# Patient Record
Sex: MALE | Race: WHITE | Employment: OTHER | URBAN - METROPOLITAN AREA
[De-identification: names, ages, dates, MRNs, and addresses within clinical notes are randomized per-mention and may not be internally consistent; named-entity substitution may affect disease eponyms.]

---

## 2017-02-10 ENCOUNTER — TELEPHONE (OUTPATIENT)
Dept: PRIMARY CARE CLINIC | Age: 57
End: 2017-02-10

## 2017-04-07 DIAGNOSIS — E11.618 TYPE 2 DIABETES MELLITUS WITH OTHER DIABETIC ARTHROPATHY (HCC): Primary | ICD-10-CM

## 2017-04-07 DIAGNOSIS — E11.69 DM TYPE 2 WITH DIABETIC DYSLIPIDEMIA (HCC): ICD-10-CM

## 2017-04-07 DIAGNOSIS — E11.618 TYPE 2 DIABETES MELLITUS WITH OTHER DIABETIC ARTHROPATHY (HCC): ICD-10-CM

## 2017-04-07 DIAGNOSIS — I10 ESSENTIAL HYPERTENSION: ICD-10-CM

## 2017-04-07 DIAGNOSIS — E78.5 DM TYPE 2 WITH DIABETIC DYSLIPIDEMIA (HCC): ICD-10-CM

## 2017-04-07 LAB
ANION GAP SERPL CALCULATED.3IONS-SCNC: 18 MEQ/L (ref 7–13)
BUN BLDV-MCNC: 14 MG/DL (ref 6–20)
CALCIUM SERPL-MCNC: 9.7 MG/DL (ref 8.6–10.2)
CHLORIDE BLD-SCNC: 90 MEQ/L (ref 98–107)
CO2: 23 MEQ/L (ref 22–29)
CREAT SERPL-MCNC: 0.79 MG/DL (ref 0.7–1.2)
GFR AFRICAN AMERICAN: >60
GFR NON-AFRICAN AMERICAN: >60
GLUCOSE BLD-MCNC: 458 MG/DL (ref 74–109)
HBA1C MFR BLD: 15.3 % (ref 4.8–5.9)
POTASSIUM SERPL-SCNC: 4.2 MEQ/L (ref 3.5–5.1)
SODIUM BLD-SCNC: 131 MEQ/L (ref 132–144)

## 2017-04-07 RX ORDER — LOSARTAN POTASSIUM 100 MG/1
100 TABLET ORAL DAILY
Qty: 90 TABLET | Refills: 1 | Status: SHIPPED | OUTPATIENT
Start: 2017-04-07 | End: 2017-11-22 | Stop reason: SDUPTHER

## 2017-04-09 LAB — C-PEPTIDE: 1.7 NG/ML (ref 0.8–3.5)

## 2017-04-14 DIAGNOSIS — E11.69 DM TYPE 2 WITH DIABETIC DYSLIPIDEMIA (HCC): ICD-10-CM

## 2017-04-14 DIAGNOSIS — E78.5 DM TYPE 2 WITH DIABETIC DYSLIPIDEMIA (HCC): ICD-10-CM

## 2017-04-14 RX ORDER — INSULIN LISPRO 100 [IU]/ML
INJECTION, SUSPENSION SUBCUTANEOUS
Qty: 90 ML | Refills: 3 | Status: SHIPPED | OUTPATIENT
Start: 2017-04-14 | End: 2017-04-14 | Stop reason: SDUPTHER

## 2017-04-14 RX ORDER — INSULIN LISPRO 100 [IU]/ML
INJECTION, SUSPENSION SUBCUTANEOUS
Qty: 90 ML | Refills: 3 | Status: SHIPPED | OUTPATIENT
Start: 2017-04-14 | End: 2019-03-26 | Stop reason: SDUPTHER

## 2017-11-16 DIAGNOSIS — I10 ESSENTIAL HYPERTENSION: ICD-10-CM

## 2017-11-16 RX ORDER — LOSARTAN POTASSIUM 100 MG/1
100 TABLET ORAL DAILY
Qty: 90 TABLET | OUTPATIENT
Start: 2017-11-16

## 2017-11-22 DIAGNOSIS — I10 ESSENTIAL HYPERTENSION: ICD-10-CM

## 2017-11-22 RX ORDER — LOSARTAN POTASSIUM 100 MG/1
100 TABLET ORAL DAILY
Qty: 30 TABLET | Refills: 0 | Status: SHIPPED | OUTPATIENT
Start: 2017-11-22 | End: 2018-06-19

## 2018-03-14 ENCOUNTER — TELEPHONE (OUTPATIENT)
Dept: ENDOCRINOLOGY | Age: 58
End: 2018-03-14

## 2018-06-19 ENCOUNTER — TELEPHONE (OUTPATIENT)
Dept: PRIMARY CARE CLINIC | Age: 58
End: 2018-06-19

## 2018-06-19 ENCOUNTER — OFFICE VISIT (OUTPATIENT)
Dept: PRIMARY CARE CLINIC | Age: 58
End: 2018-06-19
Payer: COMMERCIAL

## 2018-06-19 VITALS
HEART RATE: 100 BPM | BODY MASS INDEX: 30.31 KG/M2 | WEIGHT: 193.1 LBS | HEIGHT: 67 IN | TEMPERATURE: 98 F | DIASTOLIC BLOOD PRESSURE: 80 MMHG | OXYGEN SATURATION: 97 % | SYSTOLIC BLOOD PRESSURE: 130 MMHG | RESPIRATION RATE: 14 BRPM

## 2018-06-19 DIAGNOSIS — M79.661 PAIN OF RIGHT CALF: ICD-10-CM

## 2018-06-19 DIAGNOSIS — J45.902 PERSISTENT ASTHMA WITH STATUS ASTHMATICUS, UNSPECIFIED ASTHMA SEVERITY: Primary | ICD-10-CM

## 2018-06-19 DIAGNOSIS — R07.89 OTHER CHEST PAIN: ICD-10-CM

## 2018-06-19 PROCEDURE — 99214 OFFICE O/P EST MOD 30 MIN: CPT | Performed by: INTERNAL MEDICINE

## 2018-06-19 RX ORDER — ALBUTEROL SULFATE 90 UG/1
2 AEROSOL, METERED RESPIRATORY (INHALATION) EVERY 6 HOURS PRN
COMMUNITY
End: 2018-12-28 | Stop reason: SDUPTHER

## 2018-06-19 RX ORDER — PREDNISONE 10 MG/1
10 TABLET ORAL DAILY
Qty: 10 TABLET | Refills: 1 | Status: SHIPPED | OUTPATIENT
Start: 2018-06-19 | End: 2018-06-29

## 2018-06-19 RX ORDER — FLUTICASONE FUROATE AND VILANTEROL 100; 25 UG/1; UG/1
1 POWDER RESPIRATORY (INHALATION) DAILY
Qty: 1 EACH | Refills: 5 | Status: SHIPPED | OUTPATIENT
Start: 2018-06-19 | End: 2018-12-26 | Stop reason: SDUPTHER

## 2018-06-19 RX ORDER — PREDNISONE 10 MG/1
TABLET ORAL
Refills: 0 | COMMUNITY
Start: 2018-06-17 | End: 2018-08-14 | Stop reason: ALTCHOICE

## 2018-06-19 ASSESSMENT — PATIENT HEALTH QUESTIONNAIRE - PHQ9
SUM OF ALL RESPONSES TO PHQ QUESTIONS 1-9: 0
2. FEELING DOWN, DEPRESSED OR HOPELESS: 0
1. LITTLE INTEREST OR PLEASURE IN DOING THINGS: 0
SUM OF ALL RESPONSES TO PHQ9 QUESTIONS 1 & 2: 0

## 2018-06-20 ENCOUNTER — HOSPITAL ENCOUNTER (EMERGENCY)
Age: 58
Discharge: HOME | End: 2018-06-20
Attending: EMERGENCY MEDICINE
Payer: COMMERCIAL

## 2018-06-20 VITALS
RESPIRATION RATE: 18 BRPM | OXYGEN SATURATION: 100 % | DIASTOLIC BLOOD PRESSURE: 83 MMHG | WEIGHT: 193 LBS | HEIGHT: 67 IN | HEART RATE: 112 BPM | TEMPERATURE: 98.3 F | BODY MASS INDEX: 30.29 KG/M2 | SYSTOLIC BLOOD PRESSURE: 158 MMHG

## 2018-06-20 DIAGNOSIS — S86.111A GASTROCNEMIUS MUSCLE RUPTURE, RIGHT, INITIAL ENCOUNTER: Primary | ICD-10-CM

## 2018-06-20 PROCEDURE — 99282 EMERGENCY DEPT VISIT SF MDM: CPT

## 2018-06-20 PROCEDURE — 6370000000 HC RX 637 (ALT 250 FOR IP): Performed by: EMERGENCY MEDICINE

## 2018-06-20 PROCEDURE — 29505 APPLICATION LONG LEG SPLINT: CPT

## 2018-06-20 PROCEDURE — 6360000002 HC RX W HCPCS: Performed by: EMERGENCY MEDICINE

## 2018-06-20 RX ORDER — HYDROCODONE BITARTRATE AND ACETAMINOPHEN 5; 325 MG/1; MG/1
1 TABLET ORAL EVERY 6 HOURS PRN
Qty: 24 TABLET | Refills: 0 | Status: SHIPPED | OUTPATIENT
Start: 2018-06-20 | End: 2018-06-26

## 2018-06-20 RX ORDER — ONDANSETRON 4 MG/1
4 TABLET, ORALLY DISINTEGRATING ORAL ONCE
Status: COMPLETED | OUTPATIENT
Start: 2018-06-20 | End: 2018-06-20

## 2018-06-20 RX ORDER — OXYCODONE HYDROCHLORIDE AND ACETAMINOPHEN 5; 325 MG/1; MG/1
2 TABLET ORAL ONCE
Status: COMPLETED | OUTPATIENT
Start: 2018-06-20 | End: 2018-06-20

## 2018-06-20 RX ADMIN — ONDANSETRON 4 MG: 4 TABLET, ORALLY DISINTEGRATING ORAL at 13:01

## 2018-06-20 RX ADMIN — OXYCODONE HYDROCHLORIDE AND ACETAMINOPHEN 2 TABLET: 5; 325 TABLET ORAL at 13:00

## 2018-06-20 ASSESSMENT — ENCOUNTER SYMPTOMS
VOMITING: 0
WHEEZING: 0
ABDOMINAL PAIN: 0
EYES NEGATIVE: 1
NAUSEA: 0
SHORTNESS OF BREATH: 0
ALLERGIC/IMMUNOLOGIC NEGATIVE: 1
COLOR CHANGE: 0

## 2018-06-20 ASSESSMENT — PAIN DESCRIPTION - LOCATION: LOCATION: LEG

## 2018-06-20 ASSESSMENT — PAIN SCALES - GENERAL
PAINLEVEL_OUTOF10: 10
PAINLEVEL_OUTOF10: 10

## 2018-06-20 ASSESSMENT — PAIN DESCRIPTION - PAIN TYPE: TYPE: ACUTE PAIN

## 2018-06-20 ASSESSMENT — PAIN DESCRIPTION - ORIENTATION: ORIENTATION: RIGHT;LOWER

## 2018-06-20 ASSESSMENT — PAIN DESCRIPTION - DESCRIPTORS: DESCRIPTORS: ACHING

## 2018-06-22 ENCOUNTER — APPOINTMENT (OUTPATIENT)
Dept: ULTRASOUND IMAGING | Age: 58
End: 2018-06-22
Payer: COMMERCIAL

## 2018-06-22 ENCOUNTER — HOSPITAL ENCOUNTER (EMERGENCY)
Age: 58
Discharge: HOME | End: 2018-06-22
Attending: STUDENT IN AN ORGANIZED HEALTH CARE EDUCATION/TRAINING PROGRAM
Payer: COMMERCIAL

## 2018-06-22 ENCOUNTER — TELEPHONE (OUTPATIENT)
Dept: PRIMARY CARE CLINIC | Age: 58
End: 2018-06-22

## 2018-06-22 VITALS
HEART RATE: 105 BPM | SYSTOLIC BLOOD PRESSURE: 130 MMHG | OXYGEN SATURATION: 97 % | TEMPERATURE: 98.2 F | HEIGHT: 67 IN | WEIGHT: 190 LBS | RESPIRATION RATE: 16 BRPM | DIASTOLIC BLOOD PRESSURE: 103 MMHG | BODY MASS INDEX: 29.82 KG/M2

## 2018-06-22 DIAGNOSIS — S86.111D: Primary | ICD-10-CM

## 2018-06-22 PROCEDURE — 99283 EMERGENCY DEPT VISIT LOW MDM: CPT

## 2018-06-22 PROCEDURE — 93971 EXTREMITY STUDY: CPT

## 2018-06-22 RX ORDER — OXYCODONE HYDROCHLORIDE AND ACETAMINOPHEN 5; 325 MG/1; MG/1
1 TABLET ORAL EVERY 4 HOURS PRN
Qty: 10 TABLET | Refills: 0 | Status: SHIPPED | OUTPATIENT
Start: 2018-06-22 | End: 2018-06-29

## 2018-06-22 ASSESSMENT — PAIN DESCRIPTION - LOCATION: LOCATION: LEG

## 2018-06-22 ASSESSMENT — PAIN DESCRIPTION - PAIN TYPE: TYPE: ACUTE PAIN

## 2018-06-22 ASSESSMENT — PAIN DESCRIPTION - DESCRIPTORS: DESCRIPTORS: THROBBING;BURNING

## 2018-06-22 ASSESSMENT — PAIN DESCRIPTION - FREQUENCY: FREQUENCY: CONTINUOUS

## 2018-06-22 ASSESSMENT — ENCOUNTER SYMPTOMS
ABDOMINAL PAIN: 0
SHORTNESS OF BREATH: 0
BACK PAIN: 0
COUGH: 0

## 2018-06-22 ASSESSMENT — PAIN DESCRIPTION - PROGRESSION: CLINICAL_PROGRESSION: GRADUALLY WORSENING

## 2018-06-22 ASSESSMENT — PAIN SCALES - GENERAL: PAINLEVEL_OUTOF10: 10

## 2018-06-22 ASSESSMENT — PAIN DESCRIPTION - ONSET: ONSET: ON-GOING

## 2018-06-22 ASSESSMENT — PAIN DESCRIPTION - ORIENTATION: ORIENTATION: RIGHT

## 2018-06-23 ASSESSMENT — ENCOUNTER SYMPTOMS
NAUSEA: 0
SHORTNESS OF BREATH: 0
VOICE CHANGE: 0
WHEEZING: 1
PHOTOPHOBIA: 0
VOMITING: 0
CHOKING: 0
TROUBLE SWALLOWING: 0
CHEST TIGHTNESS: 1

## 2018-06-25 ENCOUNTER — HOSPITAL ENCOUNTER (OUTPATIENT)
Dept: MRI IMAGING | Age: 58
Discharge: HOME | End: 2018-06-27
Payer: COMMERCIAL

## 2018-06-25 DIAGNOSIS — M79.661 PAIN OF RIGHT CALF: ICD-10-CM

## 2018-06-25 PROCEDURE — 73718 MRI LOWER EXTREMITY W/O DYE: CPT

## 2018-06-26 LAB
ANION GAP SERPL CALCULATED.3IONS-SCNC: 21 MMOL/L (ref 10–20)
ANION GAP SERPL CALCULATED.3IONS-SCNC: 28 MMOL/L (ref 10–20)
BICARBONATE: 13 MMOL/L (ref 21–32)
BICARBONATE: 9 MMOL/L (ref 21–32)
BUN / CREAT RATIO: 23 (ref 5–25)
BUN / CREAT RATIO: 23 (ref 5–25)
CALCIUM SERPL-MCNC: 8.9 MG/DL (ref 8.6–10.3)
CALCIUM SERPL-MCNC: 9 MG/DL (ref 8.6–10.3)
CHLORIDE BLD-SCNC: 103 MMOL/L (ref 98–107)
CHLORIDE BLD-SCNC: 98 MMOL/L (ref 98–107)
CREAT SERPL-MCNC: 1.13 MG/DL (ref 0.5–1.3)
CREAT SERPL-MCNC: 1.2 MG/DL (ref 0.5–1.3)
GFR CALCULATED: >60
GFR CALCULATED: >60
GLUCOSE BLD-MCNC: 226 MG/DL (ref 70–100)
GLUCOSE BLD-MCNC: 280 MG/DL (ref 70–100)
GLUCOSE BLD-MCNC: 328 MG/DL (ref 70–100)
GLUCOSE BLD-MCNC: 329 MG/DL (ref 70–100)
GLUCOSE BLD-MCNC: 338 MG/DL (ref 70–100)
GLUCOSE BLD-MCNC: 379 MG/DL (ref 70–100)
GLUCOSE BLD-MCNC: 393 MG/DL (ref 70–100)
GLUCOSE BLD-MCNC: 411 MG/DL (ref 70–100)
GLUCOSE: 266 MG/DL (ref 70–100)
GLUCOSE: 384 MG/DL (ref 70–100)
POTASSIUM SERPL-SCNC: 4.8 MMOL/L (ref 3.5–5.1)
POTASSIUM SERPL-SCNC: 4.8 MMOL/L (ref 3.5–5.1)
SODIUM BLD-SCNC: 130 MMOL/L (ref 136–145)
SODIUM BLD-SCNC: 132 MMOL/L (ref 136–145)
UREA NITROGEN: 26 MG/DL (ref 6–23)
UREA NITROGEN: 28 MG/DL (ref 6–23)

## 2018-06-27 LAB
ANION GAP SERPL CALCULATED.3IONS-SCNC: 11 MMOL/L (ref 10–20)
ANION GAP SERPL CALCULATED.3IONS-SCNC: 12 MMOL/L (ref 10–20)
ANION GAP SERPL CALCULATED.3IONS-SCNC: 14 MMOL/L (ref 10–20)
ANION GAP SERPL CALCULATED.3IONS-SCNC: 15 MMOL/L (ref 10–20)
ANION GAP SERPL CALCULATED.3IONS-SCNC: 15 MMOL/L (ref 10–20)
ANION GAP SERPL CALCULATED.3IONS-SCNC: 16 MMOL/L (ref 10–20)
BETA-HYDROXYBUTYRATE: 0.7 MMOL (ref 0.02–0.27)
BICARBONATE: 16 MMOL/L (ref 21–32)
BICARBONATE: 17 MMOL/L (ref 21–32)
BICARBONATE: 17 MMOL/L (ref 21–32)
BICARBONATE: 18 MMOL/L (ref 21–32)
BICARBONATE: 20 MMOL/L (ref 21–32)
BICARBONATE: 20 MMOL/L (ref 21–32)
BUN / CREAT RATIO: 22 (ref 5–25)
BUN / CREAT RATIO: 22 (ref 5–25)
BUN / CREAT RATIO: 23 (ref 5–25)
BUN / CREAT RATIO: 23 (ref 5–25)
BUN / CREAT RATIO: 24 (ref 5–25)
BUN / CREAT RATIO: 24 (ref 5–25)
CALCIUM SERPL-MCNC: 8.3 MG/DL (ref 8.6–10.3)
CALCIUM SERPL-MCNC: 8.4 MG/DL (ref 8.6–10.3)
CALCIUM SERPL-MCNC: 8.5 MG/DL (ref 8.6–10.3)
CALCIUM SERPL-MCNC: 8.6 MG/DL (ref 8.6–10.3)
CALCIUM SERPL-MCNC: 8.7 MG/DL (ref 8.6–10.3)
CALCIUM SERPL-MCNC: 8.8 MG/DL (ref 8.6–10.3)
CHLORIDE BLD-SCNC: 106 MMOL/L (ref 98–107)
CHLORIDE BLD-SCNC: 107 MMOL/L (ref 98–107)
CHLORIDE BLD-SCNC: 108 MMOL/L (ref 98–107)
CHLORIDE BLD-SCNC: 108 MMOL/L (ref 98–107)
CREAT SERPL-MCNC: 0.83 MG/DL (ref 0.5–1.3)
CREAT SERPL-MCNC: 0.83 MG/DL (ref 0.5–1.3)
CREAT SERPL-MCNC: 0.89 MG/DL (ref 0.5–1.3)
CREAT SERPL-MCNC: 0.92 MG/DL (ref 0.5–1.3)
CREAT SERPL-MCNC: 0.97 MG/DL (ref 0.5–1.3)
CREAT SERPL-MCNC: 0.98 MG/DL (ref 0.5–1.3)
ERYTHROCYTE [DISTWIDTH] IN BLOOD BY AUTOMATED COUNT: 13.6 % (ref 12–15.4)
ERYTHROCYTE [DISTWIDTH] IN BLOOD BY AUTOMATED COUNT: 41.4 FL (ref 39.3–48.6)
GFR CALCULATED: >60
GLUCOSE BLD-MCNC: 127 MG/DL (ref 70–100)
GLUCOSE BLD-MCNC: 129 MG/DL (ref 70–100)
GLUCOSE BLD-MCNC: 132 MG/DL (ref 70–100)
GLUCOSE BLD-MCNC: 144 MG/DL (ref 70–100)
GLUCOSE BLD-MCNC: 148 MG/DL (ref 70–100)
GLUCOSE BLD-MCNC: 151 MG/DL (ref 70–100)
GLUCOSE BLD-MCNC: 153 MG/DL (ref 70–100)
GLUCOSE BLD-MCNC: 157 MG/DL (ref 70–100)
GLUCOSE BLD-MCNC: 159 MG/DL (ref 70–100)
GLUCOSE BLD-MCNC: 161 MG/DL (ref 70–100)
GLUCOSE BLD-MCNC: 167 MG/DL (ref 70–100)
GLUCOSE BLD-MCNC: 170 MG/DL (ref 70–100)
GLUCOSE BLD-MCNC: 177 MG/DL (ref 70–100)
GLUCOSE BLD-MCNC: 190 MG/DL (ref 70–100)
GLUCOSE BLD-MCNC: 196 MG/DL (ref 70–100)
GLUCOSE BLD-MCNC: 198 MG/DL (ref 70–100)
GLUCOSE BLD-MCNC: 209 MG/DL (ref 70–100)
GLUCOSE BLD-MCNC: 210 MG/DL (ref 70–100)
GLUCOSE BLD-MCNC: 221 MG/DL (ref 70–100)
GLUCOSE BLD-MCNC: 232 MG/DL (ref 70–100)
GLUCOSE BLD-MCNC: 236 MG/DL (ref 70–100)
GLUCOSE: 137 MG/DL (ref 70–100)
GLUCOSE: 167 MG/DL (ref 70–100)
GLUCOSE: 185 MG/DL (ref 70–100)
GLUCOSE: 201 MG/DL (ref 70–100)
GLUCOSE: 228 MG/DL (ref 70–100)
GLUCOSE: 236 MG/DL (ref 70–100)
HCT VFR BLD CALC: 38.2 % (ref 38.4–54.9)
HEMOGLOBIN: 12.5 G/DL (ref 12.8–17.7)
MCH RBC QN AUTO: 27.5 PG (ref 27.5–32.9)
MCHC RBC AUTO-ENTMCNC: 32.7 G/DL (ref 30.5–35.4)
MCV RBC AUTO: 84 FL (ref 83.3–98.2)
NUCLEATED RBCS: 0 /100{WBCS}
PLATELET # BLD: 209 10*3/UL (ref 155–404)
PMV BLD AUTO: 8.7 FL (ref 9.9–12.1)
POTASSIUM SERPL-SCNC: 3.6 MMOL/L (ref 3.5–5.1)
POTASSIUM SERPL-SCNC: 3.8 MMOL/L (ref 3.5–5.1)
POTASSIUM SERPL-SCNC: 4 MMOL/L (ref 3.5–5.1)
POTASSIUM SERPL-SCNC: 4 MMOL/L (ref 3.5–5.1)
POTASSIUM SERPL-SCNC: 4.1 MMOL/L (ref 3.5–5.1)
POTASSIUM SERPL-SCNC: 4.2 MMOL/L (ref 3.5–5.1)
RBC: 4.55 10*6/UL (ref 4.08–6.37)
RBCS COUNTED: 0 10*3/UL
SODIUM BLD-SCNC: 133 MMOL/L (ref 136–145)
SODIUM BLD-SCNC: 134 MMOL/L (ref 136–145)
SODIUM BLD-SCNC: 135 MMOL/L (ref 136–145)
SODIUM BLD-SCNC: 136 MMOL/L (ref 136–145)
UREA NITROGEN: 18 MG/DL (ref 6–23)
UREA NITROGEN: 19 MG/DL (ref 6–23)
UREA NITROGEN: 20 MG/DL (ref 6–23)
UREA NITROGEN: 21 MG/DL (ref 6–23)
UREA NITROGEN: 22 MG/DL (ref 6–23)
UREA NITROGEN: 24 MG/DL (ref 6–23)
WBC: 25.2 10*3/UL (ref 4.2–11)

## 2018-06-28 LAB
ANION GAP SERPL CALCULATED.3IONS-SCNC: 14 MMOL/L (ref 10–20)
BICARBONATE: 18 MMOL/L (ref 21–32)
BUN / CREAT RATIO: 25 (ref 5–25)
CALCIUM SERPL-MCNC: 8.2 MG/DL (ref 8.6–10.3)
CHLORIDE BLD-SCNC: 106 MMOL/L (ref 98–107)
CREAT SERPL-MCNC: 0.76 MG/DL (ref 0.5–1.3)
ERYTHROCYTE [DISTWIDTH] IN BLOOD BY AUTOMATED COUNT: 13.7 % (ref 12–15.4)
ERYTHROCYTE [DISTWIDTH] IN BLOOD BY AUTOMATED COUNT: 42 FL (ref 39.3–48.6)
GFR CALCULATED: >60
GLUCOSE BLD-MCNC: 231 MG/DL (ref 70–100)
GLUCOSE BLD-MCNC: 242 MG/DL (ref 70–100)
GLUCOSE BLD-MCNC: 277 MG/DL (ref 70–100)
GLUCOSE BLD-MCNC: 297 MG/DL (ref 70–100)
GLUCOSE BLD-MCNC: 324 MG/DL (ref 70–100)
GLUCOSE: 264 MG/DL (ref 70–100)
HCT VFR BLD CALC: 35.5 % (ref 38.4–54.9)
HEMOGLOBIN: 11.7 G/DL (ref 12.8–17.7)
MCH RBC QN AUTO: 27.5 PG (ref 27.5–32.9)
MCHC RBC AUTO-ENTMCNC: 33 G/DL (ref 30.5–35.4)
MCV RBC AUTO: 83.5 FL (ref 83.3–98.2)
NUCLEATED RBCS: 0 /100{WBCS}
PLATELET # BLD: 192 10*3/UL (ref 155–404)
PMV BLD AUTO: 8.9 FL (ref 9.9–12.1)
POTASSIUM SERPL-SCNC: 4 MMOL/L (ref 3.5–5.1)
RBC: 4.25 10*6/UL (ref 4.08–6.37)
RBCS COUNTED: 0 10*3/UL
SODIUM BLD-SCNC: 134 MMOL/L (ref 136–145)
UREA NITROGEN: 19 MG/DL (ref 6–23)
WBC: 18.5 10*3/UL (ref 4.2–11)

## 2018-06-29 LAB
ERYTHROCYTE [DISTWIDTH] IN BLOOD BY AUTOMATED COUNT: 13.9 % (ref 12–15.4)
ERYTHROCYTE [DISTWIDTH] IN BLOOD BY AUTOMATED COUNT: 41.7 FL (ref 39.3–48.6)
GLUCOSE BLD-MCNC: 276 MG/DL (ref 70–100)
GLUCOSE BLD-MCNC: 300 MG/DL (ref 70–100)
GLUCOSE BLD-MCNC: 321 MG/DL (ref 70–100)
GLUCOSE BLD-MCNC: 325 MG/DL (ref 70–100)
HCT VFR BLD CALC: 33.7 % (ref 38.4–54.9)
HEMOGLOBIN: 11.3 G/DL (ref 12.8–17.7)
MCH RBC QN AUTO: 27.6 PG (ref 27.5–32.9)
MCHC RBC AUTO-ENTMCNC: 33.5 G/DL (ref 30.5–35.4)
MCV RBC AUTO: 82.4 FL (ref 83.3–98.2)
NUCLEATED RBCS: 0 /100{WBCS}
PLATELET # BLD: 155 10*3/UL (ref 155–404)
PMV BLD AUTO: 9.2 FL (ref 9.9–12.1)
RBC: 4.09 10*6/UL (ref 4.08–6.37)
RBCS COUNTED: 0 10*3/UL
WBC: 15.1 10*3/UL (ref 4.2–11)

## 2018-06-30 LAB
ERYTHROCYTE [DISTWIDTH] IN BLOOD BY AUTOMATED COUNT: 13.6 % (ref 12–15.4)
ERYTHROCYTE [DISTWIDTH] IN BLOOD BY AUTOMATED COUNT: 41.3 FL (ref 39.3–48.6)
GLUCOSE BLD-MCNC: 177 MG/DL (ref 70–100)
GLUCOSE BLD-MCNC: 230 MG/DL (ref 70–100)
GLUCOSE BLD-MCNC: 268 MG/DL (ref 70–100)
GLUCOSE BLD-MCNC: 283 MG/DL (ref 70–100)
HCT VFR BLD CALC: 33.3 % (ref 38.4–54.9)
HEMOGLOBIN: 11.2 G/DL (ref 12.8–17.7)
MCH RBC QN AUTO: 27.7 PG (ref 27.5–32.9)
MCHC RBC AUTO-ENTMCNC: 33.6 G/DL (ref 30.5–35.4)
MCV RBC AUTO: 82.2 FL (ref 83.3–98.2)
NUCLEATED RBCS: 0 /100{WBCS}
PLATELET # BLD: 134 10*3/UL (ref 155–404)
PMV BLD AUTO: 9.5 FL (ref 9.9–12.1)
RBC: 4.05 10*6/UL (ref 4.08–6.37)
RBCS COUNTED: 0 10*3/UL
WBC: 15.8 10*3/UL (ref 4.2–11)

## 2018-07-01 LAB
GLUCOSE BLD-MCNC: 169 MG/DL (ref 70–100)
GLUCOSE BLD-MCNC: 201 MG/DL (ref 70–100)
GLUCOSE BLD-MCNC: 227 MG/DL (ref 70–100)
GLUCOSE BLD-MCNC: 316 MG/DL (ref 70–100)
GLUCOSE BLD-MCNC: 365 MG/DL (ref 70–100)

## 2018-07-02 LAB
GLUCOSE BLD-MCNC: 152 MG/DL (ref 70–100)
GLUCOSE BLD-MCNC: 167 MG/DL (ref 70–100)
GLUCOSE BLD-MCNC: 175 MG/DL (ref 70–100)
GLUCOSE BLD-MCNC: 189 MG/DL (ref 70–100)
GLUCOSE BLD-MCNC: 253 MG/DL (ref 70–100)

## 2018-07-03 LAB
GLUCOSE BLD-MCNC: 122 MG/DL (ref 70–100)
GLUCOSE BLD-MCNC: 157 MG/DL (ref 70–100)
GLUCOSE BLD-MCNC: 167 MG/DL (ref 70–100)
GLUCOSE BLD-MCNC: 184 MG/DL (ref 70–100)

## 2018-07-04 LAB
GLUCOSE BLD-MCNC: 143 MG/DL (ref 70–100)
GLUCOSE BLD-MCNC: 145 MG/DL (ref 70–100)
GLUCOSE BLD-MCNC: 200 MG/DL (ref 70–100)
GLUCOSE BLD-MCNC: 235 MG/DL (ref 70–100)

## 2018-07-05 LAB
GLUCOSE BLD-MCNC: 144 MG/DL (ref 70–100)
GLUCOSE BLD-MCNC: 210 MG/DL (ref 70–100)
GLUCOSE BLD-MCNC: 211 MG/DL (ref 70–100)
GLUCOSE BLD-MCNC: 248 MG/DL (ref 70–100)

## 2018-07-06 LAB
GLUCOSE BLD-MCNC: 161 MG/DL (ref 70–100)
GLUCOSE BLD-MCNC: 181 MG/DL (ref 70–100)
GLUCOSE BLD-MCNC: 183 MG/DL (ref 70–100)
GLUCOSE BLD-MCNC: 184 MG/DL (ref 70–100)

## 2018-07-07 LAB
GLUCOSE BLD-MCNC: 142 MG/DL (ref 70–100)
GLUCOSE BLD-MCNC: 152 MG/DL (ref 70–100)
GLUCOSE BLD-MCNC: 177 MG/DL (ref 70–100)
GLUCOSE BLD-MCNC: 207 MG/DL (ref 70–100)

## 2018-07-08 LAB
GLUCOSE BLD-MCNC: 123 MG/DL (ref 70–100)
GLUCOSE BLD-MCNC: 131 MG/DL (ref 70–100)
GLUCOSE BLD-MCNC: 152 MG/DL (ref 70–100)
GLUCOSE BLD-MCNC: 152 MG/DL (ref 70–100)
GLUCOSE BLD-MCNC: 187 MG/DL (ref 70–100)

## 2018-07-09 ENCOUNTER — TELEPHONE (OUTPATIENT)
Dept: PRIMARY CARE CLINIC | Age: 58
End: 2018-07-09

## 2018-07-09 LAB
GLUCOSE BLD-MCNC: 137 MG/DL (ref 70–100)
GLUCOSE BLD-MCNC: 143 MG/DL (ref 70–100)
GLUCOSE BLD-MCNC: 168 MG/DL (ref 70–100)
GLUCOSE BLD-MCNC: 247 MG/DL (ref 70–100)

## 2018-07-09 NOTE — TELEPHONE ENCOUNTER
He is in [place] [address] and is asking if you can give him a call? He is being discharged today to rehab. His # is [phone].

## 2018-07-10 LAB
GLUCOSE BLD-MCNC: 122 MG/DL (ref 70–100)
GLUCOSE BLD-MCNC: 126 MG/DL (ref 70–100)

## 2018-07-17 LAB
BASOPHILS ABSOLUTE: 0.1 /ΜL
BASOPHILS RELATIVE PERCENT: 0.8 %
BUN BLDV-MCNC: 10 MG/DL
CALCIUM SERPL-MCNC: 8.5 MG/DL
CHLORIDE BLD-SCNC: 99 MMOL/L
CO2: 27 MMOL/L
CREAT SERPL-MCNC: NORMAL MG/DL
EOSINOPHILS ABSOLUTE: 0.5 /ΜL
EOSINOPHILS RELATIVE PERCENT: 6.3 %
GFR CALCULATED: NORMAL
GLUCOSE BLD-MCNC: 163 MG/DL
HCT VFR BLD CALC: 32 % (ref 41–53)
HEMOGLOBIN: 10.8 G/DL (ref 13.5–17.5)
LYMPHOCYTES ABSOLUTE: 0.8 /ΜL
LYMPHOCYTES RELATIVE PERCENT: 9.2 %
MCH RBC QN AUTO: 26.4 PG
MCHC RBC AUTO-ENTMCNC: 33.6 G/DL
MCV RBC AUTO: 78.7 FL
MONOCYTES ABSOLUTE: 0.7 /ΜL
MONOCYTES RELATIVE PERCENT: 7.7 %
NEUTROPHILS ABSOLUTE: 6.6 /ΜL
NEUTROPHILS RELATIVE PERCENT: 76 %
PLATELET # BLD: 222 K/ΜL
PMV BLD AUTO: 6.9 FL
POTASSIUM SERPL-SCNC: 4.6 MMOL/L
RBC # BLD: 4.07 10^6/ΜL
SODIUM BLD-SCNC: 137 MMOL/L
TSH SERPL DL<=0.05 MIU/L-ACNC: 1.28 UIU/ML
VANCOMYCIN TROUGH: 7.5
WBC # BLD: 8.7 10^3/ML

## 2018-07-17 RX ORDER — MONTELUKAST SODIUM 10 MG/1
10 TABLET ORAL NIGHTLY
COMMUNITY
End: 2018-08-20 | Stop reason: SDUPTHER

## 2018-07-17 RX ORDER — ATORVASTATIN CALCIUM 40 MG/1
40 TABLET, FILM COATED ORAL DAILY
COMMUNITY
End: 2018-08-20 | Stop reason: SDUPTHER

## 2018-07-17 RX ORDER — VANCOMYCIN HCL-SODIUM CHLORIDE IV SOLN 1.5 GM/250ML-0.9% 1.5-0.9/25 GM/ML-%
SOLUTION INTRAVENOUS
COMMUNITY
End: 2018-08-14 | Stop reason: ALTCHOICE

## 2018-07-17 RX ORDER — PANTOPRAZOLE SODIUM 40 MG/1
40 TABLET, DELAYED RELEASE ORAL DAILY
COMMUNITY
End: 2018-08-20 | Stop reason: SDUPTHER

## 2018-07-17 RX ORDER — OXYCODONE HYDROCHLORIDE 5 MG/1
5 TABLET ORAL EVERY 4 HOURS PRN
COMMUNITY
End: 2018-08-20 | Stop reason: SDUPTHER

## 2018-07-17 RX ORDER — LISINOPRIL 10 MG/1
10 TABLET ORAL DAILY
COMMUNITY
End: 2018-08-20 | Stop reason: SDUPTHER

## 2018-07-18 ENCOUNTER — OFFICE VISIT (OUTPATIENT)
Dept: GERIATRIC MEDICINE | Age: 58
End: 2018-07-18
Payer: COMMERCIAL

## 2018-07-18 ENCOUNTER — TELEPHONE (OUTPATIENT)
Dept: INFECTIOUS DISEASES | Age: 58
End: 2018-07-18

## 2018-07-18 DIAGNOSIS — L03.90 CELLULITIS, UNSPECIFIED CELLULITIS SITE: Primary | ICD-10-CM

## 2018-07-18 DIAGNOSIS — R53.1 WEAKNESS: ICD-10-CM

## 2018-07-18 DIAGNOSIS — E11.9 TYPE 2 DIABETES MELLITUS WITHOUT COMPLICATION, UNSPECIFIED LONG TERM INSULIN USE STATUS: ICD-10-CM

## 2018-07-18 DIAGNOSIS — I10 ESSENTIAL HYPERTENSION: ICD-10-CM

## 2018-07-18 PROCEDURE — 99305 1ST NF CARE MODERATE MDM 35: CPT | Performed by: INTERNAL MEDICINE

## 2018-07-23 ENCOUNTER — OFFICE VISIT (OUTPATIENT)
Dept: INFECTIOUS DISEASES | Age: 58
End: 2018-07-23
Payer: COMMERCIAL

## 2018-07-23 VITALS
RESPIRATION RATE: 10 BRPM | HEIGHT: 67 IN | SYSTOLIC BLOOD PRESSURE: 107 MMHG | HEART RATE: 118 BPM | DIASTOLIC BLOOD PRESSURE: 69 MMHG

## 2018-07-23 DIAGNOSIS — S70.11XD HEMATOMA OF RIGHT THIGH, SUBSEQUENT ENCOUNTER: Primary | ICD-10-CM

## 2018-07-23 DIAGNOSIS — B95.61 STAPHYLOCOCCUS AUREUS BACTEREMIA: ICD-10-CM

## 2018-07-23 DIAGNOSIS — R78.81 STAPHYLOCOCCUS AUREUS BACTEREMIA: ICD-10-CM

## 2018-07-23 PROBLEM — S70.11XA HEMATOMA OF RIGHT THIGH: Status: ACTIVE | Noted: 2018-07-23

## 2018-07-23 PROCEDURE — 99213 OFFICE O/P EST LOW 20 MIN: CPT | Performed by: INTERNAL MEDICINE

## 2018-07-24 ENCOUNTER — TELEPHONE (OUTPATIENT)
Dept: INFECTIOUS DISEASES | Age: 58
End: 2018-07-24

## 2018-07-24 DIAGNOSIS — E11.69 DM TYPE 2 WITH DIABETIC DYSLIPIDEMIA (HCC): ICD-10-CM

## 2018-07-24 DIAGNOSIS — E78.5 DM TYPE 2 WITH DIABETIC DYSLIPIDEMIA (HCC): ICD-10-CM

## 2018-07-24 RX ORDER — CEPHALEXIN 500 MG/1
500 CAPSULE ORAL 3 TIMES DAILY
Qty: 90 CAPSULE | Refills: 0 | Status: SHIPPED | OUTPATIENT
Start: 2018-07-24 | End: 2018-08-14 | Stop reason: ALTCHOICE

## 2018-07-26 VITALS — DIASTOLIC BLOOD PRESSURE: 68 MMHG | SYSTOLIC BLOOD PRESSURE: 124 MMHG | HEART RATE: 86 BPM | TEMPERATURE: 98.9 F

## 2018-08-03 ENCOUNTER — OFFICE VISIT (OUTPATIENT)
Dept: PRIMARY CARE CLINIC | Age: 58
End: 2018-08-03
Payer: COMMERCIAL

## 2018-08-03 VITALS
RESPIRATION RATE: 16 BRPM | HEIGHT: 67 IN | DIASTOLIC BLOOD PRESSURE: 64 MMHG | SYSTOLIC BLOOD PRESSURE: 106 MMHG | HEART RATE: 100 BPM | WEIGHT: 181 LBS | BODY MASS INDEX: 28.41 KG/M2

## 2018-08-03 DIAGNOSIS — L03.115 CELLULITIS OF LEG, RIGHT: ICD-10-CM

## 2018-08-03 DIAGNOSIS — M79.604 LEG PAIN, RIGHT: Primary | ICD-10-CM

## 2018-08-03 DIAGNOSIS — E11.9 CONTROLLED TYPE 2 DIABETES MELLITUS WITHOUT COMPLICATION, UNSPECIFIED WHETHER LONG TERM INSULIN USE (HCC): ICD-10-CM

## 2018-08-03 PROCEDURE — 99214 OFFICE O/P EST MOD 30 MIN: CPT | Performed by: INTERNAL MEDICINE

## 2018-08-03 RX ORDER — OXYCODONE HYDROCHLORIDE AND ACETAMINOPHEN 5; 325 MG/1; MG/1
1 TABLET ORAL EVERY 6 HOURS PRN
Qty: 28 TABLET | Refills: 0 | Status: SHIPPED | OUTPATIENT
Start: 2018-08-03 | End: 2018-08-10

## 2018-08-03 NOTE — PROGRESS NOTES
Site: Left Arm   Position: Sitting   Cuff Size: Medium Adult   Pulse: 100   Resp: 16   Weight: 181 lb (82.1 kg)   Height: 5' 7\" (1.702 m)       Physical Exam

## 2018-08-07 ENCOUNTER — TELEPHONE (OUTPATIENT)
Dept: PRIMARY CARE CLINIC | Age: 58
End: 2018-08-07

## 2018-08-07 ASSESSMENT — ENCOUNTER SYMPTOMS
NAUSEA: 1
SHORTNESS OF BREATH: 0
TROUBLE SWALLOWING: 0
VOICE CHANGE: 0
ABDOMINAL PAIN: 1
PHOTOPHOBIA: 0
CHOKING: 0
BLOATING: 1
ABDOMINAL DISTENTION: 1
JAUNDICE: 0
VOMITING: 0

## 2018-08-09 LAB — DIABETIC RETINOPATHY: POSITIVE

## 2018-08-11 LAB — DIABETIC RETINOPATHY: POSITIVE

## 2018-08-14 ENCOUNTER — OFFICE VISIT (OUTPATIENT)
Dept: ENDOCRINOLOGY | Age: 58
End: 2018-08-14
Payer: COMMERCIAL

## 2018-08-14 VITALS
SYSTOLIC BLOOD PRESSURE: 127 MMHG | DIASTOLIC BLOOD PRESSURE: 83 MMHG | BODY MASS INDEX: 27.62 KG/M2 | WEIGHT: 176 LBS | HEART RATE: 124 BPM | HEIGHT: 67 IN

## 2018-08-14 DIAGNOSIS — Z79.4 TYPE 2 DIABETES MELLITUS WITH OTHER DIABETIC ARTHROPATHY, WITH LONG-TERM CURRENT USE OF INSULIN (HCC): Primary | ICD-10-CM

## 2018-08-14 DIAGNOSIS — E11.618 TYPE 2 DIABETES MELLITUS WITH OTHER DIABETIC ARTHROPATHY, WITH LONG-TERM CURRENT USE OF INSULIN (HCC): Primary | ICD-10-CM

## 2018-08-14 LAB
GLUCOSE BLD-MCNC: 150 MG/DL
HBA1C MFR BLD: 7.1 %

## 2018-08-14 PROCEDURE — 82962 GLUCOSE BLOOD TEST: CPT | Performed by: INTERNAL MEDICINE

## 2018-08-14 PROCEDURE — 83036 HEMOGLOBIN GLYCOSYLATED A1C: CPT | Performed by: INTERNAL MEDICINE

## 2018-08-14 PROCEDURE — 99213 OFFICE O/P EST LOW 20 MIN: CPT | Performed by: INTERNAL MEDICINE

## 2018-08-14 ASSESSMENT — ENCOUNTER SYMPTOMS: VISUAL CHANGE: 1

## 2018-08-20 ENCOUNTER — OFFICE VISIT (OUTPATIENT)
Dept: PRIMARY CARE CLINIC | Age: 58
End: 2018-08-20
Payer: COMMERCIAL

## 2018-08-20 VITALS
WEIGHT: 178 LBS | OXYGEN SATURATION: 99 % | DIASTOLIC BLOOD PRESSURE: 88 MMHG | RESPIRATION RATE: 16 BRPM | TEMPERATURE: 97.8 F | HEIGHT: 67 IN | SYSTOLIC BLOOD PRESSURE: 122 MMHG | HEART RATE: 98 BPM | BODY MASS INDEX: 27.94 KG/M2

## 2018-08-20 DIAGNOSIS — I10 ESSENTIAL HYPERTENSION, BENIGN: Primary | ICD-10-CM

## 2018-08-20 DIAGNOSIS — S80.12XD HEMATOMA OF LEFT LOWER EXTREMITY, SUBSEQUENT ENCOUNTER: ICD-10-CM

## 2018-08-20 DIAGNOSIS — J20.9 ACUTE BRONCHITIS, UNSPECIFIED ORGANISM: ICD-10-CM

## 2018-08-20 DIAGNOSIS — J30.1 ALLERGIC RHINITIS DUE TO POLLEN, UNSPECIFIED SEASONALITY: ICD-10-CM

## 2018-08-20 DIAGNOSIS — E78.5 HYPERLIPIDEMIA, UNSPECIFIED HYPERLIPIDEMIA TYPE: ICD-10-CM

## 2018-08-20 DIAGNOSIS — E78.5 DM TYPE 2 WITH DIABETIC DYSLIPIDEMIA (HCC): ICD-10-CM

## 2018-08-20 DIAGNOSIS — E11.69 DM TYPE 2 WITH DIABETIC DYSLIPIDEMIA (HCC): ICD-10-CM

## 2018-08-20 PROBLEM — S80.12XA HEMATOMA OF LEFT LOWER EXTREMITY: Status: ACTIVE | Noted: 2018-08-20

## 2018-08-20 PROCEDURE — 99214 OFFICE O/P EST MOD 30 MIN: CPT | Performed by: FAMILY MEDICINE

## 2018-08-20 RX ORDER — TADALAFIL 20 MG/1
20 TABLET ORAL PRN
Qty: 10 TABLET | Refills: 5 | Status: SHIPPED | OUTPATIENT
Start: 2018-08-20 | End: 2019-09-11

## 2018-08-20 RX ORDER — MONTELUKAST SODIUM 10 MG/1
10 TABLET ORAL NIGHTLY
Qty: 30 TABLET | Refills: 5 | Status: SHIPPED | OUTPATIENT
Start: 2018-08-20 | End: 2019-03-26 | Stop reason: SDUPTHER

## 2018-08-20 RX ORDER — PREDNISONE 10 MG/1
TABLET ORAL
Qty: 30 TABLET | Refills: 0 | Status: SHIPPED | OUTPATIENT
Start: 2018-08-20 | End: 2018-09-03

## 2018-08-20 RX ORDER — CEFDINIR 300 MG/1
300 CAPSULE ORAL 2 TIMES DAILY
Qty: 20 CAPSULE | Refills: 0 | Status: SHIPPED | OUTPATIENT
Start: 2018-08-20 | End: 2018-08-30

## 2018-08-20 RX ORDER — OXYCODONE HYDROCHLORIDE 5 MG/1
5 TABLET ORAL EVERY 4 HOURS PRN
Qty: 30 TABLET | Refills: 0 | Status: SHIPPED | OUTPATIENT
Start: 2018-08-20 | End: 2018-09-19

## 2018-08-20 RX ORDER — PANTOPRAZOLE SODIUM 40 MG/1
40 TABLET, DELAYED RELEASE ORAL DAILY
Qty: 30 TABLET | Refills: 5 | Status: SHIPPED | OUTPATIENT
Start: 2018-08-20 | End: 2019-03-26 | Stop reason: SDUPTHER

## 2018-08-20 RX ORDER — ATORVASTATIN CALCIUM 40 MG/1
40 TABLET, FILM COATED ORAL DAILY
Qty: 30 TABLET | Refills: 5 | Status: SHIPPED | OUTPATIENT
Start: 2018-08-20 | End: 2019-03-26 | Stop reason: SDUPTHER

## 2018-08-20 RX ORDER — LISINOPRIL 10 MG/1
10 TABLET ORAL DAILY
Qty: 30 TABLET | Refills: 5 | Status: SHIPPED | OUTPATIENT
Start: 2018-08-20 | End: 2019-03-26 | Stop reason: SDUPTHER

## 2018-08-20 NOTE — PROGRESS NOTES
Wilian Neumann 63 y.o. male presents today with   Chief Complaint   Patient presents with    Sinusitis     Pt. is here for sinusitis X 2 days. Pt. c/o cough, PND, headache and congestion. Pt. denies any sore throat, chills or fever. Pt. has taken OTC cough suppressant with mild relief. Cough   This is a new problem. The current episode started in the past 7 days. The problem has been unchanged. The problem occurs every few minutes. The cough is productive of sputum. Associated symptoms include ear congestion. Pertinent negatives include no chest pain, chills, ear pain, fever, headaches, heartburn, hemoptysis, myalgias, shortness of breath or weight loss.        Past Medical History:   Diagnosis Date    Back pain     Diabetes mellitus ([address]     Essential hypertension, benign     Hyperlipemia      Patient Active Problem List    Diagnosis Date Noted    Hematoma of left lower extremity 08/20/2018    Staphylococcus aureus bacteremia 07/23/2018    Hematoma of right thigh 07/23/2018    Allergic rhinitis due to pollen 06/07/2016    Diabetes mellitus ([address]     Essential hypertension, benign     Irregular heart rhythm 11/21/2012    Obesity, diabetes, and hypertension syndrome ([address] 03/16/2005    Hyperlipidemia 03/16/2005     Past Surgical History:   Procedure Laterality Date    CARPAL TUNNEL RELEASE Bilateral     FOOT SURGERY Left     bone spur    LAMINOTOMY       Family History   Problem Relation Age of Onset    Other Father         accident    Heart Failure Mother     Heart Disease Brother     Cirrhosis Brother      Social History     Social History    Marital status:      Spouse name: N/A    Number of children: N/A    Years of education: N/A     Social History Main Topics    Smoking status: Never Smoker    Smokeless tobacco: Never Used    Alcohol use 0.0 oz/week      Comment: occasional    Drug use: No    Sexual activity: Not Asked     Other Topics Concern    None     Social History

## 2018-08-27 ASSESSMENT — ENCOUNTER SYMPTOMS
HEARTBURN: 0
COUGH: 1
HEMOPTYSIS: 0
SHORTNESS OF BREATH: 0

## 2018-09-17 ENCOUNTER — OFFICE VISIT (OUTPATIENT)
Dept: PRIMARY CARE CLINIC | Age: 58
End: 2018-09-17
Payer: COMMERCIAL

## 2018-09-17 VITALS
BODY MASS INDEX: 28.56 KG/M2 | HEIGHT: 67 IN | HEART RATE: 78 BPM | DIASTOLIC BLOOD PRESSURE: 86 MMHG | WEIGHT: 182 LBS | RESPIRATION RATE: 16 BRPM | SYSTOLIC BLOOD PRESSURE: 132 MMHG | OXYGEN SATURATION: 99 % | TEMPERATURE: 96.7 F

## 2018-09-17 DIAGNOSIS — M54.41 CHRONIC BILATERAL LOW BACK PAIN WITH BILATERAL SCIATICA: Primary | ICD-10-CM

## 2018-09-17 DIAGNOSIS — I15.2 OBESITY, DIABETES, AND HYPERTENSION SYNDROME (HCC): ICD-10-CM

## 2018-09-17 DIAGNOSIS — E11.69 OBESITY, DIABETES, AND HYPERTENSION SYNDROME (HCC): ICD-10-CM

## 2018-09-17 DIAGNOSIS — M51.26 HNP (HERNIATED NUCLEUS PULPOSUS), LUMBAR: ICD-10-CM

## 2018-09-17 DIAGNOSIS — G89.29 CHRONIC BILATERAL LOW BACK PAIN WITH BILATERAL SCIATICA: Primary | ICD-10-CM

## 2018-09-17 DIAGNOSIS — M54.42 CHRONIC BILATERAL LOW BACK PAIN WITH BILATERAL SCIATICA: Primary | ICD-10-CM

## 2018-09-17 DIAGNOSIS — E66.9 OBESITY, DIABETES, AND HYPERTENSION SYNDROME (HCC): ICD-10-CM

## 2018-09-17 DIAGNOSIS — E11.59 OBESITY, DIABETES, AND HYPERTENSION SYNDROME (HCC): ICD-10-CM

## 2018-09-17 PROCEDURE — 99214 OFFICE O/P EST MOD 30 MIN: CPT | Performed by: FAMILY MEDICINE

## 2018-09-17 RX ORDER — PREDNISONE 10 MG/1
TABLET ORAL
Qty: 20 TABLET | Refills: 0 | Status: SHIPPED | OUTPATIENT
Start: 2018-09-17 | End: 2018-09-27

## 2018-09-17 RX ORDER — METHOCARBAMOL 500 MG/1
500 TABLET, FILM COATED ORAL EVERY EVENING
Qty: 10 TABLET | Refills: 0 | Status: SHIPPED | OUTPATIENT
Start: 2018-09-17 | End: 2018-09-27

## 2018-09-17 ASSESSMENT — ENCOUNTER SYMPTOMS
CHOKING: 0
BOWEL INCONTINENCE: 0
ABDOMINAL PAIN: 0
BACK PAIN: 1
COLOR CHANGE: 0
FACIAL SWELLING: 0
STRIDOR: 0
PHOTOPHOBIA: 0
CHEST TIGHTNESS: 0
WHEEZING: 0
DIARRHEA: 0
APNEA: 0
EYE DISCHARGE: 0
NAUSEA: 0
EYE REDNESS: 0
CONSTIPATION: 0
EYE PAIN: 0

## 2018-09-17 NOTE — PROGRESS NOTES
Social History    Marital status:      Spouse name: N/A    Number of children: N/A    Years of education: N/A     Occupational History    Not on file. Social History Main Topics    Smoking status: Never Smoker    Smokeless tobacco: Never Used    Alcohol use 0.0 oz/week      Comment: occasional    Drug use: No    Sexual activity: Not on file     Other Topics Concern    Not on file     Social History Narrative    No narrative on file     Allergies:  Patient has no known allergies. Review of Systems   Constitutional: Negative for activity change, appetite change, chills, diaphoresis, fever and weight loss. HENT: Negative for congestion, ear discharge, ear pain, facial swelling, hearing loss and mouth sores. Eyes: Negative for photophobia, pain, discharge and redness. Respiratory: Negative for apnea, choking, chest tightness, wheezing and stridor. Cardiovascular: Negative for chest pain, palpitations and leg swelling. Gastrointestinal: Negative for abdominal pain, bowel incontinence, constipation, diarrhea and nausea. Endocrine: Negative for cold intolerance, heat intolerance, polydipsia and polyphagia. Genitourinary: Negative for bladder incontinence, dysuria, frequency and pelvic pain. Musculoskeletal: Positive for back pain and gait problem. Negative for joint swelling, neck pain and neck stiffness. Skin: Negative for color change, pallor, rash and wound. Allergic/Immunologic: Negative for immunocompromised state. Neurological: Positive for weakness (bilateral thighs). Negative for tingling, tremors, syncope, facial asymmetry, speech difficulty, numbness, headaches and paresthesias. Psychiatric/Behavioral: Negative for confusion and hallucinations. The patient is not nervous/anxious and is not hyperactive.         Objective:   /86 (Site: Left Upper Arm, Position: Sitting, Cuff Size: Medium Adult)   Pulse 78   Temp 96.7 °F (35.9 °C) (Tympanic)   Resp 16

## 2018-09-22 LAB — DIABETIC RETINOPATHY: POSITIVE

## 2018-11-15 ENCOUNTER — OFFICE VISIT (OUTPATIENT)
Dept: PRIMARY CARE CLINIC | Age: 58
End: 2018-11-15
Payer: COMMERCIAL

## 2018-11-15 VITALS
DIASTOLIC BLOOD PRESSURE: 62 MMHG | SYSTOLIC BLOOD PRESSURE: 130 MMHG | RESPIRATION RATE: 20 BRPM | HEIGHT: 67 IN | OXYGEN SATURATION: 98 % | WEIGHT: 187.9 LBS | TEMPERATURE: 97.8 F | BODY MASS INDEX: 29.49 KG/M2 | HEART RATE: 124 BPM

## 2018-11-15 DIAGNOSIS — M25.561 ACUTE PAIN OF RIGHT KNEE: Primary | ICD-10-CM

## 2018-11-15 PROCEDURE — 99213 OFFICE O/P EST LOW 20 MIN: CPT | Performed by: INTERNAL MEDICINE

## 2018-11-15 ASSESSMENT — ENCOUNTER SYMPTOMS
VOMITING: 0
NAUSEA: 0
PHOTOPHOBIA: 0
CHOKING: 0
TROUBLE SWALLOWING: 0
SHORTNESS OF BREATH: 0
BACK PAIN: 1
VOICE CHANGE: 0

## 2018-12-07 PROBLEM — G43.009 MIGRAINE WITHOUT AURA: Status: ACTIVE | Noted: 2018-12-07

## 2018-12-07 PROBLEM — G60.9 HEREDITARY PERIPHERAL NEUROPATHY: Status: ACTIVE | Noted: 2018-12-07

## 2018-12-26 ENCOUNTER — OFFICE VISIT (OUTPATIENT)
Dept: PRIMARY CARE CLINIC | Age: 58
End: 2018-12-26
Payer: COMMERCIAL

## 2018-12-26 VITALS
BODY MASS INDEX: 28.72 KG/M2 | OXYGEN SATURATION: 97 % | HEART RATE: 97 BPM | WEIGHT: 183 LBS | HEIGHT: 67 IN | TEMPERATURE: 98.2 F | DIASTOLIC BLOOD PRESSURE: 88 MMHG | SYSTOLIC BLOOD PRESSURE: 132 MMHG | RESPIRATION RATE: 16 BRPM

## 2018-12-26 DIAGNOSIS — M54.50 LOW BACK PAIN RADIATING TO BOTH LEGS: Primary | ICD-10-CM

## 2018-12-26 DIAGNOSIS — J45.902 PERSISTENT ASTHMA WITH STATUS ASTHMATICUS, UNSPECIFIED ASTHMA SEVERITY: ICD-10-CM

## 2018-12-26 DIAGNOSIS — M79.605 LOW BACK PAIN RADIATING TO BOTH LEGS: Primary | ICD-10-CM

## 2018-12-26 DIAGNOSIS — M79.604 LOW BACK PAIN RADIATING TO BOTH LEGS: Primary | ICD-10-CM

## 2018-12-26 PROCEDURE — 99214 OFFICE O/P EST MOD 30 MIN: CPT | Performed by: INTERNAL MEDICINE

## 2018-12-26 PROCEDURE — G8484 FLU IMMUNIZE NO ADMIN: HCPCS | Performed by: INTERNAL MEDICINE

## 2018-12-26 PROCEDURE — 3017F COLORECTAL CA SCREEN DOC REV: CPT | Performed by: INTERNAL MEDICINE

## 2018-12-26 PROCEDURE — 1036F TOBACCO NON-USER: CPT | Performed by: INTERNAL MEDICINE

## 2018-12-26 PROCEDURE — G8427 DOCREV CUR MEDS BY ELIG CLIN: HCPCS | Performed by: INTERNAL MEDICINE

## 2018-12-26 PROCEDURE — G8417 CALC BMI ABV UP PARAM F/U: HCPCS | Performed by: INTERNAL MEDICINE

## 2018-12-26 RX ORDER — FLUTICASONE FUROATE AND VILANTEROL 100; 25 UG/1; UG/1
1 POWDER RESPIRATORY (INHALATION) DAILY
Qty: 1 EACH | Refills: 11 | Status: SHIPPED | OUTPATIENT
Start: 2018-12-26 | End: 2019-03-26 | Stop reason: SDUPTHER

## 2018-12-26 RX ORDER — IBUPROFEN 800 MG/1
800 TABLET ORAL 2 TIMES DAILY PRN
Qty: 60 TABLET | Refills: 5 | Status: SHIPPED | OUTPATIENT
Start: 2018-12-26 | End: 2019-11-29

## 2018-12-26 ASSESSMENT — ENCOUNTER SYMPTOMS
NAUSEA: 0
CHOKING: 0
BACK PAIN: 1
VOMITING: 0
TROUBLE SWALLOWING: 0
SHORTNESS OF BREATH: 0
VOICE CHANGE: 0
PHOTOPHOBIA: 0
ABDOMINAL PAIN: 0

## 2018-12-26 NOTE — PROGRESS NOTES
07/23/2018    Hematoma of right thigh 07/23/2018    Allergic rhinitis due to pollen 06/07/2016    [facility]     Essential hypertension, benign     Irregular heart rhythm 11/21/2012    Obesity, diabetes, and hypertension syndrome ([facility] 03/16/2005    Hyperlipidemia 03/16/2005     Past Surgical History:   Procedure Laterality Date    CARPAL TUNNEL RELEASE Bilateral     FOOT SURGERY Left     bone spur    LAMINOTOMY  2012     Family History   Problem Relation Age of Onset    Other Father         accident    Heart Failure Mother     Heart Disease Brother     Cirrhosis Brother      Social History     Social History    Marital status:      Spouse name: N/A    Number of children: N/A    Years of education: N/A     Social History Main Topics    Smoking status: Never Smoker    Smokeless tobacco: Never Used    Alcohol use 0.0 oz/week      Comment: occasional    Drug use: No    Sexual activity: Not Asked     Other Topics Concern    None     Social History Narrative    None     No Known Allergies    Review of Systems   Constitutional: Negative for appetite change and fever. HENT: Negative for trouble swallowing and voice change. Eyes: Negative for photophobia and visual disturbance. Respiratory: Positive for wheezing. Negative for choking and shortness of breath. Cardiovascular: Negative for chest pain and palpitations. Gastrointestinal: Negative for abdominal pain, nausea and vomiting. Genitourinary: Negative for decreased urine volume, testicular pain and urgency. Musculoskeletal: Positive for back pain. Negative for myalgias. Skin: Negative for rash. Neurological: Positive for numbness. Negative for tremors and syncope. Hematological: Does not bruise/bleed easily. Psychiatric/Behavioral: Negative for suicidal ideas.            Vitals:    12/26/18 1403   BP: 132/88   Site: Left Upper Arm   Position: Sitting   Cuff Size: Medium Adult   Pulse: 97   Resp: 16 Temp: 98.2 °F (36.8 °C)   TempSrc: Tympanic   SpO2: 97%   Weight: 183 lb (83 kg)   Height: 5' 7\" (1.702 m)       Physical Exam   Constitutional: He appears well-developed and well-nourished. HENT:   Head: Normocephalic and atraumatic. Eyes: Pupils are equal, round, and reactive to light. Conjunctivae and EOM are normal.   Neck: Normal range of motion. No thyromegaly present. Cardiovascular: Normal rate and regular rhythm. Pulmonary/Chest: Effort normal. No respiratory distress. He has wheezes. He has no rales. He exhibits no tenderness. Abdominal: Soft. Bowel sounds are normal. He exhibits no distension. There is no tenderness. Musculoskeletal:        Right shoulder: He exhibits decreased range of motion and spasm. Lumbar back: He exhibits decreased range of motion and spasm. Legs:  Neurological: He is alert. No cranial nerve deficit. He exhibits abnormal muscle tone. Coordination normal.   Skin: Skin is dry. Psychiatric: He has a normal mood and affect. Assessment/Plan  Fariba Morales was seen today for asthma, back pain and health maintenance. Diagnoses and all orders for this visit:    Low back pain radiating to both legs  -     ibuprofen (ADVIL;MOTRIN) 800 MG tablet; Take 1 tablet by mouth 2 times daily as needed for Pain    Persistent asthma with status asthmaticus, unspecified asthma severity  -     fluticasone-vilanterol (BREO ELLIPTA) 100-25 MCG/INH AEPB inhaler; Inhale 1 puff into the lungs daily        Return in about 3 months (around 3/26/2019), or if symptoms worsen or fail to improve.     Tracey Gonzalez MD

## 2018-12-28 RX ORDER — ALBUTEROL SULFATE 90 UG/1
2 AEROSOL, METERED RESPIRATORY (INHALATION) EVERY 6 HOURS PRN
Qty: 1 INHALER | Refills: 5 | Status: SHIPPED | OUTPATIENT
Start: 2018-12-28 | End: 2019-03-26 | Stop reason: SDUPTHER

## 2018-12-30 ASSESSMENT — ENCOUNTER SYMPTOMS
WHEEZING: 1
CHEST TIGHTNESS: 1

## 2019-03-26 ENCOUNTER — OFFICE VISIT (OUTPATIENT)
Dept: PRIMARY CARE CLINIC | Age: 59
End: 2019-03-26
Payer: COMMERCIAL

## 2019-03-26 VITALS
RESPIRATION RATE: 14 BRPM | HEART RATE: 103 BPM | DIASTOLIC BLOOD PRESSURE: 88 MMHG | SYSTOLIC BLOOD PRESSURE: 132 MMHG | BODY MASS INDEX: 29.98 KG/M2 | OXYGEN SATURATION: 98 % | HEIGHT: 67 IN | WEIGHT: 191 LBS | TEMPERATURE: 98.2 F

## 2019-03-26 DIAGNOSIS — E11.69 DM TYPE 2 WITH DIABETIC DYSLIPIDEMIA (HCC): Primary | ICD-10-CM

## 2019-03-26 DIAGNOSIS — J45.902 PERSISTENT ASTHMA WITH STATUS ASTHMATICUS, UNSPECIFIED ASTHMA SEVERITY: ICD-10-CM

## 2019-03-26 DIAGNOSIS — K21.9 GASTROESOPHAGEAL REFLUX DISEASE WITHOUT ESOPHAGITIS: ICD-10-CM

## 2019-03-26 DIAGNOSIS — I10 ESSENTIAL HYPERTENSION, BENIGN: ICD-10-CM

## 2019-03-26 DIAGNOSIS — J30.1 ALLERGIC RHINITIS DUE TO POLLEN, UNSPECIFIED SEASONALITY: ICD-10-CM

## 2019-03-26 DIAGNOSIS — E78.5 DM TYPE 2 WITH DIABETIC DYSLIPIDEMIA (HCC): Primary | ICD-10-CM

## 2019-03-26 DIAGNOSIS — E78.5 HYPERLIPIDEMIA, UNSPECIFIED HYPERLIPIDEMIA TYPE: ICD-10-CM

## 2019-03-26 PROCEDURE — 3017F COLORECTAL CA SCREEN DOC REV: CPT | Performed by: INTERNAL MEDICINE

## 2019-03-26 PROCEDURE — 3046F HEMOGLOBIN A1C LEVEL >9.0%: CPT | Performed by: INTERNAL MEDICINE

## 2019-03-26 PROCEDURE — G8427 DOCREV CUR MEDS BY ELIG CLIN: HCPCS | Performed by: INTERNAL MEDICINE

## 2019-03-26 PROCEDURE — 1036F TOBACCO NON-USER: CPT | Performed by: INTERNAL MEDICINE

## 2019-03-26 PROCEDURE — G8417 CALC BMI ABV UP PARAM F/U: HCPCS | Performed by: INTERNAL MEDICINE

## 2019-03-26 PROCEDURE — 2022F DILAT RTA XM EVC RTNOPTHY: CPT | Performed by: INTERNAL MEDICINE

## 2019-03-26 PROCEDURE — G8484 FLU IMMUNIZE NO ADMIN: HCPCS | Performed by: INTERNAL MEDICINE

## 2019-03-26 PROCEDURE — 99214 OFFICE O/P EST MOD 30 MIN: CPT | Performed by: INTERNAL MEDICINE

## 2019-03-26 RX ORDER — FLUTICASONE FUROATE AND VILANTEROL 100; 25 UG/1; UG/1
1 POWDER RESPIRATORY (INHALATION) DAILY
Qty: 3 EACH | Refills: 3 | Status: SHIPPED | OUTPATIENT
Start: 2019-03-26 | End: 2022-10-26 | Stop reason: ALTCHOICE

## 2019-03-26 RX ORDER — INSULIN LISPRO 100 [IU]/ML
INJECTION, SUSPENSION SUBCUTANEOUS
Qty: 90 ML | Refills: 3 | Status: SHIPPED | OUTPATIENT
Start: 2019-03-26 | End: 2019-11-26 | Stop reason: SDUPTHER

## 2019-03-26 RX ORDER — PANTOPRAZOLE SODIUM 40 MG/1
40 TABLET, DELAYED RELEASE ORAL DAILY
Qty: 90 TABLET | Refills: 3 | Status: SHIPPED | OUTPATIENT
Start: 2019-03-26 | End: 2019-09-11

## 2019-03-26 RX ORDER — LISINOPRIL 10 MG/1
10 TABLET ORAL DAILY
Qty: 90 TABLET | Refills: 3 | Status: ON HOLD | OUTPATIENT
Start: 2019-03-26 | End: 2019-12-11 | Stop reason: HOSPADM

## 2019-03-26 RX ORDER — MONTELUKAST SODIUM 10 MG/1
10 TABLET ORAL NIGHTLY
Qty: 90 TABLET | Refills: 3 | Status: SHIPPED | OUTPATIENT
Start: 2019-03-26 | End: 2019-11-06 | Stop reason: SDUPTHER

## 2019-03-26 RX ORDER — ALBUTEROL SULFATE 90 UG/1
2 AEROSOL, METERED RESPIRATORY (INHALATION) EVERY 6 HOURS PRN
Qty: 3 INHALER | Refills: 3 | Status: SHIPPED | OUTPATIENT
Start: 2019-03-26 | End: 2020-02-05

## 2019-03-26 RX ORDER — ATORVASTATIN CALCIUM 40 MG/1
40 TABLET, FILM COATED ORAL DAILY
Qty: 90 TABLET | Refills: 3 | Status: SHIPPED | OUTPATIENT
Start: 2019-03-26 | End: 2019-11-06 | Stop reason: SDUPTHER

## 2019-03-26 ASSESSMENT — PATIENT HEALTH QUESTIONNAIRE - PHQ9
2. FEELING DOWN, DEPRESSED OR HOPELESS: 0
SUM OF ALL RESPONSES TO PHQ QUESTIONS 1-9: 0
SUM OF ALL RESPONSES TO PHQ QUESTIONS 1-9: 0
1. LITTLE INTEREST OR PLEASURE IN DOING THINGS: 0
SUM OF ALL RESPONSES TO PHQ9 QUESTIONS 1 & 2: 0

## 2019-03-27 DIAGNOSIS — E78.5 HYPERLIPIDEMIA, UNSPECIFIED HYPERLIPIDEMIA TYPE: ICD-10-CM

## 2019-03-27 DIAGNOSIS — E78.5 DM TYPE 2 WITH DIABETIC DYSLIPIDEMIA (HCC): ICD-10-CM

## 2019-03-27 DIAGNOSIS — E11.69 DM TYPE 2 WITH DIABETIC DYSLIPIDEMIA (HCC): ICD-10-CM

## 2019-03-27 DIAGNOSIS — I10 ESSENTIAL HYPERTENSION, BENIGN: ICD-10-CM

## 2019-03-27 LAB
ALBUMIN SERPL-MCNC: 4.2 G/DL (ref 3.5–4.6)
ALP BLD-CCNC: 76 U/L (ref 35–104)
ALT SERPL-CCNC: 12 U/L (ref 0–41)
ANION GAP SERPL CALCULATED.3IONS-SCNC: 15 MEQ/L (ref 9–15)
AST SERPL-CCNC: 15 U/L (ref 0–40)
BASOPHILS ABSOLUTE: 0 K/UL (ref 0–0.2)
BASOPHILS RELATIVE PERCENT: 0.5 %
BILIRUB SERPL-MCNC: 0.5 MG/DL (ref 0.2–0.7)
BUN BLDV-MCNC: 17 MG/DL (ref 6–20)
CALCIUM SERPL-MCNC: 9.2 MG/DL (ref 8.5–9.9)
CHLORIDE BLD-SCNC: 97 MEQ/L (ref 95–107)
CHOLESTEROL, TOTAL: 240 MG/DL (ref 0–199)
CO2: 24 MEQ/L (ref 20–31)
CREAT SERPL-MCNC: 0.92 MG/DL (ref 0.7–1.2)
EOSINOPHILS ABSOLUTE: 0.4 K/UL (ref 0–0.7)
EOSINOPHILS RELATIVE PERCENT: 5.4 %
GFR AFRICAN AMERICAN: >60
GFR NON-AFRICAN AMERICAN: >60
GLOBULIN: 2.5 G/DL (ref 2.3–3.5)
GLUCOSE BLD-MCNC: 328 MG/DL (ref 70–99)
HBA1C MFR BLD: 12.5 % (ref 4.8–5.9)
HCT VFR BLD CALC: 45.4 % (ref 42–52)
HDLC SERPL-MCNC: 49 MG/DL (ref 40–59)
HEMOGLOBIN: 15.6 G/DL (ref 14–18)
LDL CHOLESTEROL CALCULATED: 173 MG/DL (ref 0–129)
LYMPHOCYTES ABSOLUTE: 0.8 K/UL (ref 1–4.8)
LYMPHOCYTES RELATIVE PERCENT: 11.4 %
MCH RBC QN AUTO: 28.5 PG (ref 27–31.3)
MCHC RBC AUTO-ENTMCNC: 34.3 % (ref 33–37)
MCV RBC AUTO: 83.2 FL (ref 80–100)
MONOCYTES ABSOLUTE: 0.4 K/UL (ref 0.2–0.8)
MONOCYTES RELATIVE PERCENT: 5.2 %
NEUTROPHILS ABSOLUTE: 5.6 K/UL (ref 1.4–6.5)
NEUTROPHILS RELATIVE PERCENT: 77.5 %
PDW BLD-RTO: 12.8 % (ref 11.5–14.5)
PLATELET # BLD: 126 K/UL (ref 130–400)
POTASSIUM SERPL-SCNC: 4.4 MEQ/L (ref 3.4–4.9)
RBC # BLD: 5.46 M/UL (ref 4.7–6.1)
SODIUM BLD-SCNC: 136 MEQ/L (ref 135–144)
TOTAL PROTEIN: 6.7 G/DL (ref 6.3–8)
TRIGL SERPL-MCNC: 92 MG/DL (ref 0–150)
WBC # BLD: 7.3 K/UL (ref 4.8–10.8)

## 2019-03-30 ASSESSMENT — ENCOUNTER SYMPTOMS
WHEEZING: 1
NAUSEA: 0
CHOKING: 0
HEARTBURN: 1
TROUBLE SWALLOWING: 0
RHINORRHEA: 1
SHORTNESS OF BREATH: 0
PHOTOPHOBIA: 0
VOMITING: 0
CHEST TIGHTNESS: 1
VOICE CHANGE: 0

## 2019-09-11 ENCOUNTER — OFFICE VISIT (OUTPATIENT)
Dept: FAMILY MEDICINE CLINIC | Age: 59
End: 2019-09-11

## 2019-09-11 VITALS
HEIGHT: 67 IN | BODY MASS INDEX: 27.75 KG/M2 | RESPIRATION RATE: 14 BRPM | OXYGEN SATURATION: 96 % | SYSTOLIC BLOOD PRESSURE: 130 MMHG | HEART RATE: 109 BPM | DIASTOLIC BLOOD PRESSURE: 88 MMHG | TEMPERATURE: 97.8 F | WEIGHT: 176.8 LBS

## 2019-09-11 DIAGNOSIS — N52.9 ERECTILE DYSFUNCTION, UNSPECIFIED ERECTILE DYSFUNCTION TYPE: ICD-10-CM

## 2019-09-11 DIAGNOSIS — Z00.00 HEALTH CARE MAINTENANCE: ICD-10-CM

## 2019-09-11 DIAGNOSIS — R30.0 DYSURIA: Primary | ICD-10-CM

## 2019-09-11 DIAGNOSIS — R30.0 DYSURIA: ICD-10-CM

## 2019-09-11 PROCEDURE — 99213 OFFICE O/P EST LOW 20 MIN: CPT | Performed by: INTERNAL MEDICINE

## 2019-09-11 RX ORDER — CIPROFLOXACIN 500 MG/1
500 TABLET, FILM COATED ORAL 2 TIMES DAILY
Qty: 28 TABLET | Refills: 0 | Status: SHIPPED | OUTPATIENT
Start: 2019-09-11 | End: 2019-09-25

## 2019-09-11 RX ORDER — DOXYCYCLINE HYCLATE 100 MG
100 TABLET ORAL 2 TIMES DAILY
Qty: 28 TABLET | Refills: 0 | Status: SHIPPED | OUTPATIENT
Start: 2019-09-11 | End: 2019-09-25

## 2019-09-11 RX ORDER — SILDENAFIL 100 MG/1
100 TABLET, FILM COATED ORAL PRN
Qty: 30 TABLET | Refills: 5 | Status: ON HOLD | OUTPATIENT
Start: 2019-09-11 | End: 2019-12-11 | Stop reason: HOSPADM

## 2019-09-11 NOTE — PROGRESS NOTES
Antonio Us 63 y.o. male presents today with   Chief Complaint   Patient presents with   [facility] Check-Up     Patient here for check up today and discuss private issues. HPI wife with positive Chamydia from the [address]. Past Medical History:   Diagnosis Date    Back pain     Diabetes mellitus ([address]     Essential hypertension, benign     Hyperlipemia     Neuropathic pain, leg, bilateral      Patient Active Problem List    Diagnosis Date Noted    Hereditary peripheral neuropathy 12/07/2018    Migraine without aura 12/07/2018    Hematoma of left lower extremity 08/20/2018    Staphylococcus aureus bacteremia 07/23/2018    Hematoma of right thigh 07/23/2018    Allergic rhinitis due to pollen 06/07/2016    Diabetes mellitus ([address]     Essential hypertension, benign     Irregular heart rhythm 11/21/2012    Obesity, diabetes, and hypertension syndrome ([address] 03/16/2005    Hyperlipidemia 03/16/2005     Past Surgical History:   Procedure Laterality Date    CARPAL TUNNEL RELEASE Bilateral     FOOT SURGERY Left     bone spur    LAMINOTOMY  2012     Family History   Problem Relation Age of Onset    Other Father         accident    Heart Failure Mother     Heart Disease Brother     Cirrhosis Brother      Social History     Socioeconomic History    Marital status:      Spouse name: None    Number of children: None    Years of education: None    Highest education level: None   Occupational History    None   Social Needs    Financial resource strain: None    Food insecurity:     Worry: None     Inability: None    Transportation needs:     Medical: None     Non-medical: None   Tobacco Use    Smoking status: Never Smoker    Smokeless tobacco: Never Used   Substance and Sexual Activity    Alcohol use:  Yes     Alcohol/week: 0.0 standard drinks     Comment: occasional    Drug use: No    Sexual activity: None   Lifestyle    Physical activity:     Days per week: None     Minutes per session: None  Stress: None   Relationships    Social connections:     Talks on phone: None     Gets together: None     Attends Taoism service: None     Active member of club or organization: None     Attends meetings of clubs or organizations: None     Relationship status: None    Intimate partner violence:     Fear of current or ex partner: None     Emotionally abused: None     Physically abused: None     Forced sexual activity: None   Other Topics Concern    None   Social History Narrative    None     No Known Allergies    Review of Systems   Constitutional: Negative for fatigue and fever. HENT: Negative for trouble swallowing and voice change. Eyes: Negative for photophobia and visual disturbance. Respiratory: Negative for choking and shortness of breath. Cardiovascular: Negative for chest pain and palpitations. Gastrointestinal: Negative for nausea and vomiting. Genitourinary: Negative for decreased urine volume, testicular pain and urgency. Skin: Negative for rash. Neurological: Negative for tremors and syncope. Hematological: Does not bruise/bleed easily. Psychiatric/Behavioral: Negative for suicidal ideas. Vitals:    09/11/19 0831   BP: 130/88   Pulse: 109   Resp: 14   Temp: 97.8 °F (36.6 °C)   SpO2: 96%   Weight: 176 lb 12.8 oz (80.2 kg)   Height: 5' 7\" (1.702 m)       Physical Exam   Constitutional: He appears well-developed and well-nourished. HENT:   Head: Normocephalic and atraumatic. Eyes: Pupils are equal, round, and reactive to light. Conjunctivae and EOM are normal.   Neck: Normal range of motion. Cardiovascular: Normal rate and regular rhythm. Pulmonary/Chest: Effort normal. No stridor. No respiratory distress. Abdominal: Soft. Bowel sounds are normal.   Musculoskeletal: Normal range of motion. Neurological: He is alert. Skin: Skin is dry. Assessment/Plan  Tila Gan was seen today for check-up.     Diagnoses and all orders for this visit:    Dysuria  -

## 2019-09-16 LAB
C. TRACHOMATIS DNA ,URINE: NEGATIVE
N. GONORRHOEAE DNA, URINE: NEGATIVE

## 2019-09-17 ASSESSMENT — ENCOUNTER SYMPTOMS
PHOTOPHOBIA: 0
VOMITING: 0
CHOKING: 0
SHORTNESS OF BREATH: 0
VOICE CHANGE: 0
TROUBLE SWALLOWING: 0
NAUSEA: 0

## 2019-10-15 ENCOUNTER — TELEPHONE (OUTPATIENT)
Dept: ENDOCRINOLOGY | Age: 59
End: 2019-10-15

## 2019-10-24 DIAGNOSIS — Z12.11 COLON CANCER SCREENING: Primary | ICD-10-CM

## 2019-11-04 DIAGNOSIS — J30.1 ALLERGIC RHINITIS DUE TO POLLEN, UNSPECIFIED SEASONALITY: ICD-10-CM

## 2019-11-04 DIAGNOSIS — E11.69 DM TYPE 2 WITH DIABETIC DYSLIPIDEMIA (HCC): ICD-10-CM

## 2019-11-04 DIAGNOSIS — E78.5 DM TYPE 2 WITH DIABETIC DYSLIPIDEMIA (HCC): ICD-10-CM

## 2019-11-04 DIAGNOSIS — E78.5 HYPERLIPIDEMIA, UNSPECIFIED HYPERLIPIDEMIA TYPE: ICD-10-CM

## 2019-11-06 RX ORDER — MONTELUKAST SODIUM 10 MG/1
10 TABLET ORAL NIGHTLY
Qty: 30 TABLET | Refills: 3 | Status: SHIPPED | OUTPATIENT
Start: 2019-11-06 | End: 2020-01-22

## 2019-11-06 RX ORDER — ATORVASTATIN CALCIUM 40 MG/1
40 TABLET, FILM COATED ORAL DAILY
Qty: 30 TABLET | Refills: 3 | Status: SHIPPED | OUTPATIENT
Start: 2019-11-06 | End: 2020-01-22

## 2019-11-14 ENCOUNTER — TELEPHONE (OUTPATIENT)
Dept: PRIMARY CARE CLINIC | Age: 59
End: 2019-11-14

## 2019-11-14 RX ORDER — BUDESONIDE AND FORMOTEROL FUMARATE DIHYDRATE 80; 4.5 UG/1; UG/1
2 AEROSOL RESPIRATORY (INHALATION) 2 TIMES DAILY
Qty: 1 INHALER | Refills: 5 | Status: SHIPPED | OUTPATIENT
Start: 2019-11-14 | End: 2020-07-20

## 2019-11-15 RX ORDER — CEFUROXIME AXETIL 500 MG/1
500 TABLET ORAL 2 TIMES DAILY
Qty: 20 TABLET | Refills: 0 | Status: SHIPPED | OUTPATIENT
Start: 2019-11-15 | End: 2019-11-25

## 2019-11-26 ENCOUNTER — HOSPITAL ENCOUNTER (EMERGENCY)
Age: 59
Discharge: HOME | DRG: 198 | End: 2019-11-27
Attending: STUDENT IN AN ORGANIZED HEALTH CARE EDUCATION/TRAINING PROGRAM | Admitting: INTERNAL MEDICINE
Payer: COMMERCIAL

## 2019-11-26 ENCOUNTER — APPOINTMENT (OUTPATIENT)
Dept: CT IMAGING | Age: 59
DRG: 198 | End: 2019-11-26
Payer: COMMERCIAL

## 2019-11-26 ENCOUNTER — OFFICE VISIT (OUTPATIENT)
Dept: ENDOCRINOLOGY | Age: 59
End: 2019-11-26
Payer: COMMERCIAL

## 2019-11-26 ENCOUNTER — APPOINTMENT (OUTPATIENT)
Dept: GENERAL RADIOLOGY | Age: 59
DRG: 198 | End: 2019-11-26
Payer: COMMERCIAL

## 2019-11-26 ENCOUNTER — TELEPHONE (OUTPATIENT)
Dept: PRIMARY CARE CLINIC | Age: 59
End: 2019-11-26

## 2019-11-26 VITALS
WEIGHT: 202 LBS | SYSTOLIC BLOOD PRESSURE: 169 MMHG | BODY MASS INDEX: 31.71 KG/M2 | DIASTOLIC BLOOD PRESSURE: 123 MMHG | HEIGHT: 67 IN | HEART RATE: 95 BPM

## 2019-11-26 VITALS
RESPIRATION RATE: 17 BRPM | BODY MASS INDEX: 29.82 KG/M2 | TEMPERATURE: 97.6 F | SYSTOLIC BLOOD PRESSURE: 176 MMHG | HEART RATE: 77 BPM | HEIGHT: 67 IN | OXYGEN SATURATION: 97 % | DIASTOLIC BLOOD PRESSURE: 106 MMHG | WEIGHT: 190 LBS

## 2019-11-26 DIAGNOSIS — I20.8 ANGINA AT REST (HCC): ICD-10-CM

## 2019-11-26 DIAGNOSIS — I10 UNCONTROLLED HYPERTENSION: Primary | ICD-10-CM

## 2019-11-26 DIAGNOSIS — Z79.4 DIABETES MELLITUS DUE TO UNDERLYING CONDITION WITH HYPEROSMOLARITY WITHOUT COMA, WITH LONG-TERM CURRENT USE OF INSULIN (HCC): Primary | ICD-10-CM

## 2019-11-26 DIAGNOSIS — E08.00 DIABETES MELLITUS DUE TO UNDERLYING CONDITION WITH HYPEROSMOLARITY WITHOUT COMA, WITH LONG-TERM CURRENT USE OF INSULIN (HCC): ICD-10-CM

## 2019-11-26 DIAGNOSIS — E78.5 DM TYPE 2 WITH DIABETIC DYSLIPIDEMIA (HCC): ICD-10-CM

## 2019-11-26 DIAGNOSIS — R51.9 ACUTE INTRACTABLE HEADACHE, UNSPECIFIED HEADACHE TYPE: ICD-10-CM

## 2019-11-26 DIAGNOSIS — E08.00 DIABETES MELLITUS DUE TO UNDERLYING CONDITION WITH HYPEROSMOLARITY WITHOUT COMA, WITH LONG-TERM CURRENT USE OF INSULIN (HCC): Primary | ICD-10-CM

## 2019-11-26 DIAGNOSIS — Z91.199 MEDICALLY NONCOMPLIANT: ICD-10-CM

## 2019-11-26 DIAGNOSIS — E11.69 DM TYPE 2 WITH DIABETIC DYSLIPIDEMIA (HCC): ICD-10-CM

## 2019-11-26 DIAGNOSIS — Z79.4 DIABETES MELLITUS DUE TO UNDERLYING CONDITION WITH HYPEROSMOLARITY WITHOUT COMA, WITH LONG-TERM CURRENT USE OF INSULIN (HCC): ICD-10-CM

## 2019-11-26 PROBLEM — R07.9 CHEST PAIN: Status: ACTIVE | Noted: 2019-11-26

## 2019-11-26 LAB
ALBUMIN SERPL-MCNC: 4.3 G/DL (ref 3.5–4.6)
ALP BLD-CCNC: 74 U/L (ref 35–104)
ALT SERPL-CCNC: 16 U/L (ref 0–41)
ANION GAP SERPL CALCULATED.3IONS-SCNC: 11 MEQ/L (ref 9–15)
ANION GAP SERPL CALCULATED.3IONS-SCNC: 12 MEQ/L (ref 9–15)
AST SERPL-CCNC: 21 U/L (ref 0–40)
BASOPHILS ABSOLUTE: 0 K/UL (ref 0–0.2)
BASOPHILS RELATIVE PERCENT: 0.7 %
BILIRUB SERPL-MCNC: 0.3 MG/DL (ref 0.2–0.7)
BUN BLDV-MCNC: 12 MG/DL (ref 6–20)
BUN BLDV-MCNC: 13 MG/DL (ref 6–20)
C-REACTIVE PROTEIN, HIGH SENSITIVITY: 2.4 MG/L (ref 0–5)
CALCIUM SERPL-MCNC: 8.8 MG/DL (ref 8.5–9.9)
CALCIUM SERPL-MCNC: 9.5 MG/DL (ref 8.5–9.9)
CHLORIDE BLD-SCNC: 98 MEQ/L (ref 95–107)
CHLORIDE BLD-SCNC: 99 MEQ/L (ref 95–107)
CHOLESTEROL, TOTAL: 127 MG/DL (ref 0–199)
CHP ED QC CHECK: NORMAL
CK MB: 6.1 NG/ML (ref 0–6.7)
CO2: 27 MEQ/L (ref 20–31)
CO2: 27 MEQ/L (ref 20–31)
CREAT SERPL-MCNC: 0.87 MG/DL (ref 0.7–1.2)
CREAT SERPL-MCNC: 0.97 MG/DL (ref 0.7–1.2)
CREATINE KINASE-MB INDEX: 2.3 % (ref 0–3.5)
EKG ATRIAL RATE: 91 BPM
EKG P AXIS: 49 DEGREES
EKG P-R INTERVAL: 134 MS
EKG Q-T INTERVAL: 380 MS
EKG QRS DURATION: 92 MS
EKG QTC CALCULATION (BAZETT): 467 MS
EKG R AXIS: -35 DEGREES
EKG T AXIS: 7 DEGREES
EKG VENTRICULAR RATE: 91 BPM
EOSINOPHILS ABSOLUTE: 0.3 K/UL (ref 0–0.7)
EOSINOPHILS RELATIVE PERCENT: 7.4 %
GFR AFRICAN AMERICAN: >60
GFR AFRICAN AMERICAN: >60
GFR NON-AFRICAN AMERICAN: >60
GFR NON-AFRICAN AMERICAN: >60
GLOBULIN: 2.1 G/DL (ref 2.3–3.5)
GLUCOSE BLD-MCNC: 172 MG/DL (ref 70–99)
GLUCOSE BLD-MCNC: 219 MG/DL
GLUCOSE BLD-MCNC: 268 MG/DL (ref 70–99)
HBA1C MFR BLD: 11.3 %
HBA1C MFR BLD: 12 % (ref 4.8–5.9)
HCT VFR BLD CALC: 41.1 % (ref 42–52)
HDLC SERPL-MCNC: 42 MG/DL (ref 40–59)
HEMOGLOBIN: 13.8 G/DL (ref 14–18)
INR BLD: 0.9
LDL CHOLESTEROL CALCULATED: 61 MG/DL (ref 0–129)
LYMPHOCYTES ABSOLUTE: 1 K/UL (ref 1–4.8)
LYMPHOCYTES RELATIVE PERCENT: 21.4 %
MAGNESIUM: 1.5 MG/DL (ref 1.7–2.4)
MCH RBC QN AUTO: 28.1 PG (ref 27–31.3)
MCHC RBC AUTO-ENTMCNC: 33.7 % (ref 33–37)
MCV RBC AUTO: 83.6 FL (ref 80–100)
MONOCYTES ABSOLUTE: 0.5 K/UL (ref 0.2–0.8)
MONOCYTES RELATIVE PERCENT: 10.3 %
NEUTROPHILS ABSOLUTE: 2.7 K/UL (ref 1.4–6.5)
NEUTROPHILS RELATIVE PERCENT: 60.2 %
PDW BLD-RTO: 13.8 % (ref 11.5–14.5)
PLATELET # BLD: 158 K/UL (ref 130–400)
POTASSIUM SERPL-SCNC: 3.8 MEQ/L (ref 3.4–4.9)
POTASSIUM SERPL-SCNC: 4.2 MEQ/L (ref 3.4–4.9)
PRO-BNP: 741 PG/ML
PROTHROMBIN TIME: 12.6 SEC (ref 12.3–14.9)
RBC # BLD: 4.92 M/UL (ref 4.7–6.1)
SODIUM BLD-SCNC: 137 MEQ/L (ref 135–144)
SODIUM BLD-SCNC: 137 MEQ/L (ref 135–144)
TOTAL CK: 265 U/L (ref 0–190)
TOTAL PROTEIN: 6.4 G/DL (ref 6.3–8)
TRIGL SERPL-MCNC: 120 MG/DL (ref 0–150)
TROPONIN: <0.01 NG/ML (ref 0–0.01)
TSH SERPL DL<=0.05 MIU/L-ACNC: 1.2 UIU/ML (ref 0.44–3.86)
WBC # BLD: 4.5 K/UL (ref 4.8–10.8)

## 2019-11-26 PROCEDURE — 2022F DILAT RTA XM EVC RTNOPTHY: CPT | Performed by: INTERNAL MEDICINE

## 2019-11-26 PROCEDURE — 71046 X-RAY EXAM CHEST 2 VIEWS: CPT

## 2019-11-26 PROCEDURE — 3046F HEMOGLOBIN A1C LEVEL >9.0%: CPT | Performed by: INTERNAL MEDICINE

## 2019-11-26 PROCEDURE — 36415 COLL VENOUS BLD VENIPUNCTURE: CPT

## 2019-11-26 PROCEDURE — 96375 TX/PRO/DX INJ NEW DRUG ADDON: CPT

## 2019-11-26 PROCEDURE — 86141 C-REACTIVE PROTEIN HS: CPT

## 2019-11-26 PROCEDURE — 3017F COLORECTAL CA SCREEN DOC REV: CPT | Performed by: INTERNAL MEDICINE

## 2019-11-26 PROCEDURE — 80061 LIPID PANEL: CPT

## 2019-11-26 PROCEDURE — 84443 ASSAY THYROID STIM HORMONE: CPT

## 2019-11-26 PROCEDURE — 6370000000 HC RX 637 (ALT 250 FOR IP): Performed by: STUDENT IN AN ORGANIZED HEALTH CARE EDUCATION/TRAINING PROGRAM

## 2019-11-26 PROCEDURE — 2500000003 HC RX 250 WO HCPCS: Performed by: STUDENT IN AN ORGANIZED HEALTH CARE EDUCATION/TRAINING PROGRAM

## 2019-11-26 PROCEDURE — 85025 COMPLETE CBC W/AUTO DIFF WBC: CPT

## 2019-11-26 PROCEDURE — G8484 FLU IMMUNIZE NO ADMIN: HCPCS | Performed by: INTERNAL MEDICINE

## 2019-11-26 PROCEDURE — 99285 EMERGENCY DEPT VISIT HI MDM: CPT

## 2019-11-26 PROCEDURE — 96374 THER/PROPH/DIAG INJ IV PUSH: CPT

## 2019-11-26 PROCEDURE — G8417 CALC BMI ABV UP PARAM F/U: HCPCS | Performed by: INTERNAL MEDICINE

## 2019-11-26 PROCEDURE — G8428 CUR MEDS NOT DOCUMENT: HCPCS | Performed by: INTERNAL MEDICINE

## 2019-11-26 PROCEDURE — 99213 OFFICE O/P EST LOW 20 MIN: CPT | Performed by: INTERNAL MEDICINE

## 2019-11-26 PROCEDURE — 82553 CREATINE MB FRACTION: CPT

## 2019-11-26 PROCEDURE — 82550 ASSAY OF CK (CPK): CPT

## 2019-11-26 PROCEDURE — 85610 PROTHROMBIN TIME: CPT

## 2019-11-26 PROCEDURE — 83735 ASSAY OF MAGNESIUM: CPT

## 2019-11-26 PROCEDURE — 93005 ELECTROCARDIOGRAM TRACING: CPT | Performed by: STUDENT IN AN ORGANIZED HEALTH CARE EDUCATION/TRAINING PROGRAM

## 2019-11-26 PROCEDURE — 1036F TOBACCO NON-USER: CPT | Performed by: INTERNAL MEDICINE

## 2019-11-26 PROCEDURE — 95250 CONT GLUC MNTR PHYS/QHP EQP: CPT | Performed by: INTERNAL MEDICINE

## 2019-11-26 PROCEDURE — 83880 ASSAY OF NATRIURETIC PEPTIDE: CPT

## 2019-11-26 PROCEDURE — 80053 COMPREHEN METABOLIC PANEL: CPT

## 2019-11-26 PROCEDURE — 70450 CT HEAD/BRAIN W/O DYE: CPT

## 2019-11-26 PROCEDURE — 83036 HEMOGLOBIN GLYCOSYLATED A1C: CPT | Performed by: INTERNAL MEDICINE

## 2019-11-26 PROCEDURE — 84484 ASSAY OF TROPONIN QUANT: CPT

## 2019-11-26 RX ORDER — LANCETS 28 GAUGE
1 EACH MISCELLANEOUS DAILY
Qty: 300 EACH | Refills: 3 | Status: SHIPPED | OUTPATIENT
Start: 2019-11-26 | End: 2021-11-19 | Stop reason: SDUPTHER

## 2019-11-26 RX ORDER — LISINOPRIL 10 MG/1
10 TABLET ORAL DAILY
Status: CANCELLED | OUTPATIENT
Start: 2019-11-27

## 2019-11-26 RX ORDER — NICOTINE POLACRILEX 4 MG
15 LOZENGE BUCCAL PRN
Status: CANCELLED | OUTPATIENT
Start: 2019-11-26

## 2019-11-26 RX ORDER — INSULIN GLARGINE 100 [IU]/ML
25 INJECTION, SOLUTION SUBCUTANEOUS NIGHTLY
Status: CANCELLED | OUTPATIENT
Start: 2019-11-26

## 2019-11-26 RX ORDER — ATORVASTATIN CALCIUM 40 MG/1
40 TABLET, FILM COATED ORAL NIGHTLY
Status: CANCELLED | OUTPATIENT
Start: 2019-11-26

## 2019-11-26 RX ORDER — INSULIN LISPRO 100 [IU]/ML
INJECTION, SUSPENSION SUBCUTANEOUS
Qty: 15 PEN | Refills: 3 | Status: SHIPPED | OUTPATIENT
Start: 2019-11-26 | End: 2020-01-20 | Stop reason: SDUPTHER

## 2019-11-26 RX ORDER — SODIUM CHLORIDE 0.9 % (FLUSH) 0.9 %
10 SYRINGE (ML) INJECTION EVERY 12 HOURS SCHEDULED
Status: CANCELLED | OUTPATIENT
Start: 2019-11-26

## 2019-11-26 RX ORDER — BLOOD-GLUCOSE METER
1 KIT MISCELLANEOUS DAILY PRN
Qty: 1 DEVICE | Refills: 0 | Status: SHIPPED | OUTPATIENT
Start: 2019-11-26 | End: 2021-11-19 | Stop reason: SDUPTHER

## 2019-11-26 RX ORDER — MAGNESIUM SULFATE IN WATER 40 MG/ML
2 INJECTION, SOLUTION INTRAVENOUS ONCE
Status: CANCELLED | OUTPATIENT
Start: 2019-11-26 | End: 2019-11-26

## 2019-11-26 RX ORDER — ACETAMINOPHEN 325 MG/1
650 TABLET ORAL EVERY 4 HOURS PRN
Status: CANCELLED | OUTPATIENT
Start: 2019-11-26

## 2019-11-26 RX ORDER — NITROGLYCERIN 0.4 MG/1
0.4 TABLET SUBLINGUAL EVERY 5 MIN PRN
Status: CANCELLED | OUTPATIENT
Start: 2019-11-26

## 2019-11-26 RX ORDER — SODIUM CHLORIDE 9 MG/ML
INJECTION, SOLUTION INTRAVENOUS CONTINUOUS
Status: CANCELLED | OUTPATIENT
Start: 2019-11-26 | End: 2019-11-27

## 2019-11-26 RX ORDER — ASPIRIN 81 MG/1
324 TABLET, CHEWABLE ORAL ONCE
Status: COMPLETED | OUTPATIENT
Start: 2019-11-26 | End: 2019-11-26

## 2019-11-26 RX ORDER — DEXTROSE MONOHYDRATE 25 G/50ML
12.5 INJECTION, SOLUTION INTRAVENOUS PRN
Status: CANCELLED | OUTPATIENT
Start: 2019-11-26

## 2019-11-26 RX ORDER — ASPIRIN 81 MG/1
81 TABLET, CHEWABLE ORAL DAILY
Status: CANCELLED | OUTPATIENT
Start: 2019-11-27

## 2019-11-26 RX ORDER — ENALAPRILAT 2.5 MG/2ML
1.25 INJECTION INTRAVENOUS ONCE
Status: COMPLETED | OUTPATIENT
Start: 2019-11-26 | End: 2019-11-26

## 2019-11-26 RX ORDER — ALBUTEROL SULFATE 90 UG/1
2 AEROSOL, METERED RESPIRATORY (INHALATION) EVERY 6 HOURS PRN
Status: CANCELLED | OUTPATIENT
Start: 2019-11-26

## 2019-11-26 RX ORDER — DEXTROSE MONOHYDRATE 50 MG/ML
100 INJECTION, SOLUTION INTRAVENOUS PRN
Status: CANCELLED | OUTPATIENT
Start: 2019-11-26

## 2019-11-26 RX ORDER — SODIUM CHLORIDE 0.9 % (FLUSH) 0.9 %
10 SYRINGE (ML) INJECTION PRN
Status: CANCELLED | OUTPATIENT
Start: 2019-11-26

## 2019-11-26 RX ORDER — ACETAMINOPHEN 500 MG
1000 TABLET ORAL ONCE
Status: COMPLETED | OUTPATIENT
Start: 2019-11-26 | End: 2019-11-26

## 2019-11-26 RX ORDER — ONDANSETRON 2 MG/ML
4 INJECTION INTRAMUSCULAR; INTRAVENOUS EVERY 6 HOURS PRN
Status: CANCELLED | OUTPATIENT
Start: 2019-11-26

## 2019-11-26 RX ORDER — METOPROLOL TARTRATE 5 MG/5ML
5 INJECTION INTRAVENOUS ONCE
Status: COMPLETED | OUTPATIENT
Start: 2019-11-26 | End: 2019-11-26

## 2019-11-26 RX ADMIN — ACETAMINOPHEN 1000 MG: 500 TABLET ORAL at 20:23

## 2019-11-26 RX ADMIN — METOPROLOL TARTRATE 5 MG: 1 INJECTION, SOLUTION INTRAVENOUS at 19:44

## 2019-11-26 RX ADMIN — ENALAPRILAT 1.25 MG: 1.25 INJECTION INTRAVENOUS at 22:04

## 2019-11-26 RX ADMIN — ASPIRIN 81 MG 324 MG: 81 TABLET ORAL at 19:44

## 2019-11-26 ASSESSMENT — ENCOUNTER SYMPTOMS
ABDOMINAL PAIN: 0
CHEST TIGHTNESS: 0
COUGH: 0
SHORTNESS OF BREATH: 0
BACK PAIN: 0
VOMITING: 0
TROUBLE SWALLOWING: 0
VISUAL CHANGE: 1
DIARRHEA: 0
SINUS PRESSURE: 0

## 2019-11-26 ASSESSMENT — PAIN DESCRIPTION - ONSET: ONSET: PROGRESSIVE

## 2019-11-26 ASSESSMENT — PAIN - FUNCTIONAL ASSESSMENT: PAIN_FUNCTIONAL_ASSESSMENT: PREVENTS OR INTERFERES SOME ACTIVE ACTIVITIES AND ADLS

## 2019-11-26 ASSESSMENT — PAIN DESCRIPTION - FREQUENCY: FREQUENCY: INTERMITTENT

## 2019-11-26 ASSESSMENT — PAIN SCALES - GENERAL
PAINLEVEL_OUTOF10: 1
PAINLEVEL_OUTOF10: 10

## 2019-11-26 ASSESSMENT — PAIN DESCRIPTION - PROGRESSION: CLINICAL_PROGRESSION: GRADUALLY WORSENING

## 2019-11-26 ASSESSMENT — PAIN DESCRIPTION - ORIENTATION: ORIENTATION: MID

## 2019-11-26 ASSESSMENT — PAIN DESCRIPTION - PAIN TYPE: TYPE: ACUTE PAIN

## 2019-11-26 ASSESSMENT — PAIN DESCRIPTION - DESCRIPTORS: DESCRIPTORS: ACHING

## 2019-11-26 ASSESSMENT — PAIN DESCRIPTION - LOCATION: LOCATION: CHEST

## 2019-11-27 PROCEDURE — 93010 ELECTROCARDIOGRAM REPORT: CPT | Performed by: INTERNAL MEDICINE

## 2019-11-29 ENCOUNTER — APPOINTMENT (OUTPATIENT)
Dept: CT IMAGING | Age: 59
End: 2019-11-29
Payer: COMMERCIAL

## 2019-11-29 ENCOUNTER — HOSPITAL ENCOUNTER (EMERGENCY)
Age: 59
Discharge: HOME | End: 2019-11-29
Attending: EMERGENCY MEDICINE
Payer: COMMERCIAL

## 2019-11-29 VITALS
DIASTOLIC BLOOD PRESSURE: 98 MMHG | HEART RATE: 90 BPM | WEIGHT: 195 LBS | BODY MASS INDEX: 30.61 KG/M2 | SYSTOLIC BLOOD PRESSURE: 163 MMHG | HEIGHT: 67 IN | OXYGEN SATURATION: 96 % | RESPIRATION RATE: 19 BRPM | TEMPERATURE: 97.6 F

## 2019-11-29 DIAGNOSIS — H53.2 DIPLOPIA: ICD-10-CM

## 2019-11-29 DIAGNOSIS — I10 ESSENTIAL HYPERTENSION: ICD-10-CM

## 2019-11-29 DIAGNOSIS — R51.9 ACUTE NONINTRACTABLE HEADACHE, UNSPECIFIED HEADACHE TYPE: Primary | ICD-10-CM

## 2019-11-29 LAB
ALBUMIN SERPL-MCNC: 3.8 G/DL (ref 3.5–4.6)
ALP BLD-CCNC: 65 U/L (ref 35–104)
ALT SERPL-CCNC: 17 U/L (ref 0–41)
ANION GAP SERPL CALCULATED.3IONS-SCNC: 9 MEQ/L (ref 9–15)
AST SERPL-CCNC: 18 U/L (ref 0–40)
BASOPHILS ABSOLUTE: 0 K/UL (ref 0–0.2)
BASOPHILS RELATIVE PERCENT: 0.6 %
BILIRUB SERPL-MCNC: 0.4 MG/DL (ref 0.2–0.7)
BUN BLDV-MCNC: 11 MG/DL (ref 6–20)
CALCIUM SERPL-MCNC: 8.9 MG/DL (ref 8.5–9.9)
CHLORIDE BLD-SCNC: 100 MEQ/L (ref 95–107)
CO2: 29 MEQ/L (ref 20–31)
CREAT SERPL-MCNC: 0.9 MG/DL (ref 0.7–1.2)
EKG ATRIAL RATE: 91 BPM
EKG P AXIS: 45 DEGREES
EKG P-R INTERVAL: 134 MS
EKG Q-T INTERVAL: 396 MS
EKG QRS DURATION: 90 MS
EKG QTC CALCULATION (BAZETT): 487 MS
EKG R AXIS: -26 DEGREES
EKG T AXIS: 9 DEGREES
EKG VENTRICULAR RATE: 91 BPM
EOSINOPHILS ABSOLUTE: 0.3 K/UL (ref 0–0.7)
EOSINOPHILS RELATIVE PERCENT: 5.9 %
GFR AFRICAN AMERICAN: >60
GFR NON-AFRICAN AMERICAN: >60
GLOBULIN: 2.2 G/DL (ref 2.3–3.5)
GLUCOSE BLD-MCNC: 209 MG/DL (ref 70–99)
HCT VFR BLD CALC: 40.9 % (ref 42–52)
HEMOGLOBIN: 13.8 G/DL (ref 14–18)
LYMPHOCYTES ABSOLUTE: 0.7 K/UL (ref 1–4.8)
LYMPHOCYTES RELATIVE PERCENT: 17.1 %
MAGNESIUM: 1.4 MG/DL (ref 1.7–2.4)
MCH RBC QN AUTO: 28.2 PG (ref 27–31.3)
MCHC RBC AUTO-ENTMCNC: 33.8 % (ref 33–37)
MCV RBC AUTO: 83.4 FL (ref 80–100)
MONOCYTES ABSOLUTE: 0.4 K/UL (ref 0.2–0.8)
MONOCYTES RELATIVE PERCENT: 10 %
NEUTROPHILS ABSOLUTE: 2.9 K/UL (ref 1.4–6.5)
NEUTROPHILS RELATIVE PERCENT: 66.4 %
PDW BLD-RTO: 13.9 % (ref 11.5–14.5)
PLATELET # BLD: 155 K/UL (ref 130–400)
POTASSIUM SERPL-SCNC: 4 MEQ/L (ref 3.4–4.9)
RBC # BLD: 4.91 M/UL (ref 4.7–6.1)
SODIUM BLD-SCNC: 138 MEQ/L (ref 135–144)
TOTAL PROTEIN: 6 G/DL (ref 6.3–8)
TROPONIN: <0.01 NG/ML (ref 0–0.01)
WBC # BLD: 4.3 K/UL (ref 4.8–10.8)

## 2019-11-29 PROCEDURE — 93005 ELECTROCARDIOGRAM TRACING: CPT | Performed by: EMERGENCY MEDICINE

## 2019-11-29 PROCEDURE — 36415 COLL VENOUS BLD VENIPUNCTURE: CPT

## 2019-11-29 PROCEDURE — 99284 EMERGENCY DEPT VISIT MOD MDM: CPT

## 2019-11-29 PROCEDURE — 2580000003 HC RX 258: Performed by: EMERGENCY MEDICINE

## 2019-11-29 PROCEDURE — 70450 CT HEAD/BRAIN W/O DYE: CPT

## 2019-11-29 PROCEDURE — 84484 ASSAY OF TROPONIN QUANT: CPT

## 2019-11-29 PROCEDURE — 96374 THER/PROPH/DIAG INJ IV PUSH: CPT

## 2019-11-29 PROCEDURE — 6370000000 HC RX 637 (ALT 250 FOR IP): Performed by: EMERGENCY MEDICINE

## 2019-11-29 PROCEDURE — 6360000002 HC RX W HCPCS: Performed by: EMERGENCY MEDICINE

## 2019-11-29 PROCEDURE — 2500000003 HC RX 250 WO HCPCS: Performed by: EMERGENCY MEDICINE

## 2019-11-29 PROCEDURE — 96375 TX/PRO/DX INJ NEW DRUG ADDON: CPT

## 2019-11-29 PROCEDURE — 85025 COMPLETE CBC W/AUTO DIFF WBC: CPT

## 2019-11-29 PROCEDURE — 83735 ASSAY OF MAGNESIUM: CPT

## 2019-11-29 PROCEDURE — 80053 COMPREHEN METABOLIC PANEL: CPT

## 2019-11-29 PROCEDURE — 93010 ELECTROCARDIOGRAM REPORT: CPT | Performed by: INTERNAL MEDICINE

## 2019-11-29 RX ORDER — LABETALOL 20 MG/4 ML (5 MG/ML) INTRAVENOUS SYRINGE
20 ONCE
Status: COMPLETED | OUTPATIENT
Start: 2019-11-29 | End: 2019-11-29

## 2019-11-29 RX ORDER — IBUPROFEN 800 MG/1
800 TABLET ORAL 2 TIMES DAILY PRN
Qty: 180 TABLET | Refills: 1 | Status: SHIPPED | OUTPATIENT
Start: 2019-11-29 | End: 2020-06-17

## 2019-11-29 RX ORDER — METOCLOPRAMIDE 10 MG/1
10 TABLET ORAL 2 TIMES DAILY PRN
Qty: 60 TABLET | Refills: 0 | Status: ON HOLD | OUTPATIENT
Start: 2019-11-29 | End: 2019-12-13

## 2019-11-29 RX ORDER — METOCLOPRAMIDE HYDROCHLORIDE 5 MG/ML
10 INJECTION INTRAMUSCULAR; INTRAVENOUS ONCE
Status: COMPLETED | OUTPATIENT
Start: 2019-11-29 | End: 2019-11-29

## 2019-11-29 RX ORDER — ACETAMINOPHEN 500 MG
1000 TABLET ORAL ONCE
Status: COMPLETED | OUTPATIENT
Start: 2019-11-29 | End: 2019-11-29

## 2019-11-29 RX ORDER — KETOROLAC TROMETHAMINE 30 MG/ML
30 INJECTION, SOLUTION INTRAMUSCULAR; INTRAVENOUS ONCE
Status: COMPLETED | OUTPATIENT
Start: 2019-11-29 | End: 2019-11-29

## 2019-11-29 RX ORDER — 0.9 % SODIUM CHLORIDE 0.9 %
1000 INTRAVENOUS SOLUTION INTRAVENOUS ONCE
Status: COMPLETED | OUTPATIENT
Start: 2019-11-29 | End: 2019-11-29

## 2019-11-29 RX ORDER — DIPHENHYDRAMINE HYDROCHLORIDE 50 MG/ML
25 INJECTION INTRAMUSCULAR; INTRAVENOUS ONCE
Status: COMPLETED | OUTPATIENT
Start: 2019-11-29 | End: 2019-11-29

## 2019-11-29 RX ADMIN — DIPHENHYDRAMINE HYDROCHLORIDE 25 MG: 50 INJECTION, SOLUTION INTRAMUSCULAR; INTRAVENOUS at 10:35

## 2019-11-29 RX ADMIN — KETOROLAC TROMETHAMINE 30 MG: 30 INJECTION, SOLUTION INTRAMUSCULAR at 10:35

## 2019-11-29 RX ADMIN — SODIUM CHLORIDE 1000 ML: 9 INJECTION, SOLUTION INTRAVENOUS at 10:36

## 2019-11-29 RX ADMIN — LABETALOL 20 MG/4 ML (5 MG/ML) INTRAVENOUS SYRINGE 20 MG: at 10:35

## 2019-11-29 RX ADMIN — ACETAMINOPHEN 1000 MG: 500 TABLET ORAL at 10:35

## 2019-11-29 RX ADMIN — METOCLOPRAMIDE 10 MG: 5 INJECTION, SOLUTION INTRAMUSCULAR; INTRAVENOUS at 10:35

## 2019-11-29 ASSESSMENT — PAIN SCALES - GENERAL
PAINLEVEL_OUTOF10: 10
PAINLEVEL_OUTOF10: 10

## 2019-11-29 ASSESSMENT — ENCOUNTER SYMPTOMS
SHORTNESS OF BREATH: 0
BACK PAIN: 0
NAUSEA: 0
VOMITING: 0
DIARRHEA: 0
SORE THROAT: 0
COUGH: 0
ABDOMINAL PAIN: 0

## 2019-11-29 ASSESSMENT — PAIN DESCRIPTION - PAIN TYPE: TYPE: ACUTE PAIN

## 2019-11-29 ASSESSMENT — PAIN DESCRIPTION - ORIENTATION: ORIENTATION: LEFT

## 2019-11-29 ASSESSMENT — PAIN DESCRIPTION - DESCRIPTORS: DESCRIPTORS: HEADACHE

## 2019-11-29 ASSESSMENT — PAIN DESCRIPTION - LOCATION: LOCATION: HEAD

## 2019-12-02 ENCOUNTER — OFFICE VISIT (OUTPATIENT)
Dept: PRIMARY CARE CLINIC | Age: 59
End: 2019-12-02
Payer: COMMERCIAL

## 2019-12-02 VITALS
RESPIRATION RATE: 14 BRPM | OXYGEN SATURATION: 98 % | HEIGHT: 67 IN | SYSTOLIC BLOOD PRESSURE: 158 MMHG | WEIGHT: 195.8 LBS | BODY MASS INDEX: 30.73 KG/M2 | HEART RATE: 105 BPM | DIASTOLIC BLOOD PRESSURE: 98 MMHG | TEMPERATURE: 97.8 F

## 2019-12-02 DIAGNOSIS — J06.9 UPPER RESPIRATORY TRACT INFECTION, UNSPECIFIED TYPE: ICD-10-CM

## 2019-12-02 DIAGNOSIS — I10 ESSENTIAL HYPERTENSION: Primary | ICD-10-CM

## 2019-12-02 DIAGNOSIS — Z12.11 COLON CANCER SCREENING: ICD-10-CM

## 2019-12-02 DIAGNOSIS — H49.22 SIXTH NERVE PALSY OF LEFT EYE: ICD-10-CM

## 2019-12-02 PROCEDURE — G8427 DOCREV CUR MEDS BY ELIG CLIN: HCPCS | Performed by: INTERNAL MEDICINE

## 2019-12-02 PROCEDURE — 1036F TOBACCO NON-USER: CPT | Performed by: INTERNAL MEDICINE

## 2019-12-02 PROCEDURE — 99214 OFFICE O/P EST MOD 30 MIN: CPT | Performed by: INTERNAL MEDICINE

## 2019-12-02 PROCEDURE — G8484 FLU IMMUNIZE NO ADMIN: HCPCS | Performed by: INTERNAL MEDICINE

## 2019-12-02 PROCEDURE — 3017F COLORECTAL CA SCREEN DOC REV: CPT | Performed by: INTERNAL MEDICINE

## 2019-12-02 PROCEDURE — G8598 ASA/ANTIPLAT THER USED: HCPCS | Performed by: INTERNAL MEDICINE

## 2019-12-02 PROCEDURE — G8417 CALC BMI ABV UP PARAM F/U: HCPCS | Performed by: INTERNAL MEDICINE

## 2019-12-02 RX ORDER — ATENOLOL 50 MG/1
50 TABLET ORAL DAILY
Qty: 30 TABLET | Refills: 5 | Status: ON HOLD | OUTPATIENT
Start: 2019-12-02 | End: 2019-12-11 | Stop reason: HOSPADM

## 2019-12-02 RX ORDER — AZITHROMYCIN 250 MG/1
TABLET, FILM COATED ORAL
Qty: 1 PACKET | Refills: 1 | Status: ON HOLD | OUTPATIENT
Start: 2019-12-02 | End: 2019-12-11 | Stop reason: HOSPADM

## 2019-12-06 ENCOUNTER — TELEPHONE (OUTPATIENT)
Dept: ENDOCRINOLOGY | Age: 59
End: 2019-12-06

## 2019-12-08 ENCOUNTER — HOSPITAL ENCOUNTER (INPATIENT)
Age: 59
LOS: 6 days | Discharge: HOME | DRG: 194 | End: 2019-12-11
Attending: EMERGENCY MEDICINE | Admitting: INTERNAL MEDICINE
Payer: COMMERCIAL

## 2019-12-08 ENCOUNTER — APPOINTMENT (OUTPATIENT)
Dept: GENERAL RADIOLOGY | Age: 59
DRG: 194 | End: 2019-12-08
Payer: COMMERCIAL

## 2019-12-08 DIAGNOSIS — E16.2 HYPOGLYCEMIA: ICD-10-CM

## 2019-12-08 DIAGNOSIS — J45.21 MILD INTERMITTENT ASTHMA WITH EXACERBATION: Primary | ICD-10-CM

## 2019-12-08 DIAGNOSIS — R06.00 DYSPNEA AND RESPIRATORY ABNORMALITIES: ICD-10-CM

## 2019-12-08 DIAGNOSIS — I50.811 ACUTE RIGHT-SIDED CONGESTIVE HEART FAILURE (HCC): ICD-10-CM

## 2019-12-08 DIAGNOSIS — R06.89 DYSPNEA AND RESPIRATORY ABNORMALITIES: ICD-10-CM

## 2019-12-08 PROBLEM — J18.9 COMMUNITY ACQUIRED PNEUMONIA OF RIGHT LOWER LOBE OF LUNG: Status: ACTIVE | Noted: 2019-12-08

## 2019-12-08 LAB
ALBUMIN SERPL-MCNC: 4.4 G/DL (ref 3.5–4.6)
ALP BLD-CCNC: 74 U/L (ref 35–104)
ALT SERPL-CCNC: 33 U/L (ref 0–41)
ANION GAP SERPL CALCULATED.3IONS-SCNC: 11 MEQ/L (ref 9–15)
AST SERPL-CCNC: 24 U/L (ref 0–40)
BASOPHILS ABSOLUTE: 0 K/UL (ref 0–0.2)
BASOPHILS RELATIVE PERCENT: 0.8 %
BILIRUB SERPL-MCNC: 0.4 MG/DL (ref 0.2–0.7)
BILIRUBIN DIRECT: <0.2 MG/DL (ref 0–0.4)
BILIRUBIN, INDIRECT: NORMAL MG/DL (ref 0–0.6)
BUN BLDV-MCNC: 15 MG/DL (ref 6–20)
CALCIUM SERPL-MCNC: 9.2 MG/DL (ref 8.5–9.9)
CHLORIDE BLD-SCNC: 107 MEQ/L (ref 95–107)
CHP ED QC CHECK: YES
CHP ED QC CHECK: YES
CO2: 24 MEQ/L (ref 20–31)
CREAT SERPL-MCNC: 1.02 MG/DL (ref 0.7–1.2)
EKG ATRIAL RATE: 82 BPM
EKG P AXIS: 54 DEGREES
EKG P-R INTERVAL: 138 MS
EKG Q-T INTERVAL: 408 MS
EKG QRS DURATION: 90 MS
EKG QTC CALCULATION (BAZETT): 476 MS
EKG R AXIS: -27 DEGREES
EKG T AXIS: -17 DEGREES
EKG VENTRICULAR RATE: 82 BPM
EOSINOPHILS ABSOLUTE: 0.3 K/UL (ref 0–0.7)
EOSINOPHILS RELATIVE PERCENT: 4.5 %
GFR AFRICAN AMERICAN: >60
GFR NON-AFRICAN AMERICAN: >60
GLUCOSE BLD-MCNC: 44 MG/DL (ref 70–99)
GLUCOSE BLD-MCNC: 56 MG/DL (ref 60–115)
GLUCOSE BLD-MCNC: 59 MG/DL
GLUCOSE BLD-MCNC: 59 MG/DL (ref 60–115)
GLUCOSE BLD-MCNC: 62 MG/DL (ref 60–115)
GLUCOSE BLD-MCNC: 81 MG/DL
GLUCOSE BLD-MCNC: 81 MG/DL (ref 60–115)
GLUCOSE BLD-MCNC: 90 MG/DL (ref 60–115)
HCT VFR BLD CALC: 41.6 % (ref 42–52)
HEMOGLOBIN: 14 G/DL (ref 14–18)
LACTIC ACID: 2.6 MMOL/L (ref 0.5–2.2)
LIPASE: 25 U/L (ref 12–95)
LYMPHOCYTES ABSOLUTE: 1.1 K/UL (ref 1–4.8)
LYMPHOCYTES RELATIVE PERCENT: 17.7 %
MCH RBC QN AUTO: 28.6 PG (ref 27–31.3)
MCHC RBC AUTO-ENTMCNC: 33.6 % (ref 33–37)
MCV RBC AUTO: 85.1 FL (ref 80–100)
MONOCYTES ABSOLUTE: 0.6 K/UL (ref 0.2–0.8)
MONOCYTES RELATIVE PERCENT: 9.1 %
NEUTROPHILS ABSOLUTE: 4.4 K/UL (ref 1.4–6.5)
NEUTROPHILS RELATIVE PERCENT: 67.9 %
PDW BLD-RTO: 13.6 % (ref 11.5–14.5)
PERFORMED ON: ABNORMAL
PERFORMED ON: ABNORMAL
PERFORMED ON: NORMAL
PLATELET # BLD: 168 K/UL (ref 130–400)
POC CREATININE WHOLE BLOOD: 59
POTASSIUM SERPL-SCNC: 4 MEQ/L (ref 3.4–4.9)
PRO-BNP: 2449 PG/ML
PROCALCITONIN: 0.05 NG/ML (ref 0–0.15)
RBC # BLD: 4.9 M/UL (ref 4.7–6.1)
SODIUM BLD-SCNC: 142 MEQ/L (ref 135–144)
TOTAL PROTEIN: 6.9 G/DL (ref 6.3–8)
TROPONIN: <0.01 NG/ML (ref 0–0.01)
WBC # BLD: 6.5 K/UL (ref 4.8–10.8)

## 2019-12-08 PROCEDURE — 94640 AIRWAY INHALATION TREATMENT: CPT

## 2019-12-08 PROCEDURE — 87040 BLOOD CULTURE FOR BACTERIA: CPT

## 2019-12-08 PROCEDURE — 80076 HEPATIC FUNCTION PANEL: CPT

## 2019-12-08 PROCEDURE — 99285 EMERGENCY DEPT VISIT HI MDM: CPT

## 2019-12-08 PROCEDURE — 85025 COMPLETE CBC W/AUTO DIFF WBC: CPT

## 2019-12-08 PROCEDURE — 6370000000 HC RX 637 (ALT 250 FOR IP): Performed by: INTERNAL MEDICINE

## 2019-12-08 PROCEDURE — 83605 ASSAY OF LACTIC ACID: CPT

## 2019-12-08 PROCEDURE — 80048 BASIC METABOLIC PNL TOTAL CA: CPT

## 2019-12-08 PROCEDURE — 93005 ELECTROCARDIOGRAM TRACING: CPT | Performed by: EMERGENCY MEDICINE

## 2019-12-08 PROCEDURE — 36415 COLL VENOUS BLD VENIPUNCTURE: CPT

## 2019-12-08 PROCEDURE — 94761 N-INVAS EAR/PLS OXIMETRY MLT: CPT

## 2019-12-08 PROCEDURE — 83690 ASSAY OF LIPASE: CPT

## 2019-12-08 PROCEDURE — 84484 ASSAY OF TROPONIN QUANT: CPT

## 2019-12-08 PROCEDURE — 6370000000 HC RX 637 (ALT 250 FOR IP): Performed by: EMERGENCY MEDICINE

## 2019-12-08 PROCEDURE — 6360000002 HC RX W HCPCS: Performed by: EMERGENCY MEDICINE

## 2019-12-08 PROCEDURE — 2580000003 HC RX 258: Performed by: EMERGENCY MEDICINE

## 2019-12-08 PROCEDURE — 1210000000 HC MED SURG R&B

## 2019-12-08 PROCEDURE — 83880 ASSAY OF NATRIURETIC PEPTIDE: CPT

## 2019-12-08 PROCEDURE — 94664 DEMO&/EVAL PT USE INHALER: CPT

## 2019-12-08 PROCEDURE — 71045 X-RAY EXAM CHEST 1 VIEW: CPT

## 2019-12-08 PROCEDURE — 2700000000 HC OXYGEN THERAPY PER DAY

## 2019-12-08 PROCEDURE — 84145 PROCALCITONIN (PCT): CPT

## 2019-12-08 RX ORDER — DOCUSATE SODIUM 100 MG/1
100 CAPSULE, LIQUID FILLED ORAL 2 TIMES DAILY
Status: DISCONTINUED | OUTPATIENT
Start: 2019-12-08 | End: 2019-12-11 | Stop reason: HOSPADM

## 2019-12-08 RX ORDER — AZITHROMYCIN 500 MG/1
500 TABLET, FILM COATED ORAL ONCE
Status: COMPLETED | OUTPATIENT
Start: 2019-12-08 | End: 2019-12-08

## 2019-12-08 RX ORDER — LISINOPRIL 20 MG/1
40 TABLET ORAL DAILY
Status: DISCONTINUED | OUTPATIENT
Start: 2019-12-09 | End: 2019-12-11 | Stop reason: HOSPADM

## 2019-12-08 RX ORDER — SODIUM CHLORIDE 0.9 % (FLUSH) 0.9 %
3 SYRINGE (ML) INJECTION EVERY 8 HOURS
Status: DISCONTINUED | OUTPATIENT
Start: 2019-12-08 | End: 2019-12-08

## 2019-12-08 RX ORDER — PANTOPRAZOLE SODIUM 40 MG/1
40 TABLET, DELAYED RELEASE ORAL
Status: DISCONTINUED | OUTPATIENT
Start: 2019-12-09 | End: 2019-12-11 | Stop reason: HOSPADM

## 2019-12-08 RX ORDER — IPRATROPIUM BROMIDE AND ALBUTEROL SULFATE 2.5; .5 MG/3ML; MG/3ML
1 SOLUTION RESPIRATORY (INHALATION) 3 TIMES DAILY
Status: DISCONTINUED | OUTPATIENT
Start: 2019-12-09 | End: 2019-12-11

## 2019-12-08 RX ORDER — NICOTINE POLACRILEX 4 MG
15 LOZENGE BUCCAL PRN
Status: DISCONTINUED | OUTPATIENT
Start: 2019-12-08 | End: 2019-12-11 | Stop reason: HOSPADM

## 2019-12-08 RX ORDER — SODIUM CHLORIDE 0.9 % (FLUSH) 0.9 %
10 SYRINGE (ML) INJECTION PRN
Status: DISCONTINUED | OUTPATIENT
Start: 2019-12-08 | End: 2019-12-11 | Stop reason: HOSPADM

## 2019-12-08 RX ORDER — HYDRALAZINE HYDROCHLORIDE 20 MG/ML
10 INJECTION INTRAMUSCULAR; INTRAVENOUS EVERY 6 HOURS PRN
Status: DISCONTINUED | OUTPATIENT
Start: 2019-12-08 | End: 2019-12-11 | Stop reason: HOSPADM

## 2019-12-08 RX ORDER — ATENOLOL 50 MG/1
50 TABLET ORAL DAILY
Status: DISCONTINUED | OUTPATIENT
Start: 2019-12-09 | End: 2019-12-09

## 2019-12-08 RX ORDER — MONTELUKAST SODIUM 10 MG/1
10 TABLET ORAL NIGHTLY
Status: DISCONTINUED | OUTPATIENT
Start: 2019-12-08 | End: 2019-12-11 | Stop reason: HOSPADM

## 2019-12-08 RX ORDER — ONDANSETRON 2 MG/ML
4 INJECTION INTRAMUSCULAR; INTRAVENOUS EVERY 6 HOURS PRN
Status: DISCONTINUED | OUTPATIENT
Start: 2019-12-08 | End: 2019-12-11 | Stop reason: HOSPADM

## 2019-12-08 RX ORDER — POLYETHYLENE GLYCOL 3350 17 G/17G
17 POWDER, FOR SOLUTION ORAL DAILY PRN
Status: DISCONTINUED | OUTPATIENT
Start: 2019-12-08 | End: 2019-12-11 | Stop reason: HOSPADM

## 2019-12-08 RX ORDER — FUROSEMIDE 10 MG/ML
20 INJECTION INTRAMUSCULAR; INTRAVENOUS ONCE
Status: COMPLETED | OUTPATIENT
Start: 2019-12-08 | End: 2019-12-09

## 2019-12-08 RX ORDER — ATORVASTATIN CALCIUM 40 MG/1
40 TABLET, FILM COATED ORAL NIGHTLY
Status: DISCONTINUED | OUTPATIENT
Start: 2019-12-08 | End: 2019-12-11 | Stop reason: HOSPADM

## 2019-12-08 RX ORDER — DEXTROSE MONOHYDRATE 25 G/50ML
12.5 INJECTION, SOLUTION INTRAVENOUS PRN
Status: DISCONTINUED | OUTPATIENT
Start: 2019-12-08 | End: 2019-12-11 | Stop reason: HOSPADM

## 2019-12-08 RX ORDER — PANTOPRAZOLE SODIUM 40 MG/1
40 TABLET, DELAYED RELEASE ORAL DAILY
Status: ON HOLD | COMMUNITY
End: 2022-08-16

## 2019-12-08 RX ORDER — DEXTROSE MONOHYDRATE 50 MG/ML
100 INJECTION, SOLUTION INTRAVENOUS PRN
Status: DISCONTINUED | OUTPATIENT
Start: 2019-12-08 | End: 2019-12-11 | Stop reason: HOSPADM

## 2019-12-08 RX ORDER — ACETAMINOPHEN 325 MG/1
650 TABLET ORAL EVERY 4 HOURS PRN
Status: DISCONTINUED | OUTPATIENT
Start: 2019-12-08 | End: 2019-12-11 | Stop reason: HOSPADM

## 2019-12-08 RX ORDER — SODIUM CHLORIDE 0.9 % (FLUSH) 0.9 %
10 SYRINGE (ML) INJECTION EVERY 12 HOURS SCHEDULED
Status: DISCONTINUED | OUTPATIENT
Start: 2019-12-08 | End: 2019-12-11 | Stop reason: HOSPADM

## 2019-12-08 RX ORDER — IPRATROPIUM BROMIDE AND ALBUTEROL SULFATE 2.5; .5 MG/3ML; MG/3ML
1 SOLUTION RESPIRATORY (INHALATION)
Status: DISCONTINUED | OUTPATIENT
Start: 2019-12-09 | End: 2019-12-08

## 2019-12-08 RX ORDER — ALBUTEROL SULFATE 2.5 MG/3ML
2.5 SOLUTION RESPIRATORY (INHALATION)
Status: DISCONTINUED | OUTPATIENT
Start: 2019-12-08 | End: 2019-12-11 | Stop reason: HOSPADM

## 2019-12-08 RX ADMIN — ALBUTEROL SULFATE 5 MG: 2.5 SOLUTION RESPIRATORY (INHALATION) at 20:01

## 2019-12-08 RX ADMIN — DEXTROSE 15 G: 15 GEL ORAL at 23:11

## 2019-12-08 RX ADMIN — IPRATROPIUM BROMIDE 0.5 MG: 0.5 SOLUTION RESPIRATORY (INHALATION) at 20:01

## 2019-12-08 RX ADMIN — Medication 3 ML: at 19:38

## 2019-12-08 RX ADMIN — CEFTRIAXONE SODIUM 2 G: 2 INJECTION, POWDER, FOR SOLUTION INTRAMUSCULAR; INTRAVENOUS at 20:59

## 2019-12-08 RX ADMIN — AZITHROMYCIN MONOHYDRATE 500 MG: 500 TABLET ORAL at 20:59

## 2019-12-08 ASSESSMENT — ENCOUNTER SYMPTOMS
VOICE CHANGE: 0
EYES NEGATIVE: 1
COUGH: 0
SHORTNESS OF BREATH: 1
VOMITING: 0
DIARRHEA: 0
CHEST TIGHTNESS: 1
TROUBLE SWALLOWING: 0
WHEEZING: 1
EYE DISCHARGE: 0
SINUS PAIN: 0
NAUSEA: 1
SORE THROAT: 0
EYE PAIN: 0
VOMITING: 0
CHOKING: 0
BLOOD IN STOOL: 0
SORE THROAT: 1
ABDOMINAL PAIN: 1
ABDOMINAL DISTENTION: 0
SINUS PAIN: 1
BACK PAIN: 0
SHORTNESS OF BREATH: 0
NAUSEA: 0
RHINORRHEA: 1
PHOTOPHOBIA: 0

## 2019-12-08 ASSESSMENT — PAIN DESCRIPTION - PAIN TYPE: TYPE: ACUTE PAIN

## 2019-12-08 ASSESSMENT — PAIN DESCRIPTION - FREQUENCY: FREQUENCY: CONTINUOUS

## 2019-12-08 ASSESSMENT — PAIN DESCRIPTION - LOCATION: LOCATION: HEAD

## 2019-12-08 ASSESSMENT — PAIN SCALES - GENERAL: PAINLEVEL_OUTOF10: 8

## 2019-12-08 ASSESSMENT — PAIN DESCRIPTION - DESCRIPTORS: DESCRIPTORS: HEADACHE

## 2019-12-09 ENCOUNTER — APPOINTMENT (OUTPATIENT)
Dept: ULTRASOUND IMAGING | Age: 59
DRG: 194 | End: 2019-12-09
Payer: COMMERCIAL

## 2019-12-09 PROBLEM — I50.33 ACUTE ON CHRONIC DIASTOLIC (CONGESTIVE) HEART FAILURE (HCC): Status: ACTIVE | Noted: 2019-12-09

## 2019-12-09 LAB
ANION GAP SERPL CALCULATED.3IONS-SCNC: 15 MEQ/L (ref 9–15)
BUN BLDV-MCNC: 15 MG/DL (ref 6–20)
CALCIUM SERPL-MCNC: 9.1 MG/DL (ref 8.5–9.9)
CHLORIDE BLD-SCNC: 103 MEQ/L (ref 95–107)
CO2: 24 MEQ/L (ref 20–31)
CREAT SERPL-MCNC: 0.81 MG/DL (ref 0.7–1.2)
GFR AFRICAN AMERICAN: >60
GFR NON-AFRICAN AMERICAN: >60
GLUCOSE BLD-MCNC: 101 MG/DL (ref 60–115)
GLUCOSE BLD-MCNC: 169 MG/DL (ref 60–115)
GLUCOSE BLD-MCNC: 198 MG/DL (ref 60–115)
GLUCOSE BLD-MCNC: 249 MG/DL (ref 60–115)
GLUCOSE BLD-MCNC: 85 MG/DL (ref 60–115)
GLUCOSE BLD-MCNC: 92 MG/DL (ref 70–99)
HCT VFR BLD CALC: 38.5 % (ref 42–52)
HEMOGLOBIN: 12.9 G/DL (ref 14–18)
LV EF: 60 %
LVEF MODALITY: NORMAL
MCH RBC QN AUTO: 28.6 PG (ref 27–31.3)
MCHC RBC AUTO-ENTMCNC: 33.5 % (ref 33–37)
MCV RBC AUTO: 85.3 FL (ref 80–100)
PDW BLD-RTO: 13.7 % (ref 11.5–14.5)
PERFORMED ON: ABNORMAL
PERFORMED ON: NORMAL
PERFORMED ON: NORMAL
PLATELET # BLD: 161 K/UL (ref 130–400)
POTASSIUM REFLEX MAGNESIUM: 4.1 MEQ/L (ref 3.4–4.9)
RBC # BLD: 4.52 M/UL (ref 4.7–6.1)
SODIUM BLD-SCNC: 142 MEQ/L (ref 135–144)
WBC # BLD: 6.4 K/UL (ref 4.8–10.8)

## 2019-12-09 PROCEDURE — 93306 TTE W/DOPPLER COMPLETE: CPT

## 2019-12-09 PROCEDURE — 6370000000 HC RX 637 (ALT 250 FOR IP): Performed by: INTERNAL MEDICINE

## 2019-12-09 PROCEDURE — 6360000002 HC RX W HCPCS: Performed by: INTERNAL MEDICINE

## 2019-12-09 PROCEDURE — 87449 NOS EACH ORGANISM AG IA: CPT

## 2019-12-09 PROCEDURE — 1210000000 HC MED SURG R&B

## 2019-12-09 PROCEDURE — 2700000000 HC OXYGEN THERAPY PER DAY

## 2019-12-09 PROCEDURE — 2580000003 HC RX 258: Performed by: INTERNAL MEDICINE

## 2019-12-09 PROCEDURE — 99254 IP/OBS CNSLTJ NEW/EST MOD 60: CPT | Performed by: INTERNAL MEDICINE

## 2019-12-09 PROCEDURE — 80048 BASIC METABOLIC PNL TOTAL CA: CPT

## 2019-12-09 PROCEDURE — 87899 AGENT NOS ASSAY W/OPTIC: CPT

## 2019-12-09 PROCEDURE — 94640 AIRWAY INHALATION TREATMENT: CPT

## 2019-12-09 PROCEDURE — 36415 COLL VENOUS BLD VENIPUNCTURE: CPT

## 2019-12-09 PROCEDURE — 93880 EXTRACRANIAL BILAT STUDY: CPT

## 2019-12-09 PROCEDURE — 93010 ELECTROCARDIOGRAM REPORT: CPT | Performed by: INTERNAL MEDICINE

## 2019-12-09 PROCEDURE — 94761 N-INVAS EAR/PLS OXIMETRY MLT: CPT

## 2019-12-09 PROCEDURE — 85027 COMPLETE CBC AUTOMATED: CPT

## 2019-12-09 RX ORDER — FUROSEMIDE 10 MG/ML
40 INJECTION INTRAMUSCULAR; INTRAVENOUS 2 TIMES DAILY
Status: DISCONTINUED | OUTPATIENT
Start: 2019-12-09 | End: 2019-12-10

## 2019-12-09 RX ORDER — KETOROLAC TROMETHAMINE 30 MG/ML
30 INJECTION, SOLUTION INTRAMUSCULAR; INTRAVENOUS EVERY 6 HOURS PRN
Status: DISCONTINUED | OUTPATIENT
Start: 2019-12-09 | End: 2019-12-11 | Stop reason: HOSPADM

## 2019-12-09 RX ORDER — ASPIRIN 81 MG/1
81 TABLET ORAL DAILY
Status: DISCONTINUED | OUTPATIENT
Start: 2019-12-09 | End: 2019-12-11 | Stop reason: HOSPADM

## 2019-12-09 RX ORDER — ISOSORBIDE MONONITRATE 60 MG/1
60 TABLET, EXTENDED RELEASE ORAL DAILY
Status: DISCONTINUED | OUTPATIENT
Start: 2019-12-09 | End: 2019-12-11 | Stop reason: HOSPADM

## 2019-12-09 RX ORDER — CARVEDILOL 12.5 MG/1
12.5 TABLET ORAL 2 TIMES DAILY WITH MEALS
Status: DISCONTINUED | OUTPATIENT
Start: 2019-12-09 | End: 2019-12-11 | Stop reason: HOSPADM

## 2019-12-09 RX ADMIN — Medication 10 ML: at 08:51

## 2019-12-09 RX ADMIN — CARVEDILOL 12.5 MG: 12.5 TABLET, FILM COATED ORAL at 17:07

## 2019-12-09 RX ADMIN — IPRATROPIUM BROMIDE AND ALBUTEROL SULFATE 1 AMPULE: .5; 3 SOLUTION RESPIRATORY (INHALATION) at 19:22

## 2019-12-09 RX ADMIN — MONTELUKAST 10 MG: 10 TABLET, FILM COATED ORAL at 01:31

## 2019-12-09 RX ADMIN — IPRATROPIUM BROMIDE AND ALBUTEROL SULFATE 1 AMPULE: .5; 3 SOLUTION RESPIRATORY (INHALATION) at 07:32

## 2019-12-09 RX ADMIN — ONDANSETRON 4 MG: 2 INJECTION INTRAMUSCULAR; INTRAVENOUS at 06:40

## 2019-12-09 RX ADMIN — ACETAMINOPHEN 650 MG: 325 TABLET ORAL at 01:30

## 2019-12-09 RX ADMIN — IPRATROPIUM BROMIDE AND ALBUTEROL SULFATE 1 AMPULE: .5; 3 SOLUTION RESPIRATORY (INHALATION) at 15:21

## 2019-12-09 RX ADMIN — DOCUSATE SODIUM 100 MG: 100 CAPSULE, LIQUID FILLED ORAL at 20:26

## 2019-12-09 RX ADMIN — KETOROLAC TROMETHAMINE 30 MG: 30 INJECTION, SOLUTION INTRAMUSCULAR at 12:21

## 2019-12-09 RX ADMIN — Medication 10 ML: at 01:33

## 2019-12-09 RX ADMIN — DOCUSATE SODIUM 100 MG: 100 CAPSULE, LIQUID FILLED ORAL at 01:31

## 2019-12-09 RX ADMIN — LISINOPRIL 40 MG: 20 TABLET ORAL at 08:51

## 2019-12-09 RX ADMIN — Medication 10 ML: at 20:27

## 2019-12-09 RX ADMIN — FUROSEMIDE 40 MG: 10 INJECTION, SOLUTION INTRAMUSCULAR; INTRAVENOUS at 17:08

## 2019-12-09 RX ADMIN — FUROSEMIDE 20 MG: 10 INJECTION, SOLUTION INTRAMUSCULAR; INTRAVENOUS at 01:32

## 2019-12-09 RX ADMIN — ATENOLOL 50 MG: 50 TABLET ORAL at 08:50

## 2019-12-09 RX ADMIN — ASPIRIN 81 MG: 81 TABLET, COATED ORAL at 18:36

## 2019-12-09 RX ADMIN — KETOROLAC TROMETHAMINE 30 MG: 30 INJECTION, SOLUTION INTRAMUSCULAR at 06:40

## 2019-12-09 RX ADMIN — MONTELUKAST 10 MG: 10 TABLET, FILM COATED ORAL at 20:26

## 2019-12-09 RX ADMIN — ISOSORBIDE MONONITRATE 60 MG: 60 TABLET, EXTENDED RELEASE ORAL at 18:36

## 2019-12-09 RX ADMIN — ATORVASTATIN CALCIUM 40 MG: 40 TABLET, FILM COATED ORAL at 20:26

## 2019-12-09 RX ADMIN — PANTOPRAZOLE SODIUM 40 MG: 40 TABLET, DELAYED RELEASE ORAL at 06:27

## 2019-12-09 ASSESSMENT — ENCOUNTER SYMPTOMS
COUGH: 0
WHEEZING: 0
BLOOD IN STOOL: 0
EYES NEGATIVE: 1
CHEST TIGHTNESS: 0
NAUSEA: 0
SHORTNESS OF BREATH: 1
STRIDOR: 0
GASTROINTESTINAL NEGATIVE: 1

## 2019-12-09 ASSESSMENT — PAIN SCALES - GENERAL
PAINLEVEL_OUTOF10: 3
PAINLEVEL_OUTOF10: 8
PAINLEVEL_OUTOF10: 10

## 2019-12-10 ENCOUNTER — APPOINTMENT (OUTPATIENT)
Dept: MRI IMAGING | Age: 59
DRG: 194 | End: 2019-12-10
Payer: COMMERCIAL

## 2019-12-10 LAB
ANION GAP SERPL CALCULATED.3IONS-SCNC: 13 MEQ/L (ref 9–15)
BUN BLDV-MCNC: 22 MG/DL (ref 6–20)
C-REACTIVE PROTEIN, HIGH SENSITIVITY: 4.4 MG/L (ref 0–5)
CALCIUM SERPL-MCNC: 8.5 MG/DL (ref 8.5–9.9)
CHLORIDE BLD-SCNC: 100 MEQ/L (ref 95–107)
CO2: 27 MEQ/L (ref 20–31)
CREAT SERPL-MCNC: 1.19 MG/DL (ref 0.7–1.2)
GFR AFRICAN AMERICAN: >60
GFR NON-AFRICAN AMERICAN: >60
GLUCOSE BLD-MCNC: 208 MG/DL (ref 60–115)
GLUCOSE BLD-MCNC: 211 MG/DL (ref 60–115)
GLUCOSE BLD-MCNC: 227 MG/DL (ref 70–99)
GLUCOSE BLD-MCNC: 248 MG/DL (ref 60–115)
GLUCOSE BLD-MCNC: 260 MG/DL (ref 60–115)
PERFORMED ON: ABNORMAL
POTASSIUM SERPL-SCNC: 4.3 MEQ/L (ref 3.4–4.9)
PROCALCITONIN: 0.05 NG/ML (ref 0–0.15)
SEDIMENTATION RATE, ERYTHROCYTE: 10 MM (ref 0–20)
SODIUM BLD-SCNC: 140 MEQ/L (ref 135–144)
STREP PNEUMO AG, UR: NEGATIVE

## 2019-12-10 PROCEDURE — 1210000000 HC MED SURG R&B

## 2019-12-10 PROCEDURE — 99223 1ST HOSP IP/OBS HIGH 75: CPT | Performed by: INTERNAL MEDICINE

## 2019-12-10 PROCEDURE — 99255 IP/OBS CONSLTJ NEW/EST HI 80: CPT | Performed by: PSYCHIATRY & NEUROLOGY

## 2019-12-10 PROCEDURE — 6360000002 HC RX W HCPCS: Performed by: NURSE PRACTITIONER

## 2019-12-10 PROCEDURE — 83519 RIA NONANTIBODY: CPT

## 2019-12-10 PROCEDURE — 6360000004 HC RX CONTRAST MEDICATION: Performed by: NURSE PRACTITIONER

## 2019-12-10 PROCEDURE — 85652 RBC SED RATE AUTOMATED: CPT

## 2019-12-10 PROCEDURE — 2580000003 HC RX 258: Performed by: INTERNAL MEDICINE

## 2019-12-10 PROCEDURE — 84145 PROCALCITONIN (PCT): CPT

## 2019-12-10 PROCEDURE — 94640 AIRWAY INHALATION TREATMENT: CPT

## 2019-12-10 PROCEDURE — 94761 N-INVAS EAR/PLS OXIMETRY MLT: CPT

## 2019-12-10 PROCEDURE — A9579 GAD-BASE MR CONTRAST NOS,1ML: HCPCS | Performed by: NURSE PRACTITIONER

## 2019-12-10 PROCEDURE — 70553 MRI BRAIN STEM W/O & W/DYE: CPT

## 2019-12-10 PROCEDURE — 36415 COLL VENOUS BLD VENIPUNCTURE: CPT

## 2019-12-10 PROCEDURE — 6370000000 HC RX 637 (ALT 250 FOR IP): Performed by: INTERNAL MEDICINE

## 2019-12-10 PROCEDURE — 6360000002 HC RX W HCPCS: Performed by: INTERNAL MEDICINE

## 2019-12-10 PROCEDURE — 86141 C-REACTIVE PROTEIN HS: CPT

## 2019-12-10 PROCEDURE — 83516 IMMUNOASSAY NONANTIBODY: CPT

## 2019-12-10 PROCEDURE — 99233 SBSQ HOSP IP/OBS HIGH 50: CPT | Performed by: INTERNAL MEDICINE

## 2019-12-10 PROCEDURE — 80048 BASIC METABOLIC PNL TOTAL CA: CPT

## 2019-12-10 RX ORDER — SODIUM CHLORIDE 0.9 % (FLUSH) 0.9 %
10 SYRINGE (ML) INJECTION 2 TIMES DAILY
Status: DISCONTINUED | OUTPATIENT
Start: 2019-12-10 | End: 2019-12-11 | Stop reason: HOSPADM

## 2019-12-10 RX ORDER — FUROSEMIDE 10 MG/ML
40 INJECTION INTRAMUSCULAR; INTRAVENOUS ONCE
Status: COMPLETED | OUTPATIENT
Start: 2019-12-10 | End: 2019-12-10

## 2019-12-10 RX ORDER — LORAZEPAM 2 MG/ML
1 INJECTION INTRAMUSCULAR ONCE
Status: COMPLETED | OUTPATIENT
Start: 2019-12-10 | End: 2019-12-10

## 2019-12-10 RX ADMIN — GADOTERIDOL 20 ML: 279.3 INJECTION, SOLUTION INTRAVENOUS at 19:42

## 2019-12-10 RX ADMIN — IPRATROPIUM BROMIDE AND ALBUTEROL SULFATE 1 AMPULE: .5; 3 SOLUTION RESPIRATORY (INHALATION) at 20:37

## 2019-12-10 RX ADMIN — ACETAMINOPHEN 650 MG: 325 TABLET ORAL at 23:06

## 2019-12-10 RX ADMIN — LISINOPRIL 40 MG: 20 TABLET ORAL at 10:11

## 2019-12-10 RX ADMIN — ISOSORBIDE MONONITRATE 60 MG: 60 TABLET, EXTENDED RELEASE ORAL at 10:29

## 2019-12-10 RX ADMIN — ATORVASTATIN CALCIUM 40 MG: 40 TABLET, FILM COATED ORAL at 21:12

## 2019-12-10 RX ADMIN — CARVEDILOL 12.5 MG: 12.5 TABLET, FILM COATED ORAL at 10:12

## 2019-12-10 RX ADMIN — FUROSEMIDE 40 MG: 10 INJECTION, SOLUTION INTRAMUSCULAR; INTRAVENOUS at 10:11

## 2019-12-10 RX ADMIN — LORAZEPAM 1 MG: 2 INJECTION INTRAMUSCULAR; INTRAVENOUS at 18:34

## 2019-12-10 RX ADMIN — IPRATROPIUM BROMIDE AND ALBUTEROL SULFATE 1 AMPULE: .5; 3 SOLUTION RESPIRATORY (INHALATION) at 07:17

## 2019-12-10 RX ADMIN — ASPIRIN 81 MG: 81 TABLET, COATED ORAL at 10:11

## 2019-12-10 RX ADMIN — CARVEDILOL 12.5 MG: 12.5 TABLET, FILM COATED ORAL at 17:45

## 2019-12-10 RX ADMIN — MONTELUKAST 10 MG: 10 TABLET, FILM COATED ORAL at 21:12

## 2019-12-10 RX ADMIN — PANTOPRAZOLE SODIUM 40 MG: 40 TABLET, DELAYED RELEASE ORAL at 05:18

## 2019-12-10 RX ADMIN — ACETAMINOPHEN 650 MG: 325 TABLET ORAL at 10:20

## 2019-12-10 RX ADMIN — IPRATROPIUM BROMIDE AND ALBUTEROL SULFATE 1 AMPULE: .5; 3 SOLUTION RESPIRATORY (INHALATION) at 12:47

## 2019-12-10 RX ADMIN — Medication 10 ML: at 21:12

## 2019-12-10 ASSESSMENT — ENCOUNTER SYMPTOMS
EYE DISCHARGE: 0
EYE PAIN: 0
TROUBLE SWALLOWING: 0
WHEEZING: 0
SHORTNESS OF BREATH: 0
EYE ITCHING: 0
PHOTOPHOBIA: 0
GASTROINTESTINAL NEGATIVE: 1
NAUSEA: 0
COUGH: 0
VOMITING: 0
RESPIRATORY NEGATIVE: 1
COLOR CHANGE: 0
CHEST TIGHTNESS: 0
EYE REDNESS: 0
EYES NEGATIVE: 1
STRIDOR: 0
BLOOD IN STOOL: 0

## 2019-12-10 ASSESSMENT — PAIN SCALES - GENERAL
PAINLEVEL_OUTOF10: 4
PAINLEVEL_OUTOF10: 5

## 2019-12-10 ASSESSMENT — PAIN DESCRIPTION - PROGRESSION
CLINICAL_PROGRESSION: GRADUALLY WORSENING

## 2019-12-11 ENCOUNTER — TELEPHONE (OUTPATIENT)
Dept: ENDOCRINOLOGY | Age: 59
End: 2019-12-11

## 2019-12-11 VITALS
BODY MASS INDEX: 29.82 KG/M2 | WEIGHT: 190 LBS | HEIGHT: 67 IN | OXYGEN SATURATION: 95 % | DIASTOLIC BLOOD PRESSURE: 90 MMHG | HEART RATE: 87 BPM | TEMPERATURE: 97.9 F | SYSTOLIC BLOOD PRESSURE: 159 MMHG | RESPIRATION RATE: 16 BRPM

## 2019-12-11 LAB
GLUCOSE BLD-MCNC: 188 MG/DL (ref 60–115)
GLUCOSE BLD-MCNC: 258 MG/DL (ref 60–115)
GLUCOSE BLD-MCNC: 267 MG/DL (ref 60–115)
L. PNEUMOPHILA SEROGP 1 UR AG: NEGATIVE
PERFORMED ON: ABNORMAL

## 2019-12-11 PROCEDURE — 6370000000 HC RX 637 (ALT 250 FOR IP): Performed by: INTERNAL MEDICINE

## 2019-12-11 PROCEDURE — 94664 DEMO&/EVAL PT USE INHALER: CPT

## 2019-12-11 PROCEDURE — 87070 CULTURE OTHR SPECIMN AEROBIC: CPT

## 2019-12-11 PROCEDURE — 99232 SBSQ HOSP IP/OBS MODERATE 35: CPT | Performed by: INTERNAL MEDICINE

## 2019-12-11 PROCEDURE — 2700000000 HC OXYGEN THERAPY PER DAY

## 2019-12-11 PROCEDURE — 87205 SMEAR GRAM STAIN: CPT

## 2019-12-11 PROCEDURE — 99233 SBSQ HOSP IP/OBS HIGH 50: CPT | Performed by: PSYCHIATRY & NEUROLOGY

## 2019-12-11 RX ORDER — IPRATROPIUM BROMIDE AND ALBUTEROL SULFATE 2.5; .5 MG/3ML; MG/3ML
1 SOLUTION RESPIRATORY (INHALATION) EVERY 4 HOURS PRN
Status: DISCONTINUED | OUTPATIENT
Start: 2019-12-11 | End: 2019-12-11 | Stop reason: HOSPADM

## 2019-12-11 RX ORDER — LISINOPRIL 40 MG/1
40 TABLET ORAL DAILY
Qty: 30 TABLET | Refills: 3 | Status: ON HOLD | OUTPATIENT
Start: 2019-12-12 | End: 2019-12-14 | Stop reason: HOSPADM

## 2019-12-11 RX ORDER — FUROSEMIDE 40 MG/1
40 TABLET ORAL DAILY
Status: DISCONTINUED | OUTPATIENT
Start: 2019-12-11 | End: 2019-12-11 | Stop reason: HOSPADM

## 2019-12-11 RX ORDER — ISOSORBIDE MONONITRATE 60 MG/1
60 TABLET, EXTENDED RELEASE ORAL DAILY
Qty: 30 TABLET | Refills: 3 | Status: ON HOLD | OUTPATIENT
Start: 2019-12-12 | End: 2019-12-14 | Stop reason: HOSPADM

## 2019-12-11 RX ORDER — ASPIRIN 81 MG/1
81 TABLET ORAL DAILY
Qty: 30 TABLET | Refills: 3 | Status: ON HOLD | OUTPATIENT
Start: 2019-12-12 | End: 2022-08-16

## 2019-12-11 RX ORDER — CARVEDILOL 12.5 MG/1
12.5 TABLET ORAL 2 TIMES DAILY WITH MEALS
Qty: 60 TABLET | Refills: 3 | Status: ON HOLD | OUTPATIENT
Start: 2019-12-11 | End: 2019-12-14 | Stop reason: HOSPADM

## 2019-12-11 RX ORDER — PSEUDOEPHEDRINE HCL 30 MG
100 TABLET ORAL 2 TIMES DAILY
Qty: 60 CAPSULE | Refills: 0 | Status: ON HOLD | OUTPATIENT
Start: 2019-12-11 | End: 2019-12-13

## 2019-12-11 RX ORDER — FUROSEMIDE 40 MG/1
40 TABLET ORAL DAILY
Qty: 60 TABLET | Refills: 3 | Status: ON HOLD | OUTPATIENT
Start: 2019-12-11 | End: 2019-12-14 | Stop reason: HOSPADM

## 2019-12-11 RX ADMIN — ASPIRIN 81 MG: 81 TABLET, COATED ORAL at 09:27

## 2019-12-11 RX ADMIN — PANTOPRAZOLE SODIUM 40 MG: 40 TABLET, DELAYED RELEASE ORAL at 06:40

## 2019-12-11 RX ADMIN — CARVEDILOL 12.5 MG: 12.5 TABLET, FILM COATED ORAL at 09:27

## 2019-12-11 RX ADMIN — LISINOPRIL 40 MG: 20 TABLET ORAL at 09:27

## 2019-12-11 RX ADMIN — DOCUSATE SODIUM 100 MG: 100 CAPSULE, LIQUID FILLED ORAL at 09:27

## 2019-12-11 RX ADMIN — ISOSORBIDE MONONITRATE 60 MG: 60 TABLET, EXTENDED RELEASE ORAL at 09:27

## 2019-12-11 ASSESSMENT — ENCOUNTER SYMPTOMS
VOMITING: 0
WHEEZING: 0
TROUBLE SWALLOWING: 0
GASTROINTESTINAL NEGATIVE: 1
STRIDOR: 0
EYES NEGATIVE: 1
SHORTNESS OF BREATH: 0
COLOR CHANGE: 0
COUGH: 0
CHEST TIGHTNESS: 0
NAUSEA: 0
BLOOD IN STOOL: 0
RESPIRATORY NEGATIVE: 1

## 2019-12-12 LAB — ACETYLCHOLINE BINDING ANTIBODY: 0 NMOL/L (ref 0–0.4)

## 2019-12-13 ENCOUNTER — HOSPITAL ENCOUNTER (INPATIENT)
Age: 59
LOS: 1 days | Discharge: HOME | DRG: 204 | End: 2019-12-14
Attending: STUDENT IN AN ORGANIZED HEALTH CARE EDUCATION/TRAINING PROGRAM | Admitting: INTERNAL MEDICINE
Payer: COMMERCIAL

## 2019-12-13 ENCOUNTER — TELEPHONE (OUTPATIENT)
Dept: PRIMARY CARE CLINIC | Age: 59
End: 2019-12-13

## 2019-12-13 ENCOUNTER — TELEPHONE (OUTPATIENT)
Dept: OTHER | Facility: CLINIC | Age: 59
End: 2019-12-13

## 2019-12-13 ENCOUNTER — APPOINTMENT (OUTPATIENT)
Dept: GENERAL RADIOLOGY | Age: 59
DRG: 204 | End: 2019-12-13
Payer: COMMERCIAL

## 2019-12-13 DIAGNOSIS — R77.8 ELEVATED TROPONIN: ICD-10-CM

## 2019-12-13 DIAGNOSIS — R55 NEAR SYNCOPE: ICD-10-CM

## 2019-12-13 DIAGNOSIS — I95.1 ORTHOSTATIC HYPOTENSION: Primary | ICD-10-CM

## 2019-12-13 PROBLEM — I21.4 NSTEMI (NON-ST ELEVATED MYOCARDIAL INFARCTION) (HCC): Status: ACTIVE | Noted: 2019-12-13

## 2019-12-13 LAB
ACETYLCHOL MODUL AB: 0 %
ACETYLCHOLINE BLOCKING AB: 0 % (ref 0–26)
ALBUMIN SERPL-MCNC: 4.1 G/DL (ref 3.5–4.6)
ALP BLD-CCNC: 78 U/L (ref 35–104)
ALT SERPL-CCNC: 17 U/L (ref 0–41)
ANION GAP SERPL CALCULATED.3IONS-SCNC: 12 MEQ/L (ref 9–15)
APTT: 29.1 SEC (ref 24.4–36.8)
AST SERPL-CCNC: 28 U/L (ref 0–40)
BASOPHILS ABSOLUTE: 0.1 K/UL (ref 0–0.2)
BASOPHILS RELATIVE PERCENT: 0.9 %
BILIRUB SERPL-MCNC: 0.5 MG/DL (ref 0.2–0.7)
BILIRUBIN URINE: NEGATIVE
BLOOD CULTURE, ROUTINE: NORMAL
BLOOD, URINE: NEGATIVE
BUN BLDV-MCNC: 28 MG/DL (ref 6–20)
C-REACTIVE PROTEIN, HIGH SENSITIVITY: 11.4 MG/L (ref 0–5)
CALCIUM SERPL-MCNC: 9.5 MG/DL (ref 8.5–9.9)
CHLORIDE BLD-SCNC: 96 MEQ/L (ref 95–107)
CHP ED QC CHECK: NORMAL
CLARITY: CLEAR
CO2: 30 MEQ/L (ref 20–31)
COLOR: YELLOW
CREAT SERPL-MCNC: 1.34 MG/DL (ref 0.7–1.2)
CULTURE, BLOOD 2: NORMAL
CULTURE, RESPIRATORY: NORMAL
EKG ATRIAL RATE: 88 BPM
EKG P AXIS: 45 DEGREES
EKG P-R INTERVAL: 124 MS
EKG Q-T INTERVAL: 404 MS
EKG QRS DURATION: 90 MS
EKG QTC CALCULATION (BAZETT): 488 MS
EKG R AXIS: -24 DEGREES
EKG T AXIS: -31 DEGREES
EKG VENTRICULAR RATE: 88 BPM
EOSINOPHILS ABSOLUTE: 0.2 K/UL (ref 0–0.7)
EOSINOPHILS RELATIVE PERCENT: 3.8 %
GFR AFRICAN AMERICAN: >60
GFR NON-AFRICAN AMERICAN: 54.6
GLOBULIN: 3.1 G/DL (ref 2.3–3.5)
GLUCOSE BLD-MCNC: 103 MG/DL (ref 70–99)
GLUCOSE BLD-MCNC: 105 MG/DL (ref 60–115)
GLUCOSE BLD-MCNC: 267 MG/DL (ref 60–115)
GLUCOSE BLD-MCNC: 96 MG/DL
GLUCOSE BLD-MCNC: 96 MG/DL (ref 60–115)
GLUCOSE URINE: NEGATIVE MG/DL
GRAM STAIN RESULT: NORMAL
HCT VFR BLD CALC: 43.2 % (ref 42–52)
HEMOGLOBIN: 14.3 G/DL (ref 14–18)
INR BLD: 1
KETONES, URINE: ABNORMAL MG/DL
LEUKOCYTE ESTERASE, URINE: NEGATIVE
LYMPHOCYTES ABSOLUTE: 0.8 K/UL (ref 1–4.8)
LYMPHOCYTES RELATIVE PERCENT: 11.9 %
MAGNESIUM: 1.9 MG/DL (ref 1.7–2.4)
MCH RBC QN AUTO: 27.9 PG (ref 27–31.3)
MCHC RBC AUTO-ENTMCNC: 33.2 % (ref 33–37)
MCV RBC AUTO: 84.1 FL (ref 80–100)
MONOCYTES ABSOLUTE: 0.5 K/UL (ref 0.2–0.8)
MONOCYTES RELATIVE PERCENT: 7.6 %
NEUTROPHILS ABSOLUTE: 5 K/UL (ref 1.4–6.5)
NEUTROPHILS RELATIVE PERCENT: 75.8 %
NITRITE, URINE: NEGATIVE
PDW BLD-RTO: 13.3 % (ref 11.5–14.5)
PERFORMED ON: ABNORMAL
PERFORMED ON: NORMAL
PERFORMED ON: NORMAL
PH UA: 5 (ref 5–9)
PLATELET # BLD: 182 K/UL (ref 130–400)
POTASSIUM SERPL-SCNC: 3.9 MEQ/L (ref 3.4–4.9)
POTASSIUM SERPL-SCNC: 5.3 MEQ/L (ref 3.4–4.9)
PRO-BNP: 770 PG/ML
PROTEIN UA: ABNORMAL MG/DL
PROTHROMBIN TIME: 13.5 SEC (ref 12.3–14.9)
RBC # BLD: 5.14 M/UL (ref 4.7–6.1)
SODIUM BLD-SCNC: 138 MEQ/L (ref 135–144)
SPECIFIC GRAVITY UA: 1.02 (ref 1–1.03)
TOTAL CK: 118 U/L (ref 0–190)
TOTAL PROTEIN: 7.2 G/DL (ref 6.3–8)
TROPONIN: 0.04 NG/ML (ref 0–0.01)
TROPONIN: 0.05 NG/ML (ref 0–0.01)
TROPONIN: 0.06 NG/ML (ref 0–0.01)
URINE REFLEX TO CULTURE: ABNORMAL
UROBILINOGEN, URINE: 0.2 E.U./DL
WBC # BLD: 6.6 K/UL (ref 4.8–10.8)

## 2019-12-13 PROCEDURE — G0378 HOSPITAL OBSERVATION PER HR: HCPCS

## 2019-12-13 PROCEDURE — 84484 ASSAY OF TROPONIN QUANT: CPT

## 2019-12-13 PROCEDURE — 36415 COLL VENOUS BLD VENIPUNCTURE: CPT

## 2019-12-13 PROCEDURE — 80053 COMPREHEN METABOLIC PANEL: CPT

## 2019-12-13 PROCEDURE — 82550 ASSAY OF CK (CPK): CPT

## 2019-12-13 PROCEDURE — 84132 ASSAY OF SERUM POTASSIUM: CPT

## 2019-12-13 PROCEDURE — 83735 ASSAY OF MAGNESIUM: CPT

## 2019-12-13 PROCEDURE — 6360000002 HC RX W HCPCS: Performed by: INTERNAL MEDICINE

## 2019-12-13 PROCEDURE — 6370000000 HC RX 637 (ALT 250 FOR IP): Performed by: STUDENT IN AN ORGANIZED HEALTH CARE EDUCATION/TRAINING PROGRAM

## 2019-12-13 PROCEDURE — 2580000003 HC RX 258: Performed by: STUDENT IN AN ORGANIZED HEALTH CARE EDUCATION/TRAINING PROGRAM

## 2019-12-13 PROCEDURE — 2060000000 HC ICU INTERMEDIATE R&B

## 2019-12-13 PROCEDURE — 86141 C-REACTIVE PROTEIN HS: CPT

## 2019-12-13 PROCEDURE — 99218 PR INITIAL OBSERVATION CARE/DAY 30 MINUTES: CPT | Performed by: INTERNAL MEDICINE

## 2019-12-13 PROCEDURE — 83880 ASSAY OF NATRIURETIC PEPTIDE: CPT

## 2019-12-13 PROCEDURE — 99285 EMERGENCY DEPT VISIT HI MDM: CPT

## 2019-12-13 PROCEDURE — 96372 THER/PROPH/DIAG INJ SC/IM: CPT

## 2019-12-13 PROCEDURE — 85730 THROMBOPLASTIN TIME PARTIAL: CPT

## 2019-12-13 PROCEDURE — 93005 ELECTROCARDIOGRAM TRACING: CPT | Performed by: STUDENT IN AN ORGANIZED HEALTH CARE EDUCATION/TRAINING PROGRAM

## 2019-12-13 PROCEDURE — 71046 X-RAY EXAM CHEST 2 VIEWS: CPT

## 2019-12-13 PROCEDURE — 85610 PROTHROMBIN TIME: CPT

## 2019-12-13 PROCEDURE — 85025 COMPLETE CBC W/AUTO DIFF WBC: CPT

## 2019-12-13 PROCEDURE — 81003 URINALYSIS AUTO W/O SCOPE: CPT

## 2019-12-13 PROCEDURE — 2580000003 HC RX 258: Performed by: INTERNAL MEDICINE

## 2019-12-13 RX ORDER — SODIUM CHLORIDE 0.9 % (FLUSH) 0.9 %
10 SYRINGE (ML) INJECTION EVERY 12 HOURS SCHEDULED
Status: DISCONTINUED | OUTPATIENT
Start: 2019-12-13 | End: 2019-12-14 | Stop reason: HOSPADM

## 2019-12-13 RX ORDER — SODIUM CHLORIDE 9 MG/ML
INJECTION, SOLUTION INTRAVENOUS ONCE
Status: COMPLETED | OUTPATIENT
Start: 2019-12-13 | End: 2019-12-13

## 2019-12-13 RX ORDER — 0.9 % SODIUM CHLORIDE 0.9 %
500 INTRAVENOUS SOLUTION INTRAVENOUS ONCE
Status: COMPLETED | OUTPATIENT
Start: 2019-12-13 | End: 2019-12-13

## 2019-12-13 RX ORDER — ASPIRIN 81 MG/1
81 TABLET, CHEWABLE ORAL DAILY
Status: DISCONTINUED | OUTPATIENT
Start: 2019-12-14 | End: 2019-12-14 | Stop reason: HOSPADM

## 2019-12-13 RX ORDER — ASPIRIN 81 MG/1
162 TABLET, CHEWABLE ORAL ONCE
Status: COMPLETED | OUTPATIENT
Start: 2019-12-13 | End: 2019-12-13

## 2019-12-13 RX ORDER — ACETAMINOPHEN 325 MG/1
650 TABLET ORAL EVERY 4 HOURS PRN
Status: DISCONTINUED | OUTPATIENT
Start: 2019-12-13 | End: 2019-12-14 | Stop reason: HOSPADM

## 2019-12-13 RX ORDER — ONDANSETRON 2 MG/ML
4 INJECTION INTRAMUSCULAR; INTRAVENOUS EVERY 6 HOURS PRN
Status: DISCONTINUED | OUTPATIENT
Start: 2019-12-13 | End: 2019-12-14 | Stop reason: HOSPADM

## 2019-12-13 RX ORDER — NICOTINE POLACRILEX 4 MG
15 LOZENGE BUCCAL PRN
Status: DISCONTINUED | OUTPATIENT
Start: 2019-12-13 | End: 2019-12-14 | Stop reason: HOSPADM

## 2019-12-13 RX ORDER — DEXTROSE MONOHYDRATE 50 MG/ML
100 INJECTION, SOLUTION INTRAVENOUS PRN
Status: DISCONTINUED | OUTPATIENT
Start: 2019-12-13 | End: 2019-12-14 | Stop reason: HOSPADM

## 2019-12-13 RX ORDER — DEXTROSE MONOHYDRATE 25 G/50ML
12.5 INJECTION, SOLUTION INTRAVENOUS PRN
Status: DISCONTINUED | OUTPATIENT
Start: 2019-12-13 | End: 2019-12-14 | Stop reason: HOSPADM

## 2019-12-13 RX ORDER — SODIUM CHLORIDE 0.9 % (FLUSH) 0.9 %
10 SYRINGE (ML) INJECTION PRN
Status: DISCONTINUED | OUTPATIENT
Start: 2019-12-13 | End: 2019-12-14 | Stop reason: HOSPADM

## 2019-12-13 RX ADMIN — SODIUM CHLORIDE 500 ML: 9 INJECTION, SOLUTION INTRAVENOUS at 14:58

## 2019-12-13 RX ADMIN — ASPIRIN 81 MG 162 MG: 81 TABLET ORAL at 14:58

## 2019-12-13 RX ADMIN — SODIUM CHLORIDE: 9 INJECTION, SOLUTION INTRAVENOUS at 20:17

## 2019-12-13 RX ADMIN — ENOXAPARIN SODIUM 40 MG: 40 INJECTION SUBCUTANEOUS at 16:39

## 2019-12-13 RX ADMIN — Medication 10 ML: at 22:59

## 2019-12-13 ASSESSMENT — ENCOUNTER SYMPTOMS
CHEST TIGHTNESS: 0
SHORTNESS OF BREATH: 0
TROUBLE SWALLOWING: 0
VOMITING: 0
COUGH: 0
BACK PAIN: 0
SINUS PRESSURE: 0
WHEEZING: 0
RESPIRATORY NEGATIVE: 1
GASTROINTESTINAL NEGATIVE: 1
ABDOMINAL PAIN: 0
EYES NEGATIVE: 1
BLOOD IN STOOL: 0
NAUSEA: 0
DIARRHEA: 0
ANAL BLEEDING: 0
STRIDOR: 0

## 2019-12-13 ASSESSMENT — PAIN SCALES - WONG BAKER
WONGBAKER_NUMERICALRESPONSE: 0

## 2019-12-13 ASSESSMENT — PAIN SCALES - GENERAL
PAINLEVEL_OUTOF10: 0

## 2019-12-14 VITALS
SYSTOLIC BLOOD PRESSURE: 160 MMHG | RESPIRATION RATE: 16 BRPM | OXYGEN SATURATION: 95 % | DIASTOLIC BLOOD PRESSURE: 87 MMHG | TEMPERATURE: 97.5 F | HEART RATE: 98 BPM | WEIGHT: 187.3 LBS | BODY MASS INDEX: 29.4 KG/M2 | HEIGHT: 67 IN

## 2019-12-14 LAB
ALBUMIN SERPL-MCNC: 3.9 G/DL (ref 3.5–4.6)
ANION GAP SERPL CALCULATED.3IONS-SCNC: 13 MEQ/L (ref 9–15)
BUN BLDV-MCNC: 26 MG/DL (ref 6–20)
CALCIUM SERPL-MCNC: 9 MG/DL (ref 8.5–9.9)
CHLORIDE BLD-SCNC: 99 MEQ/L (ref 95–107)
CO2: 28 MEQ/L (ref 20–31)
CREAT SERPL-MCNC: 1.12 MG/DL (ref 0.7–1.2)
GFR AFRICAN AMERICAN: >60
GFR NON-AFRICAN AMERICAN: >60
GLUCOSE BLD-MCNC: 216 MG/DL (ref 60–115)
GLUCOSE BLD-MCNC: 226 MG/DL (ref 70–99)
HBA1C MFR BLD: 10.3 % (ref 4.8–5.9)
HCT VFR BLD CALC: 41.7 % (ref 42–52)
HEMOGLOBIN: 14.1 G/DL (ref 14–18)
MAGNESIUM: 1.8 MG/DL (ref 1.7–2.4)
MCH RBC QN AUTO: 28.7 PG (ref 27–31.3)
MCHC RBC AUTO-ENTMCNC: 33.7 % (ref 33–37)
MCV RBC AUTO: 84.9 FL (ref 80–100)
PDW BLD-RTO: 13.5 % (ref 11.5–14.5)
PERFORMED ON: ABNORMAL
PHOSPHORUS: 3.7 MG/DL (ref 2.3–4.8)
PLATELET # BLD: 159 K/UL (ref 130–400)
POTASSIUM SERPL-SCNC: 4.1 MEQ/L (ref 3.4–4.9)
RBC # BLD: 4.91 M/UL (ref 4.7–6.1)
SODIUM BLD-SCNC: 140 MEQ/L (ref 135–144)
WBC # BLD: 6.6 K/UL (ref 4.8–10.8)

## 2019-12-14 PROCEDURE — 6360000002 HC RX W HCPCS: Performed by: INTERNAL MEDICINE

## 2019-12-14 PROCEDURE — 83735 ASSAY OF MAGNESIUM: CPT

## 2019-12-14 PROCEDURE — 99224 PR SBSQ OBSERVATION CARE/DAY 15 MINUTES: CPT | Performed by: INTERNAL MEDICINE

## 2019-12-14 PROCEDURE — 85027 COMPLETE CBC AUTOMATED: CPT

## 2019-12-14 PROCEDURE — 80069 RENAL FUNCTION PANEL: CPT

## 2019-12-14 PROCEDURE — 96372 THER/PROPH/DIAG INJ SC/IM: CPT

## 2019-12-14 PROCEDURE — 6370000000 HC RX 637 (ALT 250 FOR IP): Performed by: INTERNAL MEDICINE

## 2019-12-14 PROCEDURE — 36415 COLL VENOUS BLD VENIPUNCTURE: CPT

## 2019-12-14 PROCEDURE — 83036 HEMOGLOBIN GLYCOSYLATED A1C: CPT

## 2019-12-14 PROCEDURE — G0378 HOSPITAL OBSERVATION PER HR: HCPCS

## 2019-12-14 PROCEDURE — 2580000003 HC RX 258: Performed by: INTERNAL MEDICINE

## 2019-12-14 RX ORDER — CARVEDILOL 6.25 MG/1
6.25 TABLET ORAL 2 TIMES DAILY WITH MEALS
Qty: 60 TABLET | Refills: 3 | Status: SHIPPED | OUTPATIENT
Start: 2019-12-14 | End: 2020-04-23

## 2019-12-14 RX ORDER — LISINOPRIL 10 MG/1
10 TABLET ORAL 2 TIMES DAILY
Qty: 30 TABLET | Refills: 3 | Status: SHIPPED | OUTPATIENT
Start: 2019-12-14 | End: 2020-01-22

## 2019-12-14 RX ORDER — LISINOPRIL 10 MG/1
10 TABLET ORAL 2 TIMES DAILY
Status: DISCONTINUED | OUTPATIENT
Start: 2019-12-14 | End: 2019-12-14 | Stop reason: HOSPADM

## 2019-12-14 RX ORDER — CARVEDILOL 6.25 MG/1
6.25 TABLET ORAL 2 TIMES DAILY WITH MEALS
Status: DISCONTINUED | OUTPATIENT
Start: 2019-12-14 | End: 2019-12-14 | Stop reason: HOSPADM

## 2019-12-14 RX ADMIN — INSULIN LISPRO 2 UNITS: 100 INJECTION, SOLUTION INTRAVENOUS; SUBCUTANEOUS at 08:45

## 2019-12-14 RX ADMIN — ENOXAPARIN SODIUM 40 MG: 40 INJECTION SUBCUTANEOUS at 08:44

## 2019-12-14 RX ADMIN — LISINOPRIL 10 MG: 10 TABLET ORAL at 11:45

## 2019-12-14 RX ADMIN — ACETAMINOPHEN 650 MG: 325 TABLET ORAL at 05:33

## 2019-12-14 RX ADMIN — ASPIRIN 81 MG 81 MG: 81 TABLET ORAL at 08:44

## 2019-12-14 RX ADMIN — CARVEDILOL 6.25 MG: 6.25 TABLET, FILM COATED ORAL at 11:45

## 2019-12-14 RX ADMIN — Medication 10 ML: at 08:51

## 2019-12-14 ASSESSMENT — ENCOUNTER SYMPTOMS
GASTROINTESTINAL NEGATIVE: 1
SHORTNESS OF BREATH: 0
EYES NEGATIVE: 1
COUGH: 0
BLOOD IN STOOL: 0
STRIDOR: 0
WHEEZING: 0
CHEST TIGHTNESS: 0
NAUSEA: 0
RESPIRATORY NEGATIVE: 1

## 2019-12-14 ASSESSMENT — PAIN SCALES - WONG BAKER
WONGBAKER_NUMERICALRESPONSE: 0

## 2019-12-14 ASSESSMENT — PAIN SCALES - GENERAL
PAINLEVEL_OUTOF10: 0
PAINLEVEL_OUTOF10: 5

## 2019-12-16 ENCOUNTER — OFFICE VISIT (OUTPATIENT)
Dept: PRIMARY CARE CLINIC | Age: 59
End: 2019-12-16
Payer: COMMERCIAL

## 2019-12-16 VITALS
HEIGHT: 67 IN | TEMPERATURE: 96.8 F | SYSTOLIC BLOOD PRESSURE: 120 MMHG | RESPIRATION RATE: 16 BRPM | HEART RATE: 95 BPM | OXYGEN SATURATION: 95 % | WEIGHT: 192.6 LBS | BODY MASS INDEX: 30.23 KG/M2 | DIASTOLIC BLOOD PRESSURE: 80 MMHG

## 2019-12-16 PROCEDURE — 3017F COLORECTAL CA SCREEN DOC REV: CPT | Performed by: NURSE PRACTITIONER

## 2019-12-16 PROCEDURE — G8417 CALC BMI ABV UP PARAM F/U: HCPCS | Performed by: NURSE PRACTITIONER

## 2019-12-16 PROCEDURE — 1111F DSCHRG MED/CURRENT MED MERGE: CPT | Performed by: NURSE PRACTITIONER

## 2019-12-16 PROCEDURE — G8598 ASA/ANTIPLAT THER USED: HCPCS | Performed by: NURSE PRACTITIONER

## 2019-12-16 PROCEDURE — G8427 DOCREV CUR MEDS BY ELIG CLIN: HCPCS | Performed by: NURSE PRACTITIONER

## 2019-12-16 PROCEDURE — 99214 OFFICE O/P EST MOD 30 MIN: CPT | Performed by: NURSE PRACTITIONER

## 2019-12-16 PROCEDURE — 1036F TOBACCO NON-USER: CPT | Performed by: NURSE PRACTITIONER

## 2019-12-16 PROCEDURE — G8484 FLU IMMUNIZE NO ADMIN: HCPCS | Performed by: NURSE PRACTITIONER

## 2019-12-16 PROCEDURE — 93010 ELECTROCARDIOGRAM REPORT: CPT | Performed by: INTERNAL MEDICINE

## 2019-12-16 SDOH — ECONOMIC STABILITY: TRANSPORTATION INSECURITY
IN THE PAST 12 MONTHS, HAS THE LACK OF TRANSPORTATION KEPT YOU FROM MEDICAL APPOINTMENTS OR FROM GETTING MEDICATIONS?: NO

## 2019-12-16 SDOH — ECONOMIC STABILITY: TRANSPORTATION INSECURITY
IN THE PAST 12 MONTHS, HAS LACK OF TRANSPORTATION KEPT YOU FROM MEETINGS, WORK, OR FROM GETTING THINGS NEEDED FOR DAILY LIVING?: NO

## 2019-12-16 SDOH — ECONOMIC STABILITY: FOOD INSECURITY: WITHIN THE PAST 12 MONTHS, THE FOOD YOU BOUGHT JUST DIDN'T LAST AND YOU DIDN'T HAVE MONEY TO GET MORE.: SOMETIMES TRUE

## 2019-12-16 SDOH — ECONOMIC STABILITY: FOOD INSECURITY: WITHIN THE PAST 12 MONTHS, YOU WORRIED THAT YOUR FOOD WOULD RUN OUT BEFORE YOU GOT MONEY TO BUY MORE.: SOMETIMES TRUE

## 2019-12-16 NOTE — LETTER
[facility]  [address] [address]  Phone: [phone]  Fax: [phone] D. SHERIN Nunez - CNP        December 16, 2019     Patient: Sher Villarreal   YOB: 1960   Date of Visit: 12/16/2019       To Whom It May Concern: It is my medical opinion that Sher Villarreal should remain out of work until cleared by cardiology. If you have any questions or concerns, please don't hesitate to call.     Sincerely,        Cory Lemus, SHERIN - CNP

## 2019-12-16 NOTE — PROGRESS NOTES
Hospital Follow Up    Juan Armas   YOB: 1960    Date of Office Visit:  12/16/2019  Date of Hospital Admission: 12/23/19  Date of Hospital Discharge: 12/24/19  Readmission Risk Score(high >=14%.  Medium >=10%):Readmission Risk Score: 14    Care management risk score Rising risk (score 2-5) and Complex Care (Scores >=6): 7     Non face to face  following discharge, date last encounter closed (first attempt may have been earlier): *No documented post hospital discharge outreach found in the last 14 days *No documented post hospital discharge outreach found in the last 14 days    Call initiated 2 business days of discharge: *No response recorded in the last 14 days     Patient Active Problem List   Diagnosis    [facility]    Essential hypertension, benign    Obesity, diabetes, and hypertension syndrome ([address]    Irregular heart rhythm    Hyperlipidemia    Allergic rhinitis due to pollen    Staphylococcus aureus bacteremia    Hematoma of right thigh    Hematoma of left lower extremity    Hereditary peripheral neuropathy    Migraine without aura    Chest pain    Community acquired pneumonia of right lower lobe of lung ([address]    Acute on chronic diastolic (congestive) heart failure ([address]    Mild intermittent asthma with exacerbation    NSTEMI (non-ST elevated myocardial infarction) ([address]    Anginal chest pain at rest [facility])     No Known Allergies    Medications listed as ordered at the time of discharge from hospital   Charito Turk Medication Instructions DEEPTHI:    Printed on:01/11/20 3199   Medication Information                      albuterol sulfate HFA (VENTOLIN HFA) 108 (90 Base) MCG/ACT inhaler  Inhale 2 puffs into the lungs every 6 hours as needed for Wheezing             aspirin 81 MG EC tablet  Take 1 tablet by mouth daily             atorvastatin (LIPITOR) 40 MG tablet  Take 1 tablet by mouth daily             blood glucose monitor kit and supplies  Please give 1 meter Dx E11.65 (Please dispense covered brand)             blood glucose monitor strips  Pt test 4x daily Dx E11.65 (dispense covered brand)             Blood Glucose Monitoring Suppl (FREESTYLE LITE) MARIUM  1 Device by Does not apply route daily as needed (as directed)             Blood Glucose Monitoring Suppl (ONETOUCH VERIO IQ SYSTEM) W/DEVICE KIT  Use as directed             blood glucose test strips (FREESTYLE LITE) strip  As needed.              blood glucose test strips (ONETOUCH VERIO) strip  Test tid             budesonide-formoterol (SYMBICORT) 80-4.5 MCG/ACT AERO  Inhale 2 puffs into the lungs 2 times daily             carvedilol (COREG) 6.25 MG tablet  Take 1 tablet by mouth 2 times daily (with meals)             fluticasone-vilanterol (BREO ELLIPTA) 100-25 MCG/INH AEPB inhaler  Inhale 1 puff into the lungs daily             FREESTYLE LANCETS MISC  1 each by Does not apply route daily             ibuprofen (ADVIL;MOTRIN) 800 MG tablet  Take 1 tablet by mouth 2 times daily as needed for Pain             insulin lispro protamine & lispro (HUMALOG MIX 75/25 KWIKPEN) (75-25) 100 UNIT per ML SUPN injection pen  INJECT 50 units twice a day             Insulin Pen Needle (NOVOFINE) 32G X 6 MM MISC  Bid             Lancets MISC  Pt test 4x daily Dx E11.65 (dispense covered brand)             lisinopril (PRINIVIL;ZESTRIL) 10 MG tablet  Take 1 tablet by mouth 2 times daily             metFORMIN (GLUCOPHAGE) 1000 MG tablet  TAKE 1 TABLET BY MOUTH TWICE DAILY WITH MEALS             montelukast (SINGULAIR) 10 MG tablet  Take 1 tablet by mouth nightly             ONE TOUCH LANCETS MISC  1 each by Does not apply route 3 times daily             pantoprazole (PROTONIX) 40 MG tablet  Take 40 mg by mouth daily               Medications marked \"taking\" at this time  Outpatient Medications Marked as Taking for the 12/16/19 encounter (Office Visit) with SHERIN Caraballo CNP   Medication Sig Dispense Refill   Peter.Ivy lisinopril (PRINIVIL;ZESTRIL) 10 MG tablet Take 1 tablet by mouth 2 times daily 30 tablet 3    carvedilol (COREG) 6.25 MG tablet Take 1 tablet by mouth 2 times daily (with meals) 60 tablet 3    aspirin 81 MG EC tablet Take 1 tablet by mouth daily 30 tablet 3    pantoprazole (PROTONIX) 40 MG tablet Take 40 mg by mouth daily      ibuprofen (ADVIL;MOTRIN) 800 MG tablet Take 1 tablet by mouth 2 times daily as needed for Pain 180 tablet 1    insulin lispro protamine & lispro (HUMALOG MIX 75/25 KWIKPEN) (75-25) 100 UNIT per ML SUPN injection pen INJECT 50 units twice a day 15 pen 3    Insulin Pen Needle (NOVOFINE) 32G X 6 MM MISC Bid 300 each 3    FREESTYLE LANCETS MISC 1 each by Does not apply route daily 300 each 3    blood glucose test strips (FREESTYLE LITE) strip As needed.  300 strip 3    Blood Glucose Monitoring Suppl (FREESTYLE LITE) MARIUM 1 Device by Does not apply route daily as needed (as directed) 1 Device 0    budesonide-formoterol (SYMBICORT) 80-4.5 MCG/ACT AERO Inhale 2 puffs into the lungs 2 times daily 1 Inhaler 5    metFORMIN (GLUCOPHAGE) 1000 MG tablet TAKE 1 TABLET BY MOUTH TWICE DAILY WITH MEALS 60 tablet 3    montelukast (SINGULAIR) 10 MG tablet Take 1 tablet by mouth nightly 30 tablet 3    atorvastatin (LIPITOR) 40 MG tablet Take 1 tablet by mouth daily (Patient taking differently: Take 40 mg by mouth nightly ) 30 tablet 3    blood glucose test strips (ONETOUCH VERIO) strip Test tid 300 each 3    ONE TOUCH LANCETS MISC 1 each by Does not apply route 3 times daily 300 each 3    albuterol sulfate HFA (VENTOLIN HFA) 108 (90 Base) MCG/ACT inhaler Inhale 2 puffs into the lungs every 6 hours as needed for Wheezing 3 Inhaler 3    fluticasone-vilanterol (BREO ELLIPTA) 100-25 MCG/INH AEPB inhaler Inhale 1 puff into the lungs daily 3 each 3    Blood Glucose Monitoring Suppl (ONETOUCH VERIO IQ SYSTEM) W/DEVICE KIT Use as directed 1 kit 02     Medications patient taking as of now reconciled Weight: 192 lb 9.6 oz (87.4 kg)   Height: 5' 7\" (1.702 m)     Body mass index is 30.17 kg/m². Wt Readings from Last 3 Encounters:   12/24/19 197 lb 5 oz (89.5 kg)   12/16/19 192 lb 9.6 oz (87.4 kg)   12/14/19 187 lb 4.8 oz (85 kg)     BP Readings from Last 3 Encounters:   12/24/19 130/72   12/16/19 120/80   12/14/19 (!) 160/87     Physical Exam  Constitutional:       Appearance: He is well-developed. HENT:      Head: Normocephalic and atraumatic. Eyes:      Conjunctiva/sclera: Conjunctivae normal.      Pupils: Pupils are equal, round, and reactive to light. Neck:      Musculoskeletal: Normal range of motion. Cardiovascular:      Rate and Rhythm: Normal rate and regular rhythm. Pulmonary:      Effort: Pulmonary effort is normal. No respiratory distress. Breath sounds: No wheezing or rales. Abdominal:      General: Bowel sounds are normal. There is no distension. Palpations: Abdomen is soft. Musculoskeletal: Normal range of motion. Skin:     General: Skin is dry. Coloration: Skin is not jaundiced. Neurological:      Mental Status: He is alert and oriented to person, place, and time. Cranial Nerves: Cranial nerves are intact. No cranial nerve deficit. Psychiatric:         Mood and Affect: Mood normal.       Assessment/Plan:   Diagnosis Orders   1. Hospital discharge follow-up  [facility] DISCHARGE MEDS RECONCILED W/ CURRENT OUTPATIENT MED LIST     [facility]5 today. Dr. Daniel Agent follow up next 2 weeks. Dr. Annie Flood follow up     Go back to work once Cardiology clears him. Reviewed with the patient: currentclinical status, medications, activities and diet. Side effects, adverse effects of the medicationprescribed today, as well as treatment plan/ rationale and result expectations havebeen discussed with the patient who expresses understanding and desires to proceed. Pt instructions reviewed and given to patient.     Close follow up to evaluate treatment resultsand

## 2019-12-17 RX ORDER — GLUCOSAMINE HCL/CHONDROITIN SU 500-400 MG
CAPSULE ORAL
Qty: 150 STRIP | Refills: 3 | Status: SHIPPED | OUTPATIENT
Start: 2019-12-17 | End: 2020-01-20 | Stop reason: SDUPTHER

## 2019-12-17 RX ORDER — LANCETS 30 GAUGE
EACH MISCELLANEOUS
Qty: 150 EACH | Refills: 3 | Status: SHIPPED | OUTPATIENT
Start: 2019-12-17

## 2019-12-23 ENCOUNTER — APPOINTMENT (OUTPATIENT)
Dept: CT IMAGING | Age: 59
End: 2019-12-23
Payer: COMMERCIAL

## 2019-12-23 ENCOUNTER — HOSPITAL ENCOUNTER (OUTPATIENT)
Age: 59
Setting detail: OBSERVATION
Discharge: ANOTHER HOSPITAL | End: 2019-12-24
Attending: STUDENT IN AN ORGANIZED HEALTH CARE EDUCATION/TRAINING PROGRAM | Admitting: INTERNAL MEDICINE
Payer: COMMERCIAL

## 2019-12-23 ENCOUNTER — TELEPHONE (OUTPATIENT)
Dept: PRIMARY CARE CLINIC | Age: 59
End: 2019-12-23

## 2019-12-23 ENCOUNTER — TELEPHONE (OUTPATIENT)
Dept: OTHER | Facility: CLINIC | Age: 59
End: 2019-12-23

## 2019-12-23 ENCOUNTER — APPOINTMENT (OUTPATIENT)
Dept: GENERAL RADIOLOGY | Age: 59
End: 2019-12-23
Payer: COMMERCIAL

## 2019-12-23 DIAGNOSIS — R77.8 ELEVATED TROPONIN: ICD-10-CM

## 2019-12-23 DIAGNOSIS — Z79.4 TYPE 2 DIABETES MELLITUS WITH HYPERGLYCEMIA, WITH LONG-TERM CURRENT USE OF INSULIN (HCC): ICD-10-CM

## 2019-12-23 DIAGNOSIS — E83.42 HYPOMAGNESEMIA: ICD-10-CM

## 2019-12-23 DIAGNOSIS — I20.8 ANGINAL CHEST PAIN AT REST (HCC): Primary | ICD-10-CM

## 2019-12-23 DIAGNOSIS — I50.33 ACUTE ON CHRONIC DIASTOLIC CONGESTIVE HEART FAILURE (HCC): ICD-10-CM

## 2019-12-23 DIAGNOSIS — E11.65 TYPE 2 DIABETES MELLITUS WITH HYPERGLYCEMIA, WITH LONG-TERM CURRENT USE OF INSULIN (HCC): ICD-10-CM

## 2019-12-23 PROBLEM — I20.89 ANGINAL CHEST PAIN AT REST: Status: ACTIVE | Noted: 2019-12-23

## 2019-12-23 LAB
ALBUMIN SERPL-MCNC: 4.2 G/DL (ref 3.5–4.6)
ALP BLD-CCNC: 64 U/L (ref 35–104)
ALT SERPL-CCNC: 16 U/L (ref 0–41)
ANION GAP SERPL CALCULATED.3IONS-SCNC: 11 MEQ/L (ref 9–15)
APTT: 28.2 SEC (ref 24.4–36.8)
AST SERPL-CCNC: 16 U/L (ref 0–40)
BASOPHILS ABSOLUTE: 0.1 K/UL (ref 0–0.2)
BASOPHILS RELATIVE PERCENT: 0.9 %
BILIRUB SERPL-MCNC: 0.4 MG/DL (ref 0.2–0.7)
BUN BLDV-MCNC: 20 MG/DL (ref 6–20)
C-REACTIVE PROTEIN, HIGH SENSITIVITY: 1.1 MG/L (ref 0–5)
CALCIUM SERPL-MCNC: 9 MG/DL (ref 8.5–9.9)
CHLORIDE BLD-SCNC: 100 MEQ/L (ref 95–107)
CHP ED QC CHECK: YES
CO2: 27 MEQ/L (ref 20–31)
CREAT SERPL-MCNC: 0.9 MG/DL (ref 0.7–1.2)
EOSINOPHILS ABSOLUTE: 0.4 K/UL (ref 0–0.7)
EOSINOPHILS RELATIVE PERCENT: 6.3 %
GFR AFRICAN AMERICAN: >60
GFR NON-AFRICAN AMERICAN: >60
GLOBULIN: 2.6 G/DL (ref 2.3–3.5)
GLUCOSE BLD-MCNC: 151 MG/DL (ref 60–115)
GLUCOSE BLD-MCNC: 160 MG/DL (ref 60–115)
GLUCOSE BLD-MCNC: 166 MG/DL (ref 70–99)
GLUCOSE BLD-MCNC: 175 MG/DL
GLUCOSE BLD-MCNC: 175 MG/DL (ref 60–115)
HCT VFR BLD CALC: 42.1 % (ref 42–52)
HEMOGLOBIN: 14.2 G/DL (ref 14–18)
INR BLD: 1
LYMPHOCYTES ABSOLUTE: 0.9 K/UL (ref 1–4.8)
LYMPHOCYTES RELATIVE PERCENT: 12.9 %
MAGNESIUM: 1.4 MG/DL (ref 1.7–2.4)
MCH RBC QN AUTO: 28.3 PG (ref 27–31.3)
MCHC RBC AUTO-ENTMCNC: 33.7 % (ref 33–37)
MCV RBC AUTO: 84 FL (ref 80–100)
MONOCYTES ABSOLUTE: 0.4 K/UL (ref 0.2–0.8)
MONOCYTES RELATIVE PERCENT: 6 %
NEUTROPHILS ABSOLUTE: 5.1 K/UL (ref 1.4–6.5)
NEUTROPHILS RELATIVE PERCENT: 73.9 %
PDW BLD-RTO: 13.2 % (ref 11.5–14.5)
PERFORMED ON: ABNORMAL
PLATELET # BLD: 150 K/UL (ref 130–400)
POTASSIUM SERPL-SCNC: 4.4 MEQ/L (ref 3.4–4.9)
PRO-BNP: 431 PG/ML
PRO-BNP: 474 PG/ML
PROTHROMBIN TIME: 13.8 SEC (ref 12.3–14.9)
RBC # BLD: 5.01 M/UL (ref 4.7–6.1)
SODIUM BLD-SCNC: 138 MEQ/L (ref 135–144)
TOTAL CK: 118 U/L (ref 0–190)
TOTAL PROTEIN: 6.8 G/DL (ref 6.3–8)
TROPONIN: 0.01 NG/ML (ref 0–0.01)
TROPONIN: 0.01 NG/ML (ref 0–0.01)
TROPONIN: <0.01 NG/ML (ref 0–0.01)
TSH SERPL DL<=0.05 MIU/L-ACNC: 1.8 UIU/ML (ref 0.44–3.86)
WBC # BLD: 7 K/UL (ref 4.8–10.8)

## 2019-12-23 PROCEDURE — 71275 CT ANGIOGRAPHY CHEST: CPT

## 2019-12-23 PROCEDURE — 99220 PR INITIAL OBSERVATION CARE/DAY 70 MINUTES: CPT | Performed by: INTERNAL MEDICINE

## 2019-12-23 PROCEDURE — 96372 THER/PROPH/DIAG INJ SC/IM: CPT

## 2019-12-23 PROCEDURE — 94664 DEMO&/EVAL PT USE INHALER: CPT

## 2019-12-23 PROCEDURE — G0378 HOSPITAL OBSERVATION PER HR: HCPCS

## 2019-12-23 PROCEDURE — 6370000000 HC RX 637 (ALT 250 FOR IP): Performed by: INTERNAL MEDICINE

## 2019-12-23 PROCEDURE — 6360000004 HC RX CONTRAST MEDICATION: Performed by: INTERNAL MEDICINE

## 2019-12-23 PROCEDURE — 80053 COMPREHEN METABOLIC PANEL: CPT

## 2019-12-23 PROCEDURE — 83735 ASSAY OF MAGNESIUM: CPT

## 2019-12-23 PROCEDURE — 94640 AIRWAY INHALATION TREATMENT: CPT

## 2019-12-23 PROCEDURE — 85610 PROTHROMBIN TIME: CPT

## 2019-12-23 PROCEDURE — 6360000002 HC RX W HCPCS: Performed by: INTERNAL MEDICINE

## 2019-12-23 PROCEDURE — 83880 ASSAY OF NATRIURETIC PEPTIDE: CPT

## 2019-12-23 PROCEDURE — 2580000003 HC RX 258: Performed by: INTERNAL MEDICINE

## 2019-12-23 PROCEDURE — 6360000002 HC RX W HCPCS: Performed by: STUDENT IN AN ORGANIZED HEALTH CARE EDUCATION/TRAINING PROGRAM

## 2019-12-23 PROCEDURE — 93005 ELECTROCARDIOGRAM TRACING: CPT | Performed by: STUDENT IN AN ORGANIZED HEALTH CARE EDUCATION/TRAINING PROGRAM

## 2019-12-23 PROCEDURE — 82550 ASSAY OF CK (CPK): CPT

## 2019-12-23 PROCEDURE — 99285 EMERGENCY DEPT VISIT HI MDM: CPT

## 2019-12-23 PROCEDURE — 85025 COMPLETE CBC W/AUTO DIFF WBC: CPT

## 2019-12-23 PROCEDURE — 85730 THROMBOPLASTIN TIME PARTIAL: CPT

## 2019-12-23 PROCEDURE — 36415 COLL VENOUS BLD VENIPUNCTURE: CPT

## 2019-12-23 PROCEDURE — 84443 ASSAY THYROID STIM HORMONE: CPT

## 2019-12-23 PROCEDURE — 84484 ASSAY OF TROPONIN QUANT: CPT

## 2019-12-23 PROCEDURE — 71046 X-RAY EXAM CHEST 2 VIEWS: CPT

## 2019-12-23 PROCEDURE — 86141 C-REACTIVE PROTEIN HS: CPT

## 2019-12-23 PROCEDURE — 94761 N-INVAS EAR/PLS OXIMETRY MLT: CPT

## 2019-12-23 PROCEDURE — 6370000000 HC RX 637 (ALT 250 FOR IP): Performed by: STUDENT IN AN ORGANIZED HEALTH CARE EDUCATION/TRAINING PROGRAM

## 2019-12-23 PROCEDURE — 96374 THER/PROPH/DIAG INJ IV PUSH: CPT

## 2019-12-23 RX ORDER — ASPIRIN 81 MG/1
81 TABLET ORAL DAILY
Status: DISCONTINUED | OUTPATIENT
Start: 2019-12-23 | End: 2019-12-25 | Stop reason: HOSPADM

## 2019-12-23 RX ORDER — SODIUM CHLORIDE 0.9 % (FLUSH) 0.9 %
10 SYRINGE (ML) INJECTION EVERY 12 HOURS SCHEDULED
Status: DISCONTINUED | OUTPATIENT
Start: 2019-12-23 | End: 2019-12-24

## 2019-12-23 RX ORDER — LANCETS 28 GAUGE
1 EACH MISCELLANEOUS DAILY
Status: DISCONTINUED | OUTPATIENT
Start: 2019-12-23 | End: 2019-12-23 | Stop reason: RX

## 2019-12-23 RX ORDER — MONTELUKAST SODIUM 10 MG/1
10 TABLET ORAL NIGHTLY
Status: DISCONTINUED | OUTPATIENT
Start: 2019-12-23 | End: 2019-12-25 | Stop reason: HOSPADM

## 2019-12-23 RX ORDER — FUROSEMIDE 10 MG/ML
20 INJECTION INTRAMUSCULAR; INTRAVENOUS ONCE
Status: COMPLETED | OUTPATIENT
Start: 2019-12-23 | End: 2019-12-23

## 2019-12-23 RX ORDER — LISINOPRIL 10 MG/1
10 TABLET ORAL 2 TIMES DAILY
Status: DISCONTINUED | OUTPATIENT
Start: 2019-12-23 | End: 2019-12-25 | Stop reason: HOSPADM

## 2019-12-23 RX ORDER — PANTOPRAZOLE SODIUM 40 MG/1
40 TABLET, DELAYED RELEASE ORAL DAILY
Status: DISCONTINUED | OUTPATIENT
Start: 2019-12-23 | End: 2019-12-25 | Stop reason: HOSPADM

## 2019-12-23 RX ORDER — NITROGLYCERIN 0.4 MG/1
0.4 TABLET SUBLINGUAL EVERY 5 MIN PRN
Status: DISCONTINUED | OUTPATIENT
Start: 2019-12-23 | End: 2019-12-25 | Stop reason: HOSPADM

## 2019-12-23 RX ORDER — ASPIRIN 81 MG/1
81 TABLET, CHEWABLE ORAL DAILY
Status: DISCONTINUED | OUTPATIENT
Start: 2019-12-24 | End: 2019-12-23 | Stop reason: SDUPTHER

## 2019-12-23 RX ORDER — ONDANSETRON 2 MG/ML
4 INJECTION INTRAMUSCULAR; INTRAVENOUS EVERY 6 HOURS PRN
Status: DISCONTINUED | OUTPATIENT
Start: 2019-12-23 | End: 2019-12-25 | Stop reason: HOSPADM

## 2019-12-23 RX ORDER — BLOOD-GLUCOSE METER
1 KIT MISCELLANEOUS DAILY PRN
Status: DISCONTINUED | OUTPATIENT
Start: 2019-12-23 | End: 2019-12-23 | Stop reason: RX

## 2019-12-23 RX ORDER — HYDRALAZINE HYDROCHLORIDE 50 MG/1
50 TABLET, FILM COATED ORAL EVERY 12 HOURS SCHEDULED
Status: DISCONTINUED | OUTPATIENT
Start: 2019-12-23 | End: 2019-12-25 | Stop reason: HOSPADM

## 2019-12-23 RX ORDER — SODIUM CHLORIDE 0.9 % (FLUSH) 0.9 %
10 SYRINGE (ML) INJECTION PRN
Status: DISCONTINUED | OUTPATIENT
Start: 2019-12-23 | End: 2019-12-24

## 2019-12-23 RX ORDER — ISOSORBIDE MONONITRATE 30 MG/1
30 TABLET, EXTENDED RELEASE ORAL DAILY
Status: DISCONTINUED | OUTPATIENT
Start: 2019-12-23 | End: 2019-12-25 | Stop reason: HOSPADM

## 2019-12-23 RX ORDER — ATORVASTATIN CALCIUM 20 MG/1
20 TABLET, FILM COATED ORAL NIGHTLY
Status: DISCONTINUED | OUTPATIENT
Start: 2019-12-23 | End: 2019-12-23 | Stop reason: SDUPTHER

## 2019-12-23 RX ORDER — ALBUTEROL SULFATE 90 UG/1
2 AEROSOL, METERED RESPIRATORY (INHALATION) EVERY 6 HOURS PRN
Status: DISCONTINUED | OUTPATIENT
Start: 2019-12-23 | End: 2019-12-25 | Stop reason: HOSPADM

## 2019-12-23 RX ORDER — ASPIRIN 81 MG/1
324 TABLET, CHEWABLE ORAL ONCE
Status: COMPLETED | OUTPATIENT
Start: 2019-12-23 | End: 2019-12-23

## 2019-12-23 RX ORDER — CARVEDILOL 6.25 MG/1
6.25 TABLET ORAL 2 TIMES DAILY WITH MEALS
Status: DISCONTINUED | OUTPATIENT
Start: 2019-12-23 | End: 2019-12-25 | Stop reason: HOSPADM

## 2019-12-23 RX ORDER — ATORVASTATIN CALCIUM 40 MG/1
40 TABLET, FILM COATED ORAL DAILY
Status: DISCONTINUED | OUTPATIENT
Start: 2019-12-23 | End: 2019-12-25 | Stop reason: HOSPADM

## 2019-12-23 RX ADMIN — ASPIRIN 81 MG 324 MG: 81 TABLET ORAL at 08:37

## 2019-12-23 RX ADMIN — FUROSEMIDE 20 MG: 10 INJECTION, SOLUTION INTRAVENOUS at 10:30

## 2019-12-23 RX ADMIN — LISINOPRIL 10 MG: 10 TABLET ORAL at 12:36

## 2019-12-23 RX ADMIN — ENOXAPARIN SODIUM 40 MG: 40 INJECTION SUBCUTANEOUS at 12:36

## 2019-12-23 RX ADMIN — Medication 10 ML: at 20:56

## 2019-12-23 RX ADMIN — CARVEDILOL 6.25 MG: 6.25 TABLET, FILM COATED ORAL at 20:54

## 2019-12-23 RX ADMIN — IOPAMIDOL 100 ML: 755 INJECTION, SOLUTION INTRAVENOUS at 11:56

## 2019-12-23 RX ADMIN — ATORVASTATIN CALCIUM 40 MG: 40 TABLET, FILM COATED ORAL at 20:55

## 2019-12-23 RX ADMIN — LISINOPRIL 10 MG: 10 TABLET ORAL at 20:54

## 2019-12-23 RX ADMIN — PANTOPRAZOLE SODIUM 40 MG: 40 TABLET, DELAYED RELEASE ORAL at 12:36

## 2019-12-23 RX ADMIN — MOMETASONE FUROATE AND FORMOTEROL FUMARATE DIHYDRATE 2 PUFF: 200; 5 AEROSOL RESPIRATORY (INHALATION) at 19:35

## 2019-12-23 RX ADMIN — INSULIN LISPRO 50 UNITS: 100 INJECTION, SUSPENSION SUBCUTANEOUS at 17:07

## 2019-12-23 RX ADMIN — MONTELUKAST 10 MG: 10 TABLET, FILM COATED ORAL at 20:54

## 2019-12-23 RX ADMIN — ISOSORBIDE MONONITRATE 30 MG: 30 TABLET, EXTENDED RELEASE ORAL at 12:36

## 2019-12-23 RX ADMIN — CARVEDILOL 6.25 MG: 6.25 TABLET, FILM COATED ORAL at 12:36

## 2019-12-23 RX ADMIN — Medication 10 ML: at 12:36

## 2019-12-23 ASSESSMENT — PAIN DESCRIPTION - DESCRIPTORS
DESCRIPTORS: ACHING
DESCRIPTORS: PRESSURE

## 2019-12-23 ASSESSMENT — PAIN DESCRIPTION - FREQUENCY: FREQUENCY: CONTINUOUS

## 2019-12-23 ASSESSMENT — ENCOUNTER SYMPTOMS
TROUBLE SWALLOWING: 0
CHEST TIGHTNESS: 0
EYE PAIN: 0
VOMITING: 0
ABDOMINAL PAIN: 0
SINUS PRESSURE: 0
STRIDOR: 0
COLOR CHANGE: 0
ABDOMINAL DISTENTION: 0
SHORTNESS OF BREATH: 0
NAUSEA: 0
BACK PAIN: 0
DIARRHEA: 0
CHOKING: 0
WHEEZING: 0
EYE DISCHARGE: 0
APNEA: 0
COUGH: 0

## 2019-12-23 ASSESSMENT — PAIN SCALES - GENERAL
PAINLEVEL_OUTOF10: 0
PAINLEVEL_OUTOF10: 5

## 2019-12-23 ASSESSMENT — PAIN DESCRIPTION - PAIN TYPE
TYPE: ACUTE PAIN
TYPE: ACUTE PAIN

## 2019-12-23 ASSESSMENT — PAIN DESCRIPTION - PROGRESSION: CLINICAL_PROGRESSION: GRADUALLY WORSENING

## 2019-12-23 ASSESSMENT — PAIN DESCRIPTION - LOCATION
LOCATION: HEAD
LOCATION: CHEST

## 2019-12-23 ASSESSMENT — PAIN DESCRIPTION - ONSET: ONSET: ON-GOING

## 2019-12-23 ASSESSMENT — HEART SCORE: ECG: 1

## 2019-12-24 ENCOUNTER — APPOINTMENT (OUTPATIENT)
Dept: CARDIAC CATH/INVASIVE PROCEDURES | Age: 59
End: 2019-12-24
Payer: COMMERCIAL

## 2019-12-24 VITALS
TEMPERATURE: 97.9 F | SYSTOLIC BLOOD PRESSURE: 130 MMHG | OXYGEN SATURATION: 96 % | HEIGHT: 67 IN | RESPIRATION RATE: 18 BRPM | WEIGHT: 197.31 LBS | BODY MASS INDEX: 30.97 KG/M2 | HEART RATE: 96 BPM | DIASTOLIC BLOOD PRESSURE: 72 MMHG

## 2019-12-24 LAB
CHOLESTEROL, TOTAL: 132 MG/DL (ref 0–199)
EKG ATRIAL RATE: 90 BPM
EKG ATRIAL RATE: 91 BPM
EKG P AXIS: 47 DEGREES
EKG P AXIS: 49 DEGREES
EKG P-R INTERVAL: 136 MS
EKG P-R INTERVAL: 136 MS
EKG Q-T INTERVAL: 364 MS
EKG Q-T INTERVAL: 380 MS
EKG QRS DURATION: 86 MS
EKG QRS DURATION: 88 MS
EKG QTC CALCULATION (BAZETT): 447 MS
EKG QTC CALCULATION (BAZETT): 464 MS
EKG R AXIS: -27 DEGREES
EKG R AXIS: -28 DEGREES
EKG T AXIS: 12 DEGREES
EKG T AXIS: 3 DEGREES
EKG VENTRICULAR RATE: 90 BPM
EKG VENTRICULAR RATE: 91 BPM
GLUCOSE BLD-MCNC: 187 MG/DL (ref 60–115)
GLUCOSE BLD-MCNC: 190 MG/DL (ref 60–115)
HCT VFR BLD CALC: 42.2 % (ref 42–52)
HDLC SERPL-MCNC: 39 MG/DL (ref 40–59)
HEMOGLOBIN: 14.4 G/DL (ref 14–18)
LDL CHOLESTEROL CALCULATED: 71 MG/DL (ref 0–129)
MAGNESIUM: 1.6 MG/DL (ref 1.7–2.4)
MCH RBC QN AUTO: 28.6 PG (ref 27–31.3)
MCHC RBC AUTO-ENTMCNC: 34.1 % (ref 33–37)
MCV RBC AUTO: 83.9 FL (ref 80–100)
PDW BLD-RTO: 13.2 % (ref 11.5–14.5)
PERFORMED ON: ABNORMAL
PERFORMED ON: ABNORMAL
PLATELET # BLD: 151 K/UL (ref 130–400)
RBC # BLD: 5.03 M/UL (ref 4.7–6.1)
TRIGL SERPL-MCNC: 112 MG/DL (ref 0–150)
WBC # BLD: 6.7 K/UL (ref 4.8–10.8)

## 2019-12-24 PROCEDURE — 6370000000 HC RX 637 (ALT 250 FOR IP): Performed by: INTERNAL MEDICINE

## 2019-12-24 PROCEDURE — 6360000004 HC RX CONTRAST MEDICATION: Performed by: INTERNAL MEDICINE

## 2019-12-24 PROCEDURE — 93460 R&L HRT ART/VENTRICLE ANGIO: CPT | Performed by: INTERNAL MEDICINE

## 2019-12-24 PROCEDURE — 83735 ASSAY OF MAGNESIUM: CPT

## 2019-12-24 PROCEDURE — 2580000003 HC RX 258

## 2019-12-24 PROCEDURE — 2709999900 HC NON-CHARGEABLE SUPPLY

## 2019-12-24 PROCEDURE — 94761 N-INVAS EAR/PLS OXIMETRY MLT: CPT

## 2019-12-24 PROCEDURE — 6360000002 HC RX W HCPCS

## 2019-12-24 PROCEDURE — 2500000003 HC RX 250 WO HCPCS

## 2019-12-24 PROCEDURE — 93010 ELECTROCARDIOGRAM REPORT: CPT | Performed by: INTERNAL MEDICINE

## 2019-12-24 PROCEDURE — 93005 ELECTROCARDIOGRAM TRACING: CPT | Performed by: STUDENT IN AN ORGANIZED HEALTH CARE EDUCATION/TRAINING PROGRAM

## 2019-12-24 PROCEDURE — 85027 COMPLETE CBC AUTOMATED: CPT

## 2019-12-24 PROCEDURE — 94640 AIRWAY INHALATION TREATMENT: CPT

## 2019-12-24 PROCEDURE — 2580000003 HC RX 258: Performed by: INTERNAL MEDICINE

## 2019-12-24 PROCEDURE — C1894 INTRO/SHEATH, NON-LASER: HCPCS

## 2019-12-24 PROCEDURE — C1751 CATH, INF, PER/CENT/MIDLINE: HCPCS

## 2019-12-24 PROCEDURE — C1769 GUIDE WIRE: HCPCS

## 2019-12-24 PROCEDURE — G0378 HOSPITAL OBSERVATION PER HR: HCPCS

## 2019-12-24 PROCEDURE — 36415 COLL VENOUS BLD VENIPUNCTURE: CPT

## 2019-12-24 PROCEDURE — 80061 LIPID PANEL: CPT

## 2019-12-24 RX ORDER — SODIUM CHLORIDE 9 MG/ML
INJECTION, SOLUTION INTRAVENOUS CONTINUOUS
Status: ACTIVE | OUTPATIENT
Start: 2019-12-24 | End: 2019-12-24

## 2019-12-24 RX ORDER — SODIUM CHLORIDE 9 MG/ML
INJECTION, SOLUTION INTRAVENOUS
Status: DISCONTINUED
Start: 2019-12-24 | End: 2019-12-24

## 2019-12-24 RX ADMIN — ASPIRIN 81 MG: 81 TABLET, COATED ORAL at 08:09

## 2019-12-24 RX ADMIN — Medication 10 ML: at 08:10

## 2019-12-24 RX ADMIN — CARVEDILOL 6.25 MG: 6.25 TABLET, FILM COATED ORAL at 16:33

## 2019-12-24 RX ADMIN — PANTOPRAZOLE SODIUM 40 MG: 40 TABLET, DELAYED RELEASE ORAL at 08:09

## 2019-12-24 RX ADMIN — IOPAMIDOL 115 ML: 612 INJECTION, SOLUTION INTRAVENOUS at 10:23

## 2019-12-24 RX ADMIN — LISINOPRIL 10 MG: 10 TABLET ORAL at 08:09

## 2019-12-24 RX ADMIN — CARVEDILOL 6.25 MG: 6.25 TABLET, FILM COATED ORAL at 08:09

## 2019-12-24 RX ADMIN — ISOSORBIDE MONONITRATE 30 MG: 30 TABLET, EXTENDED RELEASE ORAL at 08:09

## 2019-12-24 RX ADMIN — MOMETASONE FUROATE AND FORMOTEROL FUMARATE DIHYDRATE 2 PUFF: 200; 5 AEROSOL RESPIRATORY (INHALATION) at 19:57

## 2019-12-24 RX ADMIN — INSULIN LISPRO 50 UNITS: 100 INJECTION, SUSPENSION SUBCUTANEOUS at 16:30

## 2019-12-24 RX ADMIN — HYDRALAZINE HYDROCHLORIDE 50 MG: 50 TABLET, FILM COATED ORAL at 12:43

## 2019-12-24 ASSESSMENT — PAIN SCALES - GENERAL
PAINLEVEL_OUTOF10: 0

## 2020-01-11 ASSESSMENT — ENCOUNTER SYMPTOMS
DIARRHEA: 0
SHORTNESS OF BREATH: 1
ANAL BLEEDING: 0
WHEEZING: 0
NAUSEA: 0
CHOKING: 0
CHEST TIGHTNESS: 0
RHINORRHEA: 0
COUGH: 0
TROUBLE SWALLOWING: 0
BLOOD IN STOOL: 0
ABDOMINAL PAIN: 0
VOICE CHANGE: 0
ABDOMINAL DISTENTION: 0
PHOTOPHOBIA: 0
CONSTIPATION: 0
VOMITING: 0

## 2020-01-20 ENCOUNTER — OFFICE VISIT (OUTPATIENT)
Dept: ENDOCRINOLOGY | Age: 60
End: 2020-01-20
Payer: COMMERCIAL

## 2020-01-20 VITALS
TEMPERATURE: 97.9 F | BODY MASS INDEX: 30.45 KG/M2 | SYSTOLIC BLOOD PRESSURE: 148 MMHG | HEART RATE: 99 BPM | HEIGHT: 67 IN | OXYGEN SATURATION: 95 % | DIASTOLIC BLOOD PRESSURE: 89 MMHG | WEIGHT: 194 LBS

## 2020-01-20 LAB
CHP ED QC CHECK: ABNORMAL
GLUCOSE BLD-MCNC: 172 MG/DL

## 2020-01-20 PROCEDURE — 3017F COLORECTAL CA SCREEN DOC REV: CPT | Performed by: INTERNAL MEDICINE

## 2020-01-20 PROCEDURE — 1036F TOBACCO NON-USER: CPT | Performed by: INTERNAL MEDICINE

## 2020-01-20 PROCEDURE — G8417 CALC BMI ABV UP PARAM F/U: HCPCS | Performed by: INTERNAL MEDICINE

## 2020-01-20 PROCEDURE — 82962 GLUCOSE BLOOD TEST: CPT | Performed by: INTERNAL MEDICINE

## 2020-01-20 PROCEDURE — 99213 OFFICE O/P EST LOW 20 MIN: CPT | Performed by: INTERNAL MEDICINE

## 2020-01-20 PROCEDURE — G8427 DOCREV CUR MEDS BY ELIG CLIN: HCPCS | Performed by: INTERNAL MEDICINE

## 2020-01-20 PROCEDURE — 3046F HEMOGLOBIN A1C LEVEL >9.0%: CPT | Performed by: INTERNAL MEDICINE

## 2020-01-20 PROCEDURE — 2022F DILAT RTA XM EVC RTNOPTHY: CPT | Performed by: INTERNAL MEDICINE

## 2020-01-20 PROCEDURE — G8484 FLU IMMUNIZE NO ADMIN: HCPCS | Performed by: INTERNAL MEDICINE

## 2020-01-20 RX ORDER — INSULIN LISPRO 100 [IU]/ML
INJECTION, SUSPENSION SUBCUTANEOUS
Qty: 15 PEN | Refills: 3
Start: 2020-01-20 | End: 2020-05-21 | Stop reason: SDUPTHER

## 2020-01-20 RX ORDER — GLUCOSAMINE HCL/CHONDROITIN SU 500-400 MG
CAPSULE ORAL
Qty: 150 STRIP | Refills: 3 | Status: SHIPPED | OUTPATIENT
Start: 2020-01-20 | End: 2020-10-07

## 2020-01-20 ASSESSMENT — ENCOUNTER SYMPTOMS: RESPIRATORY NEGATIVE: 1

## 2020-01-20 NOTE — PROGRESS NOTES
Monitoring Suppl ([employer] IQ SYSTEM) W/DEVICE KIT, Use as directed, Disp: 1 kit, Rfl: 02      Review of Systems   Respiratory: Negative. Cardiovascular: Negative. All other systems reviewed and are negative. Vitals:    01/20/20 0955   BP: (!) 148/89   Pulse: 99   Temp: 97.9 °F (36.6 °C)   TempSrc: Oral   SpO2: 95%   Weight: 194 lb (88 kg)   Height: 5' 7\" (1.702 m)       Objective:   Physical Exam  Constitutional:       Appearance: Normal appearance. HENT:      Head: Normocephalic and atraumatic. Neck:      Musculoskeletal: Normal range of motion and neck supple. Cardiovascular:      Rate and Rhythm: Normal rate. Chest:       Neurological:      Mental Status: He is alert. Assessment:       Diagnosis Orders   1. DM type 2 with diabetic dyslipidemia (HCC)  POCT Glucose    insulin lispro protamine & lispro (HUMALOG MIX 75/25 KWIKPEN) (75-25) 100 UNIT per ML SUPN injection pen    Basic Metabolic Panel    Hemoglobin A1C           Plan:      Orders Placed This Encounter   Procedures    Basic Metabolic Panel     Standing Status:   Future     Standing Expiration Date:   1/20/2021    Hemoglobin A1C     Standing Status:   Future     Standing Expiration Date:   1/20/2021    POCT Glucose     Orders Placed This Encounter   Medications    blood glucose monitor strips     Sig: Pt test 4x daily Dx E11.65 (dispense covered brand)     Dispense:  150 strip     Refill:  3     Brand per patient preference. May round up to next available package size.     insulin lispro protamine & lispro (HUMALOG MIX 75/25 KWIKPEN) (75-25) 100 UNIT per ML SUPN injection pen     Sig: INJECT 30-40  units twice a day     Dispense:  15 pen     Refill:  3     Continue metformin thousand milligrams twice daily  This patient's insulin dose 75/25 Humalog 30 to 40 units twice daily repeat labs in 3 months time        Dora Tan MD

## 2020-01-22 ENCOUNTER — OFFICE VISIT (OUTPATIENT)
Dept: FAMILY MEDICINE CLINIC | Age: 60
End: 2020-01-22
Payer: COMMERCIAL

## 2020-01-22 VITALS
HEIGHT: 67 IN | WEIGHT: 194 LBS | RESPIRATION RATE: 14 BRPM | SYSTOLIC BLOOD PRESSURE: 124 MMHG | BODY MASS INDEX: 30.45 KG/M2 | TEMPERATURE: 97.9 F | DIASTOLIC BLOOD PRESSURE: 81 MMHG

## 2020-01-22 PROCEDURE — G8484 FLU IMMUNIZE NO ADMIN: HCPCS | Performed by: INTERNAL MEDICINE

## 2020-01-22 PROCEDURE — 99213 OFFICE O/P EST LOW 20 MIN: CPT | Performed by: INTERNAL MEDICINE

## 2020-01-22 PROCEDURE — G8427 DOCREV CUR MEDS BY ELIG CLIN: HCPCS | Performed by: INTERNAL MEDICINE

## 2020-01-22 PROCEDURE — 3017F COLORECTAL CA SCREEN DOC REV: CPT | Performed by: INTERNAL MEDICINE

## 2020-01-22 PROCEDURE — 1036F TOBACCO NON-USER: CPT | Performed by: INTERNAL MEDICINE

## 2020-01-22 PROCEDURE — G8417 CALC BMI ABV UP PARAM F/U: HCPCS | Performed by: INTERNAL MEDICINE

## 2020-01-22 RX ORDER — METOPROLOL TARTRATE 100 MG/1
100 TABLET ORAL
COMMUNITY
Start: 2020-01-03 | End: 2020-04-23 | Stop reason: SDUPTHER

## 2020-01-22 RX ORDER — AMIODARONE HYDROCHLORIDE 200 MG/1
200 TABLET ORAL
COMMUNITY
Start: 2020-01-04 | End: 2020-02-03

## 2020-01-22 ASSESSMENT — PATIENT HEALTH QUESTIONNAIRE - PHQ9
2. FEELING DOWN, DEPRESSED OR HOPELESS: 0
SUM OF ALL RESPONSES TO PHQ QUESTIONS 1-9: 0
SUM OF ALL RESPONSES TO PHQ QUESTIONS 1-9: 0
SUM OF ALL RESPONSES TO PHQ9 QUESTIONS 1 & 2: 0
1. LITTLE INTEREST OR PLEASURE IN DOING THINGS: 0

## 2020-01-22 NOTE — PROGRESS NOTES
Number of children: Not on file    Years of education: Not on file    Highest education level: Not on file   Occupational History    Not on file   Social Needs    Financial resource strain: Not on file    Food insecurity:     Worry: Sometimes true     Inability: Sometimes true    Transportation needs:     Medical: No     Non-medical: No   Tobacco Use    Smoking status: Never Smoker    Smokeless tobacco: Never Used   Substance and Sexual Activity    Alcohol use: Yes     Alcohol/week: 0.0 standard drinks     Comment: occasional    Drug use: No    Sexual activity: Not on file   Lifestyle    Physical activity:     Days per week: Not on file     Minutes per session: Not on file    Stress: Not on file   Relationships    Social connections:     Talks on phone: Not on file     Gets together: Not on file     Attends [employer] service: Not on file     Active member of club or organization: Not on file     Attends meetings of clubs or organizations: Not on file     Relationship status: Not on file    Intimate partner violence:     Fear of current or ex partner: Not on file     Emotionally abused: Not on file     Physically abused: Not on file     Forced sexual activity: Not on file   Other Topics Concern    Not on file   Social History Narrative    Not on file     No Known Allergies    Review of Systems   Constitutional: Negative for fatigue and fever. HENT: Negative for trouble swallowing and voice change. Eyes: Negative for photophobia and visual disturbance. Respiratory: Negative for choking, chest tightness and shortness of breath. Cardiovascular: Negative for chest pain and palpitations. Gastrointestinal: Negative for nausea and vomiting. Genitourinary: Negative for decreased urine volume, testicular pain and urgency. Skin: Negative for rash. Neurological: Negative for tremors and syncope. Hematological: Does not bruise/bleed easily.    Psychiatric/Behavioral: Negative for suicidal ideas.           Vitals:    01/22/20 1127   BP: 124/81   Resp: 14   Temp: 97.9 °F (36.6 °C)   Weight: 194 lb (88 kg)   Height: 5' 7\" (1.702 m)       Physical Exam  Constitutional:       Appearance: He is well-developed. HENT:      Head: Normocephalic and atraumatic. Eyes:      Conjunctiva/sclera: Conjunctivae normal.      Pupils: Pupils are equal, round, and reactive to light. Neck:      Musculoskeletal: Normal range of motion. Cardiovascular:      Rate and Rhythm: Normal rate and regular rhythm. Pulmonary:      Effort: Pulmonary effort is normal.   Abdominal:      General: Bowel sounds are normal.      Palpations: Abdomen is soft. Musculoskeletal: Normal range of motion. Skin:     General: Skin is dry. Coloration: Skin is not jaundiced. Neurological:      Mental Status: He is alert. Assessment/Plan  Aline Damon was seen today for follow up after procedure. Diagnoses and all orders for this visit:    Coronary artery disease involving coronary bypass graft of native heart without angina pectoris  -     Cologuard; Future        No follow-ups on file.     Art Soriano MD

## 2020-01-28 ASSESSMENT — ENCOUNTER SYMPTOMS
CHEST TIGHTNESS: 0
NAUSEA: 0
TROUBLE SWALLOWING: 0
VOMITING: 0
VOICE CHANGE: 0
SHORTNESS OF BREATH: 0
PHOTOPHOBIA: 0
CHOKING: 0

## 2020-01-30 ENCOUNTER — TELEPHONE (OUTPATIENT)
Dept: PRIMARY CARE CLINIC | Age: 60
End: 2020-01-30

## 2020-02-05 RX ORDER — ALBUTEROL SULFATE 90 UG/1
2 AEROSOL, METERED RESPIRATORY (INHALATION) EVERY 6 HOURS PRN
Qty: 18 G | Refills: 5 | Status: SHIPPED | OUTPATIENT
Start: 2020-02-05 | End: 2020-10-08

## 2020-02-06 DIAGNOSIS — E78.5 DM TYPE 2 WITH DIABETIC DYSLIPIDEMIA (HCC): ICD-10-CM

## 2020-02-06 DIAGNOSIS — E11.69 DM TYPE 2 WITH DIABETIC DYSLIPIDEMIA (HCC): ICD-10-CM

## 2020-02-06 LAB
ANION GAP SERPL CALCULATED.3IONS-SCNC: 12 MEQ/L (ref 9–15)
BUN BLDV-MCNC: 15 MG/DL (ref 6–20)
CALCIUM SERPL-MCNC: 9.4 MG/DL (ref 8.5–9.9)
CHLORIDE BLD-SCNC: 99 MEQ/L (ref 95–107)
CO2: 28 MEQ/L (ref 20–31)
CREAT SERPL-MCNC: 1.02 MG/DL (ref 0.7–1.2)
GFR AFRICAN AMERICAN: >60
GFR NON-AFRICAN AMERICAN: >60
GLUCOSE BLD-MCNC: 192 MG/DL (ref 70–99)
HBA1C MFR BLD: 7.1 % (ref 4.8–5.9)
POTASSIUM SERPL-SCNC: 4.9 MEQ/L (ref 3.4–4.9)
SODIUM BLD-SCNC: 139 MEQ/L (ref 135–144)

## 2020-02-21 ENCOUNTER — OFFICE VISIT (OUTPATIENT)
Dept: ENDOCRINOLOGY | Age: 60
End: 2020-02-21
Payer: COMMERCIAL

## 2020-02-21 VITALS
DIASTOLIC BLOOD PRESSURE: 88 MMHG | SYSTOLIC BLOOD PRESSURE: 175 MMHG | WEIGHT: 209 LBS | HEIGHT: 67 IN | BODY MASS INDEX: 32.8 KG/M2 | HEART RATE: 105 BPM

## 2020-02-21 LAB
CHP ED QC CHECK: NORMAL
GLUCOSE BLD-MCNC: 211 MG/DL

## 2020-02-21 PROCEDURE — 3017F COLORECTAL CA SCREEN DOC REV: CPT | Performed by: INTERNAL MEDICINE

## 2020-02-21 PROCEDURE — 2022F DILAT RTA XM EVC RTNOPTHY: CPT | Performed by: INTERNAL MEDICINE

## 2020-02-21 PROCEDURE — 3051F HG A1C>EQUAL 7.0%<8.0%: CPT | Performed by: INTERNAL MEDICINE

## 2020-02-21 PROCEDURE — 82962 GLUCOSE BLOOD TEST: CPT | Performed by: INTERNAL MEDICINE

## 2020-02-21 PROCEDURE — 99213 OFFICE O/P EST LOW 20 MIN: CPT | Performed by: INTERNAL MEDICINE

## 2020-02-21 PROCEDURE — G8417 CALC BMI ABV UP PARAM F/U: HCPCS | Performed by: INTERNAL MEDICINE

## 2020-02-21 PROCEDURE — G8484 FLU IMMUNIZE NO ADMIN: HCPCS | Performed by: INTERNAL MEDICINE

## 2020-02-21 PROCEDURE — 1036F TOBACCO NON-USER: CPT | Performed by: INTERNAL MEDICINE

## 2020-02-21 PROCEDURE — G8428 CUR MEDS NOT DOCUMENT: HCPCS | Performed by: INTERNAL MEDICINE

## 2020-03-19 ENCOUNTER — OFFICE VISIT (OUTPATIENT)
Dept: PRIMARY CARE CLINIC | Age: 60
End: 2020-03-19
Payer: COMMERCIAL

## 2020-03-19 VITALS
BODY MASS INDEX: 33.27 KG/M2 | HEIGHT: 67 IN | WEIGHT: 212 LBS | HEART RATE: 80 BPM | TEMPERATURE: 97.8 F | DIASTOLIC BLOOD PRESSURE: 68 MMHG | SYSTOLIC BLOOD PRESSURE: 140 MMHG | OXYGEN SATURATION: 99 %

## 2020-03-19 PROCEDURE — G8484 FLU IMMUNIZE NO ADMIN: HCPCS | Performed by: NURSE PRACTITIONER

## 2020-03-19 PROCEDURE — G8427 DOCREV CUR MEDS BY ELIG CLIN: HCPCS | Performed by: NURSE PRACTITIONER

## 2020-03-19 PROCEDURE — 1036F TOBACCO NON-USER: CPT | Performed by: NURSE PRACTITIONER

## 2020-03-19 PROCEDURE — G8417 CALC BMI ABV UP PARAM F/U: HCPCS | Performed by: NURSE PRACTITIONER

## 2020-03-19 PROCEDURE — 87804 INFLUENZA ASSAY W/OPTIC: CPT | Performed by: NURSE PRACTITIONER

## 2020-03-19 PROCEDURE — 99213 OFFICE O/P EST LOW 20 MIN: CPT | Performed by: NURSE PRACTITIONER

## 2020-03-19 PROCEDURE — 3017F COLORECTAL CA SCREEN DOC REV: CPT | Performed by: NURSE PRACTITIONER

## 2020-03-19 RX ORDER — AZITHROMYCIN 250 MG/1
250 TABLET, FILM COATED ORAL SEE ADMIN INSTRUCTIONS
Qty: 6 TABLET | Refills: 0 | Status: SHIPPED | OUTPATIENT
Start: 2020-03-19 | End: 2020-03-24

## 2020-03-19 ASSESSMENT — ENCOUNTER SYMPTOMS
TROUBLE SWALLOWING: 0
VOMITING: 0
COUGH: 1
DIARRHEA: 0
NAUSEA: 1
SORE THROAT: 0
SHORTNESS OF BREATH: 1
VOICE CHANGE: 0
COLOR CHANGE: 0
ABDOMINAL PAIN: 0
CONSTIPATION: 0
BACK PAIN: 0

## 2020-03-19 NOTE — LETTER
[address]  Phone: [phone]  Fax: [phone]    Jacqueline Newman, SHERIN - CNP        March 19, 2020     Patient: Ilana Umaña   YOB: 1960   Date of Visit: 3/19/2020       To Whom it May Concern:    Ilana Umaña was seen in my clinic on 3/19/2020. He may return to work on 3/20/2020. If you have any questions or concerns, please don't hesitate to call.     Sincerely,                   Jacqueline Newman, APRN - CNP

## 2020-03-19 NOTE — PROGRESS NOTES
Sandra Duarte  1960    Chief Complaint   Patient presents with    Cough     x a few days    Generalized Body Aches     x a few days    Shortness of Breath     x a few days    Nausea       Respiratory Symptoms:  Patient complains of 3 day(s) history of non-productive cough. Symptoms have been unchanged with time. He denies ear symptoms. Relevant PMH: Asthma. Smoking history:  He  reports that he has never smoked. He has never used smokeless tobacco.     He has had ill contacts with influenza     Treatment to date: none. Travel screen completed:  Yes    BPA for COVID triggered: no    Sandra Duarte is a 63 y.o. male [facility] with chief complaint of intermittent productive cough producing light yellow phlegm, generalized body aches, shortness of breath and nausea for the past couple of days. He was exposed to his daughter who has influenza. He denies any chest pain, notes shortness of breath is when coughing, notes history of asthma and has inhaler but hasn't used it. Denies any fever, vomiting, diarrhea, abdominal pain. HPI    Nursing Notes were reviewed. REVIEW OF SYSTEMS       Review of Systems   Constitutional: Negative for appetite change and fever. HENT: Negative for drooling, ear pain, sore throat, trouble swallowing and voice change. Respiratory: Positive for cough and shortness of breath. Cardiovascular: Negative for chest pain. Gastrointestinal: Positive for nausea. Negative for abdominal pain, constipation, diarrhea and vomiting. Genitourinary: Negative for decreased urine volume and dysuria. Musculoskeletal: Positive for myalgias. Negative for arthralgias and back pain. Skin: Negative for color change. Neurological: Negative for dizziness, weakness, light-headedness and headaches. Psychiatric/Behavioral: Negative for agitation and behavioral problems. All other systems reviewed and are negative.             Vitals:    03/19/20 1215   BP: (!) by 250mg on days 2 - 5  Dispense: 6 tablet; Refill: 0           SHERIN Restrepo CNP  3/19/20     This visit was provided as a focused evaluation during the COVID -19 pandemic/national emergency. A comprehensive review of all previous patient history and testing was not conducted. Pertinent findings were elicited during the visit. Provided patient with anticipatory guidance and instructed on follow up and to be seen in the ER if symptoms become worse.

## 2020-04-23 ENCOUNTER — VIRTUAL VISIT (OUTPATIENT)
Dept: PRIMARY CARE CLINIC | Age: 60
End: 2020-04-23
Payer: COMMERCIAL

## 2020-04-23 PROCEDURE — 99442 PR PHYS/QHP TELEPHONE EVALUATION 11-20 MIN: CPT | Performed by: INTERNAL MEDICINE

## 2020-04-23 RX ORDER — ROSUVASTATIN CALCIUM 20 MG/1
20 TABLET, COATED ORAL NIGHTLY
Qty: 30 TABLET | Refills: 5 | Status: SHIPPED | OUTPATIENT
Start: 2020-04-23 | End: 2020-10-30

## 2020-04-23 RX ORDER — ASPIRIN 81 MG/1
162 TABLET ORAL DAILY
Qty: 30 TABLET | Refills: 5 | Status: SHIPPED | OUTPATIENT
Start: 2020-04-23 | End: 2020-10-30

## 2020-04-23 RX ORDER — SILDENAFIL 100 MG/1
100 TABLET, FILM COATED ORAL PRN
Qty: 9 TABLET | Refills: 11 | Status: SHIPPED | OUTPATIENT
Start: 2020-04-23 | End: 2020-06-17

## 2020-04-23 RX ORDER — METOPROLOL TARTRATE 100 MG/1
100 TABLET ORAL 2 TIMES DAILY
Qty: 60 TABLET | Refills: 5 | Status: SHIPPED | OUTPATIENT
Start: 2020-04-23 | End: 2020-06-04 | Stop reason: SDUPTHER

## 2020-04-23 ASSESSMENT — ENCOUNTER SYMPTOMS
CHEST TIGHTNESS: 0
PHOTOPHOBIA: 0
COUGH: 0
NAUSEA: 0
SHORTNESS OF BREATH: 0

## 2020-05-21 RX ORDER — INSULIN LISPRO 100 [IU]/ML
INJECTION, SUSPENSION SUBCUTANEOUS
Qty: 15 PEN | Refills: 3 | Status: SHIPPED | OUTPATIENT
Start: 2020-05-21 | End: 2020-08-17

## 2020-05-21 NOTE — TELEPHONE ENCOUNTER
Requested Prescriptions     Pending Prescriptions Disp Refills    insulin lispro protamine & lispro (HUMALOG MIX 75/25 KWIKPEN) (75-25) 100 UNIT per ML SUPN injection pen 15 pen 3     Sig: INJECT 30-40  units twice a day

## 2020-06-04 ENCOUNTER — TELEPHONE (OUTPATIENT)
Dept: ENDOCRINOLOGY | Age: 60
End: 2020-06-04

## 2020-06-04 RX ORDER — METOPROLOL TARTRATE 100 MG/1
100 TABLET ORAL 2 TIMES DAILY
Qty: 60 TABLET | Refills: 5 | Status: SHIPPED | OUTPATIENT
Start: 2020-06-04 | End: 2021-02-22

## 2020-06-09 DIAGNOSIS — I10 ESSENTIAL HYPERTENSION: ICD-10-CM

## 2020-06-09 DIAGNOSIS — I25.810 CORONARY ARTERY DISEASE INVOLVING CORONARY BYPASS GRAFT OF NATIVE HEART WITHOUT ANGINA PECTORIS: ICD-10-CM

## 2020-06-09 DIAGNOSIS — E03.9 HYPOTHYROIDISM, UNSPECIFIED TYPE: ICD-10-CM

## 2020-06-09 DIAGNOSIS — E78.5 DM TYPE 2 WITH DIABETIC DYSLIPIDEMIA (HCC): ICD-10-CM

## 2020-06-09 DIAGNOSIS — Z12.5 PROSTATE CANCER SCREENING: ICD-10-CM

## 2020-06-09 DIAGNOSIS — E11.69 DM TYPE 2 WITH DIABETIC DYSLIPIDEMIA (HCC): ICD-10-CM

## 2020-06-09 LAB
ALBUMIN SERPL-MCNC: 4.4 G/DL (ref 3.5–4.6)
ALP BLD-CCNC: 72 U/L (ref 35–104)
ALT SERPL-CCNC: 12 U/L (ref 0–41)
ANION GAP SERPL CALCULATED.3IONS-SCNC: 15 MEQ/L (ref 9–15)
AST SERPL-CCNC: 13 U/L (ref 0–40)
BASOPHILS ABSOLUTE: 0.1 K/UL (ref 0–0.2)
BASOPHILS RELATIVE PERCENT: 0.9 %
BILIRUB SERPL-MCNC: 0.6 MG/DL (ref 0.2–0.7)
BUN BLDV-MCNC: 23 MG/DL (ref 6–20)
CALCIUM SERPL-MCNC: 9.7 MG/DL (ref 8.5–9.9)
CHLORIDE BLD-SCNC: 94 MEQ/L (ref 95–107)
CHOLESTEROL, TOTAL: 214 MG/DL (ref 0–199)
CO2: 27 MEQ/L (ref 20–31)
CREAT SERPL-MCNC: 1.14 MG/DL (ref 0.7–1.2)
EOSINOPHILS ABSOLUTE: 0.3 K/UL (ref 0–0.7)
EOSINOPHILS RELATIVE PERCENT: 5.4 %
GFR AFRICAN AMERICAN: >60
GFR NON-AFRICAN AMERICAN: >60
GLOBULIN: 2.9 G/DL (ref 2.3–3.5)
GLUCOSE BLD-MCNC: 399 MG/DL (ref 70–99)
HBA1C MFR BLD: 11.8 % (ref 4.8–5.9)
HCT VFR BLD CALC: 47.7 % (ref 42–52)
HDLC SERPL-MCNC: 50 MG/DL (ref 40–59)
HEMOGLOBIN: 15.8 G/DL (ref 14–18)
LDL CHOLESTEROL CALCULATED: 125 MG/DL (ref 0–129)
LYMPHOCYTES ABSOLUTE: 0.8 K/UL (ref 1–4.8)
LYMPHOCYTES RELATIVE PERCENT: 13.5 %
MCH RBC QN AUTO: 27.4 PG (ref 27–31.3)
MCHC RBC AUTO-ENTMCNC: 33.2 % (ref 33–37)
MCV RBC AUTO: 82.6 FL (ref 80–100)
MONOCYTES ABSOLUTE: 0.4 K/UL (ref 0.2–0.8)
MONOCYTES RELATIVE PERCENT: 7.4 %
NEUTROPHILS ABSOLUTE: 4.4 K/UL (ref 1.4–6.5)
NEUTROPHILS RELATIVE PERCENT: 72.8 %
PDW BLD-RTO: 15.1 % (ref 11.5–14.5)
PLATELET # BLD: 145 K/UL (ref 130–400)
POTASSIUM SERPL-SCNC: 4.8 MEQ/L (ref 3.4–4.9)
PROSTATE SPECIFIC ANTIGEN: 0.56 NG/ML (ref 0–5.4)
RBC # BLD: 5.78 M/UL (ref 4.7–6.1)
SODIUM BLD-SCNC: 136 MEQ/L (ref 135–144)
TOTAL PROTEIN: 7.3 G/DL (ref 6.3–8)
TRIGL SERPL-MCNC: 193 MG/DL (ref 0–150)
TSH SERPL DL<=0.05 MIU/L-ACNC: 1.27 UIU/ML (ref 0.44–3.86)
WBC # BLD: 6 K/UL (ref 4.8–10.8)

## 2020-06-14 ENCOUNTER — HOSPITAL ENCOUNTER (EMERGENCY)
Age: 60
Discharge: HOME | End: 2020-06-14
Attending: FAMILY MEDICINE
Payer: COMMERCIAL

## 2020-06-14 VITALS
BODY MASS INDEX: 31.39 KG/M2 | SYSTOLIC BLOOD PRESSURE: 150 MMHG | HEART RATE: 76 BPM | DIASTOLIC BLOOD PRESSURE: 92 MMHG | TEMPERATURE: 97.5 F | HEIGHT: 67 IN | OXYGEN SATURATION: 99 % | RESPIRATION RATE: 18 BRPM | WEIGHT: 200 LBS

## 2020-06-14 PROCEDURE — 99283 EMERGENCY DEPT VISIT LOW MDM: CPT

## 2020-06-14 PROCEDURE — 96372 THER/PROPH/DIAG INJ SC/IM: CPT

## 2020-06-14 PROCEDURE — 6360000002 HC RX W HCPCS: Performed by: FAMILY MEDICINE

## 2020-06-14 RX ORDER — DOXYCYCLINE 100 MG/1
100 TABLET ORAL 2 TIMES DAILY
Qty: 20 TABLET | Refills: 0 | Status: SHIPPED | OUTPATIENT
Start: 2020-06-14 | End: 2020-06-24

## 2020-06-14 RX ORDER — KETOROLAC TROMETHAMINE 30 MG/ML
60 INJECTION, SOLUTION INTRAMUSCULAR; INTRAVENOUS ONCE
Status: COMPLETED | OUTPATIENT
Start: 2020-06-14 | End: 2020-06-14

## 2020-06-14 RX ADMIN — KETOROLAC TROMETHAMINE 60 MG: 30 INJECTION, SOLUTION INTRAMUSCULAR at 12:31

## 2020-06-14 ASSESSMENT — ENCOUNTER SYMPTOMS
ALLERGIC/IMMUNOLOGIC NEGATIVE: 1
BACK PAIN: 0
VISUAL CHANGE: 0
TINGLING: 0
VOMITING: 0
DIARRHEA: 0
EYE PAIN: 0
EYES NEGATIVE: 1
SINUS PRESSURE: 0
PHOTOPHOBIA: 0
SORE THROAT: 0
RESPIRATORY NEGATIVE: 1
NAUSEA: 0
GASTROINTESTINAL NEGATIVE: 1
COUGH: 0
BLURRED VISION: 0
ABDOMINAL PAIN: 0
SWOLLEN GLANDS: 0

## 2020-06-14 ASSESSMENT — PAIN DESCRIPTION - LOCATION: LOCATION: HEAD

## 2020-06-14 ASSESSMENT — PAIN DESCRIPTION - ORIENTATION: ORIENTATION: RIGHT

## 2020-06-14 ASSESSMENT — PAIN SCALES - GENERAL
PAINLEVEL_OUTOF10: 7
PAINLEVEL_OUTOF10: 7

## 2020-06-14 ASSESSMENT — PAIN DESCRIPTION - DESCRIPTORS: DESCRIPTORS: HEADACHE

## 2020-06-14 ASSESSMENT — PAIN DESCRIPTION - PAIN TYPE: TYPE: ACUTE PAIN

## 2020-06-14 NOTE — ED PROVIDER NOTES
NursingNotes were reviewed. REVIEW OF SYSTEMS    (2-9 systems for level 4, 10 or more for level 5)     Review of Systems   Constitutional: Negative. Negative for fatigue and fever. HENT: Negative. Negative for congestion, ear pain, hearing loss, postnasal drip, sinus pressure and sore throat. Eyes: Negative. Negative for blurred vision, photophobia and pain. Respiratory: Negative. Negative for cough. Cardiovascular: Negative. Negative for syncope and near-syncope. Gastrointestinal: Negative. Negative for abdominal pain, diarrhea, nausea and vomiting. Endocrine: Negative. Genitourinary: Negative. Musculoskeletal: Negative. Negative for back pain, myalgias, neck pain and neck stiffness. Skin: Negative. Allergic/Immunologic: Negative. Neurological: Positive for headaches. Negative for dizziness, focal weakness, seizures, weakness, numbness, paresthesias and loss of balance. Hematological: Negative. Psychiatric/Behavioral: Negative. Except as noted above the remainder of the review of systems was reviewed and negative.        PAST MEDICAL HISTORY     Past Medical History:   Diagnosis Date    Back pain     CAD (coronary artery disease) 01/2020    3 vessel     Diabetes mellitus ([facility] [facility]     Essential hypertension, benign     Hyperlipemia     Neuropathic pain, leg, bilateral          SURGICALHISTORY       Past Surgical History:   Procedure Laterality Date    CARPAL TUNNEL RELEASE Bilateral     FOOT SURGERY Left     bone spur    LAMINOTOMY  2012         CURRENT MEDICATIONS       Discharge Medication List as of 6/14/2020  1:27 PM      CONTINUE these medications which have NOT CHANGED    Details   metoprolol (LOPRESSOR) 100 MG tablet Take 1 tablet by mouth 2 times daily, Disp-60 tablet, R-5Normal      insulin lispro protamine & lispro (HUMALOG MIX 75/25 KWIKPEN) (75-25) 100 UNIT per ML SUPN injection pen INJECT 30-40  units twice a day, Disp-15 pen, R-3Normal each, R-3, NormalTest tid       !! ONE TOUCH LANCETS MISC 3 TIMES DAILY Starting Tue 3/26/2019, Disp-300 each, R-3, Normal      fluticasone-vilanterol (BREO ELLIPTA) 100-25 MCG/INH AEPB inhaler Inhale 1 puff into the lungs daily, Disp-3 each, R-3Normal      Blood Glucose Monitoring Suppl ([employer] SYSTEM) W/DEVICE KIT Disp-1 kit, R-02, NormalUse as directed        !! - Potential duplicate medications found. Please discuss with provider. ALLERGIES     Patient has no known allergies. FAMILY HISTORY       Family History   Problem Relation Age of Onset    Other Father         accident    Heart Failure Mother     Heart Disease Brother     Cirrhosis Brother           SOCIAL HISTORY       Social History     Socioeconomic History    Marital status:      Spouse name: None    Number of children: None    Years of education: None    Highest education level: None   Occupational History    None   Social Needs    Financial resource strain: None    Food insecurity     Worry: Sometimes true     Inability: Sometimes true    Transportation needs     Medical: No     Non-medical: No   Tobacco Use    Smoking status: Never Smoker    Smokeless tobacco: Never Used   Substance and Sexual Activity    Alcohol use:  Yes     Alcohol/week: 0.0 standard drinks     Comment: occasional    Drug use: No    Sexual activity: None   Lifestyle    Physical activity     Days per week: None     Minutes per session: None    Stress: None   Relationships    Social connections     Talks on phone: None     Gets together: None     Attends Mu-ism service: None     Active member of club or organization: None     Attends meetings of clubs or organizations: None     Relationship status: None    Intimate partner violence     Fear of current or ex partner: None     Emotionally abused: None     Physically abused: None     Forced sexual activity: None   Other Topics Concern    None   Social History Narrative    None

## 2020-06-17 ENCOUNTER — OFFICE VISIT (OUTPATIENT)
Dept: FAMILY MEDICINE CLINIC | Age: 60
End: 2020-06-17
Payer: COMMERCIAL

## 2020-06-17 VITALS
OXYGEN SATURATION: 98 % | SYSTOLIC BLOOD PRESSURE: 181 MMHG | BODY MASS INDEX: 31.71 KG/M2 | DIASTOLIC BLOOD PRESSURE: 106 MMHG | WEIGHT: 202 LBS | HEART RATE: 91 BPM | RESPIRATION RATE: 14 BRPM | TEMPERATURE: 97.6 F | HEIGHT: 67 IN

## 2020-06-17 PROCEDURE — G8427 DOCREV CUR MEDS BY ELIG CLIN: HCPCS | Performed by: INTERNAL MEDICINE

## 2020-06-17 PROCEDURE — 1036F TOBACCO NON-USER: CPT | Performed by: INTERNAL MEDICINE

## 2020-06-17 PROCEDURE — 3017F COLORECTAL CA SCREEN DOC REV: CPT | Performed by: INTERNAL MEDICINE

## 2020-06-17 PROCEDURE — G8417 CALC BMI ABV UP PARAM F/U: HCPCS | Performed by: INTERNAL MEDICINE

## 2020-06-17 PROCEDURE — 99214 OFFICE O/P EST MOD 30 MIN: CPT | Performed by: INTERNAL MEDICINE

## 2020-06-17 RX ORDER — LISINOPRIL 10 MG/1
10 TABLET ORAL DAILY
Qty: 30 TABLET | Refills: 5 | Status: SHIPPED | OUTPATIENT
Start: 2020-06-17 | End: 2021-01-20

## 2020-06-17 NOTE — PROGRESS NOTES
Malou Key 62 y.o. male presents today with   Chief Complaint   Patient presents with    Check-Up     Blood work, discuss medications and hypertension. Hypertension   This is a chronic problem. The current episode started more than 1 year ago. The problem is unchanged. The problem is controlled. Associated symptoms include anxiety. Pertinent negatives include no chest pain, headaches, palpitations or shortness of breath. Neurologic Problem   The patient's primary symptoms include an altered mental status (once a few seconds) and memory loss (forgeful more). The patient's pertinent negatives include no syncope. The current episode started 1 to 4 weeks ago. The neurological problem developed suddenly. The problem has been resolved since onset. Pertinent negatives include no abdominal pain, chest pain, fatigue, fever, headaches, light-headedness, nausea, palpitations, shortness of breath or vomiting.    tooth extractions. Had dry socket.     Past Medical History:   Diagnosis Date    Back pain     CAD (coronary artery disease) 01/2020    3 vessel     Diabetes mellitus ([address]     Essential hypertension, benign     Hyperlipemia     Neuropathic pain, leg, bilateral      Patient Active Problem List    Diagnosis Date Noted    Anginal chest pain at rest [facility]) 12/23/2019    NSTEMI (non-ST elevated myocardial infarction) ([address] 12/13/2019    Mild intermittent asthma with exacerbation     Acute on chronic diastolic (congestive) heart failure ([address] 12/09/2019    Community acquired pneumonia of right lower lobe of lung ([address] 12/08/2019    Chest pain 11/26/2019    Hereditary peripheral neuropathy 12/07/2018    Migraine without aura 12/07/2018    Hematoma of left lower extremity 08/20/2018    Staphylococcus aureus bacteremia 07/23/2018    Hematoma of right thigh 07/23/2018    Allergic rhinitis due to pollen 06/07/2016    Diabetes mellitus ([address]     Essential hypertension, benign     Irregular heart

## 2020-06-22 ASSESSMENT — ENCOUNTER SYMPTOMS
CHOKING: 0
TROUBLE SWALLOWING: 0
SHORTNESS OF BREATH: 0
PHOTOPHOBIA: 0
VOMITING: 0
NAUSEA: 0
ABDOMINAL PAIN: 0
VOICE CHANGE: 0

## 2020-06-23 ENCOUNTER — TELEPHONE (OUTPATIENT)
Dept: PRIMARY CARE CLINIC | Age: 60
End: 2020-06-23

## 2020-06-26 PROBLEM — E11.42 TYPE 2 DIABETES MELLITUS WITH DIABETIC POLYNEUROPATHY (HCC): Status: ACTIVE | Noted: 2018-06-13

## 2020-06-26 PROBLEM — J32.8 OTHER CHRONIC SINUSITIS: Status: ACTIVE | Noted: 2018-06-13

## 2020-06-26 PROBLEM — L03.115 CELLULITIS OF RIGHT LOWER LIMB: Status: ACTIVE | Noted: 2018-06-26

## 2020-06-26 PROBLEM — M43.17 SPONDYLOLISTHESIS, LUMBOSACRAL REGION: Status: ACTIVE | Noted: 2018-10-16

## 2020-06-26 PROBLEM — M79.604 PAIN OF RIGHT LOWER EXTREMITY: Status: ACTIVE | Noted: 2018-06-16

## 2020-06-26 PROBLEM — Z82.49 FAMILY HISTORY OF ISCHEMIC HEART DISEASE AND OTHER DISEASES OF THE CIRCULATORY SYSTEM: Status: ACTIVE | Noted: 2018-06-13

## 2020-06-26 PROBLEM — J20.8 ACUTE BRONCHITIS DUE TO OTHER SPECIFIED ORGANISMS: Status: ACTIVE | Noted: 2019-01-28

## 2020-06-26 PROBLEM — Z79.4 LONG TERM (CURRENT) USE OF INSULIN (HCC): Status: ACTIVE | Noted: 2018-06-26

## 2020-06-26 PROBLEM — M41.27 OTHER IDIOPATHIC SCOLIOSIS, LUMBOSACRAL REGION: Status: ACTIVE | Noted: 2018-09-28

## 2020-06-26 PROBLEM — M48.062 SPINAL STENOSIS, LUMBAR REGION WITH NEUROGENIC CLAUDICATION: Status: ACTIVE | Noted: 2018-10-16

## 2020-06-26 PROBLEM — E03.8 OTHER SPECIFIED HYPOTHYROIDISM: Status: ACTIVE | Noted: 2018-06-26

## 2020-06-26 PROBLEM — Z79.84 LONG TERM (CURRENT) USE OF ORAL HYPOGLYCEMIC DRUGS: Status: ACTIVE | Noted: 2018-06-26

## 2020-06-26 PROBLEM — M79.662 PAIN IN LEFT LOWER LEG: Status: ACTIVE | Noted: 2018-06-16

## 2020-06-26 PROBLEM — N49.2 INFLAMMATORY DISORDERS OF SCROTUM: Status: ACTIVE | Noted: 2018-06-26

## 2020-06-26 PROBLEM — R06.02 SHORTNESS OF BREATH: Status: ACTIVE | Noted: 2018-06-16

## 2020-06-26 PROBLEM — R06.00 DYSPNEA, UNSPECIFIED: Status: ACTIVE | Noted: 2018-06-16

## 2020-06-26 PROBLEM — Z91.199 GENERAL PATIENT NONCOMPLIANCE: Status: ACTIVE | Noted: 2020-06-26

## 2020-06-26 PROBLEM — S61.206A: Status: ACTIVE | Noted: 2018-06-13

## 2020-06-26 PROBLEM — G47.30 SLEEP APNEA: Status: ACTIVE | Noted: 2018-06-13

## 2020-06-26 PROBLEM — M51.86 OTHER INTERVERTEBRAL DISC DISORDERS, LUMBAR REGION: Status: ACTIVE | Noted: 2018-10-16

## 2020-06-26 PROBLEM — I25.118 ATHSCL HEART DISEASE OF NATIVE COR ART W OTH ANG PCTRS (HCC): Status: ACTIVE | Noted: 2018-06-13

## 2020-06-26 PROBLEM — Z02.1 ENCOUNTER FOR PRE-EMPLOYMENT EXAMINATION: Status: ACTIVE | Noted: 2018-04-13

## 2020-07-04 ENCOUNTER — NURSE TRIAGE (OUTPATIENT)
Dept: OTHER | Facility: CLINIC | Age: 60
End: 2020-07-04

## 2020-07-16 ENCOUNTER — NURSE ONLY (OUTPATIENT)
Dept: PRIMARY CARE CLINIC | Age: 60
End: 2020-07-16

## 2020-07-16 ENCOUNTER — VIRTUAL VISIT (OUTPATIENT)
Dept: PRIMARY CARE CLINIC | Age: 60
End: 2020-07-16
Payer: COMMERCIAL

## 2020-07-16 PROCEDURE — 99442 PR PHYS/QHP TELEPHONE EVALUATION 11-20 MIN: CPT | Performed by: INTERNAL MEDICINE

## 2020-07-16 RX ORDER — GUAIFENESIN 600 MG/1
600 TABLET, EXTENDED RELEASE ORAL 2 TIMES DAILY
Qty: 60 TABLET | Refills: 1 | Status: SHIPPED | OUTPATIENT
Start: 2020-07-16 | End: 2020-08-16

## 2020-07-16 NOTE — PROGRESS NOTES
Justin Munoz is a 62 y.o. male evaluated via telephone on 7/16/2020. Consent:  He and/or health care decision maker is aware that that he may receive a bill for this telephone service, depending on his insurance coverage, and has provided verbal consent to proceed: Yes    Documentation:  I communicated with the patient and/or health care decision maker. Details of this discussion including any medical advice provided:    Fever, cough x 1 week  Pt presents with 1 week of cough productive of yellowish sputum. No assoc chest pain but attests to chest congestion, mild exertional SOB,   Attests to feverish feeling, sweats and chills. Assoc generalized body aches and headache. Assoc wheezing, sore throat, no nausea or vomiting. Attests to contact with grandchildren exposed to positive COVID pt. Attests to lack of taste and smell. Poorly-controlled DM with A1c 11.8 in 6/2020. I affirm this is a Patient Initiated Episode with a Patient who has not had a related appointment within my department in the past 7 days or scheduled within the next 24 hours.     Patient identification was verified at the start of the visit: Yes    Total Time: minutes: 5-10 minutes    Note: not billable if this call serves to triage the patient into an appointment for the relevant concern    [facility]

## 2020-07-20 RX ORDER — DILTIAZEM HYDROCHLORIDE 60 MG/1
TABLET, FILM COATED ORAL
Qty: 10.2 G | Refills: 5 | Status: SHIPPED | OUTPATIENT
Start: 2020-07-20 | End: 2021-02-22

## 2020-07-22 LAB
SARS-COV-2: NOT DETECTED
SOURCE: NORMAL

## 2020-08-16 RX ORDER — GUAIFENESIN 600 MG/1
TABLET, EXTENDED RELEASE ORAL
Qty: 60 TABLET | Refills: 1 | Status: SHIPPED | OUTPATIENT
Start: 2020-08-16 | End: 2021-11-12

## 2020-08-17 RX ORDER — INSULIN LISPRO 100 [IU]/ML
INJECTION, SUSPENSION SUBCUTANEOUS
Qty: 15 ML | Refills: 3 | Status: SHIPPED | OUTPATIENT
Start: 2020-08-17 | End: 2021-01-27 | Stop reason: SDUPTHER

## 2020-08-22 ENCOUNTER — TELEPHONE (OUTPATIENT)
Dept: FAMILY MEDICINE CLINIC | Age: 60
End: 2020-08-22

## 2020-09-23 ENCOUNTER — TELEPHONE (OUTPATIENT)
Dept: PRIMARY CARE CLINIC | Age: 60
End: 2020-09-23

## 2020-09-23 NOTE — TELEPHONE ENCOUNTER
Jose Brooks was contacted to set up a video visit with Dennis Bermudez MD.     Spoke with: Patient    Patient encouraged to sign up for MyChart in order to complete Virtual Visits, if MyChart status was not already active. Patient not agreeableto sign up for a Virtual Visit with PCP within the next week. Reason for declining VV with PCP in the next week:  Other - not at this time       Holly Cobos

## 2020-09-30 ENCOUNTER — VIRTUAL VISIT (OUTPATIENT)
Dept: PRIMARY CARE CLINIC | Age: 60
End: 2020-09-30
Payer: COMMERCIAL

## 2020-09-30 PROCEDURE — 99441 PR PHYS/QHP TELEPHONE EVALUATION 5-10 MIN: CPT | Performed by: FAMILY MEDICINE

## 2020-09-30 RX ORDER — AMOXICILLIN 875 MG/1
875 TABLET, COATED ORAL 2 TIMES DAILY
Qty: 20 TABLET | Refills: 0 | Status: SHIPPED | OUTPATIENT
Start: 2020-09-30 | End: 2020-10-10

## 2020-09-30 ASSESSMENT — ENCOUNTER SYMPTOMS
SHORTNESS OF BREATH: 0
VOMITING: 0
SINUS PAIN: 1
DIARRHEA: 0
SINUS PRESSURE: 1
SORE THROAT: 0
CHEST TIGHTNESS: 0
ABDOMINAL PAIN: 0
COUGH: 0
RHINORRHEA: 0
TROUBLE SWALLOWING: 0
NAUSEA: 0
CONSTIPATION: 0

## 2020-09-30 NOTE — PROGRESS NOTES
TABLET BY MOUTH TWICE DAILY WITH MEALS  Edmundo Xavier MD   MUCUS RELIEF 600 MG extended release tablet Take 1 tablet by mouth 2 times daily for 15 days  Lorane Patch, MD   SYMBICORT 80-4.5 MCG/ACT AERO Inhale 2 puffs into the lungs 2 times daily  Lorane Patch, MD   lisinopril (PRINIVIL;ZESTRIL) 10 MG tablet Take 1 tablet by mouth daily  Lorane Patch, MD   metoprolol (LOPRESSOR) 100 MG tablet Take 1 tablet by mouth 2 times daily  Lorane Patch, MD   rosuvastatin (CRESTOR) 20 MG tablet Take 1 tablet by mouth nightly  Lorane Patch, MD   aspirin EC 81 MG EC tablet Take 2 tablets by mouth daily  Meron Mclean, MD   blood glucose monitor kit and supplies Test 3 times a day & as needed for symptoms of irregular blood glucose. Sunitha Pabon MD   albuterol sulfate  (90 Base) MCG/ACT inhaler Inhale 2 puffs into the lungs every 6 hours as needed for [address]   blood glucose monitor strips Pt test 4x daily Dx E11.65 (dispense covered brand)  Sunitha Pabon MD   blood glucose monitor kit and supplies Please give 1 meter Dx E11.65 (Please dispense covered brand)  Sunitha Pabon MD   Lancets MISC Pt test 4x daily Dx E11.65 (dispense covered brand)  Sunitha Pabon MD   aspirin 81 MG EC tablet Take 1 tablet by mouth daily  Shima Alba MD   pantoprazole (PROTONIX) 40 MG tablet Take 40 mg by mouth daily  Historical Provider, MD   Insulin Pen Needle (NOVOFINE) 32G X 6 MM MISC Bid  Sunitha Pabon MD   FREESTYLE LANCETS MISC 1 each by Does not apply route daily  Sunitha Pabon MD   blood glucose test strips (FREESTYLE LITE) strip As needed.   Sunitha Pabon MD   Blood Glucose Monitoring Suppl (FREESTYLE LITE) MARIUM 1 Device by Does not apply route daily as needed (as directed)  Sunitha Pabon MD   blood glucose test strips (ONETOUCH VERIO) strip Test tid  Meron Mclean MD   ONE TOUCH LANCETS MISC 1 each by Does not apply route 3 times daily  Meron Mclean MD   fluticasone-vilanterol (BREO ELLIPTA) 100-25 MCG/INH AEPB inhaler Inhale 1 puff into the lungs daily  Gee Flores MD   Blood Glucose Monitoring Suppl ([employer] SYSTEM) W/DEVICE KIT Use as directed  Sugey Saldaña MD       Social History     Tobacco Use    Smoking status: Never Smoker    Smokeless tobacco: Never Used   Substance Use Topics    Alcohol use: Yes     Alcohol/week: 0.0 standard drinks     Comment: occasional    Drug use: No        PHYSICAL EXAMINATION:    Due to this being a TeleHealth encounter, evaluation of the organ systems is limited. ASSESSMENT/PLAN:  1. Acute non-recurrent sinusitis, unspecified location    - amoxicillin (AMOXIL) 875 MG tablet; Take 1 tablet by mouth 2 times daily for 10 days  Dispense: 20 tablet; Refill: 0      Return if symptoms worsen or fail to improve. Time spent with patient: 5 minutes. An  electronic signature was used to authenticate this note. --Nathalie Marhs DO on 9/30/2020 at 9:09 AM        Pursuant to the emergency declaration under the [facility] waiver authority and the [employer] and [employer] Act, this Virtual  Visit was conducted, with patient's consent, to reduce the patient's risk of exposure to COVID-19 and provide continuity of care for an established patient. Services were provided through a telephone synchronous discussion virtually to substitute for in-person clinic visit.

## 2020-10-07 RX ORDER — BLOOD-GLUCOSE METER
KIT MISCELLANEOUS
Qty: 150 STRIP | Refills: 3 | Status: SHIPPED | OUTPATIENT
Start: 2020-10-07 | End: 2021-11-17 | Stop reason: ALTCHOICE

## 2020-10-08 RX ORDER — ALBUTEROL SULFATE 90 UG/1
2 AEROSOL, METERED RESPIRATORY (INHALATION) EVERY 6 HOURS PRN
Qty: 18 G | Refills: 5 | Status: SHIPPED | OUTPATIENT
Start: 2020-10-08 | End: 2022-04-01 | Stop reason: SDUPTHER

## 2020-10-08 NOTE — TELEPHONE ENCOUNTER
Pharmacy requesting medication refill. Please approve or deny this request.    Rx requested:  Requested Prescriptions     Pending Prescriptions Disp Refills    albuterol sulfate  (90 Base) MCG/ACT inhaler [Pharmacy Med Name: albuterol sulfate HFA 90 mcg/actuation aerosol inhaler] 18 g 5     Sig: Inhale 2 puffs into the lungs every 6 hours as needed for Wheezing         Last Office Visit:   6/17/2020      Next Visit Date:  No future appointments.

## 2021-01-20 DIAGNOSIS — E78.5 DM TYPE 2 WITH DIABETIC DYSLIPIDEMIA (HCC): ICD-10-CM

## 2021-01-20 DIAGNOSIS — E11.69 DM TYPE 2 WITH DIABETIC DYSLIPIDEMIA (HCC): ICD-10-CM

## 2021-01-20 DIAGNOSIS — I10 ESSENTIAL HYPERTENSION: ICD-10-CM

## 2021-01-20 RX ORDER — LISINOPRIL 10 MG/1
10 TABLET ORAL DAILY
Qty: 30 TABLET | Refills: 5 | Status: SHIPPED | OUTPATIENT
Start: 2021-01-20 | End: 2021-08-09

## 2021-01-27 ENCOUNTER — OFFICE VISIT (OUTPATIENT)
Dept: FAMILY MEDICINE CLINIC | Age: 61
End: 2021-01-27
Payer: MEDICARE

## 2021-01-27 VITALS
SYSTOLIC BLOOD PRESSURE: 150 MMHG | BODY MASS INDEX: 29.91 KG/M2 | OXYGEN SATURATION: 97 % | DIASTOLIC BLOOD PRESSURE: 82 MMHG | RESPIRATION RATE: 16 BRPM | WEIGHT: 190.6 LBS | HEART RATE: 109 BPM | HEIGHT: 67 IN

## 2021-01-27 DIAGNOSIS — E11.69 DM TYPE 2 WITH DIABETIC DYSLIPIDEMIA (HCC): ICD-10-CM

## 2021-01-27 DIAGNOSIS — E78.5 DM TYPE 2 WITH DIABETIC DYSLIPIDEMIA (HCC): ICD-10-CM

## 2021-01-27 DIAGNOSIS — N39.43 POST-VOID DRIBBLING: ICD-10-CM

## 2021-01-27 DIAGNOSIS — J45.909 MODERATE ASTHMA, UNSPECIFIED WHETHER COMPLICATED, UNSPECIFIED WHETHER PERSISTENT: Primary | ICD-10-CM

## 2021-01-27 PROCEDURE — 99214 OFFICE O/P EST MOD 30 MIN: CPT | Performed by: INTERNAL MEDICINE

## 2021-01-27 RX ORDER — FLUTICASONE FUROATE AND VILANTEROL TRIFENATATE 100; 25 UG/1; UG/1
1 POWDER RESPIRATORY (INHALATION) DAILY
Qty: 1 EACH | Refills: 5 | Status: SHIPPED | OUTPATIENT
Start: 2021-01-27 | End: 2021-08-09

## 2021-01-27 RX ORDER — INSULIN LISPRO 100 [IU]/ML
INJECTION, SUSPENSION SUBCUTANEOUS
Qty: 5 PEN | Refills: 5 | Status: ON HOLD | OUTPATIENT
Start: 2021-01-27 | End: 2021-11-15 | Stop reason: HOSPADM

## 2021-01-27 RX ORDER — ALBUTEROL SULFATE 90 UG/1
2 AEROSOL, METERED RESPIRATORY (INHALATION) EVERY 6 HOURS PRN
Qty: 1 INHALER | Refills: 11 | Status: SHIPPED | OUTPATIENT
Start: 2021-01-27 | End: 2021-11-12

## 2021-01-27 ASSESSMENT — ENCOUNTER SYMPTOMS
PHOTOPHOBIA: 0
VOICE CHANGE: 0
BLURRED VISION: 0
TROUBLE SWALLOWING: 0
WHEEZING: 1
CHOKING: 0
SHORTNESS OF BREATH: 0
NAUSEA: 0
CHEST TIGHTNESS: 1

## 2021-01-27 ASSESSMENT — PATIENT HEALTH QUESTIONNAIRE - PHQ9
SUM OF ALL RESPONSES TO PHQ9 QUESTIONS 1 & 2: 0
2. FEELING DOWN, DEPRESSED OR HOPELESS: 0
SUM OF ALL RESPONSES TO PHQ QUESTIONS 1-9: 0
1. LITTLE INTEREST OR PLEASURE IN DOING THINGS: 0

## 2021-01-27 NOTE — PROGRESS NOTES
Nancy Qiu 62 y.o. male presents today with   Chief Complaint   Patient presents with    Diabetes     A1C due     Other     Pt states would like to discuss personal issues        Diabetes  He presents for his follow-up diabetic visit. He has type 2 diabetes mellitus. His disease course has been stable. Pertinent negatives for hypoglycemia include no dizziness or tremors. Pertinent negatives for diabetes include no blurred vision, no chest pain and no fatigue. Pertinent negatives for hypoglycemia complications include no blackouts. Symptoms are stable. Asthma  He complains of chest tightness and wheezing. There is no shortness of breath. This is a recurrent problem. The current episode started more than 1 year ago. The problem occurs every several days. The problem has been waxing and waning. Pertinent negatives include no chest pain, fever or trouble swallowing. His past medical history is significant for asthma. Urinary Frequency   This is a recurrent problem. The current episode started more than 1 month ago. The problem has been waxing and waning. Associated symptoms include frequency and urgency. Pertinent negatives include no nausea.     not using insulin x 1 week with cost.      Past Medical History:   Diagnosis Date    Back pain     CAD (coronary artery disease) 01/2020    3 vessel     Diabetes mellitus ([facility] [address]     Essential hypertension, benign     Hyperlipemia     Neuropathic pain, leg, bilateral      Patient Active Problem List    Diagnosis Date Noted    General patient noncompliance 06/26/2020    Anginal chest pain at rest [facility]) 12/23/2019    NSTEMI (non-ST elevated myocardial infarction) ([address] 12/13/2019    Mild intermittent asthma with exacerbation     Acute on chronic diastolic (congestive) heart failure ([address] 12/09/2019    Community acquired pneumonia of right lower lobe of lung 12/08/2019    Chest pain 11/26/2019    Acute bronchitis due to other specified organisms 01/28/2019  Hereditary peripheral neuropathy 12/07/2018    Migraine without aura 12/07/2018    Other intervertebral disc disorders, lumbar region 10/16/2018    Spinal stenosis, lumbar region with neurogenic claudication 10/16/2018    Spondylolisthesis, lumbosacral region 10/16/2018    Other idiopathic scoliosis, lumbosacral region 09/28/2018    Hematoma of left lower extremity 08/20/2018    Staphylococcus aureus bacteremia 07/23/2018    Hematoma of right thigh 07/23/2018    Body mass index (bmi) 29.0-29.9, adult 06/26/2018    Cellulitis of right lower limb 06/26/2018    Other specified hypothyroidism 06/26/2018    Inflammatory disorders of scrotum 06/26/2018    Long term (current) use of insulin ([address] 06/26/2018    Long term (current) use of oral hypoglycemic drugs 06/26/2018    Dyspnea, unspecified 06/16/2018    Pain in left lower leg 06/16/2018    Pain of right lower extremity 06/16/2018    Shortness of breath 06/16/2018    Athscl heart disease of native cor art w oth ang pctrs ([address] 06/13/2018    Family history of ischemic heart disease and other diseases of the circulatory system 06/13/2018    Other chronic sinusitis 06/13/2018    Sleep apnea 06/13/2018    Type 2 diabetes mellitus with diabetic polyneuropathy ([address] 06/13/2018    Unsp open wound of r little finger w/o damage to nail, init 06/13/2018    Encounter for pre-employment examination 04/13/2018    Allergic rhinitis due to pollen 06/07/2016    Diabetes mellitus ([address]     Essential hypertension, benign     Irregular heart rhythm 11/21/2012    Obesity, diabetes, and hypertension syndrome ([address] 03/16/2005    Hyperlipidemia 03/16/2005     Past Surgical History:   Procedure Laterality Date    CARPAL TUNNEL RELEASE Bilateral     FOOT SURGERY Left     bone spur    LAMINOTOMY  2012     Family History   Problem Relation Age of Onset    Other Father         accident    Heart Failure Mother     Heart Disease Brother  Cirrhosis Brother      Social History     Socioeconomic History    Marital status:      Spouse name: None    Number of children: None    Years of education: None    Highest education level: None   Occupational History    None   Social Needs    Financial resource strain: None    Food insecurity     Worry: Sometimes true     Inability: Sometimes true    Transportation needs     Medical: No     Non-medical: No   Tobacco Use    Smoking status: Never Smoker    Smokeless tobacco: Never Used   Substance and Sexual Activity    Alcohol use: Yes     Alcohol/week: 0.0 standard drinks     Comment: occasional    Drug use: No    Sexual activity: None   Lifestyle    Physical activity     Days per week: None     Minutes per session: None    Stress: None   Relationships    Social connections     Talks on phone: None     Gets together: None     Attends [employer] service: None     Active member of club or organization: None     Attends meetings of clubs or organizations: None     Relationship status: None    Intimate partner violence     Fear of current or ex partner: None     Emotionally abused: None     Physically abused: None     Forced sexual activity: None   Other Topics Concern    None   Social History Narrative    None     No Known Allergies    Review of Systems   Constitutional: Negative for fatigue and fever. HENT: Negative for trouble swallowing and voice change. Eyes: Negative for blurred vision, photophobia and visual disturbance. Respiratory: Positive for wheezing. Negative for choking and shortness of breath. Cardiovascular: Negative for chest pain and palpitations. Gastrointestinal: Negative for nausea. Genitourinary: Positive for frequency and urgency. Negative for decreased urine volume. Skin: Negative for rash. Neurological: Negative for dizziness, tremors and syncope. Hematological: Does not bruise/bleed easily. Psychiatric/Behavioral: Negative for suicidal ideas. Vitals:    01/27/21 1145   BP: (!) 150/82   Site: Left Upper Arm   Position: Sitting   Cuff Size: Large Adult   Pulse: 109   Resp: 16   SpO2: 97%   Weight: 190 lb 9.6 oz (86.5 kg)   Height: 5' 7\" (1.702 m)       Physical Exam  Constitutional:       Appearance: He is well-developed. HENT:      Head: Normocephalic and atraumatic. Eyes:      Conjunctiva/sclera: Conjunctivae normal.      Pupils: Pupils are equal, round, and reactive to light. Neck:      Musculoskeletal: Normal range of motion. Cardiovascular:      Rate and Rhythm: Normal rate and regular rhythm. Pulmonary:      Effort: Pulmonary effort is normal. No respiratory distress. Breath sounds: No wheezing. Abdominal:      General: There is no distension. Musculoskeletal: Normal range of motion. Skin:     General: Skin is dry. Coloration: Skin is not jaundiced. Neurological:      Mental Status: He is alert. Cranial Nerves: No cranial nerve deficit. Psychiatric:         Mood and Affect: Mood normal.          Assessment/Plan  Donel Chol was seen today for diabetes and other. Diagnoses and all orders for this visit:    Moderate asthma, unspecified whether complicated, unspecified whether persistent  -     albuterol sulfate HFA (VENTOLIN HFA) 108 (90 Base) MCG/ACT inhaler; Inhale 2 puffs into the lungs every 6 hours as needed for Wheezing  -     fluticasone-vilanterol (BREO ELLIPTA) 100-25 MCG/INH AEPB inhaler; Inhale 1 puff into the lungs daily    DM type 2 with diabetic dyslipidemia (HCC)  -     insulin lispro protamine & lispro (HUMALOG MIX) (75-25) 100 UNIT per ML SUPN injection pen; 40 u bid SC    Post-void dribbling  -     US POST VOID RESIDUAL; Future  -     Jodi Huertas MD, Urology, [place]  -     PSA Screening; Future  -     US TRANSRECTAL; Future        Return in about 3 months (around 4/27/2021), or if symptoms worsen or fail to improve.     Jeff Diallo MD

## 2021-02-04 ENCOUNTER — HOSPITAL ENCOUNTER (OUTPATIENT)
Dept: ULTRASOUND IMAGING | Age: 61
Discharge: HOME | End: 2021-02-06
Payer: MEDICARE

## 2021-02-04 DIAGNOSIS — N39.43 POST-VOID DRIBBLING: ICD-10-CM

## 2021-02-04 PROCEDURE — 76872 US TRANSRECTAL: CPT

## 2021-02-04 PROCEDURE — 51798 US URINE CAPACITY MEASURE: CPT

## 2021-02-19 ENCOUNTER — OFFICE VISIT (OUTPATIENT)
Dept: UROLOGY | Age: 61
End: 2021-02-19
Payer: MEDICARE

## 2021-02-19 VITALS
SYSTOLIC BLOOD PRESSURE: 130 MMHG | BODY MASS INDEX: 29.82 KG/M2 | HEART RATE: 64 BPM | OXYGEN SATURATION: 98 % | TEMPERATURE: 96.9 F | DIASTOLIC BLOOD PRESSURE: 88 MMHG | HEIGHT: 67 IN | WEIGHT: 190 LBS

## 2021-02-19 DIAGNOSIS — N39.43 POST-VOID DRIBBLING: Primary | ICD-10-CM

## 2021-02-19 DIAGNOSIS — N13.8 HYPERTROPHY OF PROSTATE WITH URINARY OBSTRUCTION: ICD-10-CM

## 2021-02-19 DIAGNOSIS — N39.43 POST-VOID DRIBBLING: ICD-10-CM

## 2021-02-19 DIAGNOSIS — N40.1 HYPERTROPHY OF PROSTATE WITH URINARY OBSTRUCTION: ICD-10-CM

## 2021-02-19 DIAGNOSIS — N52.9 ERECTILE DYSFUNCTION, UNSPECIFIED ERECTILE DYSFUNCTION TYPE: ICD-10-CM

## 2021-02-19 LAB
BACTERIA: NEGATIVE /HPF
BILIRUBIN URINE: NEGATIVE
BILIRUBIN, POC: ABNORMAL
BLOOD URINE, POC: ABNORMAL
BLOOD, URINE: ABNORMAL
CLARITY, POC: CLEAR
CLARITY: CLEAR
COLOR, POC: YELLOW
COLOR: YELLOW
EPITHELIAL CELLS, UA: ABNORMAL /HPF (ref 0–5)
GLUCOSE URINE, POC: ABNORMAL
GLUCOSE URINE: >=1000 MG/DL
HYALINE CASTS: ABNORMAL /HPF (ref 0–5)
KETONES, POC: ABNORMAL
KETONES, URINE: NEGATIVE MG/DL
LEUKOCYTE EST, POC: ABNORMAL
LEUKOCYTE ESTERASE, URINE: NEGATIVE
NITRITE, POC: ABNORMAL
NITRITE, URINE: NEGATIVE
PH UA: 5.5 (ref 5–9)
PH, POC: 5.5
PROTEIN UA: >=300 MG/DL
PROTEIN, POC: ABNORMAL
RBC UA: ABNORMAL /HPF (ref 0–5)
SPECIFIC GRAVITY UA: 1.04 (ref 1–1.03)
SPECIFIC GRAVITY, POC: 1.02
UROBILINOGEN, POC: 1
UROBILINOGEN, URINE: 1 E.U./DL
WBC UA: ABNORMAL /HPF (ref 0–5)

## 2021-02-19 PROCEDURE — 99204 OFFICE O/P NEW MOD 45 MIN: CPT | Performed by: UROLOGY

## 2021-02-19 PROCEDURE — G8417 CALC BMI ABV UP PARAM F/U: HCPCS | Performed by: UROLOGY

## 2021-02-19 PROCEDURE — 81003 URINALYSIS AUTO W/O SCOPE: CPT | Performed by: UROLOGY

## 2021-02-19 PROCEDURE — G8427 DOCREV CUR MEDS BY ELIG CLIN: HCPCS | Performed by: UROLOGY

## 2021-02-19 PROCEDURE — G8484 FLU IMMUNIZE NO ADMIN: HCPCS | Performed by: UROLOGY

## 2021-02-19 PROCEDURE — 3017F COLORECTAL CA SCREEN DOC REV: CPT | Performed by: UROLOGY

## 2021-02-19 PROCEDURE — 1036F TOBACCO NON-USER: CPT | Performed by: UROLOGY

## 2021-02-19 ASSESSMENT — ENCOUNTER SYMPTOMS
ABDOMINAL PAIN: 0
GASTROINTESTINAL NEGATIVE: 1
ABDOMINAL DISTENTION: 0
VOMITING: 0
DIARRHEA: 0
SHORTNESS OF BREATH: 0
NAUSEA: 0
BACK PAIN: 1
RESPIRATORY NEGATIVE: 1

## 2021-02-19 NOTE — PROGRESS NOTES
Subjective:      Patient ID: Lia Antonio is a 62 y.o. male. HPI This is a 63 yo male with DM with peripheral neuropathy, HTN, COPD, GERD, CAD/CABG x 3, DDD,  and sent for evaluation of voiding problems and ED. He reports having PVD, NF 3, urgency and DF > 8 for about 6 mo and is not progressive. He has no hematuria or dysuria or pain. He has no UI and no hesitancy and mostly feels the bladder empties. He has not tried any treatments. He has no prior stones or UTI's. He has no intermittency. These symptoms are bothersome. He also has 2 yr h/o ED with 50% tumescence and rapid detumescence. He has no trauma or curve. He has tried Cialis and Viagra 100 mg and these helped at first but no longer help much. He had no SE. He has normal libido. He has no inciting events. He does notice some muscle loss. He gets back pain when having intercourse. He takes no NTG and has no current CP. He has no family h/o  malignancies. He does not smoke ciggs. He has no prior  surgical history. He has no other complaints. Past Medical History:   Diagnosis Date    Back pain     CAD (coronary artery disease) 01/2020    3 vessel     Diabetes mellitus ([facility] [address]     Essential hypertension, benign     Hyperlipemia     Neuropathic pain, leg, bilateral      Past Surgical History:   Procedure Laterality Date    CARPAL TUNNEL RELEASE Bilateral     FOOT SURGERY Left     bone spur    LAMINOTOMY  2012     Social History     Socioeconomic History    Marital status:      Spouse name: None    Number of children: None    Years of education: None    Highest education level: None   Occupational History    None   Social Needs    Financial resource strain: None    Food insecurity     Worry: Sometimes true     Inability: Sometimes true    Transportation needs     Medical: No     Non-medical: No   Tobacco Use    Smoking status: Never Smoker    Smokeless tobacco: Never Used   Substance and Sexual Activity    Alcohol use:  Yes Alcohol/week: 0.0 standard drinks     Comment: occasional    Drug use: No    Sexual activity: None   Lifestyle    Physical activity     Days per week: None     Minutes per session: None    Stress: None   Relationships    Social connections     Talks on phone: None     Gets together: None     Attends [employer] service: None     Active member of club or organization: None     Attends meetings of clubs or organizations: None     Relationship status: None    Intimate partner violence     Fear of current or ex partner: None     Emotionally abused: None     Physically abused: None     Forced sexual activity: None   Other Topics Concern    None   Social History Narrative    None     Family History   Problem Relation Age of Onset    Other Father         accident    Heart Failure Mother     Heart Disease Brother     Cirrhosis Brother      Current Outpatient Medications   Medication Sig Dispense Refill    albuterol sulfate HFA (VENTOLIN HFA) 108 (90 Base) MCG/ACT inhaler Inhale 2 puffs into the lungs every 6 hours as needed for Wheezing 1 Inhaler 11    fluticasone-vilanterol (BREO ELLIPTA) 100-25 MCG/INH AEPB inhaler Inhale 1 puff into the lungs daily 1 each 5    insulin lispro protamine & lispro (HUMALOG MIX) (75-25) 100 UNIT per ML SUPN injection pen 40 u bid SC 5 pen 5    metFORMIN (GLUCOPHAGE) 1000 MG tablet TAKE 1 TABLET BY MOUTH TWICE DAILY WITH MEALS 60 tablet 3    lisinopril (PRINIVIL;ZESTRIL) 10 MG tablet TAKE 1 TABLET BY MOUTH DAILY 30 tablet 5    ASPIRIN ADULT LOW STRENGTH 81 MG EC tablet Take 2 tablets by mouth daily 30 tablet 5    rosuvastatin (CRESTOR) 20 MG tablet Take 1 tablet by mouth nightly 30 tablet 5    albuterol sulfate  (90 Base) MCG/ACT inhaler Inhale 2 puffs into the lungs every 6 hours as needed for Wheezing 18 g 5    blood glucose test strips (FREESTYLE LITE) strip Test FOUR times daily as directed 150 strip 3  MUCUS RELIEF 600 MG extended release tablet Take 1 tablet by mouth 2 times daily for 15 days 60 tablet 1    SYMBICORT 80-4.5 MCG/ACT AERO Inhale 2 puffs into the lungs 2 times daily 10.2 g 5    blood glucose monitor kit and supplies Test 3 times a day & as needed for symptoms of irregular blood glucose. 1 kit 0    blood glucose monitor kit and supplies Please give 1 meter Dx E11.65 (Please dispense covered brand) 1 kit 0    Lancets MISC Pt test 4x daily Dx E11.65 (dispense covered brand) 150 each 3    aspirin 81 MG EC tablet Take 1 tablet by mouth daily 30 tablet 3    pantoprazole (PROTONIX) 40 MG tablet Take 40 mg by mouth daily      Insulin Pen Needle (NOVOFINE) 32G X 6 MM MISC Bid 300 each 3    FREESTYLE LANCETS MISC 1 each by Does not apply route daily 300 each 3    blood glucose test strips (FREESTYLE LITE) strip As needed. 300 strip 3    Blood Glucose Monitoring Suppl (FREESTYLE LITE) MARIUM 1 Device by Does not apply route daily as needed (as directed) 1 Device 0    blood glucose test strips (ONETOUCH VERIO) strip Test tid 300 each 3    ONE TOUCH LANCETS MISC 1 each by Does not apply route 3 times daily 300 each 3    fluticasone-vilanterol (BREO ELLIPTA) 100-25 MCG/INH AEPB inhaler Inhale 1 puff into the lungs daily 3 each 3    Blood Glucose Monitoring Suppl (ONETOUCH VERIO IQ SYSTEM) W/DEVICE KIT Use as directed 1 kit 02    metoprolol (LOPRESSOR) 100 MG tablet Take 1 tablet by mouth 2 times daily 60 tablet 5     No current facility-administered medications for this visit. Patient has no known allergies. reviewed      Review of Systems   Constitutional: Negative. HENT: Negative. Eyes: Positive for visual disturbance. Respiratory: Negative. Negative for shortness of breath. Cardiovascular: Negative. Negative for chest pain. Gastrointestinal: Negative. Negative for abdominal distention, abdominal pain, diarrhea, nausea and vomiting. Endocrine: Negative. Genitourinary: Positive for frequency and urgency. Negative for decreased urine volume, discharge, dysuria, enuresis, flank pain, genital sores, hematuria, penile pain, penile swelling, scrotal swelling and testicular pain. Musculoskeletal: Positive for back pain. Skin: Negative. Neurological: Negative. Hematological: Negative. Does not bruise/bleed easily. Psychiatric/Behavioral: Negative. Objective:   Physical Exam  Constitutional:       Appearance: Normal appearance. HENT:      Head: Normocephalic and atraumatic. Eyes:      Conjunctiva/sclera: Conjunctivae normal.   Neck:      Musculoskeletal: Neck supple. No muscular tenderness. Cardiovascular:      Rate and Rhythm: Normal rate and regular rhythm. Heart sounds: Normal heart sounds. No murmur. Pulmonary:      Effort: Pulmonary effort is normal. No respiratory distress. Breath sounds: Normal breath sounds. No stridor. No wheezing, rhonchi or rales. Abdominal:      General: Abdomen is flat. Bowel sounds are normal. There is no distension. Palpations: Abdomen is soft. There is no mass. Tenderness: There is no abdominal tenderness. There is no right CVA tenderness, left CVA tenderness, guarding or rebound. Hernia: No hernia is present. There is no hernia in the left inguinal area or right inguinal area. Genitourinary:     Penis: Normal. No phimosis, paraphimosis, hypospadias, erythema, tenderness, discharge, swelling or lesions. Testes:         Right: Mass, tenderness, swelling, testicular hydrocele or varicocele not present. Right testis is descended. Left: Mass, tenderness, swelling, testicular hydrocele or varicocele not present. Left testis is descended. Epididymis:      Right: Not inflamed or enlarged. No mass or tenderness. Left: Not inflamed or enlarged. No mass or tenderness. Prostate: Enlarged. Not tender and no nodules present. Rectum: No external hemorrhoid. Comments: Prostate is 1-2 +  Neurological:      Mental Status: He is alert. Psychiatric:         Mood and Affect: Mood normal.         Behavior: Behavior normal.           PSA   Date Value Ref Range Status   06/09/2020 0.56 0.00 - 5.40 ng/mL Final     Comment:     When the Total PSA is between 3.00 and 10.00 ng/mL, consider  requesting a Free PSA to aid in diagnosis. 06/07/2016 0.79 0.00 - 3.89 ng/mL Final     Comment:     When the Total PSA is between 3.00 and 10.00 ng/mL, consider  requesting a Free PSA to aid in diagnosis.        2/19/2021  9:51 AM - Marvin Metz CMA ([facility]AMA)    Component Collected Lab   Color, UA 02/19/2021  9:50 AM Unknown   yellow    Clarity, UA 02/19/2021  9:50 AM Unknown   clear    Glucose, UA POC 02/19/2021  9:50 AM Unknown   500 mg    Bilirubin, UA 02/19/2021  9:50 AM Unknown   neg    Ketones, UA 02/19/2021  9:50 AM Unknown   neg    Spec Grav, UA 02/19/2021  9:50 AM Unknown   1.025    Blood, UA POC 02/19/2021  9:50 AM Unknown   small    pH, UA 02/19/2021  9:50 AM Unknown   5.5    Protein, UA POC 02/19/2021  9:50 AM Unknown   >=300mg    Urobilinogen, UA 02/19/2021  9:50 AM Unknown   1.0    Leukocytes, UA 02/19/2021  9:50 AM Unknown   neg    Nitrite, UA 02/19/2021  9:50 AM Unknown   neg    Lab and Collection    POCT Urinalysis No Micro (Auto) - 2/19/2021    US TRANSRECTAL  Status: Final result   Order Providers    Authorizing Encounter Billing   Frances Ramsey MD [place] [facility] 2 Jorge Lockett MD          Signed by    Signed Date/Time Phone Pager   Yin Schmitt 2/04/2021  2:07 [phone]    Reading Providers    Read Date Phone Pager   Bentley Silva Feb 4, 2021  2:07 [phone]    All Reviewers List    Frances Ramsey MD on 2/4/2021 17:49   Routing History    Priority Sent On From To Message Type    2/4/2021  2:10 PM [place], [place] Incoming Radiant Results From [address] [place], MD Results   Radiation Dose Estimates No radiation information found for this patient   Narrative   EXAMINATION:  US POST VOID RESIDUAL, US TRANSRECTAL       CLINICAL HISTORY: N39.43 Post-void dribbling ICD10. .       COMPARISONS:  NONE AVAILABLE       TECHNIQUE:  Grayscale as well as duplex color ultrasound and a transrectal study of the prostate was performed       FINDINGS:     The prostate measures 4.7 x 2 0.5, 4.2 in the AP transverse and long dimension for a total prostatic volume of 28.3 mL. PSA 0.56. Density 0.19. Predicted PSA is 3.4. This is a grade 1 prostate. Scattered hyperechoic foci with shadowing in the midline likely prostatic calcification noted.           Impression   SCATTERED MIDLINE PROSTATIC CALCIFICATION AND OTHER FINDINGS AS DESCRIBED ABOVE               EXAMINATION:  US POST VOID RESIDUAL, US TRANSRECTAL       CLINICAL HISTORY: N39.43 Post-void dribbling ICD10. .       COMPARISONS:  NONE AVAILABLE       TECHNIQUE:  Transabdominal ultrasound the bladder       FINDINGS:     Exam is limited due to incomplete distention. The bladder wall is borderline thickened but this is likely artifactual due to incomplete distention. The bladder wall shows no irregularity. No significant trabeculations. No masses. Bilateral ureteral jets are identified.  No masses   Prevoid volume of 130 mL centimeters   Postvoid residual 17.9 cubic centimeters       IMPRESSION:  17.9 ML       Assessment:

## 2021-02-20 DIAGNOSIS — J45.902 PERSISTENT ASTHMA WITH STATUS ASTHMATICUS, UNSPECIFIED ASTHMA SEVERITY: ICD-10-CM

## 2021-02-22 RX ORDER — DILTIAZEM HYDROCHLORIDE 60 MG/1
TABLET, FILM COATED ORAL
Qty: 10.2 G | Refills: 5 | Status: SHIPPED | OUTPATIENT
Start: 2021-02-22

## 2021-02-22 RX ORDER — METOPROLOL TARTRATE 100 MG/1
100 TABLET ORAL 2 TIMES DAILY
Qty: 60 TABLET | Refills: 5 | Status: SHIPPED | OUTPATIENT
Start: 2021-02-22 | End: 2021-11-12

## 2021-02-22 NOTE — TELEPHONE ENCOUNTER
Pharmacy requesting medication refill.  Please approve or deny this request.    Rx requested:  Requested Prescriptions     Pending Prescriptions Disp Refills    SYMBICORT 80-4.5 MCG/ACT AERO [Pharmacy Med Name: Symbicort 80 mcg-4.5 mcg/actuation HFA aerosol inhaler] 10.2 g 5     Sig: Inhale 2 puffs into the lungs 2 times daily    metoprolol (LOPRESSOR) 100 MG tablet [Pharmacy Med Name: metoprolol tartrate 100 mg tablet] 60 tablet 5     Sig: Take 1 tablet by mouth 2 times daily         Last Office Visit:   1/27/2021      Next Visit Date:  Future Appointments   Date Time Provider Rosaline Silveira   3/5/2021  8:45 AM Tayler Dockery MD [facility]   4/28/2021 11:30 AM Lucero Monroe MD [address] [facility]

## 2021-02-25 DIAGNOSIS — N52.9 ERECTILE DYSFUNCTION, UNSPECIFIED ERECTILE DYSFUNCTION TYPE: ICD-10-CM

## 2021-02-27 LAB
SEX HORMONE BINDING GLOBULIN: 28 NMOL/L (ref 11–80)
TESTOSTERONE FREE PERCENT: 2 % (ref 1.6–2.9)
TESTOSTERONE FREE, CALC: 72 PG/ML (ref 47–244)
TESTOSTERONE TOTAL-MALE: 361 NG/DL (ref 300–890)

## 2021-03-04 ENCOUNTER — VIRTUAL VISIT (OUTPATIENT)
Dept: PRIMARY CARE CLINIC | Age: 61
End: 2021-03-04
Payer: MEDICARE

## 2021-03-04 DIAGNOSIS — Z00.00 ROUTINE GENERAL MEDICAL EXAMINATION AT A HEALTH CARE FACILITY: Primary | ICD-10-CM

## 2021-03-04 PROCEDURE — G0402 INITIAL PREVENTIVE EXAM: HCPCS | Performed by: NURSE PRACTITIONER

## 2021-03-04 PROCEDURE — G8484 FLU IMMUNIZE NO ADMIN: HCPCS | Performed by: NURSE PRACTITIONER

## 2021-03-04 PROCEDURE — 3017F COLORECTAL CA SCREEN DOC REV: CPT | Performed by: NURSE PRACTITIONER

## 2021-03-04 ASSESSMENT — LIFESTYLE VARIABLES: HOW OFTEN DO YOU HAVE A DRINK CONTAINING ALCOHOL: 0

## 2021-03-04 ASSESSMENT — PATIENT HEALTH QUESTIONNAIRE - PHQ9
2. FEELING DOWN, DEPRESSED OR HOPELESS: 0
SUM OF ALL RESPONSES TO PHQ QUESTIONS 1-9: 0
1. LITTLE INTEREST OR PLEASURE IN DOING THINGS: 0

## 2021-03-04 NOTE — PROGRESS NOTES
Medicare Annual Wellness Visit  Are Name: Naveen Mcbride Date: 3/4/2021   MRN: [id number] Sex: Male   Age: 62 y.o. Ethnicity: Non-/Non    : 1960 Race: Zoe Orozco is here for Medicare AWV    Screenings for behavioral, psychosocial and functional/safety risks, and cognitive dysfunction are all negative except as indicated below. These results, as well as other patient data from the [address] form, are documented in Flowsheets linked to this Encounter. No Known Allergies      Prior to Visit Medications    Medication Sig Taking?  Authorizing Provider   SYMBICORT 80-4.5 MCG/ACT AERO Inhale 2 puffs into the lungs 2 times daily Yes Cesilia Jeong MD   metoprolol (LOPRESSOR) 100 MG tablet Take 1 tablet by mouth 2 times daily Yes Cesilia Jeong MD   albuterol sulfate HFA (VENTOLIN HFA) 108 (90 Base) MCG/ACT inhaler Inhale 2 puffs into the lungs every 6 hours as needed for Wheezing Yes Cesilia Jeong MD   fluticasone-vilanterol (BREO ELLIPTA) 100-25 MCG/INH AEPB inhaler Inhale 1 puff into the lungs daily Yes Cesilia Jeong MD   insulin lispro protamine & lispro (HUMALOG MIX) (75-25) 100 UNIT per ML SUPN injection pen 40 u bid SC Yes Cesilia Jeong MD   metFORMIN (GLUCOPHAGE) 1000 MG tablet TAKE 1 TABLET BY MOUTH TWICE DAILY WITH MEALS Yes Deb Aceves MD   lisinopril (PRINIVIL;ZESTRIL) 10 MG tablet TAKE 1 TABLET BY MOUTH DAILY Yes Cesilia Jeong MD   ASPIRIN ADULT LOW STRENGTH 81 MG EC tablet Take 2 tablets by mouth daily Yes Cesilia Jeong MD   rosuvastatin (CRESTOR) 20 MG tablet Take 1 tablet by mouth nightly Yes Cesilia Jeong MD   albuterol sulfate  (90 Base) MCG/ACT inhaler Inhale 2 puffs into the lungs every 6 hours as needed for Wheezing Yes Cesilia Jeong MD   blood glucose test strips (FREESTYLE LITE) strip Test FOUR times daily as directed Yes Deb Aceves MD blood glucose monitor kit and supplies Test 3 times a day & as needed for symptoms of irregular blood glucose. Yes Juan M Haji MD   blood glucose monitor kit and supplies Please give 1 meter Dx E11.65 (Please dispense covered brand) Yes Juan M Haji MD   Lancets MISC Pt test 4x daily Dx E11.65 (dispense covered brand) Yes Juan M Haji MD   aspirin 81 MG EC tablet Take 1 tablet by mouth daily Yes Violette Colunga MD   pantoprazole (PROTONIX) 40 MG tablet Take 40 mg by mouth daily Yes Historical MD Tiffanie   Insulin Pen Needle (NOVOFINE) 32G X 6 MM MISC Bid Yes Juan M Haji MD   FREESTYLE LANCETS MISC 1 each by Does not apply route daily Yes Juan M Haji MD   blood glucose test strips (FREESTYLE LITE) strip As needed.  Yes Juan M Haji MD   Blood Glucose Monitoring Suppl (FREESTYLE LITE) MARIUM 1 Device by Does not apply route daily as needed (as directed) Yes Juan M Haji MD   ONE TOUCH LANCETS MISC 1 each by Does not apply route 3 times daily Yes Darrel Giron MD   fluticasone-vilanterol (BREO ELLIPTA) 100-25 MCG/INH AEPB inhaler Inhale 1 puff into the lungs daily Yes Darrel Giron MD   Blood Glucose Monitoring Suppl ([employer] IQ SYSTEM) W/DEVICE KIT Use as directed Yes Juan M Haji MD   MUCUS RELIEF 600 MG extended release tablet Take 1 tablet by mouth 2 times daily for 15 days  Patient not taking: Reported on 3/4/2021  Darrel Giron MD   blood glucose test strips (ONETOUCH VERIO) strip Test tid  Darrel Giron MD         Past Medical History:   Diagnosis Date    Back pain     CAD (coronary artery disease) 01/2020    3 vessel     Diabetes mellitus ([facility] [address]     Essential hypertension, benign     Hyperlipemia     Neuropathic pain, leg, bilateral        Past Surgical History:   Procedure Laterality Date    CARPAL TUNNEL RELEASE Bilateral     FOOT SURGERY Left     bone spur    LAMINOTOMY  2012         Family History   Problem Relation Age of Onset    Other Father         accident    Heart Failure Mother  Heart Disease Brother     Cirrhosis Brother        CareTeam (Including outside providers/suppliers regularly involved in providing care):   Patient Care Team:  Tabitha Arnold MD as PCP - General (Internal Medicine)  Tabitha Arnold MD [place] [facility]aneled [place]  Abdulaziz Bo MD (Infectious Diseases)    Wt Readings from Last 3 Encounters:   02/19/21 190 lb (86.2 kg)   01/27/21 190 lb 9.6 oz (86.5 kg)   06/17/20 202 lb (91.6 kg)      Patient-Reported Vitals 3/4/2021   Patient-Reported Weight 190lbs   Patient-Reported Height 5ft7      There is no height or weight on file to calculate BMI. Based upon direct observation of the patient, evaluation of cognition reveals recent and remote memory intact. Patient's complete Health Risk Assessment and screening values have been reviewed and are found in Flowsheets. The following problems were reviewed today and where indicated follow up appointments were made and/or referrals ordered. Positive Risk Factor Screenings with Interventions:            General Health and ACP:  General  In general, how would you say your health is?: (!) Poor  In the past 7 days, have you experienced any of the following?  New or Increased Pain, New or Increased Fatigue, Loneliness, Social Isolation, Stress or Anger?: (!) New or Increased Fatigue(pt reports its not new but he is tired)  Do you get the social and emotional support that you need?: Yes  Do you have a Living Will?: (!) No  Advance Directives     Power of  Living Will ACP-Advance Directive ACP-Power of     Not on File Not on File Not on File Not on File      General Health Risk Interventions: · Poor self-assessment of health status: patient advised to follow-up in this office for further evaluation and treatment of pt reports having chronic issues and needing to decide about back surgery but many chronic issues within 1 month(s), patient declines any further evaluation/treatment for this issue, pt reports having A appt sched 4/28 with PCP  · Pain issues: patient declines any further evaluation/treatment for this issue, pt declines assitance with ACP  · No Living Will: Advance Care Planning addressed with patient today and Patient declines ACP discussion/assistance    Health Habits/Nutrition:  Health Habits/Nutrition  Do you exercise for at least 20 minutes 2-3 times per week?: (!) No  Have you lost any weight without trying in the past 3 months?: No  Do you eat only one meal per day?: No  Have you seen the dentist within the past year?: Yes     Health Habits/Nutrition Interventions:  · Inadequate physical activity:  patient is not ready to increase his/her physical activity level at this time    Hearing/Vision:  No exam data present  Hearing/Vision  Do you or your family notice any trouble with your hearing that hasn't been managed with hearing aids?: (!) Yes(pt denies any assistance)  Do you have difficulty driving, watching TV, or doing any of your daily activities because of your eyesight?: (!) Yes  Have you had an eye exam within the past year?: Yes(pt has retinopathy, was gettig eye injections but needed to change insurance)  Hearing/Vision Interventions:  · Hearing concerns:  patient declines any further evaluation/treatment for hearing issues, pt encourage to make an appt with hearing specialist  · Vision concerns:  patient encouraged to make appointment with his/her eye specialist, patient declines any further evaluation/treatment for this issue    Safety:  Safety  Do you have working smoke detectors?: Yes  Have all throw rugs been removed or fastened?: Yes Do you have non-slip mats or surfaces in all bathtubs/showers?: (!) No  Do all of your stairways have a railing or banister?: Yes  Are your doorways, halls and stairs free of clutter?: Yes  Do you always fasten your seatbelt when you are in a car?: (!) No(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)  Safety Interventions:  · Home safety tips provided  · Patient declines any further evaluation/treatment for this issue     Personalized Preventive Plan   Current Health Maintenance Status  Immunization History   Administered Date(s) Administered    DTaP 06/08/2015    Influenza, Natan Almarazer, 6 mo and older, IM, PF (Flulaval, Fluarix) 03/26/2019    Tdap (Boostrix, Adacel) 06/08/2015        Health Maintenance   Topic Date Due    Hepatitis C screen  Never done    Pneumococcal 0-64 years Vaccine (1 of 1 - PPSV23) Never done    HIV screen  Never done    Hepatitis B vaccine (1 of 3 - Risk 3-dose series) Never done    Shingles Vaccine (1 of 2) Never done    Colon cancer screen colonoscopy  Never done    Diabetic microalbuminuria test  12/20/2018    Diabetic foot exam  08/14/2019    Flu vaccine (1) 09/01/2020    A1C test (Diabetic or Prediabetic)  09/09/2020    Diabetic retinal exam  12/16/2020    Annual Wellness Visit (AWV)  Never done    Lipid screen  06/09/2021    TSH testing  06/09/2021    Potassium monitoring  06/09/2021    Creatinine monitoring  06/09/2021    DTaP/Tdap/Td vaccine (2 - Tdap) 06/08/2025    Hepatitis A vaccine  Aged Out    Hib vaccine  Aged Out    Meningococcal (ACWY) vaccine  Aged Out     Recommendations for [employer] Due: see orders and patient instructions/AVS.  . Recommended screening schedule for the next 5-10 years is provided to the patient in written form: see Patient Tierra Ortiz was seen today for medicare awv.     Diagnoses and all orders for this visit:    Routine general medical examination at a health care facility Luz Marina Fregoso is a 62 y.o. male being evaluated by a Virtual Visit (phone) encounter to address concerns as mentioned above. A caregiver was present when appropriate. Due to this being a TeleHealth encounter (During [facility] public health emergency), evaluation of the following organ systems was limited: Vitals/Constitutional/EENT/Resp/CV/GI//MS/Neuro/Skin/Heme-Lymph-Imm. Pursuant to the emergency declaration under the [address] [place] [address] and the [employer] Act, this Virtual Visit was conducted with patient's (and/or legal guardian's) consent, to reduce the patient's risk of exposure to COVID-19 and provide necessary medical care. The patient (and/or legal guardian) has also been advised to contact this office for worsening conditions or problems, and seek emergency medical treatment and/or call 911 if deemed necessary. Patient identification was verified at the start of the visit: Yes    Services were provided through phone to substitute for in-person clinic visit. Patient and provider were located at their individual homes. --SHERIN Jenkins - CNP on 3/4/2021 at 3:56 PM    An electronic signature was used to authenticate this note.

## 2021-03-04 NOTE — PATIENT INSTRUCTIONS
Personalized Preventive Plan for Author Tompkins - 3/4/2021  Medicare offers a range of preventive health benefits. Some of the tests and screenings are paid in full while other may be subject to a deductible, co-insurance, and/or copay. Some of these benefits include a comprehensive review of your medical history including lifestyle, illnesses that may run in your family, and various assessments and screenings as appropriate. After reviewing your medical record and screening and assessments performed today your provider may have ordered immunizations, labs, imaging, and/or referrals for you. A list of these orders (if applicable) as well as your Preventive Care list are included within your After Visit Summary for your review. Other Preventive Recommendations:    · A preventive eye exam performed by an eye specialist is recommended every 1-2 years to screen for glaucoma; cataracts, macular degeneration, and other eye disorders. · A preventive dental visit is recommended every 6 months. · Try to get at least 150 minutes of exercise per week or 10,000 steps per day on a pedometer . · Order or download the FREE \"Exercise & Physical Activity: Your Everyday Guide\" from [employer]. Call 2-720.219.7231 or search [employer] online. · You need 1683-9582 mg of calcium and 0513-8511 IU of vitamin D per day. It is possible to meet your calcium requirement with diet alone, but a vitamin D supplement is usually necessary to meet this goal.  · When exposed to the sun, use a sunscreen that protects against both UVA and UVB radiation with an SPF of 30 or greater. Reapply every 2 to 3 hours or after sweating, drying off with a towel, or swimming. · Always wear a seat belt when traveling in a car. Always wear a helmet when riding a bicycle or motorcycle.   Patient Education        Well Visit, Men 48 to 72: Care Instructions  Your Care Instructions Physical exams can help you stay healthy. Your doctor has checked your overall health and may have suggested ways to take good care of yourself. He or she also may have recommended tests. At home, you can help prevent illness with healthy eating, regular exercise, and other steps. Follow-up care is a key part of your treatment and safety. Be sure to make and go to all appointments, and call your doctor if you are having problems. It's also a good idea to know your test results and keep a list of the medicines you take. How can you care for yourself at home? Reach and stay at a healthy weight. This will lower your risk for many problems, such as obesity, diabetes, heart disease, and high blood pressure. Get at least 30 minutes of exercise on most days of the week. Walking is a good choice. You also may want to do other activities, such as running, swimming, cycling, or playing tennis or team sports. Do not smoke. Smoking can make health problems worse. If you need help quitting, talk to your doctor about stop-smoking programs and medicines. These can increase your chances of quitting for good. Protect your skin from too much sun. When you're outdoors from 10 a.m. to 4 p.m., stay in the shade or cover up with clothing and a hat with a wide brim. Wear sunglasses that block UV rays. Even when it's cloudy, put broad-spectrum sunscreen (SPF 30 or higher) on any exposed skin. See a dentist one or two times a year for checkups and to have your teeth cleaned. Wear a seat belt in the car. Follow your doctor's advice about when to have certain tests. These tests can spot problems early. Cholesterol. Your doctor will tell you how often to have this done based on your overall health and other things that can increase your risk for heart attack and stroke. Blood pressure. Have your blood pressure checked during a routine doctor visit. Your doctor will tell you how often to check your blood pressure based on your age, your blood pressure results, and other factors. Prostate exam. Talk to your doctor about whether you should have a blood test (called a PSA test) for prostate cancer. Experts recommend that you discuss the benefits and risks of the test with your doctor before you decide whether to have this test.  Diabetes. Ask your doctor whether you should have tests for diabetes. Vision. Some experts recommend that you have yearly exams for glaucoma and other age-related eye problems starting at age 48. Hearing. Tell your doctor if you notice any change in your hearing. You can have tests to find out how well you hear. Colorectal cancer. Your risk for colorectal cancer gets higher as you get older. Some experts say that adults should start regular screening at age 48 and stop at age 76. Others say to start before age 48 or continue after age 76. Talk with your doctor about your risk and when to start and stop screening. Heart attack and stroke risk. At least every 4 to 6 years, you should have your risk for heart attack and stroke assessed. Your doctor uses factors such as your age, blood pressure, cholesterol, and whether you smoke or have diabetes to show what your risk for a heart attack or stroke is over the next 10 years. Abdominal aortic aneurysm. Ask your doctor whether you should have a test to check for an aneurysm. You may need a test if you ever smoked or if your parent, brother, sister, or child has had an aneurysm. When should you call for help? Watch closely for changes in your health, and be sure to contact your doctor if you have any problems or symptoms that concern you. Where can you learn more? Exercise regularly to improve your strength, muscle tone, and balance. Walk if you can. Swimming may be a good choice if you cannot walk easily. Have your vision and hearing checked each year or any time you notice a change. If you have trouble seeing and hearing, you might not be able to avoid objects and could lose your balance. Know the side effects of the medicines you take. Ask your doctor or pharmacist whether the medicines you take can affect your balance. Sleeping pills or sedatives can affect your balance. Limit the amount of alcohol you drink. Alcohol can impair your balance and other senses. Ask your doctor whether calluses or corns on your feet need to be removed. If you wear loose-fitting shoes because of calluses or corns, you can lose your balance and fall. Talk to your doctor if you have numbness in your feet. Preventing falls at home  Remove raised doorway thresholds, throw rugs, and clutter. Repair loose carpet or raised areas in the floor. Move furniture and electrical cords to keep them out of walking paths. Use nonskid floor wax, and wipe up spills right away, especially on ceramic tile floors. If you use a walker or cane, put rubber tips on it. If you use crutches, clean the bottoms of them regularly with an abrasive pad, such as steel wool. Keep your house well lit, especially stairways, porches, and outside walkways. Use night-lights in areas such as hallways and bathrooms. Add extra light switches or use remote switches (such as switches that go on or off when you clap your hands) to make it easier to turn lights on if you have to get up during the night. Install sturdy handrails on stairways. Move items in your cabinets so that the things you use a lot are on the lower shelves (about waist level). Keep a cordless phone and a flashlight with new batteries by your bed. If possible, put a phone in each of the main rooms of your house, or carry a cell phone in case you fall and cannot reach a phone. Or, you can wear a device around your neck or wrist. You push a button that sends a signal for help. Wear low-heeled shoes that fit well and give your feet good support. Use footwear with nonskid soles. Check the heels and soles of your shoes for wear. Repair or replace worn heels or soles. Do not wear socks without shoes on wood floors. Walk on the grass when the sidewalks are slippery. If you live in an area that gets snow and ice in the winter, sprinkle salt on slippery steps and sidewalks. Preventing falls in the bath  Install grab bars and nonskid mats inside and outside your shower or tub and near the toilet and sinks. Use shower chairs and bath benches. Use a hand-held shower head that will allow you to sit while showering. Get into a tub or shower by putting the weaker leg in first. Get out of a tub or shower with your strong side first.  Repair loose toilet seats and consider installing a raised toilet seat to make getting on and off the toilet easier. Keep your bathroom door unlocked while you are in the shower. Where can you learn more? Go to https://QuantifeedpecamilaMediaLink.Rethink Books. org and sign in to your Data Impact account. Enter [id number] [id number] [phone] in the [facility] box to learn more about \"Preventing Falls: Care Instructions. \"     If you do not have an account, please click on the \"Sign Up Now\" link. Current as of: April 15, 2020               Content Version: 12.6  © 3108-8134 [employer] Inc[employer]. Care instructions adapted under license by [facility] ([facility]). If you have questions about a medical condition or this instruction, always ask your healthcare professional. [facility] [address] any warranty or liability for your use of this information.          Patient Education Diabetes and Preventing Falls: Care Instructions  Your Care Instructions     If you are an older adult who has diabetes, you may have a higher risk of falling. Complications of diabetessuch as nerve damage, foot problems, and reduced visionmay increase your risk of a fall. Some of your medicines also may add to your risk. By making your home safer, you can lower your risk of falling. Doing things to prevent diabetes complications may also help to lower your risk. You can make your home safer with a few simple measures. Follow-up care is a key part of your treatment and safety. Be sure to make and go to all appointments, and call your doctor if you are having problems. It's also a good idea to know your test results and keep a list of the medicines you take. How can you care for yourself at home? Taking care of yourself  Keep your blood sugar at a target level (which you set with your doctor). Exercise regularly to improve your strength, muscle tone, and balance. Walk if you can. Swimming may be a good choice if you cannot walk easily. Have your vision checked as often as your doctor recommends. It is usually once a year or more often if you have eye problems. Know the side effects of the medicines you take. Ask your doctor or pharmacist whether the medicines you take can affect your balance. Sleeping pills or sedatives can affect your balance. Limit the amount of alcohol you drink. Alcohol can impair your balance and other senses. Have your doctor check your feet during each visit. If you have a foot problem, see your doctor. Preventing falls at home  Remove raised doorway thresholds, throw rugs, and clutter. Repair loose carpet or raised areas in the floor. Move furniture and electrical cords to keep them out of walking paths. Use nonskid floor wax, and wipe up spills right away, especially on ceramic tile floors. If you use a walker or cane, put rubber tips on it. If you use crutches, clean the bottoms of them regularly with an abrasive pad, such as steel wool. Keep your house well lit, especially stairways, porches, and outside walkways. Use night-lights in areas such as hallways and bathrooms. Add extra light switches or use remote switches (such as switches that go on or off when you clap your hands) to make it easier to turn lights on if you have to get up during the night. Install sturdy handrails on stairways. Put grab bars near your shower, bathtub, and toilet. Store household items on low shelves so that you do not have to climb or reach high. Or use a reaching device that you can get at a medical supply store. If you have to climb for something, use a step stool with handrails, or ask someone to get it for you. Keep a cordless phone and a flashlight with new batteries by your bed. If possible, put a phone in each of the main rooms of your house, or carry a cell phone in case you fall and cannot reach a phone. Or you can wear a device around your neck or wrist. You push a button that sends a signal for help. Wear low-heeled shoes that fit well and give your feet good support. Use footwear with nonskid soles. Check the heels and soles of your shoes for wear. Repair or replace worn heels or soles. Do not wear socks without shoes on wood floors. Walk on the grass when the sidewalks are slippery. If you live in an area that gets snow and ice in the winter, sprinkle salt on slippery steps and sidewalks. Where can you learn more? Go to https://Rovio Entertainmentluther.Acunote. org and sign in to your Posiq account. Enter S922 in the [facility] box to learn more about \"Diabetes and Preventing Falls: Care Instructions. \"     If you do not have an account, please click on the \"Sign Up Now\" link. Current as of: April 15, 2020               Content Version: 12.6  © 0910-4570 [employer] Incorporated. Put a carbon monoxide alarm in the hallway outside of the bedrooms in each sleeping area of the house. The alarm should be placed high on the wall. Make sure that the alarm can't be covered up by furniture or drapes. Test alarms regularly by pressing the test button. Replace non-lithium batteries in alarms twice a year. Have a plan for getting out of the home if there is a fire. Practice by having a fire drill. Keep a fire extinguisher in the kitchen. What can you do to prevent falls? You can help prevent falls by keeping rooms uncluttered, with clear walkways around furniture. Keep electrical cords off the floor, and remove throw rugs to prevent tripping. If there are steps in the home, make sure they all have handrails, and always use the handrails. Don't leave items on the steps, and be sure to fix any that are loose, broken, or uneven. How can you increase safety for people with dementia? If you are caring for someone who has dementia, you may need to make some extra changes to create a safe home. People with dementia have a loss of mental skills, such as memory, problem solving, and learning. So things that might not have been a danger to them before can cause safety problems now. Here are some things to consider:  Don't move furniture around. The person may become confused. Use locks on doors and cupboards. Lock up knives, scissors, medicines, cleaning supplies, and other dangerous items. Use hidden switches or controls for the stove, thermostat, water heater, and other appliances. If your loved one is still cooking, think about whether that is safe. It may be okay with some help, depending on your loved one's condition. But for people who have memory or thinking problems, it's best to avoid any activities that might not be safe. If the person tends to wander or to try to leave the home, install motion-sensor lights on all doors and windows. Have emergency numbers in a central area near a phone. Include 911 and numbers for the doctor and family members. Get medical alert jewelry for the person so you can be contacted if he or she wanders away. If possible, provide a safe place for wandering, such as an enclosed yard or garden. Where can you learn more? Go to https://Telxpepiceweb.Pocket Concierge. org and sign in to your LiveQoS account. Enter P674 in the [employer] box to learn more about \"Learning About How to Make a Home Safe. \"     If you do not have an account, please click on the \"Sign Up Now\" link. Current as of: December 9, 2019               Content Version: 12.6  © 9037-6115 [employer]. Care instructions adapted under license by [facility] ([facility]). If you have questions about a medical condition or this instruction, always ask your healthcare professional. [address] any warranty or liability for your use of this information.

## 2021-03-05 ENCOUNTER — OFFICE VISIT (OUTPATIENT)
Dept: UROLOGY | Age: 61
End: 2021-03-05
Payer: MEDICARE

## 2021-03-05 VITALS
HEIGHT: 67 IN | HEART RATE: 104 BPM | DIASTOLIC BLOOD PRESSURE: 86 MMHG | BODY MASS INDEX: 29.82 KG/M2 | WEIGHT: 190 LBS | SYSTOLIC BLOOD PRESSURE: 132 MMHG

## 2021-03-05 DIAGNOSIS — N40.1 BPH WITH OBSTRUCTION/LOWER URINARY TRACT SYMPTOMS: ICD-10-CM

## 2021-03-05 DIAGNOSIS — N13.8 BPH WITH OBSTRUCTION/LOWER URINARY TRACT SYMPTOMS: ICD-10-CM

## 2021-03-05 DIAGNOSIS — R31.29 MICROHEMATURIA: Primary | ICD-10-CM

## 2021-03-05 PROCEDURE — 51798 US URINE CAPACITY MEASURE: CPT | Performed by: UROLOGY

## 2021-03-05 PROCEDURE — G8417 CALC BMI ABV UP PARAM F/U: HCPCS | Performed by: UROLOGY

## 2021-03-05 PROCEDURE — 1036F TOBACCO NON-USER: CPT | Performed by: UROLOGY

## 2021-03-05 PROCEDURE — 3017F COLORECTAL CA SCREEN DOC REV: CPT | Performed by: UROLOGY

## 2021-03-05 PROCEDURE — 99214 OFFICE O/P EST MOD 30 MIN: CPT | Performed by: UROLOGY

## 2021-03-05 PROCEDURE — G8484 FLU IMMUNIZE NO ADMIN: HCPCS | Performed by: UROLOGY

## 2021-03-05 PROCEDURE — G8427 DOCREV CUR MEDS BY ELIG CLIN: HCPCS | Performed by: UROLOGY

## 2021-03-05 PROCEDURE — 51741 ELECTRO-UROFLOWMETRY FIRST: CPT | Performed by: UROLOGY

## 2021-03-05 RX ORDER — TAMSULOSIN HYDROCHLORIDE 0.4 MG/1
0.4 CAPSULE ORAL DAILY
Qty: 90 CAPSULE | Refills: 3 | Status: ON HOLD | OUTPATIENT
Start: 2021-03-05 | End: 2022-08-16

## 2021-03-05 ASSESSMENT — ENCOUNTER SYMPTOMS: ABDOMINAL PAIN: 0

## 2021-03-05 NOTE — PROGRESS NOTES
Subjective:      Patient ID: Tommy Fleming is a 62 y.o. male. HPI  This is a 63 yo male with DM with peripheral neuropathy, HTN, COPD, GERD, CAD/CABG x 3, DDD, and with BPH by exam and LUTs that are bothersome back in follow-up. Since last seen on 2/19/21, he has no new complaints. I reviewed the interval testosterone level and U/A results in detail. He has failed Viagra and Cialis full strength in past.      Past Medical History:   Diagnosis Date    Back pain     CAD (coronary artery disease) 01/2020    3 vessel     Diabetes mellitus ([facility] [address]     Essential hypertension, benign     Hyperlipemia     Neuropathic pain, leg, bilateral      Past Surgical History:   Procedure Laterality Date    CARPAL TUNNEL RELEASE Bilateral     FOOT SURGERY Left     bone spur    LAMINOTOMY  2012     Social History     Socioeconomic History    Marital status:      Spouse name: None    Number of children: None    Years of education: None    Highest education level: None   Occupational History    None   Social Needs    Financial resource strain: None    Food insecurity     Worry: Sometimes true     Inability: Sometimes true    Transportation needs     Medical: No     Non-medical: No   Tobacco Use    Smoking status: Never Smoker    Smokeless tobacco: Never Used   Substance and Sexual Activity    Alcohol use:  Yes     Alcohol/week: 0.0 standard drinks     Comment: occasional    Drug use: No    Sexual activity: None   Lifestyle    Physical activity     Days per week: None     Minutes per session: None    Stress: None   Relationships    Social connections     Talks on phone: None     Gets together: None     Attends [employer] service: None     Active member of club or organization: None     Attends meetings of clubs or organizations: None     Relationship status: None    Intimate partner violence     Fear of current or ex partner: None     Emotionally abused: None     Physically abused: None     Forced sexual activity: None   Other Topics Concern    None   Social History Narrative    None     Family History   Problem Relation Age of Onset    Other Father         accident    Heart Failure Mother     Heart Disease Brother     Cirrhosis Brother      Current Outpatient Medications   Medication Sig Dispense Refill    tamsulosin (FLOMAX) 0.4 MG capsule Take 1 capsule by mouth daily 90 capsule 3    SYMBICORT 80-4.5 MCG/ACT AERO Inhale 2 puffs into the lungs 2 times daily 10.2 g 5    metoprolol (LOPRESSOR) 100 MG tablet Take 1 tablet by mouth 2 times daily 60 tablet 5    albuterol sulfate HFA (VENTOLIN HFA) 108 (90 Base) MCG/ACT inhaler Inhale 2 puffs into the lungs every 6 hours as needed for Wheezing 1 Inhaler 11    fluticasone-vilanterol (BREO ELLIPTA) 100-25 MCG/INH AEPB inhaler Inhale 1 puff into the lungs daily 1 each 5    insulin lispro protamine & lispro (HUMALOG MIX) (75-25) 100 UNIT per ML SUPN injection pen 40 u bid SC 5 pen 5    metFORMIN (GLUCOPHAGE) 1000 MG tablet TAKE 1 TABLET BY MOUTH TWICE DAILY WITH MEALS 60 tablet 3    lisinopril (PRINIVIL;ZESTRIL) 10 MG tablet TAKE 1 TABLET BY MOUTH DAILY 30 tablet 5    ASPIRIN ADULT LOW STRENGTH 81 MG EC tablet Take 2 tablets by mouth daily 30 tablet 5    rosuvastatin (CRESTOR) 20 MG tablet Take 1 tablet by mouth nightly 30 tablet 5    albuterol sulfate  (90 Base) MCG/ACT inhaler Inhale 2 puffs into the lungs every 6 hours as needed for Wheezing 18 g 5    blood glucose test strips (FREESTYLE LITE) strip Test FOUR times daily as directed 150 strip 3    MUCUS RELIEF 600 MG extended release tablet Take 1 tablet by mouth 2 times daily for 15 days 60 tablet 1    blood glucose monitor kit and supplies Test 3 times a day & as needed for symptoms of irregular blood glucose.  1 kit 0    blood glucose monitor kit and supplies Please give 1 meter Dx E11.65 (Please dispense covered brand) 1 kit 0    Lancets MISC Pt test 4x daily Dx E11.65 (dispense covered brand) 150 each 3    aspirin 81 MG EC tablet Take 1 tablet by mouth daily 30 tablet 3    pantoprazole (PROTONIX) 40 MG tablet Take 40 mg by mouth daily      Insulin Pen Needle (NOVOFINE) 32G X 6 MM MISC Bid 300 each 3    FREESTYLE LANCETS MISC 1 each by Does not apply route daily 300 each 3    blood glucose test strips (FREESTYLE LITE) strip As needed. 300 strip 3    Blood Glucose Monitoring Suppl (FREESTYLE LITE) MARIUM 1 Device by Does not apply route daily as needed (as directed) 1 Device 0    blood glucose test strips (ONETOUCH VERIO) strip Test tid 300 each 3    ONE TOUCH LANCETS MISC 1 each by Does not apply route 3 times daily 300 each 3    fluticasone-vilanterol (BREO ELLIPTA) 100-25 MCG/INH AEPB inhaler Inhale 1 puff into the lungs daily 3 each 3    Blood Glucose Monitoring Suppl (ONETOUCH VERIO IQ SYSTEM) W/DEVICE KIT Use as directed 1 kit 02     No current facility-administered medications for this visit. Patient has no known allergies. reviewed      Review of Systems   Constitutional: Negative for unexpected weight change. Gastrointestinal: Negative for abdominal pain. Genitourinary: Negative for dysuria and hematuria. Objective:   Physical Exam  Constitutional:       Appearance: Normal appearance. Abdominal:      General: There is no distension. Neurological:      Mental Status: He is alert. Psychiatric:         Mood and Affect: Mood normal.         Behavior: Behavior normal.         2/27/2021  5:40 AM - [place] [facility] Incoming Lab Results From [facility]    Component Value Ref Range & Units Status Collected Lab   Testosterone Free, Calc 72  47 - 244 pg/mL Final 02/25/2021  8:57 AM [facility]   INTERPRETIVE INFORMATION:  Testosterone, Free   Deacon Stage IV    35 - 169 pg/mL   Deacon Stage V     41 - 239 pg/mL   The concentration of Free Testosterone is derived from a mathematical   expression based on the constant for the binding of testosterone to Sex   Hormone Binding Globulin (SHBG). Access complete set of age- and/or gender-specific reference intervals   for   this test in the [facility] Test Directory ([employer]). Testosterone % Free 2.0  1.6 - 2.9 % Final 02/25/2021  8:57 AM [facility]   Performed By: Juan Hoover   [address] [place] [address], [address] [place] [address]   : Marlon Dumont. Maria Teresa Fan MD    Total Testosterone 361  300 - 890 ng/dL Final 02/25/2021  8:57 AM [facility]   REFERENCE INTERVAL: Testosterone, Adult Male   Access complete set of age- and/or gender-specific reference intervals   for   this test in the [facility] Test Directory ([email or web address]). Sex Hormone Binding 28  11 - 80 nmol/L Final 02/25/2021  8:57 AM [facility]   REFERENCE INTERVAL: Sex Hormone Binding Globulin   Access complete set of age- and/or gender-specific reference intervals   for   this test in the [facility] Test Directory ([employer]). Testing Performed By    [facility] Name Director Address Valid Date Range   [address]  Yvette Aguilera MD [email or web address] [address] 12/16/20 1125-Present   Lab and Collection    Testosterone Free And Total Male - 2/25/2021  Result History    Testosterone Free And Total Male on 2/27/2021 2/19/2021  3:57 PM - [place], Ch[place] Incoming Lab Results From [facility]    Component Value Ref Range & Units Status Collected Lab   Bacteria, UA Negative  Negative /HPF Final 02/19/2021  3:41 PM [address] [facility]   Hyaline Casts, UA 0-1  0 - 5 /HPF Final 02/19/2021  3:41 PM [address] [facility]   WBC, UA 0-2  0 - 5 /HPF Final 02/19/2021  3:41 PM [address] [facility]   RBC, UA 3-5Abnormal   0 - 5 /HPF Final 02/19/2021  3:41 PM [address] [facility]   Epithelial Cells, UA 0-2  0 - 5 /HPF Final 02/19/2021  3:41 PM [address] [facility]   Testing Performed By    [facility] Name Director Address Valid Date Range   [address] [address] [facility] Aleena Evangelista MD [address]    [place] [address] [id number] 07/12/18

## 2021-03-11 ENCOUNTER — TELEPHONE (OUTPATIENT)
Dept: PRIMARY CARE CLINIC | Age: 61
End: 2021-03-11

## 2021-03-11 DIAGNOSIS — Z79.4 DIABETES MELLITUS DUE TO UNDERLYING CONDITION WITH HYPEROSMOLARITY WITHOUT COMA, WITH LONG-TERM CURRENT USE OF INSULIN (HCC): Primary | ICD-10-CM

## 2021-03-11 DIAGNOSIS — E08.00 DIABETES MELLITUS DUE TO UNDERLYING CONDITION WITH HYPEROSMOLARITY WITHOUT COMA, WITH LONG-TERM CURRENT USE OF INSULIN (HCC): Primary | ICD-10-CM

## 2021-03-22 DIAGNOSIS — I25.810 CORONARY ARTERY DISEASE INVOLVING CORONARY BYPASS GRAFT OF NATIVE HEART WITHOUT ANGINA PECTORIS: ICD-10-CM

## 2021-03-23 RX ORDER — ASPIRIN 81 MG/1
TABLET, DELAYED RELEASE ORAL
Qty: 30 TABLET | Refills: 5 | Status: SHIPPED | OUTPATIENT
Start: 2021-03-23 | End: 2021-08-09

## 2021-03-23 NOTE — TELEPHONE ENCOUNTER
Pharmacy requesting medication refill.  Please approve or deny this request.    Rx requested:  Requested Prescriptions     Pending Prescriptions Disp Refills    SM ASPIRIN ADULT LOW STRENGTH 81 MG EC tablet [Pharmacy Med Name: aspirin 81 mg tablet,delayed release] 30 tablet 5     Sig: Take 2 tablets by mouth daily         Last Office Visit:   1/27/2021      Next Visit Date:  Future Appointments   Date Time Provider Rosaline Silveira   3/26/2021  9:00 AM Verner Koller, MD [facility]   4/28/2021 11:30 AM Michelle Albarran MD [facility]

## 2021-04-28 ENCOUNTER — NURSE TRIAGE (OUTPATIENT)
Dept: OTHER | Facility: CLINIC | Age: 61
End: 2021-04-28

## 2021-04-28 ENCOUNTER — APPOINTMENT (OUTPATIENT)
Dept: GENERAL RADIOLOGY | Age: 61
End: 2021-04-28
Payer: MEDICARE

## 2021-04-28 ENCOUNTER — HOSPITAL ENCOUNTER (EMERGENCY)
Age: 61
Discharge: HOME | End: 2021-04-28
Attending: EMERGENCY MEDICINE
Payer: MEDICARE

## 2021-04-28 VITALS
WEIGHT: 190 LBS | OXYGEN SATURATION: 95 % | DIASTOLIC BLOOD PRESSURE: 88 MMHG | TEMPERATURE: 97.3 F | BODY MASS INDEX: 29.82 KG/M2 | HEIGHT: 67 IN | HEART RATE: 106 BPM | SYSTOLIC BLOOD PRESSURE: 127 MMHG | RESPIRATION RATE: 14 BRPM

## 2021-04-28 DIAGNOSIS — R53.83 FATIGUE, UNSPECIFIED TYPE: Primary | ICD-10-CM

## 2021-04-28 DIAGNOSIS — I16.0 HYPERTENSIVE URGENCY: ICD-10-CM

## 2021-04-28 DIAGNOSIS — E11.65 UNCONTROLLED TYPE 2 DIABETES MELLITUS WITH HYPERGLYCEMIA (HCC): ICD-10-CM

## 2021-04-28 DIAGNOSIS — Z91.199 NONCOMPLIANCE: ICD-10-CM

## 2021-04-28 LAB
ALBUMIN SERPL-MCNC: 4 G/DL (ref 3.5–4.6)
ALP BLD-CCNC: 72 U/L (ref 35–104)
ALT SERPL-CCNC: 9 U/L (ref 0–41)
ANION GAP SERPL CALCULATED.3IONS-SCNC: 10 MEQ/L (ref 9–15)
AST SERPL-CCNC: 11 U/L (ref 0–40)
BACTERIA: NEGATIVE /HPF
BASOPHILS ABSOLUTE: 0 K/UL (ref 0–0.2)
BASOPHILS RELATIVE PERCENT: 0.6 %
BILIRUB SERPL-MCNC: 0.9 MG/DL (ref 0.2–0.7)
BILIRUBIN URINE: NEGATIVE
BLOOD, URINE: ABNORMAL
BUN BLDV-MCNC: 15 MG/DL (ref 6–20)
CALCIUM SERPL-MCNC: 8.5 MG/DL (ref 8.5–9.9)
CHLORIDE BLD-SCNC: 92 MEQ/L (ref 95–107)
CHP ED QC CHECK: NORMAL
CHP ED QC CHECK: NORMAL
CLARITY: CLEAR
CO2: 25 MEQ/L (ref 20–31)
COLOR: YELLOW
CREAT SERPL-MCNC: 1.01 MG/DL (ref 0.7–1.2)
D DIMER: <0.27 MG/L FEU (ref 0–0.5)
EOSINOPHILS ABSOLUTE: 0.1 K/UL (ref 0–0.7)
EOSINOPHILS RELATIVE PERCENT: 2.3 %
EPITHELIAL CELLS, UA: ABNORMAL /HPF (ref 0–5)
GFR AFRICAN AMERICAN: >60
GFR NON-AFRICAN AMERICAN: >60
GLOBULIN: 2.2 G/DL (ref 2.3–3.5)
GLUCOSE BLD-MCNC: 288 MG/DL
GLUCOSE BLD-MCNC: 288 MG/DL (ref 60–115)
GLUCOSE BLD-MCNC: 358 MG/DL
GLUCOSE BLD-MCNC: 358 MG/DL (ref 60–115)
GLUCOSE BLD-MCNC: 369 MG/DL (ref 70–99)
GLUCOSE URINE: >=1000 MG/DL
HBA1C MFR BLD: 13.3 % (ref 4.8–5.9)
HCT VFR BLD CALC: 49.3 % (ref 42–52)
HEMOGLOBIN: 16.5 G/DL (ref 14–18)
HYALINE CASTS: ABNORMAL /HPF (ref 0–5)
INR BLD: 1
KETONES, URINE: 15 MG/DL
LACTIC ACID: 1.1 MMOL/L (ref 0.5–2.2)
LEUKOCYTE ESTERASE, URINE: NEGATIVE
LYMPHOCYTES ABSOLUTE: 0.5 K/UL (ref 1–4.8)
LYMPHOCYTES RELATIVE PERCENT: 10.6 %
MAGNESIUM: 1.9 MG/DL (ref 1.7–2.4)
MCH RBC QN AUTO: 27.7 PG (ref 27–31.3)
MCHC RBC AUTO-ENTMCNC: 33.5 % (ref 33–37)
MCV RBC AUTO: 82.7 FL (ref 80–100)
MONOCYTES ABSOLUTE: 0.4 K/UL (ref 0.2–0.8)
MONOCYTES RELATIVE PERCENT: 8.3 %
NEUTROPHILS ABSOLUTE: 4 K/UL (ref 1.4–6.5)
NEUTROPHILS RELATIVE PERCENT: 78.2 %
NITRITE, URINE: NEGATIVE
PDW BLD-RTO: 12.8 % (ref 11.5–14.5)
PERFORMED ON: ABNORMAL
PERFORMED ON: ABNORMAL
PH UA: 5.5 (ref 5–9)
PLATELET # BLD: 137 K/UL (ref 130–400)
POTASSIUM SERPL-SCNC: 4.3 MEQ/L (ref 3.4–4.9)
PROTEIN UA: 100 MG/DL
PROTHROMBIN TIME: 13 SEC (ref 12.3–14.9)
RBC # BLD: 5.96 M/UL (ref 4.7–6.1)
RBC UA: ABNORMAL /HPF (ref 0–5)
SARS-COV-2, NAAT: NOT DETECTED
SODIUM BLD-SCNC: 127 MEQ/L (ref 135–144)
SPECIFIC GRAVITY UA: 1.03 (ref 1–1.03)
TOTAL PROTEIN: 6.2 G/DL (ref 6.3–8)
TROPONIN: <0.01 NG/ML (ref 0–0.01)
URINE REFLEX TO CULTURE: ABNORMAL
UROBILINOGEN, URINE: 1 E.U./DL
WBC # BLD: 5.2 K/UL (ref 4.8–10.8)
WBC UA: ABNORMAL /HPF (ref 0–5)

## 2021-04-28 PROCEDURE — 6370000000 HC RX 637 (ALT 250 FOR IP): Performed by: EMERGENCY MEDICINE

## 2021-04-28 PROCEDURE — 84484 ASSAY OF TROPONIN QUANT: CPT

## 2021-04-28 PROCEDURE — 85610 PROTHROMBIN TIME: CPT

## 2021-04-28 PROCEDURE — 71045 X-RAY EXAM CHEST 1 VIEW: CPT

## 2021-04-28 PROCEDURE — 87635 SARS-COV-2 COVID-19 AMP PRB: CPT

## 2021-04-28 PROCEDURE — 99283 EMERGENCY DEPT VISIT LOW MDM: CPT

## 2021-04-28 PROCEDURE — 83605 ASSAY OF LACTIC ACID: CPT

## 2021-04-28 PROCEDURE — 36415 COLL VENOUS BLD VENIPUNCTURE: CPT

## 2021-04-28 PROCEDURE — 85025 COMPLETE CBC W/AUTO DIFF WBC: CPT

## 2021-04-28 PROCEDURE — 83036 HEMOGLOBIN GLYCOSYLATED A1C: CPT

## 2021-04-28 PROCEDURE — 83735 ASSAY OF MAGNESIUM: CPT

## 2021-04-28 PROCEDURE — 93005 ELECTROCARDIOGRAM TRACING: CPT | Performed by: EMERGENCY MEDICINE

## 2021-04-28 PROCEDURE — 2580000003 HC RX 258: Performed by: EMERGENCY MEDICINE

## 2021-04-28 PROCEDURE — 96374 THER/PROPH/DIAG INJ IV PUSH: CPT

## 2021-04-28 PROCEDURE — 80053 COMPREHEN METABOLIC PANEL: CPT

## 2021-04-28 PROCEDURE — 81001 URINALYSIS AUTO W/SCOPE: CPT

## 2021-04-28 PROCEDURE — 85379 FIBRIN DEGRADATION QUANT: CPT

## 2021-04-28 RX ORDER — NICOTINE POLACRILEX 4 MG
15 LOZENGE BUCCAL PRN
Status: DISCONTINUED | OUTPATIENT
Start: 2021-04-28 | End: 2021-04-28 | Stop reason: HOSPADM

## 2021-04-28 RX ORDER — 0.9 % SODIUM CHLORIDE 0.9 %
1000 INTRAVENOUS SOLUTION INTRAVENOUS ONCE
Status: COMPLETED | OUTPATIENT
Start: 2021-04-28 | End: 2021-04-28

## 2021-04-28 RX ORDER — DEXTROSE MONOHYDRATE 25 G/50ML
12.5 INJECTION, SOLUTION INTRAVENOUS PRN
Status: DISCONTINUED | OUTPATIENT
Start: 2021-04-28 | End: 2021-04-28 | Stop reason: HOSPADM

## 2021-04-28 RX ORDER — METOPROLOL TARTRATE 50 MG/1
100 TABLET, FILM COATED ORAL ONCE
Status: COMPLETED | OUTPATIENT
Start: 2021-04-28 | End: 2021-04-28

## 2021-04-28 RX ORDER — DEXTROSE MONOHYDRATE 50 MG/ML
100 INJECTION, SOLUTION INTRAVENOUS PRN
Status: DISCONTINUED | OUTPATIENT
Start: 2021-04-28 | End: 2021-04-28 | Stop reason: HOSPADM

## 2021-04-28 RX ADMIN — INSULIN HUMAN 8 UNITS: 100 INJECTION, SOLUTION PARENTERAL at 13:53

## 2021-04-28 RX ADMIN — SODIUM CHLORIDE 1000 ML: 9 INJECTION, SOLUTION INTRAVENOUS at 13:59

## 2021-04-28 RX ADMIN — SODIUM CHLORIDE 1000 ML: 9 INJECTION, SOLUTION INTRAVENOUS at 15:14

## 2021-04-28 RX ADMIN — METOPROLOL TARTRATE 100 MG: 50 TABLET, FILM COATED ORAL at 14:35

## 2021-04-28 ASSESSMENT — ENCOUNTER SYMPTOMS
SHORTNESS OF BREATH: 1
CHEST TIGHTNESS: 0
NAUSEA: 1
SINUS PRESSURE: 1
VOMITING: 1
ABDOMINAL PAIN: 0
EYE PAIN: 0
SORE THROAT: 0

## 2021-04-28 NOTE — TELEPHONE ENCOUNTER
and goes    6. SEVERITY: \"How bad is the pain? \"  (e.g., Scale 1-10; mild, moderate, or severe)     - MILD (1-3): doesn't interfere with normal activities, abdomen soft and not tender to touch      - MODERATE (4-7): interferes with normal activities or awakens from sleep, tender to touch      - SEVERE (8-10): excruciating pain, doubled over, unable to do any normal activities        0/10 abdominal pain. Generalized body pain 7/10    7. RECURRENT SYMPTOM: \"Have you ever had this type of abdominal pain before? \" If so, ask: \"When was the last time? \" and \"What happened that time? \"       Yes. It was an infection in his stomach    8. CAUSE: \"What do you think is causing the abdominal pain? \"      Unsure    9. RELIEVING/AGGRAVATING FACTORS: \"What makes it better or worse? \" (e.g., movement, antacids, bowel movement)      Denies    10. OTHER SYMPTOMS: \"Has there been any vomiting, diarrhea, constipation, or urine problems? \"        See above. Last vomited coffee grounds a week ago.   Bowel movements unchanged    Protocols used: ABDOMINAL PAIN - MALE-ADULT-OH

## 2021-04-28 NOTE — ED PROVIDER NOTES
[address] [facility] ED  EMERGENCY DEPARTMENT ENCOUNTER      Pt Name: Jeimy Elkins  MRN: [id number]  Armsgracegfkavita 1960  Date of evaluation: 2021  Provider: Charlene Cheney DO    CHIEF COMPLAINT       Chief Complaint   Patient presents with    Fatigue     sinus pressure and drainage, nausea, dark emesis once 4 days ago    Shortness of Breath         HISTORY OF PRESENT ILLNESS   (Location/Symptom, Timing/Onset, Context/Setting, Quality, Duration, Modifying Factors, Severity)  Note limiting factors. Jeimy Elkins is a 62 y.o. male who presents to the emergency department . Patient comes in with increasing fatigue some shortness of breath. Has some sinus pressure and drainage some nausea. Has not been eating much and therefore has not been taking his insulin. Some vomiting. Patient has history of triple bypass, ischemic cardiomyopathy, hypertension, hyperlipidemia and diabetes and stopped taking all his medication except the insulin that he takes only when he feels that he needs it. He decided that he did not need all the medicine because his mother took a lot of medicine and still  when she was 67. He feels that people are overmedicated. If there was not a pandemic going on, patient would not even have come here today because he really wants to know if he has Covid. Has not seen any of his doctors for some time at least over a year. Has cardiologist at [facility] where he had his bypass surgery. Was seeing Dr. Jose Singh for his diabetes but has not seen him for a long time. HPI    Nursing Notes were reviewed. REVIEW OF SYSTEMS    (2-9 systems for level 4, 10 or more for level 5)     Review of Systems   Constitutional: Positive for fatigue. Negative for activity change, appetite change and fever. HENT: Positive for sinus pressure. Negative for congestion and sore throat. Eyes: Negative for pain and visual disturbance. Respiratory: Positive for shortness of breath.  Negative for chest tightness. Cardiovascular: Negative for chest pain. Gastrointestinal: Positive for nausea and vomiting. Negative for abdominal pain. Endocrine: Positive for polydipsia. Genitourinary: Negative for flank pain and urgency. Musculoskeletal: Negative for gait problem and neck stiffness. Skin: Negative for rash. Neurological: Negative for weakness, light-headedness and headaches. Psychiatric/Behavioral: Negative for confusion and sleep disturbance. Except as noted above the remainder of the review of systems was reviewed and negative. PAST MEDICAL HISTORY     Past Medical History:   Diagnosis Date    Back pain     CAD (coronary artery disease) 01/2020    3 vessel     Diabetes mellitus ([facility] [address]     Essential hypertension, benign     Hyperlipemia     Neuropathic pain, leg, bilateral          SURGICAL HISTORY       Past Surgical History:   Procedure Laterality Date    CARPAL TUNNEL RELEASE Bilateral     FOOT SURGERY Left     bone spur    LAMINOTOMY  2012         CURRENT MEDICATIONS       Previous Medications    ALBUTEROL SULFATE HFA (VENTOLIN HFA) 108 (90 BASE) MCG/ACT INHALER    Inhale 2 puffs into the lungs every 6 hours as needed for Wheezing    ALBUTEROL SULFATE  (90 BASE) MCG/ACT INHALER    Inhale 2 puffs into the lungs every 6 hours as needed for Wheezing    ASPIRIN 81 MG EC TABLET    Take 1 tablet by mouth daily    BLOOD GLUCOSE MONITOR KIT AND SUPPLIES    Please give 1 meter Dx E11.65 (Please dispense covered brand)    BLOOD GLUCOSE MONITOR KIT AND SUPPLIES    Test 3 times a day & as needed for symptoms of irregular blood glucose. BLOOD GLUCOSE MONITORING SUPPL (FREESTYLE LITE) MARIUM    1 Device by Does not apply route daily as needed (as directed)    BLOOD GLUCOSE MONITORING SUPPL ([employer] SYSTEM) W/DEVICE KIT    Use as directed    BLOOD GLUCOSE TEST STRIPS (FREESTYLE LITE) STRIP    As needed.     BLOOD GLUCOSE TEST STRIPS (FREESTYLE LITE) STRIP    Test FOUR times daily as directed    BLOOD GLUCOSE TEST STRIPS (ONETOUCH VERIO) STRIP    Test tid    FLUTICASONE-VILANTEROL (BREO ELLIPTA) 100-25 MCG/INH AEPB INHALER    Inhale 1 puff into the lungs daily    FLUTICASONE-VILANTEROL (BREO ELLIPTA) 100-25 MCG/INH AEPB INHALER    Inhale 1 puff into the lungs daily    FREESTYLE LANCETS MISC    1 each by Does not apply route daily    INSULIN LISPRO PROTAMINE & LISPRO (HUMALOG MIX) (75-25) 100 UNIT PER ML SUPN INJECTION PEN    40 u bid SC    INSULIN PEN NEEDLE (NOVOFINE) 32G X 6 MM MISC    Bid    LANCETS MISC    Pt test 4x daily Dx E11.65 (dispense covered brand)    LISINOPRIL (PRINIVIL;ZESTRIL) 10 MG TABLET    TAKE 1 TABLET BY MOUTH DAILY    METFORMIN (GLUCOPHAGE) 1000 MG TABLET    TAKE 1 TABLET BY MOUTH TWICE DAILY WITH MEALS    METOPROLOL (LOPRESSOR) 100 MG TABLET    Take 1 tablet by mouth 2 times daily    MUCUS RELIEF 600 MG EXTENDED RELEASE TABLET    Take 1 tablet by mouth 2 times daily for 15 days    ONE TOUCH LANCETS MISC    1 each by Does not apply route 3 times daily    PANTOPRAZOLE (PROTONIX) 40 MG TABLET    Take 40 mg by mouth daily    ROSUVASTATIN (CRESTOR) 20 MG TABLET    Take 1 tablet by mouth nightly    SM ASPIRIN ADULT LOW STRENGTH 81 MG EC TABLET    Take 2 tablets by mouth daily    SYMBICORT 80-4.5 MCG/ACT AERO    Inhale 2 puffs into the lungs 2 times daily    TAMSULOSIN (FLOMAX) 0.4 MG CAPSULE    Take 1 capsule by mouth daily       ALLERGIES     Patient has no known allergies.     FAMILY HISTORY       Family History   Problem Relation Age of Onset    Other Father         accident    Heart Failure Mother     Heart Disease Brother     Cirrhosis Brother           SOCIAL HISTORY       Social History     Socioeconomic History    Marital status:      Spouse name: Not on file    Number of children: Not on file    Years of education: Not on file    Highest education level: Not on file   Occupational History    Not on file   Social Needs    Financial range or not returned as of this dictation. EMERGENCY DEPARTMENT COURSE and DIFFERENTIAL DIAGNOSIS/MDM:   Vitals:    Vitals:    04/28/21 1227   BP: (!) 160/94   Pulse: 112   Resp: 22   Temp: 97.3 °F (36.3 °C)   SpO2: 98%   Weight: 190 lb (86.2 kg)   Height: 5' 7\" (1.702 m)       Patient here with fatigue some shortness of breath. Extremely noncompliant with all of his medications. Hemoglobin A1c 13.3. Blood pressure uncontrolled. Patient was given IV fluids and insulin for his sugar of 380. Patient needs to get back on his medications and I will refer him back to his cardiologist.  I would also like him to make an appointment with endocrinology because clearly his diabetes is not controlled. Negative covid test.    MDM      REASSESSMENT          CRITICAL CARE TIME   Total Critical Care time was 0 minutes, excluding separately reportable procedures. There was a high probability of clinically significant/life threatening deterioration in the patient's condition which required my urgent intervention. CONSULTS:  None    PROCEDURES:  Unless otherwise noted below, none     Procedures        FINAL IMPRESSION      1. Fatigue, unspecified type    2. Uncontrolled type 2 diabetes mellitus with hyperglycemia ([facility] [address]    3. Noncompliance    4. Hypertensive urgency          DISPOSITION/PLAN   DISPOSITION        PATIENT REFERRED TO:  Elliott Ignacio[address]  [phone]    Schedule an appointment as soon as possible for a visit       Monika Hancock MD  [address] [place] [address]    Schedule an appointment as soon as possible for a visit         DISCHARGE MEDICATIONS:  New Prescriptions    No medications on file     Controlled Substances Monitoring:     RX Monitoring 8/20/2018   Attestation The Prescription Monitoring Report for this patient was reviewed today.    Periodic Controlled Substance Monitoring Possible medication side effects, risk of tolerance/dependence & alternative treatments discussed. ;No signs of potential drug abuse or diversion identified.;Obtaining appropriate analgesic effect of treatment.        (Please note that portions of this note were completed with a voice recognition program.  Efforts were made to edit the dictations but occasionally words are mis-transcribed.)    Leigh Dance, DO (electronically signed)  Attending Emergency Physician           Leigh Dance, DO  04/28/21 0235

## 2021-04-28 NOTE — ED TRIAGE NOTES
ED with c/o generalized weakness, sinus pressure with drainage that triggers his gag reflex and made him vomit once last week. Pt said emesis was very dark in color and had some green in it. Pt reports being SOB but able to speak full sentences.

## 2021-04-29 LAB
EKG ATRIAL RATE: 103 BPM
EKG P AXIS: 48 DEGREES
EKG P-R INTERVAL: 124 MS
EKG Q-T INTERVAL: 362 MS
EKG QRS DURATION: 90 MS
EKG QTC CALCULATION (BAZETT): 474 MS
EKG R AXIS: -45 DEGREES
EKG T AXIS: 78 DEGREES
EKG VENTRICULAR RATE: 103 BPM

## 2021-04-29 PROCEDURE — 93010 ELECTROCARDIOGRAM REPORT: CPT | Performed by: INTERNAL MEDICINE

## 2021-05-05 ENCOUNTER — TELEPHONE (OUTPATIENT)
Dept: FAMILY MEDICINE CLINIC | Age: 61
End: 2021-05-05

## 2021-05-05 DIAGNOSIS — E78.5 DM TYPE 2 WITH DIABETIC DYSLIPIDEMIA (HCC): ICD-10-CM

## 2021-05-05 DIAGNOSIS — I25.810 CORONARY ARTERY DISEASE INVOLVING CORONARY BYPASS GRAFT OF NATIVE HEART WITHOUT ANGINA PECTORIS: ICD-10-CM

## 2021-05-05 DIAGNOSIS — E11.69 DM TYPE 2 WITH DIABETIC DYSLIPIDEMIA (HCC): ICD-10-CM

## 2021-05-05 DIAGNOSIS — E78.5 DM TYPE 2 WITH DIABETIC DYSLIPIDEMIA (HCC): Primary | ICD-10-CM

## 2021-05-05 DIAGNOSIS — E11.69 DM TYPE 2 WITH DIABETIC DYSLIPIDEMIA (HCC): Primary | ICD-10-CM

## 2021-05-05 RX ORDER — INSULIN ASPART 100 [IU]/ML
40 INJECTION, SUSPENSION SUBCUTANEOUS 2 TIMES DAILY WITH MEALS
Qty: 5 PEN | Refills: 5 | Status: SHIPPED | OUTPATIENT
Start: 2021-05-05 | End: 2021-05-05 | Stop reason: SDUPTHER

## 2021-05-05 RX ORDER — INSULIN ASPART 100 [IU]/ML
40 INJECTION, SUSPENSION SUBCUTANEOUS 2 TIMES DAILY WITH MEALS
Qty: 5 PEN | Refills: 5 | Status: SHIPPED | OUTPATIENT
Start: 2021-05-05 | End: 2022-02-25 | Stop reason: SDUPTHER

## 2021-05-05 NOTE — TELEPHONE ENCOUNTER
Elton/Pharmacist from [facility] calling to inform. .. Cindy Garrison Pt's insurance is stating that Pt's insulin needs changed from Lispro 75/25 to Shasha brand.     Please send new script to [address] in [place]

## 2021-05-06 RX ORDER — ROSUVASTATIN CALCIUM 20 MG/1
20 TABLET, COATED ORAL NIGHTLY
Qty: 30 TABLET | Refills: 5 | Status: ON HOLD | OUTPATIENT
Start: 2021-05-06 | End: 2021-11-15 | Stop reason: SDUPTHER

## 2021-05-06 NOTE — TELEPHONE ENCOUNTER
Pharmacy requesting medication refill. Please approve or deny this request.    Rx requested:  Requested Prescriptions     Pending Prescriptions Disp Refills    rosuvastatin (CRESTOR) 20 MG tablet [Pharmacy Med Name: rosuvastatin 20 mg tablet] 30 tablet 5     Sig: Take 1 tablet by mouth nightly         Last Office Visit:   1/27/2021      Next Visit Date:  No future appointments.

## 2021-08-06 ENCOUNTER — TELEPHONE (OUTPATIENT)
Dept: PRIMARY CARE CLINIC | Age: 61
End: 2021-08-06

## 2021-08-08 DIAGNOSIS — I25.810 CORONARY ARTERY DISEASE INVOLVING CORONARY BYPASS GRAFT OF NATIVE HEART WITHOUT ANGINA PECTORIS: ICD-10-CM

## 2021-08-08 DIAGNOSIS — I10 ESSENTIAL HYPERTENSION: ICD-10-CM

## 2021-08-08 DIAGNOSIS — J45.909 MODERATE ASTHMA, UNSPECIFIED WHETHER COMPLICATED, UNSPECIFIED WHETHER PERSISTENT: ICD-10-CM

## 2021-08-09 RX ORDER — ASPIRIN 81 MG/1
TABLET ORAL
Qty: 30 TABLET | Refills: 5 | Status: SHIPPED | OUTPATIENT
Start: 2021-08-09 | End: 2021-11-12

## 2021-08-09 RX ORDER — FLUTICASONE FUROATE AND VILANTEROL TRIFENATATE 100; 25 UG/1; UG/1
1 POWDER RESPIRATORY (INHALATION) DAILY
Qty: 1 EACH | Refills: 5 | Status: SHIPPED | OUTPATIENT
Start: 2021-08-09 | End: 2021-11-12

## 2021-08-09 RX ORDER — LISINOPRIL 10 MG/1
10 TABLET ORAL DAILY
Qty: 30 TABLET | Refills: 5 | Status: ON HOLD | OUTPATIENT
Start: 2021-08-09 | End: 2021-11-15 | Stop reason: SDUPTHER

## 2021-08-27 ENCOUNTER — TELEPHONE (OUTPATIENT)
Dept: PRIMARY CARE CLINIC | Age: 61
End: 2021-08-27

## 2021-11-12 ENCOUNTER — APPOINTMENT (OUTPATIENT)
Dept: CT IMAGING | Age: 61
DRG: 073 | End: 2021-11-12
Payer: MEDICARE

## 2021-11-12 ENCOUNTER — HOSPITAL ENCOUNTER (INPATIENT)
Age: 61
LOS: 2 days | Discharge: HOME HEALTH | DRG: 073 | End: 2021-11-15
Attending: EMERGENCY MEDICINE
Payer: MEDICARE

## 2021-11-12 ENCOUNTER — APPOINTMENT (OUTPATIENT)
Dept: MRI IMAGING | Age: 61
DRG: 073 | End: 2021-11-12
Payer: MEDICARE

## 2021-11-12 ENCOUNTER — APPOINTMENT (OUTPATIENT)
Dept: GENERAL RADIOLOGY | Age: 61
DRG: 073 | End: 2021-11-12
Payer: MEDICARE

## 2021-11-12 DIAGNOSIS — G25.5 CHOREA: ICD-10-CM

## 2021-11-12 DIAGNOSIS — R73.9 HYPERGLYCEMIA: ICD-10-CM

## 2021-11-12 DIAGNOSIS — I16.0 HYPERTENSIVE URGENCY: Primary | ICD-10-CM

## 2021-11-12 DIAGNOSIS — R25.9 INVOLUNTARY MOVEMENTS: ICD-10-CM

## 2021-11-12 DIAGNOSIS — I25.810 CORONARY ARTERY DISEASE INVOLVING CORONARY BYPASS GRAFT OF NATIVE HEART WITHOUT ANGINA PECTORIS: ICD-10-CM

## 2021-11-12 DIAGNOSIS — I10 ESSENTIAL HYPERTENSION: ICD-10-CM

## 2021-11-12 PROBLEM — N17.9 AKI (ACUTE KIDNEY INJURY) (HCC): Status: ACTIVE | Noted: 2021-11-12

## 2021-11-12 PROBLEM — R79.89 ELEVATED TROPONIN: Status: ACTIVE | Noted: 2021-11-12

## 2021-11-12 PROBLEM — R77.8 ELEVATED TROPONIN: Status: ACTIVE | Noted: 2021-11-12

## 2021-11-12 PROBLEM — E11.65 TYPE 2 DIABETES MELLITUS WITH HYPERGLYCEMIA (HCC): Status: ACTIVE | Noted: 2021-11-12

## 2021-11-12 PROBLEM — I25.10 CAD (CORONARY ARTERY DISEASE): Status: ACTIVE | Noted: 2018-06-13

## 2021-11-12 PROBLEM — E87.1 HYPONATREMIA: Status: ACTIVE | Noted: 2021-11-12

## 2021-11-12 LAB
ALBUMIN SERPL-MCNC: 3.8 G/DL (ref 3.5–4.6)
ALP BLD-CCNC: 94 U/L (ref 35–104)
ALT SERPL-CCNC: 9 U/L (ref 0–41)
AMPHETAMINE SCREEN, URINE: NORMAL
ANION GAP SERPL CALCULATED.3IONS-SCNC: 12 MEQ/L (ref 9–15)
APTT: 22.4 SEC (ref 24.4–36.8)
AST SERPL-CCNC: 14 U/L (ref 0–40)
BACTERIA: NEGATIVE /HPF
BARBITURATE SCREEN URINE: NORMAL
BASE EXCESS VENOUS: 0 (ref -3–3)
BASOPHILS ABSOLUTE: 0 K/UL (ref 0–0.2)
BASOPHILS RELATIVE PERCENT: 0.4 %
BENZODIAZEPINE SCREEN, URINE: NORMAL
BILIRUB SERPL-MCNC: 0.3 MG/DL (ref 0.2–0.7)
BILIRUBIN URINE: NEGATIVE
BLOOD, URINE: ABNORMAL
BUN BLDV-MCNC: 27 MG/DL (ref 8–23)
CALCIUM IONIZED: 1.1 MMOL/L (ref 1.12–1.32)
CALCIUM SERPL-MCNC: 9 MG/DL (ref 8.5–9.9)
CANNABINOID SCREEN URINE: NORMAL
CHLORIDE BLD-SCNC: 92 MEQ/L (ref 95–107)
CHP ED QC CHECK: NORMAL
CK MB: 6.2 NG/ML (ref 0–6.7)
CLARITY: CLEAR
CO2: 25 MEQ/L (ref 20–31)
COCAINE METABOLITE SCREEN URINE: NORMAL
COLOR: YELLOW
CREAT SERPL-MCNC: 1.3 MG/DL (ref 0.7–1.2)
CREATINE KINASE-MB INDEX: 2.8 % (ref 0–3.5)
EOSINOPHILS ABSOLUTE: 0.2 K/UL (ref 0–0.7)
EOSINOPHILS RELATIVE PERCENT: 3.6 %
EPITHELIAL CELLS, UA: ABNORMAL /HPF (ref 0–5)
ETHANOL PERCENT: NORMAL G/DL
ETHANOL: <10 MG/DL (ref 0–0.08)
GFR AFRICAN AMERICAN: >60
GFR AFRICAN AMERICAN: >60
GFR NON-AFRICAN AMERICAN: 56.2
GFR NON-AFRICAN AMERICAN: >60
GLOBULIN: 2.1 G/DL (ref 2.3–3.5)
GLUCOSE BLD-MCNC: 306 MG/DL (ref 60–115)
GLUCOSE BLD-MCNC: 500 MG/DL
GLUCOSE BLD-MCNC: 681 MG/DL (ref 70–99)
GLUCOSE BLD-MCNC: >600 MG/DL (ref 60–115)
GLUCOSE BLD-MCNC: >700 MG/DL (ref 60–115)
GLUCOSE URINE: >=1000 MG/DL
HCO3 VENOUS: 25.7 MMOL/L (ref 23–29)
HCT VFR BLD CALC: 43.8 % (ref 42–52)
HEMOGLOBIN: 14.7 G/DL (ref 14–18)
HEMOGLOBIN: 16 GM/DL (ref 13.5–17.5)
HYALINE CASTS: ABNORMAL /HPF (ref 0–5)
INR BLD: 0.9
KETONES, URINE: NEGATIVE MG/DL
LACTATE: 1.91 MMOL/L (ref 0.4–2)
LACTIC ACID: 0.9 MMOL/L (ref 0.5–2.2)
LEUKOCYTE ESTERASE, URINE: NEGATIVE
LYMPHOCYTES ABSOLUTE: 0.5 K/UL (ref 1–4.8)
LYMPHOCYTES RELATIVE PERCENT: 10.5 %
Lab: NORMAL
MCH RBC QN AUTO: 28.1 PG (ref 27–31.3)
MCHC RBC AUTO-ENTMCNC: 33.5 % (ref 33–37)
MCV RBC AUTO: 83.9 FL (ref 80–100)
METHADONE SCREEN, URINE: NORMAL
MONOCYTES ABSOLUTE: 0.4 K/UL (ref 0.2–0.8)
MONOCYTES RELATIVE PERCENT: 7.7 %
NEUTROPHILS ABSOLUTE: 3.9 K/UL (ref 1.4–6.5)
NEUTROPHILS RELATIVE PERCENT: 77.8 %
NITRITE, URINE: NEGATIVE
O2 SAT, VEN: 70 %
OPIATE SCREEN URINE: NORMAL
OXYCODONE URINE: NORMAL
PCO2, VEN: 45 MM HG (ref 40–50)
PDW BLD-RTO: 12.5 % (ref 11.5–14.5)
PERFORMED ON: ABNORMAL
PH UA: 5.5 (ref 5–9)
PH VENOUS: 7.37 (ref 7.35–7.45)
PHENCYCLIDINE SCREEN URINE: NORMAL
PLATELET # BLD: 139 K/UL (ref 130–400)
PO2, VEN: 38 MM HG
POC CHLORIDE: 96 MEQ/L (ref 99–110)
POC CREATININE: 1.1 MG/DL (ref 0.8–1.3)
POC FIO2: 21
POC HEMATOCRIT: 47 % (ref 41–53)
POC POTASSIUM: 3.8 MEQ/L (ref 3.5–5.1)
POC SAMPLE TYPE: ABNORMAL
POC SODIUM: 129 MEQ/L (ref 136–145)
POTASSIUM SERPL-SCNC: 4.1 MEQ/L (ref 3.4–4.9)
PROPOXYPHENE SCREEN: NORMAL
PROTEIN UA: 100 MG/DL
PROTHROMBIN TIME: 12.1 SEC (ref 12.3–14.9)
RBC # BLD: 5.21 M/UL (ref 4.7–6.1)
RBC UA: ABNORMAL /HPF (ref 0–5)
SODIUM BLD-SCNC: 129 MEQ/L (ref 135–144)
SPECIFIC GRAVITY UA: 1.03 (ref 1–1.03)
TCO2 CALC VENOUS: 27 MMOL/L
TOTAL CK: 223 U/L (ref 0–190)
TOTAL PROTEIN: 5.9 G/DL (ref 6.3–8)
TROPONIN: 0.02 NG/ML (ref 0–0.01)
URINE REFLEX TO CULTURE: ABNORMAL
UROBILINOGEN, URINE: 1 E.U./DL
WBC # BLD: 5 K/UL (ref 4.8–10.8)
WBC UA: ABNORMAL /HPF (ref 0–5)

## 2021-11-12 PROCEDURE — 82948 REAGENT STRIP/BLOOD GLUCOSE: CPT

## 2021-11-12 PROCEDURE — 6360000004 HC RX CONTRAST MEDICATION: Performed by: EMERGENCY MEDICINE

## 2021-11-12 PROCEDURE — A9579 GAD-BASE MR CONTRAST NOS,1ML: HCPCS | Performed by: EMERGENCY MEDICINE

## 2021-11-12 PROCEDURE — 6360000002 HC RX W HCPCS: Performed by: EMERGENCY MEDICINE

## 2021-11-12 PROCEDURE — 82550 ASSAY OF CK (CPK): CPT

## 2021-11-12 PROCEDURE — 82565 ASSAY OF CREATININE: CPT

## 2021-11-12 PROCEDURE — 81001 URINALYSIS AUTO W/SCOPE: CPT

## 2021-11-12 PROCEDURE — 2500000003 HC RX 250 WO HCPCS: Performed by: EMERGENCY MEDICINE

## 2021-11-12 PROCEDURE — 82435 ASSAY OF BLOOD CHLORIDE: CPT

## 2021-11-12 PROCEDURE — 96375 TX/PRO/DX INJ NEW DRUG ADDON: CPT

## 2021-11-12 PROCEDURE — 85025 COMPLETE CBC W/AUTO DIFF WBC: CPT

## 2021-11-12 PROCEDURE — G0378 HOSPITAL OBSERVATION PER HR: HCPCS

## 2021-11-12 PROCEDURE — 93005 ELECTROCARDIOGRAM TRACING: CPT | Performed by: EMERGENCY MEDICINE

## 2021-11-12 PROCEDURE — 99284 EMERGENCY DEPT VISIT MOD MDM: CPT

## 2021-11-12 PROCEDURE — 84484 ASSAY OF TROPONIN QUANT: CPT

## 2021-11-12 PROCEDURE — 85730 THROMBOPLASTIN TIME PARTIAL: CPT

## 2021-11-12 PROCEDURE — 70450 CT HEAD/BRAIN W/O DYE: CPT

## 2021-11-12 PROCEDURE — 83605 ASSAY OF LACTIC ACID: CPT

## 2021-11-12 PROCEDURE — 36600 WITHDRAWAL OF ARTERIAL BLOOD: CPT

## 2021-11-12 PROCEDURE — 2580000003 HC RX 258: Performed by: EMERGENCY MEDICINE

## 2021-11-12 PROCEDURE — 70553 MRI BRAIN STEM W/O & W/DYE: CPT

## 2021-11-12 PROCEDURE — 80307 DRUG TEST PRSMV CHEM ANLYZR: CPT

## 2021-11-12 PROCEDURE — 82330 ASSAY OF CALCIUM: CPT

## 2021-11-12 PROCEDURE — 84132 ASSAY OF SERUM POTASSIUM: CPT

## 2021-11-12 PROCEDURE — 6370000000 HC RX 637 (ALT 250 FOR IP): Performed by: EMERGENCY MEDICINE

## 2021-11-12 PROCEDURE — 82803 BLOOD GASES ANY COMBINATION: CPT

## 2021-11-12 PROCEDURE — 80053 COMPREHEN METABOLIC PANEL: CPT

## 2021-11-12 PROCEDURE — 71045 X-RAY EXAM CHEST 1 VIEW: CPT

## 2021-11-12 PROCEDURE — 96374 THER/PROPH/DIAG INJ IV PUSH: CPT

## 2021-11-12 PROCEDURE — 82553 CREATINE MB FRACTION: CPT

## 2021-11-12 PROCEDURE — 85610 PROTHROMBIN TIME: CPT

## 2021-11-12 PROCEDURE — 36415 COLL VENOUS BLD VENIPUNCTURE: CPT

## 2021-11-12 PROCEDURE — 84295 ASSAY OF SERUM SODIUM: CPT

## 2021-11-12 PROCEDURE — 96376 TX/PRO/DX INJ SAME DRUG ADON: CPT

## 2021-11-12 PROCEDURE — 82077 ASSAY SPEC XCP UR&BREATH IA: CPT

## 2021-11-12 PROCEDURE — 85014 HEMATOCRIT: CPT

## 2021-11-12 RX ORDER — 0.9 % SODIUM CHLORIDE 0.9 %
10 VIAL (ML) INJECTION ONCE
Status: DISCONTINUED | OUTPATIENT
Start: 2021-11-12 | End: 2021-11-15 | Stop reason: HOSPADM

## 2021-11-12 RX ORDER — LABETALOL HYDROCHLORIDE 5 MG/ML
20 INJECTION, SOLUTION INTRAVENOUS ONCE
Status: COMPLETED | OUTPATIENT
Start: 2021-11-12 | End: 2021-11-12

## 2021-11-12 RX ORDER — LORAZEPAM 2 MG/ML
2 INJECTION INTRAMUSCULAR ONCE
Status: COMPLETED | OUTPATIENT
Start: 2021-11-12 | End: 2021-11-12

## 2021-11-12 RX ORDER — LABETALOL HYDROCHLORIDE 5 MG/ML
40 INJECTION, SOLUTION INTRAVENOUS ONCE
Status: COMPLETED | OUTPATIENT
Start: 2021-11-12 | End: 2021-11-12

## 2021-11-12 RX ORDER — DIPHENHYDRAMINE HYDROCHLORIDE 50 MG/ML
25 INJECTION INTRAMUSCULAR; INTRAVENOUS ONCE
Status: COMPLETED | OUTPATIENT
Start: 2021-11-12 | End: 2021-11-12

## 2021-11-12 RX ORDER — 0.9 % SODIUM CHLORIDE 0.9 %
2000 INTRAVENOUS SOLUTION INTRAVENOUS ONCE
Status: COMPLETED | OUTPATIENT
Start: 2021-11-12 | End: 2021-11-12

## 2021-11-12 RX ADMIN — INSULIN HUMAN 10 UNITS: 100 INJECTION, SOLUTION PARENTERAL at 18:40

## 2021-11-12 RX ADMIN — LABETALOL HYDROCHLORIDE 20 MG: 5 INJECTION, SOLUTION INTRAVENOUS at 19:46

## 2021-11-12 RX ADMIN — SODIUM CHLORIDE 2000 ML: 9 INJECTION, SOLUTION INTRAVENOUS at 18:32

## 2021-11-12 RX ADMIN — LABETALOL HYDROCHLORIDE 40 MG: 5 INJECTION, SOLUTION INTRAVENOUS at 22:39

## 2021-11-12 RX ADMIN — LORAZEPAM 2 MG: 2 INJECTION INTRAMUSCULAR; INTRAVENOUS at 20:39

## 2021-11-12 RX ADMIN — DIPHENHYDRAMINE HYDROCHLORIDE 25 MG: 50 INJECTION, SOLUTION INTRAMUSCULAR; INTRAVENOUS at 18:33

## 2021-11-12 RX ADMIN — GADOTERIDOL 15 ML: 279.3 INJECTION, SOLUTION INTRAVENOUS at 21:28

## 2021-11-12 ASSESSMENT — ENCOUNTER SYMPTOMS
VOMITING: 0
WHEEZING: 0
ALLERGIC/IMMUNOLOGIC NEGATIVE: 1
NAUSEA: 0
RHINORRHEA: 0
TROUBLE SWALLOWING: 0
EYES NEGATIVE: 1
SHORTNESS OF BREATH: 0
ABDOMINAL PAIN: 0
STRIDOR: 0

## 2021-11-12 NOTE — ED PROVIDER NOTES
[address] [facility] ED  eMERGENCY dEPARTMENT eNCOUnter      Pt Name: Arina Zhang  MRN: [id number]  [place] 1960  Date of evaluation: 11/12/2021  Provider: Fletcher Holman MD    CHIEF COMPLAINT     No chief complaint on file. HISTORY OF PRESENT ILLNESS   (Location/Symptom, Timing/Onset,Context/Setting, Quality, Duration, Modifying Factors, Severity)  Note limiting factors. Arina Zhang is a 62 y.o. male who presents to the emergency department complaint of having atypical movements of right upper extremity for the last several days. Patient admits that it seems as though he has some degree of left-sided facial droop. Patient admits that he has been having some speech difficulties. Patient admits the predominant reason for his presentation however, has been the unstoppable movements of his right arm and right leg. Patient denies jesica cephalgia. Patient admits to routine insulin dosing, but does not check his blood sugars and does not have an active meter at home. Patient denies jesica chest pain. Patient denies nausea vomiting. Patient denies jesica cephalgia. HPI    NursingNotes were reviewed. REVIEW OF SYSTEMS    (2-9 systems for level 4, 10 or more for level 5)     Review of Systems   Constitutional: Positive for activity change. Negative for chills and fever. HENT: Negative for congestion, ear pain, rhinorrhea and trouble swallowing. Eyes: Negative. Respiratory: Negative for shortness of breath, wheezing and stridor. Cardiovascular: Negative for chest pain and leg swelling. Gastrointestinal: Negative for abdominal pain, nausea and vomiting. Endocrine: Negative. Genitourinary: Negative for dysuria, frequency and hematuria. Musculoskeletal: Positive for gait problem. Negative for neck pain. Skin: Negative. Allergic/Immunologic: Negative. Neurological: Positive for facial asymmetry, speech difficulty and weakness.  Negative for seizures, syncope and light-headedness. Complaint of significant spastic movement of right arm and right lower extremity. Hematological: Negative. Psychiatric/Behavioral: Positive for confusion. Negative for hallucinations, self-injury and suicidal ideas. Except as noted above the remainder of the review of systems was reviewed and negative. PAST MEDICAL HISTORY     Past Medical History:   Diagnosis Date    Asthma     Back pain     CAD (coronary artery disease) 01/2020    3 vessel     Diabetes mellitus ([facility] [address]     Essential hypertension, benign     Hyperlipemia     Neuropathic pain, leg, bilateral          SURGICALHISTORY       Past Surgical History:   Procedure Laterality Date    CARPAL TUNNEL RELEASE Bilateral     FOOT SURGERY Left     bone spur    LAMINOTOMY  2012         CURRENT MEDICATIONS       Previous Medications    ALBUTEROL SULFATE  (90 BASE) MCG/ACT INHALER    Inhale 2 puffs into the lungs every 6 hours as needed for Wheezing    ASPIRIN 81 MG EC TABLET    Take 1 tablet by mouth daily    BLOOD GLUCOSE MONITOR KIT AND SUPPLIES    Please give 1 meter Dx E11.65 (Please dispense covered brand)    BLOOD GLUCOSE MONITOR KIT AND SUPPLIES    Test 3 times a day & as needed for symptoms of irregular blood glucose. BLOOD GLUCOSE MONITORING SUPPL (FREESTYLE LITE) MARIUM    1 Device by Does not apply route daily as needed (as directed)    BLOOD GLUCOSE MONITORING SUPPL (Traxer SYSTEM) W/DEVICE KIT    Use as directed    BLOOD GLUCOSE TEST STRIPS (FREESTYLE LITE) STRIP    As needed.     BLOOD GLUCOSE TEST STRIPS (FREESTYLE LITE) STRIP    Test FOUR times daily as directed    BLOOD GLUCOSE TEST STRIPS (ONETOUCH VERIO) STRIP    Test tid    FLUTICASONE-VILANTEROL (BREO ELLIPTA) 100-25 MCG/INH AEPB INHALER    Inhale 1 puff into the lungs daily    FREESTYLE LANCETS MISC    1 each by Does not apply route daily    GLIMEPIRIDE (AMARYL) 4 MG TABLET        INSULIN ASPART PROTAMINE-INSULIN ASPART (NOVOLOG MIX 70/30 FLEXPEN) (70-30) 100 UNIT/ML INJECTION    Inject 40 Units into the skin 2 times daily (with meals)    INSULIN LISPRO PROTAMINE & LISPRO (HUMALOG MIX) (75-25) 100 UNIT PER ML SUPN INJECTION PEN    40 u bid SC    INSULIN PEN NEEDLE (NOVOFINE) 32G X 6 MM MISC    Bid    LANCETS MISC    Pt test 4x daily Dx E11.65 (dispense covered brand)    LISINOPRIL (PRINIVIL;ZESTRIL) 10 MG TABLET    TAKE 1 TABLET BY MOUTH DAILY    METFORMIN (GLUCOPHAGE) 1000 MG TABLET    TAKE 1 TABLET BY MOUTH TWICE DAILY WITH MEALS    ONE TOUCH LANCETS MISC    1 each by Does not apply route 3 times daily    PANTOPRAZOLE (PROTONIX) 40 MG TABLET    Take 40 mg by mouth daily    ROSUVASTATIN (CRESTOR) 20 MG TABLET    Take 1 tablet by mouth nightly    SYMBICORT 80-4.5 MCG/ACT AERO    Inhale 2 puffs into the lungs 2 times daily    TAMSULOSIN (FLOMAX) 0.4 MG CAPSULE    Take 1 capsule by mouth daily       ALLERGIES     Patient has no known allergies. FAMILY HISTORY       Family History   Problem Relation Age of Onset    Other Father         accident    Heart Failure Mother     Heart Disease Brother     Cirrhosis Brother           SOCIAL HISTORY       Social History     Socioeconomic History    Marital status:      Spouse name: Not on file    Number of children: Not on file    Years of education: Not on file    Highest education level: Not on file   Occupational History    Not on file   Tobacco Use    Smoking status: Never Smoker    Smokeless tobacco: Never Used   Vaping Use    Vaping Use: Never used   Substance and Sexual Activity    Alcohol use:  Yes     Alcohol/week: 0.0 standard drinks     Comment: occasional    Drug use: No    Sexual activity: Not on file   Other Topics Concern    Not on file   Social History Narrative    Not on file     Social Determinants of Health     Financial Resource Strain:     Difficulty of Paying Living Expenses: Not on file   Food Insecurity:     Worried About [address] in the Last Year: Not on file    Ran Out of Food in the Last Year: Not on file   Transportation Needs:     Lack of Transportation (Medical): Not on file    Lack of Transportation (Non-Medical): Not on file   Physical Activity:     Days of Exercise per Week: Not on file    Minutes of Exercise per Session: Not on file   Stress:     Feeling of Stress : Not on file   Social Connections:     Frequency of Communication with Friends and Family: Not on file    Frequency of Social Gatherings with Friends and Family: Not on file    Attends [employer] Services: Not on file    Active Member of [address] [place] [address] or Organizations: Not on file    Attends Club or Organization Meetings: Not on file    Marital Status: Not on file   Intimate Partner Violence:     Fear of Current or Ex-Partner: Not on file    Emotionally Abused: Not on file    Physically Abused: Not on file    Sexually Abused: Not on file   Housing Stability:     Unable to Pay for Housing in the Last Year: Not on file    Number of [place] in the Last Year: Not on file    Unstable Housing in the Last Year: Not on file       SCREENINGS             PHYSICAL EXAM    (up to 7 for level 4, 8 or more for level 5)     ED Triage Vitals [11/12/21 1709]   BP Temp Temp Source Pulse Resp SpO2 Height Weight   (!) 184/120 98.6 °F (37 °C) Oral 113 18 98 % 5' 7\" (1.702 m) 170 lb (77.1 kg)       Physical Exam  Vitals and nursing note reviewed. Constitutional:       Appearance: Normal appearance. He is well-developed. Comments: Patient presently demonstrates tachycardia, without evidence of acute cardio or pulmonary decompensation. Patient demonstrates ongoing having ballistic movement of right arm with modest movement of right leg. There is no ballistic movement of the left side. HENT:      Head: Normocephalic and atraumatic. Comments: Mild left-sided facial droop is appreciated. Strictly around the mouth.   There is sparing of the forehead periocular structures and the like. Right Ear: External ear normal.      Left Ear: External ear normal.      Nose: Nose normal.   Eyes:      General:         Right eye: No discharge. Left eye: No discharge. Conjunctiva/sclera: Conjunctivae normal.      Pupils: Pupils are equal, round, and reactive to light. Neck:      Trachea: Trachea normal.   Cardiovascular:      Rate and Rhythm: Normal rate and regular rhythm. Pulses: Normal pulses. Heart sounds: Normal heart sounds. No friction rub. No gallop. Pulmonary:      Effort: Pulmonary effort is normal. No respiratory distress. Breath sounds: Normal breath sounds. No stridor. No wheezing or rhonchi. Abdominal:      General: Bowel sounds are normal. There is no distension. Palpations: Abdomen is soft. Tenderness: There is no abdominal tenderness. There is no guarding. Musculoskeletal:         General: Normal range of motion. Cervical back: Full passive range of motion without pain, normal range of motion and neck supple. No rigidity or tenderness. Skin:     General: Skin is warm and dry. Neurological:      Mental Status: He is alert and oriented to person, place, and time. Cranial Nerves: Cranial nerve deficit present. Sensory: No sensory deficit. Coordination: Coordination abnormal.      Gait: Gait abnormal.      Comments: Neurologic examination is demonstrated in above radiology. Psychiatric:         Mood and Affect: Mood normal.         Speech: Speech normal.         Behavior: Behavior normal.         Thought Content: Thought content normal.         Judgment: Judgment normal.         DIAGNOSTIC RESULTS     EKG: All EKG's are interpreted by the Emergency Department Physician who either signs or Co-signsthis chart in the absence of a cardiologist.    EKG demonstrates sinus tachycardia. Rate is 111. There is bundle branch block. Nonspecific ST segments. There is no ectopy.   This is an abnormal overall EKG.    RADIOLOGY:   Non-plain filmimages such as CT, Ultrasound and MRI are read by the radiologist. Plain radiographic images are visualized and preliminarily interpreted by the emergency physician with the below findings:      Interpretation per the Radiologist below, if available at the time ofthis note:    CT Head WO Contrast   Final Result      There is an 8 mm hyperdensity in the left periventricular white matter which may be acute or subacute and may represent a small focus of hemorrhage. Findings were called to ER at 1913 hours and briefly discussed with Dr. Anna Harris. XR CHEST PORTABLE   Final Result   No radiographic evidence of acute intrathoracic process.             MRI BRAIN W WO CONTRAST    (Results Pending)         ED BEDSIDE ULTRASOUND:   Performed by ED Physician - none    LABS:  Labs Reviewed   COMPREHENSIVE METABOLIC PANEL - Abnormal; Notable for the following components:       Result Value    Sodium 129 (*)     Chloride 92 (*)     Glucose 681 (*)     BUN 27 (*)     CREATININE 1.30 (*)     GFR Non- 56.2 (*)     Total Protein 5.9 (*)     Globulin 2.1 (*)     All other components within normal limits    Narrative:     CALL  [facility]  [facility] tel. [phone],  Glucose results called to and read back by [facility], 11/12/2021 19:22, by  KIRAN   CBC WITH AUTO DIFFERENTIAL - Abnormal; Notable for the following components:    Lymphocytes Absolute 0.5 (*)     All other components within normal limits   URINE RT REFLEX TO CULTURE - Abnormal; Notable for the following components:    Glucose, Ur >=1000 (*)     Blood, Urine TRACE (*)     Protein,  (*)     All other components within normal limits   APTT - Abnormal; Notable for the following components:    aPTT 22.4 (*)     All other components within normal limits   PROTIME-INR - Abnormal; Notable for the following components:    Protime 12.1 (*)     All other components within normal limits   CK - Abnormal; Notable for the following components: Total  (*)     All other components within normal limits    Narrative:     CALL  [facility]  [facility] tel. [phone],  Glucose results called to and read back by [facility], 11/12/2021 19:22, by  KIRAN   TROPONIN - Abnormal; Notable for the following components:    Troponin 0.018 (*)     All other components within normal limits    Narrative:     CALL  [facility]  [facility] tel. [phone],  Troponin results called to and read back by [facility], 11/12/2021 19:22,  by Dale Clayton  Glucose results called to and read back by [facility], 11/12/2021 19:22, by  [address] - Abnormal; Notable for the following components:    RBC, UA 3-5 (*)     All other components within normal limits   POCT GLUCOSE - Abnormal; Notable for the following components:    POC Glucose >600 (*)     All other components within normal limits   POCT VENOUS - Abnormal; Notable for the following components:    POC Sodium 129 (*)     POC Chloride 96 (*)     POC Glucose >700 (*)     Calcium, Ion 1.10 (*)     All other components within normal limits   POCT GLUCOSE - Abnormal; Notable for the following components:    POC Glucose 306 (*)     All other components within normal limits   POCT GLUCOSE - Normal   URINE DRUG SCREEN   ETHANOL   LACTIC ACID, PLASMA   CKMB & RELATIVE PERCENT    Narrative:     CALL  [facility] tel. [phone],  Troponin results called to and read back by [facility], 11/12/2021 19:22,  by KIRAN  Glucose results called to and read back by [facility], 11/12/2021 19:22, by  Dale Clayton       All other labs were within normal range or not returned as of this dictation.     EMERGENCY DEPARTMENT COURSE and DIFFERENTIAL DIAGNOSIS/MDM:   Vitals:    Vitals:    11/12/21 1709 11/12/21 1909   BP: (!) 184/120 (!) 169/99   Pulse: 113 110   Resp: 18 18   Temp: 98.6 °F (37 °C)    TempSrc: Oral    SpO2: 98% 99%   Weight: 170 lb (77.1 kg)    Height: 5' 7\" (1.702 m)        MDM patient's initial CT was concerning for an 8 mm density left parietal area that may represent a bleed. Given this being a change from 2 years ago urgent MRI was recommended. MRI was interpreted as normal even with comparison to 2019. There does not appear to be any acute process within the brain. Patient had hyperglycemia and hypertension that needs monitoring and treatment in hospital.  In addition, he will need neuro consultation regarding his choreic-like movements of right arm. CONSULTS:  None    PROCEDURES:  Unless otherwise noted below, none     Procedures    FINAL IMPRESSION      1. Hypertensive urgency    2. Hyperglycemia        DISPOSITION/PLAN   DISPOSITION Decision To Admit 11/12/2021 10:46:50 PM      PATIENT REFERRED TO:  No follow-up provider specified.     DISCHARGE MEDICATIONS:  New Prescriptions    No medications on file          (Please note that portions of this note were completed with a voice recognitionprogram.  Efforts were made to edit the dictations but occasionally words are mis-transcribed.)    Karen Garcia MD (electronically signed)  Attending Emergency Physician          Karen Garcia MD  11/12/21 4499

## 2021-11-12 NOTE — ED TRIAGE NOTES
Pt arrive due to PCP referring him to ER  Per pt Wife stated that he had a facial drop for the last few days. Pt states that he has had involuntary movements with right arm and leg increasing in the last 3-4 days ago  Pt states that he has had shuffle gait \"due to back pain and surgery  Pt states that he is waiting for another back surgery  Pt denies chest pain  Pt denies abd pain  Pt denies SOB  Pt denies N/V/D  Pt is alert and oriented times 4  Pt stats that he has had falls off and on greater than 3 days ago  Pt states that he is anxious due to \"this going on\".

## 2021-11-13 ENCOUNTER — APPOINTMENT (OUTPATIENT)
Dept: MRI IMAGING | Age: 61
DRG: 073 | End: 2021-11-13
Payer: MEDICARE

## 2021-11-13 ENCOUNTER — APPOINTMENT (OUTPATIENT)
Dept: ULTRASOUND IMAGING | Age: 61
DRG: 073 | End: 2021-11-13
Payer: MEDICARE

## 2021-11-13 PROBLEM — I61.9 BRAIN BLEED (HCC): Status: ACTIVE | Noted: 2021-11-13

## 2021-11-13 LAB
ALBUMIN SERPL-MCNC: 3.4 G/DL (ref 3.5–4.6)
ALP BLD-CCNC: 71 U/L (ref 35–104)
ALT SERPL-CCNC: 9 U/L (ref 0–41)
ANION GAP SERPL CALCULATED.3IONS-SCNC: 14 MEQ/L (ref 9–15)
AST SERPL-CCNC: 13 U/L (ref 0–40)
BILIRUB SERPL-MCNC: 0.5 MG/DL (ref 0.2–0.7)
BUN BLDV-MCNC: 18 MG/DL (ref 8–23)
CALCIUM SERPL-MCNC: 8.6 MG/DL (ref 8.5–9.9)
CHLORIDE BLD-SCNC: 101 MEQ/L (ref 95–107)
CO2: 21 MEQ/L (ref 20–31)
CREAT SERPL-MCNC: 0.93 MG/DL (ref 0.7–1.2)
GFR AFRICAN AMERICAN: >60
GFR NON-AFRICAN AMERICAN: >60
GLOBULIN: 2.3 G/DL (ref 2.3–3.5)
GLUCOSE BLD-MCNC: 242 MG/DL (ref 60–115)
GLUCOSE BLD-MCNC: 293 MG/DL (ref 60–115)
GLUCOSE BLD-MCNC: 327 MG/DL (ref 60–115)
GLUCOSE BLD-MCNC: 371 MG/DL (ref 70–99)
GLUCOSE BLD-MCNC: 430 MG/DL (ref 60–115)
GLUCOSE BLD-MCNC: 508 MG/DL (ref 60–115)
HCT VFR BLD CALC: 41.2 % (ref 42–52)
HEMOGLOBIN: 13.9 G/DL (ref 14–18)
MCH RBC QN AUTO: 27.9 PG (ref 27–31.3)
MCHC RBC AUTO-ENTMCNC: 33.7 % (ref 33–37)
MCV RBC AUTO: 82.8 FL (ref 80–100)
PDW BLD-RTO: 12.7 % (ref 11.5–14.5)
PERFORMED ON: ABNORMAL
PLATELET # BLD: 126 K/UL (ref 130–400)
POTASSIUM REFLEX MAGNESIUM: 3.8 MEQ/L (ref 3.4–4.9)
RBC # BLD: 4.97 M/UL (ref 4.7–6.1)
SODIUM BLD-SCNC: 136 MEQ/L (ref 135–144)
TOTAL PROTEIN: 5.7 G/DL (ref 6.3–8)
TROPONIN: 0.01 NG/ML (ref 0–0.01)
WBC # BLD: 5.3 K/UL (ref 4.8–10.8)

## 2021-11-13 PROCEDURE — 70544 MR ANGIOGRAPHY HEAD W/O DYE: CPT

## 2021-11-13 PROCEDURE — 36415 COLL VENOUS BLD VENIPUNCTURE: CPT

## 2021-11-13 PROCEDURE — 99222 1ST HOSP IP/OBS MODERATE 55: CPT | Performed by: PSYCHIATRY & NEUROLOGY

## 2021-11-13 PROCEDURE — 96375 TX/PRO/DX INJ NEW DRUG ADDON: CPT

## 2021-11-13 PROCEDURE — 1210000000 HC MED SURG R&B

## 2021-11-13 PROCEDURE — 6370000000 HC RX 637 (ALT 250 FOR IP): Performed by: PSYCHIATRY & NEUROLOGY

## 2021-11-13 PROCEDURE — 92610 EVALUATE SWALLOWING FUNCTION: CPT

## 2021-11-13 PROCEDURE — 93880 EXTRACRANIAL BILAT STUDY: CPT

## 2021-11-13 PROCEDURE — 6370000000 HC RX 637 (ALT 250 FOR IP): Performed by: INTERNAL MEDICINE

## 2021-11-13 PROCEDURE — 85027 COMPLETE CBC AUTOMATED: CPT

## 2021-11-13 PROCEDURE — 94640 AIRWAY INHALATION TREATMENT: CPT

## 2021-11-13 PROCEDURE — 96376 TX/PRO/DX INJ SAME DRUG ADON: CPT

## 2021-11-13 PROCEDURE — 6370000000 HC RX 637 (ALT 250 FOR IP): Performed by: NURSE PRACTITIONER

## 2021-11-13 PROCEDURE — 93306 TTE W/DOPPLER COMPLETE: CPT

## 2021-11-13 PROCEDURE — 92523 SPEECH SOUND LANG COMPREHEN: CPT

## 2021-11-13 PROCEDURE — 80053 COMPREHEN METABOLIC PANEL: CPT

## 2021-11-13 PROCEDURE — 94761 N-INVAS EAR/PLS OXIMETRY MLT: CPT

## 2021-11-13 PROCEDURE — 97166 OT EVAL MOD COMPLEX 45 MIN: CPT

## 2021-11-13 PROCEDURE — 97162 PT EVAL MOD COMPLEX 30 MIN: CPT

## 2021-11-13 PROCEDURE — 6360000002 HC RX W HCPCS: Performed by: INTERNAL MEDICINE

## 2021-11-13 PROCEDURE — 84484 ASSAY OF TROPONIN QUANT: CPT

## 2021-11-13 PROCEDURE — 2500000003 HC RX 250 WO HCPCS: Performed by: INTERNAL MEDICINE

## 2021-11-13 PROCEDURE — 2500000003 HC RX 250 WO HCPCS

## 2021-11-13 RX ORDER — ASPIRIN 300 MG/1
300 SUPPOSITORY RECTAL DAILY
Status: DISCONTINUED | OUTPATIENT
Start: 2021-11-13 | End: 2021-11-13

## 2021-11-13 RX ORDER — PANTOPRAZOLE SODIUM 40 MG/1
40 TABLET, DELAYED RELEASE ORAL
Status: DISCONTINUED | OUTPATIENT
Start: 2021-11-13 | End: 2021-11-15 | Stop reason: HOSPADM

## 2021-11-13 RX ORDER — HYDRALAZINE HYDROCHLORIDE 20 MG/ML
10 INJECTION INTRAMUSCULAR; INTRAVENOUS EVERY 4 HOURS PRN
Status: DISCONTINUED | OUTPATIENT
Start: 2021-11-13 | End: 2021-11-13

## 2021-11-13 RX ORDER — DEXTROSE MONOHYDRATE 25 G/50ML
12.5 INJECTION, SOLUTION INTRAVENOUS PRN
Status: DISCONTINUED | OUTPATIENT
Start: 2021-11-13 | End: 2021-11-15 | Stop reason: HOSPADM

## 2021-11-13 RX ORDER — WATER FOR INJ.,BACTERIOSTATIC
VIAL (ML) INJECTION
Status: COMPLETED
Start: 2021-11-13 | End: 2021-11-13

## 2021-11-13 RX ORDER — ASPIRIN 81 MG/1
81 TABLET ORAL DAILY
Status: DISCONTINUED | OUTPATIENT
Start: 2021-11-13 | End: 2021-11-15 | Stop reason: HOSPADM

## 2021-11-13 RX ORDER — INSULIN ASPART 100 [IU]/ML
35 INJECTION, SUSPENSION SUBCUTANEOUS 2 TIMES DAILY WITH MEALS
Status: DISCONTINUED | OUTPATIENT
Start: 2021-11-13 | End: 2021-11-13 | Stop reason: CLARIF

## 2021-11-13 RX ORDER — ASPIRIN 81 MG/1
81 TABLET ORAL DAILY
Status: DISCONTINUED | OUTPATIENT
Start: 2021-11-13 | End: 2021-11-13

## 2021-11-13 RX ORDER — DEXTROSE MONOHYDRATE 50 MG/ML
100 INJECTION, SOLUTION INTRAVENOUS PRN
Status: DISCONTINUED | OUTPATIENT
Start: 2021-11-13 | End: 2021-11-15 | Stop reason: HOSPADM

## 2021-11-13 RX ORDER — LORAZEPAM 2 MG/ML
1 INJECTION INTRAMUSCULAR
Status: DISPENSED | OUTPATIENT
Start: 2021-11-13 | End: 2021-11-13

## 2021-11-13 RX ORDER — INSULIN GLARGINE 100 [IU]/ML
42 INJECTION, SOLUTION SUBCUTANEOUS NIGHTLY
Status: DISCONTINUED | OUTPATIENT
Start: 2021-11-13 | End: 2021-11-15 | Stop reason: HOSPADM

## 2021-11-13 RX ORDER — NICOTINE POLACRILEX 4 MG
15 LOZENGE BUCCAL PRN
Status: DISCONTINUED | OUTPATIENT
Start: 2021-11-13 | End: 2021-11-15 | Stop reason: HOSPADM

## 2021-11-13 RX ORDER — BUDESONIDE AND FORMOTEROL FUMARATE DIHYDRATE 80; 4.5 UG/1; UG/1
2 AEROSOL RESPIRATORY (INHALATION) 2 TIMES DAILY
Status: DISCONTINUED | OUTPATIENT
Start: 2021-11-13 | End: 2021-11-15 | Stop reason: HOSPADM

## 2021-11-13 RX ORDER — TAMSULOSIN HYDROCHLORIDE 0.4 MG/1
0.4 CAPSULE ORAL DAILY
Status: DISCONTINUED | OUTPATIENT
Start: 2021-11-13 | End: 2021-11-15 | Stop reason: HOSPADM

## 2021-11-13 RX ORDER — POLYETHYLENE GLYCOL 3350 17 G/17G
17 POWDER, FOR SOLUTION ORAL DAILY PRN
Status: DISCONTINUED | OUTPATIENT
Start: 2021-11-13 | End: 2021-11-15 | Stop reason: HOSPADM

## 2021-11-13 RX ORDER — ONDANSETRON 2 MG/ML
4 INJECTION INTRAMUSCULAR; INTRAVENOUS EVERY 6 HOURS PRN
Status: DISCONTINUED | OUTPATIENT
Start: 2021-11-13 | End: 2021-11-15 | Stop reason: HOSPADM

## 2021-11-13 RX ORDER — ONDANSETRON 4 MG/1
4 TABLET, ORALLY DISINTEGRATING ORAL EVERY 8 HOURS PRN
Status: DISCONTINUED | OUTPATIENT
Start: 2021-11-13 | End: 2021-11-15 | Stop reason: HOSPADM

## 2021-11-13 RX ORDER — ATORVASTATIN CALCIUM 80 MG/1
80 TABLET, FILM COATED ORAL NIGHTLY
Status: DISCONTINUED | OUTPATIENT
Start: 2021-11-13 | End: 2021-11-15 | Stop reason: HOSPADM

## 2021-11-13 RX ORDER — LISINOPRIL 10 MG/1
10 TABLET ORAL DAILY
Status: DISCONTINUED | OUTPATIENT
Start: 2021-11-13 | End: 2021-11-15 | Stop reason: HOSPADM

## 2021-11-13 RX ORDER — LABETALOL HYDROCHLORIDE 5 MG/ML
20 INJECTION, SOLUTION INTRAVENOUS EVERY 4 HOURS PRN
Status: DISCONTINUED | OUTPATIENT
Start: 2021-11-13 | End: 2021-11-15 | Stop reason: HOSPADM

## 2021-11-13 RX ORDER — HALOPERIDOL 2 MG/1
2 TABLET ORAL 3 TIMES DAILY
Status: DISCONTINUED | OUTPATIENT
Start: 2021-11-13 | End: 2021-11-15 | Stop reason: HOSPADM

## 2021-11-13 RX ADMIN — BUDESONIDE AND FORMOTEROL FUMARATE DIHYDRATE 2 PUFF: 80; 4.5 AEROSOL RESPIRATORY (INHALATION) at 09:04

## 2021-11-13 RX ADMIN — HYDRALAZINE HYDROCHLORIDE 10 MG: 20 INJECTION INTRAMUSCULAR; INTRAVENOUS at 05:05

## 2021-11-13 RX ADMIN — INSULIN LISPRO 8 UNITS: 100 INJECTION, SOLUTION INTRAVENOUS; SUBCUTANEOUS at 16:18

## 2021-11-13 RX ADMIN — INSULIN GLARGINE 42 UNITS: 100 INJECTION, SOLUTION SUBCUTANEOUS at 22:02

## 2021-11-13 RX ADMIN — PANTOPRAZOLE SODIUM 40 MG: 40 TABLET, DELAYED RELEASE ORAL at 09:16

## 2021-11-13 RX ADMIN — BACTERIOSTATIC WATER: 1 INJECTION, SOLUTION INTRAMUSCULAR; INTRAVENOUS; SUBCUTANEOUS at 10:15

## 2021-11-13 RX ADMIN — LABETALOL HYDROCHLORIDE 20 MG: 5 INJECTION, SOLUTION INTRAVENOUS at 09:26

## 2021-11-13 RX ADMIN — HALOPERIDOL 2 MG: 2 TABLET ORAL at 21:34

## 2021-11-13 RX ADMIN — LISINOPRIL 10 MG: 10 TABLET ORAL at 09:16

## 2021-11-13 RX ADMIN — ATORVASTATIN CALCIUM 80 MG: 80 TABLET, FILM COATED ORAL at 21:34

## 2021-11-13 RX ADMIN — BUDESONIDE AND FORMOTEROL FUMARATE DIHYDRATE 2 PUFF: 80; 4.5 AEROSOL RESPIRATORY (INHALATION) at 19:10

## 2021-11-13 RX ADMIN — INSULIN LISPRO 10 UNITS: 100 INJECTION, SOLUTION INTRAVENOUS; SUBCUTANEOUS at 09:18

## 2021-11-13 RX ADMIN — INSULIN LISPRO 10 UNITS: 100 INJECTION, SOLUTION INTRAVENOUS; SUBCUTANEOUS at 16:17

## 2021-11-13 RX ADMIN — INSULIN LISPRO 12 UNITS: 100 INJECTION, SOLUTION INTRAVENOUS; SUBCUTANEOUS at 11:51

## 2021-11-13 RX ADMIN — HALOPERIDOL 2 MG: 2 TABLET ORAL at 14:36

## 2021-11-13 RX ADMIN — TAMSULOSIN HYDROCHLORIDE 0.4 MG: 0.4 CAPSULE ORAL at 09:16

## 2021-11-13 ASSESSMENT — PAIN SCALES - GENERAL
PAINLEVEL_OUTOF10: 0

## 2021-11-13 ASSESSMENT — ENCOUNTER SYMPTOMS
COLOR CHANGE: 0
TROUBLE SWALLOWING: 0
NAUSEA: 0
BACK PAIN: 0
VOMITING: 0
PHOTOPHOBIA: 0
SHORTNESS OF BREATH: 0
CHOKING: 0

## 2021-11-13 NOTE — FLOWSHEET NOTE
Family member of patient that identified herself as patients \"daughter\" called floor asking for medical information about patient. She did not have patients HIPPA code so this RN told her that I am unable to give out his information and that she would need to personally talk to the patient. The family member then hung up the phone. Patient then called PCA into room asking for the HIPPA code for his \"wife\". Patient also asked PCA to ask RN for medication to \"calm him down\" or he is \"going to start flipping out again\". Patient appears agitated, stating he wants to go home. Attempted to update RN supervisor Vanita Mack, unable to reach her at this time. 20

## 2021-11-13 NOTE — FLOWSHEET NOTE
Pt assessment completed. VS completed. Pt alert to self, able to give birthdate. Pt aware in hospital but thought he was at [place]. Pt wants to use urinal by self but  unable to urinate directly into urinal. Spastic movements noted to right arm. Dr Eduard José aware. N.O. received for increased BP. Pt denies pain at this time.  Electronically signed by Alejandra Charles RN on 11/13/21 at 11:17 AM EST

## 2021-11-13 NOTE — PROGRESS NOTES
[facility]   Facility/Department: [facility]  Speech Language Pathology  Clinical Bedside Swallow Evaluation    NAME:Joseph Reyes Savannah  : 1960 (17 y.o.)   MRN: [id number]  ROOM: [place] [address]  ADMISSION DATE: 2021  PATIENT DIAGNOSIS(ES): Hyperglycemia [R73.9]  Hypertensive urgency [I16.0]  Involuntary movements [R25.9]  No chief complaint on file.     Patient Active Problem List    Diagnosis Date Noted    Involuntary movements 2021    ANTON (acute kidney injury) ([facility] 2021    Hyponatremia 2021    Type 2 diabetes mellitus with hyperglycemia ([facility] 2021    Elevated troponin 2021    General patient noncompliance 2020    Anginal chest pain at rest [facility]) 2019    NSTEMI (non-ST elevated myocardial infarction) ([facility] 2019    Mild intermittent asthma with exacerbation     Acute on chronic diastolic (congestive) heart failure ([address] 2019    Community acquired pneumonia of right lower lobe of lung 2019    Chest pain 2019    Acute bronchitis due to other specified organisms 2019    Hereditary peripheral neuropathy 2018    Migraine without aura 2018    Other intervertebral disc disorders, lumbar region 10/16/2018    Spinal stenosis, lumbar region with neurogenic claudication 10/16/2018    Spondylolisthesis, lumbosacral region 10/16/2018    Other idiopathic scoliosis, lumbosacral region 2018    Hematoma of left lower extremity 2018    Staphylococcus aureus bacteremia 2018    Hematoma of right thigh 2018    Body mass index (bmi) 29.0-29.9, adult 2018    Cellulitis of right lower limb 2018    Other specified hypothyroidism 2018    Inflammatory disorders of scrotum 2018    Long term (current) use of insulin ([facility] 2018    Long term (current) use of oral hypoglycemic drugs 2018    Dyspnea, unspecified 2018    Pain in left lower leg 2018    Pain of right lower extremity 06/16/2018    Shortness of breath 06/16/2018    CAD (coronary artery disease) 06/13/2018    Family history of ischemic heart disease and other diseases of the circulatory system 06/13/2018    Other chronic sinusitis 06/13/2018    Sleep apnea 06/13/2018    Type 2 diabetes mellitus with diabetic polyneuropathy ([address] 06/13/2018    Unsp open wound of r little finger w/o damage to nail, init 06/13/2018    Encounter for pre-employment examination 04/13/2018    Allergic rhinitis due to pollen 06/07/2016    Essential hypertension, benign     Irregular heart rhythm 11/21/2012    Obesity, diabetes, and hypertension syndrome ([address] 03/16/2005    Hyperlipidemia 03/16/2005     Past Medical History:   Diagnosis Date    Asthma     Back pain     CAD (coronary artery disease) 01/2020    3 vessel     Diabetes mellitus ([address]     Essential hypertension, benign     Hyperlipemia     Neuropathic pain, leg, bilateral      Past Surgical History:   Procedure Laterality Date    CARPAL TUNNEL RELEASE Bilateral     FOOT SURGERY Left     bone spur    LAMINOTOMY  2012     No Known Allergies    DATE ONSET: 11/12/2021    Date of Evaluation: 11/13/2021   Evaluating Therapist: GEE Crisostomo    Recommended Diet and Intervention  Diet Solids Recommendation: Regular  Liquid Consistency Recommendation: Thin  Recommended Form of Meds: PO  Recommendations: Self feed; Dysphagia treatment  Therapeutic Interventions: Diet tolerance monitoring, Oral motor exercises, Pharyngeal exercises    Compensatory Swallowing Strategies  Compensatory Swallowing Strategies: Eat/Feed slowly;  No straws; Upright as possible for all oral intake; Small bites/sips    Reason for Referral  Sarah Arauz was referred for a bedside swallow evaluation to assess the efficiency of his swallow function, identify signs and symptoms of aspiration and make recommendations regarding safe dietary consistencies, effective compensatory strategies, and safe eating environment. General  Chart Reviewed: Yes  Subjective  Subjective: Pt was alert and cooperative for BSE  Behavior/Cognition: Alert; Cooperative  Respiratory Status: Room air  O2 Device: None (Room air)  Communication Observation: Functional  Follows Directions: Simple  Dentition: Adequate; Some missing teeth  Patient Positioning: Upright in bed  Baseline Vocal Quality: Normal  Prior Dysphagia History: None  Consistencies Administered: Reg solid; Dysphagia Pureed (Dysphagia I); Thin - cup    Current Diet level:  Current Diet : NPO  Current Liquid Diet : NPO    Oral Motor Deficits  Oral/Motor  Oral Motor: Exceptions to [facility]  Labial Strength: Reduced  Lingual Strength: Reduced    Oral Phase Dysfunction  Oral Phase  Oral Phase: Exceptions  Oral Phase Dysfunction  Decreased Anterior to Posterior Transit: Reg solid  Lingual/Palatal Residue: Reg solid  Oral Phase  Oral Phase - Comment: Pt presents with mild oral phase dysphagia characterized by decreased AP transit and mild lingual residuals post swallow following trials of regular solid. Pt was able to clear residuals with use of liquid wash. Indicators of Pharyngeal Phase Dysfunction   Pharyngeal Phase  Pharyngeal Phase: Exceptions  Indicators of Pharyngeal Phase Dysfunction  Cough - Immediate: Thin - cup  Pharyngeal Phase   Pharyngeal: Suspected pharyngeal phase dysphagia characterized by immediate cough following large consecutive sips of thin liquids via cup. Following direct instruction in safe swallowing strategies pt was able to implement small sips given standby cues and did not exhibit overt s/s of aspiration. 1-2x f/u needed in order to assess continued tolerance of recommended diet. Impression  Dysphagia Diagnosis: Mild oral stage dysphagia; Suspected needs further assessment  Dysphagia Impression : Mild oral phase dysphagia;  Suspected pharyngeal phase dysphagia  Dysphagia Outcome Severity Scale: Level 5: Mild dysphagia- Distant supervision. May need one diet consistency restricted     Treatment Plan  Requires SLP Intervention: Yes  Duration/Frequency of Treatment: 1-2x/wk for LOS or until all goals met  D/C Recommendations: To be determined       Treatment/Goals  Short-term Goals  Timeframe for Short-term Goals: 1 week  Goal 1: Pt will tolerate recommended diet level with no overt s/s of aspiration and adequate oral clearance of bolus in all opportunities. Goal 2: Pt will complete oral motor exercises with 80% acc given cues as needed in order to increase lingual/labial strength and decrease risk of oral residuals. Goal 3: Pt will complete pharyngeal exercises (nesha, effortful) with 80% acc given cues as needed in order to increase pharyngeal musculature and decrease risk of aspiration. Long-term Goals  Timeframe for Long-term Goals: 2 weeks  Goal 1: Pt will tolerate least restrictive diet consistency with no adverse outcomes. Prognosis  Prognosis  Prognosis for safe diet advancement: fair  Individuals consulted  Consulted and agree with results and recommendations: Patient; RN (RN - Anna Harris)    Education  Patient Education: Pt was educated on results of BSE  Patient Education Response: Verbalizes understanding  Safety Devices in place: Yes  Type of devices: Call light within reach; Bed alarm in place; Nurse notified    Pain Assessment:  Pre-Treatment  Pain assessment: 0-10  Pain level: 0  Intervention:  Patient denies pain. Post-Treatment  Pain assessment: 0-10  Pain level: 0  Intervention:  Patient denies pain.          Therapy Time  SLP Individual Minutes  Time In: 0830  Time Out: 1797  Minutes: 9          Signature: Electronically signed by GEE Crisostomo on 11/13/2021 at 10:31 AM

## 2021-11-13 NOTE — FLOWSHEET NOTE
Patient refusing blood draw at this time. Educated on the need for his labs to be redrawn. Rescheduled to try and draw him at a later time.  Electronically signed by Bre Kraft RN on 11/13/2021 at 3:27 AM

## 2021-11-13 NOTE — CONSULTS
Subjective:      Patient ID: Kathrin Dacosta is a 62 y.o. male who presents today for right-sided weakness and chorea. HPI 62 yrs  right-handed gentleman now with a right-sided weakness and chorea. Patient reports symptoms going on for a few days and is uncontrolled sling arm on the right and some in his leg as well. Patient appears to have multiple risk factors for vascular disease very noncompliant exam told. Patient has no previous history of seizures. He denies any loss of awareness or loss of consciousness with the same. Lives at home and is ambulatory. He has no other symptoms today    Review of Systems   Constitutional: Negative for fever. HENT: Negative for ear pain, tinnitus and trouble swallowing. Eyes: Negative for photophobia and visual disturbance. Respiratory: Negative for choking and shortness of breath. Cardiovascular: Negative for chest pain and palpitations. Gastrointestinal: Negative for nausea and vomiting. Musculoskeletal: Negative for back pain, gait problem, joint swelling, myalgias, neck pain and neck stiffness. Skin: Negative for color change. Allergic/Immunologic: Negative for food allergies. Neurological: Positive for tremors and weakness. Negative for dizziness, seizures, syncope, facial asymmetry, speech difficulty, light-headedness, numbness and headaches. Psychiatric/Behavioral: Negative for behavioral problems, confusion, hallucinations and sleep disturbance.        Past Medical History:   Diagnosis Date    Asthma     Back pain     CAD (coronary artery disease) 01/2020    3 vessel     Diabetes mellitus ([facility]     Essential hypertension, benign     Hyperlipemia     Neuropathic pain, leg, bilateral      Past Surgical History:   Procedure Laterality Date    CARPAL TUNNEL RELEASE Bilateral     FOOT SURGERY Left     bone spur    LAMINOTOMY  2012     Social History     Socioeconomic History    Marital status:      Spouse name: Not on file    Number of children: Not on file    Years of education: Not on file    Highest education level: Not on file   Occupational History    Not on file   Tobacco Use    Smoking status: Never Smoker    Smokeless tobacco: Never Used   Vaping Use    Vaping Use: Never used   Substance and Sexual Activity    Alcohol use: Yes     Alcohol/week: 0.0 standard drinks     Comment: occasional    Drug use: No    Sexual activity: Not on file   Other Topics Concern    Not on file   Social History Narrative    Not on file     Social Determinants of Health     Financial Resource Strain:     Difficulty of Paying Living Expenses: Not on file   Food Insecurity:     Worried About Running Out of Food in the Last Year: Not on file    Gricel of Food in the Last Year: Not on file   Transportation Needs:     Lack of Transportation (Medical): Not on file    Lack of Transportation (Non-Medical):  Not on file   Physical Activity:     Days of Exercise per Week: Not on file    Minutes of Exercise per Session: Not on file   Stress:     Feeling of Stress : Not on file   Social Connections:     Frequency of Communication with Friends and Family: Not on file    Frequency of Social Gatherings with Friends and Family: Not on file    Attends [employer] Services: Not on file    Active Member of [address] [place] [address] [employer] or Organizations: Not on file    Attends Club or Organization Meetings: Not on file    Marital Status: Not on file   Intimate Partner Violence:     Fear of Current or Ex-Partner: Not on file    Emotionally Abused: Not on file    Physically Abused: Not on file    Sexually Abused: Not on file   Housing Stability:     Unable to Pay for Housing in the Last Year: Not on file    Number of [place] in the Last Year: Not on file    Unstable Housing in the Last Year: Not on file     Family History   Problem Relation Age of Onset    Other Father         accident    Heart Failure Mother     Heart Disease Brother     Cirrhosis Brother No Known Allergies  Current Facility-Administered Medications   Medication Dose Route Frequency Provider Last Rate Last Admin    ondansetron (ZOFRAN-ODT) disintegrating tablet 4 mg  4 mg Oral Q8H PRN Deloras Michael, APRN - CNP        Or    ondansetron [facility]) injection 4 mg  4 mg IntraVENous Q6H PRN Deloras Michael, APRN - CNP        polyethylene glycol (GLYCOLAX) packet 17 g  17 g Oral Daily PRN Deloras Michael, APRN - CNP        [Held by provider] enoxaparin (LOVENOX) injection 40 mg  40 mg SubCUTAneous Daily Deloras Michael, APRN - CNP        atorvastatin (LIPITOR) tablet 80 mg  80 mg Oral Nightly Deloras Michael, APRN - CNP        insulin lispro (HUMALOG) injection vial 0-12 Units  0-12 Units SubCUTAneous TID WC Deloras Michael, APRN - CNP   10 Units at 11/13/21 7406    insulin lispro (HUMALOG) injection vial 0-6 Units  0-6 Units SubCUTAneous Nightly Lisa Head, APRN - CNP        glucose (GLUTOSE) 40 % oral gel 15 g  15 g Oral PRN Deloras Michael, APRN - CNP        dextrose 50 % IV solution  12.5 g IntraVENous PRN Deloras Michael, APRN - CNP        glucagon (rDNA) injection 1 mg  1 mg IntraMUSCular PRN Deloras Michael, APRN - CNP        dextrose 5 % solution  100 mL/hr IntraVENous PRN Deloras Michael, APRN - CNP        insulin lispro (HUMALOG) injection vial 0-12 Units  0-12 Units SubCUTAneous TID WC Deloras Michael, APRN - CNP        insulin lispro (HUMALOG) injection vial 0-6 Units  0-6 Units SubCUTAneous Nightly Deloras Michael, APRN - CNP        glucose (GLUTOSE) 40 % oral gel 15 g  15 g Oral PRN Deloras Michael, APRN - CNP        dextrose 50 % IV solution  12.5 g IntraVENous PRN Deloras Michael, APRN - CNP        glucagon (rDNA) injection 1 mg  1 mg IntraMUSCular PRN Deloras Michael, APRN - CNP        dextrose 5 % solution  100 mL/hr IntraVENous PRN Marisa Mikes Luz Teague, APRN - CNP        influenza quadrivalent split vaccine (FLUZONE;FLUARIX;FLULAVAL;AFLURIA) injection 0.5 mL  0.5 mL IntraMUSCular Prior to discharge Clarence Méndez MD       [facility] by [facility]] aspirin EC tablet 81 mg  81 mg Oral Daily Clarence Méndez MD        lisinopril (PRINIVIL;ZESTRIL) tablet 10 mg  10 mg Oral Daily Clarence Méndez MD   10 mg at 11/13/21 0916    insulin lispro (HUMALOG) injection vial 6 Units  6 Units SubCUTAneous TID WC Clarence Méndez MD   6 Units at 11/13/21 0917    insulin glargine (LANTUS) injection vial 42 Units  42 Units SubCUTAneous Nightly Clarence Méndez MD        labetalol (NORMODYNE;TRANDATE) injection 20 mg  20 mg IntraVENous Q4H PRN Kalina Gregg MD   20 mg at 11/13/21 0926    pantoprazole (PROTONIX) tablet 40 mg  40 mg Oral QAM AC Kalina Gregg MD   40 mg at 11/13/21 0916    budesonide-formoterol (SYMBICORT) 80-4.5 MCG/ACT inhaler 2 puff  2 puff Inhalation BID Kalina Gregg MD   2 puff at 11/13/21 0904    tamsulosin (FLOMAX) capsule 0.4 mg  0.4 mg Oral Daily Kalina Gregg MD   0.4 mg at 11/13/21 9880    water, bacteriostatic injection             haloperidol (HALDOL) tablet 2 mg  2 mg Oral TID Jose Antonio Steele MD        sodium chloride (PF) 0.9 % injection 10 mL  10 mL IntraVENous Once Shivani Abernathy MD            Objective:   BP (!) 177/109   Pulse 97   Temp 97.7 °F (36.5 °C) (Oral)   Resp 18   Ht 5' 7\" (1.702 m)   Wt 170 lb (77.1 kg)   SpO2 95%   BMI 26.63 kg/m²     Physical Exam  Vitals reviewed. Eyes:      Pupils: Pupils are equal, round, and reactive to light. Cardiovascular:      Rate and Rhythm: Normal rate and regular rhythm. Heart sounds: No murmur heard. Pulmonary:      Effort: Pulmonary effort is normal.      Breath sounds: Normal breath sounds. Abdominal:      General: Bowel sounds are normal.   Musculoskeletal:         General: Normal range of motion. Cervical back: Normal range of motion.    Skin: General: Skin is warm. Neurological:      Mental Status: He is alert and oriented to person, place, and time. Cranial Nerves: No cranial nerve deficit. Sensory: No sensory deficit. Motor: No abnormal muscle tone. Coordination: Coordination normal.      Deep Tendon Reflexes: Reflexes are normal and symmetric. Babinski sign absent on the right side. Babinski sign absent on the left side. Comments: Examination notable for right-sided arm chorea or hemiballismus which is uncontrolled. He is areflexic in the lower extremities with a known history of neuropathy. We did not attempt to walk him. Psychiatric:         Mood and Affect: Mood normal.         CT Head WO Contrast    Result Date: 11/12/2021  EXAMINATION: CT HEAD WO CONTRAST, 11/12/2021 6:50 PM CLINICAL HISTORY:  hemibalisstic presentation with intermittent confusion COMPARISON: Compared to prior from November 29, 2019 and November 26, 2019 TECHNIQUE:  Multiple contiguous axial images of the head were obtained from the skull base through the skull vertex without intravenous contrast. Sagittal and coronal reformats have been produced. All CT scans at this facility use dose modulation, iterative reconstruction, and/or weight based dosing when appropriate to reduce radiation dose to as low as reasonably achievable. BRAIN CT FINDINGS: There is an 8 mm hyperdensity in the left periventricular white matter which is new since the previous examinations. Gray-white matter differentiation is maintained. There is prominence of sulci and ventricles indicating mild global cerebral atrophy and chronic involutional changes. There are periventricular white matter hypodensities which are normally associated with chronic microangiopathy. The basal ganglia are within normal limits. There are no acute changes or space-occupying lesions in the posterior fossa. The visualized portions of the orbits are within normal limits. The globes are intact.  The imaged portions of the paranasal sinuses are unremarkable. The calvarium is intact. There is an 8 mm hyperdensity in the left periventricular white matter which may be acute or subacute and may represent a small focus of hemorrhage. Findings were called to ER at 1913 hours and briefly discussed with Dr. Topher Pond. XR CHEST PORTABLE    Result Date: 11/12/2021  Exam: XR CHEST PORTABLE History:  hyperglycemia, ams Technique: AP portable view of the chest obtained. Comparison: Chest x-ray from April 20, 2021 Chest x-ray portable Findings: Status post median sternotomy. The cardiomediastinal silhouette is within normal limits. There are no infiltrates, consolidations or effusions. Bones of the thorax appear intact. There are chronic, healed posttraumatic deformities of the left ribs. No radiographic evidence of acute intrathoracic process. MRI BRAIN W WO CONTRAST    Result Date: 11/13/2021  EXAMINATION:  MRI BRAIN W WO CONTRAST HISTORY:   possible stroke   fall. Restlessness within the right arm and leg. Atypical movements of the right upper extremity for several days. Forgetfulness. Confusion. Headaches. Slurred speech. Urinary urgency. Possible Stroke Symptoms Within Last Couple Days hemiballistic presentation with intermittent confusion. TECHNIQUE:  Routine brain MRI protocol without and with contrast including diffusion images. CONTRAST:  15 mL gadoteridol (PROHANCE) injection COMPARISON: CT head 11/12/2021. MRI brain 12/10/2019 RESULT: BRAIN: Acute Change: There is no evidence of restricted diffusion to suggest an acute infarct. Hemorrhage:    No evidence of prior parenchymal hemorrhage. Mass Lesion/ Mass Effect:    No evidence of an intracranial mass or extra-axial fluid collection. No abnormal parenchymal or leptomeningeal enhancement following contrast administration. No significant mass effect.  Chronic Change/parenchyma:  Bilateral increased T1 signal within the basal ganglia, especially involving the bilateral putamen and caudate. This finding correlates with the hyperdensity seen on the CT from 11/12/2021 and appears new from prior MRI 12/10/2019. Mild to moderate generalized volume loss for age. Brain parenchyma otherwise unchanged from prior MRI. Ventricles:     Ventriculomegaly corresponds to the degree of parenchymal volume loss. Skull Base:    Hypothalamic and pituitary region are grossly normal.   Craniocervical junction is normal. No significant marrow replacement process. Vasculature:    Major intracranial arterial structures, and dural venous sinuses show typical flow void, suggesting patency. Other:  Minimal thickening in the paranasal sinuses. Nonspecific right mastoid effusion, similar to prior MRI. The orbits are unremarkable. Small lipoma within the left posterior scalp measuring around 2.0 cm, unchanged. Bilateral increased T1 signal within the basal ganglia, correlating with findings on the recent CT and new from the prior MRI from 2019. 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 Differential also includes a number of other toxic/metabolic etiologies. No evidence for acute infarct.        Lab Results   Component Value Date    WBC 5.3 11/13/2021    RBC 4.97 11/13/2021    RBC 3.86 07/08/2018    HGB 13.9 11/13/2021    HCT 41.2 11/13/2021    MCV 82.8 11/13/2021    MCH 27.9 11/13/2021    MCHC 33.7 11/13/2021    RDW 12.7 11/13/2021     11/13/2021    MPV 6.9 07/17/2018     Lab Results   Component Value Date     11/13/2021    K 3.8 11/13/2021     11/13/2021    CO2 21 11/13/2021    BUN 18 11/13/2021    CREATININE 0.93 11/13/2021    GFRAA >60.0 11/13/2021    AGRATIO 0.6 07/08/2018    LABGLOM >60.0 11/13/2021    GLUCOSE 371 11/13/2021    GLUCOSE 142 07/08/2018    PROT 5.7 11/13/2021    LABALBU 3.4 11/13/2021    LABALBU 2.4 07/08/2018    CALCIUM 8.6 11/13/2021    BILITOT 0.5 11/13/2021    ALKPHOS 71 11/13/2021    AST 13 11/13/2021    ALT 9 11/13/2021     Lab Results   Component Value Date    PROTIME 12.1 11/12/2021    INR 0.9 11/12/2021     Lab Results   Component Value Date    TSH 1.270 06/09/2020     Lab Results   Component Value Date    TRIG 193 06/09/2020    HDL 50 06/09/2020    LDLCALC 125 06/09/2020     Lab Results   Component Value Date    LABAMPH Neg 11/12/2021    BARBSCNU Neg 11/12/2021    LABBENZ Neg 11/12/2021    LABMETH Neg 11/12/2021    OPIATESCREENURINE Neg 11/12/2021    PHENCYCLIDINESCREENURINE Neg 11/12/2021    ETOH <10 11/12/2021     No results found for: LITHIUM, DILFRTOT, VALPROATE    Assessment:   Intracerebral hemorrhage with a 8 mm hemorrhage in the left periventricular area. Patient also has extensive white matter changes notable in his basal ganglia. 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 Findings not quite sensitive for seizure though this cannot be ruled out we will follow this up with an EEG. In the interim we will treat him with very low-dose of haloperidol  Patient has multiple risk factors for some vascular disease and extensive white matter changes already occurred. Other etiologies of white matter disease would be considered. For now 1 would hold all anticoagulation and antiplatelet agents and patient require rehabilitation. he also has a peripheral neuropathy at least clinically. Consultation includes my consultation with emergency room      Joshua Paez MD, [place], American Board of Psychiatry & Neurology  Board Certified in Vascular Neurology  Board Certified in Neuromuscular Medicine  Certified in [facility] [address]:

## 2021-11-13 NOTE — FLOWSHEET NOTE
Called pt pharmacy updated home med list with current prescriptions.  Call made pt wife not sure of current medication list.Electronically signed by Mary Soler RN on 11/13/2021 at 2:30 PM

## 2021-11-13 NOTE — FLOWSHEET NOTE
Went in to fix patient's tele leads and patient smacking hand away and pulling up blanket refusing to be put back on the monitor.  Educated on the need to have monitor on to see his heart rhythm and pulled blanket over head and refused for me to put the leads back on Electronically signed by Kaci Ross RN on 11/13/2021 at 4:13 AM

## 2021-11-13 NOTE — PROGRESS NOTES
See OT evaluation for all goals and OT POC.  Electronically signed by LAMBERT Murry/L on 11/13/2021 at 11:36 AM

## 2021-11-13 NOTE — PROGRESS NOTES
[facility] Occupational Therapy      Date: 2021  Patient Name: Larry Patient        MRN: [id number]  Account: [de-identified]   : 1960  (62 y.o.)  Room: [place] [address]    Chart reviewed, attempted OT at 9:55 a.m  for ford; Lawernce Roughen Patient not seen 2° to:    Pt. having bedside procedure. Echo    Spoke to Adilene ''R'' , RN aware. Will attempt again when able.     Electronically signed by BAO Monroe on 2021 at 9:55 AM

## 2021-11-13 NOTE — H&P
[place] [address] MEDICINE    HISTORY AND PHYSICAL EXAM    PATIENT NAME:  Mayi Khanna    MRN:  [id number]  SERVICE DATE:  11/12/2021   SERVICE TIME:  11:39 PM    Primary Care Physician: Haley Rhodes MD     SUBJECTIVE  CHIEF COMPLAINT:  Uncontrolled movement of extremities on right    HPI:  Mayi Khanna is a 62 y.o., , male who  has a past medical history of Asthma, Back pain, CAD (coronary artery disease), Diabetes mellitus ([address] Essential hypertension, benign, Hyperlipemia, and Neuropathic pain, leg, bilateral. that is hospitalized for stroke like symptoms, involuntary movement of right extremities. HPI  Presents w 4-5 day history uncontrolled movement of extremities on right, especially right arm. His speech is noticeable slow and he believes he has right facial droop. He denies HA, blurred vision and dizziness. No falls. No fever, chills. Right arm movement appears to start at shoulder - rolls forward and arm then follows. He has less, but still present, involuntary movement of right LE - draw leg upward. He denies pain. He was found to have Glu > 600. No N/V or diaphoresis. Insulin at home.        CT head showed  8 mm density left parietal area that may represent a bleed  MRI head preliminarily negative     PAST MEDICAL HISTORY:    Past Medical History:   Diagnosis Date    Asthma     Back pain     CAD (coronary artery disease) 01/2020    3 vessel     Diabetes mellitus (HCC)     Essential hypertension, benign     Hyperlipemia     Neuropathic pain, leg, bilateral      PAST SURGICAL HISTORY:    Past Surgical History:   Procedure Laterality Date    CARPAL TUNNEL RELEASE Bilateral     FOOT SURGERY Left     bone spur    LAMINOTOMY  2012     FAMILY HISTORY:    Family History   Problem Relation Age of Onset    Other Father         accident    Heart Failure Mother     Heart Disease Brother     Cirrhosis Brother      SOCIAL HISTORY:    Social History     Socioeconomic History  Marital status:      Spouse name: Not on file    Number of children: Not on file    Years of education: Not on file    Highest education level: Not on file   Occupational History    Not on file   Tobacco Use    Smoking status: Never Smoker    Smokeless tobacco: Never Used   Vaping Use    Vaping Use: Never used   Substance and Sexual Activity    Alcohol use: Yes     Alcohol/week: 0.0 standard drinks     Comment: occasional    Drug use: No    Sexual activity: Not on file   Other Topics Concern    Not on file   Social History Narrative    Not on file     Social Determinants of Health     Financial Resource Strain:     Difficulty of Paying Living Expenses: Not on file   Food Insecurity:     Worried About Running Out of Food in the Last Year: Not on file    Gricel of Food in the Last Year: Not on file   Transportation Needs:     Lack of Transportation (Medical): Not on file    Lack of Transportation (Non-Medical): Not on file   Physical Activity:     Days of Exercise per Week: Not on file    Minutes of Exercise per Session: Not on file   Stress:     Feeling of Stress : Not on file   Social Connections:     Frequency of Communication with Friends and Family: Not on file    Frequency of Social Gatherings with Friends and Family: Not on file    Attends Caodaism Services: Not on file    Active Member of [employer] or Organizations: Not on file    Attends Club or Organization Meetings: Not on file    Marital Status: Not on file   Intimate Partner Violence:     Fear of Current or Ex-Partner: Not on file    Emotionally Abused: Not on file    Physically Abused: Not on file    Sexually Abused: Not on file   Housing Stability:     Unable to Pay for Housing in the Last Year: Not on file    Number of [place] in the Last Year: Not on file    Unstable Housing in the Last Year: Not on file     MEDICATIONS:   Prior to Admission medications    Medication Sig Start Date End Date Taking? Authorizing Provider   insulin aspart protamine-insulin aspart (NOVOLOG MIX 70/30 FLEXPEN) (70-30) 100 UNIT/ML injection Inject 40 Units into the skin 2 times daily (with meals) 5/5/21  Yes Floyd Feliciano MD   SYMBICORT 80-4.5 MCG/ACT AERO Inhale 2 puffs into the lungs 2 times daily 2/22/21  Yes Floyd Feliciano MD   metFORMIN (GLUCOPHAGE) 1000 MG tablet TAKE 1 TABLET BY MOUTH TWICE DAILY WITH MEALS 1/20/21  Yes Tima Hunter MD   fluticasone-vilanterol (BREO ELLIPTA) 100-25 MCG/INH AEPB inhaler Inhale 1 puff into the lungs daily 3/26/19  Yes Floyd Feliciano MD   glimepiride (AMARYL) 4 MG tablet     Historical Provider, MD   lisinopril (PRINIVIL;ZESTRIL) 10 MG tablet TAKE 1 TABLET BY MOUTH DAILY 8/9/21   Floyd Feliciano MD   rosuvastatin (CRESTOR) 20 MG tablet Take 1 tablet by mouth nightly 5/6/21   Floyd Feliciano MD   tamsulosin [facility]) 0.4 MG capsule Take 1 capsule by mouth daily 3/5/21   Joi Means MD   insulin lispro protamine & lispro (HUMALOG MIX) (75-25) 100 UNIT per ML SUPN injection pen 40 u bid SC 1/27/21   Floyd Feliciano MD   albuterol sulfate  (90 Base) MCG/ACT inhaler Inhale 2 puffs into the lungs every 6 hours as needed for Wheezing 10/8/20   Floyd Feliciano MD   blood glucose test strips (FREESTYLE LITE) strip Test FOUR times daily as directed 10/7/20   Tima Hunter MD   blood glucose monitor kit and supplies Test 3 times a day & as needed for symptoms of irregular blood glucose. 2/21/20   Tima Hunter MD   blood glucose monitor kit and supplies Please give 1 meter Dx E11.65 (Please dispense covered brand) 12/17/19   Tima Hunter MD   Lancets MISC Pt test 4x daily Dx E11.65 (dispense covered brand) 12/17/19   Tima Hunter MD   aspirin 81 MG EC tablet Take 1 tablet by mouth daily 12/12/19   Victoria Hodge MD   pantoprazole (PROTONIX) 40 MG tablet Take 40 mg by mouth daily    Historical Provider, MD   Insulin Pen Needle (NOVOFINE) 32G X 6 MM MISC Bid 11/26/19   MD DAMIÁN Alvarez LANCETS MISC 1 each by Does not apply route daily 11/26/19   Eryn Xiao MD   blood glucose test strips (FREESTYLE LITE) strip As needed. 11/26/19   Eryn Xiao MD   Blood Glucose Monitoring Suppl (FREESTYLE LITE) MARIUM 1 Device by Does not apply route daily as needed (as directed) 11/26/19   Eryn Xiao MD   blood glucose test strips (ONETOUCH VERIO) strip Test tid 3/26/19   Ginny Sosa MD   ONE TOUCH LANCETS MISC 1 each by Does not apply route 3 times daily 3/26/19   Ginny Sosa MD   Blood Glucose Monitoring Suppl ([place] [employer] IQ SYSTEM) W/DEVICE KIT Use as directed 7/5/16   Eryn Xiao MD       ALLERGIES: Patient has no known allergies. REVIEW OF SYSTEM:   Review of Systems   Unable to perform ROS: Other (He is sedated)        OBJECTIVE  PHYSICAL EXAM:   Physical Exam  Constitutional:       General: He is not in acute distress. Appearance: He is normal weight. Comments: Sedated  Asleep    Will awaken only briefly to answer questions. Believes he is at [facility] [place]. Does not know why. HENT:      Head: Normocephalic. Nose: Nose normal.      Mouth/Throat:      Mouth: Mucous membranes are dry. Eyes:      Conjunctiva/sclera: Conjunctivae normal.   Neck:      Vascular: No carotid bruit. Cardiovascular:      Rate and Rhythm: Normal rate and regular rhythm. Pulses: Normal pulses. Heart sounds: No murmur heard. Pulmonary:      Effort: Pulmonary effort is normal.      Breath sounds: No wheezing or rhonchi. Abdominal:      General: There is no distension. Palpations: Abdomen is soft. Tenderness: There is no abdominal tenderness. Musculoskeletal:      Cervical back: No tenderness. Right lower leg: No edema. Left lower leg: No edema. Skin:     General: Skin is warm and dry. Capillary Refill: Capillary refill takes less than 2 seconds. Neurological:      Comments: Sleepy, sedated.   Limited response  CEDEÑO x 4   Psychiatric:         Mood and Affect: Mood normal.         Behavior: Behavior normal.          BP (!) 169/99   Pulse 110   Temp 98.6 °F (37 °C) (Oral)   Resp 18   Ht 5' 7\" (1.702 m)   Wt 170 lb (77.1 kg)   SpO2 99%   BMI 26.63 kg/m²     DATA:     Diagnostic tests reviewed for today's visit:    Most recent labs and imaging results reviewed.      LABS:    Recent Results (from the past 24 hour(s))   POCT Glucose    Collection Time: 11/12/21  5:24 PM   Result Value Ref Range    POC Glucose >600 (HH) 60 - 115 mg/dl    Performed on ACCU-CHEK    POCT Glucose    Collection Time: 11/12/21  5:26 PM   Result Value Ref Range    Glucose 500 mg/dL    QC OK? ok    Urine Drug Screen    Collection Time: 11/12/21  5:30 PM   Result Value Ref Range    Amphetamine Screen, Urine Neg Negative <1000 ng/mL    Barbiturate Screen, Ur Neg Negative < 200 ng/mL    Benzodiazepine Screen, Urine Neg Negative < 200 ng/mL    Cannabinoid Scrn, Ur Neg Negative < 50 ng/mL    Cocaine Metabolite Screen, Urine Neg Negative < 300 ng/mL    Opiate Scrn, Ur Neg Negative < 300 ng/mL    PCP Screen, Urine Neg Negative < 25 ng/mL    Methadone Screen, Urine Neg Negative <300 ng/mL    Propoxyphene Scrn, Ur Neg Negative <300 ng/mL    Oxycodone Urine Neg Negative <100 ng/mL    Drug Screen Comment: see below    Urine Reflex to Culture    Collection Time: 11/12/21  5:30 PM    Specimen: Urine, clean catch   Result Value Ref Range    Color, UA Yellow Straw/Yellow    Clarity, UA Clear Clear    Glucose, Ur >=1000 (A) Negative mg/dL    Bilirubin Urine Negative Negative    Ketones, Urine Negative Negative mg/dL    Specific Gravity, UA 1.029 1.005 - 1.030    Blood, Urine TRACE (A) Negative    pH, UA 5.5 5.0 - 9.0    Protein,  (A) Negative mg/dL    Urobilinogen, Urine 1.0 <2.0 E.U./dL    Nitrite, Urine Negative Negative    Leukocyte Esterase, Urine Negative Negative    Urine Reflex to Culture Not Indicated    Microscopic Urinalysis    Collection Time: 11/12/21  5:30 PM   Result Value Ref Range    Bacteria, UA Negative Negative /HPF    Hyaline Casts, UA 0-1 0 - 5 /HPF    WBC, UA 0-2 0 - 5 /HPF    RBC, UA 3-5 (A) 0 - 5 /HPF    Epithelial Cells, UA 0-2 0 - 5 /HPF   Comprehensive Metabolic Panel    Collection Time: 11/12/21  5:45 PM   Result Value Ref Range    Sodium 129 (L) 135 - 144 mEq/L    Potassium 4.1 3.4 - 4.9 mEq/L    Chloride 92 (L) 95 - 107 mEq/L    CO2 25 20 - 31 mEq/L    Anion Gap 12 9 - 15 mEq/L    Glucose 681 (HH) 70 - 99 mg/dL    BUN 27 (H) 8 - 23 mg/dL    CREATININE 1.30 (H) 0.70 - 1.20 mg/dL    GFR Non-African American 56.2 (L) >60    GFR  >60.0 >60    Calcium 9.0 8.5 - 9.9 mg/dL    Total Protein 5.9 (L) 6.3 - 8.0 g/dL    Albumin 3.8 3.5 - 4.6 g/dL    Total Bilirubin 0.3 0.2 - 0.7 mg/dL    Alkaline Phosphatase 94 35 - 104 U/L    ALT 9 0 - 41 U/L    AST 14 0 - 40 U/L    Globulin 2.1 (L) 2.3 - 3.5 g/dL   CBC Auto Differential    Collection Time: 11/12/21  5:45 PM   Result Value Ref Range    WBC 5.0 4.8 - 10.8 K/uL    RBC 5.21 4.70 - 6.10 M/uL    Hemoglobin 14.7 14.0 - 18.0 g/dL    Hematocrit 43.8 42.0 - 52.0 %    MCV 83.9 80.0 - 100.0 fL    MCH 28.1 27.0 - 31.3 pg    MCHC 33.5 33.0 - 37.0 %    RDW 12.5 11.5 - 14.5 %    Platelets 183 760 - 193 K/uL    Neutrophils % 77.8 %    Lymphocytes % 10.5 %    Monocytes % 7.7 %    Eosinophils % 3.6 %    Basophils % 0.4 %    Neutrophils Absolute 3.9 1.4 - 6.5 K/uL    Lymphocytes Absolute 0.5 (L) 1.0 - 4.8 K/uL    Monocytes Absolute 0.4 0.2 - 0.8 K/uL    Eosinophils Absolute 0.2 0.0 - 0.7 K/uL    Basophils Absolute 0.0 0.0 - 0.2 K/uL   Ethanol    Collection Time: 11/12/21  5:45 PM   Result Value Ref Range    Ethanol Lvl <10 mg/dL    Ethanol percent Not indicated G/dL   Lactic Acid, Plasma    Collection Time: 11/12/21  5:45 PM   Result Value Ref Range    Lactic Acid 0.9 0.5 - 2.2 mmol/L   APTT    Collection Time: 11/12/21  5:45 PM   Result Value Ref Range    aPTT 22.4 (L) 24.4 - 36.8 sec   Protime-INR    Collection Time: 11/12/21  5:45 PM   Result Value Ref Range    Protime 12.1 (L) 12.3 - 14.9 sec    INR 0.9    CK    Collection Time: 11/12/21  5:45 PM   Result Value Ref Range    Total  (H) 0 - 190 U/L   Troponin    Collection Time: 11/12/21  5:45 PM   Result Value Ref Range    Troponin 0.018 (HH) 0.000 - 0.010 ng/mL   CKMB & RELATIVE PERCENT    Collection Time: 11/12/21  5:45 PM   Result Value Ref Range    CK-MB 6.2 0.0 - 6.7 ng/mL    CK-MB Index 2.8 0.0 - 3.5 %   EKG 12 Lead - Chest Pain    Collection Time: 11/12/21  6:19 PM   Result Value Ref Range    Ventricular Rate 111 BPM    Atrial Rate 111 BPM    P-R Interval 130 ms    QRS Duration 94 ms    Q-T Interval 354 ms    QTc Calculation (Bazett) 481 ms    P Axis 42 degrees    R Axis -46 degrees    T Axis 63 degrees   POCT Venous    Collection Time: 11/12/21  6:31 PM   Result Value Ref Range    POC Sodium 129 (L) 136 - 145 mEq/L    POC Potassium 3.8 3.5 - 5.1 mEq/L    POC Chloride 96 (L) 99 - 110 mEq/L    POC Glucose >700 (HH) 60 - 115 mg/dl    POC Creatinine 1.1 0.8 - 1.3 mg/dL    GFR Non-African American >60 >60    GFR African American >60 >60    Calcium, Ion 1.10 (L) 1.12 - 1.32 mmol/L    pH, Umair 7.365 7.350 - 7.450    pCO2, Umair 45.0 40.0 - 50.0 mm Hg    pO2, Umair 38 Not Established mm Hg    HCO3, Venous 25.7 23.0 - 29.0 mmol/L    Base Excess, Umair 0 -3 - 3    O2 Sat, Umair 70 Not Established %    TC02 (Calc), Umair 27 Not Established mmol/L    Lactate 1.91 0.40 - 2.00 mmol/L    Hemoglobin 16.0 13.5 - 17.5 gm/dL    POC Hematocrit 47 41 - 53 %    FIO2 21.000     Sample Type UMAIR     Performed on SEE BELOW    POCT Glucose    Collection Time: 11/12/21  9:45 PM   Result Value Ref Range    POC Glucose 306 (H) 60 - 115 mg/dl    Performed on [facility]        IMAGING:  CT Head WO Contrast    Result Date: 11/12/2021  EXAMINATION: CT HEAD WO CONTRAST, 11/12/2021 6:50 PM CLINICAL HISTORY:  hemibalisstic presentation with intermittent confusion COMPARISON: Compared to prior from November 29, 2019 and November 26, 2019 TECHNIQUE: Multiple contiguous axial images of the head were obtained from the skull base through the skull vertex without intravenous contrast. Sagittal and coronal reformats have been produced. All CT scans at this facility use dose modulation, iterative reconstruction, and/or weight based dosing when appropriate to reduce radiation dose to as low as reasonably achievable. BRAIN CT FINDINGS: There is an 8 mm hyperdensity in the left periventricular white matter which is new since the previous examinations. Gray-white matter differentiation is maintained. There is prominence of sulci and ventricles indicating mild global cerebral atrophy and chronic involutional changes. There are periventricular white matter hypodensities which are normally associated with chronic microangiopathy. The basal ganglia are within normal limits. There are no acute changes or space-occupying lesions in the posterior fossa. The visualized portions of the orbits are within normal limits. The globes are intact. The imaged portions of the paranasal sinuses are unremarkable. The calvarium is intact. There is an 8 mm hyperdensity in the left periventricular white matter which may be acute or subacute and may represent a small focus of hemorrhage. Findings were called to ER at 1913 hours and briefly discussed with Dr. Jennie Drew. XR CHEST PORTABLE    Result Date: 11/12/2021  Exam: XR CHEST PORTABLE History:  hyperglycemia, ams Technique: AP portable view of the chest obtained. Comparison: Chest x-ray from April 20, 2021 Chest x-ray portable Findings: Status post median sternotomy. The cardiomediastinal silhouette is within normal limits. There are no infiltrates, consolidations or effusions. Bones of the thorax appear intact. There are chronic, healed posttraumatic deformities of the left ribs. No radiographic evidence of acute intrathoracic process.        VTE Prophylaxis: low molecular weight heparin -  start    ASSESSMENT AND PLAN  Principal Problem:    Involuntary movements   For past several days  Mostly RUE. Had noticed speech changes and facial droop, confirmed by his wife by ER staff. He is sedated but arousable. Brief answer to some questions. CEDEÑO x 4. Frequent movement of RUE. Also draws up RLE. CT w 8 mm hyperdensity in the left periventricular white matter which may be acute or subacute and may represent a small focus of hemorrhage. MRI unremarkable. Plan: admit   Neuro consult    Carotids,  Echo. BP control. Active Problems:    Hyponatremia  Na 129  When corrected for glucose, it is normal        Plan: monitor labs,  Control hyperglycemia. CAD s/p cabbage. States no chest pain. EKG w ST, LAE, IRBBB  Troponin 0.018     Plan:  Serial troponin. ANTON    B/ Scr  27/1.30     Baseline Scr 1.0   Has UO. No hematuria. Plan:monitor labs  Maintain hydration. Type 2 diabetes mellitus with hyperglycemia  Presented BS >600   Insulin at home   Last A1c 13.3 in April   Plan:   SSI for now. Resume home meds at discharge. Elevated troponin  Trop 0.018  No chest pain.   EKG OK  Plan:  Serial troponin          Plan of care discussed with: patient    SIGNATURE: SHERIN Schwarz - CNP  DATE: November 12, 2021  TIME: 11:39 PM   Paolo Alfaro MD  - supervising physician

## 2021-11-13 NOTE — FLOWSHEET NOTE
Patients bed alarm went off. Nurse and pca went into patients room. Patient was up with walker heading to bathroom. When nursing tried to enter bathroom to steady patient, patient swung his arm back and told nursing to [facility] off\". Patient very agitated all of the sudden. He is yelling at nursing staff and insisting that we remove his IV so that he can leave AMA. Nursing supervisor called. Secure message sent to Dr Alecia Maddox. 1714: Secure message sent to Dr Marcell Shirley him that patient is wanting to leave ama. Patient is using walker and changing out of his gown and into his clothes. Andrew Short RN supervisor in room. Dr Alecia Maddox aware of patient wanting to leave AMA. 1735: Dr Shorty Johnson aware of patient wanting to leave AMA. 1752: After discussing the situation with the patient in depth, he is willing to stay at the hospital at this time. Patient made aware of the need for the bed alarm, since he was insisting we turned it off. Patient is high risk for falls, and has very unsteady gait due to the spastic nature of his right side at this time. Patient agreed to leave bed alarm on at this time and call for assistance. Patient given 2 urinals for use. Patients wife at bedside. She also expressed a desire for him to stay at the hospital. Patient calm and cooperative at this time.

## 2021-11-13 NOTE — FLOWSHEET NOTE
Reported BP to Dr Tracy Penaloza awaiting further orders Electronically signed by Frankie Sesay RN on 11/13/2021 at 4:27 AM

## 2021-11-13 NOTE — PROGRESS NOTES
Josi Ramsay   Facility/Department: [facility]  Speech Language Pathology  Initial Speech/Language/Cognitive Assessment    NAME:Gary Ovalles  : 1960 (11 y.o.)   MRN: [id number]  ROOM: [place] [address]  ADMISSION DATE: 2021  PATIENT DIAGNOSIS(ES): Hyperglycemia [R73.9]  Hypertensive urgency [I16.0]  Involuntary movements [R25.9]  No chief complaint on file.     Patient Active Problem List    Diagnosis Date Noted    Involuntary movements 2021    [facility]TON [facility]acute kidney [facility]) ([facility] 2021    Hyponatremia 2021    Type 2 diabetes mellitus with hyperglycemia ([facility] [facility] 2021    Elevated troponin 2021    General patient noncompliance 2020    Anginal chest pain at rest [facility]) 2019    NSTEMI (non-ST elevated myocardial infarction) ([facility] [facility] 2019    Mild intermittent asthma with exacerbation     Acute on chronic diastolic (congestive) heart failure ([facility] [facility] 2019    Community acquired pneumonia of right lower lobe of lung 2019    Chest pain 2019    Acute bronchitis due to other specified organisms 2019    Hereditary peripheral neuropathy 2018    Migraine without aura 2018    Other intervertebral disc disorders, lumbar region 10/16/2018    Spinal stenosis, lumbar region with neurogenic claudication 10/16/2018    Spondylolisthesis, lumbosacral region 10/16/2018    Other idiopathic scoliosis, lumbosacral region 2018    Hematoma of left lower extremity 2018    Staphylococcus aureus bacteremia 2018    Hematoma of right thigh 2018    Body mass index (bmi) 29.0-29.9, adult 2018    Cellulitis of right lower limb 2018    Other specified hypothyroidism 2018    Inflammatory disorders of scrotum 2018    Long term (current) use of insulin ([facility] 2018    Long term (current) use of oral hypoglycemic drugs 2018    Dyspnea, unspecified 2018    Pain in left lower leg 2018  Pain of right lower extremity 06/16/2018    Shortness of breath 06/16/2018    CAD (coronary artery disease) 06/13/2018    Family history of ischemic heart disease and other diseases of the circulatory system 06/13/2018    Other chronic sinusitis 06/13/2018    Sleep apnea 06/13/2018    Type 2 diabetes mellitus with diabetic polyneuropathy ([address] 06/13/2018    Unsp open wound of r little finger w/o damage to nail, init 06/13/2018    Encounter for pre-employment examination 04/13/2018    Allergic rhinitis due to pollen 06/07/2016    Essential hypertension, benign     Irregular heart rhythm 11/21/2012    Obesity, diabetes, and hypertension syndrome ([address] 03/16/2005    Hyperlipidemia 03/16/2005     Past Medical History:   Diagnosis Date    Asthma     Back pain     CAD (coronary artery disease) 01/2020    3 vessel     Diabetes mellitus ([address]     Essential hypertension, benign     Hyperlipemia     Neuropathic pain, leg, bilateral      Past Surgical History:   Procedure Laterality Date    CARPAL TUNNEL RELEASE Bilateral     FOOT SURGERY Left     bone spur    LAMINOTOMY  2012         DATE ONSET: 11/12/2021    Date of Evaluation: 11/13/2021   Evaluating Therapist: Peace Pete SLP      Assessment:  Cognitive Diagnosis: Pt presents with mild cognitive-linguistic impairment characterized by decrease STM, thought organization, and tangential, off topic responses negatively impacting his ability to effectively communicate wants and needs and safely perform ADLs upon discharge. Speech Diagnosis: Pt presents with mild dysarthria characterized by decreased oral motor strength, imprecise articulation, and mildly decreased speech intelligibility negatively impacting his ability to effective communicate wants and needs with caregivers upon discharge.    Diagnosis: Mild cognitive-linguistic impairment; Mild dysarthria    Recommendations:  Requires SLP Intervention: Yes  Duration/Frequency of Treatment: 1-2x/wk for LOS or until all goals met  D/C Recommendations: To be determined        Goals:  Short-term Goals  Timeframe for Short-term Goals: 1 week  Goal 1: Pt will be educated on 3 memory strategies and will implement during structured tasks with 80% acc given cues as needed in order to promote safety with the completion of ADLs. Goal 2: Pt will verbally sequence ADLs with 80% acc given cues as needed in order to promote effective communication of wants and needs. Goal 3: Pt will be educated on speech intelligibility strategies and will implement during structured tasks with 80% acc given cues as needed in order to promote effective communication of wants and needs with caregivers. Goal 4: Within 1-5 days of the implementation of ST POC, pt will participate in further assessment of problem solving skills and new goals to be added to POC as deemed necessary. Long-term Goals  Timeframe for Long-term Goals: 2 week  Goal 1: Pt will increase his cognitive-linguistic skills in order to promote effective communication of wants and needs and safety with the completions of ADLs. Goal 2: Pt will increase his speech intelligibility in order to promtoe effective communication of wants and needs with caregivers upon discharge.    Patient's goals: Pt was involved with the creation of POC    Subjective:   Previous level of function and limitations: See below  General  Chart Reviewed: Yes  Patient assessed for rehabilitation services?: Yes  Family / Caregiver Present: No  Subjective  Subjective: Pt was alert and cooperative for SLE  Social/Functional History  Lives With: Spouse  Active : Yes  Vision  Vision: Impaired  Vision Exceptions: Wears glasses for reading  Hearing  Hearing: Exceptions to [facility]  Hearing Exceptions: Hard of hearing/hearing concerns        Objective:     Oral/Motor  Oral Motor: Exceptions to [facility]  Labial Strength: Reduced  Lingual Strength: Reduced    Auditory Comprehension  Comprehension: Within Functional Limits  Yes/No Questions:  [facility]  Basic Questions:  [facility]  One Step Basic Commands:  (WFL)  Two Step Basic Commands:  (WFL)  Multistep Basic Commands:  (WFL)  Complex/Abstract Commands:  (WFL)  Conversation:  (WFL)    Reading Comprehension  Reading Status: Unable to assess    Expression  Primary Mode of Expression: Verbal    Verbal Expression  Verbal Expression: Within functional limits  Automatic Speech:  (WFL)  Confrontation:  (WFL)  Convergent:  (WFL)  Divergent:  (WFL)  Responsive:  (WFL)  Conversation: Moderate  Interfering Components: Impaired thought organization; Tangential speech    Written Expression  Dominant Hand: Right  Written Expression: Unable to assess    Motor Speech  Motor Speech: Exceptions to [facility] [place]  Intelligibility: Mild  Dysarthria : Mild    Pragmatics/Social Functioning  Pragmatics: Exceptions to [facility]  Affect: Mild  Topic Maintenance: Moderate    Cognition:      Orientation  Overall Orientation Status: Within Normal Limits  Attention  Attention: Within Functional Limits  Memory  Memory: Exceptions to [facility]  Short-term Memory: Moderate (Pt recalled 1/3 words after 5-min delay)  Problem Solving  Problem Solving: Unable to assess  Abstract Reasoning  Abstract Reasoning: Within Functional Limits  Safety/Judgement  Safety/Judgement: Unable to assess    Additional Assessments:   N/A        Prognosis:  Speech Therapy Prognosis  Prognosis: Fair  Individuals consulted  Consulted and agree with results and recommendations: Patient; RN (Jorge A)    Education:  Patient Education: Pt was educated on results of SLE  Patient Education Response: Verbalizes understanding  Safety Devices in place: Yes  Type of devices: Call light within reach; Bed alarm in place; Nurse notified    Pain Assessment:  Pre-Treatment  Pain assessment: 0-10  Pain level: 0  Intervention:  Patient denies pain. Post-Treatment  Pain assessment: 0-10  Pain level: 0  Intervention:  Patient denies pain.       Therapy Time  SLP Individual Minutes  Time In: 3218  Time Out: 9557  Minutes: 13        Signature: Electronically signed by GEE Gonzalez on 11/13/2021 at 10:45 AM

## 2021-11-13 NOTE — FLOWSHEET NOTE
Arrived to floor via cart. Vitals obtained but patient was not very compliant moving arm for vitals to be obtained. Barely answered any questions directed to him. Did answer some orientation questions but when asked other admission questions would not answer at this time Slight left facial droop noted when patient asked to smile. Fixed tele but patient would not roll for it to be fixed more securely and as left room tele was removed by patient again. Bed alarm on given a urinal and call light and instructed to call for assistance. Assessment was complete.  Electronically signed by Derrell Echevarria RN on 11/13/2021 at 1:30 AM

## 2021-11-13 NOTE — PROGRESS NOTES
Hospitalist Progress Note      PCP: Vincenzo Han MD    Date of Admission: 11/12/2021    Chief Complaint:  No acute events, afebrile, hypertensive    Medications:  Reviewed    Infusion Medications    dextrose      dextrose       Scheduled Medications    enoxaparin  40 mg SubCUTAneous Daily    atorvastatin  80 mg Oral Nightly    insulin lispro  0-12 Units SubCUTAneous TID WC    insulin lispro  0-6 Units SubCUTAneous Nightly    insulin lispro  0-12 Units SubCUTAneous TID WC    insulin lispro  0-6 Units SubCUTAneous Nightly    influenza virus vaccine  0.5 mL IntraMUSCular Prior to discharge    [Held by provider] aspirin  81 mg Oral Daily    lisinopril  10 mg Oral Daily    insulin lispro  6 Units SubCUTAneous TID WC    insulin glargine  42 Units SubCUTAneous Nightly    pantoprazole  40 mg Oral QAM AC    budesonide-formoterol  2 puff Inhalation BID    tamsulosin  0.4 mg Oral Daily    sodium chloride (PF)  10 mL IntraVENous Once     PRN Meds: ondansetron **OR** ondansetron, polyethylene glycol, glucose, dextrose, glucagon (rDNA), dextrose, glucose, dextrose, glucagon (rDNA), dextrose, labetalol    No intake or output data in the 24 hours ending 11/13/21 0821    Exam:    BP (!) 188/109   Pulse 101   Temp 97.9 °F (36.6 °C) (Oral)   Resp 18   Ht 5' 7\" (1.702 m)   Wt 170 lb (77.1 kg)   SpO2 100%   BMI 26.63 kg/m²     General appearance: appears stated age and cooperative. Respiratory: clear to auscultation, bilaterally   Cardiovascular: Regular rate and rhythm, S1/S2   Abdomen: Soft, active bowel sounds. Musculoskeletal: No edema bilaterally.        Labs:   Recent Labs     11/12/21 1745 11/12/21 1831 11/13/21  0601   WBC 5.0  --  5.3   HGB 14.7 16.0 13.9*   HCT 43.8  --  41.2*     --  126*     Recent Labs     11/12/21 1745 11/12/21 1831   *  --    K 4.1  --    CL 92*  --    CO2 25  --    BUN 27*  --    CREATININE 1.30* 1.1   CALCIUM 9.0  --      Recent Labs     11/12/21 1745   AST 14 ALT 9   BILITOT 0.3   ALKPHOS 94     Recent Labs     11/12/21  1745   INR 0.9     Recent Labs     11/12/21  1745   CKTOTAL 223*   TROPONINI 0.018*       Urinalysis:      Lab Results   Component Value Date    NITRU Negative 11/12/2021    WBCUA 0-2 11/12/2021    BACTERIA Negative 11/12/2021    RBCUA 3-5 11/12/2021    BLOODU TRACE 11/12/2021    SPECGRAV 1.029 11/12/2021    GLUCOSEU >=1000 11/12/2021       Radiology:  CT Head WO Contrast   Final Result      There is an 8 mm hyperdensity in the left periventricular white matter which may be acute or subacute and may represent a small focus of hemorrhage. Findings were called to ER at 1913 hours and briefly discussed with Dr. Kenyatta Thompson. XR CHEST PORTABLE   Final Result   No radiographic evidence of acute intrathoracic process. MRI BRAIN W WO CONTRAST    (Results Pending)   US CAROTID ARTERY BILATERAL    (Results Pending)           Assessment/Plan:    62 y. o. 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,RLE  - CT head showed ICH sity in the left periventricular white matter   - MRI showed extensive white matter changes in his basal ganglia  - hold ASA,Lovenox  - followed by neurology     Hypertensive urgency  - in the setting of possible brain bleed  - IV labetalol as needed to keep SBP<160  - monitor BP closely     Elevated troponin  - ECG showed sinus tachycardia  - holding ASA due to possible brain bleed  - serial troponins  - cardiology consult    IDDM with severe hyperglycemia  - with Glc>600 on arrival  - improving  - on Lantus, premeal coverage,ISS    ANTON  - improved    Diet: Diet NPO    Code Status: Full Code               Electronically signed by Chace Art MD on 11/13/2021 at 8:21 AM

## 2021-11-13 NOTE — PROGRESS NOTES
Physical Therapy Med Surg Initial Assessment  [facility]Department[facility]  Room: N221/N221-       NAME: Tyler Jarvis  : 1960 (62 y.o.)  MRN: [id number]  CODE STATUS: Full Code    Date of Service: 2021    Patient Diagnosis(es): Hyperglycemia [R73.9]  Hypertensive urgency [I16.0]  Involuntary movements [R25.9]   No chief complaint on file.     Patient Active Problem List    Diagnosis Date Noted    Involuntary movements 2021    ANTON (acute kidney injury) ([facility] 2021    Hyponatremia 2021    Type 2 diabetes mellitus with hyperglycemia ([facility] 2021    Elevated troponin 2021    General patient noncompliance 2020    Anginal chest pain at rest [facility]) 2019    NSTEMI (non-ST elevated myocardial infarction) ([facility] 2019    Mild intermittent asthma with exacerbation     Acute on chronic diastolic (congestive) heart failure ([facility] 2019    Community acquired pneumonia of right lower lobe of lung 2019    Chest pain 2019    Acute bronchitis due to other specified organisms 2019    Hereditary peripheral neuropathy 2018    Migraine without aura 2018    Other intervertebral disc disorders, lumbar region 10/16/2018    Spinal stenosis, lumbar region with neurogenic claudication 10/16/2018    Spondylolisthesis, lumbosacral region 10/16/2018    Other idiopathic scoliosis, lumbosacral region 2018    Hematoma of left lower extremity 2018    Staphylococcus aureus bacteremia 2018    Hematoma of right thigh 2018    Body mass index (bmi) 29.0-29.9, adult 2018    Cellulitis of right lower limb 2018    Other specified hypothyroidism 2018    Inflammatory disorders of scrotum 2018    Long term (current) use of insulin ([facility] 2018    Long term (current) use of oral hypoglycemic drugs 2018    Dyspnea, unspecified 2018    Pain in left lower leg 2018    Pain of right lower extremity 06/16/2018    Shortness of breath 06/16/2018    CAD (coronary artery disease) 06/13/2018    Family history of ischemic heart disease and other diseases of the circulatory system 06/13/2018    Other chronic sinusitis 06/13/2018    Sleep apnea 06/13/2018    Type 2 diabetes mellitus with diabetic polyneuropathy ([facility] 06/13/2018    Unsp open wound of r little finger w/o damage to nail, init 06/13/2018    Encounter for pre-employment examination 04/13/2018    Allergic rhinitis due to pollen 06/07/2016    Essential hypertension, benign     Irregular heart rhythm 11/21/2012    Obesity, diabetes, and hypertension syndrome ([facility] 03/16/2005    Hyperlipidemia 03/16/2005        Past Medical History:   Diagnosis Date    Asthma     Back pain     CAD (coronary artery disease) 01/2020    3 vessel     Diabetes mellitus ([address]     Essential hypertension, benign     Hyperlipemia     Neuropathic pain, leg, bilateral      Past Surgical History:   Procedure Laterality Date    CARPAL TUNNEL RELEASE Bilateral     FOOT SURGERY Left     bone spur    LAMINOTOMY  2012     Chart Reviewed: Yes  Patient assessed for rehabilitation services?: Yes  Family / Caregiver Present: No    Restrictions:  Restrictions/Precautions: Fall Risk (high fall risk per Travis score;impulsive)     SUBJECTIVE: Subjective: \"I feel that it gets worse when I am anxious. \"    Pain  Pre Treatment Pain Screening  Pain at present: 0    Post Treatment Pain Screening:   Pain Screening  Patient Currently in Pain: No  Pain Assessment  Pain Assessment: 0-10  Pain Level: 0    Prior Level of Function:  Social/Functional History  Lives With: Spouse  Type of Home:  ([facility])  Home Layout: Two level, Bed/Bath upstairs, 1/2 bath on main level (10-12 steps with handrail on L when ascending)  Home Access: Level entry, Stairs to enter without rails  Entrance Stairs - Number of Steps: 1  Bathroom Shower/Tub: Tub/Shower unit  Bathroom Toilet: Standard  Bathroom Equipment: Hand-held shower  Home Equipment:  (no AD)  ADL Assistance: Independent  Homemaking Assistance: Independent  Homemaking Responsibilities: Yes (shared)  Ambulation Assistance: Independent  Transfer Assistance: Independent  Active : Yes  Occupation: Retired  Type of occupation: [employer]    OBJECTIVE:   Vision Exceptions: Wears glasses for reading (Rx bifocals ar broken per pt reports)  Hearing: Exceptions to [facility]  Hearing Exceptions: Hard of hearing/hearing concerns    Cognition:  Overall Orientation Status: Within Functional Limits  Follows Commands: Within Functional Limits    Observation/Palpation  Posture: Good  Observation: pleasant, cooperative, uses inappriate language at times, R UE and R LE choriaform movements noted-increased when aggitated during education and instruction    ROM:  RLE AROM: WFL  LLE AROM : WFL    Strength:  Strength RLE  Strength RLE: WFL  Strength LLE  Strength LLE: WFL    Neuro:  Balance  Posture: Good  Sitting - Static: Good  Sitting - Dynamic: +; Fair (5 R sdied LOB in sitting when attempting to geovanny socks-agitated when attempting to guard for pt safety)  Standing - Static: Fair; +  Standing - Dynamic: Fair; +  Comments: reaching for object on ground with R sided LOB-impulsive and aggitated with attempts to guard pt for safety     Motor Control  Gross Motor?: Exceptions  Comments: R UE and R LE ataxia-limited assessement d/t poor ability to redirection during assessment  Sensation  Overall Sensation Status: Impaired  Additional Comments: b/l hand/feet diabetic neuropathy    Bed mobility  Supine to Sit: Stand by assistance  Sit to Supine: Supervision    Transfers  Sit to Stand: Stand by assistance  Stand to sit: Stand by assistance  Bed to Chair: Stand by assistance  Stand Pivot Transfers: Stand by assistance  Comment: with no AD    Ambulation  Ambulation?: Yes  Ambulation 1  Surface: level tile  Device: Rolling Walker  Assistance: Stand by assistance (pt did not allow for CGA)  Gait Deviations: Increased MIRANDA; Decreased step length; Decreased step height  Distance: 50ft  Comments: ambulating in room, negotiating door/doorways, bathroom environement, impulsive, gait ataxia with frequent R lateral exersions, impulsive with poor safety awareness. Pt aggitated with guarding techiques. Difficult to redirect to education regaring safety concerns    Activity Tolerance  Activity Tolerance: Patient Tolerated treatment well      PT Education  PT Education: PT Role; Goals; Plan of Care; General Safety  Patient Education: fall risk, benefits of PT to address balance and gait deficits    ASSESSMENT:   Body structures, Functions, Activity limitations: Decreased functional mobility ; Decreased safe awareness; Decreased balance; Decreased coordination  Decision Making: Medium Complexity  History: med  Exam: med  Clinical Presentation: med    Prognosis: Good  Patient Education: fall risk, benefits of PT to address balance and gait deficits  Barriers to Learning: readiness for education, problem solving/higher level reasoning deficits, decreased awarenes of impact of deficits on his safety    DISCHARGE RECOMMENDATIONS:  Discharge Recommendations: Continue to assess pending progress  Assessment: Pt demonstrates the above deficits and decline in functional mobility status placing them at increased risk for falls. Pt admits to frequent falls at home and demonstrates LOB during mobility assessment this date. Pt would benefit from physical therapy to address above deficits and allow for safe return home at highest level of function, decrease risk for falls, and improve QOL.   REQUIRES PT FOLLOW UP: Yes      PLAN OF CARE:  Plan  Times per week: 3-6  Current Treatment Recommendations: Strengthening, Transfer Training, Endurance Training, Neuromuscular Re-education, Equipment Evaluation, Education, & procurement, Modalities, Patient/Caregiver Education & Training, Home Exercise Program, Gait Training, Balance Training, Functional Mobility Training, Stair training, Safety Education & Training  Safety Devices  Type of devices: Left in bed, Call light within reach, Bed alarm in place, Nurse notified    Goals:  Patient goals : to go home  Long term goals  Long term goal 1: Bed mobility with indep  Long term goal 2: Functional transfers with indep  Long term goal 3: Amb 150ft with LRAD and indep  Long term goal 4: SMITH >43/56 to demonstrate low fall risk    AMPA (6 CLICK) [id number] Mobility Raw Score : 19     Therapy Time:   Individual   Time In 1026   Time Out 1047   Minutes [address], [address] [facility], 11/13/21 at 2:21 PM       Definitions for assistance levels  Independent = pt does not require any physical supervision or assistance from another person for activity completion. Device may be needed.   Stand by assistance = pt requires verbal cues or instructions from another person, close to but not touching, to perform the activity  Minimal assistance= pt performs 75% or more of the activity; assistance is required to complete the activity  Moderate assistance= pt performs 50% of the activity; assistance is required to complete the activity  Maximal assistance = pt performs 25% of the activity; assistance is required to complete the activity  Dependent = pt requires total physical assistance to accomplish the task

## 2021-11-13 NOTE — PROGRESS NOTES
[facility] OCCUPATIONAL THERAPY EVALUATION - ACUTE     NAME: Mavis Hill  : 1960 (62 y.o.)  MRN: [id number]  CODE STATUS: Full Code  Room: T548/C790-14    Date of Service: 2021    Patient Diagnosis(es): Hyperglycemia [R73.9]  Hypertensive urgency [I16.0]  Involuntary movements [R25.9]   No chief complaint on file.     Patient Active Problem List    Diagnosis Date Noted    Involuntary movements 2021    ANTON (acute kidney injury) ([facility] 2021    Hyponatremia 2021    Type 2 diabetes mellitus with hyperglycemia ([facility] [facility] 2021    Elevated troponin 2021    General patient noncompliance 2020    Anginal chest pain at rest [facility]) 2019    NSTEMI (non-ST elevated myocardial infarction) ([facility] [address] [facility] 2019    Mild intermittent asthma with exacerbation     Acute on chronic diastolic (congestive) heart failure ([address] 2019    Community acquired pneumonia of right lower lobe of lung 2019    Chest pain 2019    Acute bronchitis due to other specified organisms 2019    Hereditary peripheral neuropathy 2018    Migraine without aura 2018    Other intervertebral disc disorders, lumbar region 10/16/2018    Spinal stenosis, lumbar region with neurogenic claudication 10/16/2018    Spondylolisthesis, lumbosacral region 10/16/2018    Other idiopathic scoliosis, lumbosacral region 2018    Hematoma of left lower extremity 2018    Staphylococcus aureus bacteremia 2018    Hematoma of right thigh 2018    Body mass index (bmi) 29.0-29.9, adult 2018    Cellulitis of right lower limb 2018    Other specified hypothyroidism 2018    Inflammatory disorders of scrotum 2018    Long term (current) use of insulin ([address] 2018    Long term (current) use of oral hypoglycemic drugs 2018    Dyspnea, unspecified 2018    Pain in left lower leg 2018    Pain of right lower extremity 06/16/2018    Shortness of breath 06/16/2018    CAD (coronary artery disease) 06/13/2018    Family history of ischemic heart disease and other diseases of the circulatory system 06/13/2018    Other chronic sinusitis 06/13/2018    Sleep apnea 06/13/2018    Type 2 diabetes mellitus with diabetic polyneuropathy ([facility] [facility] 06/13/2018    Unsp open wound of r little finger w/o damage to nail, init 06/13/2018    Encounter for pre-employment examination 04/13/2018    Allergic rhinitis due to pollen 06/07/2016    Essential hypertension, benign     Irregular heart rhythm 11/21/2012    Obesity, diabetes, and hypertension syndrome ([facility] [address] 03/16/2005    Hyperlipidemia 03/16/2005        Past Medical History:   Diagnosis Date    Asthma     Back pain     CAD (coronary artery disease) 01/2020    3 vessel     Diabetes mellitus ([facility] [address]     Essential hypertension, benign     Hyperlipemia     Neuropathic pain, leg, bilateral      Past Surgical History:   Procedure Laterality Date    CARPAL TUNNEL RELEASE Bilateral     FOOT SURGERY Left     bone spur    LAMINOTOMY  2012        Restrictions  Restrictions/Precautions: Fall Risk     Safety Devices: Safety Devices  Safety Devices in place: Yes  Type of devices:  All fall risk precautions in place        Subjective       Pain Reassessment:   Pain Assessment  Patient Currently in Pain: Denies       Prior Level of Function:  Social/Functional History  Lives With: Spouse  Type of Home:  ([facility])  Home Layout: Two level, Bed/Bath upstairs, 1/2 bath on main level (10-12 steps with handrail on L when ascending)  Home Access: Level entry, Stairs to enter without rails  Entrance Stairs - Number of Steps: 1  Bathroom Shower/Tub: Tub/Shower unit  Bathroom Toilet: Standard  Bathroom Equipment: Hand-held shower  Home Equipment:  (no AD)  ADL Assistance: Independent  Homemaking Assistance: Independent  Homemaking Responsibilities: Yes (shared)  Ambulation Assistance: Independent  Transfer Assistance: Independent  Active : Yes  Occupation: Retired  Type of occupation: [employer]    OBJECTIVE:     Orientation Status:  Orientation  Overall Orientation Status: Within Functional Limits    Observation:  Observation/Palpation  Observation: Pt with restless movements with RUE and RLE, pt reported notices increased restless movement with increased \"anxiety\", recommended to discuss with physician    Cognition Status:  Cognition  Cognition Comment: some impulsivity, follows simple commands, mild agitation (but apologizes) for therpaist close proximity for safety    Perception Status:  Perception  Overall Perceptual Status: WFL    Sensation Status:  Sensation  Overall Sensation Status: Impaired  Additional Comments: b/l hand/feet diabetic neuropathy    Vision and Hearing Status:  Vision  Vision: Impaired  Vision Exceptions: Wears glasses for reading (Rx bifocals ar broken per pt reports)  Hearing  Hearing: Exceptions to [facility] Exceptions: Hard of hearing/hearing concerns     ROM:   LUE AROM (degrees)  LUE AROM : WFL  Left Hand AROM (degrees)  Left Hand AROM: WFL  RUE AROM (degrees)  RUE AROM : WFL  Right Hand AROM (degrees)  Right Hand AROM: WFL    Strength:  LUE Strength  L Hand General: 3+/5  LUE Strength Comment: 3+/5  RUE Strength  R Hand General: 3+/5  RUE Strength Comment: 3+/5    Coordination, Tone, Quality of Movement:    Tone RUE  RUE Tone: Normotonic  Tone LUE  LUE Tone: Normotonic  Coordination  Movements Are Fluid And Coordinated: No  Coordination and Movement description: Fine motor impairments, Gross motor impairments  Quality of Movement Other  Comment: Hemiballismus like movments in RUE and RLE    Hand Dominance:  Hand Dominance  Hand Dominance: Right    ADL Status:  ADL  Feeding: Setup  Grooming: Stand by assistance  UE Bathing: Stand by assistance  LE Bathing: Contact guard assistance  UE Dressing: Stand by assistance  LE Dressing: Contact guard assistance (pt with lateral lean to R with LOB while seated EOB to don socks, pt almost hit head on foot of bed)  Toileting: Unable to assess(comment) (anticipated SBA/CGA)  Toilet Transfers  Toilet - Technique: Ambulating  Toilet Transfer: Stand by assistance  Toilet Transfers Comments: did not use grab bars, pushed off legs       Therapy key for assistance levels -   Independent = Pt. is able to perform task with no assistance but may require a device   Stand by assistance = Pt. does not perform task at an independent level but does not need physical assistance, requires verbal cues  Minimal, Moderate, Maximal Assistance = Pt. requires physical assistance (25%, 50%, 75% assist from helper) for task but is able to actively participate in task   Dependent = Pt. requires total assistance with task and is not able to actively participate with task completion     Functional Mobility:  Functional Mobility  Functional - Mobility Device: No device  Activity: To/from bathroom  Assist Level: Contact guard assistance  Transfers  Sit to stand: Contact guard assistance, Stand by assistance  Stand to sit: Contact guard assistance, Stand by assistance    Bed Mobility  Bed mobility  Supine to Sit: Stand by assistance  Sit to Supine: Supervision    Seated and Standing Balance:  Balance  Sitting Balance: Contact guard assistance (CGA for dynamic sitting balance when donning socks, SBA static sitting)  Standing Balance: Contact guard assistance    Functional Endurance:  Activity Tolerance  Activity Tolerance: fair+    D/C Recommendations:  OT D/C RECOMMENDATIONS  REQUIRES OT FOLLOW UP: Yes    Equipment Recommendations:  OT Equipment Recommendations  Other: recommended shower chair for safety    OT Education:   OT Education  OT Education: OT Role, Plan of Care, Equipment  Patient Education: educated on shower chair for saftey at home, pt verbalized understanding  Barriers to Learning: none    OT Follow Up:  OT D/C RECOMMENDATIONS  REQUIRES OT FOLLOW UP: Yes Assessment/Discharge Disposition:  Assessment: Pt is a 62 y.o. male with above mentioned deficits impairing ability to safely complete ADL's at reported baseline. Pt reported increased hx of falls and hitting head over stove (without fall). Pt may benefit from OT services to address deficits and maximize safety and function during ADL's. Performance deficits / Impairments: Decreased functional mobility , Decreased ADL status, Decreased coordination, Decreased high-level IADLs, Decreased balance, Decreased safe awareness  Prognosis: Fair  Discharge Recommendations: Continue to assess pending progress  Decision Making: Medium Complexity  History: Multi comorb  Exam: 6 perf imp  Assistance / Modification: Min A    Six Click Score   How much help for putting on and taking off regular lower body clothing?: A Little  How much help for Bathing?: A Little  How much help for Toileting?: A Little  How much help for putting on and taking off regular upper body clothing?: A Little  How much help for taking care of personal grooming?: A Little  How much help for eating meals?: A Little  AM-[facility] Inpatient Daily Activity Raw Score: 18  AM-PAC Inpatient ADL T-Scale Score : 38.66  ADL Inpatient CMS 0-100% Score: 46.65    Plan:  Plan  Times per week: 1-4  Plan weeks: LOS  Current Treatment Recommendations: Strengthening, Functional Mobility Training, Balance Training, Home Management Training, Safety Education & Training, Equipment Evaluation, Education, & procurement, Patient/Caregiver Education & Training, Self-Care / ADL    Goals:   Patient will:    - Be Supervised in LB ADLs  - Be supervised in ADL transfers without LOB  - Improve B UE strength and endurance to increase by 1/2 MMT grade in order to participate in self-care activities as projected. - Other :  pt will verbalize 2-3 technique for safety during ADL's (i.e. sitting for shower, ECWS techniques, etc).     Patient Goal:    Get better and go home   Discussed and agreed upon: Yes Comments:     Therapy Time:   OT Individual Minutes  Time In: 1022  Time Out: 1043  Minutes: 21    Eval: 21 minutes     Electronically signed by:     Rylee Bravo OTKENZIE/L, OTR/L  11/13/2021, 11:36 AM

## 2021-11-14 LAB
ALBUMIN SERPL-MCNC: 3.1 G/DL (ref 3.5–4.6)
ALP BLD-CCNC: 70 U/L (ref 35–104)
ALT SERPL-CCNC: 8 U/L (ref 0–41)
ANION GAP SERPL CALCULATED.3IONS-SCNC: 12 MEQ/L (ref 9–15)
AST SERPL-CCNC: 13 U/L (ref 0–40)
BILIRUB SERPL-MCNC: 0.4 MG/DL (ref 0.2–0.7)
BUN BLDV-MCNC: 18 MG/DL (ref 8–23)
CALCIUM SERPL-MCNC: 8.7 MG/DL (ref 8.5–9.9)
CHLORIDE BLD-SCNC: 103 MEQ/L (ref 95–107)
CHOLESTEROL, TOTAL: 235 MG/DL (ref 0–199)
CO2: 22 MEQ/L (ref 20–31)
CREAT SERPL-MCNC: 0.93 MG/DL (ref 0.7–1.2)
GFR AFRICAN AMERICAN: >60
GFR NON-AFRICAN AMERICAN: >60
GLOBULIN: 2.3 G/DL (ref 2.3–3.5)
GLUCOSE BLD-MCNC: 189 MG/DL (ref 60–115)
GLUCOSE BLD-MCNC: 214 MG/DL (ref 60–115)
GLUCOSE BLD-MCNC: 240 MG/DL (ref 60–115)
GLUCOSE BLD-MCNC: 241 MG/DL (ref 60–115)
GLUCOSE BLD-MCNC: 259 MG/DL (ref 70–99)
GLUCOSE BLD-MCNC: 376 MG/DL (ref 60–115)
HBA1C MFR BLD: 12.7 % (ref 4.8–5.9)
HBA1C MFR BLD: 13.1 % (ref 4.8–5.9)
HCT VFR BLD CALC: 40.2 % (ref 42–52)
HDLC SERPL-MCNC: 41 MG/DL (ref 40–59)
HEMOGLOBIN: 13.7 G/DL (ref 14–18)
LDL CHOLESTEROL CALCULATED: 164 MG/DL (ref 0–129)
MAGNESIUM: 1.9 MG/DL (ref 1.7–2.4)
MCH RBC QN AUTO: 28.1 PG (ref 27–31.3)
MCHC RBC AUTO-ENTMCNC: 34.1 % (ref 33–37)
MCV RBC AUTO: 82.4 FL (ref 80–100)
PDW BLD-RTO: 12.9 % (ref 11.5–14.5)
PERFORMED ON: ABNORMAL
PLATELET # BLD: 155 K/UL (ref 130–400)
POTASSIUM REFLEX MAGNESIUM: 3.7 MEQ/L (ref 3.4–4.9)
RBC # BLD: 4.88 M/UL (ref 4.7–6.1)
SODIUM BLD-SCNC: 137 MEQ/L (ref 135–144)
TOTAL PROTEIN: 5.4 G/DL (ref 6.3–8)
TRIGL SERPL-MCNC: 150 MG/DL (ref 0–150)
WBC # BLD: 6 K/UL (ref 4.8–10.8)

## 2021-11-14 PROCEDURE — 86778 TOXOPLASMA ANTIBODY IGM: CPT

## 2021-11-14 PROCEDURE — 85027 COMPLETE CBC AUTOMATED: CPT

## 2021-11-14 PROCEDURE — 6370000000 HC RX 637 (ALT 250 FOR IP): Performed by: INTERNAL MEDICINE

## 2021-11-14 PROCEDURE — 6370000000 HC RX 637 (ALT 250 FOR IP): Performed by: PSYCHIATRY & NEUROLOGY

## 2021-11-14 PROCEDURE — 99233 SBSQ HOSP IP/OBS HIGH 50: CPT | Performed by: PSYCHIATRY & NEUROLOGY

## 2021-11-14 PROCEDURE — 94761 N-INVAS EAR/PLS OXIMETRY MLT: CPT

## 2021-11-14 PROCEDURE — 6370000000 HC RX 637 (ALT 250 FOR IP): Performed by: NURSE PRACTITIONER

## 2021-11-14 PROCEDURE — 80053 COMPREHEN METABOLIC PANEL: CPT

## 2021-11-14 PROCEDURE — 94640 AIRWAY INHALATION TREATMENT: CPT

## 2021-11-14 PROCEDURE — 36415 COLL VENOUS BLD VENIPUNCTURE: CPT

## 2021-11-14 PROCEDURE — 83036 HEMOGLOBIN GLYCOSYLATED A1C: CPT

## 2021-11-14 PROCEDURE — 86225 DNA ANTIBODY NATIVE: CPT

## 2021-11-14 PROCEDURE — 80061 LIPID PANEL: CPT

## 2021-11-14 PROCEDURE — 83735 ASSAY OF MAGNESIUM: CPT

## 2021-11-14 PROCEDURE — 86235 NUCLEAR ANTIGEN ANTIBODY: CPT

## 2021-11-14 PROCEDURE — 86317 IMMUNOASSAY INFECTIOUS AGENT: CPT

## 2021-11-14 PROCEDURE — 82164 ANGIOTENSIN I ENZYME TEST: CPT

## 2021-11-14 PROCEDURE — 87389 HIV-1 AG W/HIV-1&-2 AB AG IA: CPT

## 2021-11-14 PROCEDURE — 1210000000 HC MED SURG R&B

## 2021-11-14 PROCEDURE — 86147 CARDIOLIPIN ANTIBODY EA IG: CPT

## 2021-11-14 PROCEDURE — 86592 SYPHILIS TEST NON-TREP QUAL: CPT

## 2021-11-14 PROCEDURE — 99223 1ST HOSP IP/OBS HIGH 75: CPT | Performed by: INTERNAL MEDICINE

## 2021-11-14 RX ORDER — ACETAMINOPHEN 325 MG/1
650 TABLET ORAL EVERY 4 HOURS PRN
Status: DISCONTINUED | OUTPATIENT
Start: 2021-11-14 | End: 2021-11-15 | Stop reason: HOSPADM

## 2021-11-14 RX ORDER — CLONAZEPAM 0.5 MG/1
0.5 TABLET ORAL 2 TIMES DAILY
Status: DISCONTINUED | OUTPATIENT
Start: 2021-11-14 | End: 2021-11-15 | Stop reason: HOSPADM

## 2021-11-14 RX ADMIN — METOPROLOL TARTRATE 25 MG: 25 TABLET, FILM COATED ORAL at 20:36

## 2021-11-14 RX ADMIN — HALOPERIDOL 2 MG: 2 TABLET ORAL at 10:40

## 2021-11-14 RX ADMIN — INSULIN LISPRO 2 UNITS: 100 INJECTION, SOLUTION INTRAVENOUS; SUBCUTANEOUS at 11:19

## 2021-11-14 RX ADMIN — METOPROLOL TARTRATE 25 MG: 25 TABLET, FILM COATED ORAL at 13:15

## 2021-11-14 RX ADMIN — TAMSULOSIN HYDROCHLORIDE 0.4 MG: 0.4 CAPSULE ORAL at 08:59

## 2021-11-14 RX ADMIN — BUDESONIDE AND FORMOTEROL FUMARATE DIHYDRATE 2 PUFF: 80; 4.5 AEROSOL RESPIRATORY (INHALATION) at 05:25

## 2021-11-14 RX ADMIN — INSULIN LISPRO 10 UNITS: 100 INJECTION, SOLUTION INTRAVENOUS; SUBCUTANEOUS at 09:07

## 2021-11-14 RX ADMIN — INSULIN LISPRO 4 UNITS: 100 INJECTION, SOLUTION INTRAVENOUS; SUBCUTANEOUS at 16:16

## 2021-11-14 RX ADMIN — BUDESONIDE AND FORMOTEROL FUMARATE DIHYDRATE 2 PUFF: 80; 4.5 AEROSOL RESPIRATORY (INHALATION) at 18:56

## 2021-11-14 RX ADMIN — ACETAMINOPHEN 650 MG: 325 TABLET ORAL at 13:40

## 2021-11-14 RX ADMIN — LISINOPRIL 10 MG: 10 TABLET ORAL at 08:59

## 2021-11-14 RX ADMIN — INSULIN LISPRO 4 UNITS: 100 INJECTION, SOLUTION INTRAVENOUS; SUBCUTANEOUS at 09:06

## 2021-11-14 RX ADMIN — INSULIN GLARGINE 42 UNITS: 100 INJECTION, SOLUTION SUBCUTANEOUS at 20:40

## 2021-11-14 RX ADMIN — CLONAZEPAM 0.5 MG: 0.5 TABLET ORAL at 20:37

## 2021-11-14 RX ADMIN — ATORVASTATIN CALCIUM 80 MG: 80 TABLET, FILM COATED ORAL at 20:36

## 2021-11-14 RX ADMIN — HALOPERIDOL 2 MG: 2 TABLET ORAL at 16:24

## 2021-11-14 RX ADMIN — HALOPERIDOL 2 MG: 2 TABLET ORAL at 20:35

## 2021-11-14 RX ADMIN — PANTOPRAZOLE SODIUM 40 MG: 40 TABLET, DELAYED RELEASE ORAL at 06:21

## 2021-11-14 RX ADMIN — CLONAZEPAM 0.5 MG: 0.5 TABLET ORAL at 13:16

## 2021-11-14 RX ADMIN — INSULIN LISPRO 10 UNITS: 100 INJECTION, SOLUTION INTRAVENOUS; SUBCUTANEOUS at 16:16

## 2021-11-14 RX ADMIN — INSULIN LISPRO 10 UNITS: 100 INJECTION, SOLUTION INTRAVENOUS; SUBCUTANEOUS at 11:21

## 2021-11-14 ASSESSMENT — PAIN SCALES - GENERAL
PAINLEVEL_OUTOF10: 0
PAINLEVEL_OUTOF10: 3
PAINLEVEL_OUTOF10: 0
PAINLEVEL_OUTOF10: 0

## 2021-11-14 ASSESSMENT — ENCOUNTER SYMPTOMS
EYE DISCHARGE: 0
ALLERGIC/IMMUNOLOGIC NEGATIVE: 1
CHOKING: 0
STRIDOR: 0
COLOR CHANGE: 0
EYE REDNESS: 0
PHOTOPHOBIA: 0
RESPIRATORY NEGATIVE: 1
APNEA: 0
NAUSEA: 0
FACIAL SWELLING: 0
VOMITING: 0
TROUBLE SWALLOWING: 0
WHEEZING: 0
COUGH: 0
CHEST TIGHTNESS: 0
BACK PAIN: 0
GASTROINTESTINAL NEGATIVE: 1
SHORTNESS OF BREATH: 0

## 2021-11-14 ASSESSMENT — PAIN DESCRIPTION - ONSET: ONSET: GRADUAL

## 2021-11-14 ASSESSMENT — PAIN DESCRIPTION - FREQUENCY: FREQUENCY: CONTINUOUS

## 2021-11-14 ASSESSMENT — PAIN DESCRIPTION - DESCRIPTORS: DESCRIPTORS: HEADACHE

## 2021-11-14 ASSESSMENT — PAIN DESCRIPTION - LOCATION: LOCATION: HEAD

## 2021-11-14 ASSESSMENT — PAIN DESCRIPTION - PAIN TYPE: TYPE: ACUTE PAIN

## 2021-11-14 ASSESSMENT — PAIN DESCRIPTION - PROGRESSION: CLINICAL_PROGRESSION: GRADUALLY WORSENING

## 2021-11-14 NOTE — PROGRESS NOTES
Neurology Follow up    SUBJECTIVE: Patient became very agitated yesterday and wanted to leave the hospital he then stuck around. He still has some involuntary movement which is not as significant as we had seen yesterday though still has it.   No further bleed was identified on the MRI and there is some basal ganglia findings of concern bilaterally    Current Facility-Administered Medications   Medication Dose Route Frequency Provider Last Rate Last Admin    clonazePAM (KLONOPIN) tablet 0.5 mg  0.5 mg Oral BID Janice Garner MD        ondansetron (ZOFRAN-ODT) disintegrating tablet 4 mg  4 mg Oral Q8H PRN Manuelalee Marino, APRN - CNP        Or    ondansetron [facility]) injection 4 mg  4 mg IntraVENous Q6H PRN Manuelalee Marino, APRN - CNP        polyethylene glycol (GLYCOLAX) packet 17 g  17 g Oral Daily PRN Muna Pichardo, APRN - CNP        [Held by provider] enoxaparin (LOVENOX) injection 40 mg  40 mg SubCUTAneous Daily Manuelalegemma Pichardo, APRN - CNP        atorvastatin (LIPITOR) tablet 80 mg  80 mg Oral Nightly Manuelalee Marino, APRN - CNP   80 mg at 11/13/21 2134    insulin lispro (HUMALOG) injection vial 0-12 Units  0-12 Units SubCUTAneous TID  Muna Pichardo, APRN - CNP   8 Units at 11/13/21 1618    insulin lispro (HUMALOG) injection vial 0-6 Units  0-6 Units SubCUTAneous Nightly SHERIN Figueroa - CNP        glucose (GLUTOSE) 40 % oral gel 15 g  15 g Oral PRN Muna Pichardo, APRN - CNP        dextrose 50 % IV solution  12.5 g IntraVENous PRN Manuelalee Marino, APRN - CNP        glucagon (rDNA) injection 1 mg  1 mg IntraMUSCular PRN Miahe Marino, APRN - CNP        dextrose 5 % solution  100 mL/hr IntraVENous PRN Manuelalee Marino, APRN - CNP        insulin lispro (HUMALOG) injection vial 0-12 Units  0-12 Units SubCUTAneous TID  Muna Pichardo, APRN - CNP   2 Units at 11/14/21 1119    insulin lispro (HUMALOG) injection vial 0-6 Units  0-6 Units SubCUTAneous Nightly Negrito Stearnset, APRN - CNP   2 Units at 11/13/21 2134    glucose (GLUTOSE) 40 % oral gel 15 g  15 g Oral PRN Carmenn Darynet, APRN - CNP        dextrose 50 % IV solution  12.5 g IntraVENous PRN Negrito Stearnset, APRN - CNP        glucagon (rDNA) injection 1 mg  1 mg IntraMUSCular PRN Negrito Stearnset, APRN - CNP        dextrose 5 % solution  100 mL/hr IntraVENous PRN Livier Henriquez APRN - CNP        influenza quadrivalent split vaccine (FLUZONE;FLUARIX;FLULAVAL;AFLURIA) injection 0.5 mL  0.5 mL IntraMUSCular Prior to discharge Hayden Navarro MD       [facility] by provider] aspirin EC tablet 81 mg  81 mg Oral Daily Hayden Navarro MD        lisinopril (PRINIVIL;ZESTRIL) tablet 10 mg  10 mg Oral Daily Hayden Navarro MD   10 mg at 11/14/21 0859    insulin glargine (LANTUS) injection vial 42 Units  42 Units SubCUTAneous Nightly Hayden Navarro MD   42 Units at 11/13/21 2202    labetalol (NORMODYNE;TRANDATE) injection 20 mg  20 mg IntraVENous Q4H PRN Yi Miranda MD   20 mg at 11/13/21 0926    pantoprazole (PROTONIX) tablet 40 mg  40 mg Oral QAM AC Yi Miranda MD   40 mg at 11/14/21 0621    budesonide-formoterol (SYMBICORT) 80-4.5 MCG/ACT inhaler 2 puff  2 puff Inhalation BID Yi Miranda MD   2 puff at 11/14/21 0525    tamsulosin (FLOMAX) capsule 0.4 mg  0.4 mg Oral Daily Yi Miranda MD   0.4 mg at 11/14/21 0859    haloperidol (HALDOL) tablet 2 mg  2 mg Oral TID Santos Maddox MD   2 mg at 11/14/21 1040    insulin lispro (HUMALOG) injection vial 10 Units  10 Units SubCUTAneous TID WC Yi Miranda MD   10 Units at 11/14/21 1121    sodium chloride (PF) 0.9 % injection 10 mL  10 mL IntraVENous Once Guanako Challenger, MD           PHYSICAL EXAM:    /80   Pulse 86   Temp 97.9 °F (36.6 °C) (Oral)   Resp 16   Ht 5' 7\" (1.702 m)   Wt 170 lb (77.1 kg)   SpO2 100%   BMI 26.63 kg/m² General Appearance:      Skin:  normal  CVS - Normal sounds, No murmurs , No carotid Bruits  RS -CTA  Abdomen Soft, BS present  Review of Systems   Constitutional: Negative for fever. HENT: Negative for ear pain, tinnitus and trouble swallowing. Eyes: Negative for photophobia and visual disturbance. Respiratory: Negative for choking and shortness of breath. Cardiovascular: Negative for chest pain and palpitations. Gastrointestinal: Negative for nausea and vomiting. Musculoskeletal: Negative for back pain, gait problem, joint swelling, myalgias, neck pain and neck stiffness. Skin: Negative for color change. Allergic/Immunologic: Negative for food allergies. Neurological: Negative for dizziness, tremors, seizures, syncope, facial asymmetry, speech difficulty, weakness, light-headedness, numbness and headaches. Psychiatric/Behavioral: Negative for behavioral problems, confusion, hallucinations and sleep disturbance.    Patient has some uncontrolled movements of the left upper and lower extremity  Mental Status Exam:             Level of Alertness:   awake            Orientation:   person, place, time                      Attention/Concentration:  normal            Language:  normal      Funduscopic Exam:     Cranial Nerves          Cranial nerve III           Pupils:  equal, round, reactive to light      Cranial nerves III, IV, VI           Extraocular Movements: intact      Cranial nerve V           Facial sensation:  intact      Cranial nerve VII           Facial strength: intact      Cranial nerve VIII           Hearing:  intact      Cranial nerve IX           Palate:  intact      Cranial nerve XI         Shoulder shrug:  intact      Cranial nerve XII          Tongue movement:  normal    Motor: Motor examination notable for mild Choreform and Hemisbalic  movements on the right upper extremity  Drift:  absent  Motor exam is symmetrical 5 out of 5 all extremities bilaterally  Tone: The basal ganglia are within normal limits. There are no acute changes or space-occupying lesions in the posterior fossa. The visualized portions of the orbits are within normal limits. The globes are intact. The imaged portions of the paranasal sinuses are unremarkable. The calvarium is intact. There is an 8 mm hyperdensity in the left periventricular white matter which may be acute or subacute and may represent a small focus of hemorrhage. Findings were called to ER at 1913 hours and briefly discussed with Dr. Jose Maria Sharp. MRA HEAD WO CONTRAST    Result Date: 11/13/2021  EXAM: MR angiography of the head without contrast. History: Stroke Technique: Multiplanar multisequence MRI of the brain was performed. 3-D time-of-flight axial images were performed through the [facility] [place]. 3-D volume rendered images of the [place] [facility] [place] performed. Comparison: MRI of the brain from November 12, 2021 Findings: The bilateral distal cervical internal carotid arteries through the skull base are patent. The bilateral middle cerebral arteries through the trifurcation and opercular branches are patent. The vertebrobasilar system including the superior cerebellar and posterior cerebral arteries are patent. Neither posterior communicating artery is definitively identified. The bilateral anterior cerebral arteries and anterior communicating artery are patent. No aneurysm or high-grade stenosis of the visualized cerebral vasculature. No aneurysm or high-grade stenosis of the visualized cerebral vasculature. XR CHEST PORTABLE    Result Date: 11/12/2021  Exam: XR CHEST PORTABLE History:  hyperglycemia, ams Technique: AP portable view of the chest obtained. Comparison: Chest x-ray from April 20, 2021 Chest x-ray portable Findings: Status post median sternotomy. The cardiomediastinal silhouette is within normal limits. There are no infiltrates, consolidations or effusions. Bones of the thorax appear intact.  There are chronic, healed posttraumatic deformities of the left ribs. No radiographic evidence of acute intrathoracic process. MRI BRAIN W WO CONTRAST    Result Date: 11/13/2021  EXAMINATION:  MRI BRAIN W WO CONTRAST HISTORY:   possible stroke   fall. Restlessness within the right arm and leg. Atypical movements of the right upper extremity for several days. Forgetfulness. Confusion. Headaches. Slurred speech. Urinary urgency. Possible Stroke Symptoms Within Last Couple Days hemiballistic presentation with intermittent confusion. TECHNIQUE:  Routine brain MRI protocol without and with contrast including diffusion images. CONTRAST:  15 mL gadoteridol (PROHANCE) injection COMPARISON: CT head 11/12/2021. MRI brain 12/10/2019 RESULT: BRAIN: Acute Change: There is no evidence of restricted diffusion to suggest an acute infarct. Hemorrhage:    No evidence of prior parenchymal hemorrhage. Mass Lesion/ Mass Effect:    No evidence of an intracranial mass or extra-axial fluid collection. No abnormal parenchymal or leptomeningeal enhancement following contrast administration. No significant mass effect. Chronic Change/parenchyma:  Bilateral increased T1 signal within the basal ganglia, especially involving the bilateral putamen and caudate. This finding correlates with the hyperdensity seen on the CT from 11/12/2021 and appears new from prior MRI 12/10/2019. Mild to moderate generalized volume loss for age. Brain parenchyma otherwise unchanged from prior MRI. Ventricles:     Ventriculomegaly corresponds to the degree of parenchymal volume loss. Skull Base:    Hypothalamic and pituitary region are grossly normal.   Craniocervical junction is normal. No significant marrow replacement process. Vasculature:    Major intracranial arterial structures, and dural venous sinuses show typical flow void, suggesting patency. Other:  Minimal thickening in the paranasal sinuses.  Nonspecific right mastoid effusion, similar to prior MRI. The orbits are unremarkable. Small lipoma within the left posterior scalp measuring around 2.0 cm, unchanged. Bilateral increased T1 signal within the basal ganglia, correlating with findings on the recent CT and new from the prior MRI from 2019. 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 Differential also includes a number of other toxic/metabolic etiologies. No evidence for acute infarct. US CAROTID ARTERY BILATERAL    Result Date: 11/13/2021  BILATERAL DUPLEX CAROTID ULTRASOUND CLINICAL INFORMATION:  Stroke like symptoms COMPARISON:  None available FINDINGS:  The bilateral carotid duplex ultrasound study demonstrates the following:                                                                  RIGHT            LEFT Proximal common carotid artery           114 cm/s            102 cm/s  Mid common carotid artery                   118 cm/s            112 cm/s  Distal common carotid artery                98 cm/s           116 cm/s Proximal internal carotid artery             85 cm/s            87 cm/s Mid internal carotid artery                      81 cm/s           65 cm/s Distal internal carotid artery                  69 cm/s             60 cm/s External carotid artery                            111 cm/s           179 cm/s    ICA/CCA ratio                                         0.72                0.78   Vertebral arteries antegrade flow         Yes                     Yes      Less than 50% stenosis predicted of the bilateral internal carotid arteries. Antegrade flow of the bilateral vertebral arteries. Validated velocity measurements with angiographic measurements, velocity criteria are extrapolated from diameter data as defined by the Society of Radiologist in [address] [place] [address] Drive Radiology 2003; 601;489-110. Recent Labs     11/12/21 1745 11/12/21 1745 11/12/21 1831 11/13/21  0601 11/14/21  0554   WBC 5.0  --   --  5.3 6.0   HGB 14.7   < > 16.0 13.9* 13.7*     --   --  126* 155    < > = values in this interval not displayed. 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 The etiology of this needs to be addressed. Initial CT was reported to show abnormality though this was further followed with an MRI and there is no session of any bleed or acute infarct. A complete metabolic work-up and autoimmune work-up and infective work-up will be obtained for chorea. There is no family history of cardiac to suggest Orquidea's. Early Dimas-Creutzfeldt's disease cannot be ruled out we will follow this up with an EEG. Patient at times becomes very agitated and wants to leave and is competent if he leaves. There is significant noncompliance in his medication and he initially presented with a glucose of 681 which it self can cause such Chorea, we have seen recently a few cases of PRES-like syndromes occurring with hyperglycemia and hyponatremia causing basal ganglia symptoms and abnormal MRI findings. This therefore truly appears to be a metabolic dysfunction. Will arrange for evaluation of Lyme titers toxoplasma HIV and autoimmune evaluation such as lupus.   Recommended symptomatic and he is on low-dose Haldol and will give him some Klonopin just to make sure he does not get agitated with the same and leave before investigations are complete. Hemoglobin A1c still pending    Joshua José MD, Michael Hunter, American Board of Psychiatry & Neurology  Board Certified in Vascular Neurology  Board Certified in Neuromuscular Medicine  Certified in . Randy 38 ;

## 2021-11-14 NOTE — PROGRESS NOTES
Hospitalist Progress Note      PCP: Vincenzo Han MD    Date of Admission: 11/12/2021    Chief Complaint:  Patient tried to leave AMA last night than decided to stay, afebrile, stable HD    Medications:  Reviewed    Infusion Medications    dextrose      dextrose       Scheduled Medications    [Held by provider] enoxaparin  40 mg SubCUTAneous Daily    atorvastatin  80 mg Oral Nightly    insulin lispro  0-12 Units SubCUTAneous TID WC    insulin lispro  0-6 Units SubCUTAneous Nightly    insulin lispro  0-12 Units SubCUTAneous TID WC    insulin lispro  0-6 Units SubCUTAneous Nightly    influenza virus vaccine  0.5 mL IntraMUSCular Prior to discharge    [Held by provider] aspirin  81 mg Oral Daily    lisinopril  10 mg Oral Daily    insulin glargine  42 Units SubCUTAneous Nightly    pantoprazole  40 mg Oral QAM AC    budesonide-formoterol  2 puff Inhalation BID    tamsulosin  0.4 mg Oral Daily    haloperidol  2 mg Oral TID    insulin lispro  10 Units SubCUTAneous TID WC    sodium chloride (PF)  10 mL IntraVENous Once     PRN Meds: ondansetron **OR** ondansetron, polyethylene glycol, glucose, dextrose, glucagon (rDNA), dextrose, glucose, dextrose, glucagon (rDNA), dextrose, labetalol      Intake/Output Summary (Last 24 hours) at 11/14/2021 1002  Last data filed at 11/14/2021 0400  Gross per 24 hour   Intake 220 ml   Output 60 ml   Net 160 ml       Exam:    /80   Pulse 86   Temp 97.9 °F (36.6 °C) (Oral)   Resp 16   Ht 5' 7\" (1.702 m)   Wt 170 lb (77.1 kg)   SpO2 100%   BMI 26.63 kg/m²     General appearance: appears stated age and cooperative. Respiratory: clear to auscultation, bilaterally   Cardiovascular: Regular rate and rhythm, S1/S2   Abdomen: Soft, active bowel sounds. Musculoskeletal: No edema bilaterally.        Labs:   Recent Labs     11/12/21  1745 11/12/21  1745 11/12/21  1831 11/13/21  0601 11/14/21  0554   WBC 5.0  --   --  5.3 6.0   HGB 14.7   < > 16.0 13.9* 13.7*   HCT 43.8 --   --  41.2* 40.2*     --   --  126* 155    < > = values in this interval not displayed. Recent Labs     11/12/21  1745 11/12/21  1831 11/13/21  0601 11/14/21  0554   *  --  136 137   K 4.1  --  3.8 3.7   CL 92*  --  101 103   CO2 25  --  21 22   BUN 27*  --  18 18   CREATININE 1.30* 1.1 0.93 0.93   CALCIUM 9.0  --  8.6 8.7     Recent Labs     11/12/21  1745 11/13/21  0601 11/14/21  0554   AST 14 13 13   ALT 9 9 8   BILITOT 0.3 0.5 0.4   ALKPHOS 94 71 70     Recent Labs     11/12/21 1745   INR 0.9     Recent Labs     11/12/21  1745 11/13/21  0601   CKTOTAL 223*  --    TROPONINI 0.018* 0.013*       Urinalysis:      Lab Results   Component Value Date    NITRU Negative 11/12/2021    WBCUA 0-2 11/12/2021    BACTERIA Negative 11/12/2021    RBCUA 3-5 11/12/2021    BLOODU TRACE 11/12/2021    SPECGRAV 1.029 11/12/2021    GLUCOSEU >=1000 11/12/2021       Radiology:  US CAROTID ARTERY BILATERAL   Final Result      Less than 50% stenosis predicted of the bilateral internal carotid arteries. Antegrade flow of the bilateral vertebral arteries. Validated velocity measurements with angiographic measurements, velocity criteria are extrapolated from diameter data as defined by the Society of Radiologist in [address] [place] [address] Radiology 2003; 305;914-582. MRA HEAD WO CONTRAST   Final Result      No aneurysm or high-grade stenosis of the visualized cerebral vasculature. MRI BRAIN W WO CONTRAST   Final Result      Bilateral increased T1 signal within the basal ganglia, correlating with findings on the recent CT and new from the prior MRI from 2019. Overall this finding is nonspecific, but given clinical history and history of diabetes, findings may relate to    nonketotic hyperglycemia induced hemiballism/hemichorea or hyperglycemia associated choreaballism. Differential also includes a number of other toxic/metabolic etiologies. No evidence for acute infarct.             CT Head WO Contrast   Final Result      There is an 8 mm hyperdensity in the left periventricular white matter which may be acute or subacute and may represent a small focus of hemorrhage. Findings were called to ER at 1913 hours and briefly discussed with Dr. Tone Mack. XR CHEST PORTABLE   Final Result   No radiographic evidence of acute intrathoracic process. Assessment/Plan:    62 y. o. 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  - in the setting of hyperglycemia  - started on low dose Haldol and Klonopin  - followed by neurology     Hypertensive urgency  - in the setting of possible brain bleed  - IV labetalol as needed to keep SBP<160  - resumed home dose of Lisinopril  - monitor BP closely     Elevated troponin  - ECG showed sinus tachycardia  - holding ASA due to brain bleed  - serial troponins  - cardiology consulted    IDDM with severe hyperglycemia  - with Glc>600 on arrival  - on Lantus, premeal coverage,ISS    ANTON  - improved    Diet: ADULT DIET; Regular; 4 carb choices (60 gm/meal);  No Drinking Straws    Code Status: Full Code       Disposition - needs acute rehab, patient declined, is ok with [facility] per         Electronically signed by Kristyn Yun MD on 11/14/2021 at 10:02 AM

## 2021-11-14 NOTE — CONSULTS
CC: CAD, tremor, weakness. Patient is a 62 y.o. male who presents with a chief complaint of right sided weakness and tremor. Patient is followed on a regular basis by Dr. Moon Moreno MD.  Patient with past medical history of coronary disease status post CABG, diabetes mellitus, hypertension, hyperlipidemia who presents with above complaints. Neurological work-up is in progress. Patient noted to have a 8 mm hemorrhage in the left periventricular area. Patient also noted to have mildly elevated cardiac enzyme with mild renal insufficiency on presentation. Denies any anginal or CHF type symptoms. Patient with history of CABG in 2019. Blood sugar is 681 on presentation.     Past Medical History:   Diagnosis Date    Asthma     Back pain     CAD (coronary artery disease) 01/2020    3 vessel     Diabetes mellitus ([facility] [address]     Essential hypertension, benign     Hyperlipemia     Neuropathic pain, leg, bilateral       Patient Active Problem List   Diagnosis    Essential hypertension, benign    Obesity, diabetes, and hypertension syndrome (HCC)    Irregular heart rhythm    Hyperlipidemia    Allergic rhinitis due to pollen    Staphylococcus aureus bacteremia    Hematoma of right thigh    Hematoma of left lower extremity    Hereditary peripheral neuropathy    Migraine without aura    Chest pain    Community acquired pneumonia of right lower lobe of lung    Acute on chronic diastolic (congestive) heart failure (HCC)    Mild intermittent asthma with exacerbation    NSTEMI (non-ST elevated myocardial infarction) (HCC)    Anginal chest pain at rest [facility])    Acute bronchitis due to other specified organisms    CAD (coronary artery disease)    Body mass index (bmi) 29.0-29.9, adult    Cellulitis of right lower limb    Dyspnea, unspecified    Encounter for pre-employment examination    Family history of ischemic heart disease and other diseases of the circulatory system    Other specified hypothyroidism    Inflammatory disorders of scrotum    Long term (current) use of insulin (HCC)    Long term (current) use of oral hypoglycemic drugs    Other chronic sinusitis    Other idiopathic scoliosis, lumbosacral region    Other intervertebral disc disorders, lumbar region    Pain in left lower leg    Pain of right lower extremity    General patient noncompliance    Shortness of breath    Sleep apnea    Spinal stenosis, lumbar region with neurogenic claudication    Spondylolisthesis, lumbosacral region    Type 2 diabetes mellitus with diabetic polyneuropathy ([facility])    Unsp open wound of r little finger w/o damage to nail, init    Involuntary movements    ANTON (acute kidney injury) ([facility] [address]    Hyponatremia    Type 2 diabetes mellitus with hyperglycemia ([facility])    Elevated troponin    Brain bleed ([facility])       Past Surgical History:   Procedure Laterality Date    CARPAL TUNNEL RELEASE Bilateral     FOOT SURGERY Left     bone spur    LAMINOTOMY  2012       Social History     Socioeconomic History    Marital status:      Spouse name: None    Number of children: None    Years of education: None    Highest education level: None   Occupational History    None   Tobacco Use    Smoking status: Never Smoker    Smokeless tobacco: Never Used   Vaping Use    Vaping Use: Never used   Substance and Sexual Activity    Alcohol use: Yes     Alcohol/week: 0.0 standard drinks     Comment: occasional    Drug use: No    Sexual activity: None   Other Topics Concern    None   Social History Narrative    None     Social Determinants of Health     Financial Resource Strain:     Difficulty of Paying Living Expenses: Not on file   Food Insecurity:     Worried About Running Out of Food in the Last Year: Not on file    Gricel of Food in the Last Year: Not on file   Transportation Needs:     Lack of Transportation (Medical): Not on file    Lack of Transportation (Non-Medical):  Not on file   Physical Activity:     Days of Exercise per Week: Not on file    Minutes of Exercise per Session: Not on file   Stress:     Feeling of Stress : Not on file   Social Connections:     Frequency of Communication with Friends and Family: Not on file    Frequency of Social Gatherings with Friends and Family: Not on file    Attends Buddhism Services: Not on file    Active Member of [address] [place] [address] [employer] or Organizations: Not on file    Attends Club or Organization Meetings: Not on file    Marital Status: Not on file   Intimate Partner Violence:     Fear of Current or Ex-Partner: Not on file    Emotionally Abused: Not on file    Physically Abused: Not on file    Sexually Abused: Not on file   Housing Stability:     Unable to Pay for Housing in the Last Year: Not on file    Number of [place] in the Last Year: Not on file    Unstable Housing in the Last Year: Not on file       Family History   Problem Relation Age of Onset    Other Father         accident    Heart Failure Mother     Heart Disease Brother     Cirrhosis Brother        Current Facility-Administered Medications   Medication Dose Route Frequency Provider Last Rate Last Admin    clonazePAM (KLONOPIN) tablet 0.5 mg  0.5 mg Oral BID Caesar Joshi MD        ondansetron (ZOFRAN-ODT) disintegrating tablet 4 mg  4 mg Oral Q8H PRN SHERIN Schaefer CNP        Or    ondansetron (ZOFRAN) injection 4 mg  4 mg IntraVENous Q6H PRN SHERIN Benjamin CNP        polyethylene glycol (GLYCOLAX) packet 17 g  17 g Oral Daily PRN SHERIN Benjamin CNP        [Held by provider] enoxaparin (LOVENOX) injection 40 mg  40 mg SubCUTAneous Daily SHERIN Benjamin CNP        atorvastatin (LIPITOR) tablet 80 mg  80 mg Oral Nightly SHERIN Schaefer CNP   80 mg at 11/13/21 3704    insulin lispro (HUMALOG) injection vial 0-12 Units  0-12 Units SubCUTAneous TID  SHERIN Benjamin CNP   8 Units at 11/13/21 1618    insulin lispro (HUMALOG) injection vial 0-6 Units  0-6 Units SubCUTAneous Nightly Darshan Head APRN - CNP        glucose (GLUTOSE) 40 % oral gel 15 g  15 g Oral PRN Caralee Marino, APRN - CNP        dextrose 50 % IV solution  12.5 g IntraVENous PRN Caralee Marino, APRN - CNP        glucagon (rDNA) injection 1 mg  1 mg IntraMUSCular PRN Caralee Marino, APRN - CNP        dextrose 5 % solution  100 mL/hr IntraVENous PRN Caralee Marino, APRN - CNP        insulin lispro (HUMALOG) injection vial 0-12 Units  0-12 Units SubCUTAneous TID WC Caralee Marino, APRN - CNP   2 Units at 11/14/21 1119    insulin lispro (HUMALOG) injection vial 0-6 Units  0-6 Units SubCUTAneous Nightly Caralee Marino, APRN - CNP   2 Units at 11/13/21 2134    glucose (GLUTOSE) 40 % oral gel 15 g  15 g Oral PRN Caralee Marino, APRN - CNP        dextrose 50 % IV solution  12.5 g IntraVENous PRN Caralee Marino, APRN - CNP        glucagon (rDNA) injection 1 mg  1 mg IntraMUSCular PRN Darshan Head, APRN - CNP        dextrose 5 % solution  100 mL/hr IntraVENous PRN Caralee Marino, APRN - CNP        influenza quadrivalent split vaccine (FLUZONE;FLUARIX;FLULAVAL;AFLURIA) injection 0.5 mL  0.5 mL IntraMUSCular Prior to discharge April Vivas MD       [facility] [place] by provider] aspirin EC tablet 81 mg  81 mg Oral Daily April Vivas MD        lisinopril (PRINIVIL;ZESTRIL) tablet 10 mg  10 mg Oral Daily April Vivas MD   10 mg at 11/14/21 0859    insulin glargine (LANTUS) injection vial 42 Units  42 Units SubCUTAneous Nightly April Vivas MD   42 Units at 11/13/21 2202    labetalol (NORMODYNE;TRANDATE) injection 20 mg  20 mg IntraVENous Q4H PRN Dandre Briggs MD   20 mg at 11/13/21 0926    pantoprazole (PROTONIX) tablet 40 mg  40 mg Oral QAM AC Dandre Briggs MD   40 mg at 11/14/21 0621    budesonide-formoterol (SYMBICORT) ACCU-CHEK    POCT Glucose    Collection Time: 11/13/21  4:14 PM   Result Value Ref Range    POC Glucose 327 (H) 60 - 115 mg/dl    Performed on ACCU-CHEK    POCT Glucose    Collection Time: 11/13/21  8:27 PM   Result Value Ref Range    POC Glucose 242 (H) 60 - 115 mg/dl    Performed on ACCU-CHEK    CBC    Collection Time: 11/14/21  5:54 AM   Result Value Ref Range    WBC 6.0 4.8 - 10.8 K/uL    RBC 4.88 4.70 - 6.10 M/uL    Hemoglobin 13.7 (L) 14.0 - 18.0 g/dL    Hematocrit 40.2 (L) 42.0 - 52.0 %    MCV 82.4 80.0 - 100.0 fL    MCH 28.1 27.0 - 31.3 pg    MCHC 34.1 33.0 - 37.0 %    RDW 12.9 11.5 - 14.5 %    Platelets 538 523 - 649 K/uL   Lipid panel - fasting    Collection Time: 11/14/21  5:54 AM   Result Value Ref Range    Cholesterol, Total 235 (H) 0 - 199 mg/dL    Triglycerides 150 0 - 150 mg/dL    HDL 41 40 - 59 mg/dL    LDL Calculated 164 (H) 0 - 129 mg/dL   Comprehensive Metabolic Panel w/ Reflex to MG    Collection Time: 11/14/21  5:54 AM   Result Value Ref Range    Sodium 137 135 - 144 mEq/L    Potassium reflex Magnesium 3.7 3.4 - 4.9 mEq/L    Chloride 103 95 - 107 mEq/L    CO2 22 20 - 31 mEq/L    Anion Gap 12 9 - 15 mEq/L    Glucose 259 (H) 70 - 99 mg/dL    BUN 18 8 - 23 mg/dL    CREATININE 0.93 0.70 - 1.20 mg/dL    GFR Non-African American >60.0 >60    GFR  >60.0 >60    Calcium 8.7 8.5 - 9.9 mg/dL    Total Protein 5.4 (L) 6.3 - 8.0 g/dL    Albumin 3.1 (L) 3.5 - 4.6 g/dL    Total Bilirubin 0.4 0.2 - 0.7 mg/dL    Alkaline Phosphatase 70 35 - 104 U/L    ALT 8 0 - 41 U/L    AST 13 0 - 40 U/L    Globulin 2.3 2.3 - 3.5 g/dL   POCT Glucose    Collection Time: 11/14/21  6:24 AM   Result Value Ref Range    POC Glucose 241 (H) 60 - 115 mg/dl    Performed on ACCU-CHEK    POCT Glucose    Collection Time: 11/14/21  9:05 AM   Result Value Ref Range    POC Glucose 376 (H) 60 - 115 mg/dl    Performed on ACCU-CHEK    POCT Glucose    Collection Time: 11/14/21 11:17 AM   Result Value Ref Range    POC Glucose 189 (H) 60 - 115 mg/dl    Performed on ACCU-CHEK      Troponin:   Lab Results   Component Value Date    TROPONINI 0.013 11/13/2021       EKG: sinus tachycardia      ASSESSMENT:    Active Hospital Problems    Diagnosis Date Noted    CAD (coronary artery disease) [I25.10] 06/13/2018     Priority: High    Brain bleed ([facility] [address] [I61.9] 11/13/2021     Priority: Low    Involuntary movements [R25.9] 11/12/2021     Priority: Low    ANTON (acute kidney injury) ([facility] [address] [N17.9] 11/12/2021     Priority: Low    Hyponatremia [E87.1] 11/12/2021     Priority: Low    Type 2 diabetes mellitus with hyperglycemia ([facility] [address] [E11.65] 11/12/2021     Priority: Low    Elevated troponin [R77.8] 11/12/2021     Priority: Low     Elevated cardiac enzyme likely demand ischemia. No angina    History of CAD status post CABG in 2019    Neurological changes rule out CVA versus other. Neurology following    Diabetes mellitus type 2 uncontrolled    Essential hypertension    Hyperlipidemia    Sinus tachycardia      PLAN:   1. As always, aggressive risk factor modification is strongly recommended. We should adhere to the JNC VIII guidelines for HTN management and the NCEPATP III guidelines for LDL-C management. 2. Check 2D echocardiogram  3. Monitor on telemetry  4. Neurology recommendations. 5. GI/VTE prophylaxis   6. maximize cardiac medications   7. No antiplatelets or anticoagulation given intracranial hemorrhage. 8. Further recommendations to follow. Thank you for allowing me to participate in the care of your patient, please don't hesitate to contact me if you have any further questions.     Electronically signed by Abelardo Martinez DO on 11/14/2021 at 12:46 PM

## 2021-11-14 NOTE — CARE COORDINATION
[facility] EMERGENCY MEDICAL [place] AT [place] Case Management Initial Discharge Assessment    Met with Patient to discuss discharge plan. PCP: Cristina Lyn MD                                Date of Last Visit: few months ago    If no PCP, list provided? N/A    Discharge Planning    Living Arrangements: independently at home    Who do you live with? wife    Who helps you with your care:  self or spouse--spouse can help him all the time he states    If lives at home:     Do you have any barriers navigating in your home? yes - multiple stairs up to bedroom, steps into home    Patient can perform ADL? Yes--with assistance    Current Services (outpatient and in home) :  None    Dialysis: No    Is transportation available to get to your appointments? Yes    DME Equipment:  no    Respiratory equipment: None    Respiratory provider:  no     [facility] [address]    Consult with Medication Assistance Program?  No      Patient agreeable to [facility]? Yes, [facility] [place]    Patient agreeable to SNF/Rehab? No and Declined    Other discharge needs identified? Other patient may need a walker, he is currently declining the need for any equipment. Does Patient Have a High-Risk for Readmission Diagnosis (CHF, PN, MI, COPD)? No      Initial Discharge Plan? (Note: please see concurrent daily documentation for any updates after initial note). Patient refusing to go anywhere for therapy. He is however agreeable to [facility]. [facility] and he selected [facility]. Patient also is declining any DME for assistance. He states he will crawl around home, crawl upstairs if he has to, he does not want to go anywhere but home.      Readmission Risk              Risk of Unplanned Readmission:  19         Electronically signed by Sanaz Brooke RN on 11/14/2021 at 8:01 AM

## 2021-11-14 NOTE — FLOWSHEET NOTE
Pt assessment completed. VS completed. Denies pain. Calm et cooperative at this time. Electronically signed by Makenzie Zelaya RN on 11/14/21 at 9:28 AM EST

## 2021-11-15 VITALS
HEIGHT: 67 IN | BODY MASS INDEX: 26.68 KG/M2 | WEIGHT: 170 LBS | RESPIRATION RATE: 18 BRPM | DIASTOLIC BLOOD PRESSURE: 79 MMHG | HEART RATE: 85 BPM | SYSTOLIC BLOOD PRESSURE: 148 MMHG | TEMPERATURE: 97.9 F | OXYGEN SATURATION: 95 %

## 2021-11-15 LAB
ALBUMIN SERPL-MCNC: 3.3 G/DL (ref 3.5–4.6)
ALP BLD-CCNC: 69 U/L (ref 35–104)
ALT SERPL-CCNC: 8 U/L (ref 0–41)
ANION GAP SERPL CALCULATED.3IONS-SCNC: 9 MEQ/L (ref 9–15)
AST SERPL-CCNC: 13 U/L (ref 0–40)
BILIRUB SERPL-MCNC: 0.3 MG/DL (ref 0.2–0.7)
BUN BLDV-MCNC: 17 MG/DL (ref 8–23)
CALCIUM SERPL-MCNC: 8.7 MG/DL (ref 8.5–9.9)
CHLORIDE BLD-SCNC: 104 MEQ/L (ref 95–107)
CO2: 25 MEQ/L (ref 20–31)
CREAT SERPL-MCNC: 1.02 MG/DL (ref 0.7–1.2)
EKG ATRIAL RATE: 111 BPM
EKG P AXIS: 42 DEGREES
EKG P-R INTERVAL: 130 MS
EKG Q-T INTERVAL: 354 MS
EKG QRS DURATION: 94 MS
EKG QTC CALCULATION (BAZETT): 481 MS
EKG R AXIS: -46 DEGREES
EKG T AXIS: 63 DEGREES
EKG VENTRICULAR RATE: 111 BPM
GFR AFRICAN AMERICAN: >60
GFR NON-AFRICAN AMERICAN: >60
GLOBULIN: 2.1 G/DL (ref 2.3–3.5)
GLUCOSE BLD-MCNC: 243 MG/DL (ref 70–99)
GLUCOSE BLD-MCNC: 255 MG/DL (ref 60–115)
GLUCOSE BLD-MCNC: 256 MG/DL (ref 60–115)
HCT VFR BLD CALC: 41.2 % (ref 42–52)
HEMOGLOBIN: 13.7 G/DL (ref 14–18)
MCH RBC QN AUTO: 27.8 PG (ref 27–31.3)
MCHC RBC AUTO-ENTMCNC: 33.2 % (ref 33–37)
MCV RBC AUTO: 83.7 FL (ref 80–100)
PDW BLD-RTO: 12.8 % (ref 11.5–14.5)
PERFORMED ON: ABNORMAL
PERFORMED ON: ABNORMAL
PLATELET # BLD: 134 K/UL (ref 130–400)
POTASSIUM REFLEX MAGNESIUM: 3.9 MEQ/L (ref 3.4–4.9)
RBC # BLD: 4.92 M/UL (ref 4.7–6.1)
SODIUM BLD-SCNC: 138 MEQ/L (ref 135–144)
TOTAL PROTEIN: 5.4 G/DL (ref 6.3–8)
WBC # BLD: 6.3 K/UL (ref 4.8–10.8)

## 2021-11-15 PROCEDURE — 93010 ELECTROCARDIOGRAM REPORT: CPT | Performed by: INTERNAL MEDICINE

## 2021-11-15 PROCEDURE — 99233 SBSQ HOSP IP/OBS HIGH 50: CPT | Performed by: PSYCHIATRY & NEUROLOGY

## 2021-11-15 PROCEDURE — 6370000000 HC RX 637 (ALT 250 FOR IP): Performed by: INTERNAL MEDICINE

## 2021-11-15 PROCEDURE — 36415 COLL VENOUS BLD VENIPUNCTURE: CPT

## 2021-11-15 PROCEDURE — APPSS30 APP SPLIT SHARED TIME 16-30 MINUTES: Performed by: NURSE PRACTITIONER

## 2021-11-15 PROCEDURE — 6370000000 HC RX 637 (ALT 250 FOR IP): Performed by: PSYCHIATRY & NEUROLOGY

## 2021-11-15 PROCEDURE — 80053 COMPREHEN METABOLIC PANEL: CPT

## 2021-11-15 PROCEDURE — 6360000002 HC RX W HCPCS: Performed by: NURSE PRACTITIONER

## 2021-11-15 PROCEDURE — 97535 SELF CARE MNGMENT TRAINING: CPT

## 2021-11-15 PROCEDURE — 97116 GAIT TRAINING THERAPY: CPT

## 2021-11-15 PROCEDURE — 97129 THER IVNTJ 1ST 15 MIN: CPT

## 2021-11-15 PROCEDURE — 92526 ORAL FUNCTION THERAPY: CPT

## 2021-11-15 PROCEDURE — 94640 AIRWAY INHALATION TREATMENT: CPT

## 2021-11-15 PROCEDURE — 95816 EEG AWAKE AND DROWSY: CPT

## 2021-11-15 PROCEDURE — 85027 COMPLETE CBC AUTOMATED: CPT

## 2021-11-15 PROCEDURE — 99232 SBSQ HOSP IP/OBS MODERATE 35: CPT | Performed by: INTERNAL MEDICINE

## 2021-11-15 RX ORDER — ROSUVASTATIN CALCIUM 20 MG/1
20 TABLET, COATED ORAL NIGHTLY
Qty: 30 TABLET | Refills: 5 | Status: SHIPPED | OUTPATIENT
Start: 2021-11-15 | End: 2022-02-22

## 2021-11-15 RX ORDER — CLONAZEPAM 0.5 MG/1
0.5 TABLET ORAL 2 TIMES DAILY
Qty: 60 TABLET | Refills: 0 | Status: SHIPPED | OUTPATIENT
Start: 2021-11-15 | End: 2022-03-21 | Stop reason: ALTCHOICE

## 2021-11-15 RX ORDER — LISINOPRIL 10 MG/1
10 TABLET ORAL DAILY
Qty: 30 TABLET | Refills: 5 | Status: SHIPPED | OUTPATIENT
Start: 2021-11-15 | End: 2022-06-01 | Stop reason: SDUPTHER

## 2021-11-15 RX ADMIN — INSULIN LISPRO 10 UNITS: 100 INJECTION, SOLUTION INTRAVENOUS; SUBCUTANEOUS at 08:33

## 2021-11-15 RX ADMIN — LISINOPRIL 10 MG: 10 TABLET ORAL at 08:24

## 2021-11-15 RX ADMIN — METOPROLOL TARTRATE 25 MG: 25 TABLET, FILM COATED ORAL at 08:24

## 2021-11-15 RX ADMIN — HALOPERIDOL 2 MG: 2 TABLET ORAL at 08:24

## 2021-11-15 RX ADMIN — ENOXAPARIN SODIUM 40 MG: 100 INJECTION SUBCUTANEOUS at 09:33

## 2021-11-15 RX ADMIN — PANTOPRAZOLE SODIUM 40 MG: 40 TABLET, DELAYED RELEASE ORAL at 05:56

## 2021-11-15 RX ADMIN — INSULIN LISPRO 6 UNITS: 100 INJECTION, SOLUTION INTRAVENOUS; SUBCUTANEOUS at 11:38

## 2021-11-15 RX ADMIN — HALOPERIDOL 2 MG: 2 TABLET ORAL at 13:46

## 2021-11-15 RX ADMIN — CLONAZEPAM 0.5 MG: 0.5 TABLET ORAL at 08:25

## 2021-11-15 RX ADMIN — INSULIN LISPRO 10 UNITS: 100 INJECTION, SOLUTION INTRAVENOUS; SUBCUTANEOUS at 11:37

## 2021-11-15 RX ADMIN — TAMSULOSIN HYDROCHLORIDE 0.4 MG: 0.4 CAPSULE ORAL at 08:28

## 2021-11-15 RX ADMIN — ASPIRIN 81 MG: 81 TABLET, COATED ORAL at 09:33

## 2021-11-15 RX ADMIN — BUDESONIDE AND FORMOTEROL FUMARATE DIHYDRATE 2 PUFF: 80; 4.5 AEROSOL RESPIRATORY (INHALATION) at 08:25

## 2021-11-15 RX ADMIN — INSULIN LISPRO 6 UNITS: 100 INJECTION, SOLUTION INTRAVENOUS; SUBCUTANEOUS at 08:32

## 2021-11-15 ASSESSMENT — ENCOUNTER SYMPTOMS
NAUSEA: 0
COLOR CHANGE: 0
SHORTNESS OF BREATH: 0
BACK PAIN: 0
TROUBLE SWALLOWING: 0
CHOKING: 0
VOMITING: 0
PHOTOPHOBIA: 0

## 2021-11-15 ASSESSMENT — PAIN SCALES - GENERAL: PAINLEVEL_OUTOF10: 0

## 2021-11-15 NOTE — FLOWSHEET NOTE
Patient not cleared to drive from Neurology. Patient states we can \"advise\" him \"but [place] and I can drive myself if I want to. \" Supervisor and Charolet Hamman NP notified. Charolet Hamman NP went to speak to patient. States patient is trying to call for ride.  Electronically signed by López Williamson RN on 11/15/2021 at 1:56 PM

## 2021-11-15 NOTE — PROGRESS NOTES
CC: CAD, tremor, weakness. Patient is a 62 y.o. male who presents with a chief complaint of right sided weakness and tremor. Patient is followed on a regular basis by Dr. Cristina Lyn MD. Patient with past medical history of coronary disease status post CABG, diabetes mellitus, hypertension, hyperlipidemia who presents with above complaints. Neurological work-up is in progress. Patient noted to have a 8 mm hemorrhage in the left periventricular area. Patient also noted to have mildly elevated cardiac enzyme with mild renal insufficiency on presentation. Denies any anginal or CHF type symptoms. Patient with history of CABG in 2019. Blood sugar is 681 on presentation. 11-15-21: doing ok. Eating breakfast. No Cp or SOB. Wants to go home today. Echo is pending. HD stable. NSR on tele.  NSR at 91bpm.       Past Medical History        Past Medical History:   Diagnosis Date    Asthma     Back pain     CAD (coronary artery disease) 01/2020    3 vessel     Diabetes mellitus ([address]     Essential hypertension, benign     Hyperlipemia     Neuropathic pain, leg, bilateral          Patient Active Problem List   Diagnosis    Essential hypertension, benign    Obesity, diabetes, and hypertension syndrome (HCC)    Irregular heart rhythm    Hyperlipidemia    Allergic rhinitis due to pollen    Staphylococcus aureus bacteremia    Hematoma of right thigh    Hematoma of left lower extremity    Hereditary peripheral neuropathy    Migraine without aura    Chest pain    Community acquired pneumonia of right lower lobe of lung    Acute on chronic diastolic (congestive) heart failure (HCC)    Mild intermittent asthma with exacerbation    NSTEMI (non-ST elevated myocardial infarction) (HCC)    Anginal chest pain at rest [facility])    Acute bronchitis due to other specified organisms    CAD (coronary artery disease)    Body mass index (bmi) 29.0-29.9, adult    Cellulitis of right lower limb    Dyspnea, unspecified  Encounter for pre-employment examination    Family history of ischemic heart disease and other diseases of the circulatory system    Other specified hypothyroidism    Inflammatory disorders of scrotum    Long term (current) use of insulin ([address]    Long term (current) use of oral hypoglycemic drugs    Other chronic sinusitis    Other idiopathic scoliosis, lumbosacral region    Other intervertebral disc disorders, lumbar region    Pain in left lower leg    Pain of right lower extremity    General patient noncompliance    Shortness of breath    Sleep apnea    Spinal stenosis, lumbar region with neurogenic claudication    Spondylolisthesis, lumbosacral region    Type 2 diabetes mellitus with diabetic polyneuropathy ([address]    Unsp open wound of r little finger w/o damage to nail, init    Involuntary movements    ANTON (acute kidney injury) ([address]    Hyponatremia    Type 2 diabetes mellitus with hyperglycemia (HCC)    Elevated troponin    Brain bleed (HCC)     Past Surgical History         Past Surgical History:   Procedure Laterality Date    CARPAL TUNNEL RELEASE Bilateral     FOOT SURGERY Left     bone spur    LAMINOTOMY  2012     Social History   Social History         Socioeconomic History    Marital status:      Spouse name: None    Number of children: None    Years of education: None    Highest education level: None   Occupational History    None   Tobacco Use    Smoking status: Never Smoker    Smokeless tobacco: Never Used   Vaping Use    Vaping Use: Never used   Substance and Sexual Activity    Alcohol use:  Yes     Alcohol/week: 0.0 standard drinks     Comment: occasional    Drug use: No    Sexual activity: None   Other Topics Concern    None   Social History Narrative    None   Social Determinants of Health         Financial Resource Strain:      Difficulty of Paying Living Expenses: Not on file    Food Insecurity:      Worried About Running Out of Food in the Last Year: Not on file     Ran Out of Food in the Last Year: Not on file    Transportation Needs:      Lack of Transportation (Medical): Not on file     Lack of Transportation (Non-Medical):  Not on file    Physical Activity:      Days of Exercise per Week: Not on file     Minutes of Exercise per Session: Not on file    Stress:      Feeling of Stress : Not on file    Social Connections:      Frequency of Communication with Friends and Family: Not on file     Frequency of Social Gatherings with Friends and Family: Not on file     Attends Taoist Services: Not on file     Active Member of [address] [place] [address] [employer] or Organizations: Not on file     Attends Club or Organization Meetings: Not on file     Marital Status: Not on file    Intimate Partner Violence:      Fear of Current or Ex-Partner: Not on file     Emotionally Abused: Not on file     Physically Abused: Not on file     Sexually Abused: Not on file    Housing Stability:      Unable to Pay for Housing in the Last Year: Not on file     Number of [place] in the Last Year: Not on file     Unstable Housing in the Last Year: Not on file      Family History         Family History   Problem Relation Age of Onset    Other Father     accident    Heart Failure Mother     Heart Disease Brother     Cirrhosis Brother      Current Facility-Administered Medications             Current Facility-Administered Medications   Medication Dose Route Frequency Provider Last Rate Last Admin    clonazePAM (KLONOPIN) tablet 0.5 mg 0.5 mg Oral BID Holly Pyle MD      ondansetron (ZOFRAN-ODT) disintegrating tablet 4 mg 4 mg Oral Q8H PRN SHERIN Landaverde CNP      Or    ondansetron (ZOFRAN) injection 4 mg 4 mg IntraVENous Q6H PRN SHERIN Santos CNP      polyethylene glycol (GLYCOLAX) packet 17 g 17 g Oral Daily PRN SHERIN Santos CNP      [Held by provider] enoxaparin (LOVENOX) injection 40 mg 40 mg SubCUTAneous Daily Brant Atkins Sonido, APRN - CNP      atorvastatin (LIPITOR) tablet 80 mg 80 mg Oral Nightly Thera Frame, APRN - CNP  80 mg at 11/13/21 2134    insulin lispro (HUMALOG) injection vial 0-12 Units 0-12 Units SubCUTAneous TID WC Thera Frame, APRN - CNP  8 Units at 11/13/21 1618    insulin lispro (HUMALOG) injection vial 0-6 Units 0-6 Units SubCUTAneous Nightly Thera Frame, APRN - CNP      glucose (GLUTOSE) 40 % oral gel 15 g 15 g Oral PRN Thera Frame, APRN - CNP      dextrose 50 % IV solution 12.5 g IntraVENous PRN Thera Frame, APRN - CNP      glucagon (rDNA) injection 1 mg 1 mg IntraMUSCular PRN Thera Frame, APRN - CNP      dextrose 5 % solution 100 mL/hr IntraVENous PRN Thera Frame, APRN - CNP      insulin lispro (HUMALOG) injection vial 0-12 Units 0-12 Units SubCUTAneous TID WC Thera Frame, APRN - CNP  2 Units at 11/14/21 1119    insulin lispro (HUMALOG) injection vial 0-6 Units 0-6 Units SubCUTAneous Nightly Thera Frame, APRN - CNP  2 Units at 11/13/21 2134    glucose (GLUTOSE) 40 % oral gel 15 g 15 g Oral PRN Thera Frame, APRN - CNP      dextrose 50 % IV solution 12.5 g IntraVENous PRN Thera Frame, APRN - CNP      glucagon (rDNA) injection 1 mg 1 mg IntraMUSCular PRN Mert Head, APRN - CNP      dextrose 5 % solution 100 mL/hr IntraVENous PRN Thera Frame, APRN - CNP      influenza quadrivalent split vaccine (FLUZONE;FLUARIX;FLULAVAL;AFLURIA) injection 0.5 mL 0.5 mL IntraMUSCular Prior to discharge Mariza Pepe MD     [facility] by provider] aspirin EC tablet 81 mg 81 mg Oral Daily Mariza Pepe MD      lisinopril (PRINIVIL;ZESTRIL) tablet 10 mg 10 mg Oral Daily Mariza Pepe MD  10 mg at 11/14/21 0859    insulin glargine (LANTUS) injection vial 42 Units 42 Units SubCUTAneous Nightly Mariza Pepe MD  42 Units at 11/13/21 2202    labetalol (NORMODYNE;TRANDATE) injection 20 mg 20 mg IntraVENous Q4H PRN Anushka Cruz MD  20 mg at 11/13/21 0926    pantoprazole (PROTONIX) tablet 40 mg 40 mg Oral QAM AC Anushka Cruz MD  40 mg at 11/14/21 0621    budesonide-formoterol (SYMBICORT) 80-4.5 MCG/ACT inhaler 2 puff 2 puff Inhalation BID Anushka Cruz MD  2 puff at 11/14/21 0525    tamsulosin (FLOMAX) capsule 0.4 mg 0.4 mg Oral Daily Anushka Cruz MD  0.4 mg at 11/14/21 0859    haloperidol (HALDOL) tablet 2 mg 2 mg Oral TID Leonor August MD  2 mg at 11/14/21 1040    insulin lispro (HUMALOG) injection vial 10 Units 10 Units SubCUTAneous TID WC Anushka Cruz MD  10 Units at 11/14/21 1121    sodium chloride (PF) 0.9 % injection 10 mL 10 mL IntraVENous Once Iwona Vale MD     ALLERGIES: Patient has no known allergies. Review of Systems   Constitutional: Negative for activity change, appetite change, chills, diaphoresis, fatigue and fever. HENT: Negative for facial swelling, nosebleeds and trouble swallowing. Eyes: Negative for discharge, redness and visual disturbance. Respiratory: Negative. Negative for apnea, cough, choking, chest tightness, shortness of breath, wheezing and stridor. Cardiovascular: Negative. Gastrointestinal: Negative. Endocrine: Negative. Genitourinary: Negative for difficulty urinating, flank pain, frequency and hematuria. Musculoskeletal: Negative. Negative for neck pain. Skin: Negative. Allergic/Immunologic: Negative. Neurological: Positive for tremors. Negative for dizziness, seizures, syncope, speech difficulty, weakness and light-headedness. Hematological: Negative. Psychiatric/Behavioral: Negative for behavioral problems, confusion and sleep disturbance. The patient is not nervous/anxious. VITALS:   Blood pressure 105/80, pulse 86, temperature 97.9 °F (36.6 °C), temperature source Oral, resp. rate 16, height 5' 7\" (1.702 m), weight 170 lb (77.1 kg), SpO2 100 %.    Body mass index is 26.63 Collection Time: 11/14/21 9:05 AM   Result Value Ref Range    POC Glucose 376 (H) 60 - 115 mg/dl    Performed on ACCU-CHEK    POCT Glucose    Collection Time: 11/14/21 11:17 AM   Result Value Ref Range    POC Glucose 189 (H) 60 - 115 mg/dl    Performed on ACCU-CHEK    Troponin:         Lab Results   Component Value Date    TROPONINI 0.013 11/13/2021     EKG: sinus tachycardia   ASSESSMENT:         Active Hospital Problems    Diagnosis Date Noted    CAD (coronary artery disease) [I25.10] 06/13/2018     Priority: High    Brain bleed ([facility] [I61.9] 11/13/2021     Priority: Low    Involuntary movements [R25.9] 11/12/2021     Priority: Low    ANTON (acute kidney injury) ([facility] [N17.9] 11/12/2021     Priority: Low    Hyponatremia [E87.1] 11/12/2021     Priority: Low    Type 2 diabetes mellitus with hyperglycemia ([facility] [E11.65] 11/12/2021     Priority: Low    Elevated troponin [R77.8] 11/12/2021     Priority: Low       Elevated cardiac enzyme likely demand ischemia. No angina   History of CAD status post CABG in 2019   Neurological changes rule out CVA versus other. Neurology following   Diabetes mellitus type 2 uncontrolled   Essential hypertension   Hyperlipidemia   Sinus tachycardia - resolved. Medical non compliance. PLAN:   1. As always, aggressive risk factor modification is strongly recommended. We should adhere to the JNC VIII guidelines for HTN management and the NCEPATP III guidelines for LDL-C management. 2. Await 2D echocardiogram  3. Monitor on telemetry  4. Neurology recommendations. 5. GI/VTE prophylaxis - Lovenox 40mg daily. 6. maximize cardiac medications   7. On ASA 81mg daily  8. Stressed importance of taking meds. 9. Outpatient stress test when feasible. 10. Stable for discharge from cardio standpoint. Thank you for allowing me to participate in the care of your patient, please don't hesitate to contact me if you have any further questions.     Electronically signed by Roopa Baker Holiday, DO on 11/15/2021 at 8:05 AM

## 2021-11-15 NOTE — FLOWSHEET NOTE
Ok to DC from neuro. Patient still threatening AMA if he doesn't get to leave when ride arrives at   3:30pm today. Dr. Malina Sears aware. Awaiting response at this time.  Electronically signed by Charlene Blancas RN on 11/15/2021 at 1:22 PM

## 2021-11-15 NOTE — CARE COORDINATION
INTERDISCIPLINARY ROUNDING    November 15, 2021 at 9:39 AM EST    Anticipated Discharge Date:  Expected Discharge Date: 11/16/21    Anticipated Discharge Disposition:HOME WITH SPOUSE. Patient Mobility or PT/OT ordered: YES    Readmission Risk              Risk of Unplanned Readmission:  21           Discussed patient goal for the day, patient clinical progression, and barriers to discharge. The following Goal(s) of the Day/Commitment(s) have been identified:  EEG [address]- POSSIBLE ADDITION OF DIABETIC ED. PLAN RETURN HOME WITH SPOUSE. AGREEABLE TO [address]. WILL NEED ORDER. PT REFUSES SNF/REHAB.       Beto Vitale RN  November 15, 2021

## 2021-11-15 NOTE — DISCHARGE INSTR - COC
Continuity of Care Form    Patient Name: Guido Cordon   :  1960  MRN:  [id number]    Admit date:  2021  Discharge date:  11/15/2021    Code Status Order: Full Code   Advance Directives:      Admitting Physician:  No admitting provider for patient encounter. PCP: Christi Ochoa MD    Discharging Nurse: YAIR LARA Unit/Room#: P132/R189-65  Discharging Unit Phone Number: [phone]    Emergency Contact:   Extended Emergency Contact Information  Primary Emergency Contact: JaceAnnemarie   [address] Phone: [phone]  Mobile Phone: [phone]  Relation: Brother/Sister  Secondary Emergency Contact: Shaniqua Rooney [address] [address] Phone: [phone]  Relation: Spouse   needed?  Yes    Past Surgical History:  Past Surgical History:   Procedure Laterality Date    CARPAL TUNNEL RELEASE Bilateral     FOOT SURGERY Left     bone spur    LAMINOTOMY         Immunization History:   Immunization History   Administered Date(s) Administered    COVID-19, Pfizer, PF, 30mcg/0.3mL 2021, 2021    DTaP 2015    Influenza, Jessa Casperen, 6 mo and older, IM, PF (Flulaval, Fluarix) 2019    Tdap (Boostrix, Adacel) 2015       Active Problems:  Patient Active Problem List   Diagnosis Code    Essential hypertension, benign I10    Obesity, diabetes, and hypertension syndrome (HCC) E11.69, E66.9, E11.59, I15.2    Irregular heart rhythm I49.9    Hyperlipidemia E78.5    Allergic rhinitis due to pollen J30.1    Staphylococcus aureus bacteremia R78.81, B95.61    Hematoma of right thigh S70.11XA    Hematoma of left lower extremity S80.12XA    Hereditary peripheral neuropathy G60.9    Migraine without aura G43.009    Chest pain R07.9    Community acquired pneumonia of right lower lobe of lung J18.9    Acute on chronic diastolic (congestive) heart failure (HCC) I50.33    Mild intermittent asthma with exacerbation J45.21    NSTEMI (non-ST elevated myocardial infarction) ([facility] I21.4    Anginal chest pain at rest [facility]) I20.8    Acute bronchitis due to other specified organisms J20.8    CAD (coronary artery disease) I25.10    Body mass index (bmi) 29.0-29.9, adult Z68.29    Cellulitis of right lower limb L03.115    Dyspnea, unspecified R06.00    Encounter for pre-employment examination Z02.1    Family history of ischemic heart disease and other diseases of the circulatory system Z82.49    Other specified hypothyroidism E03.8    Inflammatory disorders of scrotum N49.2    Long term (current) use of insulin ([facility] Z79.4    Long term (current) use of oral hypoglycemic drugs Z79.84    Other chronic sinusitis J32.8    Other idiopathic scoliosis, lumbosacral region M41.27    Other intervertebral disc disorders, lumbar region M51.86    Pain in left lower leg M79.662    Pain of right lower extremity M79.604    General patient noncompliance Z91.19    Shortness of breath R06.02    Sleep apnea G47.30    Spinal stenosis, lumbar region with neurogenic claudication M48.062    Spondylolisthesis, lumbosacral region M43.17    Type 2 diabetes mellitus with diabetic polyneuropathy ([facility]) E11.42    Unsp open wound of r little finger w/o damage to nail, init S61.206A    Involuntary movements R25.9    ANTON (acute kidney injury) ([facility] N17.9    Hyponatremia E87.1    Type 2 diabetes mellitus with hyperglycemia ([facility]) E11.65    Elevated troponin R77.8    Brain bleed ([facility]) I61.9       Isolation/Infection:   Isolation            No Isolation          Patient Infection Status       Infection Onset Added Last Indicated Last Indicated By Review Planned Expiration Resolved Resolved By    None active    Resolved    COVID-19 Rule Out 21 COVID-19, Rapid (Ordered)   21     COVID-19 Rule Out 21 COVID-19, Rapid (Ordered)   21 Rule-Out Test Resulted            Nurse Assessment:  Last Vital Signs: BP (!) 148/79   Pulse 85   Temp 97.9 °F (36.6 °C) (Oral)   Resp 18  5' 7\" (1.702 m)   Wt 170 lb (77.1 kg)   SpO2 95%   BMI 26.63 kg/m²     Last documented pain score (0-10 scale): Pain Level: 0  Last Weight:   Wt Readings from Last 1 Encounters:   11/12/21 170 lb (77.1 kg)     Mental Status:  oriented, alert, coherent, and able to concentrate and follow conversation    IV Access:  - None    Nursing Mobility/ADLs:  Walking   Independent  Transfer  Independent  Bathing  Independent  Dressing  Independent  [address]  Independent  Med Delivery   whole    Wound Care Documentation and Therapy:        Elimination:  Continence: Bowel: Yes  Bladder: Yes  Urinary Catheter: None   Colostomy/Ileostomy/Ileal Conduit: No       Date of Last BM: 11/14/2021    Intake/Output Summary (Last 24 hours) at 11/15/2021 1440  Last data filed at 11/15/2021 0510  Gross per 24 hour   Intake --   Output 120 ml   Net -120 ml     I/O last 3 completed shifts:  In: -   Out: 120 [Urine:120]    Safety Concerns: At Risk for Falls    Impairments/Disabilities:      Impaired mobility    Nutrition Therapy:  Current Nutrition Therapy:   - Oral Diet:  Carb Control 4 carbs/meal (1800kcals/day)    Routes of Feeding: Oral  Liquids: Thin Liquids  Daily Fluid Restriction: no  Last Modified Barium Swallow with Video (Video Swallowing Test): not done    Treatments at the Time of Hospital Discharge:   Respiratory Treatments: inhalers  Oxygen Therapy:  is not on home oxygen therapy. Ventilator:    - No ventilator support    Rehab Therapies: Physical Therapy and Occupational Therapy  Weight Bearing Status/Restrictions: No weight bearing restirctions  Other Medical Equipment (for information only, NOT a DME order):  {EQUIPMENT:061358316}  Other Treatments: no straws, accuchecks    Patient's personal belongings (please select all that are sent with patient):   All personal belongings sent with patient     RN SIGNATURE:  Electronically signed by Pauly Rooney on 11/15/21 at 2:42 PM EST    CASE MANAGEMENT/SOCIAL WORK SECTION    Inpatient Status Date: ***    Readmission Risk Assessment Score:  Readmission Risk              Risk of Unplanned Readmission:  20           Discharging to Facility/ Agency   Name:   Address:  Phone:  Fax:    Dialysis Facility (if applicable)   Name:  Address:  Dialysis Schedule:  Phone:  Fax:    / signature: {Esignature:[id number]}    PHYSICIAN SECTION    Prognosis: {Prognosis:8373056741}    Condition at Discharge: [address] [place] [address] Patient Condition:[id number]}    Rehab Potential (if transferring to Rehab): {Prognosis:1110570829}    Recommended Labs or Other Treatments After Discharge: ***    Physician Certification: I certify the above information and transfer of Roosevelt Singh  is necessary for the continuing treatment of the diagnosis listed and that he requires {Admit to Appropriate Level of Care:00262} for {GREATER/LESS:059372454} 30 days.      Update Admission H&P: {CHP DME Changes in QIFEI:814484352}    PHYSICIAN SIGNATURE:  {Esignature:[id number]}

## 2021-11-15 NOTE — CARE COORDINATION
[facility] NOTIFIED OF REFERRAL. SPOKE [address] [place] [address] MESSAGED TO REQUEST C ORDERS.

## 2021-11-15 NOTE — FLOWSHEET NOTE
Patient threatening to leave AMA if the EEG is not complete 2pm. Page sent to EEG tech.  Electronically signed by Sunday Child RN on 11/15/2021 at 11:56 AM

## 2021-11-15 NOTE — DISCHARGE SUMMARY
Discharge Summary    Date: 11/15/2021  Patient Name: Kaci Da Silva YOB: 1960 Age: 62 y.o. Admit Date: 11/12/2021  Discharge Date: 11/15/2021  Discharge Condition: [address]    Admission Diagnosis  Hyperglycemia (R73.9); Hypertensive urgency (I16.0); Involuntary movements (R25.9); Brain bleed ([facility] [address] (I61.9)     Discharge Diagnosis  Principal Problem: Involuntary movementsActive Problems: CAD (coronary artery disease) ANTON (acute kidney injury) ([facility] [address] Hyponatremia Type 2 diabetes mellitus with hyperglycemia (HCC) Elevated troponin Brain bleed (HCC)Resolved Problems: * No resolved hospital problems. 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 Underwent work-up involving internal medicine team, cardiology, and neurology. Recommended to acute rehab on discharge, but refused. Was begrudgingly accepting of outpatient home health care. Deemed appropriate for discharge on 11/15/2021 after completion of EEG and Echocardiogram.  No driving until cleared by neurology clinic. Will need outpatient follow-up with neurology clinic, cardiology clinic, PCP, and endocrinology clinic. Consultants:  IP CONSULT TO NEUROLOGYIP CONSULT TO CARDIOLOGYIP CONSULT TO DIABETES EDUCATORIP CONSULT TO HOME CARE NEEDS    Surgeries/procedures Performed:       Treatments:            Discharge Plan/Disposition:  Home    Hospital/Incidental Findings Requiring Follow Up:    Patient Instructions:    Diet:    Activity:  For number of days (if applicable): Other Instructions:    Provider Follow-Up:   No follow-ups on file. Significant Diagnostic Studies:    Recent Labs:  Admission on 11/12/2021No results displayed because visit has over 200 results. ------------    Radiology last 7 days:  CT Head WO ContrastResult Date: 11/12/2021There is an 8 mm hyperdensity in the left periventricular white matter which may be acute or subacute and may represent a small focus of hemorrhage. Findings were called to ER at 1913 hours and briefly discussed with Dr. Emmanuel May. MRA HEAD WO CONTRASTResult Date: 11/13/2021No aneurysm or high-grade stenosis of the visualized cerebral vasculature. XR CHEST PORTABLEResult Date: 11/12/2021No radiographic evidence of acute intrathoracic process. MRI BRAIN W WO CONTRASTResult Date: 11/13/2021Bilateral increased T1 signal within the basal ganglia, correlating with findings on the recent CT and new from the prior MRI from 2019. 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 Differential also includes a number of other toxic/metabolic etiologies. No evidence for acute infarct. US CAROTID ARTERY BILATERALResult Date: 11/13/2021Less than 50% stenosis predicted of the bilateral internal carotid arteries. Antegrade flow of the bilateral vertebral arteries. Validated velocity measurements with angiographic measurements, velocity criteria are extrapolated from diameter data as defined by the Society of Radiologist in [address] [place] [address] Radiology 2003; 819;321-564. Pending Labs   Order Current Status  ANGIOTENSIN CONVERTING ENZYME In process  ANTI-DNA ANTIBODY, DOUBLE-STRANDED In process  CARDIOLIPIN ANTIBODIES IGG & IGM In process  MIKAELA 2 - Anti SSA & Anti SSB In process  HIV Screen In process  RPR In process  TOXOPLASMA ABS, IGG/IGM In process      Discharge Medications    Current Discharge Medication ListSTART taking these medicationsclonazePAM (KLONOPIN) 0.5 MG tabletTake 1 tablet by mouth 2 times daily for 30 days. Qty: 60 tablet Refills: 0Associated Diagnoses:Involuntary movements;  Chorea    Current Discharge Medication ListCONTINUE these medications which have CHANGEDmetoprolol tartrate (LOPRESSOR) 25 MG tabletTake 1 tablet by mouth 2 times dailyQty: 60 tablet Refills: 3lisinopril (PRINIVIL;ZESTRIL) 10 MG tabletTake 1 tablet by mouth dailyQty: 30 tablet Refills: 5Associated Diagnoses:Essential hypertensionrosuvastatin (CRESTOR) 20 MG tabletTake 1 tablet by mouth nightlyQty: 30 tablet Refills: 5Comments: we are requesting 90 day prescription per patient's insurance. thank you. Associated Diagnoses:Coronary artery disease involving coronary bypass graft of native heart without angina pectoris    Current Discharge Medication ListCONTINUE these medications which have NOT CHANGEDinsulin aspart protamine-insulin aspart (NOVOLOG MIX 70/30 FLEXPEN) (70-30) 100 UNIT/ML injectionInject 40 Units into the skin 2 times daily (with meals)Qty: 5 pen Refills: 5Comments: Pen needles #100, rf x 5Associated Diagnoses:DM type 2 with diabetic dyslipidemia (HCC)tamsulosin (FLOMAX) 0.4 MG capsuleTake 1 capsule by mouth dailyQty: 90 capsule Refills: 3fluticasone-vilanterol (BREO ELLIPTA) 100-25 MCG/INH AEPB inhalerInhale 1 puff into the lungs dailyQty: 3 each Refills: 3Associated Diagnoses:Persistent asthma with status asthmaticus, unspecified asthma severityglimepiride (AMARYL) 4 MG tabletSYMBICORT 80-4.5 MCG/ACT AEROInhale 2 puffs into the lungs 2 times dailyQty: 10.2 g Refills: 5Associated Diagnoses:Persistent asthma with status asthmaticus, unspecified asthma severitymetFORMIN (GLUCOPHAGE) 1000 MG tabletTAKE 1 TABLET BY MOUTH TWICE DAILY WITH MEALSQty: 60 tablet Refills: 3Associated Diagnoses:DM type 2 with diabetic dyslipidemia (HCC)albuterol sulfate  (90 Base) MCG/ACT inhalerInhale 2 puffs into the lungs every 6 hours as needed for [facility]: 18 g Refills: 5Associated Diagnoses:Persistent asthma with status asthmaticus, unspecified asthma severity!! blood glucose test strips (FREESTYLE LITE) stripTest FOUR times daily as directedQty: 150 strip Refills: 3!!  blood glucose monitor kit and suppliesTest 3 times a day & as needed for symptoms of irregular blood glucose. Qty: 1 kit Refills: 0Comments: Brand per patient preference. May round up to next available package size. !! blood glucose monitor kit and suppliesPlease give 1 meter Dx E11.65 (Please dispense covered brand)Qty: 1 kit Refills: 0Comments: Brand per patient preference. May round up to next available package size. !! Lancets MISCPt test 4x daily Dx E11.65 (dispense covered brand)Qty: 150 each Refills: 3aspirin 81 MG EC tabletTake 1 tablet by mouth dailyQty: 30 tablet Refills: 3pantoprazole (PROTONIX) 40 MG tabletTake 40 mg by mouth dailyInsulin Pen Needle (NOVOFINE) 32G X 6 MM MISCBidQty: 300 each Refills: 3!! FREESTYLE LANCETS MISC1 each by Does not apply route dailyQty: 300 each Refills: 3!! blood glucose test strips (FREESTYLE LITE) stripAs needed. Qty: 300 strip Refills: 3!! Blood Glucose Monitoring Suppl (FREESTYLE LITE) DEVI1 Device by Does not apply route daily as needed (as directed)Qty: 1 Device Refills: 0!! blood glucose test strips (Aretta Haagensen) stripTest tidQty: 300 each Refills: 3Associated Diagnoses:DM type 2 with diabetic dyslipidemia ([address]!! ONE TOUCH LANCETS MISC1 each by Does not apply route 3 times dailyQty: 300 each Refills: 3Associated Diagnoses:DM type 2 with diabetic dyslipidemia (HCC)Blood Glucose Monitoring Suppl (Aretta Haagensen IQ SYSTEM) W/DEVICE toribio Raysam as directedQty: 1 kit Refills: 02Associated Diagnoses:DM type 2 with diabetic dyslipidemia ([address]! ! - Potential duplicate medications found. Please discuss with provider.     Current Discharge Medication ListSTOP taking these medicationsinsulin lispro protamine & lispro (HUMALOG MIX) (75-25) 100 UNIT per ML SUPN injection penComments:Reason for Stopping:MUCUS RELIEF 600 MG extended release tabletComments:Reason for Stopping:    Time Spent on Discharge:E] minutes were spent in patient examination, evaluation, counseling as well as medication reconciliation, prescriptions for required medications, discharge plan, and follow up.     Electronically signed by Yung Wills DO on 11/15/21 at 2:19 PM EST

## 2021-11-15 NOTE — PROGRESS NOTES
Physical Therapy Med Surg Daily Treatment Note  Facility/Department: [facility]  Room: N221/N221-       NAME: Guido Cordon  : 1960 (62 y.o.)  MRN: [id number]  CODE STATUS: Full Code    Date of Service: 11/15/2021    Patient Diagnosis(es): Hyperglycemia [R73.9]  Hypertensive urgency [I16.0]  Involuntary movements [R25.9]  Brain bleed ([address] [I61.9]   No chief complaint on file.     Patient Active Problem List    Diagnosis Date Noted    Brain bleed ([address] 2021    Involuntary movements 2021    ANTON (acute kidney injury) ([address] 2021    Hyponatremia 2021    Type 2 diabetes mellitus with hyperglycemia ([address] 2021    Elevated troponin 2021    General patient noncompliance 2020    Anginal chest pain at rest [facility]) 2019    NSTEMI (non-ST elevated myocardial infarction) ([address] 2019    Mild intermittent asthma with exacerbation     Acute on chronic diastolic (congestive) heart failure ([address] 2019    Community acquired pneumonia of right lower lobe of lung 2019    Chest pain 2019    Acute bronchitis due to other specified organisms 2019    Hereditary peripheral neuropathy 2018    Migraine without aura 2018    Other intervertebral disc disorders, lumbar region 10/16/2018    Spinal stenosis, lumbar region with neurogenic claudication 10/16/2018    Spondylolisthesis, lumbosacral region 10/16/2018    Other idiopathic scoliosis, lumbosacral region 2018    Hematoma of left lower extremity 2018    Staphylococcus aureus bacteremia 2018    Hematoma of right thigh 2018    Body mass index (bmi) 29.0-29.9, adult 2018    Cellulitis of right lower limb 2018    Other specified hypothyroidism 2018    Inflammatory disorders of scrotum 2018    Long term (current) use of insulin ([address] 2018    Long term (current) use of oral hypoglycemic drugs 2018    Dyspnea, unspecified 06/16/2018    Pain in left lower leg 06/16/2018    Pain of right lower extremity 06/16/2018    Shortness of breath 06/16/2018    CAD (coronary artery disease) 06/13/2018    Family history of ischemic heart disease and other diseases of the circulatory system 06/13/2018    Other chronic sinusitis 06/13/2018    Sleep apnea 06/13/2018    Type 2 diabetes mellitus with diabetic polyneuropathy ([address] 06/13/2018    Unsp open wound of r little finger w/o damage to nail, init 06/13/2018    Encounter for pre-employment examination 04/13/2018    Allergic rhinitis due to pollen 06/07/2016    Essential hypertension, benign     Irregular heart rhythm 11/21/2012    Obesity, diabetes, and hypertension syndrome ([address] 03/16/2005    Hyperlipidemia 03/16/2005        Past Medical History:   Diagnosis Date    Asthma     Back pain     CAD (coronary artery disease) 01/2020    3 vessel     Diabetes mellitus ([address]     Essential hypertension, benign     Hyperlipemia     Neuropathic pain, leg, bilateral      Past Surgical History:   Procedure Laterality Date    CARPAL TUNNEL RELEASE Bilateral     FOOT SURGERY Left     bone spur    LAMINOTOMY  2012       Restrictions  Restrictions/Precautions: Fall Risk (high fall risk per Karen Sand score;impulsive)    SUBJECTIVE   General  Chart Reviewed: Yes  Family / Caregiver Present: No  Subjective  Subjective: \"I want to get out of here. Do you have the code to turn off the alarm? \"  General Comment  Comments: Dr. Frances Bonds observing ambulation    Pre-Session Pain Report  Pre Treatment Pain Screening  Pain at present: 0  Intervention List: Patient able to continue with treatment  Pain Screening  Patient Currently in Pain: Denies       Post-Session Pain Report  Pain Assessment  Pain Level: 0         OBJECTIVE        Bed mobility  Rolling to Left: Supervision  Supine to Sit: Stand by assistance  Scooting: Stand by assistance  Comment: HOB flat, use of HR.     Transfers  Sit to Stand: Stand by assistance  Stand to sit: Stand by assistance  Comment: VC's for hand placement during stand > sit. Good follow through. Mild instability with standing initially but pt able to self correct with SBA from this PTA. Multiple STS from bed and chair    Ambulation  Ambulation?: Yes  More Ambulation?: No  Ambulation 1  Surface: level tile  Device: Rolling Walker  Assistance: Stand by assistance; Contact guard assistance  Quality of Gait: shortened step length, mild lateral LOB with turns but able to self correct, steady pace  Gait Deviations: Slow Ani; Decreased step length; Decreased step height  Distance: 50' x 2  Comments: VC's for improving sequencing with WW as pt tends to  [facility]      Neuromuscular Education  PNF: Static standing balance with no UE support. Varied MIRANDA (FT,FA, staggered stance BLE) Pt able to maintain all x30\" ea, mild lateral to R with staggered stance RLE advanced. ASSESSMENT   Assessment: Pt shows an improvement in strength and endruance secondary to increasing overall ambulatory distance. Pt challenged with static standing balance tasks without UE support. Pt able to maintain stances with only one instance of LOB requiring CGA. Pt exhibtis resting tremors on LUE and LLE when sitting which increase with agitation. Pt expressing concerns for d/cing from hospital throughout tx. Education on importance of ensuring pt's safety prior to d/c. Discharge Recommendations:  Continue to assess pending progress    Goals  Long term goals  Long term goal 1: Bed mobility with indep  Long term goal 2: Functional transfers with indep  Long term goal 3: Amb 150ft with LRAD and indep  Long term goal 4: SMITH >43/56 to demonstrate low fall risk  Patient Goals   Patient goals : to go home    PLAN    Times per week: 3-6  Plan Comment: Cont. POC  Safety Devices  Type of devices:  All fall risk precautions in place, Call light within reach, Chair alarm in place, Left in chair, Nurse notified     [facility] (6 CLICK) Gilma [address] 28 Inpatient Mobility Raw Score : 19     Therapy Time   Individual   Time In 6955   Time Out 0921   Minutes 23      BM/Trsf: 8  Gait: 10   NMR:5    Lillie Miranda PTA, 11/15/21 at 9:43 AM         Definitions for assistance levels  Independent = pt does not require any physical supervision or assistance from another person for activity completion. Device may be needed.   Stand by assistance = pt requires verbal cues or instructions from another person, close to but not touching, to perform the activity  Minimal assistance= pt performs 75% or more of the activity; assistance is required to complete the activity  Moderate assistance= pt performs 50% of the activity; assistance is required to complete the activity  Maximal assistance = pt performs 25% of the activity; assistance is required to complete the activity  Dependent = pt requires total physical assistance to accomplish the task

## 2021-11-15 NOTE — FLOWSHEET NOTE
Spoke with EEG tech who is coming to do his EEG now. Updated patient. Patient made aware other physicians need to clear. Perfectserves sent to Dr. So Amado and Dr. Tay Sheppard regarding situation.  Electronically signed by Marie Tesfaye RN on 11/15/2021 at 12:01 PM

## 2021-11-15 NOTE — FLOWSHEET NOTE
Shift assessment complete. VS obtained. A&O x 4. Perfectserve sent for clarification on blood thinner resumption. Denies chest pain, shortness of breath, and nausea. Denies numbness and tingling. Spastic movements noted in right arm. Patient very anxious. Wants to go home today. Medicated per eMAR. Will continue with plan of care.  Electronically signed by Giuseppe Desai RN on 11/15/2021 at 8:41 AM

## 2021-11-15 NOTE — PROGRESS NOTES
[facility]  Facility/Department: [facility]  Speech Language Pathology   Treatment Note          Mavis Hill  1960  N221/N221-01    Medical Dx: Hyperglycemia [R73.9]  Hypertensive urgency [I16.0]  Involuntary movements [R25.9]  Brain bleed ([address] [I61.9]  Speech Dx: Dysphagia, Dysarthria and Cognitive Linguistic Impairment     11/15/2021    Subjective:  Alert, Cooperative and Pleasant        Interventions used this date:  Cognitive Skill Development and Dysphagia Treatment    Objective/Assessment:  Patient progressing towards goals:  Short-term Goals  Timeframe for Short-term Goals: 1 week  Goal 1: Pt will be educated on 3 memory strategies and will implement during structured tasks with 80% acc given cues as needed in order to promote safety with the completion of ADLs. --Pt educated on 3 memory strategies (ie., alarms for medication, making lists, writing things on calender). Pt was able to describe how he would implement these strategies at home with 90% accuracy independently and increased to 100% accuracy with min verbal cues. Goal 2: Pt will verbally sequence ADLs with 80% acc given cues as needed in order to promote effective communication of wants and needs. --Verbally sequeenced ADLs with 80% accuracy with min verbal cues, and increased to 100% accuracy with moderate verbal cues. Goal 3: Pt will be educated on speech intelligibility strategies and will implement during structured tasks with 80% acc given cues as needed in order to promote effective communication of wants and needs with caregivers. --Pt educated on speech intelligibility strategies (ie., abdominal breathing, slowing rate of speech, and over enunciating words). He was able to recall all three strategies and implement them in structured speech tasks with 70% accuracy independently and increased to 100% accuracy with min verbal cues. Pt stated at the end of session, \"This is stupid. I know how to talk. \"  SLP educated pt with rationale behind task. Goal 4: Within 1-5 days of the implementation of ST POC, pt will participate in further assessment of problem solving skills and new goals to be added to POC as deemed necessary. Long-term Goals  Timeframe for Long-term Goals: 2 week  Goal 1: Pt will increase his cognitive-linguistic skills in order to promote effective communication of wants and needs and safety with the completions of ADLs. Goal 2: Pt will increase his speech intelligibility in order to promtoe effective communication of wants and needs with caregivers upon discharge. Short-term Goals  Timeframe for Short-term Goals: 1 week  Goal 1: Pt will tolerate recommended diet level with no overt s/s of aspiration and adequate oral clearance of bolus in all opportunities. --Tolerated thin liquids 10x with no overt s/s of aspiration. Tolerated reg solids 6x with no overt s/s of aspiration. Goal 2: Pt will complete oral motor exercises with 80% acc given cues as needed in order to increase lingual/labial strength and decrease risk of oral residuals. --Pt completed oral motor exercises in 2 sets 10x each. Pt educated to continue with exercises throughout the day. Pt verbally agreed. Goal 3: Pt will complete pharyngeal exercises (nesha, effortful) with 80% acc given cues as needed in order to increase pharyngeal musculature and decrease risk of aspiration. --Pt completed Effortful swallow in 2/5 trials. Pt was observed to initiate swallow but unable to complete swallow in 3/5 trials. Pt educated to continue exercise throughout the day. Pt verbally agreed. Compensatory Swallowing Strategies: Eat/Feed slowly, No straws, Upright as possible for all oral intake, Small bites/sips      Treatment/Activity Tolerance:  Patient tolerated treatment well    Plan:  Continue per POC    Pain Assessment:  Pre-Treatment  Pain assessment: 0-10  Pain level: 0  Intervention:  Patient denies pain.     Post-Treatment  Pain assessment: 0-10  Pain level: 0  Intervention:  Patient denies pain. Patient/Caregiver Education:  Patient educated on session and progression towards goals. Patient stated verbal understanding of directions. Education needs reinforcement.     Safety Devices:  Bed alarm in place and Call light within reach      Therapy Time  SLP Individual Minutes  Time In: 1310  Time Out: 1333  Minutes: 23    Cog: 15 min  Dysphagia: 8 min    Signature: Electronically signed by GEE Moe on 11/15/2021 at 2:11 PM

## 2021-11-15 NOTE — PROGRESS NOTES
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 Hyperglycemia has improved. Blood pressure stable. Nonfocal.  Right-sided chorea mainly of the upper extremity noted. Patient is without headache or vision changes.     Patient reports no new symptoms today    Current Facility-Administered Medications   Medication Dose Route Frequency Provider Last Rate Last Admin    clonazePAM (KLONOPIN) tablet 0.5 mg  0.5 mg Oral BID Kiana Rivas MD   0.5 mg at 11/15/21 0825    metoprolol tartrate (LOPRESSOR) tablet 25 mg  25 mg Oral BID Javier J Holiday, DO   25 mg at 11/15/21 0892    acetaminophen (TYLENOL) tablet 650 mg  650 mg Oral Q4H PRN Stanislaw Tang MD   650 mg at 11/14/21 1340    ondansetron (ZOFRAN-ODT) disintegrating tablet 4 mg  4 mg Oral Q8H PRN [place] Rough, APRN - CNP        Or    [facility]ron [facility]) injection 4 mg  4 mg IntraVENous Q6H PRN [place] Rough, APRN - CNP        polyethylene glycol (GLYCOLAX) packet 17 g  17 g Oral Daily PRN [place] Rough, APRN - CNP        enoxaparin (LOVENOX) injection 40 mg  40 mg SubCUTAneous Daily [place] Rough, APRN - CNP   40 mg at 11/15/21 0933    atorvastatin (LIPITOR) tablet 80 mg  80 mg Oral Nightly [place] Rough, APRN - CNP   80 mg at 11/14/21 2036    insulin lispro (HUMALOG) injection vial 0-12 Units  0-12 Units SubCUTAneous TID WC [place] Rough, APRN - CNP   8 Units at 11/13/21 1618    insulin lispro (HUMALOG) injection vial 0-6 Units  0-6 Units SubCUTAneous Nightly SHERIN Jade CNP        glucose (GLUTOSE) 40 % oral gel 15 g  15 g Oral PRN [place] Rough, SHERIN - CNP        dextrose 50 % IV solution  12.5 g IntraVENous PRN [place] Rough, APRN - CNP        glucagon (rDNA) injection 1 mg  1 mg IntraMUSCular PRN Fauzia Arnold Sonido, APRN - CNP        dextrose 5 % solution  100 mL/hr IntraVENous PRN Radha Backbone, APRN - CNP        insulin lispro (HUMALOG) injection vial 0-12 Units  0-12 Units SubCUTAneous TID WC Radha Backbone, APRN - CNP   6 Units at 11/15/21 1138    insulin lispro (HUMALOG) injection vial 0-6 Units  0-6 Units SubCUTAneous Nightly Radha Backbone, APRN - CNP   2 Units at 11/14/21 2039    glucose (GLUTOSE) 40 % oral gel 15 g  15 g Oral PRN Radha Backbone, APRN - CNP        dextrose 50 % IV solution  12.5 g IntraVENous PRN Radha Backbone, APRN - CNP        glucagon (rDNA) injection 1 mg  1 mg IntraMUSCular PRN Rodrigo Ricardo Head, APRN - CNP        dextrose 5 % solution  100 mL/hr IntraVENous PRN Radha Backbone, APRN - CNP        influenza quadrivalent split vaccine (FLUZONE;FLUARIX;FLULAVAL;AFLURIA) injection 0.5 mL  0.5 mL IntraMUSCular Prior to discharge Neptali Mendez MD        aspirin EC tablet 81 mg  81 mg Oral Daily Neptali Mendez MD   81 mg at 11/15/21 0933    lisinopril (PRINIVIL;ZESTRIL) tablet 10 mg  10 mg Oral Daily Neptali Mendez MD   10 mg at 11/15/21 0824    insulin glargine (LANTUS) injection vial 42 Units  42 Units SubCUTAneous Nightly Neptali Mendez MD   42 Units at 11/14/21 2040    labetalol (NORMODYNE;TRANDATE) injection 20 mg  20 mg IntraVENous Q4H PRN Shruthi Addison MD   20 mg at 11/13/21 0926    pantoprazole (PROTONIX) tablet 40 mg  40 mg Oral QAM AC Shruthi Addison MD   40 mg at 11/15/21 0556    budesonide-formoterol (SYMBICORT) 80-4.5 MCG/ACT inhaler 2 puff  2 puff Inhalation BID Shruthi Addison MD   2 puff at 11/15/21 0825    tamsulosin (FLOMAX) capsule 0.4 mg  0.4 mg Oral Daily Shruthi Addison MD   0.4 mg at 11/15/21 0828    haloperidol (HALDOL) tablet 2 mg  2 mg Oral TID Juan Campos MD   2 mg at 11/15/21 0824    insulin lispro (HUMALOG) injection vial 10 Units  10 Units SubCUTAneous TID  Shruthi Addison MD   10 Units at 11/15/21 1137    sodium chloride (PF) 0.9 % injection 10 mL  10 mL IntraVENous Once Shivani Abernathy MD           PHYSICAL EXAM:    BP (!) 148/79   Pulse 85   Temp 97.9 °F (36.6 °C) (Oral)   Resp 18   Ht 5' 7\" (1.702 m)   Wt 170 lb (77.1 kg)   SpO2 95%   BMI 26.63 kg/m²    General Appearance:      Skin:  normal  CVS - Normal sounds, No murmurs , No carotid Bruits  RS -CTA  Abdomen Soft, BS present  Review of Systems   Constitutional: Negative for fever. HENT: Negative for ear pain, tinnitus and trouble swallowing. Eyes: Negative for photophobia and visual disturbance. Respiratory: Negative for choking and shortness of breath. Cardiovascular: Negative for chest pain and palpitations. Gastrointestinal: Negative for nausea and vomiting. Musculoskeletal: Negative for back pain, joint swelling, myalgias, neck pain and neck stiffness. Skin: Negative for color change. Allergic/Immunologic: Negative for food allergies. Neurological: Positive for tremors (right sided chorea). Negative for dizziness, seizures, syncope, facial asymmetry, speech difficulty, weakness, light-headedness, numbness and headaches. Psychiatric/Behavioral: Negative for behavioral problems, confusion, hallucinations and sleep disturbance.      Mental Status Exam:             Level of Alertness:   awake            Orientation:   person, place, time                      Attention/Concentration:  normal            Language:  normal      Funduscopic Exam:     Cranial Nerves          Cranial nerve III           Pupils:  equal, round, reactive to light      Cranial nerves III, IV, VI           Extraocular Movements: intact      Cranial nerve V           Facial sensation:  intact      Cranial nerve VII           Facial strength: intact      Cranial nerve VIII           Hearing:  intact      Cranial nerve IX           Palate:  intact      Cranial nerve XI         Shoulder shrug:  intact      Cranial nerve XII          Tongue prominence of sulci and ventricles indicating mild global cerebral atrophy and chronic involutional changes. There are periventricular white matter hypodensities which are normally associated with chronic microangiopathy. The basal ganglia are within normal limits. There are no acute changes or space-occupying lesions in the posterior fossa. The visualized portions of the orbits are within normal limits. The globes are intact. The imaged portions of the paranasal sinuses are unremarkable. The calvarium is intact. There is an 8 mm hyperdensity in the left periventricular white matter which may be acute or subacute and may represent a small focus of hemorrhage. Findings were called to ER at 1913 hours and briefly discussed with Dr. Topher Pond. MRA HEAD WO CONTRAST    Result Date: 11/13/2021  EXAM: MR angiography of the head without contrast. History: Stroke Technique: Multiplanar multisequence MRI of the brain was performed. 3-D time-of-flight axial images were performed through the [place]. 3-D volume rendered images of the [place] performed. Comparison: MRI of the brain from November 12, 2021 Findings: The bilateral distal cervical internal carotid arteries through the skull base are patent. The bilateral middle cerebral arteries through the trifurcation and opercular branches are patent. The vertebrobasilar system including the superior cerebellar and posterior cerebral arteries are patent. Neither posterior communicating artery is definitively identified. The bilateral anterior cerebral arteries and anterior communicating artery are patent. No aneurysm or high-grade stenosis of the visualized cerebral vasculature. No aneurysm or high-grade stenosis of the visualized cerebral vasculature. XR CHEST PORTABLE    Result Date: 11/12/2021  Exam: XR CHEST PORTABLE History:  hyperglycemia, ams Technique: AP portable view of the chest obtained.  Comparison: Chest x-ray from April 20, 2021 Chest x-ray portable Findings: Status post median sternotomy. The cardiomediastinal silhouette is within normal limits. There are no infiltrates, consolidations or effusions. Bones of the thorax appear intact. There are chronic, healed posttraumatic deformities of the left ribs. No radiographic evidence of acute intrathoracic process. MRI BRAIN W WO CONTRAST    Result Date: 11/13/2021  EXAMINATION:  MRI BRAIN W WO CONTRAST HISTORY:   possible stroke   fall. Restlessness within the right arm and leg. Atypical movements of the right upper extremity for several days. Forgetfulness. Confusion. Headaches. Slurred speech. Urinary urgency. Possible Stroke Symptoms Within Last Couple Days hemiballistic presentation with intermittent confusion. TECHNIQUE:  Routine brain MRI protocol without and with contrast including diffusion images. CONTRAST:  15 mL gadoteridol (PROHANCE) injection COMPARISON: CT head 11/12/2021. MRI brain 12/10/2019 RESULT: BRAIN: Acute Change: There is no evidence of restricted diffusion to suggest an acute infarct. Hemorrhage:    No evidence of prior parenchymal hemorrhage. Mass Lesion/ Mass Effect:    No evidence of an intracranial mass or extra-axial fluid collection. No abnormal parenchymal or leptomeningeal enhancement following contrast administration. No significant mass effect. Chronic Change/parenchyma:  Bilateral increased T1 signal within the basal ganglia, especially involving the bilateral putamen and caudate. This finding correlates with the hyperdensity seen on the CT from 11/12/2021 and appears new from prior MRI 12/10/2019. Mild to moderate generalized volume loss for age. Brain parenchyma otherwise unchanged from prior MRI. Ventricles:     Ventriculomegaly corresponds to the degree of parenchymal volume loss. Skull Base:    Hypothalamic and pituitary region are grossly normal.   Craniocervical junction is normal. No significant marrow replacement process.  Vasculature: Major intracranial arterial structures, and dural venous sinuses show typical flow void, suggesting patency. Other:  Minimal thickening in the paranasal sinuses. Nonspecific right mastoid effusion, similar to prior MRI. The orbits are unremarkable. Small lipoma within the left posterior scalp measuring around 2.0 cm, unchanged. Bilateral increased T1 signal within the basal ganglia, correlating with findings on the recent CT and new from the prior MRI from 2019. 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 Differential also includes a number of other toxic/metabolic etiologies. No evidence for acute infarct. US CAROTID ARTERY BILATERAL    Result Date: 11/13/2021  BILATERAL DUPLEX CAROTID ULTRASOUND CLINICAL INFORMATION:  Stroke like symptoms COMPARISON:  None available FINDINGS:  The bilateral carotid duplex ultrasound study demonstrates the following:                                                                  RIGHT            LEFT Proximal common carotid artery           114 cm/s            102 cm/s  Mid common carotid artery                   118 cm/s            112 cm/s  Distal common carotid artery                98 cm/s           116 cm/s Proximal internal carotid artery             85 cm/s            87 cm/s Mid internal carotid artery                      81 cm/s           65 cm/s Distal internal carotid artery                  69 cm/s             60 cm/s External carotid artery                            111 cm/s           179 cm/s    ICA/CCA ratio                                         0.72                0.78   Vertebral arteries antegrade flow         Yes                     Yes      Less than 50% stenosis predicted of the bilateral internal carotid arteries. Antegrade flow of the bilateral vertebral arteries.  Validated velocity measurements with angiographic measurements, velocity criteria are extrapolated from diameter data as defined by the Society of Radiologist in [address] [place] [address] Drive Radiology 2003; 115;760-918. 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 The etiology of this needs to be addressed. Initial CT was reported to show abnormality though this was further followed with an MRI and there is no session of any bleed or acute infarct. A complete metabolic work-up and autoimmune work-up and infective work-up will be obtained for chorea. There is no family history of cardiac to suggest Bibb's. Early Dimas-Creutzfeldt's disease cannot be ruled out we will follow this up with an EEG. Patient at times becomes very agitated and wants to leave and is competent if he leaves. There is significant noncompliance in his medication and he initially presented with a glucose of 681 which it self can cause such Chorea, we have seen recently a few cases of PRES-like syndromes occurring with hyperglycemia and hyponatremia causing basal ganglia symptoms and abnormal MRI findings. This therefore truly appears to be a metabolic dysfunction. Will arrange for evaluation of Lyme titers toxoplasma HIV and autoimmune evaluation such as lupus.   Recommended symptomatic and he is on low-dose Haldol and will give him some Klonopin just to make sure he does not get agitated with the same and leave before investigations are complete. Hemoglobin A1c still pending      Laboratory testing remains pending for cardiolipin antibiotics, anti-SSA and anti-SSB, anti-DNA antibody, ACE and toxoplasma antibodies. EEG completed and preliminarily no PLEDs. Dr. Wei Sotelo to further review. Hyperglycemia improved. Chorea is somewhat improved. Okay for patient to be discharged on Klonopin 0.5 mg twice daily. Reinforced with him importance of follow-up in 3 to 4 weeks. I have personally performed a face to face diagnostic evaluation on this patient, reviewed all data and investigations, and am the sole provider of all clinical decisions on the neurological status of this patient. Patient continues to threaten discharge [address] We have been able to keep him for so long and is competent and patient will be discharged once EEG is done. We will keep him on Klonopin for a month      Joshua Sotelo MD, Zoie Hylton, American Board of Psychiatry & Neurology  Board Certified in Vascular Neurology  Board Certified in Neuromuscular Medicine  Certified in . [place] 38

## 2021-11-15 NOTE — FLOWSHEET NOTE
2030: Patient was getting very aggressive with Francisco Javier Fonseca LPN and refusing his PO medications and insulin. I came into the room and I explained to patient why he was in the hospital and the reason he was prescribed medications. He would not allow me to give him his medication nor do an assessment. He told me and Francisco Javier Fonseca LPN to get out of the room and leave him alone so he could sleep. Electronically signed by Ole Rizo RN on 11/14/2021 at 10:57 PM      2040: Patient called. He asked Francisco Javier Fonseca LPN if there were nurses in the room trying to give him medications and insulin. He stated he felt like he was in a dream and there were multiple people in the room. I went in the room to do an assessment. Patient still seemed very aggravated and was hesitant on me doing a full assessment on him. He did apologize to me for being aggressive. He agreed to taking his medications and insulin.  Electronically signed by Ole Rizo RN on 11/14/2021 at 10:57 PM

## 2021-11-15 NOTE — PROGRESS NOTES
Hospitalist Progress Note      PCP: Moon Moreno MD    Date of Admission: 11/12/2021    Chief Complaint: Again with agitated episode overnight, discussing AMA, then apparently forgetting episode and putting on call light 5 minutes later, asking if group of staff just been there a few minutes ago, having apparently forgotten the episode. AM vital signs within acceptable ranged. AM /80. -255 overnight. EEG pending per neurology. Awaiting echo per cardiology. Nursing awaiting callback from neurology regarding resumption of anticoagulation this morning. Patient irritated at continued hospitalization but generally cooperative with exam this morning. Not agitated or belligerent. States that he is right-sided hemiballistic arm movements seem worse when he is upset. Has been up walking with PT with four-wheel walker, able to ambulate, but slightly unsteady on his feet compared to baseline (no walker required previously).     Medications:  Reviewed    Infusion Medications    dextrose      dextrose       Scheduled Medications    clonazePAM  0.5 mg Oral BID    metoprolol tartrate  25 mg Oral BID    enoxaparin  40 mg SubCUTAneous Daily    atorvastatin  80 mg Oral Nightly    insulin lispro  0-12 Units SubCUTAneous TID WC    insulin lispro  0-6 Units SubCUTAneous Nightly    insulin lispro  0-12 Units SubCUTAneous TID WC    insulin lispro  0-6 Units SubCUTAneous Nightly    influenza virus vaccine  0.5 mL IntraMUSCular Prior to discharge    aspirin  81 mg Oral Daily    lisinopril  10 mg Oral Daily    insulin glargine  42 Units SubCUTAneous Nightly    pantoprazole  40 mg Oral QAM AC    budesonide-formoterol  2 puff Inhalation BID    tamsulosin  0.4 mg Oral Daily    haloperidol  2 mg Oral TID    insulin lispro  10 Units SubCUTAneous TID WC    sodium chloride (PF)  10 mL IntraVENous Once     PRN Meds: acetaminophen, ondansetron **OR** ondansetron, polyethylene glycol, glucose, dextrose, predicted of the bilateral internal carotid arteries. Antegrade flow of the bilateral vertebral arteries. Validated velocity measurements with angiographic measurements, velocity criteria are extrapolated from diameter data as defined by the Society of Radiologist in [address] [place] [address] Drive Radiology 2003; 757;[phone]. MRA HEAD WO CONTRAST   Final Result      No aneurysm or high-grade stenosis of the visualized cerebral vasculature. MRI BRAIN W WO CONTRAST   Final Result      Bilateral increased T1 signal within the basal ganglia, correlating with findings on the recent CT and new from the prior MRI from 2019. Overall this finding is nonspecific, but given clinical history and history of diabetes, findings may relate to    nonketotic hyperglycemia induced hemiballism/hemichorea or hyperglycemia associated choreaballism. Differential also includes a number of other toxic/metabolic etiologies. No evidence for acute infarct. CT Head WO Contrast   Final Result      There is an 8 mm hyperdensity in the left periventricular white matter which may be acute or subacute and may represent a small focus of hemorrhage. Findings were called to ER at 1913 hours and briefly discussed with Dr. Eric Isabel. XR CHEST PORTABLE   Final Result   No radiographic evidence of acute intrathoracic process. Assessment/Plan:    62 y. o. 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  - Appropriate for discharge from cardiology perspective after echocardiogram obtained, per cardiology note. IDDM with severe hyperglycemia  - with Glc>600 on arrival  - on Lantus, premeal coverage,ISS    ANTON  - improved    Diet: ADULT DIET; Regular; 4 carb choices (60 gm/meal);  No Drinking Straws    Code Status: Full Code       Disposition - needs acute rehab, patient declined, is ok with [facility] per         Electronically signed by Kirby Adorno DO on 11/15/2021 at 8:58 AM

## 2021-11-16 ENCOUNTER — TELEPHONE (OUTPATIENT)
Dept: PRIMARY CARE CLINIC | Age: 61
End: 2021-11-16

## 2021-11-16 LAB
ANGIOTENSIN CONVERTING ENZYME: <5 U/L (ref 9–67)
HIV AG/AB: NONREACTIVE
RPR: NORMAL

## 2021-11-16 PROCEDURE — 95816 EEG AWAKE AND DROWSY: CPT | Performed by: PSYCHIATRY & NEUROLOGY

## 2021-11-16 NOTE — PROCEDURES
[address]                      [address], [address] [facility]                          ELECTROENCEPHALOGRAM REPORT    PATIENT NAME: Enmanuel Pizano                        :        1960  MED REC NO:   [id number]                            ROOM:       N221  ACCOUNT NO:   [de-identified]                           ADMIT DATE: 2021  PROVIDER:     Caesar Joshi MD    DATE OF EE2021    EEG FINDINGS:  This is a spontaneous 21-channel recording for this  patient with movement disorder. The background rhythm of this EEG shows  a well-regulated 8-9 Hz posterior dominant rhythm of alpha. This is  symmetrical and attenuates with eye opening. EKG is monitored, this is  regular. Photic stimulation is performed without any photic responses. There is no photo response to seizures. The EKG is monitored, this is  regular. Movement was noted during EEG, but no seizures are noted. Hyperventilation is performed with good effort without any change in  frequencies. The record remains symmetrical throughout. Photic  stimulation is performed without any photic response. The record  remains in awake phase throughout without any suggestion of asymmetries  or epileptiform discharge. IMPRESSION:  This is a normal well-regulated EEG recording. Movements  of the arms were noted clinically, but no electrical activity is noted  with this. Clinical correlation is recommended.         Neda Reich MD    D: 2021 9:46:59       T: 2021 9:49:31     [email or web address]  Job#: [id number]     Doc#: [id number]    CC:

## 2021-11-17 ENCOUNTER — OFFICE VISIT (OUTPATIENT)
Dept: FAMILY MEDICINE CLINIC | Age: 61
End: 2021-11-17

## 2021-11-17 VITALS — BODY MASS INDEX: 26.63 KG/M2 | TEMPERATURE: 97.7 F | HEIGHT: 67 IN

## 2021-11-17 DIAGNOSIS — Z91.199 NO-SHOW FOR APPOINTMENT: Primary | ICD-10-CM

## 2021-11-17 LAB
ANTICARDIOLIPIN IGG ANTIBODY: <10 GPL
CARDIOLIPIN AB IGM: <10 MPL
DOUBLE STRANDED DNA AB, IGG: 4 IU (ref 0–24)
ENA TO SSB (LA) ANTIBODY: 0 AU/ML (ref 0–40)
SSA 52 (RO) (ENA) AB, IGG: 0 AU/ML (ref 0–40)
TOXOPLASMA GONDI AB IGM: 3.1 AU/ML
TOXOPLASMA GONDII AB IGG: 10 IU/ML

## 2021-11-17 PROCEDURE — 99999 PR OFFICE/OUTPT VISIT,PROCEDURE ONLY: CPT | Performed by: INTERNAL MEDICINE

## 2021-11-17 RX ORDER — GLUCOSAMINE HCL/CHONDROITIN SU 500-400 MG
CAPSULE ORAL
Qty: 100 STRIP | Refills: 3 | Status: SHIPPED | OUTPATIENT
Start: 2021-11-17 | End: 2021-11-19 | Stop reason: SDUPTHER

## 2021-11-17 SDOH — ECONOMIC STABILITY: FOOD INSECURITY: WITHIN THE PAST 12 MONTHS, THE FOOD YOU BOUGHT JUST DIDN'T LAST AND YOU DIDN'T HAVE MONEY TO GET MORE.: NEVER TRUE

## 2021-11-17 SDOH — ECONOMIC STABILITY: FOOD INSECURITY: WITHIN THE PAST 12 MONTHS, YOU WORRIED THAT YOUR FOOD WOULD RUN OUT BEFORE YOU GOT MONEY TO BUY MORE.: NEVER TRUE

## 2021-11-17 ASSESSMENT — SOCIAL DETERMINANTS OF HEALTH (SDOH): HOW HARD IS IT FOR YOU TO PAY FOR THE VERY BASICS LIKE FOOD, HOUSING, MEDICAL CARE, AND HEATING?: NOT HARD AT ALL

## 2021-11-17 NOTE — TELEPHONE ENCOUNTER
Patient requesting medication refill. Please approve or deny this request.    Rx requested:  Requested Prescriptions     Pending Prescriptions Disp Refills    blood glucose monitor kit and supplies 1 kit 0     Sig: Test 3 times a day & as needed for symptoms of irregular blood glucose. Dx:E11.65    blood glucose monitor strips 100 strip 3     Sig: Test 3 times a day & as needed for symptoms of irregular blood glucose. Dispense sufficient amount for indicated testing frequency plus additional to accommodate PRN testing needs.  Dx:E11.65         Last Office Visit:   2/21/2020      Next Visit Date:  Future Appointments   Date Time Provider Rosaline Silveira   11/19/2021 11:00 AM MD Rosaline Geronimo

## 2021-11-18 ENCOUNTER — TELEPHONE (OUTPATIENT)
Dept: DIABETES SERVICES | Age: 61
End: 2021-11-18

## 2021-11-18 ENCOUNTER — TELEPHONE (OUTPATIENT)
Dept: PRIMARY CARE CLINIC | Age: 61
End: 2021-11-18

## 2021-11-18 NOTE — TELEPHONE ENCOUNTER
Patient called inquiring about his glucose meter and strips. I called [facility] and they said he needs to have a face to face with Dr. Osiris Eldridge discussing his diabetes and how many times he tests per day, per Medicare rules. I called the patient back and asked if he wanted to schedule an appointment and he got mad and hung up on me.

## 2021-11-18 NOTE — PROGRESS NOTES
Attempted to contact patient who has recently been discharged from hospital. Inpatient referral entered for Diabetes Education and was discharged before seen. This call is to assess diabetes knowledge and need for diabetes education. A1c of13.1.  No answer and left voice message to call office

## 2021-11-19 ENCOUNTER — TELEPHONE (OUTPATIENT)
Dept: PRIMARY CARE CLINIC | Age: 61
End: 2021-11-19

## 2021-11-19 ENCOUNTER — TELEMEDICINE (OUTPATIENT)
Dept: PRIMARY CARE CLINIC | Age: 61
End: 2021-11-19
Payer: MEDICARE

## 2021-11-19 DIAGNOSIS — I61.9 LEFT-SIDED NONTRAUMATIC INTRACEREBRAL HEMORRHAGE, UNSPECIFIED CEREBRAL LOCATION (HCC): Primary | ICD-10-CM

## 2021-11-19 DIAGNOSIS — E78.5 DM TYPE 2 WITH DIABETIC DYSLIPIDEMIA (HCC): ICD-10-CM

## 2021-11-19 DIAGNOSIS — I50.33 ACUTE ON CHRONIC DIASTOLIC CONGESTIVE HEART FAILURE (HCC): ICD-10-CM

## 2021-11-19 DIAGNOSIS — E11.69 DM TYPE 2 WITH DIABETIC DYSLIPIDEMIA (HCC): ICD-10-CM

## 2021-11-19 DIAGNOSIS — I20.8 ANGINAL CHEST PAIN AT REST (HCC): ICD-10-CM

## 2021-11-19 PROCEDURE — 1111F DSCHRG MED/CURRENT MED MERGE: CPT | Performed by: INTERNAL MEDICINE

## 2021-11-19 PROCEDURE — 99215 OFFICE O/P EST HI 40 MIN: CPT | Performed by: INTERNAL MEDICINE

## 2021-11-19 RX ORDER — BLOOD-GLUCOSE METER
1 KIT MISCELLANEOUS DAILY PRN
Qty: 1 EACH | Refills: 3 | Status: SHIPPED | OUTPATIENT
Start: 2021-11-19

## 2021-11-19 RX ORDER — LANCETS 28 GAUGE
1 EACH MISCELLANEOUS DAILY
Qty: 300 EACH | Refills: 3 | Status: SHIPPED | OUTPATIENT
Start: 2021-11-19

## 2021-11-19 RX ORDER — GLUCOSAMINE HCL/CHONDROITIN SU 500-400 MG
CAPSULE ORAL
Qty: 100 STRIP | Refills: 3 | Status: SHIPPED | OUTPATIENT
Start: 2021-11-19 | End: 2022-02-25

## 2021-11-19 RX ORDER — INSULIN ASPART 100 [IU]/ML
INJECTION, SOLUTION INTRAVENOUS; SUBCUTANEOUS
Qty: 5 PEN | Refills: 3 | Status: SHIPPED | OUTPATIENT
Start: 2021-11-19 | End: 2021-11-20 | Stop reason: SDUPTHER

## 2021-11-19 NOTE — TELEPHONE ENCOUNTER
Lora from [address] [place] [address] is asking how often he is injecting the novolog flex pen? She aslo needs maximum units per day? Her # is [phone].

## 2021-11-20 ENCOUNTER — TELEPHONE (OUTPATIENT)
Dept: PRIMARY CARE CLINIC | Age: 61
End: 2021-11-20

## 2021-11-20 DIAGNOSIS — E11.69 DM TYPE 2 WITH DIABETIC DYSLIPIDEMIA (HCC): ICD-10-CM

## 2021-11-20 DIAGNOSIS — E78.5 DM TYPE 2 WITH DIABETIC DYSLIPIDEMIA (HCC): ICD-10-CM

## 2021-11-20 RX ORDER — INSULIN ASPART 100 [IU]/ML
INJECTION, SOLUTION INTRAVENOUS; SUBCUTANEOUS
Qty: 5 PEN | Refills: 5 | Status: SHIPPED | OUTPATIENT
Start: 2021-11-20 | End: 2022-02-25

## 2021-11-20 NOTE — TELEPHONE ENCOUNTER
Patient does not want an appointment at this time, he states he already picked up his supplies and went ahead and paid for them

## 2021-11-23 NOTE — PROGRESS NOTES
Physician Progress Note      Yeyo Brown  [facility] #:                  [id number]  :                       1960  ADMIT DATE:       2021 5:09 PM  [address] [place] DATE:        11/15/2021 3:00 PM  RESPONDING  PROVIDER #:        Ed Gill MD          QUERY TEXT:    Pt admitted with blood sugar of > 600, hypertensive emergency and atypical   movements of the right upper extremity. There was also facial droop and speech   difficulties. If possible, please clarify patients condition after study as: The medical record reflects the following:  Risk Factors: DM 2 (insulin, Metformin) HTN, CAD  Clinical Indicators: Last A1c 13.3 in April . Involuntary movements   For past   several days  Mostly RUE. Had noticed speech changes and facial droop,   confirmed by his wife by ER staff. CT w 8 mm hyperdensity in the left   periventricular white matter which may be acute or subacute and may represent   a small focus of hemorrhage. MRI unremarkable, Laboratory testing remains   pending for cardiolipin antibiotics, anti-SSA and anti-SSB, anti-DNA antibody,   ACE and toxoplasma antibodies, EEG completed and preliminarily no PLEDs. Treatment: SSI, CT, MRI, 2 Liter NS bolus, Lovenox day of DC, Insulin SC,   Labetalol IV, EEG, Klonopin on DC. 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     Query created by: Chana Powers on 2021 12:42 PM      Electronically signed by:  Penny Ji MD 11/23/2021 3:53 PM

## 2021-11-30 NOTE — PROGRESS NOTES
Physician Progress Note      PATIENT:               Veronika Monique  [facility] #:                  [id number]  :                       1960  ADMIT DATE:       2021 5:09 PM  [address] [place] DATE:        11/15/2021 3:00 PM  RESPONDING  PROVIDER #:        Brad Aquino TEXT:    Patient admitted with uncontrolled diabetes involuntary movements and   hypertensive urgency  Noted documentation of Acute Kidney Injury on admission. In order to support the diagnosis of ANTON, please include additional clinical   indicators in your documentation. Or please document if the diagnosis of ANTON   has been ruled out after further study    The medical record reflects the following:  Risk Factors: DM2, HTN, CAD  Clinical Indicators: per H&P \" ANTON    B/ Scr  /1.30     Baseline Scr 1.0     Has UO. No hematuria. \" creatinine on admit 1.30, then 0.93, 1.02. Treatment: 2L NS bolus, Labetalol IV, Flomax, Insulin    Defined by Kidney Disease Improving Global Outcomes (KDIGO) clinical practice   guideline for acute kidney injury:  -Increase in SCr by greater than or equal to 0.3 mg/dl within 48 hours; or  -Increase or decrease in SCr to greater than or equal to 1.5 times baseline,   which is known or presumed to have occurred within the prior 7 days; or  -Urine volume < 0.5ml/kg/h for 6 hours    Negrito Mohan RN, BSN, CCDS, [facility]  [phone]  Options provided:  -- Acute kidney injury evidenced by, Please document evidence as well as   baseline creatinine, if known. -- Currently resolved acute kidney injury was evidenced by, Please document   evidence as well as baseline creatinine, if known. -- Acute kidney injury ruled out after study  -- Other - I will add my own diagnosis  -- Disagree - Not applicable / Not valid  -- Disagree - Clinically unable to determine / Unknown  -- Refer to Clinical Documentation Reviewer    PROVIDER RESPONSE TEXT:    This patient has an acute kidney injury as evidenced by Baseline Cr <1.0.  Cr elevated as high as 1.35 during admission, more than meeting criteria for ANTON. Query created by: Lea Mathew on 11/23/2021 1:54 PM      QUERY TEXT:    Patient admitted with blood sugar of > 600, hypertensive emergency and   atypical movements of the right upper extremity. There was also facial droop   and speech difficulties. Per Dr Milan Quiroz progress note in response to query   11/23/21: \"patient has Non ketotic hyperglycemia induced hemichorea and   hemorrhagic CVA. \"  If possible, please clarify patients condition after study   as: The medical record reflects the following:  Risk Factors: DM 2 (insulin, Metformin) HTN, CAD  Clinical Indicators: Last A1c 13.3 in April . Involuntary movements   For past   several days  Mostly RUE. Had noticed speech changes and facial droop,   confirmed by his wife by ER staff. CT w 8 mm hyperdensity in the left   periventricular white matter which may be acute or subacute and may represent   a small focus of hemorrhage. MRI unremarkable, Laboratory testing remains   pending for cardiolipin antibiotics, anti-SSA and anti-SSB, anti-DNA antibody,   ACE and toxoplasma antibodies, EEG completed and preliminarily no PLEDs. Treatment: SSI, CT, MRI, 2 Liter NS bolus, Lovenox day of DC, Insulin SC,   Labetalol IV, EEG, Klonopin on DC.     Thank you,  Kristan VEEN RN CDS  [phone] M-F 6am-2pm  Options provided:  -- Non ketotic hyperglycemia induced hemichorea and hemorrhagic CVA  -- Non ketotic hyperglycemia induced hemichorea ruled out and hemorrhagic CVA   only  -- Hemorrhagic CVA ruled out and Non ketotic hyperglycemia induced hemichorea   only  -- Defer to neurology consultant documentation regarding Non ketotic   hyperglycemia induced hemichorea and hemorrhagic CVA  -- Other - I will add my own diagnosis  -- Disagree - Not applicable / Not valid  -- Disagree - Clinically unable to determine / Unknown  -- Refer to Clinical Documentation Reviewer    PROVIDER RESPONSE TEXT:    I defer to Dr. Karyn Martinly neurology consultant regarding documentation of Non   ketotic hyperglycemia induced hemichorea and hemorrhagic CVA.     Query created by: Roberto Francois on 11/26/2021 6:18 AM      Electronically signed by:  Thanh Mark 11/29/2021 7:11 PM

## 2021-12-01 ENCOUNTER — VIRTUAL VISIT (OUTPATIENT)
Dept: FAMILY MEDICINE CLINIC | Age: 61
End: 2021-12-01
Payer: MEDICARE

## 2021-12-01 DIAGNOSIS — I10 ESSENTIAL HYPERTENSION: Primary | ICD-10-CM

## 2021-12-01 DIAGNOSIS — E78.5 DM TYPE 2 WITH DIABETIC DYSLIPIDEMIA (HCC): ICD-10-CM

## 2021-12-01 DIAGNOSIS — E11.69 DM TYPE 2 WITH DIABETIC DYSLIPIDEMIA (HCC): ICD-10-CM

## 2021-12-01 PROCEDURE — 99442 PR PHYS/QHP TELEPHONE EVALUATION 11-20 MIN: CPT | Performed by: INTERNAL MEDICINE

## 2021-12-01 NOTE — PROGRESS NOTES
Doxy no answer  Phone  Esau Eng is a 62 y.o. male evaluated via telephone on 12/1/2021. Consent:  He and/or health care decision maker is aware that that he may receive a bill for this telephone service, depending on his insurance coverage, and has provided verbal consent to proceed: Yes      Documentation:  I communicated with the patient and/or health care decision maker about htn. Details of this discussion including any medical advice provided: meds      I affirm this is a Patient Initiated Episode with a Patient who has not had a related appointment within my department in the past 7 days or scheduled within the next 24 hours. Patient identification was verified at the start of the visit: Yes    Total Time: minutes: 11-20 minutes    The visit was conducted pursuant to the emergency declaration under the [address] [place] [address], [address] [place] [address] authority and the [employer] Act. Patient identification was verified, and a caregiver was present when appropriate. The patient was located in a state where the provider was credentialed to provide care. Note: not billable if this call serves to triage the patient into an appointment for the relevant concern      Alexandria Lau MD     Esau Eng is a 62 y.o. male evaluated via telephone on 12/1/2021. Esau Eng 62 y.o. male presents today with   Chief Complaint   Patient presents with    Follow-Up from Hospital     patient was seen for hypertension     Hypertension       Hypertension  This is a chronic problem. The problem has been waxing and waning since onset. The problem is controlled. Associated symptoms include anxiety. Pertinent negatives include no chest pain, headaches, palpitations or shortness of breath. seen at[facility] bp new.   Seen at [facility] for high glucose  Past Medical History:   Diagnosis Date    Asthma     Back pain     CAD (coronary artery disease) 01/2020    3 vessel     Diabetes mellitus ([address]     Essential hypertension, benign     Hyperlipemia     Neuropathic pain, leg, bilateral      Patient Active Problem List    Diagnosis Date Noted    Brain bleed ([address] 11/13/2021    Involuntary movements 11/12/2021    ANTON (acute kidney injury) ([address] 11/12/2021    Hyponatremia 11/12/2021    Type 2 diabetes mellitus with hyperglycemia ([address] 11/12/2021    Elevated troponin 11/12/2021    General patient noncompliance 06/26/2020    Anginal chest pain at rest [facility]) 12/23/2019    NSTEMI (non-ST elevated myocardial infarction) ([address] 12/13/2019    Mild intermittent asthma with exacerbation     Acute on chronic diastolic (congestive) heart failure ([address] 12/09/2019    Community acquired pneumonia of right lower lobe of lung 12/08/2019    Chest pain 11/26/2019    Acute bronchitis due to other specified organisms 01/28/2019    Hereditary peripheral neuropathy 12/07/2018    Migraine without aura 12/07/2018    Other intervertebral disc disorders, lumbar region 10/16/2018    Spinal stenosis, lumbar region with neurogenic claudication 10/16/2018    Spondylolisthesis, lumbosacral region 10/16/2018    Other idiopathic scoliosis, lumbosacral region 09/28/2018    Hematoma of left lower extremity 08/20/2018    Staphylococcus aureus bacteremia 07/23/2018    Hematoma of right thigh 07/23/2018    Body mass index (bmi) 29.0-29.9, adult 06/26/2018    Cellulitis of right lower limb 06/26/2018    Other specified hypothyroidism 06/26/2018    Inflammatory disorders of scrotum 06/26/2018    Long term (current) use of insulin ([address] 06/26/2018    Long term (current) use of oral hypoglycemic drugs 06/26/2018    Dyspnea, unspecified 06/16/2018    Pain in left lower leg 06/16/2018    Pain of right lower extremity 06/16/2018    Shortness of breath 06/16/2018    CAD (coronary artery disease) 06/13/2018    Family history of ischemic heart disease and other diseases of the circulatory system 06/13/2018    Other chronic sinusitis 06/13/2018    Sleep apnea 06/13/2018    Type 2 diabetes mellitus with diabetic polyneuropathy ([facility] 06/13/2018    Unsp open wound of r little finger w/o damage to nail, init 06/13/2018    Encounter for pre-employment examination 04/13/2018    Allergic rhinitis due to pollen 06/07/2016    Essential hypertension, benign     Irregular heart rhythm 11/21/2012    Obesity, diabetes, and hypertension syndrome ([facility] 03/16/2005    Hyperlipidemia 03/16/2005     Past Surgical History:   Procedure Laterality Date    CARPAL TUNNEL RELEASE Bilateral     FOOT SURGERY Left     bone spur    LAMINOTOMY  2012     Family History   Problem Relation Age of Onset    Other Father         accident    Heart Failure Mother     Heart Disease Brother     Cirrhosis Brother      Social History     Socioeconomic History    Marital status:      Spouse name: Not on file    Number of children: Not on file    Years of education: Not on file    Highest education level: Not on file   Occupational History    Not on file   Tobacco Use    Smoking status: Never Smoker    Smokeless tobacco: Never Used   Vaping Use    Vaping Use: Never used   Substance and Sexual Activity    Alcohol use: Yes     Alcohol/week: 0.0 standard drinks     Comment: occasional    Drug use: No    Sexual activity: Not on file   Other Topics Concern    Not on file   Social History Narrative    Not on file     Social Determinants of Health     Financial Resource Strain: Low Risk     Difficulty of Paying Living Expenses: Not hard at all   Food Insecurity: No Food Insecurity    Worried About Running Out of Food in the Last Year: Never true    Gricel of Food in the Last Year: Never true   Transportation Needs: No Transportation Needs    Lack of Transportation (Medical): No    Lack of Transportation (Non-Medical):  No   Physical Activity:     Days of Exercise per Week: Not on file    Minutes of Exercise per Session: Not on file   Stress:     Feeling of Stress : Not on file   Social Connections:     Frequency of Communication with Friends and Family: Not on file    Frequency of Social Gatherings with Friends and Family: Not on file    Attends Judaism Services: Not on file    Active Member of Clubs or Organizations: Not on file    Attends Club or Organization Meetings: Not on file    Marital Status: Not on file   Intimate Partner Violence:     Fear of Current or Ex-Partner: Not on file    Emotionally Abused: Not on file    Physically Abused: Not on file    Sexually Abused: Not on file   Housing Stability:     Unable to Pay for Housing in the Last Year: Not on file    Number of [place] in the Last Year: Not on file    Unstable Housing in the Last Year: Not on file     No Known Allergies    Review of Systems   Constitutional: Negative for fatigue and fever. HENT: Negative for trouble swallowing and voice change. Eyes: Negative for photophobia and visual disturbance. Respiratory: Negative for choking and shortness of breath. Cardiovascular: Negative for chest pain and palpitations. Gastrointestinal: Negative for nausea and vomiting. Genitourinary: Negative for urgency. Skin: Negative for rash. Neurological: Negative for tremors, syncope and headaches. Hematological: Does not bruise/bleed easily. Psychiatric/Behavioral: Negative for suicidal ideas. Assessment/Plan  Cristian Block was seen today for follow-up from hospital and hypertension. Diagnoses and all orders for this visit:    Essential hypertension    DM type 2 with diabetic dyslipidemia ([facility]    I discuss I need bp reading and what pills he is on  I discuss he need to see endocrine soon. He did not make an appintment yet    Return in about 6 months (around 6/1/2022), or if symptoms worsen or fail to improve.     Diana Rush MD

## 2021-12-12 PROBLEM — R77.8 ELEVATED TROPONIN: Status: RESOLVED | Noted: 2021-11-12 | Resolved: 2021-12-12

## 2021-12-12 PROBLEM — R79.89 ELEVATED TROPONIN: Status: RESOLVED | Noted: 2021-11-12 | Resolved: 2021-12-12

## 2021-12-12 ASSESSMENT — ENCOUNTER SYMPTOMS
VOMITING: 0
TROUBLE SWALLOWING: 0
VOICE CHANGE: 0
CHOKING: 0
NAUSEA: 0
SHORTNESS OF BREATH: 0
PHOTOPHOBIA: 0

## 2022-02-17 ENCOUNTER — TELEPHONE (OUTPATIENT)
Dept: PRIMARY CARE CLINIC | Age: 62
End: 2022-02-17

## 2022-02-22 DIAGNOSIS — I25.810 CORONARY ARTERY DISEASE INVOLVING CORONARY BYPASS GRAFT OF NATIVE HEART WITHOUT ANGINA PECTORIS: ICD-10-CM

## 2022-02-22 RX ORDER — METOPROLOL TARTRATE 100 MG/1
100 TABLET ORAL 2 TIMES DAILY
Qty: 60 TABLET | Refills: 5 | Status: ON HOLD | OUTPATIENT
Start: 2022-02-22 | End: 2022-08-16

## 2022-02-22 RX ORDER — ROSUVASTATIN CALCIUM 20 MG/1
20 TABLET, COATED ORAL NIGHTLY
Qty: 30 TABLET | Refills: 5 | Status: SHIPPED | OUTPATIENT
Start: 2022-02-22

## 2022-02-25 ENCOUNTER — OFFICE VISIT (OUTPATIENT)
Dept: ENDOCRINOLOGY | Age: 62
End: 2022-02-25
Payer: MEDICARE

## 2022-02-25 VITALS
SYSTOLIC BLOOD PRESSURE: 137 MMHG | HEIGHT: 66 IN | HEART RATE: 113 BPM | OXYGEN SATURATION: 98 % | BODY MASS INDEX: 26.2 KG/M2 | DIASTOLIC BLOOD PRESSURE: 85 MMHG | WEIGHT: 163 LBS

## 2022-02-25 DIAGNOSIS — E11.69 DM TYPE 2 WITH DIABETIC DYSLIPIDEMIA (HCC): Primary | ICD-10-CM

## 2022-02-25 DIAGNOSIS — E11.65 TYPE 2 DIABETES MELLITUS WITH HYPERGLYCEMIA, WITH LONG-TERM CURRENT USE OF INSULIN (HCC): ICD-10-CM

## 2022-02-25 DIAGNOSIS — Z79.4 TYPE 2 DIABETES MELLITUS WITH HYPERGLYCEMIA, WITH LONG-TERM CURRENT USE OF INSULIN (HCC): ICD-10-CM

## 2022-02-25 DIAGNOSIS — E78.5 DM TYPE 2 WITH DIABETIC DYSLIPIDEMIA (HCC): Primary | ICD-10-CM

## 2022-02-25 LAB
CHP ED QC CHECK: NORMAL
GLUCOSE BLD-MCNC: 420 MG/DL
HBA1C MFR BLD: 14 %

## 2022-02-25 PROCEDURE — 2022F DILAT RTA XM EVC RTNOPTHY: CPT | Performed by: INTERNAL MEDICINE

## 2022-02-25 PROCEDURE — 3017F COLORECTAL CA SCREEN DOC REV: CPT | Performed by: INTERNAL MEDICINE

## 2022-02-25 PROCEDURE — 82962 GLUCOSE BLOOD TEST: CPT | Performed by: INTERNAL MEDICINE

## 2022-02-25 PROCEDURE — G8417 CALC BMI ABV UP PARAM F/U: HCPCS | Performed by: INTERNAL MEDICINE

## 2022-02-25 PROCEDURE — 83036 HEMOGLOBIN GLYCOSYLATED A1C: CPT | Performed by: INTERNAL MEDICINE

## 2022-02-25 PROCEDURE — 99213 OFFICE O/P EST LOW 20 MIN: CPT | Performed by: INTERNAL MEDICINE

## 2022-02-25 PROCEDURE — 1036F TOBACCO NON-USER: CPT | Performed by: INTERNAL MEDICINE

## 2022-02-25 PROCEDURE — 3046F HEMOGLOBIN A1C LEVEL >9.0%: CPT | Performed by: INTERNAL MEDICINE

## 2022-02-25 PROCEDURE — G8427 DOCREV CUR MEDS BY ELIG CLIN: HCPCS | Performed by: INTERNAL MEDICINE

## 2022-02-25 PROCEDURE — G8484 FLU IMMUNIZE NO ADMIN: HCPCS | Performed by: INTERNAL MEDICINE

## 2022-02-25 RX ORDER — INSULIN ASPART 100 [IU]/ML
INJECTION, SUSPENSION SUBCUTANEOUS
Qty: 10 PEN | Refills: 5 | Status: SHIPPED | OUTPATIENT
Start: 2022-02-25

## 2022-02-25 NOTE — PROGRESS NOTES
2022    Assessment:       Diagnosis Orders   1. DM type 2 with diabetic dyslipidemia (HCC)  POCT Glucose    POCT glycosylated hemoglobin (Hb A1C)    Microalbumin / Creatinine Urine Ratio     DIABETES FOOT EXAM    Hemoglobin L1L    Basic Metabolic Panel, Fasting    insulin aspart protamine-insulin aspart (NOVOLOG MIX 70/30 FLEXPEN) (70-30) 100 UNIT/ML injection    metFORMIN (GLUCOPHAGE) 1000 MG tablet   2. Type 2 diabetes mellitus with hyperglycemia, with long-term current use of insulin (HCC)           PLAN:     Orders Placed This Encounter   Procedures    Microalbumin / Creatinine Urine Ratio     Standing Status:   Future     Standing Expiration Date:   2023    Hemoglobin A1C     Standing Status:   Future     Standing Expiration Date:   2023    Basic Metabolic Panel, Fasting     Standing Status:   Future     Standing Expiration Date:   2023    POCT Glucose    POCT glycosylated hemoglobin (Hb A1C)     DIABETES FOOT EXAM     Increase NovoLog 7030 to 30 units twice a day patient also educated about insulin pump for better control A1c goal of 7 or lower  Diabetes education provided today:    Insulin pumps, how they work and how they affect blood sugar levels. Continuous Glucose monitor. How it works and checks blood sugars every 5 min. for 4 days during our tests.     Orders Placed This Encounter   Medications    insulin aspart protamine-insulin aspart (NOVOLOG MIX 70/30 FLEXPEN) (70-30) 100 UNIT/ML injection     Si units twice day     Dispense:  10 pen     Refill:  5     Pen needles #100, rf x 5    metFORMIN (GLUCOPHAGE) 1000 MG tablet     Sig: bid     Dispense:  60 tablet     Refill:  3         Orders Placed This Encounter   Procedures    POCT Glucose    POCT glycosylated hemoglobin (Hb A1C)       Subjective:     Chief Complaint   Patient presents with    Diabetes     Vitals:    22 1023   BP: 137/85   Pulse: 113   SpO2: 98%   Weight: 163 lb (73.9 kg)   Height: 5' 6\" (1.676 m)     Wt Readings from Last 3 Encounters:   02/25/22 163 lb (73.9 kg)   11/12/21 170 lb (77.1 kg)   04/28/21 190 lb (86.2 kg)     BP Readings from Last 3 Encounters:   02/25/22 137/85   11/15/21 (!) 148/79   04/28/21 127/88     Follow-up on type 2 diabetes patient on NovoLog 70/30 taking only 15 units twice a day plus Metformin A1c is a bit high due to compliance with diet does complain of fatigue history of heart disease last C-peptide was low in November at 0.4  Hemoglobin A1c done today was 14 they have been running between 11-14 range over the last year    Diabetes  He presents for his follow-up diabetic visit. He has type 2 diabetes mellitus. His disease course has been fluctuating. Associated symptoms include fatigue. Diabetic complications include heart disease. Current diabetic treatment includes insulin injections. He is currently taking insulin pre-breakfast and pre-dinner. His overall blood glucose range is >200 mg/dl. (Lab Results       Component                Value               Date                       LABA1C                   14.0                02/25/2022            ) An ACE inhibitor/angiotensin II receptor blocker is being taken. Results for Curly Solre (MRN [id number]) as of 2/25/2022 10:53   Ref.  Range 4/7/2017 14:25 11/27/2021 10:11   C-Peptide Latest Ref Range: 0.7 - 3.9 ng/mL 1.7 0.4 (L)       Past Medical History:   Diagnosis Date    Asthma     Back pain     CAD (coronary artery disease) 01/2020    3 vessel     [facility]     Essential hypertension, benign     Hyperlipemia     Neuropathic pain, leg, bilateral      Past Surgical History:   Procedure Laterality Date    CARPAL TUNNEL RELEASE Bilateral     FOOT SURGERY Left     bone spur    LAMINOTOMY  2012     Social History     Socioeconomic History    Marital status:      Spouse name: Not on file    Number of children: Not on file    Years of education: Not on file    Highest education level: Not on file   Occupational History    Not on file   Tobacco Use    Smoking status: Never Smoker    Smokeless tobacco: Never Used   Vaping Use    Vaping Use: Never used   Substance and Sexual Activity    Alcohol use: Yes     Alcohol/week: 0.0 standard drinks     Comment: occasional    Drug use: No    Sexual activity: Not on file   Other Topics Concern    Not on file   Social History Narrative    Not on file     Social Determinants of Health     Financial Resource Strain: Low Risk     Difficulty of Paying Living Expenses: Not hard at all   Food Insecurity: No Food Insecurity    Worried About Running Out of Food in the Last Year: Never true    Gricel of Food in the Last Year: Never true   Transportation Needs: No Transportation Needs    Lack of Transportation (Medical): No    Lack of Transportation (Non-Medical):  No   Physical Activity:     Days of Exercise per Week: Not on file    Minutes of Exercise per Session: Not on file   Stress:     Feeling of Stress : Not on file   Social Connections:     Frequency of Communication with Friends and Family: Not on file    Frequency of Social Gatherings with Friends and Family: Not on file    Attends Zoroastrian Services: Not on file    Active Member of [address] [place] [address] or Organizations: Not on file    Attends Club or Organization Meetings: Not on file    Marital Status: Not on file   Intimate Partner Violence:     Fear of Current or Ex-Partner: Not on file    Emotionally Abused: Not on file    Physically Abused: Not on file    Sexually Abused: Not on file   Housing Stability:     Unable to Pay for Housing in the Last Year: Not on file    Number of [place] in the Last Year: Not on file    Unstable Housing in the Last Year: Not on file     Family History   Problem Relation Age of Onset    Other Father         accident    Heart Failure Mother     Heart Disease Brother     Cirrhosis Brother      No Known Allergies    Current Outpatient Medications:    metoprolol (LOPRESSOR) 100 MG tablet, Take 1 tablet by mouth 2 times daily, Disp: 60 tablet, Rfl: 5    rosuvastatin (CRESTOR) 20 MG tablet, Take 1 tablet by mouth nightly, Disp: 30 tablet, Rfl: 5    Blood Glucose Monitoring Suppl (FREESTYLE LITE) MARIUM, 1 Device by Does not apply route daily as needed (as directed), Disp: 1 each, Rfl: 3    FreeStyle Lancets MISC, 1 each by Does not apply route daily, Disp: 300 each, Rfl: 3    metoprolol tartrate (LOPRESSOR) 25 MG tablet, Take 1 tablet by mouth 2 times daily, Disp: 60 tablet, Rfl: 3    lisinopril (PRINIVIL;ZESTRIL) 10 MG tablet, Take 1 tablet by mouth daily, Disp: 30 tablet, Rfl: 5    glimepiride (AMARYL) 4 MG tablet, , Disp: , Rfl:     insulin aspart protamine-insulin aspart (NOVOLOG MIX 70/30 FLEXPEN) (70-30) 100 UNIT/ML injection, Inject 40 Units into the skin 2 times daily (with meals), Disp: 5 pen, Rfl: 5    tamsulosin (FLOMAX) 0.4 MG capsule, Take 1 capsule by mouth daily, Disp: 90 capsule, Rfl: 3    SYMBICORT 80-4.5 MCG/ACT AERO, Inhale 2 puffs into the lungs 2 times daily, Disp: 10.2 g, Rfl: 5    metFORMIN (GLUCOPHAGE) 1000 MG tablet, TAKE 1 TABLET BY MOUTH TWICE DAILY WITH MEALS, Disp: 60 tablet, Rfl: 3    albuterol sulfate  (90 Base) MCG/ACT inhaler, Inhale 2 puffs into the lungs every 6 hours as needed for Wheezing, Disp: 18 g, Rfl: 5    Lancets MISC, Pt test 4x daily Dx E11.65 (dispense covered brand), Disp: 150 each, Rfl: 3    aspirin 81 MG EC tablet, Take 1 tablet by mouth daily, Disp: 30 tablet, Rfl: 3    pantoprazole (PROTONIX) 40 MG tablet, Take 40 mg by mouth daily, Disp: , Rfl:     Insulin Pen Needle (NOVOFINE) 32G X 6 MM MISC, Bid, Disp: 300 each, Rfl: 3    fluticasone-vilanterol (BREO ELLIPTA) 100-25 MCG/INH AEPB inhaler, Inhale 1 puff into the lungs daily, Disp: 3 each, Rfl: 3    clonazePAM (KLONOPIN) 0.5 MG tablet, Take 1 tablet by mouth 2 times daily for 30 days. , Disp: 60 tablet, Rfl: 0  Lab Results   Component Value Date     11/28/2021    K 4.1 11/28/2021     11/28/2021    CO2 25 11/15/2021    BUN 17 11/15/2021    CREATININE 1.28 11/28/2021    GLUCOSE 420 02/25/2022    CALCIUM 9.0 11/28/2021    PROT 6.6 11/27/2021    LABALBU 4.0 11/27/2021    BILITOT 0.3 11/27/2021    ALKPHOS 70 11/27/2021    AST 20 11/27/2021    ALT 13 11/27/2021    LABGLOM 57 (A) 11/28/2021    GFRAA 69 11/28/2021    AGRATIO 0.6 (L) 07/08/2018    GLOB 2.1 (L) 11/15/2021     Lab Results   Component Value Date    WBC 6.5 11/28/2021    HGB 14.1 11/28/2021    HCT 43.8 11/28/2021    MCV 85 11/28/2021     (L) 11/28/2021     Lab Results   Component Value Date    LABA1C 14.0 02/25/2022    LABA1C 11.7 (A) 11/27/2021    LABA1C 12.7 (H) 11/14/2021     Lab Results   Component Value Date    HDL 41 11/14/2021    HDL 50 06/09/2020    HDL 39 (L) 12/24/2019    LDLCALC 164 (H) 11/14/2021    LDLCALC 125 06/09/2020    LDLCALC 71 12/24/2019    CHOL 235 (H) 11/14/2021    CHOL 214 (H) 06/09/2020    CHOL 132 12/24/2019    TRIG 150 11/14/2021    TRIG 193 (H) 06/09/2020    TRIG 112 12/24/2019     Lab Results   Component Value Date    TESTM 361 02/25/2021     Lab Results   Component Value Date    TSH 1.270 06/09/2020    TSH 1.800 12/23/2019    TSH 1.200 11/26/2019     No results found for: TPOABS    Review of Systems   Constitutional: Positive for fatigue. Cardiovascular: Negative. Endocrine: Negative. All other systems reviewed and are negative. Objective:   Physical Exam  Vitals reviewed. Constitutional:       Appearance: Normal appearance. HENT:      Head: Normocephalic and atraumatic. Hair is normal.      Right Ear: External ear normal.      Left Ear: External ear normal.      Nose: Nose normal.   Eyes:      General: No scleral icterus. Right eye: No discharge. Left eye: No discharge. Extraocular Movements: Extraocular movements intact.       Conjunctiva/sclera: Conjunctivae normal.   Neck:      Trachea: Trachea normal. Cardiovascular:      Rate and Rhythm: Normal rate. Musculoskeletal:         General: Normal range of motion. Cervical back: Normal range of motion and neck supple. Feet:    Neurological:      General: No focal deficit present. Mental Status: He is alert and oriented to person, place, and time.    Psychiatric:         Mood and Affect: Mood normal.         Behavior: Behavior normal.

## 2022-03-10 ENCOUNTER — CARE COORDINATION (OUTPATIENT)
Dept: CARE COORDINATION | Age: 62
End: 2022-03-10

## 2022-03-10 NOTE — LETTER
3/10/2022    [address] [place] [address] [id number] [facility] [address]      Dear Margie Butler,    My name is Damon Major RN and I am a registered nurse who partners with Amadeo Elaine MD to improve patients' health. Amadeo Elaine MD believes you would benefit from working with me. As a member of your health care team, I would work with other providers involved in your care, offer education for your specific health conditions, and connect you with additional resources as needed. I will collaborate with Amadeo Elaine MD to support you in following your treatment plan. The additional support I provide is no additional cost to you. My primary focus is to help you achieve specific goals and improve your health. We are committed to walk with you on this journey and look forward to working with you. Please call me to further discuss your healthcare needs. I am available by phone or for appointments at the office. You can reach me at [phone].     In good health,     Damon Major RN

## 2022-03-10 NOTE — CARE COORDINATION
ACM called patient. Left message requesting a return call for [facility] introduction. Contact information supplied. Letter sent via [employer] and also [address] mail.

## 2022-03-15 ENCOUNTER — CARE COORDINATION (OUTPATIENT)
Dept: CARE COORDINATION | Age: 62
End: 2022-03-15

## 2022-03-15 NOTE — LETTER
3/15/2022     Tito Tovar  [facility] [address]  [address] [facility]    Dear Tito Tovar    I recently attempted to contact you to discuss your healthcare needs. My name is Garrett Armas RN and I am a registered nurse who partners with Sabina Bazzi MD to improve patients health. As a member of your health care team, I would work with other providers involved in your care, offer education for your specific health conditions, and connect you with additional resources as needed. I will collaborate with Suzette Prasad MD to support you in following your treatment plan. The additional support I provide is no additional cost to you. My primary focus is to help you achieve specific goals and improve your health. I look forward to working with you. Please contact me at your earliest convenience at [phone].     In good health,    Garrett Armas RN

## 2022-03-15 NOTE — CARE COORDINATION
ACM called patient. Left message requesting a return call for [facility] out reach introduction. Contact information supplied and second letter sent via [phone]

## 2022-03-21 ENCOUNTER — CARE COORDINATION (OUTPATIENT)
Dept: CARE COORDINATION | Age: 62
End: 2022-03-21

## 2022-03-21 DIAGNOSIS — E11.65 TYPE 2 DIABETES MELLITUS WITH HYPERGLYCEMIA, WITH LONG-TERM CURRENT USE OF INSULIN (HCC): Primary | ICD-10-CM

## 2022-03-21 DIAGNOSIS — Z79.4 TYPE 2 DIABETES MELLITUS WITH HYPERGLYCEMIA, WITH LONG-TERM CURRENT USE OF INSULIN (HCC): Primary | ICD-10-CM

## 2022-03-21 PROCEDURE — 3046F HEMOGLOBIN A1C LEVEL >9.0%: CPT | Performed by: INTERNAL MEDICINE

## 2022-03-21 RX ORDER — BLOOD SUGAR DIAGNOSTIC
STRIP MISCELLANEOUS
Qty: 150 EACH | Refills: 3 | Status: SHIPPED | OUTPATIENT
Start: 2022-03-21 | End: 2022-03-29 | Stop reason: SDUPTHER

## 2022-03-21 RX ORDER — LANCETS
EACH MISCELLANEOUS
Qty: 150 EACH | Refills: 3 | Status: SHIPPED | OUTPATIENT
Start: 2022-03-21 | End: 2022-03-29 | Stop reason: SDUPTHER

## 2022-03-21 NOTE — PATIENT INSTRUCTIONS
Patient Education        Type 2 Diabetes: Care Instructions  Your Care Instructions     Type 2 diabetes is a disease that develops when the body's tissues cannot use insulin properly. Over time, the pancreas cannot make enough insulin. Insulin is a hormone that helps the body's cells use sugar (glucose) for energy. It also helps the body store extra sugar in muscle, fat, and liver cells. Without insulin, the sugar cannot get into the cells to do its work. It stays in the blood instead. This can cause high blood sugar levels. A person has diabetes when the blood sugar stays too high too much of the time. Over time, diabetes can lead to diseases of the heart, blood vessels, nerves, kidneys, and eyes. You may be able to control your blood sugar by losing weight, eating a healthy diet, and getting daily exercise. You may also have to take insulin or other diabetes medicine. Follow-up care is a key part of your treatment and safety. Be sure to make and go to all appointments. Call your doctor if you are having problems. It's also a good idea to know your test results and keep a list of the medicines you take. How can you care for yourself at home? · Keep your blood sugar at a target level (which you set with your doctor). ? Carbohydratethe body's main source of fuelaffects blood sugar more than any other nutrient. Carbohydrate is in fruits, vegetables, milk, and yogurt. It also is in breads, cereals, vegetables such as potatoes and corn, and sugary foods such as candy and cakes. Follow your meal plan to know how much carbohydrate to eat at each meal and snack. ? Aim for 30 minutes of exercise on most, preferably all, days of the week. Walking is a good choice. You also may want to do other activities, such as running, swimming, cycling, or playing tennis or team sports. Try to do muscle-strengthening exercises at least 2 times a week. ? Take your medicines exactly as prescribed.  Call your doctor if you think you are having a problem with your medicine. You will get more details on the specific medicines your doctor prescribes. · Check your blood sugar as often as your doctor recommends. It is important to keep track of any symptoms you have, such as low blood sugar. Also tell your doctor if you have any changes in your activities, diet, or insulin use. · Talk to your doctor before you start taking aspirin every day. Aspirin can help certain people lower their risk of a heart attack or stroke. But taking aspirin isn't right for everyone, because it can cause serious bleeding. · Do not smoke. If you need help quitting, talk to your doctor about stop-smoking programs and medicines. These can increase your chances of quitting for good. · Keep your cholesterol and blood pressure at normal levels. You may need to take one or more medicines to reach your goals. Take them exactly as directed. Do not stop or change a medicine without talking to your doctor first.  When should you call for help? Call 911 anytime you think you may need emergency care. For example, call if:    · You passed out (lost consciousness), or you suddenly become very sleepy or confused. (You may have very low blood sugar.)   Call your doctor now or seek immediate medical care if:    · Your blood sugar is 300 mg/dL or is higher than the level your doctor has set for you.     · You have symptoms of low blood sugar, such as:  ? Sweating. ? Feeling nervous, shaky, and weak. ? Extreme hunger and slight nausea. ? Dizziness and headache.  ? Blurred vision. ? Confusion. Watch closely for changes in your health, and be sure to contact your doctor if:    · You often have problems controlling your blood sugar.     · You have symptoms of long-term diabetes problems, such as:  ? New vision changes. ? New pain, numbness, or tingling in your hands or feet. ? Skin problems. Where can you learn more? Go to https://chjorge luiseb.[email or web address] org and sign as your glucose meter. Examples:  · 6% A1c = 126 mg/dL  · 7% A1c = 154 mg/dL  · 8% A1c = 183 mg/dL  · 9% A1c = 212 mg/dL  · 10% A1c = 240 mg/dL  · 11% A1c = 269 mg/dL  · 12% A1c = 298 mg/dL  Where can you learn more? Go to [email or web address] and sign in to your DEQ account. Enter U216 in the weezim.com box to learn more about \"Hemoglobin A1c: About This Test.\"     If you do not have an account, please click on the \"Sign Up Now\" link. Current as of: July 28, 2021               Content Version: 13.1  © 2006-2021 Healthwise, Incorporated. Care instructions adapted under license by [facility] ([facility]). If you have questions about a medical condition or this instruction, always ask your healthcare professional. [place] [address] any warranty or liability for your use of this information. Patient Education        Learning About Continuous Glucose Monitors (CGMs)  What is a continuous glucose monitor? A continuous glucose monitor (CGM) is a device that you attach to your body. It reads your blood sugar (glucose) level, day and night. If your blood sugar is too low, and you don't notice it, some CGMs can alert you in time to get treatment. Your doctor may also recommend using a CGM to help you stay in your target blood sugar range more consistently. How is a CGM used? A CGM has several parts. You wear one partthe sensoragainst your skin. It has a tiny needle that stays under your skin and constantly reads your blood sugar level. It sends this information to a wireless . The  can tell you if your blood sugar goes up or down and how fast the blood sugar level changes. And you can view the stored data to help you identify trends in your blood sugar level. Some insulin pumps include a CGM. In this case, the insulin pump is also the . The readings from the CGM can help you decide what to eat and how to exercise.  And they can help you know how much medicine to take. You can note on the  when you do these things. That way you can see how those activities affect your blood sugar throughout the day and night. All this detailed information gives you and your doctor a better idea of what your treatment needs are. CGM technology is always changing and getting better. Here are some things to know about most CGMs. These may not apply to all systems. · Sensors need to be changed. Depending on the system, you may need to change the sensor every few days. Some systems have sensors that last 10 days. · Some CGMs will require that you prick your finger to confirm the CGM's accuracy. · Acetaminophen (Tylenol) can affect the results in some CGMs, reporting the readings higher than they actually are. Where can you learn more? Go to https://Contentful.[employer]. org and sign in to your Search to Phone account. Enter [phone] in the [facility] box to learn more about \"Learning About Continuous Glucose Monitors (CGMs). \"     If you do not have an account, please click on the \"Sign Up Now\" link. Current as of: July 28, 2021               Content Version: 13.1  © 1092-7414 [employer]start. Care instructions adapted under license by [facility] ([facility]). If you have questions about a medical condition or this instruction, always ask your healthcare professional. [address] any warranty or liability for your use of this information. Patient Education        Learning About High Blood Sugar  What is high blood sugar? Your body turns the food you eat into glucose (sugar), which it uses for energy. But if your body isn't able to use the sugar right away, it can build up in your blood and lead to high blood sugar. When the amount of sugar in your blood stays too high for too much of the time, you may have diabetes. Diabetes is a disease that can cause serious health problems.   The good news is that lifestyle having problems. It's also a good idea to know your test results and keep a list of the medicines you take. Where can you learn more? Go to https://[email or web address].[email or web address] org and sign in to your Amlogic account. Enter O108 in the [employer] box to learn more about \"Learning About High Blood Sugar. \"     If you do not have an account, please click on the \"Sign Up Now\" link. Current as of: July 28, 2021               Content Version: 13.1  © 9070-9939 [employer]. Care instructions adapted under license by [facility] ([facility]). If you have questions about a medical condition or this instruction, always ask your healthcare professional. [address] any warranty or liability for your use of this information. Patient Education        Hypoglycemia: Care Instructions  Overview     Hypoglycemia means that your blood sugar is low and your body is not getting enough fuel. Some people get low blood sugar from not eating often enough. Some medicines to treat diabetes can cause low blood sugar. People who have had surgery on their stomachs or intestines may get hypoglycemia. Problems with the pancreas, kidneys, or liver also can cause low blood sugar. A snack or drink with sugar in it will raise your blood sugar and should ease your symptoms right away. Your doctor may recommend that you change or stop your medicines until you can get your blood sugar levels under control. In the long run, you may need to change your diet and eating habits so that you get enough fuel for your body throughout the day. Follow-up care is a key part of your treatment and safety. Be sure to make and go to all appointments, and call your doctor if you are having problems. It's also a good idea to know your test results and keep a list of the medicines you take. How can you care for yourself at home? · Learn your signs of low blood sugar. They are different for everyone.  Some common early signs include:  ? Nausea. ? Hunger. ? Feeling nervous, irritable, or shaky. ? Cold, clammy skin. ? Sweating (when you're not exercising). · Use the \"rule of 15\" to treat low blood sugar. This includes eating 15 grams of carbohydrate from a quick-sugar food, such as 3 or 4 glucose tablets or ½ cup of juice. Wait 15 minutes and check your blood sugar. If it is still below 70 mg/dL, eat another 15 grams of carbohydrate. Repeat this every 15 minutes until your blood sugar is in a safe target range. · Once your blood sugar is in a safe range, eat a snack or meal to prevent recurrent low blood sugar. · Make sure family, friends, and coworkers know the symptoms of low blood sugar and know how to get your sugar level up. · If you were prescribed a glucagon kit, always have it with you. Make sure friends and family know how to use it. When should you call for help? Call 911 anytime you think you may need emergency care. For example, call if:    · You passed out (lost consciousness).     · You are confused or cannot think clearly.     · Your blood sugar is very high or very low. Watch closely for changes in your health, and be sure to contact your doctor if:    · Your blood sugar stays outside the level your doctor set for you.     · You have any problems. Where can you learn more? Go to https://MoboTappemorganeb.Innovari. org and sign in to your Satmetrix account. Enter F380 in the [employer]o box to learn more about \"Hypoglycemia: Care Instructions. \"     If you do not have an account, please click on the \"Sign Up Now\" link. Current as of: July 28, 2021               Content Version: 13.1  © 2643-2106 [employer]REX international. Care instructions adapted under license by [facility] ([facility]). If you have questions about a medical condition or this instruction, always ask your healthcare professional. [address] any warranty or liability for your use of this information. Someone from Dr. Casey Andujar office will call to set up when you would come in with the box that you received at home for your continuous glucose monitoring. Someone will be calling you from diabetic education over at the hospital to set up a diabetic education as we discussed. As discussed and you agreed. You will monitor your blood sugars once in the morning fasting and once in the evening before your last meal.  You stated you will have your wife assist.  Please write these numbers down with the date/time. Per Dr. Quintana Pleasant last office visit you are to use Novolin 70/30 30 units twice daily before a meal we also discussed this.

## 2022-03-21 NOTE — CARE COORDINATION
Ambulatory Care Coordination Note  3/21/2022  CM Risk Score: 7  Johnny Mortality Risk Score: [unfilled]    ACC: Felix Kapoor, RN    Summary Note: ACM spoke to patient introduced [facility] program role of AC goals and benefits. ACM reviewed current health status. Upon review patient's A1c is 14. Patient sees Dr. Jose Martin Bermudez. Patient's diabetic management thus far has been complicated with patient noncompliance or inability to understand needs. She has not been monitoring blood sugars in several months. At last visit patient was ordered continuous glucose monitoring to review prior to possible insulin pump. Patient states that he received a box in the mail but has not opened it. [facility] had patient open box patient's information regarding glucose monitoring was inside. ACM called Dr. Jose Martin Bermudez office and spoke with Braxton Ibarra. They will call patient to set up to come into the office to get continuous glucose monitor attached and additional edu. Has also had noncompliance with medications more specifically his insulin. Patient voiced concerns over fear of hypoglycemia. ACM educated patient on the difference between hypoglycemia and hyperglycemia and the fact that patient's A1c is 14. Patient states he struggles with his vision and [facility]. Patient states that he gets injections every 3 or 4 months. ACM recommended patient get a eye exam and possible need for glasses. ACM completed initial assessment. S LIZET, medication review. POC  Follow-up with continuous glucose monitoring and pump with Dr. Allyn Collins office  Educate patient on diabetic education and CHF education  Sent referral to [facility] diabetic educators  Referral to  for food insecurities and additional community resources  Patient agreed to monitor blood sugars twice daily with the help of his wife until he gets the continuous glucose monitor device set up.   Pharmacy review  Nutritional review   ACP planning  Blood sugar monitoring and education. Ambulatory Care Coordination Assessment    Care Coordination Protocol  Program Enrollment: Complex Care  Referral from Primary Care Provider: No  Week 1 - Initial Assessment     Do you have all of your prescriptions and are they filled?: Yes  Barriers to medication adherence: Complexity of regimen, Does not understand need for medication  Are you able to afford your medications?: No  How often do you have trouble taking your medications the way you have been told to take them?: I seldom take them as prescribed. Do you have Home O2 Therapy?: No      Ability to seek help/take action for Emergent Urgent situations i.e. fire, crime, inclement weather or health crisis. : Independent  Ability to ambulate to restroom: Independent  Ability handle personal hygeine needs (bathing/dressing/grooming): Independent  Ability to manage Medications: Needs Assistance  Ability to prepare Food Preparation: Needs Assistance  Ability to maintain home (clean home, laundry): Needs Assistance  Ability to drive and/or has transportation: Independent  Ability to do shopping: Independent  Ability to manage finances: Independent  Is patient able to live independently?: Yes     Current Housing: Private Residence        Per the Fall Risk Screening, did the patient have 2 or more falls or 1 fall with injury in the past year?: Yes  How often do you think you are about to fall and you do NOT fall?  For example, you grab something to stabilize yourself or hold onto a wall/furniture?: Occasionally  Use of a Mobility Aid: No  Changes in vision, poor vision or poor lighting in environment: Yes  Dizziness: No  Other Fall Risk: Yes  What other fall risks does the patient have?: high risk meds      Frequent urination at night?: No  Do you use rails/bars?: No  Do you have a non-slip tub mat?: Yes     Are you experiencing loss of meaning?: No  Are you experiencing loss of hope and peace?: No     Thinking about your patient's physical health needs, are there any symptoms or problems (risk indicators) you are unsure about that require further investigation?: No identified areas of uncertainly or problems already being investigated   Are the patients physical health problems impacting on their mental well-being?: Mild impact on mental well-being e.g. \"\"feeling fed-up\"\", \"\"reduced enjoyment\"\"   Are there any problems with your patients lifestyle behaviors (alcohol, drugs, diet, exercise) that are impacting on physical or mental well-being?: Some mild concern of potential negative impact on well-being   Do you have any other concerns about your patients mental well-being? How would you rate their severity and impact on the patient?: Mild problems - don't interfere with function   How would you rate their home environment in terms of safety and stability (including domestic violence, insecure housing, neighbor harassment)?: Consistently safe, supportive, stable, no identified problems   How do daily activities impact on the patient's well-being? (include current or anticipated unemployment, work, caregiving, access to transportation or other): Contributes to low mood or stress at times   How would you rate their social network (family, work, friends)?: Restricted participation with some degree of social isolation   How would you rate their financial resources (including ability to afford all required medical care)?: [address] insecure, some resource challenges   How wells does the patient now understand their health and well-being (symptoms, signs or risk factors) and what they need to do to manage their health?: Little understanding which impacts on their ability to undertake better management   How well do you think your patient can engage in healthcare discussions? (Barriers include language, deafness, aphasia, alcohol or drug problems, learning difficulties, concentration):  Adequate communication, with or without minor barriers   Do other services need to be involved to help this patient?: Other care/services in place and adequate   Are current services involved with this patient well-coordinated? (Include coordination with other services you are now recommendation): All required care/services in place and well-coordinated   Suggested Interventions and Community Resources  Diabetes Education: Completed (Comment: SENT REFERRAL 3/21/2022)   Fall Risk Prevention: Not Started Medi Set or Pill Pack: Not Started   Pharmacist: Not Started   Registered Dietician: Not Started   Social Work: Completed   Specialty Service Referral: Not Started   Other Services: Completed   Zone Management Tools: Completed         Schedule an appointment with the patient's PCP, Set up/Review an Education Plan, Set up/Review Goals              Prior to Admission medications    Medication Sig Start Date End Date Taking?  Authorizing Provider   metFORMIN (GLUCOPHAGE) 1000 MG tablet bid 2/25/22  Yes Pedro Ambrose MD   metoprolol (LOPRESSOR) 100 MG tablet Take 1 tablet by mouth 2 times daily 2/22/22 5/23/22 Yes Amish Colindres MD   rosuvastatin (CRESTOR) 20 MG tablet Take 1 tablet by mouth nightly 2/22/22  Yes Amish Colindres MD   lisinopril (PRINIVIL;ZESTRIL) 10 MG tablet Take 1 tablet by mouth daily 11/15/21  Yes Javier MORA Holiday, DO   albuterol sulfate  (90 Base) MCG/ACT inhaler Inhale 2 puffs into the lungs every 6 hours as needed for Wheezing 10/8/20  Yes Amish Colindres MD   fluticasone-vilanterol (BREO ELLIPTA) 100-25 MCG/INH AEPB inhaler Inhale 1 puff into the lungs daily 3/26/19  Yes Amish Colindres MD   insulin aspart protamine-insulin aspart (NOVOLOG MIX 70/30 FLEXPEN) (70-30) 100 UNIT/ML injection 30 units twice day 2/25/22   Pedro Ambrose MD   Blood Glucose Monitoring Suppl (FREESTYLE LITE) MARIUM 1 Device by Does not apply route daily as needed (as directed) 11/19/21   Akira Gerber MD   FreeStyle Lancets MISC 1 each by Does not apply route daily 11/19/21   Akira Gerber MD metoprolol tartrate (LOPRESSOR) 25 MG tablet Take 1 tablet by mouth 2 times daily  Patient not taking: Reported on 3/21/2022 11/15/21   Javier Reeder DO   tamsulosin (FLOMAX) 0.4 MG capsule Take 1 capsule by mouth daily  Patient not taking: Reported on 3/21/2022 3/5/21   Rajiv Chamberlain MD   SYMBICORT 80-4.5 MCG/ACT AERO Inhale 2 puffs into the lungs 2 times daily  Patient not taking: Reported on 3/21/2022 2/22/21   Alejandro Zavala MD   Lancets MISC Pt test 4x daily Dx E11.65 (dispense covered brand) 12/17/19   Radha Pascal MD   aspirin 81 MG EC tablet Take 1 tablet by mouth daily 12/12/19   Janet Chavez MD   pantoprazole (PROTONIX) 40 MG tablet Take 40 mg by mouth daily  Patient not taking: Reported on 3/21/2022    Historical Provider, MD   Insulin Pen Needle (Courtney Fox) 32G X 6 MM MISC Bid 11/26/19   Radha Pascal MD       Future Appointments   Date Time Provider Rosaline Silveira   4/25/2022 10:00 AM Edmundo Dodson MD [facility]

## 2022-03-21 NOTE — LETTER
3/21/2022    Lucian Geiger  [facility] [id number]  [address]    Dear Lucian Geiger,    I enjoyed speaking with you and wanted to send some additional information. Viraj Magaña MD and I will work together to ensure your needs are met and help you achieve your health goals. We are committed to walk with you on this journey and look forward to working with you. Please feel free to contact me with any questions or concerns. I am available by phone or for appointments at the office. You can reach me at [phone].       In good health,     Carlos A Lewis RN   Enclosed  Diabetes and CHF zones

## 2022-03-22 ENCOUNTER — CARE COORDINATION (OUTPATIENT)
Dept: CARE COORDINATION | Age: 62
End: 2022-03-22

## 2022-03-22 NOTE — CARE COORDINATION
Received call back from [facility] [address] stating that they will be able to assist patient with paying electric bill and will also give food package, if he comes into the office and brings electric bill, S.S card, income verification ( award letter) and ID. Called patient to pass along information, that [address] will assist with paying electric bill and will provide with a food box. Patient request text message with address to [facility] [address].

## 2022-03-22 NOTE — CARE COORDINATION
Received referral from [address] [place] [address]. Requesting to contact patient, who has some concerns with his Social Security Check and has need for food this month. Called patient to discuss concerns. Patient shared that he did not receive his Social Security check this month and was told it was because of an overpayment and will also have $300 taken out of his next check. Patient reports that he has called Social Security and they have not been able to tell him why or when he had the over-payment. Patient reports need for food, and help with electric bill. Offered to provide patient with a  List of food pantries, however patient states that he has been to food pantries and he does not like the food they offer as some of the food is old and . Informed patient at this writer can make a referral to [facility] on [facility] to inquire about food package and inquire if they assist with paying household bills. Patient agreed to this referral.     Will mail out resources for help with electric bill.

## 2022-03-22 NOTE — CARE COORDINATION
Phone call to [facility] on Aging, [employer] and [employer] for assistance with payment for [employer]. Left message with phone number for all agencies to call back.

## 2022-03-28 ENCOUNTER — CARE COORDINATION (OUTPATIENT)
Dept: CARE COORDINATION | Age: 62
End: 2022-03-28

## 2022-03-28 ENCOUNTER — TELEMEDICINE (OUTPATIENT)
Dept: PRIMARY CARE CLINIC | Age: 62
End: 2022-03-28
Payer: MEDICARE

## 2022-03-28 DIAGNOSIS — Z00.00 INITIAL MEDICARE ANNUAL WELLNESS VISIT: Primary | ICD-10-CM

## 2022-03-28 PROCEDURE — G0438 PPPS, INITIAL VISIT: HCPCS | Performed by: NURSE PRACTITIONER

## 2022-03-28 PROCEDURE — G8484 FLU IMMUNIZE NO ADMIN: HCPCS | Performed by: NURSE PRACTITIONER

## 2022-03-28 PROCEDURE — 3017F COLORECTAL CA SCREEN DOC REV: CPT | Performed by: NURSE PRACTITIONER

## 2022-03-28 ASSESSMENT — PATIENT HEALTH QUESTIONNAIRE - PHQ9
SUM OF ALL RESPONSES TO PHQ QUESTIONS 1-9: 0
SUM OF ALL RESPONSES TO PHQ9 QUESTIONS 1 & 2: 0
1. LITTLE INTEREST OR PLEASURE IN DOING THINGS: 0
SUM OF ALL RESPONSES TO PHQ QUESTIONS 1-9: 0
2. FEELING DOWN, DEPRESSED OR HOPELESS: 0

## 2022-03-28 ASSESSMENT — LIFESTYLE VARIABLES: HOW OFTEN DO YOU HAVE A DRINK CONTAINING ALCOHOL: NEVER

## 2022-03-28 NOTE — PROGRESS NOTES
Medicare Annual Wellness Visit    Osman Kamara is here for Medicare AWV (2022)    Assessment & Plan   Initial Medicare annual wellness visit      Recommendations for Preventive Services Due: see orders and patient instructions/AVS.  Recommended screening schedule for the next 5-10 years is provided to the patient in written form: see Patient Instructions/AVS.     Return in 1 week (on 4/4/2022) for follow up with PCP. Subjective       Pt declined any falls but CC called a week prior and pt reports he had a fall in the past. Pt also not completely aware of all the names of his medications and if he is taking them or not. Pt advised to follow up with his PCP for further guidance. Pt aware and verbalized understanding. Patient's complete Health Risk Assessment and screening values have been reviewed and are found in Flowsheets. The following problems were reviewed today and where indicated follow up appointments were made and/or referrals ordered. Positive Risk Factor Screenings with Interventions:     Cognitive:   Words recalled: 2 Words Recalled  Clock Drawing Test (CDT):  (beatrice due to telephone visit)  Total Score Interpretation: Abnormal Mini-Cog  Did the patient refuse to take the cognition test?: No    Cognitive Impairment Interventions:  · Patient advised to follow-up in this office for further evaluation and treatment within 1 week(s)  · Patient declines any further evaluation/treatment for cognitive impairment             General Health and ACP:  General  In general, how would you say your health is?: (!) Poor  In the past 7 days, have you experienced any of the following: New or Increased Pain, New or Increased Fatigue, Loneliness, Social Isolation, Stress or Anger?: No  Do you get the social and emotional support that you need?: Yes  Do you have a Living Will?: (!) No    Advance Directives     Power of  Living Will ACP-Advance Directive ACP-Power of     Not on File Not on File Not on File Not on File      General Health Risk Interventions:  · Poor self-assessment of health status: patient advised to follow-up in this office for further evaluation and treatment of pt reports he is o.k within 1 week(s), patient declines any further evaluation/treatment for this issue  · No Living Will: Advance Care Planning addressed with patient today and Patient declines ACP discussion/assistance    Health Habits/Nutrition:     Physical Activity: Inactive    Days of Exercise per Week: 0 days    Minutes of Exercise per Session: 0 min     Have you lost any weight without trying in the past 3 months?: No     Have you seen the dentist within the past year?: (!) No    Health Habits/Nutrition Interventions:  · Inadequate physical activity:  patient is not ready to increase his/her physical activity level at this time  · Dental exam overdue:  patient encouraged to make appointment with his/her dentist, patient declines dental evaluation    Hearing/Vision:  Do you or your family notice any trouble with your hearing that hasn't been managed with hearing aids?: No  Do you have difficulty driving, watching TV, or doing any of your daily activities because of your eyesight?: (!) Yes  Have you had an eye exam within the past year?: Yes (pt reports he is getting glasses)  No exam data present    Hearing/Vision Interventions:  · Vision concerns:  patient encouraged to make appointment with his/her eye specialist, patient declines any further evaluation/treatment for this issue, pt reports he is waiting for his glasees    Safety:  Do you have working smoke detectors?: Yes  Do you have any tripping hazards - loose or unsecured carpets or rugs?: No  Do you have any tripping hazards - clutter in doorways, halls, or stairs?: No  Do you have either shower bars, grab bars, non-slip mats or non-slip surfaces in your shower or bathtub?: Yes  Do all of your stairways have a railing or banister?: Yes  Do you always fasten your seatbelt when you are in a car?: (!) No    Safety Interventions:  · Home safety tips provided  · Patient declines any further evaluation/treatment for this issue           Objective      Patient-Reported Vitals  BP Observations: No, remote/electronic monitoring device was not used or able to be verified  Patient-Reported Weight: 163 lbs  Patient-Reported Height: 5ft6           No Known Allergies  Prior to Visit Medications    Medication Sig Taking?  Authorizing Provider   insulin aspart (NOVOLOG) 100 UNIT/ML injection vial Use via insulin pump max daily dose 100 units Yes Corey Nair MD   Accu-Chek FastClix Lancets MISC Test 4x daily Yes Corey Nair MD   blood glucose test strips (ACCU-CHEK GUIDE) strip Test 4x daily Yes Corey Nair MD   insulin aspart protamine-insulin aspart (NOVOLOG MIX 70/30 FLEXPEN) (70-30) 100 UNIT/ML injection 30 units twice day Yes Corey Nair MD   metFORMIN (GLUCOPHAGE) 1000 MG tablet bid Yes Corey Nair MD   rosuvastatin (CRESTOR) 20 MG tablet Take 1 tablet by mouth nightly Yes Cassandra Coombs MD   Blood Glucose Monitoring Suppl (FREESTYLE LITE) MARIUM 1 Device by Does not apply route daily as needed (as directed) Yes Estephania Rodríguez MD   FreeStyle Lancets MISC 1 each by Does not apply route daily Yes Estephania Rodríguez MD   lisinopril (PRINIVIL;ZESTRIL) 10 MG tablet Take 1 tablet by mouth daily Yes Javier Reeder,    tamsulosin (FLOMAX) 0.4 MG capsule Take 1 capsule by mouth daily Yes Urszula Bacon MD   SYMBICORT 80-4.5 MCG/ACT AERO Inhale 2 puffs into the lungs 2 times daily Yes Cassandra Coombs MD   albuterol sulfate  (90 Base) MCG/ACT inhaler Inhale 2 puffs into the lungs every 6 hours as needed for Wheezing Yes Cassandra Coombs MD   Lancets MISC Pt test 4x daily Dx E11.65 (dispense covered brand) Yes Corey Nair MD   fluticasone-vilanterol (BREO ELLIPTA) 100-25 MCG/INH AEPB inhaler Inhale 1 puff into the lungs daily Yes Cassandra Coombs MD   metoprolol (LOPRESSOR) 100 MG tablet Take 1 tablet by mouth 2 times daily  Patient not taking: Reported on 3/28/2022  Ricardo Do MD   metoprolol tartrate (LOPRESSOR) 25 MG tablet Take 1 tablet by mouth 2 times daily  Patient not taking: Reported on 3/21/2022  Javier Reeder DO   aspirin 81 MG EC tablet Take 1 tablet by mouth daily  Patient not taking: Reported on 3/28/2022  Alexi Gallo MD   pantoprazole (PROTONIX) 40 MG tablet Take 40 mg by mouth daily  Patient not taking: Reported on 3/21/2022  Historical Provider, MD   Insulin Pen Needle (Chelly Campos) 32G X 6 MM MD Emily Chandra (Including outside providers/suppliers regularly involved in providing care):   Patient Care Team:  Ricardo Do MD as PCP - General (Internal Medicine)  Ricardo Do MD as PCP - [address] [place] [address] Provider  Stanislaw Mercedes MD (Infectious Diseases)  Lexi Fierro, RN as Ambulatory Care Manager    Reviewed and updated this visit:  Tobacco  Allergies  Meds  Problems  Med Hx  Surg Hx  Soc Hx  Fam Hx             Nubia Oden, was evaluated through a synchronous (real-time) audio-video encounter. The patient (or guardian if applicable) is aware that this is a billable service, which includes applicable co-pays. This Virtual Visit was conducted with patient's (and/or legal guardian's) consent. The visit was conducted pursuant to the emergency declaration under the [facility], [address] [place] [address] authority and the [employer] Act. Patient identification was verified, and a caregiver was present when appropriate. The patient was located at home in a state where the provider was licensed to provide care.

## 2022-03-28 NOTE — PATIENT INSTRUCTIONS
Personalized Preventive Plan for Kyler Serrato - 3/28/2022  Medicare offers a range of preventive health benefits. Some of the tests and screenings are paid in full while other may be subject to a deductible, co-insurance, and/or copay. Some of these benefits include a comprehensive review of your medical history including lifestyle, illnesses that may run in your family, and various assessments and screenings as appropriate. After reviewing your medical record and screening and assessments performed today your provider may have ordered immunizations, labs, imaging, and/or referrals for you. A list of these orders (if applicable) as well as your Preventive Care list are included within your After Visit Summary for your review. Other Preventive Recommendations:    · A preventive eye exam performed by an eye specialist is recommended every 1-2 years to screen for glaucoma; cataracts, macular degeneration, and other eye disorders. · A preventive dental visit is recommended every 6 months. · Try to get at least 150 minutes of exercise per week or 10,000 steps per day on a pedometer . · Order or download the FREE \"Exercise & Physical Activity: Your Everyday Guide\" from [employer]. Call 2-633.240.7962 or search [employer] online. · You need 3353-3794 mg of calcium and 2661-6234 IU of vitamin D per day. It is possible to meet your calcium requirement with diet alone, but a vitamin D supplement is usually necessary to meet this goal.  · When exposed to the sun, use a sunscreen that protects against both UVA and UVB radiation with an SPF of 30 or greater. Reapply every 2 to 3 hours or after sweating, drying off with a towel, or swimming. · Always wear a seat belt when traveling in a car. Always wear a helmet when riding a bicycle or motorcycle. Patient Education        Well Visit, Men 48 to 72: Care Instructions  Overview     Well visits can help you stay healthy. helps if you use condoms (male or female condoms) and if you limit your sex partners to one person who only has sex with you. Vaccines are available for some STIs. If it's important to you to prevent pregnancy with your partner, talk with your doctor about birth control options that might be best for you. If you think you may have a problem with alcohol or drug use, talk to your doctor. This includes prescription medicines (such as amphetamines and opioids) and illegal drugs (such as cocaine and methamphetamine). Your doctor can help you figure out what type of treatment is best for you. Protect your skin from too much sun. When you're outdoors from 10 a.m. to 4 p.m., stay in the shade or cover up with clothing and a hat with a wide brim. Wear sunglasses that block UV rays. Even when it's cloudy, put broad-spectrum sunscreen (SPF 30 or higher) on any exposed skin. See a dentist one or two times a year for checkups and to have your teeth cleaned. Wear a seat belt in the car. When should you call for help? Watch closely for changes in your health, and be sure to contact your doctor if you have any problems or symptoms that concern you. Where can you learn more? Go to [email or web address] and sign in to your [employer] account. Enter N436 in the [employer] box to learn more about \"Well Visit, Men 48 to 72: Care Instructions. \"     If you do not have an account, please click on the \"Sign Up Now\" link. Current as of: October 6, 2021               Content Version: 13.1  © 2006-2021 Healthwise, Incorporated. Care instructions adapted under license by [facility] ([facility]). If you have questions about a medical condition or this instruction, always ask your healthcare professional. [place] [address] any warranty or liability for your use of this information.

## 2022-03-28 NOTE — CARE COORDINATION
ACM called patient. Left message requesting a return call for [facility] out reach. Contact information supplied.

## 2022-03-29 ENCOUNTER — CARE COORDINATION (OUTPATIENT)
Dept: CARE COORDINATION | Age: 62
End: 2022-03-29

## 2022-03-29 RX ORDER — BLOOD SUGAR DIAGNOSTIC
STRIP MISCELLANEOUS
Qty: 150 EACH | Refills: 3 | Status: SHIPPED | OUTPATIENT
Start: 2022-03-29

## 2022-03-29 RX ORDER — LANCETS
EACH MISCELLANEOUS
Qty: 150 EACH | Refills: 3 | Status: SHIPPED | OUTPATIENT
Start: 2022-03-29

## 2022-03-29 NOTE — CARE COORDINATION
Phone call to patient for follow up to discuss updates with [facility] helping with electric bill. Called patient to get update on if [facility] and if they were able to assist with paying bill and if they were able to provide food box. Patient reports that he did go into the office and they were not able to help him because of the household income and also because his electric bill was not past due and he did not have a shut off notice. Patient reports that his wife is still working and they have manage to keep some food in the house. Patient reports that the electric bill is not past due and he can make a payment once he gets paid. Patient states that his daughter was also able to let him borrow some money until he gets is SSD check in a few days. Patient states no further concerns at this time, since his daughter let him borrow money and he will also expect to get his SSD check in few days.

## 2022-03-31 ENCOUNTER — CARE COORDINATION (OUTPATIENT)
Dept: CARE COORDINATION | Age: 62
End: 2022-03-31

## 2022-03-31 NOTE — CARE COORDINATION
ACM called patient. Left message requesting a return call for [facility] out reach. Contact information supplied.

## 2022-04-01 DIAGNOSIS — J45.902 PERSISTENT ASTHMA WITH STATUS ASTHMATICUS, UNSPECIFIED ASTHMA SEVERITY: ICD-10-CM

## 2022-04-01 RX ORDER — ALBUTEROL SULFATE 90 UG/1
2 AEROSOL, METERED RESPIRATORY (INHALATION) EVERY 6 HOURS PRN
Qty: 18 G | Refills: 5 | Status: SHIPPED | OUTPATIENT
Start: 2022-04-01

## 2022-04-05 ENCOUNTER — CARE COORDINATION (OUTPATIENT)
Dept: CARE COORDINATION | Age: 62
End: 2022-04-05

## 2022-04-05 NOTE — LETTER
4/5/2022    [address] [place] [address] [id number] [facility]    Araceli Pabon     I have enjoyed working with you to improve your health. I would like to continue to provide you support. However, I have been unable to reach you at, [phone] (home)  . Please let me know if I can help schedule an office visit for you or answer any questions. Inna Flores MD is interested in your health and hopes to hear from you soon. If you would like continued access to your Nurse Care Coordinator, Nidhi Reich RN, you can reach me at [phone]. I will wait to hear from you and will no longer reach out to you. Inna Flores MD and his/her team will continue to provide care and be available for questions.       In good health,     Nidhi Reich RN

## 2022-04-05 NOTE — CARE COORDINATION
ACM called patient. Left message requesting a return call for [facility] out reach. Patient was enrolled in [facility] program early last month. Since that enrollment patient has not responded to calls or request for return calls. ACM will sign off at this time. ACM sent letter.

## 2022-04-05 NOTE — CARE COORDINATION
Made and attempt to contact patient for follow up. Left message with reason for call. Left phone number for return call.

## 2022-04-08 ENCOUNTER — CARE COORDINATION (OUTPATIENT)
Dept: CARE COORDINATION | Age: 62
End: 2022-04-08

## 2022-04-08 NOTE — CARE COORDINATION
Made final follow up with patient to inquire if he received his SSD check for the month and if he may require any further assistance. Patient states that he received his check for the month. Patient declines needing any further assistance. Discussed closing referral due to no other needs or concerns. Will no longer follow patient at this time.

## 2022-05-09 DIAGNOSIS — E11.69 DM TYPE 2 WITH DIABETIC DYSLIPIDEMIA (HCC): ICD-10-CM

## 2022-05-09 DIAGNOSIS — E78.5 DM TYPE 2 WITH DIABETIC DYSLIPIDEMIA (HCC): ICD-10-CM

## 2022-05-10 NOTE — TELEPHONE ENCOUNTER
Pharmacy requesting medication refill. Please approve or deny this request.    Rx requested:  Requested Prescriptions     Pending Prescriptions Disp Refills    metFORMIN (GLUCOPHAGE) 1000 MG tablet [Pharmacy Med Name: metformin 1,000 mg tablet] 180 tablet 1     Sig: TAKE 1 TABLET TWICE DAILY         Last Office Visit:   2/25/2022      Next Visit Date:  No future appointments.

## 2022-05-26 ENCOUNTER — COMMUNITY OUTREACH (OUTPATIENT)
Dept: PRIMARY CARE CLINIC | Age: 62
End: 2022-05-26

## 2022-06-01 DIAGNOSIS — I10 ESSENTIAL HYPERTENSION: ICD-10-CM

## 2022-06-01 RX ORDER — LISINOPRIL 10 MG/1
10 TABLET ORAL DAILY
Qty: 30 TABLET | Refills: 5 | Status: ON HOLD | OUTPATIENT
Start: 2022-06-01 | End: 2022-08-18 | Stop reason: HOSPADM

## 2022-06-01 NOTE — TELEPHONE ENCOUNTER
Pharmacy requesting medication refill. Please approve or deny this request.    Rx requested:  Requested Prescriptions     Pending Prescriptions Disp Refills    lisinopril (PRINIVIL;ZESTRIL) 10 MG tablet 30 tablet 5     Sig: Take 1 tablet by mouth daily         Last Office Visit:   6/1/2022      Next Visit Date:  No future appointments.

## 2022-07-07 DIAGNOSIS — E78.5 DM TYPE 2 WITH DIABETIC DYSLIPIDEMIA (HCC): ICD-10-CM

## 2022-07-07 DIAGNOSIS — E11.69 DM TYPE 2 WITH DIABETIC DYSLIPIDEMIA (HCC): ICD-10-CM

## 2022-07-07 LAB — HBA1C MFR BLD: 11.4 % (ref 4.8–5.9)

## 2022-07-12 ENCOUNTER — TELEPHONE (OUTPATIENT)
Dept: ENDOCRINOLOGY | Age: 62
End: 2022-07-12

## 2022-07-12 DIAGNOSIS — E11.69 DM TYPE 2 WITH DIABETIC DYSLIPIDEMIA (HCC): ICD-10-CM

## 2022-07-12 DIAGNOSIS — E78.5 DM TYPE 2 WITH DIABETIC DYSLIPIDEMIA (HCC): ICD-10-CM

## 2022-07-12 LAB
ANION GAP SERPL CALCULATED.3IONS-SCNC: 13 MEQ/L (ref 9–15)
BUN BLDV-MCNC: 20 MG/DL (ref 8–23)
CALCIUM SERPL-MCNC: 9.6 MG/DL (ref 8.5–9.9)
CHLORIDE BLD-SCNC: 96 MEQ/L (ref 95–107)
CO2: 26 MEQ/L (ref 20–31)
CREAT SERPL-MCNC: 1.15 MG/DL (ref 0.7–1.2)
CREATININE URINE: 61.3 MG/DL
GFR AFRICAN AMERICAN: >60
GFR NON-AFRICAN AMERICAN: >60
GLUCOSE FASTING: 432 MG/DL (ref 70–99)
MICROALBUMIN UR-MCNC: 193 MG/DL
MICROALBUMIN/CREAT UR-RTO: 3148.5 MG/G (ref 0–30)
POTASSIUM SERPL-SCNC: 4.5 MEQ/L (ref 3.4–4.9)
SODIUM BLD-SCNC: 135 MEQ/L (ref 135–144)

## 2022-08-15 ENCOUNTER — OFFICE VISIT (OUTPATIENT)
Dept: ENDOCRINOLOGY | Age: 62
End: 2022-08-15
Payer: MEDICARE

## 2022-08-15 ENCOUNTER — HOSPITAL ENCOUNTER (EMERGENCY)
Age: 62
Discharge: LEFT BEFORE DISCHARGE | End: 2022-08-15

## 2022-08-15 VITALS
DIASTOLIC BLOOD PRESSURE: 113 MMHG | WEIGHT: 175 LBS | OXYGEN SATURATION: 96 % | HEART RATE: 107 BPM | SYSTOLIC BLOOD PRESSURE: 188 MMHG | BODY MASS INDEX: 28.12 KG/M2 | HEIGHT: 66 IN

## 2022-08-15 DIAGNOSIS — E78.5 DM TYPE 2 WITH DIABETIC DYSLIPIDEMIA (HCC): Primary | ICD-10-CM

## 2022-08-15 DIAGNOSIS — E11.69 DM TYPE 2 WITH DIABETIC DYSLIPIDEMIA (HCC): Primary | ICD-10-CM

## 2022-08-15 LAB
CHP ED QC CHECK: ABNORMAL
GLUCOSE BLD-MCNC: 471 MG/DL

## 2022-08-15 PROCEDURE — 99213 OFFICE O/P EST LOW 20 MIN: CPT | Performed by: INTERNAL MEDICINE

## 2022-08-15 PROCEDURE — 82962 GLUCOSE BLOOD TEST: CPT | Performed by: INTERNAL MEDICINE

## 2022-08-15 PROCEDURE — 3017F COLORECTAL CA SCREEN DOC REV: CPT | Performed by: INTERNAL MEDICINE

## 2022-08-15 PROCEDURE — 2022F DILAT RTA XM EVC RTNOPTHY: CPT | Performed by: INTERNAL MEDICINE

## 2022-08-15 PROCEDURE — G8428 CUR MEDS NOT DOCUMENT: HCPCS | Performed by: INTERNAL MEDICINE

## 2022-08-15 PROCEDURE — 3046F HEMOGLOBIN A1C LEVEL >9.0%: CPT | Performed by: INTERNAL MEDICINE

## 2022-08-15 PROCEDURE — 1036F TOBACCO NON-USER: CPT | Performed by: INTERNAL MEDICINE

## 2022-08-15 PROCEDURE — G8417 CALC BMI ABV UP PARAM F/U: HCPCS | Performed by: INTERNAL MEDICINE

## 2022-08-15 NOTE — PROGRESS NOTES
8/15/2022    Assessment:       Diagnosis Orders   1. DM type 2 with diabetic dyslipidemia ([address]  POCT Glucose            PLAN:     Orders Placed This Encounter   Procedures    Basic Metabolic Panel     Standing Status:   Future     Standing Expiration Date:   8/15/2023    Hemoglobin A1C     Standing Status:   Future     Standing Expiration Date:   8/15/2023    POCT Glucose     Continue current dose of NovoLog 70/30 30 units twice a day plus metformin patient will talk to [employer] representative regarding going back on the [employer] pump for improved control    Declined to go to the ER for the blood pressure we will check his blood pressures at home        Orders Placed This Encounter   Procedures    POCT Glucose       Subjective:     Chief Complaint   Patient presents with    Diabetes     Vitals:    08/15/22 1549 08/15/22 1555   BP: (!) 188/100 (!) 188/113   Site: Right Upper Arm Left Upper Arm   Position: Sitting Sitting   Cuff Size: Medium Adult Medium Adult   Pulse: (!) 106 (!) 107   SpO2: 96%    Weight: 175 lb (79.4 kg)    Height: 5' 6\" (1.676 m)      Wt Readings from Last 3 Encounters:   08/15/22 175 lb (79.4 kg)   02/25/22 163 lb (73.9 kg)   11/12/21 170 lb (77.1 kg)     BP Readings from Last 3 Encounters:   08/15/22 (!) 188/113   02/25/22 137/85   11/15/21 (!) 148/79     Follow-up on type 2 diabetes patient's blood sugars are still fluctuating in the 300 range A1c was 11.4 prior A1c was 14 patient had received insulin pump 3 months ago but was anxious and overwhelmed return the pump back now he wants to go back on insulin pump    Blood pressure was high today after he did not take his medication history of heart disease denies any chest pain shortness of breath dizziness headaches    For diabetes patient on NovoLog 70/30 30 units twice a day plus metformin    Last C-peptide was lower at 0.4    Diabetes  He presents for his follow-up diabetic visit. He has type 2 diabetes mellitus.  Associated symptoms include fatigue. Pertinent negatives for diabetes include no polydipsia and no polyuria. Symptoms are improving. Diabetic complications include heart disease. Current diabetic treatment includes insulin injections. He is currently taking insulin pre-breakfast and pre-dinner. His overall blood glucose range is >200 mg/dl. (Lab Results       Component                Value               Date                       LABA1C                   11.4 (H)            07/07/2022            ) An ACE inhibitor/angiotensin II receptor blocker is being taken. Latest Reference Range & Units 4/7/17 14:25 11/27/21 10:11   C-Peptide 0.7 - 3.9 ng/mL 1.7 0.4 (L)   (L): Data is abnormally low    Past Medical History:   Diagnosis Date    Asthma     Back pain     CAD (coronary artery disease) 01/2020    3 vessel     Diabetes mellitus ([facility]     Essential hypertension, benign     Hyperlipemia     Neuropathic pain, leg, bilateral      Past Surgical History:   Procedure Laterality Date    CARPAL TUNNEL RELEASE Bilateral     FOOT SURGERY Left     bone spur    LAMINOTOMY  2012     Social History     Socioeconomic History    Marital status:      Spouse name: Guy Negrete    Number of children: 2    Years of education: 14    Highest education level: Some college, no degree   Occupational History    Occupation: disability    Tobacco Use    Smoking status: Never    Smokeless tobacco: Never   Vaping Use    Vaping Use: Never used   Substance and Sexual Activity    Alcohol use: Yes     Alcohol/week: 2.0 standard drinks     Types: 2 Cans of beer per week     Comment: every 6 months or so     Drug use: No    Sexual activity: Not Currently     Partners: Female   Other Topics Concern    Not on file   Social History Narrative    Lives with wife. Per patient he has been  for 2 years. He met his wife online and went to [facility] and brought her back to [facility]. They live in [address] [place] [address] no steps into home. It is a 2 story home. Bedroom and bath upstairs. Bathroom down. Per patient once he is down the steps he doesn't go back up during the day. 2 daughters local.      Per he has very poor vision. He does not like to drive at night or when raining. Patient states he struggles looking at a computer or reading due to vision. Social Determinants of Health     Financial Resource Strain: Low Risk     Difficulty of Paying Living Expenses: Not hard at all   Food Insecurity: No Food Insecurity    Worried About [address] in the Last Year: Never true    [address] in the Last Year: Never true   Transportation Needs: No Transportation Needs    Lack of Transportation (Medical): No    Lack of Transportation (Non-Medical):  No   Physical Activity: Inactive    Days of Exercise per Week: 0 days    Minutes of Exercise per Session: 0 min   Stress: Not on file   Social Connections: Not on file   Intimate Partner Violence: Not on file   Housing Stability: Not on file     Family History   Problem Relation Age of Onset    Other Father         accident    Heart Failure Mother     Heart Disease Brother     Cirrhosis Brother      No Known Allergies    Current Outpatient Medications:     lisinopril (PRINIVIL;ZESTRIL) 10 MG tablet, Take 1 tablet by mouth daily, Disp: 30 tablet, Rfl: 5    metFORMIN (GLUCOPHAGE) 1000 MG tablet, TAKE 1 TABLET TWICE DAILY, Disp: 180 tablet, Rfl: 1    albuterol sulfate  (90 Base) MCG/ACT inhaler, Inhale 2 puffs into the lungs every 6 hours as needed for Wheezing, Disp: 18 g, Rfl: 5    Accu-Chek FastClix Lancets MISC, Test 4x daily Dx E11.65, Disp: 150 each, Rfl: 3    blood glucose test strips (ACCU-CHEK GUIDE) strip, Test 4x daily Dx E11.65, Disp: 150 each, Rfl: 3    insulin aspart (NOVOLOG) 100 UNIT/ML injection vial, Use via insulin pump max daily dose 100 units, Disp: 30 mL, Rfl: 3    insulin aspart protamine-insulin aspart (NOVOLOG MIX 70/30 FLEXPEN) (70-30) 100 UNIT/ML injection, 30 units twice day, Disp: 10 pen, Rfl: 5 rosuvastatin (CRESTOR) 20 MG tablet, Take 1 tablet by mouth nightly, Disp: 30 tablet, Rfl: 5    Blood Glucose Monitoring Suppl (FREESTYLE LITE) MARIUM, 1 Device by Does not apply route daily as needed (as directed), Disp: 1 each, Rfl: 3    FreeStyle Lancets MISC, 1 each by Does not apply route daily, Disp: 300 each, Rfl: 3    metoprolol tartrate (LOPRESSOR) 25 MG tablet, Take 1 tablet by mouth 2 times daily, Disp: 60 tablet, Rfl: 3    tamsulosin (FLOMAX) 0.4 MG capsule, Take 1 capsule by mouth daily, Disp: 90 capsule, Rfl: 3    SYMBICORT 80-4.5 MCG/ACT AERO, Inhale 2 puffs into the lungs 2 times daily, Disp: 10.2 g, Rfl: 5    Lancets MISC, Pt test 4x daily Dx E11.65 (dispense covered brand), Disp: 150 each, Rfl: 3    aspirin 81 MG EC tablet, Take 1 tablet by mouth daily, Disp: 30 tablet, Rfl: 3    pantoprazole (PROTONIX) 40 MG tablet, Take 40 mg by mouth in the morning., Disp: , Rfl:     Insulin Pen Needle (NOVOFINE) 32G X 6 MM MISC, Bid, Disp: 300 each, Rfl: 3    fluticasone-vilanterol (BREO ELLIPTA) 100-25 MCG/INH AEPB inhaler, Inhale 1 puff into the lungs daily, Disp: 3 each, Rfl: 3    metoprolol (LOPRESSOR) 100 MG tablet, Take 1 tablet by mouth 2 times daily (Patient not taking: Reported on 3/28/2022), Disp: 60 tablet, Rfl: 5  Lab Results   Component Value Date     07/12/2022    K 4.5 07/12/2022    CL 96 07/12/2022    CO2 26 07/12/2022    BUN 20 07/12/2022    CREATININE 1.15 07/12/2022    GLUCOSE 471 (H) 08/15/2022    CALCIUM 9.6 07/12/2022    PROT 6.6 11/27/2021    LABALBU 4.0 11/27/2021    BILITOT 0.3 11/27/2021    ALKPHOS 70 11/27/2021    AST 20 11/27/2021    ALT 13 11/27/2021    LABGLOM >60.0 07/12/2022    GFRAA >60.0 07/12/2022    AGRATIO 0.6 (L) 07/08/2018    GLOB 2.1 (L) 11/15/2021     Lab Results   Component Value Date    WBC 6.5 11/28/2021    HGB 14.1 11/28/2021    HCT 43.8 11/28/2021    MCV 85 11/28/2021     (L) 11/28/2021     Lab Results   Component Value Date    LABA1C 11.4 (H) 07/07/2022 LABA1C 14.0 02/25/2022    LABA1C 11.7 (A) 11/27/2021     Lab Results   Component Value Date    HDL 41 11/14/2021    HDL 50 06/09/2020    HDL 39 (L) 12/24/2019    LDLCALC 164 (H) 11/14/2021    LDLCALC 125 06/09/2020    LDLCALC 71 12/24/2019    CHOL 235 (H) 11/14/2021    CHOL 214 (H) 06/09/2020    CHOL 132 12/24/2019    TRIG 150 11/14/2021    TRIG 193 (H) 06/09/2020    TRIG 112 12/24/2019     Lab Results   Component Value Date    TESTM 361 02/25/2021     Lab Results   Component Value Date    TSH 1.270 06/09/2020    TSH 1.800 12/23/2019    TSH 1.200 11/26/2019     No results found for: TPOABS    Review of Systems   Constitutional:  Positive for fatigue. Eyes: Negative. Cardiovascular: Negative. Endocrine: Negative for polydipsia and polyuria. Neurological: Negative. All other systems reviewed and are negative. Objective:   Physical Exam  Vitals reviewed. Constitutional:       General: He is not in acute distress. Appearance: Normal appearance. He is normal weight. HENT:      Head: Normocephalic and atraumatic. Right Ear: External ear normal.      Left Ear: External ear normal.      Nose: Nose normal.   Eyes:      General: No scleral icterus. Right eye: No discharge. Left eye: No discharge. Extraocular Movements: Extraocular movements intact. Conjunctiva/sclera: Conjunctivae normal.   Cardiovascular:      Rate and Rhythm: Normal rate. Pulmonary:      Effort: Pulmonary effort is normal.   Musculoskeletal:         General: Normal range of motion. Cervical back: Normal range of motion and neck supple. Neurological:      General: No focal deficit present. Mental Status: He is alert and oriented to person, place, and time.    Psychiatric:         Mood and Affect: Mood normal.         Behavior: Behavior normal.

## 2022-08-16 ENCOUNTER — APPOINTMENT (OUTPATIENT)
Dept: CT IMAGING | Age: 62
DRG: 287 | End: 2022-08-16
Payer: MEDICARE

## 2022-08-16 ENCOUNTER — HOSPITAL ENCOUNTER (INPATIENT)
Age: 62
LOS: 1 days | Discharge: HOME | DRG: 287 | End: 2022-08-18
Attending: STUDENT IN AN ORGANIZED HEALTH CARE EDUCATION/TRAINING PROGRAM | Admitting: INTERNAL MEDICINE
Payer: MEDICARE

## 2022-08-16 DIAGNOSIS — Z79.4 TYPE 2 DIABETES MELLITUS WITH HYPERGLYCEMIA, WITH LONG-TERM CURRENT USE OF INSULIN (HCC): ICD-10-CM

## 2022-08-16 DIAGNOSIS — I25.5 CARDIOMYOPATHY, ISCHEMIC: ICD-10-CM

## 2022-08-16 DIAGNOSIS — R77.8 ELEVATED TROPONIN: ICD-10-CM

## 2022-08-16 DIAGNOSIS — R63.8 POOR FLUID INTAKE: ICD-10-CM

## 2022-08-16 DIAGNOSIS — I16.0 HYPERTENSIVE URGENCY: ICD-10-CM

## 2022-08-16 DIAGNOSIS — Z91.199 MEDICALLY NONCOMPLIANT: ICD-10-CM

## 2022-08-16 DIAGNOSIS — R11.2 INTRACTABLE NAUSEA AND VOMITING: Primary | ICD-10-CM

## 2022-08-16 DIAGNOSIS — E11.65 TYPE 2 DIABETES MELLITUS WITH HYPERGLYCEMIA, WITH LONG-TERM CURRENT USE OF INSULIN (HCC): ICD-10-CM

## 2022-08-16 LAB
ALBUMIN SERPL-MCNC: 3.7 G/DL (ref 3.5–4.6)
ALP BLD-CCNC: 63 U/L (ref 35–104)
ALT SERPL-CCNC: 9 U/L (ref 0–41)
AMPHETAMINE SCREEN, URINE: NORMAL
ANION GAP SERPL CALCULATED.3IONS-SCNC: 10 MEQ/L (ref 9–15)
AST SERPL-CCNC: 11 U/L (ref 0–40)
BARBITURATE SCREEN URINE: NORMAL
BASE EXCESS VENOUS: 1 (ref -3–3)
BASOPHILS ABSOLUTE: 0 K/UL (ref 0–0.2)
BASOPHILS RELATIVE PERCENT: 0.8 %
BENZODIAZEPINE SCREEN, URINE: NORMAL
BILIRUB SERPL-MCNC: 0.3 MG/DL (ref 0.2–0.7)
BUN BLDV-MCNC: 20 MG/DL (ref 8–23)
CALCIUM IONIZED: 1.24 MMOL/L (ref 1.12–1.32)
CALCIUM SERPL-MCNC: 9.5 MG/DL (ref 8.5–9.9)
CANNABINOID SCREEN URINE: NORMAL
CHLORIDE BLD-SCNC: 98 MEQ/L (ref 95–107)
CHP ED QC CHECK: YES
CO2: 27 MEQ/L (ref 20–31)
COCAINE METABOLITE SCREEN URINE: NORMAL
CREAT SERPL-MCNC: 1.08 MG/DL (ref 0.7–1.2)
EOSINOPHILS ABSOLUTE: 0.2 K/UL (ref 0–0.7)
EOSINOPHILS RELATIVE PERCENT: 3.2 %
ETHANOL PERCENT: NORMAL G/DL
ETHANOL: <10 MG/DL (ref 0–0.08)
FENTANYL SCREEN, URINE: NORMAL
GFR AFRICAN AMERICAN: >60
GFR NON-AFRICAN AMERICAN: >60
GLOBULIN: 2.3 G/DL (ref 2.3–3.5)
GLUCOSE BLD-MCNC: 268 MG/DL (ref 70–99)
GLUCOSE BLD-MCNC: 313 MG/DL (ref 70–99)
GLUCOSE BLD-MCNC: 313 MG/DL (ref 70–99)
GLUCOSE BLD-MCNC: 358 MG/DL
GLUCOSE BLD-MCNC: 358 MG/DL (ref 70–99)
HCO3 VENOUS: 27.4 MMOL/L (ref 23–29)
HCT VFR BLD CALC: 44.3 % (ref 42–52)
HEMOGLOBIN: 15 G/DL (ref 14–18)
HEMOGLOBIN: 16.4 GM/DL (ref 13.5–17.5)
LACTATE: 3.53 MMOL/L (ref 0.4–2)
LACTIC ACID: 2.5 MMOL/L (ref 0.5–2.2)
LYMPHOCYTES ABSOLUTE: 0.5 K/UL (ref 1–4.8)
LYMPHOCYTES RELATIVE PERCENT: 8.7 %
Lab: NORMAL
MAGNESIUM: 1.8 MG/DL (ref 1.7–2.4)
MCH RBC QN AUTO: 28.5 PG (ref 27–31.3)
MCHC RBC AUTO-ENTMCNC: 33.8 % (ref 33–37)
MCV RBC AUTO: 84.3 FL (ref 80–100)
METHADONE SCREEN, URINE: NORMAL
MONOCYTES ABSOLUTE: 0.4 K/UL (ref 0.2–0.8)
MONOCYTES RELATIVE PERCENT: 6.9 %
NEUTROPHILS ABSOLUTE: 4.6 K/UL (ref 1.4–6.5)
NEUTROPHILS RELATIVE PERCENT: 80.4 %
O2 SAT, VEN: 72 %
OPIATE SCREEN URINE: NORMAL
OXYCODONE URINE: NORMAL
PCO2, VEN: 53.3 MM HG (ref 40–50)
PDW BLD-RTO: 12.6 % (ref 11.5–14.5)
PERFORMED ON: ABNORMAL
PERFORMED ON: NORMAL
PH VENOUS: 7.32 (ref 7.32–7.42)
PHENCYCLIDINE SCREEN URINE: NORMAL
PLATELET # BLD: 148 K/UL (ref 130–400)
PO2, VEN: 42 MM HG
POC CHLORIDE: 100 MEQ/L (ref 99–110)
POC CREATININE: 1.2 MG/DL (ref 0.8–1.3)
POC CREATININE: 1.2 MG/DL (ref 0.8–1.3)
POC FIO2: 21
POC HEMATOCRIT: 48 % (ref 41–53)
POC POTASSIUM: 4.3 MEQ/L (ref 3.5–5.1)
POC SAMPLE TYPE: ABNORMAL
POC SAMPLE TYPE: NORMAL
POC SODIUM: 137 MEQ/L (ref 136–145)
POTASSIUM SERPL-SCNC: 4.5 MEQ/L (ref 3.4–4.9)
PROPOXYPHENE SCREEN: NORMAL
RBC # BLD: 5.26 M/UL (ref 4.7–6.1)
SARS-COV-2, NAAT: NOT DETECTED
SODIUM BLD-SCNC: 135 MEQ/L (ref 135–144)
STREP GRP A PCR: NEGATIVE
TCO2 CALC VENOUS: 29 MMOL/L
TOTAL CK: 75 U/L (ref 0–190)
TOTAL PROTEIN: 6 G/DL (ref 6.3–8)
TROPONIN: 0.03 NG/ML (ref 0–0.01)
WBC # BLD: 5.7 K/UL (ref 4.8–10.8)

## 2022-08-16 PROCEDURE — 99285 EMERGENCY DEPT VISIT HI MDM: CPT

## 2022-08-16 PROCEDURE — 84295 ASSAY OF SERUM SODIUM: CPT

## 2022-08-16 PROCEDURE — 80307 DRUG TEST PRSMV CHEM ANLYZR: CPT

## 2022-08-16 PROCEDURE — 36600 WITHDRAWAL OF ARTERIAL BLOOD: CPT

## 2022-08-16 PROCEDURE — 87635 SARS-COV-2 COVID-19 AMP PRB: CPT

## 2022-08-16 PROCEDURE — 81001 URINALYSIS AUTO W/SCOPE: CPT

## 2022-08-16 PROCEDURE — 94761 N-INVAS EAR/PLS OXIMETRY MLT: CPT

## 2022-08-16 PROCEDURE — 83036 HEMOGLOBIN GLYCOSYLATED A1C: CPT

## 2022-08-16 PROCEDURE — 36415 COLL VENOUS BLD VENIPUNCTURE: CPT

## 2022-08-16 PROCEDURE — 82803 BLOOD GASES ANY COMBINATION: CPT

## 2022-08-16 PROCEDURE — G0378 HOSPITAL OBSERVATION PER HR: HCPCS | Performed by: INTERNAL MEDICINE

## 2022-08-16 PROCEDURE — 82550 ASSAY OF CK (CPK): CPT

## 2022-08-16 PROCEDURE — 82330 ASSAY OF CALCIUM: CPT

## 2022-08-16 PROCEDURE — 6370000000 HC RX 637 (ALT 250 FOR IP): Performed by: INTERNAL MEDICINE

## 2022-08-16 PROCEDURE — G0378 HOSPITAL OBSERVATION PER HR: HCPCS

## 2022-08-16 PROCEDURE — 2500000003 HC RX 250 WO HCPCS: Performed by: STUDENT IN AN ORGANIZED HEALTH CARE EDUCATION/TRAINING PROGRAM

## 2022-08-16 PROCEDURE — 96361 HYDRATE IV INFUSION ADD-ON: CPT

## 2022-08-16 PROCEDURE — 6370000000 HC RX 637 (ALT 250 FOR IP): Performed by: STUDENT IN AN ORGANIZED HEALTH CARE EDUCATION/TRAINING PROGRAM

## 2022-08-16 PROCEDURE — 82435 ASSAY OF BLOOD CHLORIDE: CPT

## 2022-08-16 PROCEDURE — 84484 ASSAY OF TROPONIN QUANT: CPT

## 2022-08-16 PROCEDURE — 94640 AIRWAY INHALATION TREATMENT: CPT

## 2022-08-16 PROCEDURE — 6360000004 HC RX CONTRAST MEDICATION: Performed by: STUDENT IN AN ORGANIZED HEALTH CARE EDUCATION/TRAINING PROGRAM

## 2022-08-16 PROCEDURE — 83605 ASSAY OF LACTIC ACID: CPT

## 2022-08-16 PROCEDURE — 96372 THER/PROPH/DIAG INJ SC/IM: CPT

## 2022-08-16 PROCEDURE — 80053 COMPREHEN METABOLIC PANEL: CPT

## 2022-08-16 PROCEDURE — 85014 HEMATOCRIT: CPT

## 2022-08-16 PROCEDURE — 87651 STREP A DNA AMP PROBE: CPT

## 2022-08-16 PROCEDURE — 84132 ASSAY OF SERUM POTASSIUM: CPT

## 2022-08-16 PROCEDURE — 85025 COMPLETE CBC W/AUTO DIFF WBC: CPT

## 2022-08-16 PROCEDURE — 94664 DEMO&/EVAL PT USE INHALER: CPT

## 2022-08-16 PROCEDURE — 74177 CT ABD & PELVIS W/CONTRAST: CPT

## 2022-08-16 PROCEDURE — 2580000003 HC RX 258: Performed by: STUDENT IN AN ORGANIZED HEALTH CARE EDUCATION/TRAINING PROGRAM

## 2022-08-16 PROCEDURE — 82948 REAGENT STRIP/BLOOD GLUCOSE: CPT

## 2022-08-16 PROCEDURE — 82077 ASSAY SPEC XCP UR&BREATH IA: CPT

## 2022-08-16 PROCEDURE — 2580000003 HC RX 258: Performed by: INTERNAL MEDICINE

## 2022-08-16 PROCEDURE — 96374 THER/PROPH/DIAG INJ IV PUSH: CPT

## 2022-08-16 PROCEDURE — 6360000002 HC RX W HCPCS: Performed by: INTERNAL MEDICINE

## 2022-08-16 PROCEDURE — 82565 ASSAY OF CREATININE: CPT

## 2022-08-16 PROCEDURE — 83735 ASSAY OF MAGNESIUM: CPT

## 2022-08-16 PROCEDURE — 93005 ELECTROCARDIOGRAM TRACING: CPT | Performed by: STUDENT IN AN ORGANIZED HEALTH CARE EDUCATION/TRAINING PROGRAM

## 2022-08-16 RX ORDER — ACETAMINOPHEN 325 MG/1
650 TABLET ORAL EVERY 6 HOURS PRN
Status: DISCONTINUED | OUTPATIENT
Start: 2022-08-16 | End: 2022-08-18 | Stop reason: HOSPADM

## 2022-08-16 RX ORDER — GABAPENTIN 100 MG/1
100 CAPSULE ORAL 3 TIMES DAILY
Status: DISCONTINUED | OUTPATIENT
Start: 2022-08-16 | End: 2022-08-18 | Stop reason: HOSPADM

## 2022-08-16 RX ORDER — SODIUM CHLORIDE 0.9 % (FLUSH) 0.9 %
5-40 SYRINGE (ML) INJECTION PRN
Status: DISCONTINUED | OUTPATIENT
Start: 2022-08-16 | End: 2022-08-18 | Stop reason: HOSPADM

## 2022-08-16 RX ORDER — LABETALOL HYDROCHLORIDE 5 MG/ML
10 INJECTION, SOLUTION INTRAVENOUS EVERY 4 HOURS PRN
Status: DISCONTINUED | OUTPATIENT
Start: 2022-08-16 | End: 2022-08-18 | Stop reason: HOSPADM

## 2022-08-16 RX ORDER — INSULIN GLARGINE 100 [IU]/ML
30 INJECTION, SOLUTION SUBCUTANEOUS NIGHTLY
Status: DISCONTINUED | OUTPATIENT
Start: 2022-08-16 | End: 2022-08-16

## 2022-08-16 RX ORDER — 0.9 % SODIUM CHLORIDE 0.9 %
1000 INTRAVENOUS SOLUTION INTRAVENOUS ONCE
Status: COMPLETED | OUTPATIENT
Start: 2022-08-16 | End: 2022-08-16

## 2022-08-16 RX ORDER — DEXTROSE MONOHYDRATE 100 MG/ML
INJECTION, SOLUTION INTRAVENOUS CONTINUOUS PRN
Status: DISCONTINUED | OUTPATIENT
Start: 2022-08-16 | End: 2022-08-18 | Stop reason: HOSPADM

## 2022-08-16 RX ORDER — SODIUM CHLORIDE 9 MG/ML
INJECTION, SOLUTION INTRAVENOUS PRN
Status: DISCONTINUED | OUTPATIENT
Start: 2022-08-16 | End: 2022-08-18 | Stop reason: HOSPADM

## 2022-08-16 RX ORDER — INSULIN LISPRO 100 [IU]/ML
7 INJECTION, SOLUTION INTRAVENOUS; SUBCUTANEOUS
Status: DISCONTINUED | OUTPATIENT
Start: 2022-08-17 | End: 2022-08-18 | Stop reason: HOSPADM

## 2022-08-16 RX ORDER — ROSUVASTATIN CALCIUM 20 MG/1
20 TABLET, COATED ORAL NIGHTLY
Status: DISCONTINUED | OUTPATIENT
Start: 2022-08-16 | End: 2022-08-18 | Stop reason: HOSPADM

## 2022-08-16 RX ORDER — INSULIN LISPRO 100 [IU]/ML
0-4 INJECTION, SOLUTION INTRAVENOUS; SUBCUTANEOUS
Status: DISCONTINUED | OUTPATIENT
Start: 2022-08-17 | End: 2022-08-18 | Stop reason: HOSPADM

## 2022-08-16 RX ORDER — SODIUM CHLORIDE 0.9 % (FLUSH) 0.9 %
5-40 SYRINGE (ML) INJECTION EVERY 12 HOURS SCHEDULED
Status: DISCONTINUED | OUTPATIENT
Start: 2022-08-16 | End: 2022-08-18 | Stop reason: HOSPADM

## 2022-08-16 RX ORDER — INSULIN GLARGINE 100 [IU]/ML
21 INJECTION, SOLUTION SUBCUTANEOUS NIGHTLY
Status: DISCONTINUED | OUTPATIENT
Start: 2022-08-16 | End: 2022-08-18 | Stop reason: HOSPADM

## 2022-08-16 RX ORDER — ENOXAPARIN SODIUM 100 MG/ML
40 INJECTION SUBCUTANEOUS DAILY
Status: DISCONTINUED | OUTPATIENT
Start: 2022-08-16 | End: 2022-08-16

## 2022-08-16 RX ORDER — ONDANSETRON 2 MG/ML
4 INJECTION INTRAMUSCULAR; INTRAVENOUS EVERY 6 HOURS PRN
Status: DISCONTINUED | OUTPATIENT
Start: 2022-08-16 | End: 2022-08-18 | Stop reason: HOSPADM

## 2022-08-16 RX ORDER — INSULIN LISPRO 100 [IU]/ML
10 INJECTION, SOLUTION INTRAVENOUS; SUBCUTANEOUS
Status: DISCONTINUED | OUTPATIENT
Start: 2022-08-17 | End: 2022-08-16

## 2022-08-16 RX ORDER — ENOXAPARIN SODIUM 100 MG/ML
40 INJECTION SUBCUTANEOUS DAILY
Status: DISCONTINUED | OUTPATIENT
Start: 2022-08-16 | End: 2022-08-18 | Stop reason: HOSPADM

## 2022-08-16 RX ORDER — ACETAMINOPHEN 650 MG/1
650 SUPPOSITORY RECTAL EVERY 6 HOURS PRN
Status: DISCONTINUED | OUTPATIENT
Start: 2022-08-16 | End: 2022-08-18 | Stop reason: HOSPADM

## 2022-08-16 RX ORDER — LISINOPRIL 20 MG/1
40 TABLET ORAL DAILY
Status: DISCONTINUED | OUTPATIENT
Start: 2022-08-16 | End: 2022-08-18 | Stop reason: HOSPADM

## 2022-08-16 RX ORDER — ONDANSETRON 4 MG/1
4 TABLET, ORALLY DISINTEGRATING ORAL EVERY 8 HOURS PRN
Status: DISCONTINUED | OUTPATIENT
Start: 2022-08-16 | End: 2022-08-18 | Stop reason: HOSPADM

## 2022-08-16 RX ORDER — BUDESONIDE AND FORMOTEROL FUMARATE DIHYDRATE 80; 4.5 UG/1; UG/1
AEROSOL RESPIRATORY (INHALATION)
Status: DISCONTINUED
Start: 2022-08-16 | End: 2022-08-16 | Stop reason: WASHOUT

## 2022-08-16 RX ORDER — INSULIN LISPRO 100 [IU]/ML
0-4 INJECTION, SOLUTION INTRAVENOUS; SUBCUTANEOUS NIGHTLY
Status: DISCONTINUED | OUTPATIENT
Start: 2022-08-16 | End: 2022-08-18 | Stop reason: HOSPADM

## 2022-08-16 RX ORDER — POLYETHYLENE GLYCOL 3350 17 G/17G
17 POWDER, FOR SOLUTION ORAL DAILY PRN
Status: DISCONTINUED | OUTPATIENT
Start: 2022-08-16 | End: 2022-08-18 | Stop reason: HOSPADM

## 2022-08-16 RX ORDER — ASPIRIN 81 MG/1
324 TABLET, CHEWABLE ORAL ONCE
Status: COMPLETED | OUTPATIENT
Start: 2022-08-16 | End: 2022-08-16

## 2022-08-16 RX ORDER — ALBUTEROL SULFATE 90 UG/1
2 AEROSOL, METERED RESPIRATORY (INHALATION) EVERY 6 HOURS PRN
Status: DISCONTINUED | OUTPATIENT
Start: 2022-08-16 | End: 2022-08-18 | Stop reason: HOSPADM

## 2022-08-16 RX ORDER — BUDESONIDE AND FORMOTEROL FUMARATE DIHYDRATE 80; 4.5 UG/1; UG/1
2 AEROSOL RESPIRATORY (INHALATION) 2 TIMES DAILY
Status: DISCONTINUED | OUTPATIENT
Start: 2022-08-16 | End: 2022-08-18 | Stop reason: HOSPADM

## 2022-08-16 RX ORDER — ENALAPRILAT 2.5 MG/2ML
1.25 INJECTION INTRAVENOUS ONCE
Status: COMPLETED | OUTPATIENT
Start: 2022-08-16 | End: 2022-08-16

## 2022-08-16 RX ADMIN — LISINOPRIL 40 MG: 20 TABLET ORAL at 21:02

## 2022-08-16 RX ADMIN — ROSUVASTATIN CALCIUM 20 MG: 20 TABLET, FILM COATED ORAL at 21:02

## 2022-08-16 RX ADMIN — ACETAMINOPHEN 650 MG: 325 TABLET ORAL at 21:03

## 2022-08-16 RX ADMIN — Medication 10 ML: at 20:55

## 2022-08-16 RX ADMIN — ENOXAPARIN SODIUM 40 MG: 100 INJECTION SUBCUTANEOUS at 21:01

## 2022-08-16 RX ADMIN — INSULIN GLARGINE 21 UNITS: 100 INJECTION, SOLUTION SUBCUTANEOUS at 21:02

## 2022-08-16 RX ADMIN — SODIUM CHLORIDE 1000 ML: 9 INJECTION, SOLUTION INTRAVENOUS at 14:29

## 2022-08-16 RX ADMIN — IOPAMIDOL 50 ML: 612 INJECTION, SOLUTION INTRAVENOUS at 13:08

## 2022-08-16 RX ADMIN — METOPROLOL TARTRATE 25 MG: 25 TABLET, FILM COATED ORAL at 21:02

## 2022-08-16 RX ADMIN — ALBUTEROL SULFATE 2 PUFF: 90 AEROSOL, METERED RESPIRATORY (INHALATION) at 20:04

## 2022-08-16 RX ADMIN — GABAPENTIN 100 MG: 100 CAPSULE ORAL at 21:02

## 2022-08-16 RX ADMIN — ASPIRIN 324 MG: 81 TABLET, CHEWABLE ORAL at 16:05

## 2022-08-16 RX ADMIN — ENALAPRILAT 1.25 MG: 1.25 INJECTION INTRAVENOUS at 15:32

## 2022-08-16 ASSESSMENT — PAIN DESCRIPTION - DESCRIPTORS
DESCRIPTORS: PATIENT UNABLE TO DESCRIBE
DESCRIPTORS: ACHING

## 2022-08-16 ASSESSMENT — PAIN DESCRIPTION - ORIENTATION: ORIENTATION: MID

## 2022-08-16 ASSESSMENT — PAIN DESCRIPTION - LOCATION
LOCATION: HEAD
LOCATION: HEAD

## 2022-08-16 ASSESSMENT — ENCOUNTER SYMPTOMS
SHORTNESS OF BREATH: 0
ABDOMINAL PAIN: 1
BACK PAIN: 0
VOMITING: 1
COUGH: 0
NAUSEA: 1
TROUBLE SWALLOWING: 0
EYES NEGATIVE: 1
SINUS PRESSURE: 0
DIARRHEA: 0
CHEST TIGHTNESS: 0

## 2022-08-16 ASSESSMENT — PAIN SCALES - GENERAL
PAINLEVEL_OUTOF10: 8
PAINLEVEL_OUTOF10: 0
PAINLEVEL_OUTOF10: 7
PAINLEVEL_OUTOF10: 0

## 2022-08-16 ASSESSMENT — PAIN - FUNCTIONAL ASSESSMENT: PAIN_FUNCTIONAL_ASSESSMENT: 0-10

## 2022-08-16 ASSESSMENT — PAIN DESCRIPTION - PAIN TYPE: TYPE: ACUTE PAIN

## 2022-08-16 ASSESSMENT — PAIN DESCRIPTION - FREQUENCY: FREQUENCY: CONTINUOUS

## 2022-08-16 NOTE — CARE COORDINATION
[facility] [place] Case Management Initial Discharge Assessment    Met with Patient to discuss discharge plan. PCP: Floyd Feliciano MD                                  Date of Last Visit: Months     [facility] Patient: No        [facility] Notified: no    If no PCP, list provided? N/A    Discharge Planning    Living Arrangements: independently at home    Who do you live with? Spouse     Who helps you with your care:  self    If lives at home:     Do you have any barriers navigating in your home? no    Patient can perform ADL? Yes    Current Services (outpatient and in home) :  None    Dialysis: No    Is transportation available to get to your appointments? Yes, pt drives     DME Equipment:  no    Respiratory equipment: None    Respiratory provider:  no     Pharmacy:  yes - Janice Pelletier with Medication Assistance Program?  No      Patient agreeable to [facility]? Declined    Patient agreeable to SNF/Rehab? Declined    Other discharge needs identified? N/A    Does Patient Have a High-Risk for Readmission Diagnosis (CHF, PN, MI, COPD)? No      Initial Discharge Plan? (Note: please see concurrent daily documentation for any updates after initial note). Met with pt at bedside to discuss discharge planning. Pt plans to return home and denies needs.      Readmission Risk              Risk of Unplanned Readmission:  0         Electronically signed by Thad Go RN on 8/16/2022 at 2:22 PM

## 2022-08-16 NOTE — ED NOTES
Report called to Kodak Márquez on [facility], [address] [place] [address]  08/16/22 1456

## 2022-08-16 NOTE — ED PROVIDER NOTES
[facility] ED  eMERGENCY dEPARTMENT eNCOUnter      Pt Name: Fabian Bernal  MRN: [id number]  [place] 1960  Date of evaluation: 8/16/2022  Provider: Maribeth Nissen, DO    CHIEF COMPLAINT       Chief Complaint   Patient presents with    Hypertension     Pt c/o HTN since yesterday (/105), pt reports taking medications this AM     Hyperglycemia     Pt c/o hyperglycemia since yesterday (450), reports taking novolog as prescribed; + nausea         HISTORY OF PRESENT ILLNESS   (Location/Symptom, Timing/Onset,Context/Setting, Quality, Duration, Modifying Factors, Severity)  Note limiting factors. Fabian Bernal is a 65 y.o. male who presents to the emergency department with c/o elevated blood pressure reading (taken at [facility] this morning) and high blood sugar. Patient he has not been able to use his glucometer because his vision is bad and he cannot see the glucometer controls in order to use the device. Upon further questioning the patient's been vomiting for the last several days and the last 2 days has not been able to eat or drink very much at all. Patient is dry mucous membranes on exam and is tachycardic. The patient is diaphoretic. When asked about this the patient states he has always been [place] person who sweats\" and that this is nothing new. The patient denies having a fever. He states he always feels flushed. The ED attending took the patient's temperature himself and got 98.5. The patient denies chills. Patient states he does not know why he has this vomiting. He does take his insulin shots in the same area of his right lower abdomen. The ER physician discussed site rotation to prevent scar tissue from preventing absorption of the insulin with him. The patient follows with Dr. Jaki Pop, with [id number] [facility] endocrinology. The patient states he has had complications from diabetes being poorly controlled.   Dates that he is supposed to start an insulin pump and that the pump that he is supposed to start on has the capability of telling him his sugars. Patient denies any chest pain. He denies any shortness of breath. On exam the patient has diminished bowel sounds on the left side. Patient denies any history of bowel obstruction. Unknown for how long the patient's blood sugars have been high. He states his last A1c was greater than 11. Records review shows that the A1c from 7/7/2022 was 11.4 and the A1c from 2/25/2022 was 14. Denies history of cyclical vomiting. The patient states he did take his medications today but he has not in the last couple of days. HPI    NursingNotes were reviewed. REVIEW OF SYSTEMS    (2-9 systems for level 4, 10 or more for level 5)     Review of Systems   Constitutional:  Positive for appetite change. Negative for activity change, chills, fever and unexpected weight change. HENT:  Negative for drooling, ear pain, nosebleeds, sinus pressure and trouble swallowing. Respiratory:  Negative for cough, chest tightness and shortness of breath. Cardiovascular:  Negative for chest pain and leg swelling. Gastrointestinal:  Positive for abdominal pain, nausea and vomiting. Negative for diarrhea. Endocrine: Positive for polydipsia and polyuria. Negative for polyphagia. Genitourinary:  Negative for dysuria, flank pain and frequency. Musculoskeletal:  Negative for back pain and myalgias. Skin:  Negative for pallor and rash. Neurological:  Negative for syncope, weakness and headaches. Hematological:  Does not bruise/bleed easily. All other systems reviewed and are negative. Except as noted above the remainder of the review of systems was reviewed and negative.        PAST MEDICAL HISTORY     Past Medical History:   Diagnosis Date    Asthma     Back pain     CAD (coronary artery disease) 01/2020    3 vessel     Diabetes mellitus ([facility]     Essential hypertension, benign     Hyperlipemia     Neuropathic pain, leg, bilateral          SURGICALHISTORY Past Surgical History:   Procedure Laterality Date    CARPAL TUNNEL RELEASE Bilateral     FOOT SURGERY Left     bone spur    LAMINOTOMY  2012         CURRENT MEDICATIONS       Previous Medications    ACCU-CHEK FASTCLIX LANCETS MISC    Test 4x daily Dx E11.65    ALBUTEROL SULFATE  (90 BASE) MCG/ACT INHALER    Inhale 2 puffs into the lungs every 6 hours as needed for Wheezing    ASPIRIN 81 MG EC TABLET    Take 1 tablet by mouth daily    BLOOD GLUCOSE MONITORING SUPPL (FREESTYLE LITE) MARIUM    1 Device by Does not apply route daily as needed (as directed)    BLOOD GLUCOSE TEST STRIPS (ACCU-CHEK GUIDE) STRIP    Test 4x daily Dx E11.65    FLUTICASONE-VILANTEROL (BREO ELLIPTA) 100-25 MCG/INH AEPB INHALER    Inhale 1 puff into the lungs daily    FREESTYLE LANCETS MISC    1 each by Does not apply route daily    INSULIN ASPART (NOVOLOG) 100 UNIT/ML INJECTION VIAL    Use via insulin pump max daily dose 100 units    INSULIN ASPART PROTAMINE-INSULIN ASPART (NOVOLOG MIX 70/30 FLEXPEN) (70-30) 100 UNIT/ML INJECTION    30 units twice day    INSULIN PEN NEEDLE (NOVOFINE) 32G X 6 MM MISC    Bid    LANCETS MISC    Pt test 4x daily Dx E11.65 (dispense covered brand)    LISINOPRIL (PRINIVIL;ZESTRIL) 10 MG TABLET    Take 1 tablet by mouth daily    METFORMIN (GLUCOPHAGE) 1000 MG TABLET    TAKE 1 TABLET TWICE DAILY    METOPROLOL (LOPRESSOR) 100 MG TABLET    Take 1 tablet by mouth 2 times daily    METOPROLOL TARTRATE (LOPRESSOR) 25 MG TABLET    Take 1 tablet by mouth 2 times daily    PANTOPRAZOLE (PROTONIX) 40 MG TABLET    Take 40 mg by mouth in the morning. ROSUVASTATIN (CRESTOR) 20 MG TABLET    Take 1 tablet by mouth nightly    SYMBICORT 80-4.5 MCG/ACT AERO    Inhale 2 puffs into the lungs 2 times daily    TAMSULOSIN (FLOMAX) 0.4 MG CAPSULE    Take 1 capsule by mouth daily       ALLERGIES     Patient has no known allergies.     FAMILY HISTORY       Family History   Problem Relation Age of Onset    Other Father accident    Heart Failure Mother     Heart Disease Brother     Cirrhosis Brother           SOCIAL HISTORY       Social History     Socioeconomic History    Marital status:      Spouse name: Iza Hunter    Number of children: 2    Years of education: 14    Highest education level: Some college, no degree   Occupational History    Occupation: disability    Tobacco Use    Smoking status: Never    Smokeless tobacco: Never   Vaping Use    Vaping Use: Never used   Substance and Sexual Activity    Alcohol use: Yes     Alcohol/week: 2.0 standard drinks     Types: 2 Cans of beer per week     Comment: every 6 months or so     Drug use: No    Sexual activity: Not Currently     Partners: Female   Social History Narrative    Lives with wife. Per patient he has been  for 2 years. He met his wife online and went to [facility] and brought her back to [facility]. They live in [address] [place] [address] no steps into home. It is a 2 story home. Bedroom and bath upstairs. Bathroom down. Per patient once he is down the steps he doesn't go back up during the day. 2 daughters local.      Per he has very poor vision. He does not like to drive at night or when raining. Patient states he struggles looking at a computer or reading due to vision. Social Determinants of Health     Financial Resource Strain: Low Risk     Difficulty of Paying Living Expenses: Not hard at all   Food Insecurity: No Food Insecurity    Worried About [facility] in the Last Year: Never true    [facility] in the Last Year: Never true   Transportation Needs: No Transportation Needs    Lack of Transportation (Medical): No    Lack of Transportation (Non-Medical):  No   Physical Activity: Inactive    Days of Exercise per Week: 0 days    Minutes of Exercise per Session: 0 min       SCREENINGS    [place] Coma Scale  Eye Opening: Spontaneous  Best Verbal Response: Oriented  Best Motor Response: Obeys commands  [place] Coma Scale Score: 15 @FLOW([id number])@      PHYSICAL EXAM    (up to 7 for level 4, 8 or more for level 5)     ED Triage Vitals [08/16/22 1142]   BP Temp Temp Source Heart Rate Resp SpO2 Height Weight   (!) 198/98 97.1 °F (36.2 °C) Temporal (!) 114 20 97 % 5' 6\" (1.676 m) 170 lb (77.1 kg)       Physical Exam  Vitals and nursing note reviewed. Constitutional:       General: He is awake. He is in acute distress. Appearance: Normal appearance. He is well-developed and normal weight. He is not ill-appearing, toxic-appearing or diaphoretic. Comments: No photophobia. No phonophobia. HENT:      Head: Normocephalic and atraumatic. No Slater's sign. Right Ear: External ear normal.      Left Ear: External ear normal.      Nose: Nose normal. No congestion or rhinorrhea. Mouth/Throat:      Mouth: Mucous membranes are dry. Pharynx: Oropharynx is clear. No oropharyngeal exudate or posterior oropharyngeal erythema. Eyes:      General: No scleral icterus. Right eye: No foreign body or discharge. Left eye: No discharge. Extraocular Movements: Extraocular movements intact. Conjunctiva/sclera: Conjunctivae normal.      Left eye: No exudate. Pupils: Pupils are equal, round, and reactive to light. Neck:      Vascular: No JVD. Trachea: No tracheal deviation. Comments: No meningismus. Cardiovascular:      Rate and Rhythm: Normal rate and regular rhythm. Pulses: Normal pulses. Heart sounds: Normal heart sounds. Heart sounds not distant. No murmur heard. No friction rub. No gallop. Pulmonary:      Effort: Pulmonary effort is normal. No respiratory distress. Breath sounds: Normal breath sounds. No stridor. No wheezing, rhonchi or rales. Chest:      Chest wall: No tenderness. Abdominal:      General: Abdomen is flat. Bowel sounds are normal. There is no distension or abdominal bruit. There are no signs of injury. Palpations: Abdomen is soft.  There is no shifting dullness, fluid wave, hepatomegaly, splenomegaly, mass or pulsatile mass. Tenderness: There is abdominal tenderness in the left lower quadrant. There is no right CVA tenderness, left CVA tenderness, guarding or rebound. Hernia: No hernia is present. Comments: No flank ecchymosis. Negative [place] sign. No caput medusae. Musculoskeletal:         General: No swelling, tenderness, deformity or signs of injury. Normal range of motion. Cervical back: Normal range of motion and neck supple. No rigidity. Lymphadenopathy:      Head:      Right side of head: No submental adenopathy. Left side of head: No submental adenopathy. Skin:     General: Skin is warm and dry. Capillary Refill: Capillary refill takes less than 2 seconds. Coloration: Skin is not jaundiced or pale. Findings: No bruising, erythema, lesion or rash. Neurological:      General: No focal deficit present. Mental Status: He is alert and oriented to person, place, and time. Mental status is at baseline. Cranial Nerves: No cranial nerve deficit. Sensory: No sensory deficit. Motor: No weakness. Coordination: Coordination normal.      Deep Tendon Reflexes: Reflexes are normal and symmetric. Psychiatric:         Mood and Affect: Mood normal.         Behavior: Behavior normal. Behavior is cooperative. Thought Content: Thought content normal.         Judgment: Judgment normal.           DIAGNOSTIC RESULTS     EKG: All EKG's are interpreted by the Emergency Department Physician who either signs or Co-signsthis chart in the absence of a cardiologist.    EKG: Normal sinus rhythm at 100 bpm.  Prominent P wave best visualized in lead II consistent with P pulmonale. There is a left axis. There is LVH voltage. There is a QS pattern in the V1 and V2 leads. There is T wave inversion in III, aVR, V4, V5, and V6. The QT intervals 384 ms. The QTC is 495 ms.     RADIOLOGY:   Non-plain 5. Elevated troponin          DISPOSITION/PLAN   DISPOSITION Admitted 08/16/2022 03:50:50 PM      PATIENT REFERRED TO:  No follow-up provider specified.     DISCHARGE MEDICATIONS:  New Prescriptions    No medications on file          (Please note that portions of this note were completed with a voice recognition program.  Efforts were made to edit the dictations but occasionally words are mis-transcribed.)    Becky Zavala DO (electronically signed)  Attending Emergency Physician         Becky Zavala DO  08/16/22 [id number]

## 2022-08-16 NOTE — ED NOTES
Patient states to this nurse that patient has lost his blood pressure medications and has not been taking his pills at home. Pt states \"I can't focus on my blood pressure and my diabetes too. \"      Mriiam Boykin RN  08/16/22 [id number]

## 2022-08-17 LAB
ANION GAP SERPL CALCULATED.3IONS-SCNC: 11 MEQ/L (ref 9–15)
BACTERIA: NEGATIVE /HPF
BASOPHILS ABSOLUTE: 0 K/UL (ref 0–0.2)
BASOPHILS RELATIVE PERCENT: 0.6 %
BILIRUBIN URINE: NEGATIVE
BLOOD, URINE: ABNORMAL
BUN BLDV-MCNC: 19 MG/DL (ref 8–23)
CALCIUM SERPL-MCNC: 9.1 MG/DL (ref 8.5–9.9)
CHLORIDE BLD-SCNC: 104 MEQ/L (ref 95–107)
CLARITY: CLEAR
CO2: 26 MEQ/L (ref 20–31)
COLOR: YELLOW
CREAT SERPL-MCNC: 1.1 MG/DL (ref 0.7–1.2)
EKG ATRIAL RATE: 100 BPM
EKG P AXIS: 56 DEGREES
EKG P-R INTERVAL: 130 MS
EKG Q-T INTERVAL: 384 MS
EKG QRS DURATION: 96 MS
EKG QTC CALCULATION (BAZETT): 495 MS
EKG R AXIS: -60 DEGREES
EKG T AXIS: 35 DEGREES
EKG VENTRICULAR RATE: 100 BPM
EOSINOPHILS ABSOLUTE: 0.3 K/UL (ref 0–0.7)
EOSINOPHILS RELATIVE PERCENT: 4.4 %
EPITHELIAL CELLS, UA: ABNORMAL /HPF
FERRITIN: 150 NG/ML (ref 30–400)
GFR AFRICAN AMERICAN: >60
GFR NON-AFRICAN AMERICAN: >60
GLUCOSE BLD-MCNC: 127 MG/DL (ref 70–99)
GLUCOSE BLD-MCNC: 231 MG/DL (ref 70–99)
GLUCOSE BLD-MCNC: 265 MG/DL (ref 70–99)
GLUCOSE BLD-MCNC: 328 MG/DL (ref 70–99)
GLUCOSE URINE: >=1000 MG/DL
HBA1C MFR BLD: 10.1 % (ref 4.8–5.9)
HCT VFR BLD CALC: 42.5 % (ref 42–52)
HEMOGLOBIN: 14.4 G/DL (ref 14–18)
IRON SATURATION: 21 % (ref 20–55)
IRON: 45 UG/DL (ref 59–158)
KETONES, URINE: ABNORMAL MG/DL
LEUKOCYTE ESTERASE, URINE: NEGATIVE
LV EF: 20 %
LVEF MODALITY: NORMAL
LYMPHOCYTES ABSOLUTE: 0.9 K/UL (ref 1–4.8)
LYMPHOCYTES RELATIVE PERCENT: 14.4 %
MCH RBC QN AUTO: 28.7 PG (ref 27–31.3)
MCHC RBC AUTO-ENTMCNC: 34 % (ref 33–37)
MCV RBC AUTO: 84.4 FL (ref 80–100)
MONOCYTES ABSOLUTE: 0.5 K/UL (ref 0.2–0.8)
MONOCYTES RELATIVE PERCENT: 8.3 %
NEUTROPHILS ABSOLUTE: 4.7 K/UL (ref 1.4–6.5)
NEUTROPHILS RELATIVE PERCENT: 72.3 %
NITRITE, URINE: NEGATIVE
PDW BLD-RTO: 12.8 % (ref 11.5–14.5)
PERFORMED ON: ABNORMAL
PH UA: 5.5 (ref 5–9)
PLATELET # BLD: 151 K/UL (ref 130–400)
POTASSIUM REFLEX MAGNESIUM: 4.1 MEQ/L (ref 3.4–4.9)
PROTEIN UA: >=300 MG/DL
RBC # BLD: 5.04 M/UL (ref 4.7–6.1)
RBC UA: ABNORMAL /HPF (ref 0–2)
SODIUM BLD-SCNC: 141 MEQ/L (ref 135–144)
SPECIFIC GRAVITY UA: 1.04 (ref 1–1.03)
TOTAL IRON BINDING CAPACITY: 218 UG/DL (ref 250–450)
TROPONIN: 0.01 NG/ML (ref 0–0.01)
TROPONIN: 0.02 NG/ML (ref 0–0.01)
TROPONIN: 0.02 NG/ML (ref 0–0.01)
UNSATURATED IRON BINDING CAPACITY: 173 UG/DL (ref 112–347)
URINE REFLEX TO CULTURE: ABNORMAL
UROBILINOGEN, URINE: 1 E.U./DL
WBC # BLD: 6.6 K/UL (ref 4.8–10.8)
WBC UA: ABNORMAL /HPF (ref 0–5)

## 2022-08-17 PROCEDURE — 6370000000 HC RX 637 (ALT 250 FOR IP): Performed by: INTERNAL MEDICINE

## 2022-08-17 PROCEDURE — 83540 ASSAY OF IRON: CPT

## 2022-08-17 PROCEDURE — 83550 IRON BINDING TEST: CPT

## 2022-08-17 PROCEDURE — 85025 COMPLETE CBC W/AUTO DIFF WBC: CPT

## 2022-08-17 PROCEDURE — 99223 1ST HOSP IP/OBS HIGH 75: CPT | Performed by: INTERNAL MEDICINE

## 2022-08-17 PROCEDURE — 2580000003 HC RX 258: Performed by: INTERNAL MEDICINE

## 2022-08-17 PROCEDURE — 80048 BASIC METABOLIC PNL TOTAL CA: CPT

## 2022-08-17 PROCEDURE — 2060000000 HC ICU INTERMEDIATE R&B

## 2022-08-17 PROCEDURE — 94761 N-INVAS EAR/PLS OXIMETRY MLT: CPT

## 2022-08-17 PROCEDURE — 93306 TTE W/DOPPLER COMPLETE: CPT

## 2022-08-17 PROCEDURE — 36415 COLL VENOUS BLD VENIPUNCTURE: CPT

## 2022-08-17 PROCEDURE — 82728 ASSAY OF FERRITIN: CPT

## 2022-08-17 PROCEDURE — 94640 AIRWAY INHALATION TREATMENT: CPT

## 2022-08-17 PROCEDURE — 84484 ASSAY OF TROPONIN QUANT: CPT

## 2022-08-17 RX ORDER — METOPROLOL TARTRATE 50 MG/1
50 TABLET, FILM COATED ORAL 2 TIMES DAILY
Status: DISCONTINUED | OUTPATIENT
Start: 2022-08-17 | End: 2022-08-17

## 2022-08-17 RX ORDER — SPIRONOLACTONE 25 MG/1
25 TABLET ORAL DAILY
Status: DISCONTINUED | OUTPATIENT
Start: 2022-08-17 | End: 2022-08-18 | Stop reason: HOSPADM

## 2022-08-17 RX ORDER — CARVEDILOL 6.25 MG/1
6.25 TABLET ORAL 2 TIMES DAILY
Status: DISCONTINUED | OUTPATIENT
Start: 2022-08-17 | End: 2022-08-18 | Stop reason: HOSPADM

## 2022-08-17 RX ADMIN — CARVEDILOL 6.25 MG: 6.25 TABLET, FILM COATED ORAL at 21:43

## 2022-08-17 RX ADMIN — INSULIN LISPRO 7 UNITS: 100 INJECTION, SOLUTION INTRAVENOUS; SUBCUTANEOUS at 15:35

## 2022-08-17 RX ADMIN — Medication 10 ML: at 09:41

## 2022-08-17 RX ADMIN — ACETAMINOPHEN 650 MG: 325 TABLET ORAL at 07:47

## 2022-08-17 RX ADMIN — Medication 10 ML: at 21:45

## 2022-08-17 RX ADMIN — INSULIN GLARGINE 21 UNITS: 100 INJECTION, SOLUTION SUBCUTANEOUS at 21:42

## 2022-08-17 RX ADMIN — BUDESONIDE AND FORMOTEROL FUMARATE DIHYDRATE 2 PUFF: 80; 4.5 AEROSOL RESPIRATORY (INHALATION) at 07:43

## 2022-08-17 RX ADMIN — GABAPENTIN 100 MG: 100 CAPSULE ORAL at 09:39

## 2022-08-17 RX ADMIN — INSULIN LISPRO 4 UNITS: 100 INJECTION, SOLUTION INTRAVENOUS; SUBCUTANEOUS at 15:35

## 2022-08-17 RX ADMIN — GABAPENTIN 100 MG: 100 CAPSULE ORAL at 21:43

## 2022-08-17 RX ADMIN — SPIRONOLACTONE 25 MG: 25 TABLET, FILM COATED ORAL at 18:56

## 2022-08-17 RX ADMIN — INSULIN LISPRO 7 UNITS: 100 INJECTION, SOLUTION INTRAVENOUS; SUBCUTANEOUS at 18:32

## 2022-08-17 RX ADMIN — LISINOPRIL 40 MG: 20 TABLET ORAL at 09:40

## 2022-08-17 RX ADMIN — BUDESONIDE AND FORMOTEROL FUMARATE DIHYDRATE 2 PUFF: 80; 4.5 AEROSOL RESPIRATORY (INHALATION) at 21:17

## 2022-08-17 RX ADMIN — ROSUVASTATIN CALCIUM 20 MG: 20 TABLET, FILM COATED ORAL at 21:43

## 2022-08-17 RX ADMIN — ACETAMINOPHEN 650 MG: 325 TABLET ORAL at 21:44

## 2022-08-17 RX ADMIN — GABAPENTIN 100 MG: 100 CAPSULE ORAL at 18:31

## 2022-08-17 RX ADMIN — METOPROLOL TARTRATE 25 MG: 25 TABLET, FILM COATED ORAL at 09:40

## 2022-08-17 RX ADMIN — INSULIN LISPRO 3 UNITS: 100 INJECTION, SOLUTION INTRAVENOUS; SUBCUTANEOUS at 18:32

## 2022-08-17 ASSESSMENT — PAIN SCALES - GENERAL
PAINLEVEL_OUTOF10: 4
PAINLEVEL_OUTOF10: 8

## 2022-08-17 ASSESSMENT — PAIN DESCRIPTION - LOCATION
LOCATION: HEAD
LOCATION: HEAD

## 2022-08-17 ASSESSMENT — PAIN DESCRIPTION - ORIENTATION
ORIENTATION: UPPER;ANTERIOR
ORIENTATION: MID

## 2022-08-17 ASSESSMENT — PAIN DESCRIPTION - DESCRIPTORS
DESCRIPTORS: ACHING
DESCRIPTORS: ACHING

## 2022-08-17 NOTE — CONSULTS
[address] [facility], [address] [facility] [address] [place] REPORT      DATE OF CONSULTATION  8/17/2022    CONSULTANT  oCncepción Dupont DO     REQUESTING PHYSICIAN  Gabe John DO     PRIMARY CARDIOLOGIST  None currently     REASON FOR CONSULTATION  Hypertensive urgency; elevated troponin     HISTORY OF PRESENT ILLNESS  Fabian Bernal is a 65 y.o. male with history of CAD, prior three-vessel CABG (2019), hypertension, hyperlipidemia, and diabetes mellitus who presents with aches, vision changes, and arm paresthesias and found to have hypertensive urgency. Blood pressure is 190/105 at presentation. Admits to medication noncompliance with his blood pressure and cardiac medications. Not been taking them for quite some time. He says he has been focused on his diabetes which is poorly controlled. He denies any jesica chest pain or worsening exertional dyspnea. He has occasional dizziness but no near syncope or syncope. No palpitations. Troponin was found to be mildly elevated. Not seen a cardiologist since his open heart surgery in 2019.     Allergies  No Known Allergies    Medications   sodium chloride flush, 5-40 mL, 2 times per day  enoxaparin, 40 mg, Daily  budesonide-formoterol, 2 puff, BID  lisinopril, 40 mg, Daily  rosuvastatin, 20 mg, Nightly  metoprolol tartrate, 25 mg, BID  insulin lispro, 0-4 Units, TID WC  insulin lispro, 0-4 Units, Nightly  insulin glargine, 21 Units, Nightly  insulin lispro, 7 Units, TID WC  gabapentin, 100 mg, TID        Past Medical History:   Diagnosis Date    Asthma     Back pain     CAD (coronary artery disease) 01/2020    3 vessel     Diabetes mellitus ([facility] [address]     Essential hypertension, benign     Hyperlipemia     Neuropathic pain, leg, bilateral       Past Surgical History:   Procedure Laterality Date    CARPAL TUNNEL RELEASE Bilateral     FOOT SURGERY Left     bone spur    LAMINOTOMY  2012      Social History     Tobacco Use    Smoking status: Never    Smokeless tobacco: Never Substance Use Topics    Alcohol use: Yes     Alcohol/week: 2.0 standard drinks     Types: 2 Cans of beer per week     Comment: every 6 months or so       Family History   Problem Relation Age of Onset    Other Father         accident    Heart Failure Mother     Heart Disease Brother     Cirrhosis Brother         Review of Systems  General: Fatigues easily  Cardiovascular: See HPI. No orthopnea or PND. Respiratory: Dyspnea is present with mild degrees of activity. Gastrointestinal: No melena or hematochezia. Genitourinary: No hematuria. Hematological: No easy bruising or bleeding. Vascular: No lower extremity edema or claudication. Neurological: No TIA or CVA symptoms. + Paresthesias. Musculoskeletal: No chest wall pain. Psychiatric: No anxiety.  + Poor insight into current medical conditions. Skin: No rashes or lesions. No cyanosis. PHYSICAL EXAM  BP (!) 174/104   Pulse 86   Temp 97.9 °F (36.6 °C) (Oral)   Resp 18   Ht 5' 6\" (1.676 m)   Wt 168 lb (76.2 kg)   SpO2 98%   BMI 27.12 kg/m²   Constitutional: alert, cooperative, in no distress  Eyes: Conjunctiva clear; no scleral icturus. PERRLA   Respiratory: clear to auscultation bilaterally  Musculoskeletal: No chest wall tenderness. No clubbing or cyanosis. Cardiovascular: regular rate and rhythm, S1, S2 normal, soft systolic murmur. Click, rub or gallop. No carotid bruit. No JVD. Pulses 2+ and symmetric. GI: soft, non-tender, non-distended. Bowel sounds normal. No masses,  no organomegaly  MSK: extremities normal, atraumatic, no clubbing. No chest wall pain. Neurologic: Grossly normal  Skin: No rashes or lesions. No cyanosis.        Recent Labs     08/16/22  1215 08/16/22  1243 08/16/22  1426 08/17/22  0307   HGB 15.0  --  16.4 14.4   WBC 5.7  --   --  6.6     --   --  151   NA  --  135  --  141   K  --  4.5  --  4.1   CO2  --  27  --  26   BUN  --  20  --  19   MG  --  1.8  --   --    TROPONINI  --  0.030*  --  0.014* Patient

## 2022-08-17 NOTE — CARE COORDINATION
POSSIBLE DC TODAY. AWAITING ECHO AND CARDIO CONSULT. VERIFICATION OF PRESENCE FORM SCANNED TO [email or web address] ORG PER PT REQUEST TO THE [facility]. CONFIRMATION OF EMAIL SCAN SUCCESS PLACED ON PT'S CHART.

## 2022-08-17 NOTE — PROGRESS NOTES
[facility] AT [place] Respiratory Therapy Evaluation   Current Order:  PRN albuterol      Home Regimen: PRN albuterol      Ordering Physician: Alvaro  Re-evaluation Date:  na     Diagnosis: Hypertension      Patient Status: Stable / Unstable + Physician notified    The following MDI Criteria must be met in order to convert aerosol to MDI with spacer. If unable to meet, MDI will be converted to aerosol:  []  Patient able to demonstrate the ability to use MDI effectively  []  Patient alert and cooperative  []  Patient able to take deep breath with 5-10 second hold  []  Medication(s) available in this delivery method   []  Peak flow greater than or equal to 200 ml/min            Current Order Substituted To  (same drug, same frequency)   Aerosol to MDI [] Albuterol Sulfate 0.083% unit dose by aerosol Albuterol Sulfate MDI 2 puffs by inhalation with spacer    [] Levalbuterol 1.25 mg unit dose by aerosol Levalbuterol MDI 2 puffs by inhalation with spacer    [] Levalbuterol 0.63 mg unit dose by aerosol Levalbuterol MDI 2 puffs by inhalation with spacer    [] Ipratropium Bromide 0.02% unit dose by aerosol Ipratropium Bromide MDI 2 puffs by inhalation with spacer    [] Duoneb (Ipratropium + Albuterol) unit dose by aerosol Ipratropium MDI + Albuterol MDI 2 puffs by inhalation w/spacer   MDI to Aerosol [] Albuterol Sulfate MDI Albuterol Sulfate 0.083% unit dose by aerosol    [] Levalbuterol MDI 2 puffs by inhalation Levalbuterol 1.25 mg unit dose by aerosol    [] Ipratropium Bromide MDI by inhalation Ipratropium Bromide 0.02% unit dose by aerosol    [] Combivent (Ipratropium + Albuterol) MDI by inhalation Duoneb (Ipratropium + Albuterol) unit dose by aerosol       Treatment Assessment [Frequency/Schedule]:  Change frequency to: ___________no change_______________________________________per Protocol, P&T, MEC      Points 0 1 2 3 4   Pulmonary Status  Non-Smoker  [x]   Smoking history   < 20 pack years  []   Smoking history  ?  20 pack years  []   Pulmonary Disorder  (acute or chronic)  []   Severe or Chronic w/ Exacerbation  []     Surgical Status No [x]   Surgeries     General []   Surgery Lower []   Abdominal Thoracic or []   Upper Abdominal Thoracic with  PulmonaryDisorder  []     Chest X-ray Clear/Not  Ordered     [x]  Chronic Changes  Results Pending  []  Infiltrates, atelectasis, pleural effusion, or edema  []  Infiltrates in more than one lobe []  Infiltrate + Atelectasis, &/or pleural effusion  []    Respiratory Pattern Regular,  RR = 12-20 [x]  Increased,  RR = 21-25 []  COOPER, irregular,  or RR = 26-30 []  Decreased FEV1  or RR = 31-35 []  Severe SOB, use  of accessory muscles, or RR ? 35  []    Mental Status Alert, oriented,  Cooperative [x]  Confused but Follows commands []  Lethargic or unable to follow commands []  Obtunded  []  Comatose  []    Breath Sounds Clear to  auscultation  [x]  Decreased unilaterally or  in bases only []  Decreased  bilaterally  []  Crackles or intermittent wheezes []  Wheezes []    Cough Strong, Spontan., & nonproductive [x]  Strong,  spontaneous, &  productive []  Weak,  Nonproductive []  Weak, productive or  with wheezes []  No spontaneous  cough or may require suctioning []    Level of Activity Ambulatory [x]  Ambulatory w/ Assist  []  Non-ambulatory []  Paraplegic []  Quadriplegic []    Total    Score:___0____     Triage Score:___5_____      Tri       Triage:     1. (>20) Freq: Q3    2. (16-20) Freq: Q4   3. (11-15) Freq: QID & Albuterol Q2 PRN    4. (6-10) Freq: TID & Albuterol Q2 PRN    5. (0-5) Freq Q4prn

## 2022-08-17 NOTE — PROGRESS NOTES
2000: Assumed care of pt. Pt resting comfortably in bed. Pt has no complaints. BP elevated. Dr. Bertin Manzo at bedside to assess pt and reorder home meds. 0000: Pt resting comfortably in bed. No complaints at this time.  BP stable    0400: Pt resting in bed

## 2022-08-17 NOTE — PROGRESS NOTES
Hospitalist Daily Progress Note  Name: Kaci Da Silva  Age: 65 y.o. Gender: male  CodeStatus: Full Code  Allergies: No Known Allergies    Chief Complaint:Hypertension (Pt c/o HTN since yesterday (/105), pt reports taking medications this AM ) and Hyperglycemia (Pt c/o hyperglycemia since yesterday (450), reports taking novolog as prescribed; + nausea)    Primary Care Provider: Ashlie Mosqueda MD  InpatientTreatment Team: Treatment Team: Attending Provider: Lamine Fonseca DO; : Eva Tijerina RN; Registered Nurse: Nasim Urbina RN; Consulting Physician: Miriam Barbosa DO  Admission Date: 8/16/2022      Subjective: patient seen and evaluated at bedside. No chest pain, sob, n/v. BP improved. Trops trending down. Continues to complain of neuropathic symptoms. HTN now, awaiting AM meds to be given. Pt anxious to d/c to go to court    Physical Exam  Constitutional: alert, appears stated age and cooperative  Skin: Skin color, texture, turgor normal. No rashes or lesions  Eyes:Eye: Normal external eye, conjunctiva, SAL. ENT: Head: Normocephalic, no lesions, without obvious abnormality. Neck: no adenopathy, no carotid bruit, no JVD, supple, symmetrical, trachea midline and thyroid not enlarged, symmetric, no tenderness/mass/nodules  Respiratory: clear to auscultation bilaterally  Cardiovascular: regular rate and rhythm, FATMATA  Gastrointestinal: soft, non-tender; bowel sounds normal; no masses,  no organomegaly  Genitourinary: Deferred  Musculoskeletal:extremities normal, atraumatic, no cyanosis or edema  Neurologic: Mental status AAOx3 No facial asymmetry or droop. Normal muscle strength b/l. Psychiatric: Appropriate mood and affect.  Good insight and judgement  Hematologic: No obvious bruising or bleeding  Review of Systems  14 point ros is reviewed and negative except for as above  Medications:  Reviewed    Infusion Medications:    sodium chloride      dextrose       Scheduled Medications:    sodium for orders placed during the hospital encounter of 19    XR CHEST STANDARD (2 VW)    Narrative  EXAMINATION: Chest x-ray, 2 view    HISTORY: Chest pain    TECHNIQUE: Frontal and lateral views of the chest    COMPARISON: Chest x-rays from 2019    FINDINGS:    Suboptimal inspiration. Cardiomediastinal silhouette is within normal limits. No pneumothorax, pleural effusion, or consolidation. Chronic left rib deformities noted. Impression  No acute intrathoracic process. Results for orders placed during the hospital encounter of 19    XR CHEST STANDARD (2 VW)    Narrative  Patient MRN: [id number]    : 1960    Age:  63 years    Gender: Male    Order Date: 2019 1:45 PM.    Exam: XR CHEST (2 VW)    Number of Views: 2    Indication:  Low blood pressure with near syncope. History of congestive heart failure. Cough and shortness of breath, near syncope. Comparison: 2019    Findings: Cardiomediastinal silhouette is within normal limits. Lungs are clear without evidence of infiltrate/pneumonia, pneumothorax or pleural effusion    Impression  Impression:  No radiographic evidence of acute cardiopulmonary disease       Portable: Results for orders placed during the hospital encounter of 21    XR CHEST PORTABLE    Narrative  Exam: XR CHEST PORTABLE    History:  hyperglycemia, ams    Technique: AP portable view of the chest obtained. Comparison: Chest x-ray from 2021    Chest x-ray portable  Findings:  Status post median sternotomy. The cardiomediastinal silhouette is within normal limits. There are no infiltrates, consolidations or effusions. Bones of the thorax appear intact. There are chronic, healed posttraumatic deformities of the left ribs. Impression  No radiographic evidence of acute intrathoracic process. Echo No results found for this or any previous visit.             Assessment/Plan:    Active Hospital Problems    Diagnosis Date Noted Hypertensive urgency [I16.0] 08/16/2022     Priority: Medium    Intractable nausea and vomiting [R11.2] 08/16/2022     Priority: Medium     Hypertensive urgency: Resume oral agents. increase lisinopril to 40 follow BMP continue metoprolol PRN labetalol and hydralazine. Trend troponins and consult to cardiology given elevated troponins but this is likely secondary to cardiac strain rather than an acute coronary event. Unfortunately given his history of coronary disease however we will continue to trend these and EKG is pending. DM2, uncontrolled check hemoglobin A1c sliding scale coverage Lantus 21 units nightly Humalog 7 units 3 times daily AC with sliding scale hold metformin    hyperLipidemia with coronary disease as above statin antiplatelet beta-blocker ACE inhibitor  Diabetic neuropathy low-dose gabapentin, check iron levels. Asthma: prn nebulized aerosols  DVT ppx lovenox. Additional work up or/and treatment plan may be added today or then after based on clinical progression. I am managing a portion of pt care. Some medical issues are handled byother specialists. Additional work up and treatment should be done in out pt setting by pt PCP and other out pt providers. In addition to examining and evaluating pt, I spent additional time explaining care, normaland abnormal findings, and treatment plan. All of pt questions were answered. Counseling, diet and education were provided. Case will be discussed with nursing staff when appropriate. Family will be updated if and whenappropriate.       Electronically signed by Wanda Villegas DO on 8/17/2022 at 8:30 AM

## 2022-08-17 NOTE — H&P
Hospital Medicine  History and Physical    Patient:  Elyssa Smith  MRN: [id number]    CHIEF COMPLAINT:    Chief Complaint   Patient presents with    Hypertension     Pt c/o HTN since yesterday (/105), pt reports taking medications this AM     Hyperglycemia     Pt c/o hyperglycemia since yesterday (450), reports taking novolog as prescribed; + nausea       History Obtained From:  patient, electronic medical record  Primary Care Physician: Candy Rose MD    HISTORY OF PRESENT ILLNESS:   The patient is a 65 y.o. male who presents with uncontrolled hypertension from home. Patient is a 81-year-old male with history of coronary disease status post CABG, hypertension hyperlipidemia diabetes and medical noncompliance. He was seen at his endocrinologist office yesterday with a systolic of 556 and began to become concerned about his pressure so he started taking his medications as he developed blurred vision he became more concerned and came to the ER today where he was found to have a blood pressure of 198/98 had associated tachycardia respiratory 20 T-max 97.1 but saturating well on room air. Patient associated elevated troponin 0.030 previous troponin elevation around 0.012 he had associated hyperglycemia. Patient reports that he is not compliant with his medications at home has a multitude of reasons including forgetting to and having trouble organizing his pills. He does live at home with his wife no recent travel no pets or animals does not work.   Denies alcohol or drug use    Past Medical History:      Diagnosis Date    Asthma     Back pain     CAD (coronary artery disease) 01/2020    3 vessel     Diabetes mellitus ([facility] [address]     Essential hypertension, benign     Hyperlipemia     Neuropathic pain, leg, bilateral        Past Surgical History:      Procedure Laterality Date    CARPAL TUNNEL RELEASE Bilateral     FOOT SURGERY Left     bone spur    LAMINOTOMY  2012       Medications Prior to Admission:    Prior to Admission medications    Medication Sig Start Date End Date Taking? Authorizing Provider   lisinopril (PRINIVIL;ZESTRIL) 10 MG tablet Take 1 tablet by mouth daily 6/1/22   Isha Aguirre MD   metFORMIN (GLUCOPHAGE) 1000 MG tablet TAKE 1 TABLET TWICE DAILY 5/10/22   Makenzie Toledo MD   albuterol sulfate  (90 Base) MCG/ACT inhaler Inhale 2 puffs into the lungs every 6 hours as needed for Wheezing 4/1/22   Isha Aguirre MD   Accu-Chek FastClix Lancets MISC Test 4x daily Dx E11.65 3/29/22   Makenzie Toledo MD   blood glucose test strips (ACCU-CHEK GUIDE) strip Test 4x daily Dx E11.65 3/29/22   Makenzie Toledo MD   insulin aspart protamine-insulin aspart (NOVOLOG MIX 70/30 FLEXPEN) (70-30) 100 UNIT/ML injection 30 units twice day  Patient taking differently: 50 Units 2 times daily (with meals) 2/25/22   Makenzie Toledo MD   rosuvastatin (CRESTOR) 20 MG tablet Take 1 tablet by mouth nightly 2/22/22   Isha Aguirre MD   Blood Glucose Monitoring Suppl (FREESTYLE LITE) MARIUM 1 Device by Does not apply route daily as needed (as directed) 11/19/21   Elise Bustos MD   FreeStyle Lancets MISC 1 each by Does not apply route daily 11/19/21   Elise Bustos MD   [facility] 80-4.5 MCG/ACT AERO Inhale 2 puffs into the lungs 2 times daily 2/22/21   Isha Aguirre MD   Lancets MISC Pt test 4x daily Dx E11.65 (dispense covered brand) 12/17/19   Makenzie Toledo MD   Insulin Pen Needle (NOVOFINE) 32G X 6 MM MISC Bid 11/26/19   Makenzie Toledo MD   fluticasone-vilanterol (BREO ELLIPTA) 100-25 MCG/INH AEPB inhaler Inhale 1 puff into the lungs daily 3/26/19   Isha Aguirre MD       Allergies:  Patient has no known allergies. Social History:   TOBACCO:   reports that he has never smoked. He has never used smokeless tobacco.  ETOH:   reports current alcohol use of about 2.0 standard drinks per week.   OCCUPATION:  no    Family History:       Problem Relation Age of Onset    Other Father         accident    Heart Failure Mother     Heart Disease Brother     Cirrhosis Brother        REVIEW OF SYSTEMS:  Ten systems reviewed and negative except for as above. Physical Exam:    Vitals: BP (!) 168/101   Pulse (!) 106   Temp 98.2 °F (36.8 °C)   Resp 18   Ht 5' 6\" (1.676 m)   Wt 170 lb (77.1 kg)   SpO2 97%   BMI 27.44 kg/m²   Constitutional: alert, appears stated age and cooperative  Skin: Skin color, texture, turgor normal. No rashes or lesions  Eyes:Eye: Normal external eye, conjunctiva, SAL. ENT: Head: Normocephalic, no lesions, without obvious abnormality. Neck: no adenopathy, no carotid bruit, no JVD, supple, symmetrical, trachea midline and thyroid not enlarged, symmetric, no tenderness/mass/nodules  Respiratory: clear to auscultation bilaterally  Cardiovascular: regular rate and rhythm, FATMATA  Gastrointestinal: soft, non-tender; bowel sounds normal; no masses,  no organomegaly  Genitourinary: Deferred  Musculoskeletal:extremities normal, atraumatic, no cyanosis or edema  Neurologic: Mental status AAOx3 No facial asymmetry or droop. Normal muscle strength b/l. Psychiatric: Appropriate mood and affect.  Good insight and judgement  Hematologic: No obvious bruising or bleeding    Recent Labs     08/16/22  1215 08/16/22  1426   WBC 5.7  --    HGB 15.0 16.4     --      Recent Labs     08/15/22  1551 08/16/22  1243 08/16/22  1246 08/16/22  1251 08/16/22  1426   NA  --  135  --   --   --    K  --  4.5  --   --   --    CL  --  98  --   --   --    CO2  --  27  --   --   --    BUN  --  20  --   --   --    CREATININE  --  1.08 1.2  --  1.2   GLUCOSE 471* 313*  --  358  --    AST  --  11  --   --   --    ALT  --  9  --   --   --    BILITOT  --  0.3  --   --   --    ALKPHOS  --  63  --   --   --      Troponin T:   Recent Labs     08/16/22  1243   TROPONINI 0.030*     URINALYSIS:  Recent Labs     08/16/22  1215   COLORU Yellow   PHUR 5.5   RBCUA 10-20*   CLARITYU Clear   SPECGRAV 1.040   LEUKOCYTESUR Negative   UROBILINOGEN 1.0   BILIRUBINUR Negative   BLOODU SMALL* GLUCOSEU >=1000*     -----------------------------------------------------------------   CT ABDOMEN PELVIS W IV CONTRAST Additional Contrast? None    Result Date: 8/16/2022  EXAMINATION:  CT ABDOMEN AND PELVIS. CLINICAL HISTORY:  VOMITING. COMPARISONS:  NONE AVAILABLE TECHNIQUE:  Serial 5 mm scans. IV contrast. FINDINGS:  The liver and spleen and pancreas appear normal. The gallbladder appears slightly distended but otherwise unremarkable. Kidneys and adrenals appear normal. The aorta is normal in caliber. No abdominal or pelvic mass is demonstrated. There is no fluid in the abdomen or pelvis. No inflammatory changes demonstrated. The bowel pattern appears unremarkable. There is scoliosis. There is low-grade spondylolisthesis at the L5 interspace. NO ACUTE CHANGE. THE GALLBLADDER APPEARS SLIGHTLY DISTENDED BUT OTHERWISE UNREMARKABLE.       EKG: Ordered stat, pending    Assessment and Plan   Hypertensive urgency: Resume oral agents increase lisinopril to 40 follow BMP continue metoprolol as needed labetalol and hydralazine. Trend troponins and consult to cardiology given elevated troponins but this is likely secondary to cardiac strain rather than an acute coronary event. Unfortunately given his history of coronary disease however we will continue to trend these and EKG is pending. Consult to cardiology as well  DM2, uncontrolled check hemoglobin A1c sliding scale coverage Lantus 21 units nightly Humalog 7 units 3 times daily AC with sliding scale hold metformin  hyperLipidemia with coronary disease as above statin antiplatelet beta-blocker ACE inhibitor  Diabetic neuropathy low-dose gabapentin  Asthma: prn nebulized aerosols  DVT ppx lovenox.      Patient Active Problem List   Diagnosis Code    Essential hypertension, benign I10    Obesity, diabetes, and hypertension syndrome (HCC) E11.69, E66.9, E11.59, I15.2    Irregular heart rhythm I49.9    Hyperlipidemia E78.5    Allergic rhinitis due to pollen J30.1 Staphylococcus aureus bacteremia R78.81, B95.61    Hematoma of right thigh S70.11XA    Hematoma of left lower extremity S80.12XA    Hereditary peripheral neuropathy G60.9    Migraine without aura G43.009    Chest pain R07.9    Community acquired pneumonia of right lower lobe of lung J18.9    Acute on chronic diastolic (congestive) heart failure ([facility]) I50.33    Mild intermittent asthma with exacerbation J45.21    NSTEMI (non-ST elevated myocardial infarction) ([facility]) I21.4    Anginal chest pain at rest [facility]) I20.8    Acute bronchitis due to other specified organisms J20.8    CAD (coronary artery disease) I25.10    Body mass index (bmi) 29.0-29.9, adult Z68.29    Cellulitis of right lower limb L03.115    Dyspnea, unspecified R06.00    Encounter for pre-employment examination Z02.1    Family history of ischemic heart disease and other diseases of the circulatory system Z82.49    Other specified hypothyroidism E03.8    Inflammatory disorders of scrotum N49.2    Long term (current) use of insulin ([facility] [address] Z79.4    Long term (current) use of oral hypoglycemic drugs Z79.84    Other chronic sinusitis J32.8    Other idiopathic scoliosis, lumbosacral region M41.27    Other intervertebral disc disorders, lumbar region M51.86    Pain in left lower leg M79.662    Pain of right lower extremity M79.604    General patient noncompliance Z91.19    Shortness of breath R06.02    Sleep apnea G47.30    Spinal stenosis, lumbar region with neurogenic claudication M48.062    Spondylolisthesis, lumbosacral region M43.17    Type 2 diabetes mellitus with diabetic polyneuropathy ([facility]) E11.42    Unsp open wound of r little finger w/o damage to nail, init S61.206A    Involuntary movements R25.9    ANTON (acute kidney injury) ([facility] [address] N17.9    Hyponatremia E87.1    Type 2 diabetes mellitus with hyperglycemia ([facility]) E11.65    Brain bleed ([facility]) I61.9    Hypertensive urgency I16.0    Intractable nausea and vomiting R11.2       Tenny Relic, DO  Admitting Hospitalist    Emergency Contact:

## 2022-08-18 ENCOUNTER — APPOINTMENT (OUTPATIENT)
Dept: CARDIAC CATH/INVASIVE PROCEDURES | Age: 62
DRG: 287 | End: 2022-08-18
Payer: MEDICARE

## 2022-08-18 VITALS
OXYGEN SATURATION: 98 % | WEIGHT: 189 LBS | RESPIRATION RATE: 16 BRPM | TEMPERATURE: 98.1 F | SYSTOLIC BLOOD PRESSURE: 152 MMHG | HEIGHT: 66 IN | HEART RATE: 90 BPM | BODY MASS INDEX: 30.37 KG/M2 | DIASTOLIC BLOOD PRESSURE: 94 MMHG

## 2022-08-18 LAB
ANION GAP SERPL CALCULATED.3IONS-SCNC: 12 MEQ/L (ref 9–15)
ANION GAP SERPL CALCULATED.3IONS-SCNC: 8 MEQ/L (ref 9–15)
BASOPHILS ABSOLUTE: 0 K/UL (ref 0–0.2)
BASOPHILS RELATIVE PERCENT: 0.5 %
BUN BLDV-MCNC: 20 MG/DL (ref 8–23)
BUN BLDV-MCNC: 22 MG/DL (ref 8–23)
CALCIUM SERPL-MCNC: 8.7 MG/DL (ref 8.5–9.9)
CALCIUM SERPL-MCNC: 9 MG/DL (ref 8.5–9.9)
CHLORIDE BLD-SCNC: 104 MEQ/L (ref 95–107)
CHLORIDE BLD-SCNC: 99 MEQ/L (ref 95–107)
CO2: 25 MEQ/L (ref 20–31)
CO2: 28 MEQ/L (ref 20–31)
CREAT SERPL-MCNC: 1.01 MG/DL (ref 0.7–1.2)
CREAT SERPL-MCNC: 1.12 MG/DL (ref 0.7–1.2)
EOSINOPHILS ABSOLUTE: 0.3 K/UL (ref 0–0.7)
EOSINOPHILS RELATIVE PERCENT: 5 %
GFR AFRICAN AMERICAN: >60
GFR AFRICAN AMERICAN: >60
GFR NON-AFRICAN AMERICAN: >60
GFR NON-AFRICAN AMERICAN: >60
GLUCOSE BLD-MCNC: 224 MG/DL (ref 70–99)
GLUCOSE BLD-MCNC: 295 MG/DL (ref 70–99)
GLUCOSE BLD-MCNC: 332 MG/DL (ref 70–99)
HCT VFR BLD CALC: 43.5 % (ref 42–52)
HCT VFR BLD CALC: 43.6 % (ref 42–52)
HEMOGLOBIN: 14.9 G/DL (ref 14–18)
HEMOGLOBIN: 15.2 G/DL (ref 14–18)
LV EF: 25 %
LVEF MODALITY: NORMAL
LYMPHOCYTES ABSOLUTE: 0.7 K/UL (ref 1–4.8)
LYMPHOCYTES RELATIVE PERCENT: 12.6 %
MCH RBC QN AUTO: 28.9 PG (ref 27–31.3)
MCH RBC QN AUTO: 29.3 PG (ref 27–31.3)
MCHC RBC AUTO-ENTMCNC: 34.3 % (ref 33–37)
MCHC RBC AUTO-ENTMCNC: 34.8 % (ref 33–37)
MCV RBC AUTO: 84.2 FL (ref 80–100)
MCV RBC AUTO: 84.3 FL (ref 80–100)
MONOCYTES ABSOLUTE: 0.5 K/UL (ref 0.2–0.8)
MONOCYTES RELATIVE PERCENT: 8.7 %
NEUTROPHILS ABSOLUTE: 4.1 K/UL (ref 1.4–6.5)
NEUTROPHILS RELATIVE PERCENT: 73.2 %
PDW BLD-RTO: 13 % (ref 11.5–14.5)
PDW BLD-RTO: 13.1 % (ref 11.5–14.5)
PERFORMED ON: ABNORMAL
PLATELET # BLD: 144 K/UL (ref 130–400)
PLATELET # BLD: 147 K/UL (ref 130–400)
POTASSIUM REFLEX MAGNESIUM: 4.3 MEQ/L (ref 3.4–4.9)
POTASSIUM SERPL-SCNC: 4.4 MEQ/L (ref 3.4–4.9)
RBC # BLD: 5.16 M/UL (ref 4.7–6.1)
RBC # BLD: 5.18 M/UL (ref 4.7–6.1)
SODIUM BLD-SCNC: 135 MEQ/L (ref 135–144)
SODIUM BLD-SCNC: 141 MEQ/L (ref 135–144)
WBC # BLD: 5.6 K/UL (ref 4.8–10.8)
WBC # BLD: 5.9 K/UL (ref 4.8–10.8)

## 2022-08-18 PROCEDURE — 36415 COLL VENOUS BLD VENIPUNCTURE: CPT

## 2022-08-18 PROCEDURE — 6370000000 HC RX 637 (ALT 250 FOR IP): Performed by: INTERNAL MEDICINE

## 2022-08-18 PROCEDURE — 6360000002 HC RX W HCPCS

## 2022-08-18 PROCEDURE — 94640 AIRWAY INHALATION TREATMENT: CPT

## 2022-08-18 PROCEDURE — 99152 MOD SED SAME PHYS/QHP 5/>YRS: CPT | Performed by: INTERNAL MEDICINE

## 2022-08-18 PROCEDURE — C1894 INTRO/SHEATH, NON-LASER: HCPCS

## 2022-08-18 PROCEDURE — C1769 GUIDE WIRE: HCPCS

## 2022-08-18 PROCEDURE — B2181ZZ FLUOROSCOPY OF LEFT INTERNAL MAMMARY BYPASS GRAFT USING LOW OSMOLAR CONTRAST: ICD-10-PCS | Performed by: INTERNAL MEDICINE

## 2022-08-18 PROCEDURE — 93459 L HRT ART/GRFT ANGIO: CPT

## 2022-08-18 PROCEDURE — 93459 L HRT ART/GRFT ANGIO: CPT | Performed by: INTERNAL MEDICINE

## 2022-08-18 PROCEDURE — 94761 N-INVAS EAR/PLS OXIMETRY MLT: CPT

## 2022-08-18 PROCEDURE — 85025 COMPLETE CBC W/AUTO DIFF WBC: CPT

## 2022-08-18 PROCEDURE — 4A023N7 MEASUREMENT OF CARDIAC SAMPLING AND PRESSURE, LEFT HEART, PERCUTANEOUS APPROACH: ICD-10-PCS | Performed by: INTERNAL MEDICINE

## 2022-08-18 PROCEDURE — B2111ZZ FLUOROSCOPY OF MULTIPLE CORONARY ARTERIES USING LOW OSMOLAR CONTRAST: ICD-10-PCS | Performed by: INTERNAL MEDICINE

## 2022-08-18 PROCEDURE — B2131ZZ FLUOROSCOPY OF MULTIPLE CORONARY ARTERY BYPASS GRAFTS USING LOW OSMOLAR CONTRAST: ICD-10-PCS | Performed by: INTERNAL MEDICINE

## 2022-08-18 PROCEDURE — 2500000003 HC RX 250 WO HCPCS

## 2022-08-18 PROCEDURE — 80048 BASIC METABOLIC PNL TOTAL CA: CPT

## 2022-08-18 PROCEDURE — 2580000003 HC RX 258: Performed by: INTERNAL MEDICINE

## 2022-08-18 PROCEDURE — 85027 COMPLETE CBC AUTOMATED: CPT

## 2022-08-18 PROCEDURE — 2709999900 HC NON-CHARGEABLE SUPPLY

## 2022-08-18 PROCEDURE — 6360000004 HC RX CONTRAST MEDICATION: Performed by: INTERNAL MEDICINE

## 2022-08-18 RX ORDER — CARVEDILOL 6.25 MG/1
6.25 TABLET ORAL 2 TIMES DAILY
Qty: 60 TABLET | Refills: 3 | Status: SHIPPED | OUTPATIENT
Start: 2022-08-18 | End: 2022-08-31

## 2022-08-18 RX ORDER — FENTANYL CITRATE 50 UG/ML
25 INJECTION, SOLUTION INTRAMUSCULAR; INTRAVENOUS
Status: DISCONTINUED | OUTPATIENT
Start: 2022-08-18 | End: 2022-08-18 | Stop reason: HOSPADM

## 2022-08-18 RX ORDER — SPIRONOLACTONE 25 MG/1
25 TABLET ORAL DAILY
Qty: 30 TABLET | Refills: 3 | Status: SHIPPED | OUTPATIENT
Start: 2022-08-19 | End: 2022-09-02 | Stop reason: SINTOL

## 2022-08-18 RX ORDER — SODIUM CHLORIDE 9 MG/ML
INJECTION, SOLUTION INTRAVENOUS PRN
Status: DISCONTINUED | OUTPATIENT
Start: 2022-08-18 | End: 2022-08-18 | Stop reason: HOSPADM

## 2022-08-18 RX ORDER — CLOPIDOGREL BISULFATE 75 MG/1
75 TABLET ORAL DAILY
Qty: 30 TABLET | Refills: 3 | Status: SHIPPED | OUTPATIENT
Start: 2022-08-18

## 2022-08-18 RX ORDER — ASPIRIN 81 MG/1
81 TABLET ORAL DAILY
Qty: 30 TABLET | Refills: 3 | Status: SHIPPED | OUTPATIENT
Start: 2022-08-18

## 2022-08-18 RX ORDER — SODIUM CHLORIDE 0.9 % (FLUSH) 0.9 %
5-40 SYRINGE (ML) INJECTION EVERY 12 HOURS SCHEDULED
Status: DISCONTINUED | OUTPATIENT
Start: 2022-08-18 | End: 2022-08-18 | Stop reason: HOSPADM

## 2022-08-18 RX ORDER — MORPHINE SULFATE 2 MG/ML
2 INJECTION, SOLUTION INTRAMUSCULAR; INTRAVENOUS
Status: DISCONTINUED | OUTPATIENT
Start: 2022-08-18 | End: 2022-08-18 | Stop reason: HOSPADM

## 2022-08-18 RX ORDER — CARVEDILOL 6.25 MG/1
6.25 TABLET ORAL 2 TIMES DAILY
Qty: 60 TABLET | Refills: 3 | Status: SHIPPED | OUTPATIENT
Start: 2022-08-18 | End: 2022-08-18 | Stop reason: SDUPTHER

## 2022-08-18 RX ORDER — HYDRALAZINE HYDROCHLORIDE 20 MG/ML
10 INJECTION INTRAMUSCULAR; INTRAVENOUS EVERY 10 MIN PRN
Status: DISCONTINUED | OUTPATIENT
Start: 2022-08-18 | End: 2022-08-18 | Stop reason: HOSPADM

## 2022-08-18 RX ORDER — MIDAZOLAM HYDROCHLORIDE 2 MG/2ML
2 INJECTION, SOLUTION INTRAMUSCULAR; INTRAVENOUS
Status: DISCONTINUED | OUTPATIENT
Start: 2022-08-18 | End: 2022-08-18 | Stop reason: HOSPADM

## 2022-08-18 RX ORDER — ACETAMINOPHEN 325 MG/1
650 TABLET ORAL EVERY 4 HOURS PRN
Status: DISCONTINUED | OUTPATIENT
Start: 2022-08-18 | End: 2022-08-18 | Stop reason: HOSPADM

## 2022-08-18 RX ORDER — SODIUM CHLORIDE 0.9 % (FLUSH) 0.9 %
5-40 SYRINGE (ML) INJECTION PRN
Status: DISCONTINUED | OUTPATIENT
Start: 2022-08-18 | End: 2022-08-18 | Stop reason: HOSPADM

## 2022-08-18 RX ORDER — ASPIRIN 81 MG/1
81 TABLET ORAL DAILY
Qty: 30 TABLET | Refills: 3 | Status: SHIPPED | OUTPATIENT
Start: 2022-08-18 | End: 2022-08-18 | Stop reason: SDUPTHER

## 2022-08-18 RX ADMIN — ACETAMINOPHEN 650 MG: 325 TABLET ORAL at 08:53

## 2022-08-18 RX ADMIN — BUDESONIDE AND FORMOTEROL FUMARATE DIHYDRATE 2 PUFF: 80; 4.5 AEROSOL RESPIRATORY (INHALATION) at 07:06

## 2022-08-18 RX ADMIN — SPIRONOLACTONE 25 MG: 25 TABLET, FILM COATED ORAL at 08:47

## 2022-08-18 RX ADMIN — CARVEDILOL 6.25 MG: 6.25 TABLET, FILM COATED ORAL at 08:47

## 2022-08-18 RX ADMIN — IOPAMIDOL 80 ML: 612 INJECTION, SOLUTION INTRAVENOUS at 12:46

## 2022-08-18 RX ADMIN — LISINOPRIL 40 MG: 20 TABLET ORAL at 08:47

## 2022-08-18 RX ADMIN — Medication 10 ML: at 08:54

## 2022-08-18 RX ADMIN — GABAPENTIN 100 MG: 100 CAPSULE ORAL at 09:47

## 2022-08-18 RX ADMIN — ACETAMINOPHEN 650 MG: 325 TABLET ORAL at 15:02

## 2022-08-18 ASSESSMENT — PAIN DESCRIPTION - LOCATION
LOCATION: HEAD
LOCATION: HEAD

## 2022-08-18 ASSESSMENT — PAIN SCALES - GENERAL
PAINLEVEL_OUTOF10: 8
PAINLEVEL_OUTOF10: 7
PAINLEVEL_OUTOF10: 0

## 2022-08-18 ASSESSMENT — PAIN DESCRIPTION - DESCRIPTORS: DESCRIPTORS: ACHING

## 2022-08-18 NOTE — BRIEF OP NOTE
Section of Cardiology  Adult Brief Cardiac Cath Procedure Note        Procedure(s):  LHC, b/l coronary angio, grafts    Pre-operative Diagnosis:  new CMP, EF of 20%      H&P Status: Completed and reviewed. Post-operative Diagnosis:      LV EF of 30-35%  LM normal   % prox  CX severe diffuse disease up to 70-80% in mid  RCA small, severe disease up to 90%      LIMA jump graft to D1 and LAD is widely patent supplying small caliber vessels with mod diffuse disease    SVG to % occluded   SVG to % occluded. Findings:  See full report    Complications:  none      Primary Proceduralist:   Dr.Wes Reeder DO      Plan    Max med rx  Rfm  Will plan for outpatient PCI of CX in near future at level III cath lab  Follow up in office in one week  Ok to dc home today from cardio standpoint.        Full procedure note to follow

## 2022-08-18 NOTE — PROGRESS NOTES
Arrived to pre/post cath from [facility], alert and oriented. Vital signs obtained. Consent for procedure signed. Prepped for procedure and taken to cath lab. 1145: Returned from cath lab. R groin remains stable no bleeding or hematoma. VSS. Resting comfortably.

## 2022-08-18 NOTE — DISCHARGE SUMMARY
Hospital Medicine Discharge Summary    Desiree Greenwood  :  1960  MRN:  [id number]    Admit date:  2022  Discharge date:  2022    Admitting Physician:  Melinda Stevens DO  Primary Care Physician:  Yael Soni MD      Discharge Diagnoses:    Principal Problem:    Hypertensive urgency  Active Problems:    Intractable nausea and vomiting  Resolved Problems:    * No resolved hospital problems. *  Coronary disease, hypertensive emergency, NSTEMI, type 2 diabetes    Hospital Course:   Desiree Greenwood is a 65 y.o. male that was admitted and treated at [facility] for vision changes with hypertension urgency hyperglycemia medical noncompliance. Patient was seen evaluated by cardiology taken to the Cath Lab to find significant coronary occlusion. Patient medical management is optimized and he will be discharged home with outpatient follow-up for high risk cardiac cath in the near future. EF noted to be around 20% on echocardiogram with severe LV dysfunction. Extensive education regarding medical compliance was performed prior to discharge he understands his risks of medical noncompliance    Principal Problem:    Hypertensive urgency  Active Problems:    Intractable nausea and vomiting  Resolved Problems:    * No resolved hospital problems. *      Patient was seen by the following consultants while admitted to [facility]:   Consults:  IP CONSULT TO CARDIOLOGY  IP CONSULT TO CARDIAC REHAB    Physical Exam:   Constitutional: alert, appears stated age and cooperative  Skin: Skin color, texture, turgor normal. No rashes or lesions  Eyes:Eye: Normal external eye, conjunctiva, SAL. ENT: Head: Normocephalic, no lesions, without obvious abnormality.   Neck: no adenopathy, no carotid bruit, no JVD, supple, symmetrical, trachea midline and thyroid not enlarged, symmetric, no tenderness/mass/nodules  Respiratory: clear to auscultation bilaterally  Cardiovascular: regular rate and rhythm, FATMATA  Gastrointestinal: soft, non-tender; bowel sounds normal; no masses,  no organomegaly  Genitourinary: Deferred  Musculoskeletal:extremities normal, atraumatic, no cyanosis or edema  Neurologic: Mental status AAOx3 No facial asymmetry or droop. Normal muscle strength b/l. Psychiatric: Appropriate mood and affect. Good insight and judgement  Hematologic: No obvious bruising or bleeding    Significant Diagnostic Studies: Cardiac cath showing EF 30 to 35% with 100% LAD occlusion graft to the circumflex on a percent occluded graft to the % occluded 70 to 80% circumflex disease 90% RCA disease patent LIMA.     Discharge Medications:         Medication List        START taking these medications      carvedilol 6.25 MG tablet  Commonly known as: COREG  Take 1 tablet by mouth 2 times daily     clopidogrel 75 MG tablet  Commonly known as: Plavix  Take 1 tablet by mouth daily     sacubitril-valsartan 24-26 MG per tablet  Commonly known as: ENTRESTO  Take 1 tablet by mouth 2 times daily     spironolactone 25 MG tablet  Commonly known as: ALDACTONE  Take 1 tablet by mouth daily  Start taking on: August 19, 2022            CONTINUE taking these medications      Accu-Chek Guide strip  Generic drug: blood glucose test strips  Test 4x daily Dx E11.65     albuterol sulfate  (90 Base) MCG/ACT inhaler  Commonly known as: PROVENTIL;VENTOLIN;PROAIR  Inhale 2 puffs into the lungs every 6 hours as needed for Wheezing     aspirin 81 MG EC tablet  Take 1 tablet by mouth daily     fluticasone-vilanterol 100-25 MCG/INH Aepb inhaler  Commonly known as: Breo Ellipta  Inhale 1 puff into the lungs daily     FreeStyle Lite Kinjal  1 Device by Does not apply route daily as needed (as directed)     Insulin Pen Needle 32G X 6 MM Misc  Commonly known as: NovoFine  Bid     * Lancets Misc  Pt test 4x daily Dx E11.65 (dispense covered brand)     * FreeStyle Lancets Misc  1 each by Does not apply route daily     * Accu-Chek FastClix Lancets Misc  Test 4x daily Dx E11.65     metFORMIN 1000 MG tablet  Commonly known as: GLUCOPHAGE  TAKE 1 TABLET TWICE DAILY     rosuvastatin 20 MG tablet  Commonly known as: CRESTOR  Take 1 tablet by mouth nightly     Symbicort 80-4.5 MCG/ACT Aero  Generic drug: budesonide-formoterol  Inhale 2 puffs into the lungs 2 times daily           * This list has 3 medication(s) that are the same as other medications prescribed for you. Read the directions carefully, and ask your doctor or other care provider to review them with you. STOP taking these medications      lisinopril 10 MG tablet  Commonly known as: PRINIVIL;ZESTRIL            ASK your doctor about these medications      NovoLOG Mix 70/30 FlexPen (70-30) 100 UNIT/ML injection  Generic drug: insulin aspart protamine-insulin aspart  30 units twice day               Where to Get Your Medications        These medications were sent to [address] [place] [address] [address] [address]  [address] [place] [address]      Phone: [phone]   aspirin 81 MG EC tablet  carvedilol 6.25 MG tablet  clopidogrel 75 MG tablet  sacubitril-valsartan 24-26 MG per tablet  spironolactone 25 MG tablet         Disposition:   Discharged to Home. Any [facility] needs that were indicated and/or required as been addressed and set up by Social Work. Condition at discharge: Pt was medically stable at the time of discharge. Activity: activity as tolerated    Total time taken for discharging this patient: 40 minutes. Greater than 70% of time was spent focused exclusively on this patient. Time was taken to review chart, discuss plans with consultants, reconciling medications, discussing plan answering questions with patient.      SignedChanaten Lanes Sedar, DO  8/18/2022, 7:50 PM

## 2022-08-18 NOTE — PROGRESS NOTES
Right groin stable.   Report called to [employer] on one [address] [facility] [address]  Monitor attached and transport notified

## 2022-08-18 NOTE — PROGRESS NOTES
Patient has remained NPO since midnight. No complaints of pain/ discomfort noted. Bed lock low and call button within reach.

## 2022-08-18 NOTE — PROGRESS NOTES
Patient educated on npo after MN and he verbalized understanding on why he is NPO due to morning procedure . No complaints of pain/ discomfort noted.

## 2022-08-18 NOTE — PROGRESS NOTES
No complaints of pain/discomfort noted. Bed lock low and call button within reach. Staff will continue to monitor.

## 2022-08-19 ENCOUNTER — CARE COORDINATION (OUTPATIENT)
Dept: CASE MANAGEMENT | Age: 62
End: 2022-08-19

## 2022-08-19 DIAGNOSIS — I21.4 NSTEMI (NON-ST ELEVATED MYOCARDIAL INFARCTION) (HCC): ICD-10-CM

## 2022-08-19 DIAGNOSIS — R07.89 OTHER CHEST PAIN: Primary | ICD-10-CM

## 2022-08-19 DIAGNOSIS — I25.10 CORONARY ARTERY DISEASE INVOLVING NATIVE CORONARY ARTERY OF NATIVE HEART, UNSPECIFIED WHETHER ANGINA PRESENT: ICD-10-CM

## 2022-08-19 DIAGNOSIS — I16.0 HYPERTENSIVE URGENCY: Primary | ICD-10-CM

## 2022-08-19 PROCEDURE — 1111F DSCHRG MED/CURRENT MED MERGE: CPT | Performed by: INTERNAL MEDICINE

## 2022-08-19 NOTE — CARE COORDINATION
P[facility] Transitions Initial Follow Up Call    Call within 2 business days of discharge: Yes    Patient: Larry Patient Patient : 1960   MRN: [id number]  Reason for Admission: Hypertensive urgency  Discharge Date: 22 RARS: Readmission Risk Score: 10.3      Last Discharge [facility]       Date Complaint Diagnosis Description Type Department Provider    22 Hypertension; Hyperglycemia Intractable nausea and vomiting . .. ED to Hosp-Admission (Discharged) (ADMITTED) [id number] Randall John DO; Ella Mcmanus,... Transitions of Care Initial Call    Was this an external facility discharge? No Discharge Facility: [place] [address] to be reviewed by the provider   Additional needs identified to be addressed with provider: No  none             Method of communication with provider : none    Advance Care Planning:   Does patient have an Advance Directive: reviewed and current. Care Transition Nurse contacted the patient by telephone to perform post hospital discharge assessment. Verified name and  with patient as identifiers. Provided introduction to self, and explanation of the CTN role. CTN reviewed discharge instructions, medical action plan and red flags with patient who verbalized understanding. Patient given an opportunity to ask questions and does not have any further questions or concerns at this time. Were discharge instructions available to patient? Yes. Reviewed appropriate site of care based on symptoms and resources available to patient including: PCP  Specialist  Urgent care clinics  When to call 911  Condition related references. The patient agrees to contact the PCP office for questions related to their healthcare. Medication reconciliation was performed with patient, who verbalizes understanding of administration of home medications. Advised obtaining a 90-day supply of all daily and as-needed medications. Was patient discharged with a pulse oximeter? no    CTN provided contact information. Plan for follow-up call in 5-7 days based on severity of symptoms and risk factors. Plan for next call: follow up appointment-Did patient r/s HFU appt with Dr. Gurpreet Bermudez (PCP)? Non-face-to-face services provided:  Scheduled appointment with PCP-CTN confirmed with patient he cancelled his f/u appt with Dr. Gurpreet Bermudez (PCP) that was schedule d/t having appt at CHF clinic but agrees to call and r/s. Scheduled appointment with Specialist-CTN confirmed with patient he is scheduled to f/u with [facility] CHF clinic with NABILA Fraga on 8/24/22 at 12:30 pm.   Obtained and reviewed discharge summary and/or continuity of care documents  Education of patient/family/caregiver/guardian to support self-management-Discussed CHF zone too and encouraged to perform daily weight and follow low sodium diet as part of DM management. Care Transitions 24 Hour Call    Schedule Follow Up Appointment with PCP: Declined  Do you have a copy of your discharge instructions?: Yes  Do you have all of your prescriptions and are they filled?: Yes  Have you been contacted by a [address]?: No  Have you scheduled your follow up appointment?: Yes  How are you going to get to your appointment?: Car - drive self  Do you have support at home?: Partner/Spouse/SO  Do you feel like you have everything you need to keep you well at home?: Yes  Are you an active caregiver in your home?: No  Care Transitions Interventions    Registered Dietician: Declined           Spoke with patient today 8/19/22 for initial TCM/hospital discharge follow up. Patient states he is feeling better since discharge and offers no complaints. Noted BP on admission was 198/98. Denies any headaches, confusion, changes in vision, shortness of breath, chest pain or chest discomfort. Confirmed patient is s/p cardiac catheterization on 8/16/22. States right groin is dry, open to air and healing.  Denies any abnormal pain, redness, swelling or warm/hard to touch at cath site. Patient denies monitoring daily weight at home. Encouraged to monitor for 2-3 lbs/day and/or 5 lbs/week and call doctor for rapid weight gain. Advised to follow low sodium diet as part of HF management. Confirmed with patient he is scheduled to f/u with HF Clinic ([place], [place]) on 8/24/22 at 12:30 pm.     Patient states he did not check BG today. States he is taking Metformin and Novolog 70/30 as directed. Reiterated DM/CHF zone tools and knowing when to seek medical attention. Patient states he cancelled PCP f/u appt with Dr. Joana Schirmer that was scheduled for 8/24/22 d/t scheduled at [address] clinic but will call to r/s. Patient states he is independent in driving and has no transportation issues. Denies any complaints or concerns at this time. CTN will continue to follow for Care Transition.     Follow Up  Future Appointments   Date Time Provider Rosaline Silveira   8/24/2022 12:30 PM Praneeth Estes SHELBY [facility] [place]   9/12/2022 11:00 AM Kehinde Pavon MD [facility]       SHERIN Fink

## 2022-08-22 ENCOUNTER — TELEPHONE (OUTPATIENT)
Dept: CARDIOLOGY CLINIC | Age: 62
End: 2022-08-22

## 2022-08-22 NOTE — TELEPHONE ENCOUNTER
Collette From [employer], requesting pt's insurance information and history of physical.    Fax # [phone]

## 2022-08-24 LAB — GLUCOSE BLD-MCNC: 279 MG/DL (ref 74–99)

## 2022-08-30 ENCOUNTER — CARE COORDINATION (OUTPATIENT)
Dept: CASE MANAGEMENT | Age: 62
End: 2022-08-30

## 2022-08-30 DIAGNOSIS — E11.69 DM TYPE 2 WITH DIABETIC DYSLIPIDEMIA (HCC): Primary | ICD-10-CM

## 2022-08-30 DIAGNOSIS — E78.5 DM TYPE 2 WITH DIABETIC DYSLIPIDEMIA (HCC): Primary | ICD-10-CM

## 2022-08-30 RX ORDER — FLASH GLUCOSE SENSOR
1 KIT MISCELLANEOUS
Qty: 2 EACH | Refills: 3 | Status: SHIPPED | OUTPATIENT
Start: 2022-08-30 | End: 2022-09-02

## 2022-08-30 RX ORDER — FLASH GLUCOSE SCANNING READER
1 EACH MISCELLANEOUS DAILY
Qty: 1 EACH | Refills: 0 | Status: SHIPPED | OUTPATIENT
Start: 2022-08-30 | End: 2022-09-02

## 2022-08-30 NOTE — CARE COORDINATION
you have any questions related to your medications?: No  Do you currently have any active services?: No  Do you have any needs or concerns that I can assist you with?: No  Identified Barriers: Lack of Motivation  Care Transitions Interventions    Registered Dietician: Declined    Other Interventions:           Spoke with patient today 8/30/22 for TCM/hospital discharge follow up sub call. Patient states he is doing well and offers no complaints. Denies any headaches, confusion or visual changes. Denies any shortness of breath, chest pain or chest discomfort or BLE swelling    Patient states he does not monitor BP, weight or BG and not interested. States right groin utilized for cardiac catheterization is healing and doing well. Confirmed with patient he r/s PCP visit scheduled for 8/24/22 to 9/7/22 and is scheduled to follow up with NABILA Lam tomorrow at CHF clinic. Denies any needs or concerns at this time. CTN will continue to follow for Care Transition.      Follow Up  Future Appointments   Date Time Provider Rosaline Silveira   8/31/2022 11:00 AM [facility] [place]   9/7/2022  9:45 AM Dean Jurado MD [facility]   9/12/2022 11:00 AM Tea Gamez MD [address] [place] [address]

## 2022-08-31 ENCOUNTER — OFFICE VISIT (OUTPATIENT)
Dept: CARDIOLOGY CLINIC | Age: 62
End: 2022-08-31
Payer: MEDICARE

## 2022-08-31 VITALS
HEART RATE: 96 BPM | RESPIRATION RATE: 18 BRPM | OXYGEN SATURATION: 97 % | HEIGHT: 66 IN | WEIGHT: 169 LBS | DIASTOLIC BLOOD PRESSURE: 75 MMHG | SYSTOLIC BLOOD PRESSURE: 116 MMHG | BODY MASS INDEX: 27.16 KG/M2

## 2022-08-31 DIAGNOSIS — I50.21 ACUTE SYSTOLIC CHF (CONGESTIVE HEART FAILURE), NYHA CLASS 1 (HCC): ICD-10-CM

## 2022-08-31 DIAGNOSIS — I25.5 ISCHEMIC CARDIOMYOPATHY: ICD-10-CM

## 2022-08-31 DIAGNOSIS — E66.9 DIABETES MELLITUS TYPE 2 IN OBESE (HCC): ICD-10-CM

## 2022-08-31 DIAGNOSIS — Z98.61 CAD S/P PERCUTANEOUS CORONARY ANGIOPLASTY: ICD-10-CM

## 2022-08-31 DIAGNOSIS — E11.69 DIABETES MELLITUS TYPE 2 IN OBESE (HCC): ICD-10-CM

## 2022-08-31 DIAGNOSIS — E78.5 DYSLIPIDEMIA: ICD-10-CM

## 2022-08-31 DIAGNOSIS — I25.10 CAD S/P PERCUTANEOUS CORONARY ANGIOPLASTY: ICD-10-CM

## 2022-08-31 DIAGNOSIS — I10 HYPERTENSION, UNSPECIFIED TYPE: ICD-10-CM

## 2022-08-31 LAB
ANION GAP SERPL CALCULATED.3IONS-SCNC: 15 MEQ/L (ref 9–15)
BUN BLDV-MCNC: 33 MG/DL (ref 8–23)
CALCIUM SERPL-MCNC: 9.7 MG/DL (ref 8.5–9.9)
CHLORIDE BLD-SCNC: 99 MEQ/L (ref 95–107)
CO2: 23 MEQ/L (ref 20–31)
CREAT SERPL-MCNC: 1.22 MG/DL (ref 0.7–1.2)
GFR AFRICAN AMERICAN: >60
GFR NON-AFRICAN AMERICAN: >60
GLUCOSE BLD-MCNC: 186 MG/DL (ref 70–99)
POTASSIUM SERPL-SCNC: 5.2 MEQ/L (ref 3.4–4.9)
PRO-BNP: 711 PG/ML
SODIUM BLD-SCNC: 137 MEQ/L (ref 135–144)

## 2022-08-31 PROCEDURE — G8417 CALC BMI ABV UP PARAM F/U: HCPCS | Performed by: PHYSICIAN ASSISTANT

## 2022-08-31 PROCEDURE — 1036F TOBACCO NON-USER: CPT | Performed by: PHYSICIAN ASSISTANT

## 2022-08-31 PROCEDURE — G8427 DOCREV CUR MEDS BY ELIG CLIN: HCPCS | Performed by: PHYSICIAN ASSISTANT

## 2022-08-31 PROCEDURE — 3017F COLORECTAL CA SCREEN DOC REV: CPT | Performed by: PHYSICIAN ASSISTANT

## 2022-08-31 PROCEDURE — 2022F DILAT RTA XM EVC RTNOPTHY: CPT | Performed by: PHYSICIAN ASSISTANT

## 2022-08-31 PROCEDURE — 3046F HEMOGLOBIN A1C LEVEL >9.0%: CPT | Performed by: PHYSICIAN ASSISTANT

## 2022-08-31 PROCEDURE — 1111F DSCHRG MED/CURRENT MED MERGE: CPT | Performed by: PHYSICIAN ASSISTANT

## 2022-08-31 PROCEDURE — 99215 OFFICE O/P EST HI 40 MIN: CPT | Performed by: PHYSICIAN ASSISTANT

## 2022-08-31 RX ORDER — CARVEDILOL 12.5 MG/1
12.5 TABLET ORAL 2 TIMES DAILY
Qty: 60 TABLET | Refills: 3 | Status: SHIPPED | OUTPATIENT
Start: 2022-08-31

## 2022-08-31 RX ORDER — FLASH GLUCOSE SCANNING READER
EACH MISCELLANEOUS
Qty: 1 EACH | Refills: 1 | Status: SHIPPED | OUTPATIENT
Start: 2022-08-31

## 2022-08-31 RX ORDER — FLASH GLUCOSE SENSOR
KIT MISCELLANEOUS
Qty: 2 EACH | Refills: 3 | Status: SHIPPED | OUTPATIENT
Start: 2022-08-31

## 2022-08-31 ASSESSMENT — ENCOUNTER SYMPTOMS
BLOOD IN STOOL: 0
ABDOMINAL PAIN: 0
SHORTNESS OF BREATH: 0
COLOR CHANGE: 0
COUGH: 1
VOMITING: 0
NAUSEA: 0
CHEST TIGHTNESS: 0
CONSTIPATION: 1

## 2022-08-31 NOTE — TELEPHONE ENCOUNTER
Patient requesting medication refill.  Please approve or deny this request.    Rx requested:  Requested Prescriptions     Pending Prescriptions Disp Refills    Continuous Blood Gluc  (FREESTYLE TESS 2 READER) MARIUM 1 each 1     Sig: Give 1     Continuous Blood Gluc Sensor (FREESTYLE TESS 2 SENSOR) MISC 2 each 3     Sig: Change every 2 weeks         Last Office Visit:   8/15/2022      Next Visit Date:  Future Appointments   Date Time Provider Rosaline Silveira   9/7/2022  9:45 AM [facility]   9/12/2022 11:00 AM Bear Pompa MD [facility]   9/16/2022 10:45 AM DO Aliza Sun [facility] Aliza   9/28/2022 11:00 AM [address] NABILA Zeng SHELBY [facility] EM[facility] AT [place]

## 2022-08-31 NOTE — PROGRESS NOTES
Patient: Janet Phillips  YOB: 1960  MRN: [id number]    Chief Complaint:  Chief Complaint   Patient presents with    Congestive Heart Failure    Dizziness     occasional           Subjective/HPI       8/31/22 CHF clinic visit #1: This is a pleasant 80-year-old  male with past medical history significant for coronary artery disease status post CABG x3 in 2019 at [facility], hypertension, dyslipidemia, diabetes and obesity who presents for initial CHF clinic evaluation following recent hospitalization for hypertensive urgency and elevated troponin during which he was found to have new onset cardiomyopathy with a EF of 20% per echo on 8/17/2022. He was admittedly noncompliant with medications prior to admission and cardiac/BP meds were resumed. During this hospitalization, he underwent cardiac catheterization on 8/18/2022 which revealed left main-normal, % proximal stenosis, circumflex-70 to 80% mid stenosis, RCA-small with severe disease up to 90% stenosis, LIMA jump graft to D1 and LAD widely patent, SVG to circumflex-100% occluded, SVG to RCA-100% occluded. Based on findings of this cardiac catheterization, plan for staged PCI of the circumflex in the near future at level 3 Cath Lab. He was optimized on cardiac/CHF medications including aspirin, Plavix, Coreg, Crestor, Aldactone and Entresto and subsequently discharged home in stable condition. Patient has been doing well overall since hospital discharge. He underwent staged PCI of mid and distal circumflex with drug-eluting stent x2 on 8/24/2022 at [facility]. He has been compliant with dual antiplatelet therapy of aspirin and Plavix as well as compliant with all of his other cardiac/CHF medications. He denies any bleeding issues such as hematochezia, melena or hematuria. He continues to experience fatigue and tiredness but states that he does not do much at home.   He denies shortness of breath or dyspnea exertion, chest pain, palpitations, diaphoresis, paroxysmal nocturnal dyspnea, orthopnea, lower extremity edema, dizziness, lightheadedness, syncope, fever or chills. He states that he has cut salt out of his diet and was educated on a 2000 mg daily sodium restriction as well as a 1.5 L daily fluid restriction. He will be referred to cardiac rehab. He does follow with endocrinology, Dr. Aneta Boeck for management of diabetes and states that he recently had an insulin pump placed. Most recent labs reviewed and documented below  BMP on 8/18/2022: Sodium 135, potassium 4.4, chloride 99, total CO2 28, BUN 20, creatinine 1.01, GFR greater than 60, glucose elevated 295  CBC on 8/18/2022: WBC 5.9, hemoglobin 14.9, hematocrit 43.5, platelets 743  Hemoglobin A1c on 8/16/2022: Elevated at 10.1  Fasting lipid panel 11/14/2021: Total cholesterol elevated 235, triglycerides 150, HDL 41, LDL elevated 164      Most recent diagnostic testing reviewed and documented below  EKG 8/16/22: , nonspecific ST changes, IRBBB, QTc 495ms      Had PCI on 8/24/22    Echocardiogram 8/17/22:  Conclusions   Summary   Mild-to-moderate mitral regurgitation with jet centrally-directed. Mildly dilated left atrium. Left ventricular ejection fraction is visually estimated at 20%. Severe LV systolic dysfunction   Left ventricular size is mildly increased . When compared to prior study from 2019, the LV dysfunction is new. Moderate concentric left ventricular hypertrophy. Abnormal (paradoxical) motion consistent with post-operative status. Signature   ----------------------------------------------------------------   Electronically signed by Gama Arreola(Interpreting   physician) on 08/17/2022 05:29 PM    [address] [place] [address] 8/18/22:  Conclusions  Procedure Summary  EF of 20-30%  patent LIMA to LAD  70-80% mid CX  100% native LAD  occluded SVG to CX  occluded SVG to RCA  small non dominant RCA with 90% prox  Recommendations  Aggressive risk factor management. Maximize medical therapy. staged PCI of mid CX at level III cath lab. Angiographic Findings   Cardiac Arteries and Lesion Findings  LMCA: Normal.  LAD: 100% prox  LCx: mod caliber, mid CX 70-80%. RCA: small non dominant. 90% prox. Cardiac Grafts   -  There is a LIMA graft that originates at the LIMA and attaches to the      Mid LAD (jump graft to D1 and LAD is patent. ). -  There is a Vein graft that originates at the Aorta Left and attaches to      the Mid CX (100%). -  There is a Vein graft that originates at the Aorta Right and attaches      to the Dist RCA (100%). Coronary Tree   Dominance: Right  LV function assessed as:Abnormal.  Ejection Fraction    - Method: LV gram. EF%: 30.    Vital signs stable in office today. Blood pressure 116/75, heart rate 98, pulse ox 97% on room air. Weight is 169 pounds. PMHx:  New ischemic cardiomyopathy with EF 20% per echo 8/17/22  CAD s/p CABG x 3 on 12/30/2019 at [facility]  HTN  Dyslipidemia  DM--has insulin pump now.  Follows with Dr. Jalloh Keep    PSHx:  CABG x 3 (LIMA to D1 and LAD, SVG to Circ and SVG to RCA) on 12/30/19 at [facility]  Carpal tunnel release bilateral  Left foot surgery  Back surgery    Social Hx:  No tobacco  Occasional alcohol  No drugs    Family Hx:  Mom with with CABG      No Known Allergies    Current Outpatient Medications   Medication Sig Dispense Refill    empagliflozin (JARDIANCE) 10 MG tablet Take 1 tablet by mouth daily 30 tablet 5    carvedilol (COREG) 12.5 MG tablet Take 1 tablet by mouth 2 times daily 60 tablet 3    Continuous Blood Gluc Sensor (FREESTYLE TESS 14 DAY SENSOR) MISC 1 each by Does not apply route every 14 days 2 each 3    Continuous Blood Gluc  (FREESTYLE TESS 14 DAY READER) MARIUM 1 each by Does not apply route daily 1 each 0    spironolactone (ALDACTONE) 25 MG tablet Take 1 tablet by mouth daily 30 tablet 3    clopidogrel (PLAVIX) 75 MG tablet Take 1 tablet by mouth daily 30 tablet 3    sacubitril-valsartan (ENTRESTO) 24-26 MG per tablet Take 1 tablet by mouth 2 times daily 60 tablet 3    aspirin 81 MG EC tablet Take 1 tablet by mouth daily 30 tablet 3    metFORMIN (GLUCOPHAGE) 1000 MG tablet TAKE 1 TABLET TWICE DAILY 180 tablet 1    albuterol sulfate  (90 Base) MCG/ACT inhaler Inhale 2 puffs into the lungs every 6 hours as needed for Wheezing 18 g 5    Accu-Chek FastClix Lancets MISC Test 4x daily Dx E11.65 150 each 3    blood glucose test strips (ACCU-CHEK GUIDE) strip Test 4x daily Dx E11.65 150 each 3    insulin aspart protamine-insulin aspart (NOVOLOG MIX 70/30 FLEXPEN) (70-30) 100 UNIT/ML injection 30 units twice day (Patient taking differently: 50 Units 2 times daily (with meals)) 10 pen 5    rosuvastatin (CRESTOR) 20 MG tablet Take 1 tablet by mouth nightly 30 tablet 5    Blood Glucose Monitoring Suppl (FREESTYLE LITE) MARIUM 1 Device by Does not apply route daily as needed (as directed) 1 each 3    FreeStyle Lancets MISC 1 each by Does not apply route daily 300 each 3    SYMBICORT 80-4.5 MCG/ACT AERO Inhale 2 puffs into the lungs 2 times daily 10.2 g 5    Lancets MISC Pt test 4x daily Dx E11.65 (dispense covered brand) 150 each 3    Insulin Pen Needle (NOVOFINE) 32G X 6 MM MISC Bid 300 each 3    fluticasone-vilanterol (BREO ELLIPTA) 100-25 MCG/INH AEPB inhaler Inhale 1 puff into the lungs daily 3 each 3     No current facility-administered medications for this visit.        Past Medical History:   Diagnosis Date    Asthma     Back pain     CAD (coronary artery disease) 01/2020    3 vessel     Diabetes mellitus ([facility] [address]     Essential hypertension, benign     Hyperlipemia     Neuropathic pain, leg, bilateral        Past Surgical History:   Procedure Laterality Date    CARPAL TUNNEL RELEASE Bilateral     FOOT SURGERY Left     bone spur    LAMINOTOMY  2012       Social History     Socioeconomic History    Marital status:      Spouse name: Dulce Dior    Number of children: 2    Years of education: 15 Highest education level: Some college, no degree   Occupational History    Occupation: disability    Tobacco Use    Smoking status: Never    Smokeless tobacco: Never   Vaping Use    Vaping Use: Never used   Substance and Sexual Activity    Alcohol use: Yes     Alcohol/week: 2.0 standard drinks     Types: 2 Cans of beer per week     Comment: every 6 months or so     Drug use: No    Sexual activity: Not Currently     Partners: Female   Social History Narrative    Lives with wife. Per patient he has been  for 2 years. He met his wife online and went to [facility] and brought her back to [facility]. They live in [address] [place] [address] no steps into home. It is a 2 story home. Bedroom and bath upstairs. Bathroom down. Per patient once he is down the steps he doesn't go back up during the day. 2 daughters local.      Per he has very poor vision. He does not like to drive at night or when raining. Patient states he struggles looking at a computer or reading due to vision. Social Determinants of Health     Financial Resource Strain: Low Risk     Difficulty of Paying Living Expenses: Not hard at all   Food Insecurity: No Food Insecurity    Worried About [facility] in the Last Year: Never true    [facility] in the Last Year: Never true   Transportation Needs: No Transportation Needs    Lack of Transportation (Medical): No    Lack of Transportation (Non-Medical): No   Physical Activity: Inactive    Days of Exercise per Week: 0 days    Minutes of Exercise per Session: 0 min       Family History   Problem Relation Age of Onset    Other Father         accident    Heart Failure Mother     Heart Disease Brother     Cirrhosis Brother          Review of Systems:   Review of Systems   Constitutional:  Positive for fatigue. Negative for chills and fever. HENT:  Negative for congestion. Respiratory:  Positive for cough (occasional). Negative for chest tightness and shortness of breath.     Cardiovascular:  Negative for chest pain, palpitations and leg swelling. No orthopnea or PND   Gastrointestinal:  Positive for constipation. Negative for abdominal pain, blood in stool, nausea and vomiting. Genitourinary:  Negative for difficulty urinating and hematuria. Musculoskeletal:  Negative for arthralgias. Skin:  Negative for color change and pallor. Neurological:  Negative for dizziness, syncope and light-headedness. Psychiatric/Behavioral:  Negative for agitation. Physical Examination:    /75 (Site: Right Upper Arm, Position: Sitting, Cuff Size: Small Adult)   Pulse 96   Resp 18   Ht 5' 6\" (1.676 m)   Wt 169 lb (76.7 kg)   SpO2 97%   BMI 27.28 kg/m²    Physical Exam  Constitutional:       General: He is not in acute distress. HENT:      Head: Normocephalic and atraumatic. Cardiovascular:      Rate and Rhythm: Normal rate and regular rhythm. Heart sounds: No murmur heard. Pulmonary:      Effort: Pulmonary effort is normal. No respiratory distress. Breath sounds: No wheezing, rhonchi or rales. Abdominal:      Palpations: Abdomen is soft. Tenderness: There is no abdominal tenderness. Musculoskeletal:         General: Normal range of motion. Cervical back: Normal range of motion and neck supple. Right lower leg: No edema. Left lower leg: No edema. Skin:     General: Skin is warm and dry. Neurological:      General: No focal deficit present. Mental Status: He is alert and oriented to person, place, and time. Cranial Nerves: No cranial nerve deficit.    Psychiatric:         Mood and Affect: Mood normal.         Behavior: Behavior normal.       LABS:  CBC:   Lab Results   Component Value Date/Time    WBC 5.9 08/18/2022 02:44 PM    RBC 5.16 08/18/2022 02:44 PM    RBC 3.86 07/08/2018 05:05 AM    HGB 14.9 08/18/2022 02:44 PM    HCT 43.5 08/18/2022 02:44 PM    MCV 84.3 08/18/2022 02:44 PM    MCH 28.9 08/18/2022 02:44 PM    MCHC 34.3 08/18/2022 02:44 PM    RDW SOB, orthopnea, PND or progressive edema.     > 40 min spent on chart review and face-to-face time with patient reviewing history, reviewing recent diagnostic testing, providing patient education, discussing medication adjustments and providing ongoing plan of care

## 2022-08-31 NOTE — PATIENT INSTRUCTIONS
-ADD Jardiance 10mg PO daily  -INCREASE Coreg (carvedilol) to 12.5mg PO twice daily--may take 2-6.25mg tabs twice daily until current script runs out    -Check labs today (BMP and BNP)    Refer to cardiac rehab    -Check daily weight every morning and notify CHF clinic if gaining more than 3 pounds in 2 days    -Check blood pressure twice daily in a.m. and p.m. prior to taking medications and keep log of blood pressure trends to review at next office visit  Notify office if BP running low with  mmHg or below prior to taking medications  Notify office if BP running high with  mmHg or above or if DBP 90 mmHg or above  -Please record heart rate with each BP reading    -Recommend 2000 mg daily sodium restriction    -Recommend 1.5 liter (48 ounces) daily fluid restriction

## 2022-09-02 ENCOUNTER — TELEPHONE (OUTPATIENT)
Dept: CARDIOLOGY CLINIC | Age: 62
End: 2022-09-02

## 2022-09-02 NOTE — TELEPHONE ENCOUNTER
----- Message from Praneeth Samaniego sent at 9/1/2022  8:15 AM EDT -----  Please call and advise him to stop aldactone because his potassium is mildly elevated and mild decline in renal function compared to prior with concern for mild dehydration.  Thanks

## 2022-09-07 ENCOUNTER — CARE COORDINATION (OUTPATIENT)
Dept: CASE MANAGEMENT | Age: 62
End: 2022-09-07

## 2022-09-07 NOTE — CARE COORDINATION
[facility] Transitions Follow Up Call    2022    Patient: Fabiana Cardoso  Patient : 1960   MRN: <[id number]>  Reason for Admission: Hypertensive Urgency  Discharge Date: 22 RARS: Readmission Risk Score: 10.3    Spoke with: [facility] Transitions Subsequent and Final Call    Subsequent and Final Calls  Do you have any ongoing symptoms?: No  Have your medications changed?: No  Do you have any questions related to your medications?: No  Do you currently have any active services?: No  Do you have any needs or concerns that I can assist you with?: No  Identified Barriers: None  Care Transitions Interventions  No Identified Needs  Other Interventions:         Spoke with Cristian Block for follow up care transition call. He reports that he is feeling \"good\" this week. He denies any SOB, headache, dizziness, edema, or chest tightness. He reports that his BP this morning was 128/90 and his weight has consistently been 166lbs daily this week. He stopped the Aldactone as instructed per cardio on 22. Cristian Mckayla denies any needs, questions, or concerns at this time.      Follow Up  Future Appointments   Date Time Provider Rosaline Silveira   2022 11:00 AM Deana Sutton  [address]   2022 10:45 AM DO Aliza Tarango [facility] [place]   2022 11:00 AM NABILA Ray [facility]

## 2022-09-12 ENCOUNTER — OFFICE VISIT (OUTPATIENT)
Dept: ENDOCRINOLOGY | Age: 62
End: 2022-09-12
Payer: MEDICARE

## 2022-09-12 VITALS
HEART RATE: 96 BPM | DIASTOLIC BLOOD PRESSURE: 83 MMHG | BODY MASS INDEX: 27.8 KG/M2 | OXYGEN SATURATION: 98 % | HEIGHT: 66 IN | SYSTOLIC BLOOD PRESSURE: 145 MMHG | WEIGHT: 173 LBS

## 2022-09-12 DIAGNOSIS — E11.69 DM TYPE 2 WITH DIABETIC DYSLIPIDEMIA (HCC): Primary | ICD-10-CM

## 2022-09-12 DIAGNOSIS — E78.5 DM TYPE 2 WITH DIABETIC DYSLIPIDEMIA (HCC): Primary | ICD-10-CM

## 2022-09-12 DIAGNOSIS — Z46.81 INSULIN PUMP TITRATION: ICD-10-CM

## 2022-09-12 LAB
CHP ED QC CHECK: NORMAL
GLUCOSE BLD-MCNC: 131 MG/DL

## 2022-09-12 PROCEDURE — G8417 CALC BMI ABV UP PARAM F/U: HCPCS | Performed by: INTERNAL MEDICINE

## 2022-09-12 PROCEDURE — 99214 OFFICE O/P EST MOD 30 MIN: CPT | Performed by: INTERNAL MEDICINE

## 2022-09-12 PROCEDURE — 3017F COLORECTAL CA SCREEN DOC REV: CPT | Performed by: INTERNAL MEDICINE

## 2022-09-12 PROCEDURE — 82962 GLUCOSE BLOOD TEST: CPT | Performed by: INTERNAL MEDICINE

## 2022-09-12 PROCEDURE — 2022F DILAT RTA XM EVC RTNOPTHY: CPT | Performed by: INTERNAL MEDICINE

## 2022-09-12 PROCEDURE — G8427 DOCREV CUR MEDS BY ELIG CLIN: HCPCS | Performed by: INTERNAL MEDICINE

## 2022-09-12 PROCEDURE — 3046F HEMOGLOBIN A1C LEVEL >9.0%: CPT | Performed by: INTERNAL MEDICINE

## 2022-09-12 PROCEDURE — 1111F DSCHRG MED/CURRENT MED MERGE: CPT | Performed by: INTERNAL MEDICINE

## 2022-09-12 PROCEDURE — 1036F TOBACCO NON-USER: CPT | Performed by: INTERNAL MEDICINE

## 2022-09-12 RX ORDER — INSULIN ASPART 100 [IU]/ML
INJECTION, SOLUTION INTRAVENOUS; SUBCUTANEOUS
COMMUNITY
Start: 2022-08-29

## 2022-09-12 NOTE — PROGRESS NOTES
9/12/2022    Assessment:       Diagnosis Orders   1. DM type 2 with diabetic dyslipidemia (HCC)  POCT Glucose    Basic Metabolic Panel    Hemoglobin A1C    metFORMIN (GLUCOPHAGE) 1000 MG tablet      2. Insulin pump titration              PLAN:     More than 50% of 30-minute spent patient education counseling  Low basal rate to 1 unit/h  Present bolus to 8 units with each meals  Pump adjustment was made today  Orders Placed This Encounter   Procedures    Basic Metabolic Panel     Standing Status:   Future     Standing Expiration Date:   9/12/2023    Hemoglobin A1C     Standing Status:   Future     Standing Expiration Date:   9/12/2023    POCT Glucose     Diabetes education provided today:    Insulin pumps, how they work and how they affect blood sugar levels. Continuous Glucose monitor. How it works and checks blood sugars every 5 min. for 4 days during our tests. Orders Placed This Encounter   Procedures    POCT Glucose     No orders of the defined types were placed in this encounter. No follow-ups on file. Subjective:     Chief Complaint   Patient presents with    Diabetes     Vitals:    09/12/22 1104 09/12/22 1109   BP: (!) 145/75 (!) 145/83   Site: Left Upper Arm Right Upper Arm   Position: Sitting Sitting   Cuff Size: Large Adult Large Adult   Pulse: 96    SpO2: 98%    Weight: 173 lb (78.5 kg)    Height: 5' 6\" (1.676 m)      Wt Readings from Last 3 Encounters:   09/12/22 173 lb (78.5 kg)   08/31/22 169 lb (76.7 kg)   08/18/22 189 lb (85.7 kg)     BP Readings from Last 3 Encounters:   09/12/22 (!) 145/83   08/31/22 116/75   08/18/22 (!) 152/94     Follow-up on type 2 diabetes recently started on insulin pump therapy not using sensor glucose monitoring blood sugars have been lower sometimes in the 80s and 90s  Pump was downloaded reviewed complications include heart disease neuropathy    Diabetes  He presents for his follow-up diabetic visit. He has type 2 diabetes mellitus.  His disease course has been improving. There are no hypoglycemic associated symptoms. Associated symptoms include fatigue. Pertinent negatives for diabetes include no polydipsia and no polyuria. There are no hypoglycemic complications. Diabetic complications include heart disease and peripheral neuropathy. Current diabetic treatment includes insulin pump. His overall blood glucose range is 130-140 mg/dl. (Review 2-week pump download average blood sugar 157  Basal rate 12 AM 1.4 units/h 3 AM 1.15 units/h  7 AM 1.4 units/h  Preset bolus of 10 units with each meals    Carb ratio 15 sensitivity 50) An ACE inhibitor/angiotensin II receptor blocker is being taken. Past Medical History:   Diagnosis Date    Asthma     Back pain     CAD (coronary artery disease) 01/2020    3 vessel     Diabetes mellitus ([facility] [address]     Essential hypertension, benign     Hyperlipemia     Neuropathic pain, leg, bilateral      Past Surgical History:   Procedure Laterality Date    CARPAL TUNNEL RELEASE Bilateral     FOOT SURGERY Left     bone spur    LAMINOTOMY  2012     Social History     Socioeconomic History    Marital status:      Spouse name: Teodora Coleman    Number of children: 2    Years of education: 14    Highest education level: Some college, no degree   Occupational History    Occupation: disability    Tobacco Use    Smoking status: Never    Smokeless tobacco: Never   Vaping Use    Vaping Use: Never used   Substance and Sexual Activity    Alcohol use: Yes     Alcohol/week: 2.0 standard drinks     Types: 2 Cans of beer per week     Comment: every 6 months or so     Drug use: No    Sexual activity: Not Currently     Partners: Female   Other Topics Concern    Not on file   Social History Narrative    Lives with wife. Per patient he has been  for 2 years. He met his wife online and went to [facility] and brought her back to [facility]. They live in [address] [place] [address] no steps into home. It is a 2 story home. Bedroom and bath upstairs. Bathroom down.  Per patient once he is down the steps he doesn't go back up during the day. 2 daughters local.      Per he has very poor vision. He does not like to drive at night or when raining. Patient states he struggles looking at a computer or reading due to vision. Social Determinants of Health     Financial Resource Strain: Low Risk     Difficulty of Paying Living Expenses: Not hard at all   Food Insecurity: No Food Insecurity    Worried About [address] [employer] in the Last Year: Never true    [employer] in the Last Year: Never true   Transportation Needs: No Transportation Needs    Lack of Transportation (Medical): No    Lack of Transportation (Non-Medical):  No   Physical Activity: Inactive    Days of Exercise per Week: 0 days    Minutes of Exercise per Session: 0 min   Stress: Not on file   Social Connections: Not on file   Intimate Partner Violence: Not on file   Housing Stability: Not on file     Family History   Problem Relation Age of Onset    Other Father         accident    Heart Failure Mother     Heart Disease Brother     Cirrhosis Brother      No Known Allergies    Current Outpatient Medications:     NOVOLOG 100 UNIT/ML injection vial, Use via insulin pump max daily dose 100 units, Disp: , Rfl:     empagliflozin (JARDIANCE) 10 MG tablet, Take 1 tablet by mouth daily, Disp: 30 tablet, Rfl: 5    carvedilol (COREG) 12.5 MG tablet, Take 1 tablet by mouth 2 times daily, Disp: 60 tablet, Rfl: 3    Continuous Blood Gluc  (FREESTYLE TESS 2 READER) MARIUM, Give 1 , Disp: 1 each, Rfl: 1    Continuous Blood Gluc Sensor (FREESTYLE TESS 2 SENSOR) [facility], Change every 2 weeks, Disp: 2 each, Rfl: 3    clopidogrel (PLAVIX) 75 MG tablet, Take 1 tablet by mouth daily, Disp: 30 tablet, Rfl: 3    sacubitril-valsartan (ENTRESTO) 24-26 MG per tablet, Take 1 tablet by mouth 2 times daily, Disp: 60 tablet, Rfl: 3    aspirin 81 MG EC tablet, Take 1 tablet by mouth daily, Disp: 30 tablet, Rfl: 3    metFORMIN (GLUCOPHAGE) 1000 MG tablet, TAKE 1 TABLET TWICE DAILY, Disp: 180 tablet, Rfl: 1    albuterol sulfate  (90 Base) MCG/ACT inhaler, Inhale 2 puffs into the lungs every 6 hours as needed for Wheezing, Disp: 18 g, Rfl: 5    Accu-Chek FastClix Lancets MISC, Test 4x daily Dx E11.65, Disp: 150 each, Rfl: 3    blood glucose test strips (ACCU-CHEK GUIDE) strip, Test 4x daily Dx E11.65, Disp: 150 each, Rfl: 3    insulin aspart protamine-insulin aspart (NOVOLOG MIX 70/30 FLEXPEN) (70-30) 100 UNIT/ML injection, 30 units twice day (Patient taking differently: 50 Units 2 times daily (with meals)), Disp: 10 pen, Rfl: 5    rosuvastatin (CRESTOR) 20 MG tablet, Take 1 tablet by mouth nightly, Disp: 30 tablet, Rfl: 5    Blood Glucose Monitoring Suppl (FREESTYLE LITE) MARIUM, 1 Device by Does not apply route daily as needed (as directed), Disp: 1 each, Rfl: 3    FreeStyle Lancets MISC, 1 each by Does not apply route daily, Disp: 300 each, Rfl: 3    SYMBICORT 80-4.5 MCG/ACT AERO, Inhale 2 puffs into the lungs 2 times daily, Disp: 10.2 g, Rfl: 5    Lancets MISC, Pt test 4x daily Dx E11.65 (dispense covered brand), Disp: 150 each, Rfl: 3    Insulin Pen Needle (NOVOFINE) 32G X 6 MM MISC, Bid, Disp: 300 each, Rfl: 3    fluticasone-vilanterol (BREO ELLIPTA) 100-25 MCG/INH AEPB inhaler, Inhale 1 puff into the lungs daily, Disp: 3 each, Rfl: 3  Lab Results   Component Value Date     08/31/2022    K 5.2 (H) 08/31/2022    CL 99 08/31/2022    CO2 23 08/31/2022    BUN 33 (H) 08/31/2022    CREATININE 1.22 (H) 08/31/2022    GLUCOSE 131 09/12/2022    CALCIUM 9.7 08/31/2022    PROT 6.0 (L) 08/16/2022    LABALBU 3.7 08/16/2022    BILITOT 0.3 08/16/2022    ALKPHOS 63 08/16/2022    AST 11 08/16/2022    ALT 9 08/16/2022    LABGLOM >60.0 08/31/2022    GFRAA >60.0 08/31/2022    AGRATIO 0.6 (L) 07/08/2018    GLOB 2.3 08/16/2022     Lab Results   Component Value Date    WBC 5.9 08/18/2022    HGB 14.9 08/18/2022    HCT 43.5 08/18/2022    MCV 84.3 08/18/2022     08/18/2022     Lab Results   Component Value Date    LABA1C 10.1 (H) 08/16/2022    LABA1C 11.4 (H) 07/07/2022    LABA1C 14.0 02/25/2022     Lab Results   Component Value Date    HDL 41 11/14/2021    HDL 50 06/09/2020    HDL 39 (L) 12/24/2019    LDLCALC 164 (H) 11/14/2021    LDLCALC 125 06/09/2020    LDLCALC 71 12/24/2019    CHOL 235 (H) 11/14/2021    CHOL 214 (H) 06/09/2020    CHOL 132 12/24/2019    TRIG 150 11/14/2021    TRIG 193 (H) 06/09/2020    TRIG 112 12/24/2019     Lab Results   Component Value Date    TESTM 361 02/25/2021     Lab Results   Component Value Date    TSH 1.270 06/09/2020    TSH 1.800 12/23/2019    TSH 1.200 11/26/2019     No results found for: TPOABS    Review of Systems   Constitutional:  Positive for fatigue. Eyes: Negative. Cardiovascular: Negative. Endocrine: Negative. Negative for polydipsia and polyuria. All other systems reviewed and are negative. Objective:   Physical Exam  Vitals reviewed. Constitutional:       General: He is not in acute distress. Appearance: Normal appearance. HENT:      Head: Normocephalic and atraumatic. Right Ear: External ear normal.      Left Ear: External ear normal.      Nose: Nose normal.   Eyes:      General: No scleral icterus. Right eye: No discharge. Left eye: No discharge. Extraocular Movements: Extraocular movements intact. Conjunctiva/sclera: Conjunctivae normal.   Cardiovascular:      Rate and Rhythm: Normal rate. Pulmonary:      Effort: Pulmonary effort is normal.   Musculoskeletal:         General: Normal range of motion. Cervical back: Normal range of motion and neck supple. Neurological:      General: No focal deficit present. Mental Status: He is alert and oriented to person, place, and time.    Psychiatric:         Mood and Affect: Mood normal.         Behavior: Behavior normal.

## 2022-09-14 ENCOUNTER — CARE COORDINATION (OUTPATIENT)
Dept: CASE MANAGEMENT | Age: 62
End: 2022-09-14

## 2022-09-14 NOTE — CARE COORDINATION
[facility] Transitions Follow Up Call    2022    Patient: Christophe Browning  Patient : 1960   MRN: [id number]  Reason for Admission: Hypertensive Urgency  Discharge Date: 22 RARS: Readmission Risk Score: 10.3    Care Transitions Follow Up Call    Needs to be reviewed by the provider   Additional needs identified to be addressed with provider: No  none             Method of communication with provider : none      Care Transition Nurse contacted the patient by telephone to follow up after admission on 22. Verified name and  with patient as identifiers. Addressed changes since last contact: none  Discussed follow-up appointments. If no appointment was previously scheduled, appointment scheduling offered: Yes. Is follow up appointment scheduled within 7 days of discharge? No.    Advance Care Planning:   Does patient have an Advance Directive: reviewed and current. CTN reviewed discharge instructions, medical action plan and red flags with patient and discussed any barriers to care and/or understanding of plan of care after discharge. Discussed appropriate site of care based on symptoms and resources available to patient including: PCP  Specialist  Urgent care clinics  When to call 911. The patient agrees to contact the PCP office for questions related to their healthcare. Patients top risk factors for readmission: medical condition-CHF, PNA, PNA, DM and non-compliance  polypharmacy    CTN provided contact information for future needs. No further follow-up call indicated based on severity of symptoms and risk factors. Plan for next call:  Patient declines care coordination/RPM offer at this time.       Care Transitions Subsequent and Final Call    Subsequent and Final Calls  Do you have any ongoing symptoms?: No  Have your medications changed?: No  Do you have any questions related to your medications?: No  Do you currently have any active services?: No  Do you have any needs or concerns that I can assist you with?: No  Identified Barriers: Lack of Education, Other  Care Transitions Interventions    Registered Dietician: Declined    Other Interventions:           Spoke with patient today 9/14/22 for TCM/hospital discharge (low risk) final follow up call. Patient states he continues doing well and offers no complaints. States BP has been running 130/80 and weight consistently at 166 lbs. Denies any headaches, visual changes, confusion, shortness of breath, chest pain, chest discomfort, LE swelling or sudden weight gain. Patient states he was to start OP cardio rehab but will not start for another 2 weeks d/t insurance clearance. Patient denies any needs or concerns. Patient declines care coordination/RPM offer. CTN signing off.     Follow Up  Future Appointments   Date Time Provider Rosaline Silveira   9/16/2022 10:45 AM Wes Minus Sacks, DO [place] [facility] [place]   9/28/2022 11:00 AM Jeimy Praneeth [facility] [place] EMERGENCY [facility] [place]   9/28/2022  1:00 PM Chanel Escalera [address] [place] [address]   11/14/2022 11:30 AM Anant Weeks  [facility]

## 2022-09-16 ENCOUNTER — OFFICE VISIT (OUTPATIENT)
Dept: CARDIOLOGY CLINIC | Age: 62
End: 2022-09-16
Payer: MEDICARE

## 2022-09-16 VITALS
DIASTOLIC BLOOD PRESSURE: 84 MMHG | HEART RATE: 97 BPM | WEIGHT: 172 LBS | OXYGEN SATURATION: 96 % | SYSTOLIC BLOOD PRESSURE: 136 MMHG | BODY MASS INDEX: 27.76 KG/M2

## 2022-09-16 DIAGNOSIS — I50.32 CHRONIC DIASTOLIC CONGESTIVE HEART FAILURE (HCC): ICD-10-CM

## 2022-09-16 DIAGNOSIS — I10 ESSENTIAL HYPERTENSION, BENIGN: ICD-10-CM

## 2022-09-16 DIAGNOSIS — I25.810 CORONARY ARTERY DISEASE INVOLVING CORONARY BYPASS GRAFT OF NATIVE HEART WITHOUT ANGINA PECTORIS: Primary | ICD-10-CM

## 2022-09-16 DIAGNOSIS — E11.69 OBESITY, DIABETES, AND HYPERTENSION SYNDROME (HCC): ICD-10-CM

## 2022-09-16 DIAGNOSIS — E11.59 OBESITY, DIABETES, AND HYPERTENSION SYNDROME (HCC): ICD-10-CM

## 2022-09-16 DIAGNOSIS — I25.5 ISCHEMIC CARDIOMYOPATHY: ICD-10-CM

## 2022-09-16 DIAGNOSIS — I15.2 OBESITY, DIABETES, AND HYPERTENSION SYNDROME (HCC): ICD-10-CM

## 2022-09-16 DIAGNOSIS — E66.9 OBESITY, DIABETES, AND HYPERTENSION SYNDROME (HCC): ICD-10-CM

## 2022-09-16 PROBLEM — R06.02 SHORTNESS OF BREATH: Status: RESOLVED | Noted: 2018-06-16 | Resolved: 2022-09-16

## 2022-09-16 PROBLEM — I20.89 ANGINAL CHEST PAIN AT REST: Status: RESOLVED | Noted: 2019-12-23 | Resolved: 2022-09-16

## 2022-09-16 PROBLEM — M79.604 PAIN OF RIGHT LOWER EXTREMITY: Status: RESOLVED | Noted: 2018-06-16 | Resolved: 2022-09-16

## 2022-09-16 PROBLEM — I20.8 ANGINAL CHEST PAIN AT REST (HCC): Status: RESOLVED | Noted: 2019-12-23 | Resolved: 2022-09-16

## 2022-09-16 PROBLEM — R07.9 CHEST PAIN: Status: RESOLVED | Noted: 2019-11-26 | Resolved: 2022-09-16

## 2022-09-16 PROCEDURE — 1111F DSCHRG MED/CURRENT MED MERGE: CPT | Performed by: INTERNAL MEDICINE

## 2022-09-16 PROCEDURE — 3017F COLORECTAL CA SCREEN DOC REV: CPT | Performed by: INTERNAL MEDICINE

## 2022-09-16 PROCEDURE — 2022F DILAT RTA XM EVC RTNOPTHY: CPT | Performed by: INTERNAL MEDICINE

## 2022-09-16 PROCEDURE — 99214 OFFICE O/P EST MOD 30 MIN: CPT | Performed by: INTERNAL MEDICINE

## 2022-09-16 PROCEDURE — 1036F TOBACCO NON-USER: CPT | Performed by: INTERNAL MEDICINE

## 2022-09-16 PROCEDURE — G8417 CALC BMI ABV UP PARAM F/U: HCPCS | Performed by: INTERNAL MEDICINE

## 2022-09-16 PROCEDURE — 3046F HEMOGLOBIN A1C LEVEL >9.0%: CPT | Performed by: INTERNAL MEDICINE

## 2022-09-16 PROCEDURE — G8427 DOCREV CUR MEDS BY ELIG CLIN: HCPCS | Performed by: INTERNAL MEDICINE

## 2022-09-16 NOTE — PROGRESS NOTES
Chief Complaint   Patient presents with    Follow-Up from [facility]    Follow Up After Procedure     S/P PCI    Medication Problem     ENTRESTO COSTS TOO MUCH AND WILL NEED TO HAVE IT SWITCHED TO SOMETHING CHEAPER          Patient is a 62 y.o. male who presents with a chief complaint of right sided weakness and tremor. Patient is followed on a regular basis by Dr. Gerald Alaniz MD.  Patient with past medical history of coronary disease status post CABG X4, diabetes mellitus, hypertension, hyperlipidemia who presents with above complaints. Neurological work-up is in progress. Patient noted to have a 8 mm hemorrhage in the left periventricular area. Patient also noted to have mildly elevated cardiac enzyme with mild renal insufficiency on presentation. Denies any anginal or CHF type symptoms. Patient with history of CABG in 2019. Blood sugar is 681 on presentation. 9-16-22: Patient presents for initial medical evaluation. Patient is followed on a regular basis by Dr. Gerald Alaniz MD. status post hospitalization for new onset cardiomyopathy ejection fraction of 20%. Patient underwent cardiac catheterization ejection fraction of 30 to 35%, severe native three-vessel disease. LIMA jump graft to diagonal 1 and LAD is widely patent supplying a small caliber vessels with moderate diffuse disease. SVG to the circumflex as well as SVG to RCA were 100% occluded. Native RCA with 90% stenosis small vessel severe diffuse  Patient has status post PCI of the circumflex at [facility] a few weeks ago. Patient is follow-up with CHF clinic for. Patient is on dual antiplatelet therapy. Referred to cardiac rehab.   Patient does have an echocardiogram scheduled around November 2022 for LV function recheck      Patient Active Problem List   Diagnosis    Essential hypertension, benign    Obesity, diabetes, and hypertension syndrome ([address]    Irregular heart rhythm    Hyperlipidemia    Allergic rhinitis Occupation: disability    Tobacco Use    Smoking status: Never    Smokeless tobacco: Never   Vaping Use    Vaping Use: Never used   Substance and Sexual Activity    Alcohol use: Yes     Alcohol/week: 2.0 standard drinks     Types: 2 Cans of beer per week     Comment: every 6 months or so     Drug use: No    Sexual activity: Not Currently     Partners: Female   Social History Narrative    Lives with wife. Per patient he has been  for 2 years. He met his wife online and went [facility] They live in [address] [place] [address] no steps into home. It is a 2 story home. Bedroom and bath upstairs. Bathroom down. Per patient once he is down the steps he doesn't go back up during the day. 2 daughters local.      Per he has very poor vision. He does not like to drive at night or when raining. Patient states he struggles looking at a computer or reading due to vision. Social Determinants of Health     Financial Resource Strain: Low Risk     Difficulty of Paying Living Expenses: Not hard at all   Food Insecurity: No Food Insecurity    Worried About [facility] in the Last Year: Never true    [address] [facility] in the Last Year: Never true   Transportation Needs: No Transportation Needs    Lack of Transportation (Medical): No    Lack of Transportation (Non-Medical):  No   Physical Activity: Inactive    Days of Exercise per Week: 0 days    Minutes of Exercise per Session: 0 min       Family History   Problem Relation Age of Onset    Other Father         accident    Heart Failure Mother     Heart Disease Brother     Cirrhosis Brother        Current Outpatient Medications   Medication Sig Dispense Refill    NOVOLOG 100 UNIT/ML injection vial Use via insulin pump max daily dose 100 units      metFORMIN (GLUCOPHAGE) 1000 MG tablet TAKE 1 TABLET TWICE DAILY 180 tablet 1    empagliflozin (JARDIANCE) 10 MG tablet Take 1 tablet by mouth daily 30 tablet 5    carvedilol (COREG) 12.5 MG tablet Take 1 dyspnea on exertion  Gastrointestinal ROS: no abdominal pain, change in bowel habits, or black or bloody stools  Genito-Urinary ROS: no dysuria, trouble voiding, or hematuria  Musculoskeletal ROS: negative  Neurological ROS: no TIA or stroke symptoms  Dermatological ROS: negative    VITALS:  Blood pressure 136/84, pulse 97, weight 172 lb (78 kg), SpO2 96 %. Body mass index is 27.76 kg/m². Physical Examination:  General appearance - alert, well appearing, and in no distress and normal appearing weight  Mental status - alert, oriented to person, place, and time  Neck - Neck is supple, no JVD or carotid bruits. No thyromegaly or adenopathy. Chest - clear to auscultation, no wheezes, rales or rhonchi, symmetric air entry  Heart - normal rate, regular rhythm, normal S1, S2, no murmurs, rubs, clicks or gallops  Abdomen - soft, nontender, nondistended, no masses or organomegaly  Neurological - alert, oriented, normal speech, no focal findings or movement disorder noted  Extremities - peripheral pulses normal, no pedal edema, no clubbing or cyanosis  Skin - normal coloration and turgor, no rashes, no suspicious skin lesions noted      EKG:    No orders of the defined types were placed in this encounter. ASSESSMENT:     Diagnosis Orders   1. Coronary artery disease involving coronary bypass graft of native heart without angina pectoris        2. Essential hypertension, benign        3. Obesity, diabetes, and hypertension syndrome ([facility] [address]        4. chf        5. Ischemic cardiomyopathy              PLAN:         As always, aggressive risk factor modification is strongly recommended. We should adhere to the JNC VIII guidelines for HTN management and the NCEP ATP III guidelines for LDL-C management. Cardiac diet is always recommended with low fat, cholesterol, calories and sodium. Continue medications at current doses. Check limited echocardiogram in November 2022.   If ejection fraction remains below 35% then will refer for AICD evaluation. Continue with dual antiplatelet therapy. Cardiac rehab referral    Follow-up with CHF clinic    Check EKG next office visit    The importance of daily weights, dietary sodium restriction, and contact with cardiology if weight is increased more than 3 lbs in any 48 hour period was stressed. The patient has been advised to contact us if they experience progressive SOB, orthopnea, PND or progressive edema. We will need to continue to monitor muscle and liver enzymes, BUN, CR, and electrolytes. Patient was advised and encouraged to check blood pressure at home or at a pharmacy, maintain a logbook, and also call us back if blood pressure are above the target ranges or if it is low. Patient clearly understands and agrees to the instructions.

## 2022-09-17 ASSESSMENT — ENCOUNTER SYMPTOMS: EYES NEGATIVE: 1

## 2022-09-23 ENCOUNTER — NURSE ONLY (OUTPATIENT)
Dept: FAMILY MEDICINE CLINIC | Age: 62
End: 2022-09-23

## 2022-09-23 ENCOUNTER — TELEPHONE (OUTPATIENT)
Dept: PRIMARY CARE CLINIC | Age: 62
End: 2022-09-23

## 2022-09-23 VITALS — SYSTOLIC BLOOD PRESSURE: 136 MMHG | DIASTOLIC BLOOD PRESSURE: 84 MMHG

## 2022-09-23 RX ORDER — BLOOD-GLUCOSE METER
KIT MISCELLANEOUS
Qty: 300 STRIP | Refills: 3 | Status: SHIPPED | OUTPATIENT
Start: 2022-09-23

## 2022-09-23 NOTE — TELEPHONE ENCOUNTER
Patient called the office stated his BP has been high for the last 3 days 190/110 and 170/180 I advised patient to come into the walk-in for a BP check.

## 2022-09-23 NOTE — TELEPHONE ENCOUNTER
patient requesting medication refill.  Please approve or deny this request.    Rx requested:  Requested Prescriptions     Pending Prescriptions Disp Refills    blood glucose test strips (FREESTYLE LITE) strip 300 strip 3     Sig: Tid E11.65         Last Office Visit:   9/12/2022      Next Visit Date:  Future Appointments   Date Time Provider Rosaline Silveira   9/28/2022 11:00 AM [address] [facility] EM[facility]GENCY [facility] [place]   9/28/2022  1:00 PM Jw [address] [place] [address]   11/14/2022 11:30 AM Rayn Carmen  [address]   3/17/2023 11:00 AM Javier Handy DO [address] [place] [address]

## 2022-09-28 ENCOUNTER — OFFICE VISIT (OUTPATIENT)
Dept: CARDIOLOGY CLINIC | Age: 62
End: 2022-09-28
Payer: MEDICARE

## 2022-09-28 VITALS
DIASTOLIC BLOOD PRESSURE: 88 MMHG | SYSTOLIC BLOOD PRESSURE: 150 MMHG | WEIGHT: 172 LBS | RESPIRATION RATE: 18 BRPM | HEIGHT: 66 IN | BODY MASS INDEX: 27.64 KG/M2 | OXYGEN SATURATION: 100 % | HEART RATE: 67 BPM

## 2022-09-28 DIAGNOSIS — R42 DISEQUILIBRIUM: ICD-10-CM

## 2022-09-28 DIAGNOSIS — I50.21 ACUTE SYSTOLIC CHF (CONGESTIVE HEART FAILURE), NYHA CLASS 1 (HCC): ICD-10-CM

## 2022-09-28 DIAGNOSIS — E11.69 DIABETES MELLITUS TYPE 2 IN OBESE (HCC): ICD-10-CM

## 2022-09-28 DIAGNOSIS — I50.21 ACUTE SYSTOLIC CHF (CONGESTIVE HEART FAILURE), NYHA CLASS 1 (HCC): Primary | ICD-10-CM

## 2022-09-28 DIAGNOSIS — I10 UNCONTROLLED HYPERTENSION: ICD-10-CM

## 2022-09-28 DIAGNOSIS — R42 DIZZINESS: ICD-10-CM

## 2022-09-28 DIAGNOSIS — Z95.1 HX OF CABG: ICD-10-CM

## 2022-09-28 DIAGNOSIS — I25.5 ISCHEMIC CARDIOMYOPATHY: ICD-10-CM

## 2022-09-28 DIAGNOSIS — E66.9 DIABETES MELLITUS TYPE 2 IN OBESE (HCC): ICD-10-CM

## 2022-09-28 DIAGNOSIS — E78.5 DYSLIPIDEMIA: ICD-10-CM

## 2022-09-28 LAB
ANION GAP SERPL CALCULATED.3IONS-SCNC: 11 MEQ/L (ref 9–15)
BUN BLDV-MCNC: 20 MG/DL (ref 8–23)
CALCIUM SERPL-MCNC: 9.1 MG/DL (ref 8.5–9.9)
CHLORIDE BLD-SCNC: 102 MEQ/L (ref 95–107)
CO2: 28 MEQ/L (ref 20–31)
CREAT SERPL-MCNC: 1.08 MG/DL (ref 0.7–1.2)
GFR AFRICAN AMERICAN: >60
GFR NON-AFRICAN AMERICAN: >60
GLUCOSE BLD-MCNC: 183 MG/DL (ref 70–99)
POTASSIUM SERPL-SCNC: 4 MEQ/L (ref 3.4–4.9)
SODIUM BLD-SCNC: 141 MEQ/L (ref 135–144)

## 2022-09-28 PROCEDURE — 1036F TOBACCO NON-USER: CPT | Performed by: PHYSICIAN ASSISTANT

## 2022-09-28 PROCEDURE — G8417 CALC BMI ABV UP PARAM F/U: HCPCS | Performed by: PHYSICIAN ASSISTANT

## 2022-09-28 PROCEDURE — 99214 OFFICE O/P EST MOD 30 MIN: CPT | Performed by: PHYSICIAN ASSISTANT

## 2022-09-28 PROCEDURE — 2022F DILAT RTA XM EVC RTNOPTHY: CPT | Performed by: PHYSICIAN ASSISTANT

## 2022-09-28 PROCEDURE — 3017F COLORECTAL CA SCREEN DOC REV: CPT | Performed by: PHYSICIAN ASSISTANT

## 2022-09-28 PROCEDURE — 3046F HEMOGLOBIN A1C LEVEL >9.0%: CPT | Performed by: PHYSICIAN ASSISTANT

## 2022-09-28 PROCEDURE — G8427 DOCREV CUR MEDS BY ELIG CLIN: HCPCS | Performed by: PHYSICIAN ASSISTANT

## 2022-09-28 ASSESSMENT — ENCOUNTER SYMPTOMS
COUGH: 1
VOMITING: 0
COLOR CHANGE: 0
ABDOMINAL PAIN: 0
SINUS PRESSURE: 1
NAUSEA: 0
CHEST TIGHTNESS: 0
SHORTNESS OF BREATH: 0
BLOOD IN STOOL: 0

## 2022-09-28 NOTE — PROGRESS NOTES
Patient: Iona Vivas  YOB: 1960  MRN: [id number]    Chief Complaint:  Chief Complaint   Patient presents with    Congestive Heart Failure     systolic    Dizziness    Fatigue           Subjective/HPI       9/28/22: Here for follow-up of acute systolic congestive heart failure and ischemic cardiomyopathy with EF of 20% per echo on 8/17/2022. Since last CHF clinic visit on 8/31/2022, patient underwent staged PCI of the mid to distal circumflex on 8/24/2022 at [facility] with Dr. Renny Garza. He has been compliant with dual antiplatelet therapy of aspirin and Plavix. At last CHF clinic visit, patient was advised to increase Coreg to 12.5 mg p.o. twice daily for improved BP management however he states he inadvertently never made this change. Blood pressure is continuing to run high at home in the 150s to 325A range systolically. Patient states he is not keeping a log of his blood pressure trends. He denies any shortness of breath or dyspnea on exertion, chest pain, palpitations, diaphoresis, paroxysmal nocturnal dyspnea, orthopnea, lower extremity edema, syncope, fever or chills. He is complaining of dizziness with positional changes such as from sitting to standing and occasionally when turning his head from side to side and also dysequilibrium when beginning to walk upon standing. I checked blood pressure at time my evaluation and while seated BP was 160/100 on the right and upon standing BP was 155/90. As orthos negative in office and otherwise unable to explain dizziness complaints, will refer to neurology for evaluation. He has been compliant with low-sodium diet and fluid restriction. He is checking his weight regularly and weight has reportedly been stable. Ortho BP completed: 160/100 seated, 155/90 standing    Most recent labs reviewed and documented below.   BMP on 8/31/2022: Sodium 137, potassium elevated 5.2, chloride 99, total CO2 23, BUN elevated 33, creatinine elevated 1.22, GFR greater than 60, glucose 186. --Patient was advised to stop Aldactone due to hyperkalemia and mild decline in renal function  proBNP on 8/31/2022: 711    Blood pressure mildly elevated in office today at 162/93 in the left and 150/88 on the right, heart rate 67 bpm, pulse ox 100% on room air. Weight is 172 pounds compared to 169 pounds at last office visit on 8/31/2022 8/31/22 CHF clinic visit #1: This is a pleasant 58-year-old  male with past medical history significant for coronary artery disease status post CABG x3 in 2019 at [facility], hypertension, dyslipidemia, diabetes and obesity who presents for initial CHF clinic evaluation following recent hospitalization for hypertensive urgency and elevated troponin during which he was found to have new onset cardiomyopathy with a EF of 20% per echo on 8/17/2022. He was admittedly noncompliant with medications prior to admission and cardiac/BP meds were resumed. During this hospitalization, he underwent cardiac catheterization on 8/18/2022 which revealed left main-normal, % proximal stenosis, circumflex-70 to 80% mid stenosis, RCA-small with severe disease up to 90% stenosis, LIMA jump graft to D1 and LAD widely patent, SVG to circumflex-100% occluded, SVG to RCA-100% occluded. Based on findings of this cardiac catheterization, plan for staged PCI of the circumflex in the near future at level 3 Cath Lab. He was optimized on cardiac/CHF medications including aspirin, Plavix, Coreg, Crestor, Aldactone and Entresto and subsequently discharged home in stable condition. Patient has been doing well overall since hospital discharge. He underwent staged PCI of mid and distal circumflex with drug-eluting stent x2 on 8/24/2022 at [facility]. He has been compliant with dual antiplatelet therapy of aspirin and Plavix as well as compliant with all of his other cardiac/CHF medications.   He denies any bleeding issues such as hematochezia, melena or hematuria. He continues to experience fatigue and tiredness but states that he does not do much at home. He denies shortness of breath or dyspnea exertion, chest pain, palpitations, diaphoresis, paroxysmal nocturnal dyspnea, orthopnea, lower extremity edema, dizziness, lightheadedness, syncope, fever or chills. He states that he has cut salt out of his diet and was educated on a 2000 mg daily sodium restriction as well as a 1.5 L daily fluid restriction. He will be referred to cardiac rehab. He does follow with endocrinology, Dr. Celinda Bamberger for management of diabetes and states that he recently had an insulin pump placed. Most recent labs reviewed and documented below  BMP on 8/18/2022: Sodium 135, potassium 4.4, chloride 99, total CO2 28, BUN 20, creatinine 1.01, GFR greater than 60, glucose elevated 295  CBC on 8/18/2022: WBC 5.9, hemoglobin 14.9, hematocrit 43.5, platelets 626  Hemoglobin A1c on 8/16/2022: Elevated at 10.1  Fasting lipid panel 11/14/2021: Total cholesterol elevated 235, triglycerides 150, HDL 41, LDL elevated 164      Most recent diagnostic testing reviewed and documented below  EKG 8/16/22: , nonspecific ST changes, IRBBB, QTc 495ms      Had PCI on 8/24/22    Echocardiogram 8/17/22:  Conclusions   Summary   Mild-to-moderate mitral regurgitation with jet centrally-directed. Mildly dilated left atrium. Left ventricular ejection fraction is visually estimated at 20%. Severe LV systolic dysfunction   Left ventricular size is mildly increased . When compared to prior study from 2019, the LV dysfunction is new. Moderate concentric left ventricular hypertrophy. Abnormal (paradoxical) motion consistent with post-operative status. Signature   ----------------------------------------------------------------   Electronically signed by Mark Arreola(Interpreting   physician) on 08/17/2022 05:29 PM    [facility] 8/18/22:  Conclusions  Procedure Summary  EF of 20-30%  patent LIMA to LAD  70-80% mid CX  100% native LAD  occluded SVG to CX  occluded SVG to RCA  small non dominant RCA with 90% prox  Recommendations  Aggressive risk factor management. Maximize medical therapy. staged PCI of mid CX at level III cath lab. Angiographic Findings   Cardiac Arteries and Lesion Findings  LMCA: Normal.  LAD: 100% prox  LCx: mod caliber, mid CX 70-80%. RCA: small non dominant. 90% prox. Cardiac Grafts   -  There is a LIMA graft that originates at the LIMA and attaches to the      Mid LAD (jump graft to D1 and LAD is patent. ). -  There is a Vein graft that originates at the Aorta Left and attaches to      the Mid CX (100%). -  There is a Vein graft that originates at the Aorta Right and attaches      to the Dist RCA (100%). Coronary Tree   Dominance: Right  LV function assessed as:Abnormal.  Ejection Fraction    - Method: LV gram. EF%: 30.    Vital signs stable in office today. Blood pressure 116/75, heart rate 98, pulse ox 97% on room air. Weight is 169 pounds. PMHx:  New ischemic cardiomyopathy with EF 20% per echo 8/17/22  CAD s/p CABG x 3 on 12/30/2019 at [facility]  HTN  Dyslipidemia  DM--has insulin pump now.  Follows with Dr. Mai Elliott    PSHx:  CABG x 3 (LIMA to D1 and LAD, SVG to Circ and SVG to RCA) on 12/30/19 at [facility]  Carpal tunnel release bilateral  Left foot surgery  Back surgery    Social Hx:  No tobacco  Occasional alcohol  No drugs    Family Hx:  Mom with with CABG      No Known Allergies    Current Outpatient Medications   Medication Sig Dispense Refill    blood glucose test strips (FREESTYLE LITE) strip Tid E11.65 300 strip 3    NOVOLOG 100 UNIT/ML injection vial Use via insulin pump max daily dose 100 units      metFORMIN (GLUCOPHAGE) 1000 MG tablet TAKE 1 TABLET TWICE DAILY 180 tablet 1    empagliflozin (JARDIANCE) 10 MG tablet Take 1 tablet by mouth daily 30 tablet 5    carvedilol (COREG) 12.5 MG tablet Take 1 tablet by mouth 2 times daily 60 tablet 3    Continuous Blood Gluc  (FREESTYLE TESS 2 READER) MARIUM Give 1  1 each 1    Continuous Blood Gluc Sensor (FREESTYLE TESS 2 SENSOR) MISC Change every 2 weeks 2 each 3    clopidogrel (PLAVIX) 75 MG tablet Take 1 tablet by mouth daily 30 tablet 3    sacubitril-valsartan (ENTRESTO) 24-26 MG per tablet Take 1 tablet by mouth 2 times daily 60 tablet 3    aspirin 81 MG EC tablet Take 1 tablet by mouth daily 30 tablet 3    albuterol sulfate  (90 Base) MCG/ACT inhaler Inhale 2 puffs into the lungs every 6 hours as needed for Wheezing 18 g 5    Accu-Chek FastClix Lancets MISC Test 4x daily Dx E11.65 150 each 3    blood glucose test strips (ACCU-CHEK GUIDE) strip Test 4x daily Dx E11.65 150 each 3    insulin aspart protamine-insulin aspart (NOVOLOG MIX 70/30 FLEXPEN) (70-30) 100 UNIT/ML injection 30 units twice day (Patient taking differently: 50 Units 2 times daily (with meals)) 10 pen 5    rosuvastatin (CRESTOR) 20 MG tablet Take 1 tablet by mouth nightly 30 tablet 5    Blood Glucose Monitoring Suppl (FREESTYLE LITE) MARIUM 1 Device by Does not apply route daily as needed (as directed) 1 each 3    FreeStyle Lancets MISC 1 each by Does not apply route daily 300 each 3    Lancets MISC Pt test 4x daily Dx E11.65 (dispense covered brand) 150 each 3    Insulin Pen Needle (NOVOFINE) 32G X 6 MM MISC Bid 300 each 3    SYMBICORT 80-4.5 MCG/ACT AERO Inhale 2 puffs into the lungs 2 times daily (Patient not taking: Reported on 9/28/2022) 10.2 g 5    fluticasone-vilanterol (BREO ELLIPTA) 100-25 MCG/INH AEPB inhaler Inhale 1 puff into the lungs daily (Patient not taking: Reported on 9/28/2022) 3 each 3     No current facility-administered medications for this visit.        Past Medical History:   Diagnosis Date    Asthma     Back pain     CAD (coronary artery disease) 01/2020    3 vessel     chf 9/16/2022    Diabetes mellitus ([facility] [address]     Essential hypertension, benign     Hyperlipemia     Ischemic cardiomyopathy 9/16/2022    Neuropathic pain, leg, bilateral        Past Surgical History:   Procedure Laterality Date    CARPAL TUNNEL RELEASE Bilateral     FOOT SURGERY Left     bone spur    LAMINOTOMY  2012       Social History     Socioeconomic History    Marital status:      Spouse name: Cynthia How    Number of children: 2    Years of education: 14    Highest education level: Some college, no degree   Occupational History    Occupation: disability    Tobacco Use    Smoking status: Never    Smokeless tobacco: Never   Vaping Use    Vaping Use: Never used   Substance and Sexual Activity    Alcohol use: Yes     Alcohol/week: 2.0 standard drinks     Types: 2 Cans of beer per week     Comment: every 6 months or so     Drug use: No    Sexual activity: Not Currently     Partners: Female   Social History Narrative    Lives with wife. Per patient he has been  for 2 years. He met his wife online and went to [facility] and brought her back to [facility]. They live in [address] [place] [address] no steps into home. It is a 2 story home. Bedroom and bath upstairs. Bathroom down. Per patient once he is down the steps he doesn't go back up during the day. 2 daughters local.      Per he has very poor vision. He does not like to drive at night or when raining. Patient states he struggles looking at a computer or reading due to vision. Social Determinants of Health     Financial Resource Strain: Low Risk     Difficulty of Paying Living Expenses: Not hard at all   Food Insecurity: No Food Insecurity    Worried About [address] in the Last Year: Never true    [facility] in the Last Year: Never true   Transportation Needs: No Transportation Needs    Lack of Transportation (Medical): No    Lack of Transportation (Non-Medical):  No   Physical Activity: Inactive    Days of Exercise per Week: 0 days    Minutes of Exercise per Session: 0 min       Family History   Problem Relation Age of Onset    Other Father         accident    Heart Failure Mother     Heart Disease Brother     Cirrhosis Brother          Review of Systems:   Review of Systems   Constitutional:  Positive for fatigue. Negative for appetite change, chills and fever. HENT:  Positive for sinus pressure. Negative for congestion. Respiratory:  Positive for cough (occasional). Negative for chest tightness and shortness of breath. Cardiovascular:  Negative for chest pain, palpitations and leg swelling. No orthopnea or PND   Gastrointestinal:  Negative for abdominal pain, blood in stool, nausea and vomiting. Genitourinary:  Negative for difficulty urinating, dysuria and hematuria. Musculoskeletal:  Negative for arthralgias. Skin:  Negative for color change and pallor. Neurological:  Positive for dizziness (upon standing). Negative for syncope and light-headedness. Disequilibrium upon standing and walking   Psychiatric/Behavioral:  Negative for agitation. Physical Examination:    BP (!) 150/88 (Site: Right Upper Arm, Position: Sitting, Cuff Size: Small Adult)   Pulse 67   Resp 18   Ht 5' 6\" (1.676 m)   Wt 172 lb (78 kg)   SpO2 100%   BMI 27.76 kg/m²    Physical Exam  Constitutional:       General: He is not in acute distress. HENT:      Head: Normocephalic and atraumatic. Cardiovascular:      Rate and Rhythm: Normal rate and regular rhythm. Heart sounds: No murmur heard. Pulmonary:      Effort: Pulmonary effort is normal. No respiratory distress. Breath sounds: No wheezing, rhonchi or rales. Abdominal:      Palpations: Abdomen is soft. Tenderness: There is no abdominal tenderness. Musculoskeletal:         General: Normal range of motion. Cervical back: Normal range of motion and neck supple. Right lower leg: No edema. Left lower leg: No edema. Skin:     General: Skin is warm and dry. Neurological:      General: No focal deficit present. Mental Status: He is alert and oriented to person, place, and time. Cranial Nerves:  No cranial nerve deficit.    Psychiatric:         Mood and Affect: Mood normal.         Behavior: Behavior normal.       LABS:  CBC:   Lab Results   Component Value Date/Time    WBC 5.9 08/18/2022 02:44 PM    RBC 5.16 08/18/2022 02:44 PM    RBC 3.86 07/08/2018 05:05 AM    HGB 14.9 08/18/2022 02:44 PM    HCT 43.5 08/18/2022 02:44 PM    MCV 84.3 08/18/2022 02:44 PM    MCH 28.9 08/18/2022 02:44 PM    MCHC 34.3 08/18/2022 02:44 PM    RDW 13.1 08/18/2022 02:44 PM     08/18/2022 02:44 PM    MPV 6.9 07/17/2018 06:51 AM     Lipids:  Lab Results   Component Value Date    CHOL 235 (H) 11/14/2021    CHOL 214 (H) 06/09/2020    CHOL 132 12/24/2019     Lab Results   Component Value Date    TRIG 150 11/14/2021    TRIG 193 (H) 06/09/2020    TRIG 112 12/24/2019     Lab Results   Component Value Date    HDL 41 11/14/2021    HDL 50 06/09/2020    HDL 39 (L) 12/24/2019     Lab Results   Component Value Date    LDLCALC 164 (H) 11/14/2021    LDLCALC 125 06/09/2020    LDLCALC 71 12/24/2019     No results found for: LABVLDL, VLDL  Lab Results   Component Value Date    CHOLHDLRATIO 5.5 07/19/2012     CMP:    Lab Results   Component Value Date/Time     08/31/2022 11:55 AM    K 5.2 08/31/2022 11:55 AM    K 4.3 08/18/2022 06:26 AM    CL 99 08/31/2022 11:55 AM    CO2 23 08/31/2022 11:55 AM    BUN 33 08/31/2022 11:55 AM    CREATININE 1.22 08/31/2022 11:55 AM    GFRAA >60.0 08/31/2022 11:55 AM    AGRATIO 0.6 07/08/2018 05:05 AM    LABGLOM >60.0 08/31/2022 11:55 AM    GLUCOSE 131 09/12/2022 11:06 AM    GLUCOSE 121 11/28/2021 05:52 AM    PROT 6.0 08/16/2022 12:43 PM    LABALBU 3.7 08/16/2022 12:43 PM    LABALBU 4.0 11/27/2021 12:35 AM    CALCIUM 9.7 08/31/2022 11:55 AM    BILITOT 0.3 08/16/2022 12:43 PM    ALKPHOS 63 08/16/2022 12:43 PM    AST 11 08/16/2022 12:43 PM    ALT 9 08/16/2022 12:43 PM     BMP:    Lab Results   Component Value Date/Time     08/31/2022 11:55 AM    K 5.2 08/31/2022 11:55 AM    K 4.3 08/18/2022 06:26 AM    CL 99 08/31/2022 11:55 AM    CO2 23 08/31/2022 11:55 AM    BUN 33 08/31/2022 11:55 AM    LABALBU 3.7 08/16/2022 12:43 PM    LABALBU 4.0 11/27/2021 12:35 AM    CREATININE 1.22 08/31/2022 11:55 AM    CALCIUM 9.7 08/31/2022 11:55 AM    GFRAA >60.0 08/31/2022 11:55 AM    LABGLOM >60.0 08/31/2022 11:55 AM    GLUCOSE 131 09/12/2022 11:06 AM    GLUCOSE 121 11/28/2021 05:52 AM     Magnesium:    Lab Results   Component Value Date/Time    MG 1.8 08/16/2022 12:43 PM     TSH:  Lab Results   Component Value Date    TSH 1.270 06/09/2020     . result  No results for input(s): PROBNP in the last 72 hours. No results for input(s): INR in the last 72 hours.             Patient Active Problem List   Diagnosis    Essential hypertension, benign    Obesity, diabetes, and hypertension syndrome (HCC)    Irregular heart rhythm    Hyperlipidemia    Allergic rhinitis due to pollen    Staphylococcus aureus bacteremia    Hematoma of right thigh    Hematoma of left lower extremity    Hereditary peripheral neuropathy    Migraine without aura    Community acquired pneumonia of right lower lobe of lung    Acute on chronic diastolic (congestive) heart failure (HCC)    Mild intermittent asthma with exacerbation    NSTEMI (non-ST elevated myocardial infarction) ([address]    Acute bronchitis due to other specified organisms    CAD (coronary artery disease)    Body mass index (bmi) 29.0-29.9, adult    Cellulitis of right lower limb    Dyspnea, unspecified    Encounter for pre-employment examination    Family history of ischemic heart disease and other diseases of the circulatory system    Other specified hypothyroidism    Inflammatory disorders of scrotum    Long term (current) use of insulin ([address]    Long term (current) use of oral hypoglycemic drugs    Other chronic sinusitis    Other idiopathic scoliosis, lumbosacral region    Other intervertebral disc disorders, lumbar region    Pain in left lower leg    General patient noncompliance    Sleep apnea    Spinal stenosis, lumbar region with neurogenic claudication    Spondylolisthesis, lumbosacral region    Type 2 diabetes mellitus with diabetic polyneuropathy (HCC)    Unsp open wound of r little finger w/o damage to nail, init    Involuntary movements    ANTON (acute kidney injury) ([facility] [address]    Hyponatremia    Type 2 diabetes mellitus with hyperglycemia (HCC)    Brain bleed (HCC)    Hypertensive urgency    Intractable nausea and vomiting    chf    Ischemic cardiomyopathy       Assessment/Plan:     Diagnosis Orders   1. Acute systolic CHF (congestive heart failure), NYHA class 1 (HCC)      Compensated. Continue current meds      2. Ischemic cardiomyopathy      EF 20% per echo 8/17/22      3. Hx of CABG      CABG x 3 at [facility] in 2019. s/p PCI of mid to distal circ on 8/24/22 at [facility] with Dr. Danny Christensen. Continue DAPT with ASA/plavix. No angina      4. Uncontrolled hypertension      Increase Coreg to 12.5mg PO BID      5. Dyslipidemia      Continue Crestor 20mg PO daily      6. Diabetes mellitus type 2 in [facility])      Management per endocrinology      7. Nolene Potash, Wilbern Burkitt MD, Neurology, [place]    Refer to neurology for evaluation      8. Joshua Ybarra MD, Neurology, [place] negative in office. ? vertigo vs other.  Refer to neuro for evaluation          -Maximize medical therapy--DAPT aspirin 81 mg p.o. daily and Plavix 75 mg p.o. daily for at least 1 year without interruption, increase to Coreg 12.5 mg p.o. twice daily as directed at last office visit, Jardiance 10 mg p.o. daily, Entresto 24/26 mg p.o. twice daily, Crestor 20 mg p.o. daily, sublingual nitroglycerin as needed for chest pain  -Aldactone discontinued on 9/1/2022 due to mild hyperkalemia as well as mild decline in renal function on BMP from 8/31/2022.  -Provided patient with financial assistance paperwork for Entresto at initial visit on 8/31/22 to see if he qualifies and can obtain refills at reduced or no cost.  States his refills will cost him between $500-$600. If patient does not qualify for financial assistance program, will switch Entresto to valsartan or losartan  -No sign of decompensated heart failure.    -Check BMP today to re-evaluate renal function and electrolytes  -Consider repeat fasting lipid panel in future to reevaluate. Last lipid panel was in November 2021  -Cardiac/<2 gram sodium diet recommended  - Recommend 1500mL fluid restriction   -Instructed patient to weigh self daily every morning upon waking and keep log book of daily weights. Notify office if gaining more than 3 pounds in 48 hours. -Recommend routine blood pressure monitoring at home. Advised patient to check blood pressure twice daily in a.m. and p.m. prior to taking medications and record log of blood pressure trends to review at next office visit  Advised to notify cardiology if BP running low with  mmHg or below prior to taking medications  Advised to notify office if BP running high with  mmHg or above or DBP 85 mmHg or above  -Advised patient to notify office immediately if experiencing any progressive SOB, orthopnea, PND, LE edema or weight gain  -Maintain routine outpatient follow-up with general cardiologist, Dr. Ke Montalvo  **Plan to reevaluate LV function by repeat limited echocardiogram within the next 3 months (Around Nov 2022) and if EF remains severely reduced at less than or equal to 35%, will need referral to electrophysiology for AICD evaluation---will plan to order at next visit in Oct 2022  -Maintain routine outpatient follow-up with PCP  -Maintain routine follow-up with endocrinology, Dr. Ashley Gomez for ongoing diabetic management  -Advised to notify cardiology immediately with any questions, concerns or changes in his condition  -F/U with CHF clinic in 4 weeks or sooner if needed       Counseling:   The importance of daily weights, dietary sodium restriction, and contact with cardiology if weight is increased more than 3 lbs in any 48 hour period was stressed. The patient has been advised to contact us if theyexperience progressive SOB, orthopnea, PND or progressive edema.

## 2022-09-28 NOTE — PATIENT INSTRUCTIONS
-Increase Coreg (carvedilol) to 12.5mg PO twice daily    Refer to neurology for evaluation of dizziness/dysequilibrium      -Check daily weight every morning and notify CHF clinic if gaining more than 3 pounds in 2 days    -Check blood pressure twice daily in a.m. and p.m. prior to taking medications and keep log of blood pressure trends to review at next office visit  Notify office if BP running low with  mmHg or below prior to taking medications  Notify office if BP running high with  mmHg or above or if DBP 85 mmHg or above  -Please record heart rate with each BP    -Recommend 2000 mg daily sodium restriction    -Recommend 1.5 iter (48ounces) daily fluid restriction

## 2022-09-30 ENCOUNTER — TELEPHONE (OUTPATIENT)
Dept: CARDIOLOGY CLINIC | Age: 62
End: 2022-09-30

## 2022-10-05 DIAGNOSIS — I50.21 ACUTE SYSTOLIC CHF (CONGESTIVE HEART FAILURE), NYHA CLASS 1 (HCC): Primary | ICD-10-CM

## 2022-10-05 DIAGNOSIS — I25.5 ISCHEMIC CARDIOMYOPATHY: ICD-10-CM

## 2022-10-26 ENCOUNTER — OFFICE VISIT (OUTPATIENT)
Dept: CARDIOLOGY CLINIC | Age: 62
End: 2022-10-26
Payer: MEDICARE

## 2022-10-26 VITALS
BODY MASS INDEX: 26.63 KG/M2 | RESPIRATION RATE: 16 BRPM | SYSTOLIC BLOOD PRESSURE: 132 MMHG | DIASTOLIC BLOOD PRESSURE: 90 MMHG | HEART RATE: 84 BPM | OXYGEN SATURATION: 99 % | WEIGHT: 165 LBS

## 2022-10-26 DIAGNOSIS — I50.22 CHRONIC SYSTOLIC CHF (CONGESTIVE HEART FAILURE), NYHA CLASS 1 (HCC): ICD-10-CM

## 2022-10-26 DIAGNOSIS — E78.5 DYSLIPIDEMIA: ICD-10-CM

## 2022-10-26 DIAGNOSIS — Z86.79 HISTORY OF CORONARY ARTERY DISEASE: ICD-10-CM

## 2022-10-26 DIAGNOSIS — Z86.39 HISTORY OF DIABETES MELLITUS, TYPE II: ICD-10-CM

## 2022-10-26 DIAGNOSIS — I25.5 ISCHEMIC CARDIOMYOPATHY: ICD-10-CM

## 2022-10-26 DIAGNOSIS — I10 UNCONTROLLED HYPERTENSION: ICD-10-CM

## 2022-10-26 PROCEDURE — 99214 OFFICE O/P EST MOD 30 MIN: CPT | Performed by: PHYSICIAN ASSISTANT

## 2022-10-26 PROCEDURE — 3078F DIAST BP <80 MM HG: CPT | Performed by: PHYSICIAN ASSISTANT

## 2022-10-26 PROCEDURE — G8484 FLU IMMUNIZE NO ADMIN: HCPCS | Performed by: PHYSICIAN ASSISTANT

## 2022-10-26 PROCEDURE — 3074F SYST BP LT 130 MM HG: CPT | Performed by: PHYSICIAN ASSISTANT

## 2022-10-26 PROCEDURE — G8427 DOCREV CUR MEDS BY ELIG CLIN: HCPCS | Performed by: PHYSICIAN ASSISTANT

## 2022-10-26 PROCEDURE — 3017F COLORECTAL CA SCREEN DOC REV: CPT | Performed by: PHYSICIAN ASSISTANT

## 2022-10-26 PROCEDURE — 1036F TOBACCO NON-USER: CPT | Performed by: PHYSICIAN ASSISTANT

## 2022-10-26 PROCEDURE — G8417 CALC BMI ABV UP PARAM F/U: HCPCS | Performed by: PHYSICIAN ASSISTANT

## 2022-10-26 RX ORDER — HYDRALAZINE HYDROCHLORIDE 25 MG/1
25 TABLET, FILM COATED ORAL 2 TIMES DAILY
Qty: 60 TABLET | Refills: 3 | Status: SHIPPED | OUTPATIENT
Start: 2022-10-26

## 2022-10-26 ASSESSMENT — ENCOUNTER SYMPTOMS
ABDOMINAL PAIN: 0
NAUSEA: 0
SHORTNESS OF BREATH: 0
BLOOD IN STOOL: 0
COUGH: 1
CHEST TIGHTNESS: 0
COLOR CHANGE: 0
SINUS PRESSURE: 1
VOMITING: 0

## 2022-10-26 NOTE — PATIENT INSTRUCTIONS
-Limited echocardiogram ordered to be completed after 11/17/22  **Call [phone] to schedule    START Hydralazine 25mg PO twice daily      -Check daily weight every morning and notify CHF clinic if gaining more than 3 pounds in 2 days    -Check blood pressure twice daily in a.m. and p.m. prior to taking medications and keep log of blood pressure trends to review at next office visit  Notify office if BP running low with  mmHg or below prior to taking medications  Notify office if BP running high with  mmHg or above or if DBP 85 mmHg or above    -Recommend 2000 mg daily sodium restriction    -Recommend 1.5 liter (48 ounces) daily fluid restriction

## 2022-10-26 NOTE — PROGRESS NOTES
Patient: Wendi Montanez  YOB: 1960  MRN: [id number]    Chief Complaint:  Chief Complaint   Patient presents with    Congestive Heart Failure    Cardiomyopathy    Discuss Labs     Bmp 9/28/2022           Subjective/HPI       10/26/22: Here for follow-up of systolic congestive heart failure and ischemic cardiomyopathy with EF of 20% per echo 8/17/2022. At last office visit on 9/28/2022, patient was advised to increase his Coreg to 12.5 mg p.o. twice daily which he was instructed previously but never completed given persistently elevated blood pressure. Also he was provided with financial assistance paperwork to fill out for Entresto to see if he qualifies for the medication at a reduced cost however patient states when he most recently filled his Brandi Francis it was affordable for him so he never filled out this paperwork. He states he is doing well overall without any new or worsening heart failure symptoms. He denies shortness of breath or dyspnea exertion, chest pain, palpitations, diaphoresis, paroxysmal nocturnal dyspnea, orthopnea, lower extremity edema, syncope, fever or chills. He is still experiencing occasional dizziness and disequilibrium when he stands and upon walking for which she was previously referred to neurology and states he has an appointment scheduled for the middle of November. He is compliant with all of his medications and with low-sodium diet and fluid restriction. He is compliant with dual antiplatelet therapy of aspirin and Plavix. He is not weighing himself daily but usually a few times per week when he remembers. He denies any significant or rapid weight gain. States that his BP has been running high when he has been checking it at home with readings occasionally 180s over 90s. Systolic BP running within normal range in office today but diastolic blood pressure running high. Most recent labs reviewed and documented below.   BMP on 9/28/2022: Sodium 141, potassium 4.0, chloride 102, total CO2 28, BUN 20, creatinine 1.08, GFR greater than 60, glucose elevated 183. Vital signs in office today show blood pressure 124/100 on the left and 132/90 on the right, heart rate 84 bpm, pulse ox 99% on room air. Weight is 165 pounds compared to 172 pounds at last office visit on 9/28/2022.      9/28/22: Here for follow-up of acute systolic congestive heart failure and ischemic cardiomyopathy with EF of 20% per echo on 8/17/2022. Since last CHF clinic visit on 8/31/2022, patient underwent staged PCI of the mid to distal circumflex on 8/24/2022 at [facility] with Dr. Kenan Bonilla. He has been compliant with dual antiplatelet therapy of aspirin and Plavix. At last CHF clinic visit, patient was advised to increase Coreg to 12.5 mg p.o. twice daily for improved BP management however he states he inadvertently never made this change. Blood pressure is continuing to run high at home in the 150s to 401V range systolically. Patient states he is not keeping a log of his blood pressure trends. He denies any shortness of breath or dyspnea on exertion, chest pain, palpitations, diaphoresis, paroxysmal nocturnal dyspnea, orthopnea, lower extremity edema, syncope, fever or chills. He is complaining of dizziness with positional changes such as from sitting to standing and occasionally when turning his head from side to side and also dysequilibrium when beginning to walk upon standing. I checked blood pressure at time my evaluation and while seated BP was 160/100 on the right and upon standing BP was 155/90. As orthos negative in office and otherwise unable to explain dizziness complaints, will refer to neurology for evaluation. He has been compliant with low-sodium diet and fluid restriction. He is checking his weight regularly and weight has reportedly been stable. Ortho BP completed: 160/100 seated, 155/90 standing    Most recent labs reviewed and documented below.   BMP on 8/31/2022: Sodium 137, potassium elevated 5.2, chloride 99, total CO2 23, BUN elevated 33, creatinine elevated 1.22, GFR greater than 60, glucose 186. --Patient was advised to stop Aldactone due to hyperkalemia and mild decline in renal function  proBNP on 8/31/2022: 711    Blood pressure mildly elevated in office today at 162/93 in the left and 150/88 on the right, heart rate 67 bpm, pulse ox 100% on room air. Weight is 172 pounds compared to 169 pounds at last office visit on 8/31/2022 8/31/22 CHF clinic visit #1: This is a pleasant 56-year-old  male with past medical history significant for coronary artery disease status post CABG x3 in 2019 at [facility], hypertension, dyslipidemia, diabetes and obesity who presents for initial CHF clinic evaluation following recent hospitalization for hypertensive urgency and elevated troponin during which he was found to have new onset cardiomyopathy with a EF of 20% per echo on 8/17/2022. He was admittedly noncompliant with medications prior to admission and cardiac/BP meds were resumed. During this hospitalization, he underwent cardiac catheterization on 8/18/2022 which revealed left main-normal, % proximal stenosis, circumflex-70 to 80% mid stenosis, RCA-small with severe disease up to 90% stenosis, LIMA jump graft to D1 and LAD widely patent, SVG to circumflex-100% occluded, SVG to RCA-100% occluded. Based on findings of this cardiac catheterization, plan for staged PCI of the circumflex in the near future at level 3 Cath Lab. He was optimized on cardiac/CHF medications including aspirin, Plavix, Coreg, Crestor, Aldactone and Entresto and subsequently discharged home in stable condition. Patient has been doing well overall since hospital discharge. He underwent staged PCI of mid and distal circumflex with drug-eluting stent x2 on 8/24/2022 at [facility].   He has been compliant with dual antiplatelet therapy of aspirin and Plavix as well as compliant with all of his other cardiac/CHF medications. He denies any bleeding issues such as hematochezia, melena or hematuria. He continues to experience fatigue and tiredness but states that he does not do much at home. He denies shortness of breath or dyspnea exertion, chest pain, palpitations, diaphoresis, paroxysmal nocturnal dyspnea, orthopnea, lower extremity edema, dizziness, lightheadedness, syncope, fever or chills. He states that he has cut salt out of his diet and was educated on a 2000 mg daily sodium restriction as well as a 1.5 L daily fluid restriction. He will be referred to cardiac rehab. He does follow with endocrinology, Dr. Dennys Segura for management of diabetes and states that he recently had an insulin pump placed. Most recent labs reviewed and documented below  BMP on 8/18/2022: Sodium 135, potassium 4.4, chloride 99, total CO2 28, BUN 20, creatinine 1.01, GFR greater than 60, glucose elevated 295  CBC on 8/18/2022: WBC 5.9, hemoglobin 14.9, hematocrit 43.5, platelets 432  Hemoglobin A1c on 8/16/2022: Elevated at 10.1  Fasting lipid panel 11/14/2021: Total cholesterol elevated 235, triglycerides 150, HDL 41, LDL elevated 164      Most recent diagnostic testing reviewed and documented below  EKG 8/16/22: , nonspecific ST changes, IRBBB, QTc 495ms      Had PCI on 8/24/22    Echocardiogram 8/17/22:  Conclusions   Summary   Mild-to-moderate mitral regurgitation with jet centrally-directed. Mildly dilated left atrium. Left ventricular ejection fraction is visually estimated at 20%. Severe LV systolic dysfunction   Left ventricular size is mildly increased . When compared to prior study from 2019, the LV dysfunction is new. Moderate concentric left ventricular hypertrophy. Abnormal (paradoxical) motion consistent with post-operative status. Signature   ----------------------------------------------------------------   Electronically signed by Maria L Arreola(Interpreting physician) on 08/17/2022 05:29 PM    [address] [facility] 8/18/22:  Conclusions  Procedure Summary  EF of 20-30%  patent LIMA to LAD  70-80% mid CX  100% native LAD  occluded SVG to CX  occluded SVG to RCA  small non dominant RCA with 90% prox  Recommendations  Aggressive risk factor management. Maximize medical therapy. staged PCI of mid CX at level III cath lab. Angiographic Findings   Cardiac Arteries and Lesion Findings  LMCA: Normal.  LAD: 100% prox  LCx: mod caliber, mid CX 70-80%. RCA: small non dominant. 90% prox. Cardiac Grafts   -  There is a LIMA graft that originates at the LIMA and attaches to the      Mid LAD (jump graft to D1 and LAD is patent. ). -  There is a Vein graft that originates at the Aorta Left and attaches to      the Mid CX (100%). -  There is a Vein graft that originates at the Aorta Right and attaches      to the Dist RCA (100%). Coronary Tree   Dominance: Right  LV function assessed as:Abnormal.  Ejection Fraction    - Method: LV gram. EF%: 30.    Vital signs stable in office today. Blood pressure 116/75, heart rate 98, pulse ox 97% on room air. Weight is 169 pounds. PMHx:  New ischemic cardiomyopathy with EF 20% per echo 8/17/22  CAD s/p CABG x 3 on 12/30/2019 at [facility]  HTN  Dyslipidemia  DM--has insulin pump now.  Follows with Dr. Leone Diver    PSHx:  CABG x 3 (LIMA to D1 and LAD, SVG to Circ and SVG to RCA) on 12/30/19 at [facility]  Carpal tunnel release bilateral  Left foot surgery  Back surgery    Social Hx:  No tobacco  Occasional alcohol  No drugs    Family Hx:  Mom with with CABG      No Known Allergies    Current Outpatient Medications   Medication Sig Dispense Refill    hydrALAZINE (APRESOLINE) 25 MG tablet Take 1 tablet by mouth in the morning and at bedtime 60 tablet 3    blood glucose test strips (FREESTYLE LITE) strip Tid E11.65 300 strip 3    NOVOLOG 100 UNIT/ML injection vial Use via insulin pump max daily dose 100 units      metFORMIN (GLUCOPHAGE) 1000 MG tablet TAKE 1 TABLET TWICE DAILY 180 tablet 1    empagliflozin (JARDIANCE) 10 MG tablet Take 1 tablet by mouth daily 30 tablet 5    carvedilol (COREG) 12.5 MG tablet Take 1 tablet by mouth 2 times daily 60 tablet 3    Continuous Blood Gluc  (FREESTYLE TESS 2 READER) MARIUM Give 1  1 each 1    Continuous Blood Gluc Sensor (FREESTYLE TESS 2 SENSOR) MISC Change every 2 weeks 2 each 3    clopidogrel (PLAVIX) 75 MG tablet Take 1 tablet by mouth daily 30 tablet 3    sacubitril-valsartan (ENTRESTO) 24-26 MG per tablet Take 1 tablet by mouth 2 times daily 60 tablet 3    aspirin 81 MG EC tablet Take 1 tablet by mouth daily 30 tablet 3    albuterol sulfate  (90 Base) MCG/ACT inhaler Inhale 2 puffs into the lungs every 6 hours as needed for Wheezing 18 g 5    Accu-Chek FastClix Lancets MISC Test 4x daily Dx E11.65 150 each 3    blood glucose test strips (ACCU-CHEK GUIDE) strip Test 4x daily Dx E11.65 150 each 3    insulin aspart protamine-insulin aspart (NOVOLOG MIX 70/30 FLEXPEN) (70-30) 100 UNIT/ML injection 30 units twice day (Patient taking differently: 50 Units 2 times daily (with meals)) 10 pen 5    rosuvastatin (CRESTOR) 20 MG tablet Take 1 tablet by mouth nightly 30 tablet 5    Blood Glucose Monitoring Suppl (FREESTYLE LITE) MARIUM 1 Device by Does not apply route daily as needed (as directed) 1 each 3    FreeStyle Lancets MISC 1 each by Does not apply route daily 300 each 3    SYMBICORT 80-4.5 MCG/ACT AERO Inhale 2 puffs into the lungs 2 times daily 10.2 g 5    Lancets MISC Pt test 4x daily Dx E11.65 (dispense covered brand) 150 each 3    Insulin Pen Needle (NOVOFINE) 32G X 6 MM MISC Bid 300 each 3     No current facility-administered medications for this visit.        Past Medical History:   Diagnosis Date    Asthma     Back pain     CAD (coronary artery disease) 01/2020    3 vessel     chf 9/16/2022    Diabetes mellitus ([facility] [address]     Essential hypertension, benign     Hyperlipemia     Ischemic cardiomyopathy 9/16/2022 Neuropathic pain, leg, bilateral        Past Surgical History:   Procedure Laterality Date    CARPAL TUNNEL RELEASE Bilateral     FOOT SURGERY Left     bone spur    LAMINOTOMY  2012       Social History     Socioeconomic History    Marital status:      Spouse name: Micaela Door    Number of children: 2    Years of education: 14    Highest education level: Some college, no degree   Occupational History    Occupation: disability    Tobacco Use    Smoking status: Never    Smokeless tobacco: Never   Vaping Use    Vaping Use: Never used   Substance and Sexual Activity    Alcohol use: Yes     Alcohol/week: 2.0 standard drinks     Types: 2 Cans of beer per week     Comment: every 6 months or so     Drug use: No    Sexual activity: Not Currently     Partners: Female   Social History Narrative    Lives with wife. Per patient he has been  for 2 years. He met his wife online and went to [facility] and brought her back to [facility]. They live in [address] [place] [address] no steps into home. It is a 2 story home. Bedroom and bath upstairs. Bathroom down. Per patient once he is down the steps he doesn't go back up during the day. 2 daughters local.      Per he has very poor vision. He does not like to drive at night or when raining. Patient states he struggles looking at a computer or reading due to vision. Social Determinants of Health     Financial Resource Strain: Low Risk     Difficulty of Paying Living Expenses: Not hard at all   Food Insecurity: No Food Insecurity    Worried About [address] in the Last Year: Never true    [address]  in the Last Year: Never true   Transportation Needs: No Transportation Needs    Lack of Transportation (Medical): No    Lack of Transportation (Non-Medical):  No   Physical Activity: Inactive    Days of Exercise per Week: 0 days    Minutes of Exercise per Session: 0 min       Family History   Problem Relation Age of Onset    Other Father         accident    Heart Failure Mother Heart Disease Brother     Cirrhosis Brother          Review of Systems:   Review of Systems   Constitutional:  Negative for appetite change, chills, fatigue and fever. HENT:  Positive for sinus pressure. Negative for congestion. Respiratory:  Positive for cough (rare). Negative for chest tightness and shortness of breath. Cardiovascular:  Negative for chest pain, palpitations and leg swelling. No orthopnea or PND   Gastrointestinal:  Negative for abdominal pain, blood in stool, nausea and vomiting. Genitourinary:  Negative for difficulty urinating, dysuria and hematuria. Musculoskeletal:  Negative for arthralgias. Skin:  Negative for color change and pallor. Neurological:  Positive for dizziness (upon standing). Negative for syncope and light-headedness. Disequilibrium upon standing and walking   Psychiatric/Behavioral:  Negative for agitation. Physical Examination:    BP (!) 132/90 (Site: Right Upper Arm, Position: Sitting, Cuff Size: Medium Adult)   Pulse 84   Resp 16   Wt 165 lb (74.8 kg)   SpO2 99%   BMI 26.63 kg/m²    Physical Exam  Constitutional:       General: He is not in acute distress. HENT:      Head: Normocephalic and atraumatic. Cardiovascular:      Rate and Rhythm: Normal rate and regular rhythm. Heart sounds: No murmur heard. Pulmonary:      Effort: Pulmonary effort is normal. No respiratory distress. Breath sounds: No wheezing, rhonchi or rales. Abdominal:      Palpations: Abdomen is soft. Tenderness: There is no abdominal tenderness. Musculoskeletal:         General: Normal range of motion. Cervical back: Normal range of motion and neck supple. Right lower leg: No edema. Left lower leg: No edema. Skin:     General: Skin is warm and dry. Neurological:      General: No focal deficit present. Mental Status: He is alert and oriented to person, place, and time. Cranial Nerves: No cranial nerve deficit. Psychiatric:         Mood and Affect: Mood normal.         Behavior: Behavior normal.       LABS:  CBC:   Lab Results   Component Value Date/Time    WBC 5.9 08/18/2022 02:44 PM    RBC 5.16 08/18/2022 02:44 PM    RBC 3.86 07/08/2018 05:05 AM    HGB 14.9 08/18/2022 02:44 PM    HCT 43.5 08/18/2022 02:44 PM    MCV 84.3 08/18/2022 02:44 PM    MCH 28.9 08/18/2022 02:44 PM    MCHC 34.3 08/18/2022 02:44 PM    RDW 13.1 08/18/2022 02:44 PM     08/18/2022 02:44 PM    MPV 6.9 07/17/2018 06:51 AM     Lipids:  Lab Results   Component Value Date    CHOL 235 (H) 11/14/2021    CHOL 214 (H) 06/09/2020    CHOL 132 12/24/2019     Lab Results   Component Value Date    TRIG 150 11/14/2021    TRIG 193 (H) 06/09/2020    TRIG 112 12/24/2019     Lab Results   Component Value Date    HDL 41 11/14/2021    HDL 50 06/09/2020    HDL 39 (L) 12/24/2019     Lab Results   Component Value Date    LDLCALC 164 (H) 11/14/2021    LDLCALC 125 06/09/2020    LDLCALC 71 12/24/2019     No results found for: LABVLDL, VLDL  Lab Results   Component Value Date    CHOLHDLRATIO 5.5 07/19/2012     CMP:    Lab Results   Component Value Date/Time     09/28/2022 11:35 AM    K 4.0 09/28/2022 11:35 AM    K 4.3 08/18/2022 06:26 AM     09/28/2022 11:35 AM    CO2 28 09/28/2022 11:35 AM    BUN 20 09/28/2022 11:35 AM    CREATININE 1.08 09/28/2022 11:35 AM    GFRAA >60.0 09/28/2022 11:35 AM    AGRATIO 0.6 07/08/2018 05:05 AM    LABGLOM >60.0 09/28/2022 11:35 AM    GLUCOSE 183 09/28/2022 11:35 AM    GLUCOSE 121 11/28/2021 05:52 AM    PROT 6.0 08/16/2022 12:43 PM    LABALBU 3.7 08/16/2022 12:43 PM    LABALBU 4.0 11/27/2021 12:35 AM    CALCIUM 9.1 09/28/2022 11:35 AM    BILITOT 0.3 08/16/2022 12:43 PM    ALKPHOS 63 08/16/2022 12:43 PM    AST 11 08/16/2022 12:43 PM    ALT 9 08/16/2022 12:43 PM     BMP:    Lab Results   Component Value Date/Time     09/28/2022 11:35 AM    K 4.0 09/28/2022 11:35 AM    K 4.3 08/18/2022 06:26 AM     09/28/2022 11:35 AM CO2 28 09/28/2022 11:35 AM    BUN 20 09/28/2022 11:35 AM    LABALBU 3.7 08/16/2022 12:43 PM    LABALBU 4.0 11/27/2021 12:35 AM    CREATININE 1.08 09/28/2022 11:35 AM    CALCIUM 9.1 09/28/2022 11:35 AM    GFRAA >60.0 09/28/2022 11:35 AM    LABGLOM >60.0 09/28/2022 11:35 AM    GLUCOSE 183 09/28/2022 11:35 AM    GLUCOSE 121 11/28/2021 05:52 AM     Magnesium:    Lab Results   Component Value Date/Time    MG 1.8 08/16/2022 12:43 PM     TSH:  Lab Results   Component Value Date    TSH 1.270 06/09/2020     . result  No results for input(s): PROBNP in the last 72 hours. No results for input(s): INR in the last 72 hours.             Patient Active Problem List   Diagnosis    Essential hypertension, benign    Obesity, diabetes, and hypertension syndrome (HCC)    Irregular heart rhythm    Hyperlipidemia    Allergic rhinitis due to pollen    Staphylococcus aureus bacteremia    Hematoma of right thigh    Hematoma of left lower extremity    Hereditary peripheral neuropathy    Migraine without aura    Community acquired pneumonia of right lower lobe of lung    Acute on chronic diastolic (congestive) heart failure (HCC)    Mild intermittent asthma with exacerbation    NSTEMI (non-ST elevated myocardial infarction) ([facility] [address]    Acute bronchitis due to other specified organisms    CAD (coronary artery disease)    Body mass index (bmi) 29.0-29.9, adult    Cellulitis of right lower limb    Dyspnea, unspecified    Encounter for pre-employment examination    Family history of ischemic heart disease and other diseases of the circulatory system    Other specified hypothyroidism    Inflammatory disorders of scrotum    Long term (current) use of insulin ([address]    Long term (current) use of oral hypoglycemic drugs    Other chronic sinusitis    Other idiopathic scoliosis, lumbosacral region    Other intervertebral disc disorders, lumbar region    Pain in left lower leg    General patient noncompliance    Sleep apnea    Spinal stenosis, lumbar region with neurogenic claudication    Spondylolisthesis, lumbosacral region    Type 2 diabetes mellitus with diabetic polyneuropathy ([facility] [address]    Unsp open wound of r little finger w/o damage to nail, init    Involuntary movements    ANTON (acute kidney injury) ([facility] [address]    Hyponatremia    Type 2 diabetes mellitus with hyperglycemia (HCC)    Brain bleed (HCC)    Hypertensive urgency    Intractable nausea and vomiting    chf    Ischemic cardiomyopathy       Assessment/Plan:     Diagnosis Orders   1. Chronic systolic CHF (congestive heart failure), NYHA class 1 (HCC)  Echo limited    Compensated. Continue current meds--coreg, entresto, jardiance      2. History of coronary artery disease      Hx CABG in 2019 at [facility] 8/18/22: Occluded SVG to circ and RCA, patent LIMA to LAD, 70-80% mid circ. s/p PCI circ 8/24/22. Cont ASA/Plavix      3. Ischemic cardiomyopathy  Echo limited    EF 20% per echo 8/17/22. Limited echo ordered to be completed after 11/17/22 to re-evaluate LV function      4. Uncontrolled hypertension      Diastolic BP running high in office today. Continue coreg 12.5mg PO BID. Add hydralazine 25mg PO BID. 5. Dyslipidemia      Continue Crestor 20mg PO daily      6. History of diabetes mellitus, type II      Management per endocrinology          -Maximize medical therapy--DAPT aspirin 81 mg p.o. daily and Plavix 75 mg p.o. daily for at least 1 year without interruption, Coreg 12.5 mg p.o. twice daily, Jardiance 10 mg p.o. daily, Entresto 24/26 mg p.o. twice daily, Crestor 20 mg p.o. daily, add hydralazine 25mg PO BID, sublingual nitroglycerin as needed for chest pain  -Aldactone discontinued on 9/1/2022 due to mild hyperkalemia as well as mild decline in renal function on BMP from 8/31/2022.  -Heart failure compensated at this time  -Most recent BMP from 9/28/2022 reviewed. Renal function electrolytes stable. -Consider repeat fasting lipid panel in future to reevaluate.   Last lipid panel was in November 2021  -Cardiac/<2 gram sodium diet recommended  - Recommend 1500mL fluid restriction   -Instructed patient to weigh self daily every morning upon waking and keep log book of daily weights. Notify office if gaining more than 3 pounds in 48 hours. -Recommend routine blood pressure monitoring at home. Advised patient to check blood pressure twice daily in a.m. and p.m. prior to taking medications and record log of blood pressure trends to review at next office visit  Advised to notify cardiology if BP running low with  mmHg or below prior to taking medications  Advised to notify office if BP running high with  mmHg or above or DBP 85 mmHg or above  -Advised patient to notify office immediately if experiencing any progressive SOB, orthopnea, PND, LE edema or weight gain  -Maintain routine outpatient follow-up with general cardiologist, Dr. Aleksandar Monroe  **Limited echocardiogram ordered to be completed after 11/17/2022 to reevaluate LV function. If EF remains severely reduced at less than or equal to 35%, will need referral to electrophysiology for AICD evaluation  -Maintain routine outpatient follow-up with PCP  -Maintain routine follow-up with endocrinology, Dr. Gaviota Salvador for ongoing diabetic management  -Advised to notify cardiology immediately with any questions, concerns or changes in his condition  -F/U with CHF clinic in 3 months or sooner if needed       Counseling: The importance of daily weights, dietary sodium restriction, and contact with cardiology if weight is increased more than 3 lbs in any 48 hour period was stressed. The patient has been advised to contact us if theyexperience progressive SOB, orthopnea, PND or progressive edema.

## 2022-11-01 ENCOUNTER — TELEPHONE (OUTPATIENT)
Dept: GASTROENTEROLOGY | Age: 62
End: 2022-11-01

## 2022-11-09 ENCOUNTER — HOSPITAL ENCOUNTER (EMERGENCY)
Age: 62
Discharge: HOME | End: 2022-11-09
Payer: MEDICARE

## 2022-11-09 ENCOUNTER — TELEPHONE (OUTPATIENT)
Dept: PRIMARY CARE CLINIC | Age: 62
End: 2022-11-09

## 2022-11-09 ENCOUNTER — APPOINTMENT (OUTPATIENT)
Dept: GENERAL RADIOLOGY | Age: 62
End: 2022-11-09
Payer: MEDICARE

## 2022-11-09 ENCOUNTER — APPOINTMENT (OUTPATIENT)
Dept: CT IMAGING | Age: 62
End: 2022-11-09
Payer: MEDICARE

## 2022-11-09 VITALS
HEART RATE: 89 BPM | SYSTOLIC BLOOD PRESSURE: 156 MMHG | TEMPERATURE: 97.7 F | OXYGEN SATURATION: 98 % | BODY MASS INDEX: 26.52 KG/M2 | DIASTOLIC BLOOD PRESSURE: 97 MMHG | HEIGHT: 66 IN | RESPIRATION RATE: 18 BRPM | WEIGHT: 165 LBS

## 2022-11-09 DIAGNOSIS — S32.010A COMPRESSION FRACTURE OF L1 VERTEBRA, INITIAL ENCOUNTER (HCC): Primary | ICD-10-CM

## 2022-11-09 PROCEDURE — 72131 CT LUMBAR SPINE W/O DYE: CPT

## 2022-11-09 PROCEDURE — 6360000002 HC RX W HCPCS: Performed by: STUDENT IN AN ORGANIZED HEALTH CARE EDUCATION/TRAINING PROGRAM

## 2022-11-09 PROCEDURE — 96372 THER/PROPH/DIAG INJ SC/IM: CPT

## 2022-11-09 PROCEDURE — 72128 CT CHEST SPINE W/O DYE: CPT

## 2022-11-09 PROCEDURE — 72100 X-RAY EXAM L-S SPINE 2/3 VWS: CPT

## 2022-11-09 PROCEDURE — 99284 EMERGENCY DEPT VISIT MOD MDM: CPT

## 2022-11-09 RX ORDER — KETOROLAC TROMETHAMINE 30 MG/ML
30 INJECTION, SOLUTION INTRAMUSCULAR; INTRAVENOUS ONCE
Status: COMPLETED | OUTPATIENT
Start: 2022-11-09 | End: 2022-11-09

## 2022-11-09 RX ORDER — OXYCODONE HYDROCHLORIDE AND ACETAMINOPHEN 5; 325 MG/1; MG/1
1 TABLET ORAL EVERY 6 HOURS PRN
Qty: 12 TABLET | Refills: 0 | Status: SHIPPED | OUTPATIENT
Start: 2022-11-09 | End: 2022-11-14

## 2022-11-09 RX ORDER — ONDANSETRON 4 MG/1
4 TABLET, ORALLY DISINTEGRATING ORAL 3 TIMES DAILY PRN
Qty: 21 TABLET | Refills: 0 | Status: SHIPPED | OUTPATIENT
Start: 2022-11-09 | End: 2022-11-16

## 2022-11-09 RX ADMIN — KETOROLAC TROMETHAMINE 30 MG: 30 INJECTION, SOLUTION INTRAMUSCULAR; INTRAVENOUS at 08:57

## 2022-11-09 ASSESSMENT — ENCOUNTER SYMPTOMS
VOMITING: 0
ABDOMINAL PAIN: 0
SHORTNESS OF BREATH: 0
COUGH: 0
EYE PAIN: 0
NAUSEA: 0
BACK PAIN: 1
CONSTIPATION: 0
PHOTOPHOBIA: 0
WHEEZING: 0
ABDOMINAL DISTENTION: 0
DIARRHEA: 0

## 2022-11-09 ASSESSMENT — PAIN - FUNCTIONAL ASSESSMENT
PAIN_FUNCTIONAL_ASSESSMENT: 0-10
PAIN_FUNCTIONAL_ASSESSMENT: 0-10

## 2022-11-09 ASSESSMENT — PAIN DESCRIPTION - FREQUENCY
FREQUENCY: INTERMITTENT
FREQUENCY: INTERMITTENT

## 2022-11-09 ASSESSMENT — PAIN SCALES - GENERAL
PAINLEVEL_OUTOF10: 10

## 2022-11-09 ASSESSMENT — PAIN DESCRIPTION - DESCRIPTORS
DESCRIPTORS: ACHING
DESCRIPTORS: ACHING

## 2022-11-09 ASSESSMENT — PAIN DESCRIPTION - LOCATION
LOCATION: BACK
LOCATION: BACK

## 2022-11-09 ASSESSMENT — PAIN DESCRIPTION - PAIN TYPE
TYPE: ACUTE PAIN
TYPE: ACUTE PAIN

## 2022-11-09 ASSESSMENT — PAIN DESCRIPTION - ORIENTATION
ORIENTATION: LOWER
ORIENTATION: LOWER

## 2022-11-09 NOTE — DISCHARGE INSTRUCTIONS
Dr. Alvaro Skaggs will see you in his office (address phone number above) tomorrow, 11/10/22 at 930 am.

## 2022-11-09 NOTE — H&P (VIEW-ONLY)
Patient Name: Lynda Reich : 1960        Date: 11/10/2022      Type of Appt: Follow up    Reason for appt: Follow up per [facility] - seen at [facility] 22 - Dx L1 fracture    Studies done: 22 T/S & L/S CT, L/S X-RAY @ [facility]    Smoking: No    REVIEW OF SYSTEMS:    Sleep Disturbance, Back Pain   Attending Physician: No att. providers found  Reason for consult: L1 osteoporotic compression fracture     History of Presenting Illness and Review of Systems      Lynda Reich is a 65 y.o. male on hospital day 0 . History Of Present Illness:  Increased back pain status post fall almost 2 weeks ago ago worsening of his chronic low back pain with urinary urgency difficulty walking. Pain across the lumbar area. Pain inhibiting his ability to ambulate take care of himself is not eating well. It is getting worse instead of better. insulin-dependent diabetes mellitus. He has an insulin pump which is not working at this time. He has diabetic retinopathy.      History:       Past Medical History        Past Medical History:   Diagnosis Date    Asthma      Back pain      CAD (coronary artery disease) 2020     3 vessel     chf 2022    Diabetes mellitus ([address]      Essential hypertension, benign      Hyperlipemia      Ischemic cardiomyopathy 2022    Neuropathic pain, leg, bilateral           Past Surgical History         Past Surgical History:   Procedure Laterality Date    CARPAL TUNNEL RELEASE Bilateral      FOOT SURGERY Left       bone spur    LAMINOTOMY   2012            Family History  Family History         Family History   Problem Relation Age of Onset    Other Father           accident    Heart Failure Mother      Heart Disease Brother      Cirrhosis Brother           [] Unable to obtain due to ventilated and/ or neurologic status     Social History               Socioeconomic History    Marital status:        Spouse name: Won Bush    Number of children: 2    Years of education: 14    Highest education level: Some college, no degree   Occupational History    Occupation: disability    Tobacco Use    Smoking status: Never    Smokeless tobacco: Never   Vaping Use    Vaping Use: Never used   Substance and Sexual Activity    Alcohol use: Yes       Alcohol/week: 2.0 standard drinks       Types: 2 Cans of beer per week       Comment: every 6 months or so     Drug use: No    Sexual activity: Not Currently       Partners: Female   Other Topics Concern    Not on file   Social History Narrative     Lives with wife. Per patient he has been  for 2 years. He met his wife online and went to [facility] and brought her back to [facility]. They live in [address] [place] [address] no steps into home. It is a 2 story home. Bedroom and bath upstairs. Bathroom down. Per patient once he is down the steps he doesn't go back up during the day. 2 daughters local.       Per he has very poor vision. He does not like to drive at night or when raining. Patient states he struggles looking at a computer or reading due to vision. Social Determinants of Health          Financial Resource Strain: Low Risk     Difficulty of Paying Living Expenses: Not hard at all   Food Insecurity: No Food Insecurity    Worried About [facility] in the Last Year: Never true    [address] [facility] [address] in the Last Year: Never true   Transportation Needs: No Transportation Needs    Lack of Transportation (Medical): No    Lack of Transportation (Non-Medical): No   Physical Activity: Inactive    Days of Exercise per Week: 0 days    Minutes of Exercise per Session: 0 min   Stress: Not on file   Social Connections: Not on file   Intimate Partner Violence: Not on file   Housing Stability: Not on file         [] Unable to obtain due to ventilated and/ or neurologic status     Home Medications:      Prescriptions Prior to Admission   Not in a hospital admission. Current Hospital Medications:      Scheduled Meds:  Continuous Infusions:  PRN Meds:. Sandip Patella Allergies:      No Known Allergies         REVIEW OF SYSTEMS    (2-9 systems for level 4, 10 or more for level 5)      Review of Systems   Constitutional:  Negative for fever. HENT:  Negative for hearing loss. Eyes:  Negative for pain. Diabetic retinopathy visual decrease  Respiratory:  Negative for shortness of breath. Gastrointestinal:  Negative for nausea. Endocrine: Negative for heat intolerance. Genitourinary:  Positive for difficulty urinating and urgency. Musculoskeletal:  Positive for back pain. Negative for neck pain. Skin:  Negative for rash. Allergic/Immunologic: Negative for immunocompromised state. Neurological:  Negative for headaches. Psychiatric/Behavioral:  Negative for sleep disturbance. Except as noted above the remainder of the review of systems was reviewed and negative. Physical Exam      BP (!) 156/97   Pulse 89   Temp 97.7 °F (36.5 °C) (Oral)   Resp 18   Ht 5' 6\" (1.676 m)   Wt 165 lb (74.8 kg)   SpO2 98%   BMI 26.63 kg/m²   Body mass index is 26.63 kg/m². Physical Exam  Vitals and nursing note reviewed. Constitutional:       Appearance: Normal appearance. HENT:      Head: Normocephalic and atraumatic. Right Ear: External ear normal.      Left Ear: External ear normal.      Nose: Nose normal.      Mouth/Throat:      Pharynx: Oropharynx is clear. Eyes: Diabetic retinopathy has difficulty with vision     Extraocular Movements: Extraocular movements intact. Cardiovascular:      Pulses: Normal pulses. Pulmonary:      Effort: Pulmonary effort is normal.   Abdominal:      Palpations: Abdomen is soft. Genitourinary:     Rectum: Normal.   Musculoskeletal:    Difficulty getting up out of the chair secondary to his severe low back pain. Extremely tender to percussion thorax lumbar junction     General: Normal range of motion. Cervical back: Normal range of motion. Skin:     General: Skin is warm.    Neurological:      General: No focal deficit present. Psychiatric:         Mood and Affect: Mood normal.         I have reviewed all laboratory studies, reports, data, and pertinent images. Objective Findings:      Vitals:   Vitals          Vitals:     11/09/22 0812 11/09/22 0900 11/09/22 0936 11/09/22 1000   BP: (!) 177/100 (!) 165/96 (!) 163/87 (!) 156/97   Pulse:     88 89   Resp:     20 18   Temp:           TempSrc:           SpO2:     99% 98%   Weight:           Height:                    Laboratory, Microbiology, Pathology, Radiology, Cardiology, Medications and Transcriptions reviewed  Scheduled Meds:  Continuous Infusions:     No results for input(s): WBC, HGB, HCT, MCV, PLT in the last 72 hours. No results for input(s): NA, K, CL, CO2, PHOS, BUN, CREATININE, CA in the last 72 hours. No results for input(s): AST, ALT, ALB, BILIDIR, BILITOT, ALKPHOS in the last 72 hours. No results for input(s): LIPASE, AMYLASE in the last 72 hours. No results for input(s): PROT, INR in the last 72 hours. CT THORACIC SPINE WO CONTRAST     Result Date: 11/9/2022  EXAMINATION: CT OF THE THORACIC SPINE WITHOUT CONTRAST  11/9/2022 9:13 am: TECHNIQUE: CT of the thoracic spine was performed without the administration of intravenous contrast. Multiplanar reformatted images are provided for review. Automated exposure control, iterative reconstruction, and/or weight based adjustment of the mA/kV was utilized to reduce the radiation dose to as low as reasonably achievable. COMPARISON: None. HISTORY: ORDERING SYSTEM PROVIDED HISTORY: fall, back pain, midline TECHNOLOGIST PROVIDED HISTORY: Reason for exam:->fall, back pain, midline What reading provider will be dictating this exam?->CRC FINDINGS: There is straightening of the alignment of the columns of the thoracic spine visualized.  Depression of the superior endplate with a fracture plane visualized traversing the anterior aspect of the L1 vertebral body was not seen on the prior study consistent with acute fractures. Multilevel degenerative endplate changes are visualized with anterior osteophyte formation. Degenerative intervertebral disc disease visualized, no evidence of spondylolisthesis is seen. No paraspinal mass is seen. Degenerative changes of the thoracic spine. Acute fractures of the L1 vertebral body. CT LUMBAR SPINE WO CONTRAST     Result Date: 11/9/2022  EXAMINATION: CT OF THE LUMBAR SPINE WITHOUT CONTRAST  11/9/2022 TECHNIQUE: CT of the lumbar spine was performed without the administration of intravenous contrast. Multiplanar reformatted images are provided for review. Adjustment of mA and/or kV according to patient size was utilized. Automated exposure control, iterative reconstruction, and/or weight based adjustment of the mA/kV was utilized to reduce the radiation dose to as low as reasonably achievable. COMPARISON: CT abdomen pelvis August 16, 2022 HISTORY: ORDERING SYSTEM PROVIDED HISTORY: fall, back pain, midline TECHNOLOGIST PROVIDED HISTORY: Reason for exam:->fall, back pain, midline Decision Support Exception - unselect if not a suspected or confirmed emergency medical condition->Emergency Medical Condition (MA) What reading provider will be dictating this exam?->CRC FINDINGS: BONES/ALIGNMENT: There is mild dextroscoliosis lumbar spine. There is new L1 compression fracture with 50% central loss of height which is new. There is gas within a portion of the vertebral body suggesting osteoporotic compression fracture. Acute Schmorl's node thought less likely. There is 40% loss of height with downward sloping superior endplate of L2 which is unchanged. DEGENERATIVE CHANGES: Moderate to severe disc space narrowing with vacuum phenomena L3-4. Large endplate osteophytes noted. Moderate-sized disc bulge also noted. There is pars defect at L5. There is grade 1 anterolisthesis L5 on S1 measuring 4 mm. Associated disc bulge noted.  SOFT TISSUES/RETROPERITONEUM: Subtle atrophy left psoas muscle. New loss of height felt represent new compression fracture with 50% central loss of height at the L1 level. Moderate to severe degenerative disc changes L3-4. Pars defect L5 with grade 1 anterolisthesis L5 on S1.      11 9 2022 XR Lumbar spine  EXAMINATION:   3 XRAY VIEWS OF THE LUMBAR SPINE       11/9/2022 10:56 am       COMPARISON:   CT abdomen and pelvis 08/16/2022. HISTORY:   ORDERING SYSTEM PROVIDED HISTORY: fracture L1   TECHNOLOGIST PROVIDED HISTORY:   Reason for exam:->fracture L1   What reading provider will be dictating this exam?->CRC       FINDINGS:   AP, lateral, and coned-down lateral views of the lumbar spine were obtained. The bones are demineralized. There is rightward curvature in the lumbar   spine. There is bilateral spondylolysis at L5 with grade 1 anterolisthesis   at L5-S1. The remaining alignment is anatomic. There is stable wedge   compression deformity at L2. There is new wedge compression deformity at L1   compared to 08/16/2022. There is approximately 30% loss of height   anteriorly. There is vacuum disc phenomenon with endplate sclerosis at H5-8. There is endplate osteophyte formation at all levels throughout the lumbar   spine. There is multilevel facet hypertrophy. The pedicles are intact at   all levels. The paravertebral soft tissue structures are unremarkable. The   bilateral sacroiliac joints are patent and symmetric. Impression   1. New compression deformity at L1 compared to 08/16/2022.   2. Stable wedge compression deformity at L2.   3. Bilateral spondylolysis at L5 with grade 1 anterolisthesis at L5-S1,   stable. 4. Multilevel degenerative disc disease and facet arthropathy in the lumbar   spine.            Impression:      L1 osteoporotic compression fracture  Chronic low back pain  Insulin-dependent diabetic mellitus type II with retinopathy       Plan:      L1 vertebral augmentation kyphoplasty    The surgical/medical procedure, indications and risks have been discussed. There is a low chance of having any type of risk, but risks are still present including serious risks as pain, bleeding, infection, death, paralysis, sensory loss, bladder bowel and sexual dysfunction, chance of recurrence and so forth. Surgery is not a guarantee of normalcy. We cannot undo any permanent damage already done. We cannot change the course of any of the other medical diseases. I have discussed alternative procedures, risks and benefits. I have answered all questions. There is understanding and agreement to proceed.

## 2022-11-09 NOTE — TELEPHONE ENCOUNTER
Pt is calling to inform you he has a Compression fracture of L1 vertebra. He concern because some of the medication will make his bones brittle.

## 2022-11-09 NOTE — ED PROVIDER NOTES
[address] ED  EMERGENCY DEPARTMENT ENCOUNTER      Pt Name: Dana Reyes  MRN: [id number]  [place] 1960  Date of evaluation: 11/9/2022  Provider: Nanci Wen, [address] [place] [address]       Chief Complaint   Patient presents with    Back Pain     Fall 5 days ago         HISTORY OF PRESENT ILLNESS   (Location/Symptom, Timing/Onset, Context/Setting, Quality, Duration, Modifying Factors, Severity)  Note limiting factors. Dana eRyes is a 65 y.o. male who per chart review has a past medical history of ischemic cardiomyopathy hypertension obesity hyperlipidemia migraine CHF CAD asthma TIIDM presents to the emergency department for evaluation of back pain. Pt states he has chronic low back pain, s/p laminectomy in 2012 with DR. Rosana Marks. He states on Halloween he walked his grand kids around the neighborhood for trick or treat and had to go home as back pain was worsening at that time. He states that night he got up to use the bathroom and tripped and fell, falling to the ground. He states he landed on his back. Did not hit head, no LOC. He is on plavix. He states this fall further exacerbated the pain. He states pain feels consistent with chronic back pain however a little bit higher up. Some relief with heating pad. Has not tried any medications at home for symptoms. States pain worsens with movement. He states when going from a laying to sitting position if he \"uses his back muscles to get up\" the pain worsens. He is able to ambulate. He denies fever chills cp sob abd pain nvd bowel or bladder incontinence urinary retention saddle anesthesia numbness tingling weakness headache dizziness urinary symptoms constipation diarrhea syncope dizziness or lightheadedness. Pt does have some peripheral neuropathy in toes, chronic, 2/2 to TIIDM. HPI    Nursing Notes were reviewed.     REVIEW OF SYSTEMS    (2-9 systems for level 4, 10 or more for level 5)     Review of Systems   Constitutional:  Negative for chills, fatigue and fever. HENT:  Negative for congestion. Eyes:  Negative for photophobia. Respiratory:  Negative for cough, shortness of breath and wheezing. Cardiovascular:  Negative for chest pain and palpitations. Gastrointestinal:  Negative for abdominal distention, abdominal pain, constipation, diarrhea, nausea and vomiting. Genitourinary:  Negative for dysuria, frequency and hematuria. Musculoskeletal:  Positive for back pain. Negative for joint swelling and myalgias. Allergic/Immunologic: Negative for immunocompromised state. Neurological:  Negative for dizziness, weakness and headaches. All other systems reviewed and are negative. Except as noted above the remainder of the review of systems was reviewed and negative.        PAST MEDICAL HISTORY     Past Medical History:   Diagnosis Date    Asthma     Back pain     CAD (coronary artery disease) 01/2020    3 vessel     chf 9/16/2022    Diabetes mellitus ([facility] [address]     Essential hypertension, benign     Hyperlipemia     Ischemic cardiomyopathy 9/16/2022    Neuropathic pain, leg, bilateral          SURGICAL HISTORY       Past Surgical History:   Procedure Laterality Date    CARPAL TUNNEL RELEASE Bilateral     FOOT SURGERY Left     bone spur    LAMINOTOMY  2012         CURRENT MEDICATIONS       Discharge Medication List as of 11/9/2022 11:11 AM        CONTINUE these medications which have NOT CHANGED    Details   hydrALAZINE (APRESOLINE) 25 MG tablet Take 1 tablet by mouth in the morning and at bedtime, Disp-60 tablet, R-3Normal      !! blood glucose test strips (FREESTYLE LITE) strip Tid E11.65, Disp-300 strip, R-3Normal      NOVOLOG 100 UNIT/ML injection vial Use via insulin pump max daily dose 100 units, DAWHistorical Med      metFORMIN (GLUCOPHAGE) 1000 MG tablet TAKE 1 TABLET TWICE DAILY, Disp-180 tablet, R-1requests 90 day supplyNormal      empagliflozin (JARDIANCE) 10 MG tablet Take 1 tablet by mouth daily, Disp-30 tablet, R-5Normal carvedilol (COREG) 12.5 MG tablet Take 1 tablet by mouth 2 times daily, Disp-60 tablet, R-3Normal      Continuous Blood Gluc  (FREESTYLE TESS 2 READER) MARIUM Give 1 , Disp-1 each, R-1Normal      Continuous Blood Gluc Sensor (FREESTYLE TESS 2 SENSOR) MISC Change every 2 weeks, Disp-2 each, R-3Normal      clopidogrel (PLAVIX) 75 MG tablet Take 1 tablet by mouth daily, Disp-30 tablet, R-3Normal      sacubitril-valsartan (ENTRESTO) 24-26 MG per tablet Take 1 tablet by mouth 2 times daily, Disp-60 tablet, R-3Normal      aspirin 81 MG EC tablet Take 1 tablet by mouth daily, Disp-30 tablet, R-3Normal      albuterol sulfate  (90 Base) MCG/ACT inhaler Inhale 2 puffs into the lungs every 6 hours as needed for Wheezing, Disp-18 g, R-5Normal      !! Accu-Chek FastClix Lancets MISC Disp-150 each, R-3, NormalTest 4x daily Dx E11.65      !! blood glucose test strips (ACCU-CHEK GUIDE) strip Test 4x daily Dx E11.65, Disp-150 each, R-3Normal      insulin aspart protamine-insulin aspart (NOVOLOG MIX 70/30 FLEXPEN) (70-30) 100 UNIT/ML injection 30 units twice day, Disp-10 pen, R-5Pen needles #100, rf x 5Normal      rosuvastatin (CRESTOR) 20 MG tablet Take 1 tablet by mouth nightly, Disp-30 tablet, R-5Normal      Blood Glucose Monitoring Suppl (FREESTYLE LITE) MARIUM DAILY PRN Starting Fri 11/19/2021, Disp-1 each, R-3, Normal      !! FreeStyle Lancets MISC DAILY Starting Fri 11/19/2021, Disp-300 each, R-3, Normal      SYMBICORT 80-4.5 MCG/ACT AERO Inhale 2 puffs into the lungs 2 times daily, Disp-10.2 g, R-5Normal      !! Lancets MISC Disp-150 each, R-3, NormalPt test 4x daily Dx E11.65 (dispense covered brand)      Insulin Pen Needle (NOVOFINE) 32G X 6 MM MISC Disp-300 each, R-3, NormalBid       !! - Potential duplicate medications found. Please discuss with provider. ALLERGIES     Patient has no known allergies.     FAMILY HISTORY       Family History   Problem Relation Age of Onset    Other Father accident    Heart Failure Mother     Heart Disease Brother     Cirrhosis Brother           SOCIAL HISTORY       Social History     Socioeconomic History    Marital status:      Spouse name: Rahul Love    Number of children: 2    Years of education: 14    Highest education level: Some college, no degree   Occupational History    Occupation: disability    Tobacco Use    Smoking status: Never    Smokeless tobacco: Never   Vaping Use    Vaping Use: Never used   Substance and Sexual Activity    Alcohol use: Yes     Alcohol/week: 2.0 standard drinks     Types: 2 Cans of beer per week     Comment: every 6 months or so     Drug use: No    Sexual activity: Not Currently     Partners: Female   Social History Narrative    Lives with wife. Per patient he has been  for 2 years. He met his wife online and went to [facility] and brought her back to [facility]. They live in [address] [place] [address] no steps into home. It is a 2 story home. Bedroom and bath upstairs. Bathroom down. Per patient once he is down the steps he doesn't go back up during the day. 2 daughters local.      Per he has very poor vision. He does not like to drive at night or when raining. Patient states he struggles looking at a computer or reading due to vision. Social Determinants of Health     Financial Resource Strain: Low Risk     Difficulty of Paying Living Expenses: Not hard at all   Food Insecurity: No Food Insecurity    Worried About [facility] in the Last Year: Never true    [facility] in the Last Year: Never true   Transportation Needs: No Transportation Needs    Lack of Transportation (Medical): No    Lack of Transportation (Non-Medical):  No   Physical Activity: Inactive    Days of Exercise per Week: 0 days    Minutes of Exercise per Session: 0 min       SCREENINGS         [place] Coma Scale  Eye Opening: Spontaneous  Best Verbal Response: Oriented  Best Motor Response: Obeys commands  Angélica Coma Scale Score: 15 [facility] Assessment  BP: (!) 156/97  Heart Rate: 89                 PHYSICAL EXAM    (up to 7 for level 4, 8 or more for level 5)     ED Triage Vitals   BP Temp Temp Source Heart Rate Resp SpO2 Height Weight   11/09/22 0812 11/09/22 0806 11/09/22 0806 11/09/22 0806 11/09/22 0806 11/09/22 0806 11/09/22 0806 11/09/22 0806   (!) 177/100 97.7 °F (36.5 °C) Oral 95 18 98 % 5' 6\" (1.676 m) 165 lb (74.8 kg)       Physical Exam  Constitutional:       General: He is not in acute distress. Appearance: He is well-developed. He is not ill-appearing, toxic-appearing or diaphoretic. HENT:      Head: Normocephalic and atraumatic. Nose: Nose normal.      Mouth/Throat:      Mouth: Mucous membranes are moist.   Eyes:      Pupils: Pupils are equal, round, and reactive to light. Cardiovascular:      Rate and Rhythm: Normal rate and regular rhythm. Heart sounds: No murmur heard. No friction rub. No gallop. Pulmonary:      Effort: Pulmonary effort is normal.      Breath sounds: Normal breath sounds. No wheezing, rhonchi or rales. Abdominal:      General: Bowel sounds are normal. There is no distension. Palpations: Abdomen is soft. Tenderness: There is no abdominal tenderness. There is no guarding or rebound. Musculoskeletal:         General: No swelling. Cervical back: Normal and normal range of motion. Thoracic back: Tenderness present. No swelling, edema, deformity, signs of trauma, lacerations, spasms or bony tenderness. Decreased range of motion. No scoliosis. Lumbar back: Tenderness present. No swelling, edema, deformity, signs of trauma or lacerations. Decreased range of motion. Negative right straight leg raise test and negative left straight leg raise test.        Back:       Right lower leg: No edema. Left lower leg: No edema. Comments: No rash    Skin:     General: Skin is warm and dry. Capillary Refill: Capillary refill takes less than 2 seconds.       Findings: No rash. Neurological:      General: No focal deficit present. Mental Status: He is alert and oriented to person, place, and time. Cranial Nerves: Cranial nerves 2-12 are intact. Sensory: Sensation is intact. Motor: Motor function is intact. Coordination: Coordination is intact. Deep Tendon Reflexes: Reflexes are normal and symmetric. Comments: Slight antalgic gait 2/2 to pain  Bilateral LE neurovascularly intact, strength 5/5 and sensation intact/symmetrical.        DIAGNOSTIC RESULTS     EKG: All EKG's are interpreted by the Emergency Department Physician who either signs or Co-signs this chart in the absence of a cardiologist.        RADIOLOGY:   Non-plain film images such as CT, Ultrasound and MRI are read by the radiologist. Plain radiographic images are visualized and preliminarily interpreted by the emergency physician with the below findings:        Interpretation per the Radiologist below, if available at the time of this note:    XR LUMBAR SPINE (2-3 VIEWS)   Final Result   1. New compression deformity at L1 compared to 08/16/2022.   2. Stable wedge compression deformity at L2.   3. Bilateral spondylolysis at L5 with grade 1 anterolisthesis at L5-S1,   stable. 4. Multilevel degenerative disc disease and facet arthropathy in the lumbar   spine. CT THORACIC SPINE WO CONTRAST   Final Result   Degenerative changes of the thoracic spine. Acute fractures of the L1 vertebral body. CT LUMBAR SPINE WO CONTRAST   Final Result   New loss of height felt represent new compression fracture with 50% central   loss of height at the L1 level. Moderate to severe degenerative disc changes L3-4. Pars defect L5 with grade 1 anterolisthesis L5 on S1.               ED BEDSIDE ULTRASOUND:   Performed by ED Physician - none    LABS:  Labs Reviewed - No data to display    All other labs were within normal range or not returned as of this dictation.     EMERGENCY DEPARTMENT COURSE and DIFFERENTIAL DIAGNOSIS/MDM:   Vitals:    Vitals:    11/09/22 0812 11/09/22 0900 11/09/22 0936 11/09/22 1000   BP: (!) 177/100 (!) 165/96 (!) 163/87 (!) 156/97   Pulse:   88 89   Resp:   20 18   Temp:       TempSrc:       SpO2:   99% 98%   Weight:       Height:         MDM    Patient presents with low back pain. Afebrile hemodynamically stable. Patient was given IM Toradol in the ED. Unable to control pain further as he drove to the emergency department and was not able to get a ride home. CT of the lumbar spine shows new L1 compression fracture with 50% central loss of height. Likely osteoporotic. CT of the thoracic spine negative for acute abnormality. Spoke with Dr. Denver Sloan of neurosurgery who will follow up with patient in his office tomorrow at 9:30 AM. Agrees with outpatient management at this time. Patient is stable for discharge at this time. No signs symptoms or physical exam findings to suggest cauda equina syndrome therefore do not feel emergent MRI imaging of the lumbar spine indicated today. He is ambulatory in the ED without difficulty. We will treat with a short course of Percocet. Return to the ED for worsening symptoms given warning signs for which she should return. REASSESSMENT          CRITICAL CARE TIME   Total Critical Care time was 0 minutes, excluding separately reportable procedures. There was a high probability of clinically significant/life threatening deterioration in the patient's condition which required my urgent intervention. CONSULTS:  None    PROCEDURES:  Unless otherwise noted below, none     Procedures        FINAL IMPRESSION      1.  Compression fracture of L1 vertebra, initial encounter [facility])          DISPOSITION/PLAN   DISPOSITION Decision To Discharge 11/09/2022 11:10:30 AM      PATIENT REFERRED TO:  [facility]) ED  [address] [address] [phone]  Go to   As needed, If symptoms worsen    Markus De La Torre MD [place]    Go in 1 day  9:30 am    DISCHARGE MEDICATIONS:  Discharge Medication List as of 11/9/2022 11:11 AM        START taking these medications    Details   oxyCODONE-acetaminophen (PERCOCET) 5-325 MG per tablet Take 1 tablet by mouth every 6 hours as needed for Pain for up to 5 days. Intended supply: 3 days. Take lowest dose possible to manage pain, Disp-12 tablet, R-0Print      ondansetron (ZOFRAN-ODT) 4 MG disintegrating tablet Take 1 tablet by mouth 3 times daily as needed for Nausea or Vomiting, Disp-21 tablet, R-0Print           Controlled Substances Monitoring:     RX Monitoring 8/20/2018   Attestation The Prescription Monitoring Report for this patient was reviewed today. Periodic Controlled Substance Monitoring Possible medication side effects, risk of tolerance/dependence & alternative treatments discussed. ;No signs of potential drug abuse or diversion identified.;Obtaining appropriate analgesic effect of treatment.        (Please note that portions of this note were completed with a voice recognition program.  Efforts were made to edit the dictations but occasionally words are mis-transcribed.)    Caitlin Parekh PA-C (electronically signed)             Caitlin Parekh PA-C  11/10/22 [id number]

## 2022-11-09 NOTE — PROGRESS NOTES
Patient Name: Greg Logan : 1960        Date: 11/10/2022      Type of Appt: Follow up    Reason for appt: Follow up per [facility] - seen at [facility] 22 - Dx L1 fracture    Studies done: 22 T/S & L/S CT, L/S X-RAY @ [facility]    Smoking: No    REVIEW OF SYSTEMS:    Sleep Disturbance, Back Pain   Attending Physician: No att. providers found  Reason for consult: L1 osteoporotic compression fracture     History of Presenting Illness and Review of Systems      Greg Logan is a 65 y.o. male on hospital day 0 . History Of Present Illness:  Increased back pain status post fall almost 2 weeks ago ago worsening of his chronic low back pain with urinary urgency difficulty walking. Pain across the lumbar area. Pain inhibiting his ability to ambulate take care of himself is not eating well. It is getting worse instead of better. insulin-dependent diabetes mellitus. He has an insulin pump which is not working at this time. He has diabetic retinopathy.      History:       Past Medical History        Past Medical History:   Diagnosis Date    Asthma      Back pain      CAD (coronary artery disease) 2020     3 vessel     chf 2022    Diabetes mellitus ([address]      Essential hypertension, benign      Hyperlipemia      Ischemic cardiomyopathy 2022    Neuropathic pain, leg, bilateral           Past Surgical History         Past Surgical History:   Procedure Laterality Date    CARPAL TUNNEL RELEASE Bilateral      FOOT SURGERY Left       bone spur    LAMINOTOMY   2012            Family History  Family History         Family History   Problem Relation Age of Onset    Other Father           accident    Heart Failure Mother      Heart Disease Brother      Cirrhosis Brother           [] Unable to obtain due to ventilated and/ or neurologic status     Social History               Socioeconomic History    Marital status:        Spouse name: Carolina Leonardo    Number of children: 2    Years of education: 14    Highest education level: Some college, no degree   Occupational History    Occupation: disability    Tobacco Use    Smoking status: Never    Smokeless tobacco: Never   Vaping Use    Vaping Use: Never used   Substance and Sexual Activity    Alcohol use: Yes       Alcohol/week: 2.0 standard drinks       Types: 2 Cans of beer per week       Comment: every 6 months or so     Drug use: No    Sexual activity: Not Currently       Partners: Female   Other Topics Concern    Not on file   Social History Narrative     Lives with wife. Per patient he has been  for 2 years. He met his wife online and went to [facility] and brought her back to [facility]. They live in [facility] no steps into home. It is a 2 story home. Bedroom and bath upstairs. Bathroom down. Per patient once he is down the steps he doesn't go back up during the day. 2 daughters local.       Per he has very poor vision. He does not like to drive at night or when raining. Patient states he struggles looking at a computer or reading due to vision. Social Determinants of Health          Financial Resource Strain: Low Risk     Difficulty of Paying Living Expenses: Not hard at all   Food Insecurity: No Food Insecurity    Worried About [address] [facility] in the Last Year: Never true    [address] [facility] [address] in the Last Year: Never true   Transportation Needs: No Transportation Needs    Lack of Transportation (Medical): No    Lack of Transportation (Non-Medical): No   Physical Activity: Inactive    Days of Exercise per Week: 0 days    Minutes of Exercise per Session: 0 min   Stress: Not on file   Social Connections: Not on file   Intimate Partner Violence: Not on file   Housing Stability: Not on file         [] Unable to obtain due to ventilated and/ or neurologic status     Home Medications:      Prescriptions Prior to Admission   Not in a hospital admission. Current Hospital Medications:      Scheduled Meds:  Continuous Infusions:  PRN Meds:. Author Junior Allergies:      No Known Allergies         REVIEW OF SYSTEMS    (2-9 systems for level 4, 10 or more for level 5)      Review of Systems   Constitutional:  Negative for fever. HENT:  Negative for hearing loss. Eyes:  Negative for pain. Diabetic retinopathy visual decrease  Respiratory:  Negative for shortness of breath. Gastrointestinal:  Negative for nausea. Endocrine: Negative for heat intolerance. Genitourinary:  Positive for difficulty urinating and urgency. Musculoskeletal:  Positive for back pain. Negative for neck pain. Skin:  Negative for rash. Allergic/Immunologic: Negative for immunocompromised state. Neurological:  Negative for headaches. Psychiatric/Behavioral:  Negative for sleep disturbance. Except as noted above the remainder of the review of systems was reviewed and negative. Physical Exam      BP (!) 156/97   Pulse 89   Temp 97.7 °F (36.5 °C) (Oral)   Resp 18   Ht 5' 6\" (1.676 m)   Wt 165 lb (74.8 kg)   SpO2 98%   BMI 26.63 kg/m²   Body mass index is 26.63 kg/m². Physical Exam  Vitals and nursing note reviewed. Constitutional:       Appearance: Normal appearance. HENT:      Head: Normocephalic and atraumatic. Right Ear: External ear normal.      Left Ear: External ear normal.      Nose: Nose normal.      Mouth/Throat:      Pharynx: Oropharynx is clear. Eyes: Diabetic retinopathy has difficulty with vision     Extraocular Movements: Extraocular movements intact. Cardiovascular:      Pulses: Normal pulses. Pulmonary:      Effort: Pulmonary effort is normal.   Abdominal:      Palpations: Abdomen is soft. Genitourinary:     Rectum: Normal.   Musculoskeletal:    Difficulty getting up out of the chair secondary to his severe low back pain. Extremely tender to percussion thorax lumbar junction     General: Normal range of motion. Cervical back: Normal range of motion. Skin:     General: Skin is warm.    Neurological:      General: No focal deficit present. Psychiatric:         Mood and Affect: Mood normal.         I have reviewed all laboratory studies, reports, data, and pertinent images. Objective Findings:      Vitals:   Vitals          Vitals:     11/09/22 0812 11/09/22 0900 11/09/22 0936 11/09/22 1000   BP: (!) 177/100 (!) 165/96 (!) 163/87 (!) 156/97   Pulse:     88 89   Resp:     20 18   Temp:           TempSrc:           SpO2:     99% 98%   Weight:           Height:                    Laboratory, Microbiology, Pathology, Radiology, Cardiology, Medications and Transcriptions reviewed  Scheduled Meds:  Continuous Infusions:     No results for input(s): WBC, HGB, HCT, MCV, PLT in the last 72 hours. No results for input(s): NA, K, CL, CO2, PHOS, BUN, CREATININE, CA in the last 72 hours. No results for input(s): AST, ALT, ALB, BILIDIR, BILITOT, ALKPHOS in the last 72 hours. No results for input(s): LIPASE, AMYLASE in the last 72 hours. No results for input(s): PROT, INR in the last 72 hours. CT THORACIC SPINE WO CONTRAST     Result Date: 11/9/2022  EXAMINATION: CT OF THE THORACIC SPINE WITHOUT CONTRAST  11/9/2022 9:13 am: TECHNIQUE: CT of the thoracic spine was performed without the administration of intravenous contrast. Multiplanar reformatted images are provided for review. Automated exposure control, iterative reconstruction, and/or weight based adjustment of the mA/kV was utilized to reduce the radiation dose to as low as reasonably achievable. COMPARISON: None. HISTORY: ORDERING SYSTEM PROVIDED HISTORY: fall, back pain, midline TECHNOLOGIST PROVIDED HISTORY: Reason for exam:->fall, back pain, midline What reading provider will be dictating this exam?->CRC FINDINGS: There is straightening of the alignment of the columns of the thoracic spine visualized.  Depression of the superior endplate with a fracture plane visualized traversing the anterior aspect of the L1 vertebral body was not seen on the prior study consistent with acute fractures. Multilevel degenerative endplate changes are visualized with anterior osteophyte formation. Degenerative intervertebral disc disease visualized, no evidence of spondylolisthesis is seen. No paraspinal mass is seen. Degenerative changes of the thoracic spine. Acute fractures of the L1 vertebral body. CT LUMBAR SPINE WO CONTRAST     Result Date: 11/9/2022  EXAMINATION: CT OF THE LUMBAR SPINE WITHOUT CONTRAST  11/9/2022 TECHNIQUE: CT of the lumbar spine was performed without the administration of intravenous contrast. Multiplanar reformatted images are provided for review. Adjustment of mA and/or kV according to patient size was utilized. Automated exposure control, iterative reconstruction, and/or weight based adjustment of the mA/kV was utilized to reduce the radiation dose to as low as reasonably achievable. COMPARISON: CT abdomen pelvis August 16, 2022 HISTORY: ORDERING SYSTEM PROVIDED HISTORY: fall, back pain, midline TECHNOLOGIST PROVIDED HISTORY: Reason for exam:->fall, back pain, midline Decision Support Exception - unselect if not a suspected or confirmed emergency medical condition->Emergency Medical Condition (MA) What reading provider will be dictating this exam?->CRC FINDINGS: BONES/ALIGNMENT: There is mild dextroscoliosis lumbar spine. There is new L1 compression fracture with 50% central loss of height which is new. There is gas within a portion of the vertebral body suggesting osteoporotic compression fracture. Acute Schmorl's node thought less likely. There is 40% loss of height with downward sloping superior endplate of L2 which is unchanged. DEGENERATIVE CHANGES: Moderate to severe disc space narrowing with vacuum phenomena L3-4. Large endplate osteophytes noted. Moderate-sized disc bulge also noted. There is pars defect at L5. There is grade 1 anterolisthesis L5 on S1 measuring 4 mm. Associated disc bulge noted.  SOFT TISSUES/RETROPERITONEUM: Subtle atrophy left psoas muscle. New loss of height felt represent new compression fracture with 50% central loss of height at the L1 level. Moderate to severe degenerative disc changes L3-4. Pars defect L5 with grade 1 anterolisthesis L5 on S1.      11 9 2022 XR Lumbar spine  EXAMINATION:   3 XRAY VIEWS OF THE LUMBAR SPINE       11/9/2022 10:56 am       COMPARISON:   CT abdomen and pelvis 08/16/2022. HISTORY:   ORDERING SYSTEM PROVIDED HISTORY: fracture L1   TECHNOLOGIST PROVIDED HISTORY:   Reason for exam:->fracture L1   What reading provider will be dictating this exam?->CRC       FINDINGS:   AP, lateral, and coned-down lateral views of the lumbar spine were obtained. The bones are demineralized. There is rightward curvature in the lumbar   spine. There is bilateral spondylolysis at L5 with grade 1 anterolisthesis   at L5-S1. The remaining alignment is anatomic. There is stable wedge   compression deformity at L2. There is new wedge compression deformity at L1   compared to 08/16/2022. There is approximately 30% loss of height   anteriorly. There is vacuum disc phenomenon with endplate sclerosis at B4-4. There is endplate osteophyte formation at all levels throughout the lumbar   spine. There is multilevel facet hypertrophy. The pedicles are intact at   all levels. The paravertebral soft tissue structures are unremarkable. The   bilateral sacroiliac joints are patent and symmetric. Impression   1. New compression deformity at L1 compared to 08/16/2022.   2. Stable wedge compression deformity at L2.   3. Bilateral spondylolysis at L5 with grade 1 anterolisthesis at L5-S1,   stable. 4. Multilevel degenerative disc disease and facet arthropathy in the lumbar   spine.            Impression:      L1 osteoporotic compression fracture  Chronic low back pain  Insulin-dependent diabetic mellitus type II with retinopathy       Plan:      L1 vertebral augmentation kyphoplasty    The surgical/medical procedure, indications and risks have been discussed. There is a low chance of having any type of risk, but risks are still present including serious risks as pain, bleeding, infection, death, paralysis, sensory loss, bladder bowel and sexual dysfunction, chance of recurrence and so forth. Surgery is not a guarantee of normalcy. We cannot undo any permanent damage already done. We cannot change the course of any of the other medical diseases. I have discussed alternative procedures, risks and benefits. I have answered all questions. There is understanding and agreement to proceed.

## 2022-11-09 NOTE — CONSULTS
Patient Name: Jamarcus Martines  Admit Date: 2022  8:26 AM  MR #: [id number]  : 1960    Attending Physician: No att. providers found  Reason for consult: L1 osteoporotic compression fracture    History of Presenting Illness and Review of Systems     Jamarcus Martines is a 65 y.o. male on hospital day 0 . History Of Present Illness:  Increased back pain status post fall 1 week ago worsening of his chronic low back pain with urinary urgency difficulty walking. Pain across the lumbar area. History:      Past Medical History:   Diagnosis Date    Asthma     Back pain     CAD (coronary artery disease) 2020    3 vessel     chf 2022    Diabetes mellitus ([address]     Essential hypertension, benign     Hyperlipemia     Ischemic cardiomyopathy 2022    Neuropathic pain, leg, bilateral      Past Surgical History:   Procedure Laterality Date    CARPAL TUNNEL RELEASE Bilateral     FOOT SURGERY Left     bone spur    LAMINOTOMY         Family History  Family History   Problem Relation Age of Onset    Other Father         accident    Heart Failure Mother     Heart Disease Brother     Cirrhosis Brother      [] Unable to obtain due to ventilated and/ or neurologic status    Social History     Socioeconomic History    Marital status:      Spouse name: Ricky Donahue Number of children: 2    Years of education: 15    Highest education level: Some college, no degree   Occupational History    Occupation: disability    Tobacco Use    Smoking status: Never    Smokeless tobacco: Never   Vaping Use    Vaping Use: Never used   Substance and Sexual Activity    Alcohol use: Yes     Alcohol/week: 2.0 standard drinks     Types: 2 Cans of beer per week     Comment: every 6 months or so     Drug use: No    Sexual activity: Not Currently     Partners: Female   Other Topics Concern    Not on file   Social History Narrative    Lives with wife. Per patient he has been  for 2 years.   He met his wife online and went to [facility] and brought her back to [facility]. They live in [address] no steps into home. It is a 2 story home. Bedroom and bath upstairs. Bathroom down. Per patient once he is down the steps he doesn't go back up during the day. 2 daughters local.      Per he has very poor vision. He does not like to drive at night or when raining. Patient states he struggles looking at a computer or reading due to vision. Social Determinants of Health     Financial Resource Strain: Low Risk     Difficulty of Paying Living Expenses: Not hard at all   Food Insecurity: No Food Insecurity    Worried About Running Out of Food in the Last Year: Never true    Gricel of Food in the Last Year: Never true   Transportation Needs: No Transportation Needs    Lack of Transportation (Medical): No    Lack of Transportation (Non-Medical): No   Physical Activity: Inactive    Days of Exercise per Week: 0 days    Minutes of Exercise per Session: 0 min   Stress: Not on file   Social Connections: Not on file   Intimate Partner Violence: Not on file   Housing Stability: Not on file      [] Unable to obtain due to ventilated and/ or neurologic status    Home Medications:      Not in a hospital admission. Current Hospital Medications:     Scheduled Meds:  Continuous Infusions:  PRN Meds:. .       Allergies:     No Known Allergies       REVIEW OF SYSTEMS    (2-9 systems for level 4, 10 or more for level 5)     Review of Systems   Constitutional:  Negative for fever. HENT:  Negative for hearing loss. Eyes:  Negative for pain. Respiratory:  Negative for shortness of breath. Gastrointestinal:  Negative for nausea. Endocrine: Negative for heat intolerance. Genitourinary:  Positive for difficulty urinating and urgency. Musculoskeletal:  Positive for back pain. Negative for neck pain. Skin:  Negative for rash. Allergic/Immunologic: Negative for immunocompromised state.    Neurological:  Negative for headaches. Psychiatric/Behavioral:  Negative for sleep disturbance. Except as noted above the remainder of the review of systems was reviewed and negative. Physical Exam     BP (!) 156/97   Pulse 89   Temp 97.7 °F (36.5 °C) (Oral)   Resp 18   Ht 5' 6\" (1.676 m)   Wt 165 lb (74.8 kg)   SpO2 98%   BMI 26.63 kg/m²   Body mass index is 26.63 kg/m². Physical Exam  Vitals and nursing note reviewed. Constitutional:       Appearance: Normal appearance. HENT:      Head: Normocephalic and atraumatic. Right Ear: External ear normal.      Left Ear: External ear normal.      Nose: Nose normal.      Mouth/Throat:      Pharynx: Oropharynx is clear. Eyes:      Extraocular Movements: Extraocular movements intact. Cardiovascular:      Pulses: Normal pulses. Pulmonary:      Effort: Pulmonary effort is normal.   Abdominal:      Palpations: Abdomen is soft. Genitourinary:     Rectum: Normal.   Musculoskeletal:         General: Normal range of motion. Cervical back: Normal range of motion. Skin:     General: Skin is warm. Neurological:      General: No focal deficit present. Psychiatric:         Mood and Affect: Mood normal.        I have reviewed all laboratory studies, reports, data, and pertinent images. Objective Findings:     Vitals:   Vitals:    11/09/22 0812 11/09/22 0900 11/09/22 0936 11/09/22 1000   BP: (!) 177/100 (!) 165/96 (!) 163/87 (!) 156/97   Pulse:   88 89   Resp:   20 18   Temp:       TempSrc:       SpO2:   99% 98%   Weight:       Height:            Laboratory, Microbiology, Pathology, Radiology, Cardiology, Medications and Transcriptions reviewed  Scheduled Meds:  Continuous Infusions:    No results for input(s): WBC, HGB, HCT, MCV, PLT in the last 72 hours. No results for input(s): NA, K, CL, CO2, PHOS, BUN, CREATININE, CA in the last 72 hours. No results for input(s): AST, ALT, ALB, BILIDIR, BILITOT, ALKPHOS in the last 72 hours.   No results for input(s): LIPASE, AMYLASE in the last 72 hours. No results for input(s): PROT, INR in the last 72 hours. CT THORACIC SPINE WO CONTRAST    Result Date: 11/9/2022  EXAMINATION: CT OF THE THORACIC SPINE WITHOUT CONTRAST  11/9/2022 9:13 am: TECHNIQUE: CT of the thoracic spine was performed without the administration of intravenous contrast. Multiplanar reformatted images are provided for review. Automated exposure control, iterative reconstruction, and/or weight based adjustment of the mA/kV was utilized to reduce the radiation dose to as low as reasonably achievable. COMPARISON: None. HISTORY: ORDERING SYSTEM PROVIDED HISTORY: fall, back pain, midline TECHNOLOGIST PROVIDED HISTORY: Reason for exam:->fall, back pain, midline What reading provider will be dictating this exam?->CRC FINDINGS: There is straightening of the alignment of the columns of the thoracic spine visualized. Depression of the superior endplate with a fracture plane visualized traversing the anterior aspect of the L1 vertebral body was not seen on the prior study consistent with acute fractures. Multilevel degenerative endplate changes are visualized with anterior osteophyte formation. Degenerative intervertebral disc disease visualized, no evidence of spondylolisthesis is seen. No paraspinal mass is seen. Degenerative changes of the thoracic spine. Acute fractures of the L1 vertebral body. CT LUMBAR SPINE WO CONTRAST    Result Date: 11/9/2022  EXAMINATION: CT OF THE LUMBAR SPINE WITHOUT CONTRAST  11/9/2022 TECHNIQUE: CT of the lumbar spine was performed without the administration of intravenous contrast. Multiplanar reformatted images are provided for review. Adjustment of mA and/or kV according to patient size was utilized. Automated exposure control, iterative reconstruction, and/or weight based adjustment of the mA/kV was utilized to reduce the radiation dose to as low as reasonably achievable.  COMPARISON: CT abdomen pelvis August 16, 2022 HISTORY: ORDERING SYSTEM PROVIDED HISTORY: fall, back pain, midline TECHNOLOGIST PROVIDED HISTORY: Reason for exam:->fall, back pain, midline Decision Support Exception - unselect if not a suspected or confirmed emergency medical condition->Emergency Medical Condition (MA) What reading provider will be dictating this exam?->CRC FINDINGS: BONES/ALIGNMENT: There is mild dextroscoliosis lumbar spine. There is new L1 compression fracture with 50% central loss of height which is new. There is gas within a portion of the vertebral body suggesting osteoporotic compression fracture. Acute Schmorl's node thought less likely. There is 40% loss of height with downward sloping superior endplate of L2 which is unchanged. DEGENERATIVE CHANGES: Moderate to severe disc space narrowing with vacuum phenomena L3-4. Large endplate osteophytes noted. Moderate-sized disc bulge also noted. There is pars defect at L5. There is grade 1 anterolisthesis L5 on S1 measuring 4 mm. Associated disc bulge noted. SOFT TISSUES/RETROPERITONEUM: Subtle atrophy left psoas muscle. New loss of height felt represent new compression fracture with 50% central loss of height at the L1 level. Moderate to severe degenerative disc changes L3-4. Pars defect L5 with grade 1 anterolisthesis L5 on S1. Impression:     L1 osteoporotic compression fracture  Chronic low back pain        Plan:     Follow up with the patient on an outpatient basis.   Obtain baseline x-rays lumbar spine        Comments:           Electronically signed by Shane Moreau MD on 11/9/2022 at 10:37 AM

## 2022-11-09 NOTE — ED TRIAGE NOTES
Pt has had increased back pain since falling a week ago  Pt states H/O lower back issues and back surgery  Pt states that he has issues with urinating on self due to not \"making it to bathroom on time\"  Pt states that he knows when has to go to restroom  Pt states difficulty walking and increased pain

## 2022-11-10 ENCOUNTER — OFFICE VISIT (OUTPATIENT)
Dept: NEUROSURGERY | Age: 62
End: 2022-11-10
Payer: MEDICARE

## 2022-11-10 VITALS
SYSTOLIC BLOOD PRESSURE: 136 MMHG | WEIGHT: 165 LBS | BODY MASS INDEX: 25.9 KG/M2 | TEMPERATURE: 96.9 F | HEIGHT: 67 IN | DIASTOLIC BLOOD PRESSURE: 82 MMHG

## 2022-11-10 DIAGNOSIS — M80.08XA CRUSH FRACTURE OF VERTEBRA DUE TO OSTEOPOROSIS, INITIAL ENCOUNTER (HCC): Primary | ICD-10-CM

## 2022-11-10 DIAGNOSIS — E11.3593 PROLIFERATIVE DIABETIC RETINOPATHY OF BOTH EYES ASSOCIATED WITH TYPE 2 DIABETES MELLITUS, UNSPECIFIED PROLIFERATIVE RETINOPATHY TYPE (HCC): ICD-10-CM

## 2022-11-10 DIAGNOSIS — Z79.4 DIABETES MELLITUS TYPE 2, INSULIN DEPENDENT (HCC): ICD-10-CM

## 2022-11-10 DIAGNOSIS — E11.9 DIABETES MELLITUS TYPE 2, INSULIN DEPENDENT (HCC): ICD-10-CM

## 2022-11-10 DIAGNOSIS — I50.22 CHRONIC SYSTOLIC (CONGESTIVE) HEART FAILURE (HCC): ICD-10-CM

## 2022-11-10 PROBLEM — E11.40 TYPE 2 DIABETES MELLITUS WITH DIABETIC NEUROPATHY (HCC): Status: ACTIVE | Noted: 2022-11-10

## 2022-11-10 PROCEDURE — G8484 FLU IMMUNIZE NO ADMIN: HCPCS | Performed by: NEUROLOGICAL SURGERY

## 2022-11-10 PROCEDURE — 3078F DIAST BP <80 MM HG: CPT | Performed by: NEUROLOGICAL SURGERY

## 2022-11-10 PROCEDURE — G8427 DOCREV CUR MEDS BY ELIG CLIN: HCPCS | Performed by: NEUROLOGICAL SURGERY

## 2022-11-10 PROCEDURE — 99213 OFFICE O/P EST LOW 20 MIN: CPT | Performed by: NEUROLOGICAL SURGERY

## 2022-11-10 PROCEDURE — G8417 CALC BMI ABV UP PARAM F/U: HCPCS | Performed by: NEUROLOGICAL SURGERY

## 2022-11-10 PROCEDURE — 3074F SYST BP LT 130 MM HG: CPT | Performed by: NEUROLOGICAL SURGERY

## 2022-11-10 PROCEDURE — 2022F DILAT RTA XM EVC RTNOPTHY: CPT | Performed by: NEUROLOGICAL SURGERY

## 2022-11-11 ENCOUNTER — HOSPITAL ENCOUNTER (OUTPATIENT)
Age: 62
Setting detail: OUTPATIENT SURGERY
Discharge: HOME | End: 2022-11-11
Attending: NEUROLOGICAL SURGERY | Admitting: NEUROLOGICAL SURGERY
Payer: MEDICARE

## 2022-11-11 ENCOUNTER — ANESTHESIA (OUTPATIENT)
Dept: OPERATING ROOM | Age: 62
End: 2022-11-11
Payer: MEDICARE

## 2022-11-11 ENCOUNTER — ANESTHESIA EVENT (OUTPATIENT)
Dept: OPERATING ROOM | Age: 62
End: 2022-11-11
Payer: MEDICARE

## 2022-11-11 ENCOUNTER — APPOINTMENT (OUTPATIENT)
Dept: GENERAL RADIOLOGY | Age: 62
End: 2022-11-11
Attending: NEUROLOGICAL SURGERY
Payer: MEDICARE

## 2022-11-11 VITALS
BODY MASS INDEX: 25.9 KG/M2 | OXYGEN SATURATION: 99 % | HEIGHT: 67 IN | HEART RATE: 88 BPM | TEMPERATURE: 97.4 F | DIASTOLIC BLOOD PRESSURE: 74 MMHG | WEIGHT: 165 LBS | SYSTOLIC BLOOD PRESSURE: 148 MMHG | RESPIRATION RATE: 15 BRPM

## 2022-11-11 LAB
APTT: 27.1 SEC (ref 24.4–36.8)
GLUCOSE BLD-MCNC: 196 MG/DL (ref 70–99)
GLUCOSE BLD-MCNC: 224 MG/DL (ref 70–99)
INR BLD: 1
PERFORMED ON: ABNORMAL
PERFORMED ON: ABNORMAL
PROTHROMBIN TIME: 13 SEC (ref 12.3–14.9)

## 2022-11-11 PROCEDURE — A4217 STERILE WATER/SALINE, 500 ML: HCPCS | Performed by: NEUROLOGICAL SURGERY

## 2022-11-11 PROCEDURE — 3209999900 FLUORO FOR SURGICAL PROCEDURES

## 2022-11-11 PROCEDURE — 22514 PERQ VERTEBRAL AUGMENTATION: CPT | Performed by: NEUROLOGICAL SURGERY

## 2022-11-11 PROCEDURE — 3700000000 HC ANESTHESIA ATTENDED CARE: Performed by: NEUROLOGICAL SURGERY

## 2022-11-11 PROCEDURE — 6360000002 HC RX W HCPCS: Performed by: NEUROLOGICAL SURGERY

## 2022-11-11 PROCEDURE — 6360000002 HC RX W HCPCS: Performed by: STUDENT IN AN ORGANIZED HEALTH CARE EDUCATION/TRAINING PROGRAM

## 2022-11-11 PROCEDURE — 2709999900 HC NON-CHARGEABLE SUPPLY: Performed by: NEUROLOGICAL SURGERY

## 2022-11-11 PROCEDURE — 2580000003 HC RX 258: Performed by: STUDENT IN AN ORGANIZED HEALTH CARE EDUCATION/TRAINING PROGRAM

## 2022-11-11 PROCEDURE — 2720000010 HC SURG SUPPLY STERILE: Performed by: NEUROLOGICAL SURGERY

## 2022-11-11 PROCEDURE — 3600000004 HC SURGERY LEVEL 4 BASE: Performed by: NEUROLOGICAL SURGERY

## 2022-11-11 PROCEDURE — 85610 PROTHROMBIN TIME: CPT

## 2022-11-11 PROCEDURE — 2500000003 HC RX 250 WO HCPCS: Performed by: NEUROLOGICAL SURGERY

## 2022-11-11 PROCEDURE — 7100000010 HC PHASE II RECOVERY - FIRST 15 MIN: Performed by: NEUROLOGICAL SURGERY

## 2022-11-11 PROCEDURE — 2580000003 HC RX 258: Performed by: NEUROLOGICAL SURGERY

## 2022-11-11 PROCEDURE — C1894 INTRO/SHEATH, NON-LASER: HCPCS | Performed by: NEUROLOGICAL SURGERY

## 2022-11-11 PROCEDURE — 7100000011 HC PHASE II RECOVERY - ADDTL 15 MIN: Performed by: NEUROLOGICAL SURGERY

## 2022-11-11 PROCEDURE — 3600000014 HC SURGERY LEVEL 4 ADDTL 15MIN: Performed by: NEUROLOGICAL SURGERY

## 2022-11-11 PROCEDURE — 3700000001 HC ADD 15 MINUTES (ANESTHESIA): Performed by: NEUROLOGICAL SURGERY

## 2022-11-11 PROCEDURE — 85730 THROMBOPLASTIN TIME PARTIAL: CPT

## 2022-11-11 DEVICE — BONE CEMENT C01B HV-R WITH MIXER  US
Type: IMPLANTABLE DEVICE | Site: SPINE LUMBAR | Status: FUNCTIONAL
Brand: KYPHON® HV-R® BONE CEMENT AND KYPHON® MIXER PACK

## 2022-11-11 RX ORDER — SODIUM CHLORIDE 0.9 % (FLUSH) 0.9 %
5-40 SYRINGE (ML) INJECTION PRN
Status: DISCONTINUED | OUTPATIENT
Start: 2022-11-11 | End: 2022-11-11 | Stop reason: HOSPADM

## 2022-11-11 RX ORDER — OXYCODONE HYDROCHLORIDE 5 MG/1
5 TABLET ORAL PRN
Status: DISCONTINUED | OUTPATIENT
Start: 2022-11-11 | End: 2022-11-11 | Stop reason: HOSPADM

## 2022-11-11 RX ORDER — SODIUM CHLORIDE, SODIUM LACTATE, POTASSIUM CHLORIDE, CALCIUM CHLORIDE 600; 310; 30; 20 MG/100ML; MG/100ML; MG/100ML; MG/100ML
INJECTION, SOLUTION INTRAVENOUS CONTINUOUS PRN
Status: DISCONTINUED | OUTPATIENT
Start: 2022-11-11 | End: 2022-11-11 | Stop reason: SDUPTHER

## 2022-11-11 RX ORDER — MAGNESIUM HYDROXIDE 1200 MG/15ML
LIQUID ORAL CONTINUOUS PRN
Status: COMPLETED | OUTPATIENT
Start: 2022-11-11 | End: 2022-11-11

## 2022-11-11 RX ORDER — DROPERIDOL 2.5 MG/ML
0.62 INJECTION, SOLUTION INTRAMUSCULAR; INTRAVENOUS
Status: DISCONTINUED | OUTPATIENT
Start: 2022-11-11 | End: 2022-11-11 | Stop reason: HOSPADM

## 2022-11-11 RX ORDER — FENTANYL CITRATE 50 UG/ML
50 INJECTION, SOLUTION INTRAMUSCULAR; INTRAVENOUS EVERY 5 MIN PRN
Status: DISCONTINUED | OUTPATIENT
Start: 2022-11-11 | End: 2022-11-11 | Stop reason: HOSPADM

## 2022-11-11 RX ORDER — HYDRALAZINE HYDROCHLORIDE 20 MG/ML
10 INJECTION INTRAMUSCULAR; INTRAVENOUS
Status: DISCONTINUED | OUTPATIENT
Start: 2022-11-11 | End: 2022-11-11 | Stop reason: HOSPADM

## 2022-11-11 RX ORDER — OXYCODONE HYDROCHLORIDE 5 MG/1
10 TABLET ORAL PRN
Status: DISCONTINUED | OUTPATIENT
Start: 2022-11-11 | End: 2022-11-11 | Stop reason: HOSPADM

## 2022-11-11 RX ORDER — MEPERIDINE HYDROCHLORIDE 25 MG/ML
12.5 INJECTION INTRAMUSCULAR; INTRAVENOUS; SUBCUTANEOUS EVERY 5 MIN PRN
Status: DISCONTINUED | OUTPATIENT
Start: 2022-11-11 | End: 2022-11-11 | Stop reason: HOSPADM

## 2022-11-11 RX ORDER — PROPOFOL 10 MG/ML
INJECTION, EMULSION INTRAVENOUS CONTINUOUS PRN
Status: DISCONTINUED | OUTPATIENT
Start: 2022-11-11 | End: 2022-11-11 | Stop reason: SDUPTHER

## 2022-11-11 RX ORDER — PROCHLORPERAZINE EDISYLATE 5 MG/ML
5 INJECTION INTRAMUSCULAR; INTRAVENOUS
Status: DISCONTINUED | OUTPATIENT
Start: 2022-11-11 | End: 2022-11-11 | Stop reason: HOSPADM

## 2022-11-11 RX ORDER — DIPHENHYDRAMINE HYDROCHLORIDE 50 MG/ML
12.5 INJECTION INTRAMUSCULAR; INTRAVENOUS
Status: DISCONTINUED | OUTPATIENT
Start: 2022-11-11 | End: 2022-11-11 | Stop reason: HOSPADM

## 2022-11-11 RX ORDER — FENTANYL CITRATE 50 UG/ML
INJECTION, SOLUTION INTRAMUSCULAR; INTRAVENOUS PRN
Status: DISCONTINUED | OUTPATIENT
Start: 2022-11-11 | End: 2022-11-11 | Stop reason: SDUPTHER

## 2022-11-11 RX ORDER — SODIUM CHLORIDE, SODIUM LACTATE, POTASSIUM CHLORIDE, CALCIUM CHLORIDE 600; 310; 30; 20 MG/100ML; MG/100ML; MG/100ML; MG/100ML
INJECTION, SOLUTION INTRAVENOUS CONTINUOUS
Status: DISCONTINUED | OUTPATIENT
Start: 2022-11-11 | End: 2022-11-11 | Stop reason: HOSPADM

## 2022-11-11 RX ORDER — SODIUM CHLORIDE 9 MG/ML
INJECTION, SOLUTION INTRAVENOUS PRN
Status: DISCONTINUED | OUTPATIENT
Start: 2022-11-11 | End: 2022-11-11 | Stop reason: HOSPADM

## 2022-11-11 RX ORDER — MIDAZOLAM HYDROCHLORIDE 1 MG/ML
INJECTION INTRAMUSCULAR; INTRAVENOUS PRN
Status: DISCONTINUED | OUTPATIENT
Start: 2022-11-11 | End: 2022-11-11 | Stop reason: SDUPTHER

## 2022-11-11 RX ORDER — SODIUM CHLORIDE 0.9 % (FLUSH) 0.9 %
5-40 SYRINGE (ML) INJECTION EVERY 12 HOURS SCHEDULED
Status: DISCONTINUED | OUTPATIENT
Start: 2022-11-11 | End: 2022-11-11 | Stop reason: HOSPADM

## 2022-11-11 RX ADMIN — PROPOFOL 50 MCG/KG/MIN: 10 INJECTION, EMULSION INTRAVENOUS at 07:37

## 2022-11-11 RX ADMIN — CEFAZOLIN 2000 MG: 10 INJECTION, POWDER, FOR SOLUTION INTRAVENOUS at 07:42

## 2022-11-11 RX ADMIN — FENTANYL CITRATE 25 MCG: 50 INJECTION, SOLUTION INTRAMUSCULAR; INTRAVENOUS at 07:37

## 2022-11-11 RX ADMIN — MIDAZOLAM HYDROCHLORIDE 2 MG: 1 INJECTION, SOLUTION INTRAMUSCULAR; INTRAVENOUS at 07:37

## 2022-11-11 RX ADMIN — FENTANYL CITRATE 25 MCG: 50 INJECTION, SOLUTION INTRAMUSCULAR; INTRAVENOUS at 07:58

## 2022-11-11 RX ADMIN — SODIUM CHLORIDE, POTASSIUM CHLORIDE, SODIUM LACTATE AND CALCIUM CHLORIDE: 600; 310; 30; 20 INJECTION, SOLUTION INTRAVENOUS at 07:36

## 2022-11-11 ASSESSMENT — PAIN DESCRIPTION - DESCRIPTORS: DESCRIPTORS: ACHING;SHARP;SHOOTING

## 2022-11-11 ASSESSMENT — PAIN - FUNCTIONAL ASSESSMENT: PAIN_FUNCTIONAL_ASSESSMENT: 0-10

## 2022-11-11 NOTE — ANESTHESIA PRE PROCEDURE
Department of Anesthesiology  Preprocedure Note       Name:  Emy Boykin   Age:  65 y.o.  :  1960                                          MRN:  [id number]         Date:  2022      Surgeon: Cristina Fatima):  Wes Ramirez MD    Procedure: Procedure(s):  L1 VERETEBRAL AUGMENTATION 1/2 HOUR/ 2 C-ARMS/ ROOM #1/ NEW MICROSCOPE (PAT ON ADMIT)    Medications prior to admission:   Prior to Admission medications    Medication Sig Start Date End Date Taking? Authorizing Provider   oxyCODONE-acetaminophen (PERCOCET) 5-325 MG per tablet Take 1 tablet by mouth every 6 hours as needed for Pain for up to 5 days. Intended supply: 3 days.  Take lowest dose possible to manage pain 22  Caitlin Parekh PA-C   ondansetron (ZOFRAN-ODT) 4 MG disintegrating tablet Take 1 tablet by mouth 3 times daily as needed for Nausea or Vomiting 22  Iwona Paul PA-C   hydrALAZINE (APRESOLINE) 25 MG tablet Take 1 tablet by mouth in the morning and at bedtime 10/26/22   NABILA Villafana   blood glucose test strips (FREESTYLE LITE) strip Tid E11.65 22   Petros Pino MD   NOVOLOG 100 UNIT/ML injection vial Use via insulin pump max daily dose 100 units 22   Historical Provider, MD   metFORMIN (GLUCOPHAGE) 1000 MG tablet TAKE 1 TABLET TWICE DAILY 22   Petros Pino MD   empagliflozin (JARDIANCE) 10 MG tablet Take 1 tablet by mouth daily 22   NABILA Villafana   carvedilol (COREG) 12.5 MG tablet Take 1 tablet by mouth 2 times daily 22   NABILA Villafana   Continuous Blood Gluc  (FREESTYLE TESS 2 READER) MARIUM Give 1  22   Petros Pino MD   Continuous Blood Gluc Sensor (FREESTYLE TESS 2 SENSOR) MISC Change every 2 weeks 22   Petros Pino MD   clopidogrel (PLAVIX) 75 MG tablet Take 1 tablet by mouth daily 22   Javier Reeder DO   sacubitril-valsartan (ENTRESTO) 24-26 MG per tablet Take 1 tablet by mouth 2 times daily 22   Evi John, DO   aspirin 81 MG EC tablet Take 1 tablet by mouth daily 8/18/22   Eva John DO   lisinopril (PRINIVIL;ZESTRIL) 10 MG tablet Take 1 tablet by mouth daily 6/1/22 8/18/22  Brady Greco MD   albuterol sulfate  (90 Base) MCG/ACT inhaler Inhale 2 puffs into the lungs every 6 hours as needed for Wheezing 4/1/22   Brady Greco MD   Accu-Chek FastClix Lancets MISC Test 4x daily Dx E11.65 3/29/22   Eduardo Blanchard MD   blood glucose test strips (ACCU-CHEK GUIDE) strip Test 4x daily Dx E11.65 3/29/22   Eduardo Blanchard MD   insulin aspart protamine-insulin aspart (NOVOLOG MIX 70/30 FLEXPEN) (70-30) 100 UNIT/ML injection 30 units twice day  Patient taking differently: 50 Units 2 times daily (with meals) 2/25/22   Eduardo Blanchard MD   rosuvastatin (CRESTOR) 20 MG tablet Take 1 tablet by mouth nightly 2/22/22   Brady Greco MD   Blood Glucose Monitoring Suppl (FREESTYLE LITE) MARIUM 1 Device by Does not apply route daily as needed (as directed) 11/19/21   Jabari Kay MD   FreeStyle Lancets [facility] 1 each by Does not apply route daily 11/19/21   Jabari Kay MD   [facility] 80-4.5 MCG/ACT AERO Inhale 2 puffs into the lungs 2 times daily 2/22/21   Brady Greco MD   Lancets MISC Pt test 4x daily Dx E11.65 (dispense covered brand) 12/17/19   Eduardo Blanchard MD   Insulin Pen Needle (Marlon Lovett) 32G X 6 MM MISC Bid 11/26/19   Eduardo Blanchard MD       Current medications:    Current Facility-Administered Medications   Medication Dose Route Frequency Provider Last Rate Last Admin    ceFAZolin (ANCEF) 2000 mg in dextrose 5 % 100 mL IVPB  2,000 mg IntraVENous On Call to [address] Alan MD        lactated ringers infusion   IntraVENous Continuous Pham Linker, DO           Allergies:  No Known Allergies    Problem List:    Patient Active Problem List   Diagnosis Code    Essential hypertension, benign I10    Obesity, diabetes, and hypertension syndrome ([facility] E11.69, E66.9, E11.59, I15.2    Irregular heart rhythm I49.9    Hyperlipidemia E78.5    Allergic rhinitis due to pollen J30.1    Staphylococcus aureus bacteremia R78.81, B95.61    Hematoma of right thigh S70.11XA    Hematoma of left lower extremity S80.12XA    Hereditary peripheral neuropathy G60.9    Migraine without aura G43.009    Community acquired pneumonia of right lower lobe of lung J18.9    Acute on chronic diastolic (congestive) heart failure ([facility]) I50.33    Mild intermittent asthma with exacerbation J45.21    NSTEMI (non-ST elevated myocardial infarction) ([facility]) I21.4    Acute bronchitis due to other specified organisms J20.8    CAD (coronary artery disease) I25.10    Body mass index (bmi) 29.0-29.9, adult Z68.29    Cellulitis of right lower limb L03.115    Dyspnea, unspecified R06.00    Encounter for pre-employment examination Z02.1    Family history of ischemic heart disease and other diseases of the circulatory system Z82.49    Other specified hypothyroidism E03.8    Inflammatory disorders of scrotum N49.2    Long term (current) use of insulin ([facility] Z79.4    Long term (current) use of oral hypoglycemic drugs Z79.84    Other chronic sinusitis J32.8    Other idiopathic scoliosis, lumbosacral region M41.27    Other intervertebral disc disorders, lumbar region M51.86    Pain in left lower leg M79.662    General patient noncompliance Z91.199    Sleep apnea G47.30    Spinal stenosis, lumbar region with neurogenic claudication M48.062    Spondylolisthesis, lumbosacral region M43.17    Type 2 diabetes mellitus with diabetic polyneuropathy ([facility]) E11.42    Unsp open wound of r little finger w/o damage to nail, init S61.206A    Involuntary movements R25.9    ANTON (acute kidney injury) ([facility] N17.9    Hyponatremia E87.1    Type 2 diabetes mellitus with hyperglycemia ([facility]) E11.65    Brain bleed ([facility]) I61.9    Hypertensive urgency I16.0    Intractable nausea and vomiting R11.2    chf I50.32    Ischemic cardiomyopathy I25.5    Chronic systolic (congestive) heart failure I50.22  Crush fracture of vertebra due to osteoporosis (HCC) M80.08XA    Type 2 diabetes mellitus with diabetic neuropathy E11.40    Diabetes mellitus type 2, insulin dependent ([facility] E11.9, Z79.4    Proliferative diabetic retinopathy of both eyes associated with type 2 diabetes mellitus ([facility] Y25.0309       Past Medical History:        Diagnosis Date    Asthma     Back pain     CAD (coronary artery disease) 01/2020    3 vessel     chf 9/16/2022    Diabetes mellitus ([facility]     Essential hypertension, benign     Hyperlipemia     Ischemic cardiomyopathy 9/16/2022    Neuropathic pain, leg, bilateral        Past Surgical History:        Procedure Laterality Date    CARPAL TUNNEL RELEASE Bilateral     FOOT SURGERY Left     bone spur    LAMINOTOMY  2012       Social History:    Social History     Tobacco Use    Smoking status: Never    Smokeless tobacco: Never   Substance Use Topics    Alcohol use: Yes     Alcohol/week: 2.0 standard drinks     Types: 2 Cans of beer per week     Comment: every 6 months or so                                 Counseling given: Not Answered      Vital Signs (Current):   Vitals:    11/11/22 0603   BP: (!) 162/88   Pulse: 96   Resp: 18   Temp: 97.4 °F (36.3 °C)   TempSrc: Temporal   SpO2: 99%   Weight: 165 lb (74.8 kg)   Height: 5' 7\" (1.702 m)                                              BP Readings from Last 3 Encounters:   11/11/22 (!) 162/88   11/10/22 136/82   11/09/22 (!) 156/97       NPO Status: Time of last liquid consumption: 1900                        Time of last solid consumption: 1900                        Date of last liquid consumption: 11/10/22                        Date of last solid food consumption: 11/10/22    BMI:   Wt Readings from Last 3 Encounters:   11/11/22 165 lb (74.8 kg)   11/10/22 165 lb (74.8 kg)   11/09/22 165 lb (74.8 kg)     Body mass index is 25.84 kg/m².     CBC:   Lab Results   Component Value Date/Time    WBC 5.9 08/18/2022 02:44 PM    RBC 5.16 08/18/2022 02:44 PM    RBC 3.86 07/08/2018 05:05 AM    HGB 14.9 08/18/2022 02:44 PM    HCT 43.5 08/18/2022 02:44 PM    MCV 84.3 08/18/2022 02:44 PM    RDW 13.1 08/18/2022 02:44 PM     08/18/2022 02:44 PM       CMP:   Lab Results   Component Value Date/Time     09/28/2022 11:35 AM    K 4.0 09/28/2022 11:35 AM    K 4.3 08/18/2022 06:26 AM     09/28/2022 11:35 AM    CO2 28 09/28/2022 11:35 AM    BUN 20 09/28/2022 11:35 AM    CREATININE 1.08 09/28/2022 11:35 AM    GFRAA >60.0 09/28/2022 11:35 AM    AGRATIO 0.6 07/08/2018 05:05 AM    LABGLOM >60.0 09/28/2022 11:35 AM    GLUCOSE 183 09/28/2022 11:35 AM    GLUCOSE 121 11/28/2021 05:52 AM    PROT 6.0 08/16/2022 12:43 PM    CALCIUM 9.1 09/28/2022 11:35 AM    BILITOT 0.3 08/16/2022 12:43 PM    ALKPHOS 63 08/16/2022 12:43 PM    AST 11 08/16/2022 12:43 PM    ALT 9 08/16/2022 12:43 PM       POC Tests:   Recent Labs     11/11/22  0621   POCGLU 196*       Coags:   Lab Results   Component Value Date/Time    PROTIME 13.0 11/11/2022 06:28 AM    INR 1.0 11/11/2022 06:28 AM    APTT 27.1 11/11/2022 06:28 AM       HCG (If Applicable): No results found for: PREGTESTUR, PREGSERUM, HCG, HCGQUANT     ABGs: No results found for: PHART, PO2ART, AON9VJF, NUC0POT, BEART, Q8FQNMTY     Type & Screen (If Applicable):  No results found for: LABABO, LABRH    Drug/Infectious Status (If Applicable):  No results found for: HIV, HEPCAB    COVID-19 Screening (If Applicable):   Lab Results   Component Value Date/Time    COVID19 Not Detected 08/16/2022 12:44 PM    COVID19 NOT DETECTED 11/27/2021 12:32 AM           Anesthesia Evaluation    Airway: Mallampati: II  TM distance: >3 FB   Neck ROM: full  Mouth opening: > = 3 FB   Dental: normal exam         Pulmonary:normal exam    (+) sleep apnea:  asthma:                            Cardiovascular:    (+) hypertension:, CAD:, CABG/stent:, CHF:,                   Neuro/Psych:               GI/Hepatic/Renal:             Endo/Other:    (+) Diabetes, hypothyroidism::., .                 Abdominal:             Vascular: Other Findings:           Anesthesia Plan      MAC     ASA 4     (ETT)  Induction: intravenous. MIPS: Postoperative opioids intended and Prophylactic antiemetics administered. Anesthetic plan and risks discussed with patient.         Attending anesthesiologist reviewed and agrees with Preprocedure content                Gus Cage DO   11/11/2022

## 2022-11-11 NOTE — ANESTHESIA POSTPROCEDURE EVALUATION
Department of Anesthesiology  Postprocedure Note    Patient: Selvin Huber  MRN: [id number]  [place]: 1960  Date of evaluation: 11/11/2022      Procedure Summary     Date: 11/11/22 Room / Location: [address]    Anesthesia Start: 7373 Anesthesia Stop: 0820    Procedure: L1 VERETEBRAL AUGMENTATION Diagnosis:       Fracture of vertebra due to osteoporosis, [facility]      (L1 FRACTURE)    Surgeons: Jacqueline Brown MD Responsible Provider: Carlos Arvizu DO    Anesthesia Type: MAC ASA Status: 4          Anesthesia Type: MAC    Schuyler Phase I: Schuyler Score: 10    Schuyler Phase II:        Anesthesia Post Evaluation    Patient location during evaluation: PACU  Patient participation: complete - patient cannot participate  Level of consciousness: awake and alert  Airway patency: patent  Nausea & Vomiting: no nausea and no vomiting  Complications: no  Cardiovascular status: blood pressure returned to baseline  Respiratory status: room air, spontaneous ventilation and acceptable  Hydration status: stable  Multimodal analgesia pain management approach

## 2022-11-11 NOTE — BRIEF OP NOTE
Brief Postoperative Note      Patient: Maddi Barlow  YOB: 1960  MRN: [id number]    Date of Procedure: 11/11/2022    Pre-Op Diagnosis: L1 FRACTURE    Post-Op Diagnosis: Same       Procedure(s):  L1 VERETEBRAL AUGMENTATION    Surgeon(s):  Estefanía Granger MD    Assistant:  First Assistant: Francisco Newton    Anesthesia: General    Estimated Blood Loss (mL): Minimal    Complications: None      Implants:  Implant Name Type Inv.  Item Serial No.  Lot No. LRB No. Used Action   KIT CEMENT BONE COMBO Gladys Rancher HV-R - [id number]  KIT CEMENT BONE COMBO Gladys Rancher HV-R  [employer] [facility]- [id number] N/A 1 Implanted         Findings: osteoporotic compression fracture    Electronically signed by Sharron Braswell MD on 11/11/2022 at 8:12 AM

## 2022-11-11 NOTE — OP NOTE
[address]                      [address], [address] [facility]                                OPERATIVE REPORT    PATIENT NAME: April Purcell                        :        1960  MED REC NO:   [id number]                            ROOM:  ACCOUNT NO:   [de-identified]                           ADMIT DATE: 2022  PROVIDER:     Markus De La Torre MD    DATE OF PROCEDURE:  2022    PREOPERATIVE DIAGNOSIS:  L1 osteoporotic compression fracture. POSTOPERATIVE DIAGNOSIS:  L1 osteoporotic compression fracture. SURGEON:  Markus De La Torre MD    INDICATION:  Severe back pain. DESCRIPTION OF PROCEDURE:  The patient was given anesthesia MAC per  Anesthesiology. He is turned to prone position watching all pressure  points. AP, lateral C-arm fluoroscopy positioned at L1 under my  direction. Back was prepped and draped. Time-out, patient identified. I measured about 4 cm to the left side of spinous process tip of L1. Skin infiltrated with lidocaine. Small sarabjit was made, Jamshidi needle  was then placed into the left side of the L1 vertebral body through the  pedicle. Inner cannula was removed, placing the guidewire, removing the  outer cannula. Over the guidewire, the inner and outer working cannulas  were placed removing the guidewire inner working cannula. Using outer  working cannula, the drill was used to make a passageway for the balloon  which was then placed, inflated to about 4 mL. Balloon was deflated and  removed and the bone filling devices were used to place bone cement into  the L1 vertebral body with an excellent fill. Bone cement was allowed  to harden. Hardware was removed and Band-Aid applied. EBL less than 3  mL. No blood transfused.         Ty Katz MD    D: 2022 8:21:15       T: 2022 8:24:53     [email or web address]  Job#: [id number]     Doc#: [id number]    CC:

## 2022-11-11 NOTE — DISCHARGE INSTRUCTIONS
Patient has his own medications at home. Instructed to remove Band-Aid later tonight or in in AM.  Resume all preoperative activities diet self-cares medications and so forth. Recheck one month. Questions call office [phone].

## 2022-11-11 NOTE — INTERVAL H&P NOTE
Update History & Physical    The patient's History and Physical of  was reviewed with the patient and I examined the patient. There was no change. The surgical site was confirmed by the patient and me. Plan: The risks, benefits, expected outcome, and alternative to the recommended procedure have been discussed with the patient. Patient understands and wants to proceed with the procedure.      Electronically signed by Gudelia Lo MD on 11/11/2022 at 7:07 AM

## 2022-11-12 NOTE — DISCHARGE SUMMARY
[address]                      [address] [place], [address] [facility] SUMMARY    PATIENT NAME: Neda Nunez                        :        1960  MED REC NO:   [id number]                            ROOM:  ACCOUNT NO:   [de-identified]                           ADMIT DATE: 2022  PROVIDER:     Wes Ramirez MD                      [address] [facility] DATE: 2022    PROCEDURE PERFORMED:  Lumbar 1 vertebral augmentation kyphoplasty. HOSPITAL COURSE:  The patient tolerated the procedure well. Once  ambulatory with bladder control, plan to discharge to home in good  condition. DISCHARGE DIAGNOSIS:  Lumbar 1 osteoporotic compression fracture,  improved. DISCHARGE MEDICATION:  None. DISCHARGE INSTRUCTIONS:  The patient instructed to remove the Band-Aid  postoperatively resuming all of his preoperative diet, activities,  cares, medications and so forth. Recheck in one month. If he has any  questions or problems, please contact the office.         Gaylia Carrel, MD    D: 2022 8:22:39       T: 2022 4:58:46     [email or web address]  Job#: [id number]     Doc#: [id number]    CC:

## 2022-11-15 ENCOUNTER — CARE COORDINATION (OUTPATIENT)
Dept: CARE COORDINATION | Age: 62
End: 2022-11-15

## 2022-11-15 DIAGNOSIS — E55.9 VITAMIN D DEFICIENCY: Primary | ICD-10-CM

## 2022-11-15 DIAGNOSIS — M80.00XA AGE-RELATED OSTEOPOROSIS WITH CURRENT PATHOLOGICAL FRACTURE, INITIAL ENCOUNTER: Primary | ICD-10-CM

## 2022-11-15 NOTE — CARE COORDINATION
ACM spoke to patient. ACM is familiar with patient as previously attempted engagement in [facility] program.  Patient states that he is doing okay. Patient has had recent back issues resulting in ER visits and surgery with Dr. Alvaro Skaggs  last week. Patient reports today his back is somewhat better. Patient states he missed his diabetic management appointment with Dr. Mana Schaffer. Patient states he used to get messages regarding his appointments but he does not anymore. Patient states he gets forgetful and does not write things down. ACM reviewed all listed appointments and also assisted patient in getting a follow-up with Dr. Mana Schaffer and Dr. Jamie Claros as patient requested. ACM sent patient email as he requested with a list of his appointments. Patient states he cannot set up [employer] as he is not very good with computer things but he is okay with reading his emails. Although patient has multiple barriers as it relates to diabetic self-management he believes overall he is doing well. He has not worn his insulin pump for almost a month patient is checking his blood sugars daily ranging 140s to no higher than 200 per patient. Patient wants to speak with provider on going back on insulin pens. At this time patient feels other than setting up appointments and sending him appointment list he has no care coordination needs. Patient has declined care coordination services but thankful for assistance with getting appointments.

## 2022-11-18 ENCOUNTER — HOSPITAL ENCOUNTER (OUTPATIENT)
Dept: NON INVASIVE DIAGNOSTICS | Age: 62
Discharge: HOME | End: 2022-11-18
Payer: MEDICARE

## 2022-11-18 DIAGNOSIS — I50.22 CHRONIC SYSTOLIC CHF (CONGESTIVE HEART FAILURE), NYHA CLASS 1 (HCC): ICD-10-CM

## 2022-11-18 DIAGNOSIS — E55.9 VITAMIN D DEFICIENCY: ICD-10-CM

## 2022-11-18 DIAGNOSIS — I25.5 ISCHEMIC CARDIOMYOPATHY: ICD-10-CM

## 2022-11-18 LAB
LV EF: 38 %
LVEF MODALITY: NORMAL
VITAMIN D 25-HYDROXY: 14.4 NG/ML

## 2022-11-18 PROCEDURE — 93325 DOPPLER ECHO COLOR FLOW MAPG: CPT

## 2022-11-18 PROCEDURE — 93308 TTE F-UP OR LMTD: CPT

## 2022-11-18 PROCEDURE — 93320 DOPPLER ECHO COMPLETE: CPT

## 2022-11-22 ENCOUNTER — TELEPHONE (OUTPATIENT)
Dept: CARDIOLOGY CLINIC | Age: 62
End: 2022-11-22

## 2022-11-22 NOTE — TELEPHONE ENCOUNTER
----- Message from Praneeth Gaitan sent at 11/21/2022  8:30 AM EST -----  Please notify patient that I reviewed his echo and LV function has improved with EF now 35-40% compared to 20% previously. No need for ICD at this time.   Thanks

## 2022-11-28 NOTE — TELEPHONE ENCOUNTER
Requesting medication refill. Please approve or deny this request.    Rx requested:  Requested Prescriptions     Pending Prescriptions Disp Refills    clopidogrel (PLAVIX) 75 MG tablet [Pharmacy Med Name: clopidogrel 75 mg tablet] 30 tablet 3     Sig: Take 1 tablet by mouth daily         Last Office Visit:   9/16/2022      Next Visit Date:  Future Appointments   Date Time Provider Rosaline Silveira   11/29/2022 11:30 AM [address], APRN - CNP PILY [facility] EMERGENCY MEDICAL [place] AT [place]   12/6/2022 11:00 AM [facility] AT [place]   12/13/2022 11:30 AM SHERIN Daigle CNP [facility] EM[facility]GENCY MED[facility] AT [place]   1/25/2023 11:00 AM NABILA Silvestre [facility] EMERGENCY [facility] AT [place]   3/15/2023  4:00 PM MD Doug Thakkariel Lei Neurology -   3/17/2023 11:00 AM Javier Linares DO [facility]                Please approve or deny.

## 2022-11-30 RX ORDER — CLOPIDOGREL BISULFATE 75 MG/1
75 TABLET ORAL DAILY
Qty: 30 TABLET | Refills: 3 | Status: SHIPPED | OUTPATIENT
Start: 2022-11-30

## 2022-12-21 ENCOUNTER — OFFICE VISIT (OUTPATIENT)
Dept: PRIMARY CARE CLINIC | Age: 62
End: 2022-12-21

## 2022-12-21 VITALS
HEART RATE: 96 BPM | HEIGHT: 67 IN | BODY MASS INDEX: 23.54 KG/M2 | DIASTOLIC BLOOD PRESSURE: 60 MMHG | SYSTOLIC BLOOD PRESSURE: 132 MMHG | OXYGEN SATURATION: 100 % | WEIGHT: 150 LBS | TEMPERATURE: 98.5 F

## 2022-12-21 DIAGNOSIS — E78.5 DM TYPE 2 WITH DIABETIC DYSLIPIDEMIA (HCC): ICD-10-CM

## 2022-12-21 DIAGNOSIS — Z00.00 PREVENTATIVE HEALTH CARE: ICD-10-CM

## 2022-12-21 DIAGNOSIS — E78.49 OTHER HYPERLIPIDEMIA: ICD-10-CM

## 2022-12-21 DIAGNOSIS — E55.9 VITAMIN D DEFICIENCY: Primary | ICD-10-CM

## 2022-12-21 DIAGNOSIS — Z12.5 PROSTATE CANCER SCREENING: ICD-10-CM

## 2022-12-21 DIAGNOSIS — E11.69 DM TYPE 2 WITH DIABETIC DYSLIPIDEMIA (HCC): ICD-10-CM

## 2022-12-21 RX ORDER — ERGOCALCIFEROL 1.25 MG/1
50000 CAPSULE ORAL WEEKLY
Qty: 4 CAPSULE | Refills: 5 | Status: SHIPPED | OUTPATIENT
Start: 2022-12-21

## 2022-12-21 SDOH — ECONOMIC STABILITY: FOOD INSECURITY: WITHIN THE PAST 12 MONTHS, YOU WORRIED THAT YOUR FOOD WOULD RUN OUT BEFORE YOU GOT MONEY TO BUY MORE.: NEVER TRUE

## 2022-12-21 SDOH — ECONOMIC STABILITY: FOOD INSECURITY: WITHIN THE PAST 12 MONTHS, THE FOOD YOU BOUGHT JUST DIDN'T LAST AND YOU DIDN'T HAVE MONEY TO GET MORE.: NEVER TRUE

## 2022-12-21 ASSESSMENT — PATIENT HEALTH QUESTIONNAIRE - PHQ9
SUM OF ALL RESPONSES TO PHQ QUESTIONS 1-9: 0
SUM OF ALL RESPONSES TO PHQ QUESTIONS 1-9: 0
SUM OF ALL RESPONSES TO PHQ9 QUESTIONS 1 & 2: 0
1. LITTLE INTEREST OR PLEASURE IN DOING THINGS: 0
SUM OF ALL RESPONSES TO PHQ QUESTIONS 1-9: 0
SUM OF ALL RESPONSES TO PHQ QUESTIONS 1-9: 0
2. FEELING DOWN, DEPRESSED OR HOPELESS: 0

## 2022-12-21 ASSESSMENT — ENCOUNTER SYMPTOMS
NAUSEA: 0
SHORTNESS OF BREATH: 0
PHOTOPHOBIA: 0
BACK PAIN: 1
TROUBLE SWALLOWING: 0
VOICE CHANGE: 0
CHOKING: 0
VOMITING: 0

## 2022-12-21 ASSESSMENT — SOCIAL DETERMINANTS OF HEALTH (SDOH): HOW HARD IS IT FOR YOU TO PAY FOR THE VERY BASICS LIKE FOOD, HOUSING, MEDICAL CARE, AND HEATING?: NOT HARD AT ALL

## 2022-12-21 NOTE — PROGRESS NOTES
Danay Dalton 65 y.o. male presents today with   Chief Complaint   Patient presents with    Annual Exam    Discuss Labs       HPIannual  F/u back fx.   Insulin pump not working  Vit d  Past Medical History:   Diagnosis Date    Asthma     Back pain     CAD (coronary artery disease) 01/2020    3 vessel     chf 9/16/2022    Diabetes mellitus ([address]     Essential hypertension, benign     Hyperlipemia     Ischemic cardiomyopathy 9/16/2022    Neuropathic pain, leg, bilateral      Patient Active Problem List    Diagnosis Date Noted    Chronic systolic (congestive) heart failure 11/10/2022    Crush fracture of vertebra due to osteoporosis ([address] 11/10/2022    Type 2 diabetes mellitus with diabetic neuropathy 11/10/2022    Diabetes mellitus type 2, insulin dependent ([address] 11/10/2022    Proliferative diabetic retinopathy of both eyes associated with type 2 diabetes mellitus ([address] 11/10/2022    chf 09/16/2022    Ischemic cardiomyopathy 09/16/2022    Hypertensive urgency 08/16/2022    Intractable nausea and vomiting 08/16/2022    Brain bleed ([address] 11/13/2021    Involuntary movements 11/12/2021    ANTON (acute kidney injury) ([address] 11/12/2021    Hyponatremia 11/12/2021    Type 2 diabetes mellitus with hyperglycemia ([address] 11/12/2021    General patient noncompliance 06/26/2020    NSTEMI (non-ST elevated myocardial infarction) ([address] 12/13/2019    Mild intermittent asthma with exacerbation     Acute on chronic diastolic (congestive) heart failure ([address] 12/09/2019    Community acquired pneumonia of right lower lobe of lung 12/08/2019    Acute bronchitis due to other specified organisms 01/28/2019    Hereditary peripheral neuropathy 12/07/2018    Migraine without aura 12/07/2018    Other intervertebral disc disorders, lumbar region 10/16/2018    Spinal stenosis, lumbar region with neurogenic claudication 10/16/2018    Spondylolisthesis, lumbosacral region 10/16/2018    Other idiopathic scoliosis, lumbosacral region 09/28/2018    Hematoma of left lower extremity 08/20/2018    Staphylococcus aureus bacteremia 07/23/2018    Hematoma of right thigh 07/23/2018    Body mass index (bmi) 29.0-29.9, adult 06/26/2018    Cellulitis of right lower limb 06/26/2018    Other specified hypothyroidism 06/26/2018    Inflammatory disorders of scrotum 06/26/2018    Long term (current) use of insulin ([address] 06/26/2018    Long term (current) use of oral hypoglycemic drugs 06/26/2018    Dyspnea, unspecified 06/16/2018    Pain in left lower leg 06/16/2018    CAD (coronary artery disease) 06/13/2018    Family history of ischemic heart disease and other diseases of the circulatory system 06/13/2018    Other chronic sinusitis 06/13/2018    Sleep apnea 06/13/2018    Type 2 diabetes mellitus with diabetic polyneuropathy ([address] 06/13/2018    Unsp open wound of r little finger w/o damage to nail, init 06/13/2018    Encounter for pre-employment examination 04/13/2018    Allergic rhinitis due to pollen 06/07/2016    Essential hypertension, benign     Irregular heart rhythm 11/21/2012    Obesity, diabetes, and hypertension syndrome ([address] 03/16/2005    Hyperlipidemia 03/16/2005     Past Surgical History:   Procedure Laterality Date    CARPAL TUNNEL RELEASE Bilateral     FOOT SURGERY Left     bone spur    LAMINOTOMY  2012    SPINE SURGERY N/A 11/11/2022    L1 VERETEBRAL AUGMENTATION performed by Mariza Zamora MD at [address] History   Problem Relation Age of Onset    Other Father         accident    Heart Failure Mother     Heart Disease Brother     Cirrhosis Brother      Social History     Socioeconomic History    Marital status:      Spouse name: Alana Mortensen    Number of children: 2    Years of education: 14    Highest education level: Some college, no degree   Occupational History    Occupation: disability    Tobacco Use    Smoking status: Never    Smokeless tobacco: Never   Vaping Use    Vaping Use: Never used   Substance and Sexual Activity    Alcohol use:  Yes Alcohol/week: 2.0 standard drinks     Types: 2 Cans of beer per week     Comment: every 6 months or so     Drug use: No    Sexual activity: Not Currently     Partners: Female   Social History Narrative    Lives with wife. Per patient he has been  for 2 years. He met his wife online and went to [facility] and brought her back to [facility]. They live in [address] [place] [address] no steps into home. It is a 2 story home. Bedroom and bath upstairs. Bathroom down. Per patient once he is down the steps he doesn't go back up during the day. 2 daughters local.      Per he has very poor vision. He does not like to drive at night or when raining. Patient states he struggles looking at a computer or reading due to vision. Social Determinants of Health     Financial Resource Strain: Low Risk     Difficulty of Paying Living Expenses: Not hard at all   Food Insecurity: No Food Insecurity    Worried About Running Out of Food in the Last Year: Never true    Ran Out of Food in the Last Year: Never true   Physical Activity: Inactive    Days of Exercise per Week: 0 days    Minutes of Exercise per Session: 0 min     No Known Allergies    Review of Systems   Constitutional:  Negative for fatigue and fever. HENT:  Negative for trouble swallowing and voice change. Eyes:  Negative for photophobia and visual disturbance. Respiratory:  Negative for choking and shortness of breath. Cardiovascular:  Negative for chest pain and palpitations. Gastrointestinal:  Negative for nausea and vomiting. Genitourinary:  Negative for decreased urine volume, testicular pain and urgency. Musculoskeletal:  Positive for back pain. Skin:  Negative for rash. Neurological:  Negative for tremors and syncope. Hematological:  Does not bruise/bleed easily. Psychiatric/Behavioral:  Negative for suicidal ideas.           Vitals:    12/21/22 0920   BP: 132/60   Site: Right Upper Arm   Position: Sitting   Cuff Size: Medium Adult   Pulse: 96   Temp: 98.5 °F (36.9 °C)   SpO2: 100%   Weight: 150 lb (68 kg)   Height: 5' 7\" (1.702 m)       Physical Exam  Constitutional:       Appearance: He is well-developed. HENT:      Head: Normocephalic. Eyes:      Conjunctiva/sclera: Conjunctivae normal.   Cardiovascular:      Rate and Rhythm: Regular rhythm. Pulmonary:      Effort: Pulmonary effort is normal. No respiratory distress. Breath sounds: No wheezing or rales. Abdominal:      General: There is no distension. Musculoskeletal:         General: Normal range of motion. Cervical back: Normal range of motion. Skin:     Coloration: Skin is not jaundiced. Neurological:      Mental Status: He is alert. Psychiatric:         Mood and Affect: Mood normal.     He did not want a colon ca screen  Assessment/Plan  Vickie Lima was seen today for annual exam and discuss labs. Diagnoses and all orders for this visit:    Vitamin D deficiency  -     vitamin D (ERGOCALCIFEROL) 1.25 MG (91344 UT) CAPS capsule; Take 1 capsule by mouth once a week    Other hyperlipidemia  -     Lipid, Fasting; Future    DM type 2 with diabetic dyslipidemia ([facility]  -     Hemoglobin A1C; Future  -     Comprehensive Metabolic Panel; Future    Prostate cancer screening  -     PSA Screening; Future    Preventative health care      No follow-ups on file.     Kody Daugherty MD

## 2022-12-27 ENCOUNTER — TELEPHONE (OUTPATIENT)
Dept: ENDOCRINOLOGY | Age: 62
End: 2022-12-27

## 2022-12-27 NOTE — TELEPHONE ENCOUNTER
Pt wants to go off of the pump. He would like you to call in medication for him and the new doses that you would like him to take.   Pt aware  is out and will be contacted next week

## 2022-12-30 DIAGNOSIS — E11.69 DM TYPE 2 WITH DIABETIC DYSLIPIDEMIA (HCC): ICD-10-CM

## 2022-12-30 DIAGNOSIS — E78.5 DM TYPE 2 WITH DIABETIC DYSLIPIDEMIA (HCC): ICD-10-CM

## 2023-01-02 DIAGNOSIS — E11.69 DM TYPE 2 WITH DIABETIC DYSLIPIDEMIA (HCC): ICD-10-CM

## 2023-01-02 DIAGNOSIS — E78.5 DM TYPE 2 WITH DIABETIC DYSLIPIDEMIA (HCC): ICD-10-CM

## 2023-01-02 RX ORDER — INSULIN ASPART 100 [IU]/ML
INJECTION, SUSPENSION SUBCUTANEOUS
Qty: 10 ADJUSTABLE DOSE PRE-FILLED PEN SYRINGE | Refills: 3 | Status: SHIPPED | OUTPATIENT
Start: 2023-01-02

## 2023-01-03 RX ORDER — CLOPIDOGREL BISULFATE 75 MG/1
75 TABLET ORAL DAILY
Qty: 30 TABLET | Refills: 3 | Status: SHIPPED | OUTPATIENT
Start: 2023-01-03

## 2023-01-03 RX ORDER — HYDRALAZINE HYDROCHLORIDE 25 MG/1
25 TABLET, FILM COATED ORAL 2 TIMES DAILY
Qty: 60 TABLET | Refills: 3 | Status: SHIPPED | OUTPATIENT
Start: 2023-01-03

## 2023-01-03 NOTE — TELEPHONE ENCOUNTER
Please approve or deny this refill request. The order is pended. Thank you.     LOV 09/16/2022    Next Visit Date:  Future Appointments   Date Time Provider Rosaline Silveira   1/25/2023 11:00 AM [facility] [facility] EMERGENCY [facility] AT [place]   3/15/2023  4:00 PM MD Zak Retana Neurology -   3/17/2023 11:00 AM Javier Morin DO [address] [place] [address]

## 2023-01-03 NOTE — TELEPHONE ENCOUNTER
Comments: This request is coming from the pharmacy. Last Office Visit (last PCP visit):   9/12/2022    Next Visit Date:  Future Appointments   Date Time Provider Rosaline Silveira   1/25/2023 11:00 AM [address] [place], [place]   3/15/2023  4:00 PM MD Herman Whalen [facility] -   [facility]17[facility]23 [facility]       If hasn't been seen in over a year OR hasn't followed up according to last diabetes/ADHD visit, make appointment for patient before sending refill to provider.     Rx requested:  Requested Prescriptions     Pending Prescriptions Disp Refills    metFORMIN (GLUCOPHAGE) 1000 MG tablet [Pharmacy Med Name: metformin 1,000 mg tablet] 180 tablet 1     Sig: TAKE 1 TABLET BY MOUTH TWICE DAILY

## 2023-01-03 NOTE — TELEPHONE ENCOUNTER
Requesting medication refill. Please approve or deny this request.    Rx requested:  Requested Prescriptions     Pending Prescriptions Disp Refills    hydrALAZINE (APRESOLINE) 25 MG tablet [Pharmacy Med Name: hydralazine 25 mg tablet] 60 tablet 3     Sig: Take 1 tablet by mouth in the morning and at bedtime         Last Office Visit:   10/26/2022      Next Visit Date:  Future Appointments   Date Time Provider Rosaline Silveira   1/25/2023 11:00 AM [address] [place], [place] [address] [facility]   3/15/2023  4:00 PM MD Steve Flores Neurology -   3/17/2023 11:00 AM Wes Monnie Meckel,  [facility]               Last refill 12/30/22. Please approve or deny.

## 2023-01-04 ENCOUNTER — TELEPHONE (OUTPATIENT)
Dept: PRIMARY CARE CLINIC | Age: 63
End: 2023-01-04

## 2023-01-04 NOTE — TELEPHONE ENCOUNTER
PA denied for vitamin d2. THis is a benefits excluded medication. The CMS excludes vitamins and minerals from coverage under part D. The only vitamins and minerals covered under part D are:    Fluoride preparations (chewable tabs and oral solution)  And rx prenatal vitamins (when used to support pregnancy or breast feeding). Pt will need to  OTC if he is too take them.

## 2023-01-25 RX ORDER — EMPAGLIFLOZIN 10 MG/1
TABLET, FILM COATED ORAL
Qty: 30 TABLET | Refills: 5 | Status: SHIPPED | OUTPATIENT
Start: 2023-01-25

## 2023-01-25 RX ORDER — CARVEDILOL 12.5 MG/1
12.5 TABLET ORAL 2 TIMES DAILY
Qty: 60 TABLET | Refills: 5 | Status: SHIPPED | OUTPATIENT
Start: 2023-01-25

## 2023-03-02 PROBLEM — R69 ILLNESS, UNSPECIFIED: Status: ACTIVE | Noted: 2018-06-26

## 2023-03-02 PROBLEM — R05.9 COUGH: Status: ACTIVE | Noted: 2019-01-28

## 2023-03-02 PROBLEM — R51.9 HEADACHE: Status: ACTIVE | Noted: 2018-12-07

## 2023-03-02 PROBLEM — J45.40 MODERATE PERSISTENT ASTHMA WITHOUT COMPLICATION: Status: ACTIVE | Noted: 2019-12-24

## 2023-03-02 PROBLEM — K08.89 TOOTHACHE: Status: ACTIVE | Noted: 2023-03-02

## 2023-03-02 PROBLEM — R41.82 ALTERED MENTAL STATUS, UNSPECIFIED: Status: ACTIVE | Noted: 2021-11-27

## 2023-03-02 PROBLEM — Z95.1 PRESENCE OF AORTOCORONARY BYPASS GRAFT: Status: ACTIVE | Noted: 2021-11-28

## 2023-03-02 PROBLEM — H53.2 DIPLOPIA: Status: ACTIVE | Noted: 2023-03-02

## 2023-03-02 PROBLEM — R09.81 NASAL CONGESTION: Status: ACTIVE | Noted: 2019-01-28

## 2023-03-02 PROBLEM — R11.0 NAUSEA: Status: ACTIVE | Noted: 2018-06-16

## 2023-03-02 PROBLEM — E86.0 DEHYDRATION: Status: ACTIVE | Noted: 2018-06-26

## 2023-03-02 PROBLEM — E87.6 HYPOKALEMIA: Status: ACTIVE | Noted: 2021-11-28

## 2023-03-02 PROBLEM — Z20.822 CONTACT WITH AND (SUSPECTED) EXPOSURE TO COVID-19: Status: ACTIVE | Noted: 2021-11-28

## 2023-04-01 PROBLEM — E86.0 DEHYDRATION: Status: RESOLVED | Noted: 2018-06-26 | Resolved: 2023-04-01

## 2023-04-01 PROBLEM — R05.9 COUGH: Status: RESOLVED | Noted: 2019-01-28 | Resolved: 2023-04-01

## 2023-04-18 DIAGNOSIS — I15.2 OBESITY, DIABETES, AND HYPERTENSION SYNDROME (HCC): Primary | ICD-10-CM

## 2023-04-18 DIAGNOSIS — E11.59 OBESITY, DIABETES, AND HYPERTENSION SYNDROME (HCC): Primary | ICD-10-CM

## 2023-04-18 DIAGNOSIS — E11.69 OBESITY, DIABETES, AND HYPERTENSION SYNDROME (HCC): Primary | ICD-10-CM

## 2023-04-18 DIAGNOSIS — E66.9 OBESITY, DIABETES, AND HYPERTENSION SYNDROME (HCC): Primary | ICD-10-CM

## 2023-04-28 ENCOUNTER — HOSPITAL ENCOUNTER (EMERGENCY)
Age: 63
Discharge: HOME | DRG: 623 | End: 2023-04-28
Attending: EMERGENCY MEDICINE
Payer: MEDICARE

## 2023-04-28 ENCOUNTER — APPOINTMENT (OUTPATIENT)
Dept: GENERAL RADIOLOGY | Age: 63
DRG: 623 | End: 2023-04-28
Payer: MEDICARE

## 2023-04-28 VITALS
BODY MASS INDEX: 25.11 KG/M2 | HEIGHT: 67 IN | OXYGEN SATURATION: 94 % | SYSTOLIC BLOOD PRESSURE: 120 MMHG | WEIGHT: 160 LBS | HEART RATE: 107 BPM | RESPIRATION RATE: 18 BRPM | TEMPERATURE: 98.1 F | DIASTOLIC BLOOD PRESSURE: 71 MMHG

## 2023-04-28 DIAGNOSIS — R73.9 HYPERGLYCEMIA: Primary | ICD-10-CM

## 2023-04-28 DIAGNOSIS — L08.9 INFECTION OF GREAT TOE: ICD-10-CM

## 2023-04-28 LAB
CHP ED QC CHECK: YES
GLUCOSE BLD-MCNC: 355 MG/DL
GLUCOSE BLD-MCNC: 355 MG/DL (ref 70–99)
PERFORMED ON: ABNORMAL

## 2023-04-28 PROCEDURE — 99284 EMERGENCY DEPT VISIT MOD MDM: CPT

## 2023-04-28 PROCEDURE — 96372 THER/PROPH/DIAG INJ SC/IM: CPT

## 2023-04-28 PROCEDURE — 73630 X-RAY EXAM OF FOOT: CPT

## 2023-04-28 PROCEDURE — 6370000000 HC RX 637 (ALT 250 FOR IP): Performed by: EMERGENCY MEDICINE

## 2023-04-28 RX ORDER — DOXYCYCLINE HYCLATE 100 MG
100 TABLET ORAL 2 TIMES DAILY
Qty: 20 TABLET | Refills: 0 | Status: ON HOLD | OUTPATIENT
Start: 2023-04-28 | End: 2023-05-09 | Stop reason: HOSPADM

## 2023-04-28 RX ORDER — DOXYCYCLINE HYCLATE 100 MG/1
100 CAPSULE ORAL ONCE
Status: COMPLETED | OUTPATIENT
Start: 2023-04-28 | End: 2023-04-28

## 2023-04-28 RX ADMIN — INSULIN HUMAN 10 UNITS: 100 INJECTION, SOLUTION PARENTERAL at 19:29

## 2023-04-28 RX ADMIN — DOXYCYCLINE HYCLATE 100 MG: 100 CAPSULE ORAL at 19:26

## 2023-04-28 ASSESSMENT — PAIN DESCRIPTION - FREQUENCY: FREQUENCY: CONTINUOUS

## 2023-04-28 ASSESSMENT — PAIN DESCRIPTION - ORIENTATION: ORIENTATION: RIGHT

## 2023-04-28 ASSESSMENT — PAIN DESCRIPTION - ONSET: ONSET: ON-GOING

## 2023-04-28 ASSESSMENT — PAIN DESCRIPTION - DESCRIPTORS: DESCRIPTORS: SHARP;STABBING

## 2023-04-28 ASSESSMENT — PAIN SCALES - GENERAL: PAINLEVEL_OUTOF10: 6

## 2023-04-28 ASSESSMENT — PAIN DESCRIPTION - LOCATION: LOCATION: FOOT;TOE (COMMENT WHICH ONE)

## 2023-04-28 NOTE — ED NOTES
Pt states stubbed toe on right foot. Pt states poss infection, red and swollen.      Kristian Pompa  04/28/23 1849

## 2023-04-28 NOTE — ED PROVIDER NOTES
[facility] ED  EMERGENCY DEPARTMENT ENCOUNTER      Pt Name: Janet Mcfarland  MRN: [id number]  Bryangfkavita 1960  Date of evaluation: 4/28/2023  Provider: Eliza Cantu MD    [address] [place] [address]       Chief Complaint   Patient presents with    Foot Injury     Stubbed tow three days ago         HISTORY OF PRESENT ILLNESS   (Location/Symptom, Timing/Onset, Context/Setting, Quality, Duration, Modifying Factors, Severity)  Note limiting factors. 71-year-old male with history of diabetes presenting with redness of the right great toe. Patient believes he may have stepped on a toy a few days prior. He does have some neuropathy and does not have much feeling in his feet. Unsure what his sugars have been. Within the last few days his great toe has become red and swollen. Nursing Notes were reviewed. REVIEW OF SYSTEMS    (2-9 systems for level 4, 10 or more for level 5)     Review of Systems   Skin:  Positive for wound. All other systems reviewed and are negative. Except as noted above the remainder of the review of systems was reviewed and negative.        PAST MEDICAL HISTORY     Past Medical History:   Diagnosis Date    Asthma     Back pain     CAD (coronary artery disease) 01/2020    3 vessel     chf 9/16/2022    Diabetes mellitus ([address]     Essential hypertension, benign     Hyperlipemia     Ischemic cardiomyopathy 9/16/2022    Neuropathic pain, leg, bilateral          SURGICAL HISTORY       Past Surgical History:   Procedure Laterality Date    CARPAL TUNNEL RELEASE Bilateral     FOOT SURGERY Left     bone spur    LAMINOTOMY  2012    SPINE SURGERY N/A 11/11/2022    L1 VERETEBRAL AUGMENTATION performed by Jacky Richards MD at [address] [facility]       Previous Medications    ACCU-CHEK FASTCLIX LANCETS MISC    Test 4x daily Dx E11.65    ALBUTEROL SULFATE  (90 BASE) MCG/ACT INHALER    Inhale 2 puffs into the lungs every 6 hours as needed for Wheezing    ASPIRIN 81 MG EC TABLET    Take

## 2023-04-30 ENCOUNTER — HOSPITAL ENCOUNTER (INPATIENT)
Age: 63
LOS: 9 days | Discharge: HOME | DRG: 623 | End: 2023-05-09
Attending: INTERNAL MEDICINE | Admitting: INTERNAL MEDICINE
Payer: MEDICARE

## 2023-04-30 DIAGNOSIS — R73.9 HYPERGLYCEMIA: ICD-10-CM

## 2023-04-30 DIAGNOSIS — L03.115 CELLULITIS OF RIGHT FOOT: Primary | ICD-10-CM

## 2023-04-30 DIAGNOSIS — L02.91 ABSCESS: ICD-10-CM

## 2023-04-30 DIAGNOSIS — E78.5 DM TYPE 2 WITH DIABETIC DYSLIPIDEMIA (HCC): ICD-10-CM

## 2023-04-30 DIAGNOSIS — N17.9 AKI (ACUTE KIDNEY INJURY) (HCC): ICD-10-CM

## 2023-04-30 DIAGNOSIS — E11.69 DM TYPE 2 WITH DIABETIC DYSLIPIDEMIA (HCC): ICD-10-CM

## 2023-04-30 PROBLEM — L97.519 RIGHT FOOT ULCER, WITH UNSPECIFIED SEVERITY (HCC): Status: ACTIVE | Noted: 2023-04-30

## 2023-04-30 LAB
ALBUMIN SERPL-MCNC: 3.2 G/DL (ref 3.5–4.6)
ALP SERPL-CCNC: 83 U/L (ref 35–104)
ALT SERPL-CCNC: 6 U/L (ref 0–41)
ANION GAP SERPL CALCULATED.3IONS-SCNC: 23 MEQ/L (ref 9–15)
AST SERPL-CCNC: 8 U/L (ref 0–40)
BASOPHILS # BLD: 0 K/UL (ref 0–0.2)
BASOPHILS NFR BLD: 0.3 %
BILIRUB SERPL-MCNC: 0.3 MG/DL (ref 0.2–0.7)
BUN SERPL-MCNC: 28 MG/DL (ref 8–23)
CALCIUM SERPL-MCNC: 8.7 MG/DL (ref 8.5–9.9)
CHLORIDE SERPL-SCNC: 88 MEQ/L (ref 95–107)
CO2 SERPL-SCNC: 14 MEQ/L (ref 20–31)
CREAT SERPL-MCNC: 1.7 MG/DL (ref 0.7–1.2)
EOSINOPHIL # BLD: 0 K/UL (ref 0–0.7)
EOSINOPHIL NFR BLD: 0.3 %
ERYTHROCYTE [DISTWIDTH] IN BLOOD BY AUTOMATED COUNT: 13.5 % (ref 11.5–14.5)
GLOBULIN SER CALC-MCNC: 3 G/DL (ref 2.3–3.5)
GLUCOSE BLD-MCNC: 227 MG/DL (ref 70–99)
GLUCOSE SERPL-MCNC: 274 MG/DL (ref 70–99)
HBA1C MFR BLD: 11.2 % (ref 4.8–5.9)
HCT VFR BLD AUTO: 40.2 % (ref 42–52)
HGB BLD-MCNC: 13.1 G/DL (ref 14–18)
LYMPHOCYTES # BLD: 0.5 K/UL (ref 1–4.8)
LYMPHOCYTES NFR BLD: 4 %
MCH RBC QN AUTO: 26.9 PG (ref 27–31.3)
MCHC RBC AUTO-ENTMCNC: 32.5 % (ref 33–37)
MCV RBC AUTO: 82.7 FL (ref 79–92.2)
MONOCYTES # BLD: 1 K/UL (ref 0.2–0.8)
MONOCYTES NFR BLD: 8.6 %
NEUTROPHILS # BLD: 10.2 K/UL (ref 1.4–6.5)
NEUTS SEG NFR BLD: 86.8 %
PERFORMED ON: ABNORMAL
PLATELET # BLD AUTO: 174 K/UL (ref 130–400)
POTASSIUM SERPL-SCNC: 4.9 MEQ/L (ref 3.4–4.9)
PROCALCITONIN SERPL IA-MCNC: 0.99 NG/ML (ref 0–0.15)
PROT SERPL-MCNC: 6.2 G/DL (ref 6.3–8)
RBC # BLD AUTO: 4.86 M/UL (ref 4.7–6.1)
SODIUM SERPL-SCNC: 125 MEQ/L (ref 135–144)
WBC # BLD AUTO: 11.7 K/UL (ref 4.8–10.8)

## 2023-04-30 PROCEDURE — 87070 CULTURE OTHR SPECIMN AEROBIC: CPT

## 2023-04-30 PROCEDURE — 83036 HEMOGLOBIN GLYCOSYLATED A1C: CPT

## 2023-04-30 PROCEDURE — 99285 EMERGENCY DEPT VISIT HI MDM: CPT

## 2023-04-30 PROCEDURE — 86403 PARTICLE AGGLUT ANTBDY SCRN: CPT

## 2023-04-30 PROCEDURE — 80053 COMPREHEN METABOLIC PANEL: CPT

## 2023-04-30 PROCEDURE — 96374 THER/PROPH/DIAG INJ IV PUSH: CPT

## 2023-04-30 PROCEDURE — 2580000003 HC RX 258

## 2023-04-30 PROCEDURE — 1210000000 HC MED SURG R&B

## 2023-04-30 PROCEDURE — 36415 COLL VENOUS BLD VENIPUNCTURE: CPT

## 2023-04-30 PROCEDURE — 6360000002 HC RX W HCPCS

## 2023-04-30 PROCEDURE — 87075 CULTR BACTERIA EXCEPT BLOOD: CPT

## 2023-04-30 PROCEDURE — 85025 COMPLETE CBC W/AUTO DIFF WBC: CPT

## 2023-04-30 PROCEDURE — 84145 PROCALCITONIN (PCT): CPT

## 2023-04-30 RX ORDER — 0.9 % SODIUM CHLORIDE 0.9 %
500 INTRAVENOUS SOLUTION INTRAVENOUS ONCE
Status: COMPLETED | OUTPATIENT
Start: 2023-04-30 | End: 2023-04-30

## 2023-04-30 RX ORDER — INSULIN LISPRO 100 [IU]/ML
0-4 INJECTION, SOLUTION INTRAVENOUS; SUBCUTANEOUS NIGHTLY
Status: DISCONTINUED | OUTPATIENT
Start: 2023-04-30 | End: 2023-05-09 | Stop reason: HOSPADM

## 2023-04-30 RX ORDER — KETOROLAC TROMETHAMINE 15 MG/ML
15 INJECTION, SOLUTION INTRAMUSCULAR; INTRAVENOUS ONCE
Status: COMPLETED | OUTPATIENT
Start: 2023-04-30 | End: 2023-04-30

## 2023-04-30 RX ORDER — ENOXAPARIN SODIUM 100 MG/ML
40 INJECTION SUBCUTANEOUS DAILY
Status: DISCONTINUED | OUTPATIENT
Start: 2023-05-01 | End: 2023-05-09 | Stop reason: HOSPADM

## 2023-04-30 RX ORDER — DEXTROSE MONOHYDRATE 100 MG/ML
INJECTION, SOLUTION INTRAVENOUS CONTINUOUS PRN
Status: DISCONTINUED | OUTPATIENT
Start: 2023-04-30 | End: 2023-05-09 | Stop reason: HOSPADM

## 2023-04-30 RX ORDER — ACETAMINOPHEN 650 MG/1
650 SUPPOSITORY RECTAL EVERY 6 HOURS PRN
Status: DISCONTINUED | OUTPATIENT
Start: 2023-04-30 | End: 2023-05-09 | Stop reason: HOSPADM

## 2023-04-30 RX ORDER — ACETAMINOPHEN 325 MG/1
650 TABLET ORAL EVERY 6 HOURS PRN
Status: DISCONTINUED | OUTPATIENT
Start: 2023-04-30 | End: 2023-05-09 | Stop reason: HOSPADM

## 2023-04-30 RX ORDER — ONDANSETRON 2 MG/ML
4 INJECTION INTRAMUSCULAR; INTRAVENOUS EVERY 6 HOURS PRN
Status: DISCONTINUED | OUTPATIENT
Start: 2023-04-30 | End: 2023-05-09 | Stop reason: HOSPADM

## 2023-04-30 RX ORDER — ONDANSETRON 4 MG/1
4 TABLET, ORALLY DISINTEGRATING ORAL EVERY 8 HOURS PRN
Status: DISCONTINUED | OUTPATIENT
Start: 2023-04-30 | End: 2023-05-09 | Stop reason: HOSPADM

## 2023-04-30 RX ORDER — POLYETHYLENE GLYCOL 3350 17 G/17G
17 POWDER, FOR SOLUTION ORAL DAILY PRN
Status: DISCONTINUED | OUTPATIENT
Start: 2023-04-30 | End: 2023-05-09 | Stop reason: HOSPADM

## 2023-04-30 RX ORDER — INSULIN LISPRO 100 [IU]/ML
0-8 INJECTION, SOLUTION INTRAVENOUS; SUBCUTANEOUS
Status: DISCONTINUED | OUTPATIENT
Start: 2023-05-01 | End: 2023-05-09 | Stop reason: HOSPADM

## 2023-04-30 RX ADMIN — KETOROLAC TROMETHAMINE 15 MG: 15 INJECTION, SOLUTION INTRAMUSCULAR; INTRAVENOUS at 18:58

## 2023-04-30 RX ADMIN — SODIUM CHLORIDE 500 ML: 9 INJECTION, SOLUTION INTRAVENOUS at 19:59

## 2023-04-30 RX ADMIN — CEFTRIAXONE SODIUM 1000 MG: 1 INJECTION, POWDER, FOR SOLUTION INTRAMUSCULAR; INTRAVENOUS at 23:53

## 2023-04-30 ASSESSMENT — ENCOUNTER SYMPTOMS
CONSTIPATION: 0
COUGH: 0
ABDOMINAL PAIN: 0
VOMITING: 0
COLOR CHANGE: 0
ABDOMINAL DISTENTION: 0
SHORTNESS OF BREATH: 0
CHEST TIGHTNESS: 0
SORE THROAT: 0
VOMITING: 1
NAUSEA: 1
TROUBLE SWALLOWING: 0
BACK PAIN: 0
DIARRHEA: 0
COLOR CHANGE: 1
PHOTOPHOBIA: 0
RHINORRHEA: 0

## 2023-04-30 ASSESSMENT — PAIN - FUNCTIONAL ASSESSMENT
PAIN_FUNCTIONAL_ASSESSMENT: PREVENTS OR INTERFERES WITH MANY ACTIVE NOT PASSIVE ACTIVITIES
PAIN_FUNCTIONAL_ASSESSMENT: 0-10

## 2023-04-30 ASSESSMENT — PAIN DESCRIPTION - ORIENTATION
ORIENTATION: RIGHT
ORIENTATION: RIGHT

## 2023-04-30 ASSESSMENT — PAIN DESCRIPTION - LOCATION
LOCATION: TOE (COMMENT WHICH ONE)
LOCATION: FOOT

## 2023-04-30 ASSESSMENT — PAIN DESCRIPTION - FREQUENCY
FREQUENCY: CONTINUOUS
FREQUENCY: CONTINUOUS

## 2023-04-30 ASSESSMENT — PAIN DESCRIPTION - PAIN TYPE
TYPE: ACUTE PAIN
TYPE: ACUTE PAIN

## 2023-04-30 ASSESSMENT — PAIN SCALES - GENERAL
PAINLEVEL_OUTOF10: 3
PAINLEVEL_OUTOF10: 9

## 2023-04-30 ASSESSMENT — LIFESTYLE VARIABLES
HOW MANY STANDARD DRINKS CONTAINING ALCOHOL DO YOU HAVE ON A TYPICAL DAY: PATIENT DOES NOT DRINK
HOW OFTEN DO YOU HAVE A DRINK CONTAINING ALCOHOL: NEVER

## 2023-04-30 ASSESSMENT — PAIN DESCRIPTION - DESCRIPTORS: DESCRIPTORS: ACHING

## 2023-04-30 ASSESSMENT — PAIN DESCRIPTION - ONSET: ONSET: SUDDEN

## 2023-05-01 ENCOUNTER — APPOINTMENT (OUTPATIENT)
Dept: ULTRASOUND IMAGING | Age: 63
DRG: 623 | End: 2023-05-01
Payer: MEDICARE

## 2023-05-01 PROBLEM — E11.65 INADEQUATELY CONTROLLED DIABETES MELLITUS (HCC): Status: ACTIVE | Noted: 2018-06-13

## 2023-05-01 LAB
ANION GAP SERPL CALCULATED.3IONS-SCNC: 18 MEQ/L (ref 9–15)
ANION GAP SERPL CALCULATED.3IONS-SCNC: 22 MEQ/L (ref 9–15)
BASOPHILS # BLD: 0 K/UL (ref 0–0.2)
BASOPHILS NFR BLD: 0.2 %
BUN SERPL-MCNC: 29 MG/DL (ref 8–23)
BUN SERPL-MCNC: 31 MG/DL (ref 8–23)
CALCIUM SERPL-MCNC: 8.4 MG/DL (ref 8.5–9.9)
CALCIUM SERPL-MCNC: 8.9 MG/DL (ref 8.5–9.9)
CHLORIDE SERPL-SCNC: 92 MEQ/L (ref 95–107)
CHLORIDE SERPL-SCNC: 95 MEQ/L (ref 95–107)
CO2 SERPL-SCNC: 14 MEQ/L (ref 20–31)
CO2 SERPL-SCNC: 16 MEQ/L (ref 20–31)
CREAT SERPL-MCNC: 1.57 MG/DL (ref 0.7–1.2)
CREAT SERPL-MCNC: 1.63 MG/DL (ref 0.7–1.2)
CRP SERPL HS-MCNC: 249.3 MG/L (ref 0–5)
EOSINOPHIL # BLD: 0.1 K/UL (ref 0–0.7)
EOSINOPHIL NFR BLD: 1.6 %
ERYTHROCYTE [DISTWIDTH] IN BLOOD BY AUTOMATED COUNT: 13.3 % (ref 11.5–14.5)
ERYTHROCYTE [SEDIMENTATION RATE] IN BLOOD BY WESTERGREN METHOD: 77 MM (ref 0–20)
GLUCOSE BLD-MCNC: 140 MG/DL (ref 70–99)
GLUCOSE BLD-MCNC: 162 MG/DL (ref 70–99)
GLUCOSE BLD-MCNC: 251 MG/DL (ref 70–99)
GLUCOSE SERPL-MCNC: 215 MG/DL (ref 70–99)
GLUCOSE SERPL-MCNC: 290 MG/DL (ref 70–99)
HCT VFR BLD AUTO: 36.6 % (ref 42–52)
HGB BLD-MCNC: 12.3 G/DL (ref 14–18)
LYMPHOCYTES # BLD: 0.6 K/UL (ref 1–4.8)
LYMPHOCYTES NFR BLD: 6.5 %
MCH RBC QN AUTO: 27.4 PG (ref 27–31.3)
MCHC RBC AUTO-ENTMCNC: 33.6 % (ref 33–37)
MCV RBC AUTO: 81.6 FL (ref 79–92.2)
MONOCYTES # BLD: 0.8 K/UL (ref 0.2–0.8)
MONOCYTES NFR BLD: 9.1 %
NEUTROPHILS # BLD: 7.4 K/UL (ref 1.4–6.5)
NEUTS SEG NFR BLD: 82.6 %
PERFORMED ON: ABNORMAL
PLATELET # BLD AUTO: 158 K/UL (ref 130–400)
POTASSIUM SERPL-SCNC: 3.9 MEQ/L (ref 3.4–4.9)
POTASSIUM SERPL-SCNC: 4.3 MEQ/L (ref 3.4–4.9)
PROCALCITONIN SERPL IA-MCNC: 0.93 NG/ML (ref 0–0.15)
RBC # BLD AUTO: 4.48 M/UL (ref 4.7–6.1)
SODIUM SERPL-SCNC: 128 MEQ/L (ref 135–144)
SODIUM SERPL-SCNC: 129 MEQ/L (ref 135–144)
WBC # BLD AUTO: 9 K/UL (ref 4.8–10.8)

## 2023-05-01 PROCEDURE — 6370000000 HC RX 637 (ALT 250 FOR IP): Performed by: INTERNAL MEDICINE

## 2023-05-01 PROCEDURE — 6370000000 HC RX 637 (ALT 250 FOR IP)

## 2023-05-01 PROCEDURE — 2580000003 HC RX 258: Performed by: INTERNAL MEDICINE

## 2023-05-01 PROCEDURE — 80048 BASIC METABOLIC PNL TOTAL CA: CPT

## 2023-05-01 PROCEDURE — 1210000000 HC MED SURG R&B

## 2023-05-01 PROCEDURE — 6360000002 HC RX W HCPCS: Performed by: INTERNAL MEDICINE

## 2023-05-01 PROCEDURE — 6360000002 HC RX W HCPCS

## 2023-05-01 PROCEDURE — 86140 C-REACTIVE PROTEIN: CPT

## 2023-05-01 PROCEDURE — 99223 1ST HOSP IP/OBS HIGH 75: CPT | Performed by: INTERNAL MEDICINE

## 2023-05-01 PROCEDURE — 84145 PROCALCITONIN (PCT): CPT

## 2023-05-01 PROCEDURE — 99222 1ST HOSP IP/OBS MODERATE 55: CPT | Performed by: INTERNAL MEDICINE

## 2023-05-01 PROCEDURE — 36415 COLL VENOUS BLD VENIPUNCTURE: CPT

## 2023-05-01 PROCEDURE — 85025 COMPLETE CBC W/AUTO DIFF WBC: CPT

## 2023-05-01 PROCEDURE — 85652 RBC SED RATE AUTOMATED: CPT

## 2023-05-01 PROCEDURE — 93925 LOWER EXTREMITY STUDY: CPT

## 2023-05-01 PROCEDURE — 2580000003 HC RX 258

## 2023-05-01 RX ORDER — INSULIN LISPRO 100 [IU]/ML
12 INJECTION, SOLUTION INTRAVENOUS; SUBCUTANEOUS
Status: DISCONTINUED | OUTPATIENT
Start: 2023-05-01 | End: 2023-05-05

## 2023-05-01 RX ORDER — INSULIN GLARGINE 100 [IU]/ML
0.25 INJECTION, SOLUTION SUBCUTANEOUS NIGHTLY
Status: DISCONTINUED | OUTPATIENT
Start: 2023-05-01 | End: 2023-05-01

## 2023-05-01 RX ORDER — INSULIN GLARGINE 100 [IU]/ML
40 INJECTION, SOLUTION SUBCUTANEOUS NIGHTLY
Status: DISCONTINUED | OUTPATIENT
Start: 2023-05-01 | End: 2023-05-05

## 2023-05-01 RX ORDER — INSULIN LISPRO 100 [IU]/ML
4 INJECTION, SOLUTION INTRAVENOUS; SUBCUTANEOUS
Status: DISCONTINUED | OUTPATIENT
Start: 2023-05-01 | End: 2023-05-01

## 2023-05-01 RX ORDER — HYDRALAZINE HYDROCHLORIDE 25 MG/1
25 TABLET, FILM COATED ORAL 2 TIMES DAILY
Status: DISCONTINUED | OUTPATIENT
Start: 2023-05-01 | End: 2023-05-07

## 2023-05-01 RX ORDER — CARVEDILOL 12.5 MG/1
12.5 TABLET ORAL 2 TIMES DAILY
Status: DISCONTINUED | OUTPATIENT
Start: 2023-05-01 | End: 2023-05-09 | Stop reason: HOSPADM

## 2023-05-01 RX ORDER — ASPIRIN 81 MG/1
81 TABLET ORAL DAILY
Status: DISCONTINUED | OUTPATIENT
Start: 2023-05-01 | End: 2023-05-09 | Stop reason: HOSPADM

## 2023-05-01 RX ORDER — CLOPIDOGREL BISULFATE 75 MG/1
75 TABLET ORAL DAILY
Status: DISCONTINUED | OUTPATIENT
Start: 2023-05-01 | End: 2023-05-09 | Stop reason: HOSPADM

## 2023-05-01 RX ORDER — ROSUVASTATIN CALCIUM 20 MG/1
20 TABLET, COATED ORAL NIGHTLY
Status: DISCONTINUED | OUTPATIENT
Start: 2023-05-01 | End: 2023-05-09 | Stop reason: HOSPADM

## 2023-05-01 RX ADMIN — ONDANSETRON 4 MG: 4 TABLET, ORALLY DISINTEGRATING ORAL at 11:24

## 2023-05-01 RX ADMIN — ASPIRIN 81 MG: 81 TABLET, COATED ORAL at 08:51

## 2023-05-01 RX ADMIN — ACETAMINOPHEN 650 MG: 325 TABLET ORAL at 11:22

## 2023-05-01 RX ADMIN — HYDRALAZINE HYDROCHLORIDE 25 MG: 25 TABLET, FILM COATED ORAL at 08:51

## 2023-05-01 RX ADMIN — ENOXAPARIN SODIUM 40 MG: 100 INJECTION SUBCUTANEOUS at 08:49

## 2023-05-01 RX ADMIN — CEFTRIAXONE SODIUM 1000 MG: 1 INJECTION, POWDER, FOR SOLUTION INTRAMUSCULAR; INTRAVENOUS at 22:43

## 2023-05-01 RX ADMIN — INSULIN LISPRO 4 UNITS: 100 INJECTION, SOLUTION INTRAVENOUS; SUBCUTANEOUS at 12:56

## 2023-05-01 RX ADMIN — VANCOMYCIN HYDROCHLORIDE 1000 MG: 1 INJECTION, POWDER, LYOPHILIZED, FOR SOLUTION INTRAVENOUS at 13:19

## 2023-05-01 RX ADMIN — INSULIN LISPRO 4 UNITS: 100 INJECTION, SOLUTION INTRAVENOUS; SUBCUTANEOUS at 12:58

## 2023-05-01 RX ADMIN — CLOPIDOGREL BISULFATE 75 MG: 75 TABLET ORAL at 08:51

## 2023-05-01 RX ADMIN — INSULIN LISPRO 2 UNITS: 100 INJECTION, SOLUTION INTRAVENOUS; SUBCUTANEOUS at 08:51

## 2023-05-01 RX ADMIN — INSULIN GLARGINE 40 UNITS: 100 INJECTION, SOLUTION SUBCUTANEOUS at 21:43

## 2023-05-01 RX ADMIN — INSULIN LISPRO 12 UNITS: 100 INJECTION, SOLUTION INTRAVENOUS; SUBCUTANEOUS at 17:32

## 2023-05-01 RX ADMIN — CARVEDILOL 12.5 MG: 12.5 TABLET, FILM COATED ORAL at 11:24

## 2023-05-01 RX ADMIN — CARVEDILOL 12.5 MG: 12.5 TABLET, FILM COATED ORAL at 21:41

## 2023-05-01 RX ADMIN — ROSUVASTATIN CALCIUM 20 MG: 20 TABLET, FILM COATED ORAL at 21:42

## 2023-05-01 RX ADMIN — HYDRALAZINE HYDROCHLORIDE 25 MG: 25 TABLET, FILM COATED ORAL at 21:43

## 2023-05-01 ASSESSMENT — PAIN DESCRIPTION - LOCATION
LOCATION: FOOT

## 2023-05-01 ASSESSMENT — PAIN SCALES - GENERAL
PAINLEVEL_OUTOF10: 5

## 2023-05-01 ASSESSMENT — PAIN DESCRIPTION - ORIENTATION
ORIENTATION: RIGHT
ORIENTATION: RIGHT

## 2023-05-02 ENCOUNTER — APPOINTMENT (OUTPATIENT)
Dept: MRI IMAGING | Age: 63
DRG: 623 | End: 2023-05-02
Payer: MEDICARE

## 2023-05-02 PROBLEM — M86.9 OSTEOMYELITIS (HCC): Status: ACTIVE | Noted: 2023-04-30

## 2023-05-02 PROBLEM — R73.9 HYPERGLYCEMIA: Status: ACTIVE | Noted: 2023-05-02

## 2023-05-02 PROBLEM — L03.115 CELLULITIS OF RIGHT FOOT: Status: ACTIVE | Noted: 2023-05-02

## 2023-05-02 LAB
ABO + RH BLD: NORMAL
ANION GAP SERPL CALCULATED.3IONS-SCNC: 14 MEQ/L (ref 9–15)
BASOPHILS # BLD: 0 K/UL (ref 0–0.2)
BASOPHILS NFR BLD: 0.3 %
BLD GP AB SCN SERPL QL: NORMAL
BUN SERPL-MCNC: 17 MG/DL (ref 8–23)
CALCIUM SERPL-MCNC: 8.7 MG/DL (ref 8.5–9.9)
CHLORIDE SERPL-SCNC: 99 MEQ/L (ref 95–107)
CO2 SERPL-SCNC: 21 MEQ/L (ref 20–31)
CREAT SERPL-MCNC: 1.23 MG/DL (ref 0.7–1.2)
EOSINOPHIL # BLD: 0.2 K/UL (ref 0–0.7)
EOSINOPHIL NFR BLD: 2.3 %
ERYTHROCYTE [DISTWIDTH] IN BLOOD BY AUTOMATED COUNT: 13.2 % (ref 11.5–14.5)
GLUCOSE BLD-MCNC: 127 MG/DL (ref 70–99)
GLUCOSE BLD-MCNC: 185 MG/DL (ref 70–99)
GLUCOSE BLD-MCNC: 195 MG/DL (ref 70–99)
GLUCOSE BLD-MCNC: 87 MG/DL (ref 70–99)
GLUCOSE SERPL-MCNC: 159 MG/DL (ref 70–99)
HCT VFR BLD AUTO: 38 % (ref 42–52)
HGB BLD-MCNC: 12.8 G/DL (ref 14–18)
LYMPHOCYTES # BLD: 0.4 K/UL (ref 1–4.8)
LYMPHOCYTES NFR BLD: 4.7 %
MCH RBC QN AUTO: 27.4 PG (ref 27–31.3)
MCHC RBC AUTO-ENTMCNC: 33.6 % (ref 33–37)
MCV RBC AUTO: 81.5 FL (ref 79–92.2)
MONOCYTES # BLD: 0.7 K/UL (ref 0.2–0.8)
MONOCYTES NFR BLD: 8.7 %
NEUTROPHILS # BLD: 7 K/UL (ref 1.4–6.5)
NEUTS SEG NFR BLD: 84 %
PERFORMED ON: ABNORMAL
PERFORMED ON: NORMAL
PLATELET # BLD AUTO: 180 K/UL (ref 130–400)
POTASSIUM SERPL-SCNC: 3.9 MEQ/L (ref 3.4–4.9)
RBC # BLD AUTO: 4.67 M/UL (ref 4.7–6.1)
SODIUM SERPL-SCNC: 134 MEQ/L (ref 135–144)
VANCOMYCIN TROUGH SERPL-MCNC: <4 UG/ML (ref 10–20)
WBC # BLD AUTO: 8.3 K/UL (ref 4.8–10.8)

## 2023-05-02 PROCEDURE — 99232 SBSQ HOSP IP/OBS MODERATE 35: CPT | Performed by: PHYSICIAN ASSISTANT

## 2023-05-02 PROCEDURE — 80202 ASSAY OF VANCOMYCIN: CPT

## 2023-05-02 PROCEDURE — 6360000002 HC RX W HCPCS

## 2023-05-02 PROCEDURE — 6370000000 HC RX 637 (ALT 250 FOR IP): Performed by: INTERNAL MEDICINE

## 2023-05-02 PROCEDURE — 99222 1ST HOSP IP/OBS MODERATE 55: CPT | Performed by: INTERNAL MEDICINE

## 2023-05-02 PROCEDURE — 86850 RBC ANTIBODY SCREEN: CPT

## 2023-05-02 PROCEDURE — 1210000000 HC MED SURG R&B

## 2023-05-02 PROCEDURE — 36415 COLL VENOUS BLD VENIPUNCTURE: CPT

## 2023-05-02 PROCEDURE — 86901 BLOOD TYPING SEROLOGIC RH(D): CPT

## 2023-05-02 PROCEDURE — 2580000003 HC RX 258

## 2023-05-02 PROCEDURE — 80048 BASIC METABOLIC PNL TOTAL CA: CPT

## 2023-05-02 PROCEDURE — 86900 BLOOD TYPING SEROLOGIC ABO: CPT

## 2023-05-02 PROCEDURE — 6360000002 HC RX W HCPCS: Performed by: INTERNAL MEDICINE

## 2023-05-02 PROCEDURE — 73718 MRI LOWER EXTREMITY W/O DYE: CPT

## 2023-05-02 PROCEDURE — 6370000000 HC RX 637 (ALT 250 FOR IP)

## 2023-05-02 PROCEDURE — 2580000003 HC RX 258: Performed by: INTERNAL MEDICINE

## 2023-05-02 PROCEDURE — 85025 COMPLETE CBC W/AUTO DIFF WBC: CPT

## 2023-05-02 RX ORDER — HYDROXYZINE HYDROCHLORIDE 25 MG/1
25 TABLET, FILM COATED ORAL EVERY 6 HOURS PRN
Status: DISCONTINUED | OUTPATIENT
Start: 2023-05-02 | End: 2023-05-09 | Stop reason: HOSPADM

## 2023-05-02 RX ADMIN — ROSUVASTATIN CALCIUM 20 MG: 20 TABLET, FILM COATED ORAL at 20:26

## 2023-05-02 RX ADMIN — CEFTRIAXONE SODIUM 1000 MG: 1 INJECTION, POWDER, FOR SOLUTION INTRAMUSCULAR; INTRAVENOUS at 23:14

## 2023-05-02 RX ADMIN — INSULIN LISPRO 12 UNITS: 100 INJECTION, SOLUTION INTRAVENOUS; SUBCUTANEOUS at 08:53

## 2023-05-02 RX ADMIN — SACUBITRIL AND VALSARTAN 1 TABLET: 24; 26 TABLET, FILM COATED ORAL at 20:26

## 2023-05-02 RX ADMIN — CLOPIDOGREL BISULFATE 75 MG: 75 TABLET ORAL at 08:53

## 2023-05-02 RX ADMIN — CARVEDILOL 12.5 MG: 12.5 TABLET, FILM COATED ORAL at 08:53

## 2023-05-02 RX ADMIN — HYDROXYZINE HYDROCHLORIDE 25 MG: 25 TABLET ORAL at 20:26

## 2023-05-02 RX ADMIN — ENOXAPARIN SODIUM 40 MG: 100 INJECTION SUBCUTANEOUS at 08:53

## 2023-05-02 RX ADMIN — INSULIN LISPRO 12 UNITS: 100 INJECTION, SOLUTION INTRAVENOUS; SUBCUTANEOUS at 13:10

## 2023-05-02 RX ADMIN — HYDRALAZINE HYDROCHLORIDE 25 MG: 25 TABLET, FILM COATED ORAL at 20:26

## 2023-05-02 RX ADMIN — SACUBITRIL AND VALSARTAN 1 TABLET: 24; 26 TABLET, FILM COATED ORAL at 08:53

## 2023-05-02 RX ADMIN — HYDRALAZINE HYDROCHLORIDE 25 MG: 25 TABLET, FILM COATED ORAL at 08:53

## 2023-05-02 RX ADMIN — ASPIRIN 81 MG: 81 TABLET, COATED ORAL at 08:53

## 2023-05-02 RX ADMIN — INSULIN LISPRO 12 UNITS: 100 INJECTION, SOLUTION INTRAVENOUS; SUBCUTANEOUS at 16:48

## 2023-05-02 RX ADMIN — ACETAMINOPHEN 650 MG: 325 TABLET ORAL at 08:53

## 2023-05-02 RX ADMIN — CARVEDILOL 12.5 MG: 12.5 TABLET, FILM COATED ORAL at 20:26

## 2023-05-02 RX ADMIN — INSULIN GLARGINE 40 UNITS: 100 INJECTION, SOLUTION SUBCUTANEOUS at 22:13

## 2023-05-02 RX ADMIN — VANCOMYCIN HYDROCHLORIDE 1250 MG: 1.25 INJECTION, POWDER, LYOPHILIZED, FOR SOLUTION INTRAVENOUS at 17:02

## 2023-05-02 ASSESSMENT — PAIN SCALES - GENERAL: PAINLEVEL_OUTOF10: 5

## 2023-05-02 ASSESSMENT — ENCOUNTER SYMPTOMS
WHEEZING: 0
SHORTNESS OF BREATH: 0
NAUSEA: 0
ABDOMINAL PAIN: 0
GASTROINTESTINAL NEGATIVE: 1
EYES NEGATIVE: 1
VOMITING: 0
ALLERGIC/IMMUNOLOGIC NEGATIVE: 1

## 2023-05-03 PROBLEM — Z91.199 MEDICALLY NONCOMPLIANT: Status: ACTIVE | Noted: 2023-05-03

## 2023-05-03 PROBLEM — M86.071 ACUTE HEMATOGENOUS OSTEOMYELITIS OF RIGHT FOOT (HCC): Status: ACTIVE | Noted: 2023-05-03

## 2023-05-03 LAB
ANION GAP SERPL CALCULATED.3IONS-SCNC: 15 MEQ/L (ref 9–15)
BASOPHILS # BLD: 0.1 K/UL (ref 0–0.2)
BASOPHILS NFR BLD: 0.8 %
BUN SERPL-MCNC: 19 MG/DL (ref 8–23)
CALCIUM SERPL-MCNC: 8.8 MG/DL (ref 8.5–9.9)
CHLORIDE SERPL-SCNC: 101 MEQ/L (ref 95–107)
CO2 SERPL-SCNC: 20 MEQ/L (ref 20–31)
CREAT SERPL-MCNC: 0.98 MG/DL (ref 0.7–1.2)
CRP SERPL HS-MCNC: 135.6 MG/L (ref 0–5)
EOSINOPHIL # BLD: 0.3 K/UL (ref 0–0.7)
EOSINOPHIL NFR BLD: 4.2 %
ERYTHROCYTE [DISTWIDTH] IN BLOOD BY AUTOMATED COUNT: 13.3 % (ref 11.5–14.5)
GLUCOSE BLD-MCNC: 126 MG/DL (ref 70–99)
GLUCOSE BLD-MCNC: 144 MG/DL (ref 70–99)
GLUCOSE BLD-MCNC: 153 MG/DL (ref 70–99)
GLUCOSE BLD-MCNC: 268 MG/DL (ref 70–99)
GLUCOSE SERPL-MCNC: 147 MG/DL (ref 70–99)
HCT VFR BLD AUTO: 40.4 % (ref 42–52)
HGB BLD-MCNC: 13.7 G/DL (ref 14–18)
LYMPHOCYTES # BLD: 0.6 K/UL (ref 1–4.8)
LYMPHOCYTES NFR BLD: 8.6 %
MCH RBC QN AUTO: 27.7 PG (ref 27–31.3)
MCHC RBC AUTO-ENTMCNC: 33.9 % (ref 33–37)
MCV RBC AUTO: 81.6 FL (ref 79–92.2)
MONOCYTES # BLD: 0.7 K/UL (ref 0.2–0.8)
MONOCYTES NFR BLD: 11 %
NEUTROPHILS # BLD: 4.9 K/UL (ref 1.4–6.5)
NEUTS SEG NFR BLD: 75.4 %
PERFORMED ON: ABNORMAL
PLATELET # BLD AUTO: 184 K/UL (ref 130–400)
POTASSIUM SERPL-SCNC: 3.6 MEQ/L (ref 3.4–4.9)
RBC # BLD AUTO: 4.94 M/UL (ref 4.7–6.1)
SODIUM SERPL-SCNC: 136 MEQ/L (ref 135–144)
WBC # BLD AUTO: 6.6 K/UL (ref 4.8–10.8)

## 2023-05-03 PROCEDURE — 80048 BASIC METABOLIC PNL TOTAL CA: CPT

## 2023-05-03 PROCEDURE — 1210000000 HC MED SURG R&B

## 2023-05-03 PROCEDURE — 6370000000 HC RX 637 (ALT 250 FOR IP): Performed by: INTERNAL MEDICINE

## 2023-05-03 PROCEDURE — 36415 COLL VENOUS BLD VENIPUNCTURE: CPT

## 2023-05-03 PROCEDURE — 6360000002 HC RX W HCPCS

## 2023-05-03 PROCEDURE — 86140 C-REACTIVE PROTEIN: CPT

## 2023-05-03 PROCEDURE — 85025 COMPLETE CBC W/AUTO DIFF WBC: CPT

## 2023-05-03 PROCEDURE — 6370000000 HC RX 637 (ALT 250 FOR IP)

## 2023-05-03 PROCEDURE — 99232 SBSQ HOSP IP/OBS MODERATE 35: CPT | Performed by: INTERNAL MEDICINE

## 2023-05-03 PROCEDURE — 99231 SBSQ HOSP IP/OBS SF/LOW 25: CPT | Performed by: INTERNAL MEDICINE

## 2023-05-03 PROCEDURE — 99232 SBSQ HOSP IP/OBS MODERATE 35: CPT | Performed by: PHYSICIAN ASSISTANT

## 2023-05-03 PROCEDURE — 2500000003 HC RX 250 WO HCPCS: Performed by: INTERNAL MEDICINE

## 2023-05-03 RX ORDER — CLINDAMYCIN PHOSPHATE 900 MG/50ML
900 INJECTION INTRAVENOUS EVERY 8 HOURS
Status: DISCONTINUED | OUTPATIENT
Start: 2023-05-03 | End: 2023-05-06

## 2023-05-03 RX ADMIN — ENOXAPARIN SODIUM 40 MG: 100 INJECTION SUBCUTANEOUS at 09:33

## 2023-05-03 RX ADMIN — CLINDAMYCIN PHOSPHATE 900 MG: 900 INJECTION, SOLUTION INTRAVENOUS at 22:47

## 2023-05-03 RX ADMIN — CARVEDILOL 12.5 MG: 12.5 TABLET, FILM COATED ORAL at 20:46

## 2023-05-03 RX ADMIN — CLINDAMYCIN PHOSPHATE 900 MG: 900 INJECTION, SOLUTION INTRAVENOUS at 15:39

## 2023-05-03 RX ADMIN — SACUBITRIL AND VALSARTAN 1 TABLET: 24; 26 TABLET, FILM COATED ORAL at 20:46

## 2023-05-03 RX ADMIN — INSULIN LISPRO 12 UNITS: 100 INJECTION, SOLUTION INTRAVENOUS; SUBCUTANEOUS at 16:56

## 2023-05-03 RX ADMIN — ACETAMINOPHEN 650 MG: 325 TABLET ORAL at 10:16

## 2023-05-03 RX ADMIN — ACETAMINOPHEN 650 MG: 325 TABLET ORAL at 23:47

## 2023-05-03 RX ADMIN — INSULIN LISPRO 4 UNITS: 100 INJECTION, SOLUTION INTRAVENOUS; SUBCUTANEOUS at 12:32

## 2023-05-03 RX ADMIN — HYDRALAZINE HYDROCHLORIDE 25 MG: 25 TABLET, FILM COATED ORAL at 20:46

## 2023-05-03 RX ADMIN — ONDANSETRON 4 MG: 4 TABLET, ORALLY DISINTEGRATING ORAL at 22:45

## 2023-05-03 RX ADMIN — CARVEDILOL 12.5 MG: 12.5 TABLET, FILM COATED ORAL at 09:32

## 2023-05-03 RX ADMIN — SACUBITRIL AND VALSARTAN 1 TABLET: 24; 26 TABLET, FILM COATED ORAL at 09:31

## 2023-05-03 RX ADMIN — ROSUVASTATIN CALCIUM 20 MG: 20 TABLET, FILM COATED ORAL at 20:46

## 2023-05-03 RX ADMIN — ASPIRIN 81 MG: 81 TABLET, COATED ORAL at 09:32

## 2023-05-03 RX ADMIN — CLOPIDOGREL BISULFATE 75 MG: 75 TABLET ORAL at 09:32

## 2023-05-03 RX ADMIN — INSULIN GLARGINE 40 UNITS: 100 INJECTION, SOLUTION SUBCUTANEOUS at 20:47

## 2023-05-03 RX ADMIN — HYDRALAZINE HYDROCHLORIDE 25 MG: 25 TABLET, FILM COATED ORAL at 09:32

## 2023-05-03 RX ADMIN — INSULIN LISPRO 12 UNITS: 100 INJECTION, SOLUTION INTRAVENOUS; SUBCUTANEOUS at 13:07

## 2023-05-03 ASSESSMENT — PAIN SCALES - GENERAL
PAINLEVEL_OUTOF10: 8
PAINLEVEL_OUTOF10: 8
PAINLEVEL_OUTOF10: 7
PAINLEVEL_OUTOF10: 6

## 2023-05-03 ASSESSMENT — PAIN - FUNCTIONAL ASSESSMENT
PAIN_FUNCTIONAL_ASSESSMENT: PREVENTS OR INTERFERES SOME ACTIVE ACTIVITIES AND ADLS
PAIN_FUNCTIONAL_ASSESSMENT: PREVENTS OR INTERFERES SOME ACTIVE ACTIVITIES AND ADLS

## 2023-05-03 ASSESSMENT — PAIN DESCRIPTION - DESCRIPTORS
DESCRIPTORS: SORE

## 2023-05-03 ASSESSMENT — PAIN DESCRIPTION - PAIN TYPE: TYPE: ACUTE PAIN;NEUROPATHIC PAIN

## 2023-05-03 ASSESSMENT — PAIN DESCRIPTION - LOCATION
LOCATION: TOE (COMMENT WHICH ONE)
LOCATION: FOOT
LOCATION: TOE (COMMENT WHICH ONE)
LOCATION: TOE (COMMENT WHICH ONE)

## 2023-05-03 ASSESSMENT — PAIN DESCRIPTION - FREQUENCY: FREQUENCY: CONTINUOUS

## 2023-05-03 ASSESSMENT — PAIN DESCRIPTION - ORIENTATION
ORIENTATION: RIGHT

## 2023-05-03 ASSESSMENT — PAIN DESCRIPTION - ONSET: ONSET: ON-GOING

## 2023-05-04 PROBLEM — L02.91 ABSCESS: Status: ACTIVE | Noted: 2023-04-30

## 2023-05-04 LAB
ANION GAP SERPL CALCULATED.3IONS-SCNC: 12 MEQ/L (ref 9–15)
BASOPHILS # BLD: 0 K/UL (ref 0–0.2)
BASOPHILS NFR BLD: 0.7 %
BUN SERPL-MCNC: 21 MG/DL (ref 8–23)
CALCIUM SERPL-MCNC: 8.7 MG/DL (ref 8.5–9.9)
CHLORIDE SERPL-SCNC: 101 MEQ/L (ref 95–107)
CO2 SERPL-SCNC: 25 MEQ/L (ref 20–31)
CREAT SERPL-MCNC: 0.99 MG/DL (ref 0.7–1.2)
EOSINOPHIL # BLD: 0.4 K/UL (ref 0–0.7)
EOSINOPHIL NFR BLD: 6.2 %
ERYTHROCYTE [DISTWIDTH] IN BLOOD BY AUTOMATED COUNT: 13.3 % (ref 11.5–14.5)
GLUCOSE BLD-MCNC: 118 MG/DL (ref 70–99)
GLUCOSE BLD-MCNC: 125 MG/DL (ref 70–99)
GLUCOSE BLD-MCNC: 127 MG/DL (ref 70–99)
GLUCOSE BLD-MCNC: 138 MG/DL (ref 70–99)
GLUCOSE SERPL-MCNC: 118 MG/DL (ref 70–99)
HCT VFR BLD AUTO: 43.5 % (ref 42–52)
HGB BLD-MCNC: 14.6 G/DL (ref 14–18)
LYMPHOCYTES # BLD: 0.6 K/UL (ref 1–4.8)
LYMPHOCYTES NFR BLD: 10.9 %
MCH RBC QN AUTO: 27.7 PG (ref 27–31.3)
MCHC RBC AUTO-ENTMCNC: 33.7 % (ref 33–37)
MCV RBC AUTO: 82.4 FL (ref 79–92.2)
MONOCYTES # BLD: 0.7 K/UL (ref 0.2–0.8)
MONOCYTES NFR BLD: 11.2 %
NEUTROPHILS # BLD: 4.2 K/UL (ref 1.4–6.5)
NEUTS SEG NFR BLD: 71 %
PERFORMED ON: ABNORMAL
PLATELET # BLD AUTO: 218 K/UL (ref 130–400)
POTASSIUM SERPL-SCNC: 4.2 MEQ/L (ref 3.4–4.9)
RBC # BLD AUTO: 5.27 M/UL (ref 4.7–6.1)
SODIUM SERPL-SCNC: 138 MEQ/L (ref 135–144)
WBC # BLD AUTO: 5.9 K/UL (ref 4.8–10.8)

## 2023-05-04 PROCEDURE — 85025 COMPLETE CBC W/AUTO DIFF WBC: CPT

## 2023-05-04 PROCEDURE — 80048 BASIC METABOLIC PNL TOTAL CA: CPT

## 2023-05-04 PROCEDURE — 6370000000 HC RX 637 (ALT 250 FOR IP)

## 2023-05-04 PROCEDURE — 36415 COLL VENOUS BLD VENIPUNCTURE: CPT

## 2023-05-04 PROCEDURE — 6360000002 HC RX W HCPCS

## 2023-05-04 PROCEDURE — 2500000003 HC RX 250 WO HCPCS: Performed by: INTERNAL MEDICINE

## 2023-05-04 PROCEDURE — 6370000000 HC RX 637 (ALT 250 FOR IP): Performed by: INTERNAL MEDICINE

## 2023-05-04 PROCEDURE — 99232 SBSQ HOSP IP/OBS MODERATE 35: CPT | Performed by: INTERNAL MEDICINE

## 2023-05-04 PROCEDURE — 1210000000 HC MED SURG R&B

## 2023-05-04 RX ADMIN — CLINDAMYCIN PHOSPHATE 900 MG: 900 INJECTION, SOLUTION INTRAVENOUS at 22:37

## 2023-05-04 RX ADMIN — CLOPIDOGREL BISULFATE 75 MG: 75 TABLET ORAL at 08:26

## 2023-05-04 RX ADMIN — CLINDAMYCIN PHOSPHATE 900 MG: 900 INJECTION, SOLUTION INTRAVENOUS at 06:36

## 2023-05-04 RX ADMIN — ASPIRIN 81 MG: 81 TABLET, COATED ORAL at 08:26

## 2023-05-04 RX ADMIN — ONDANSETRON 4 MG: 4 TABLET, ORALLY DISINTEGRATING ORAL at 15:19

## 2023-05-04 RX ADMIN — SACUBITRIL AND VALSARTAN 1 TABLET: 24; 26 TABLET, FILM COATED ORAL at 08:26

## 2023-05-04 RX ADMIN — ONDANSETRON 4 MG: 4 TABLET, ORALLY DISINTEGRATING ORAL at 06:38

## 2023-05-04 RX ADMIN — CLINDAMYCIN PHOSPHATE 900 MG: 900 INJECTION, SOLUTION INTRAVENOUS at 15:18

## 2023-05-04 RX ADMIN — CARVEDILOL 12.5 MG: 12.5 TABLET, FILM COATED ORAL at 08:26

## 2023-05-04 RX ADMIN — SACUBITRIL AND VALSARTAN 1 TABLET: 24; 26 TABLET, FILM COATED ORAL at 20:44

## 2023-05-04 RX ADMIN — INSULIN LISPRO 12 UNITS: 100 INJECTION, SOLUTION INTRAVENOUS; SUBCUTANEOUS at 16:35

## 2023-05-04 RX ADMIN — HYDRALAZINE HYDROCHLORIDE 25 MG: 25 TABLET, FILM COATED ORAL at 20:46

## 2023-05-04 RX ADMIN — HYDRALAZINE HYDROCHLORIDE 25 MG: 25 TABLET, FILM COATED ORAL at 08:26

## 2023-05-04 RX ADMIN — CARVEDILOL 12.5 MG: 12.5 TABLET, FILM COATED ORAL at 20:45

## 2023-05-04 RX ADMIN — ENOXAPARIN SODIUM 40 MG: 100 INJECTION SUBCUTANEOUS at 08:26

## 2023-05-04 RX ADMIN — INSULIN LISPRO 12 UNITS: 100 INJECTION, SOLUTION INTRAVENOUS; SUBCUTANEOUS at 08:26

## 2023-05-04 RX ADMIN — INSULIN GLARGINE 40 UNITS: 100 INJECTION, SOLUTION SUBCUTANEOUS at 20:46

## 2023-05-04 RX ADMIN — ROSUVASTATIN CALCIUM 20 MG: 20 TABLET, FILM COATED ORAL at 20:44

## 2023-05-04 RX ADMIN — INSULIN LISPRO 12 UNITS: 100 INJECTION, SOLUTION INTRAVENOUS; SUBCUTANEOUS at 14:07

## 2023-05-04 ASSESSMENT — PAIN SCALES - GENERAL
PAINLEVEL_OUTOF10: 0
PAINLEVEL_OUTOF10: 3

## 2023-05-05 ENCOUNTER — ANESTHESIA EVENT (OUTPATIENT)
Dept: OPERATING ROOM | Age: 63
End: 2023-05-05
Payer: MEDICARE

## 2023-05-05 ENCOUNTER — ANESTHESIA (OUTPATIENT)
Dept: OPERATING ROOM | Age: 63
End: 2023-05-05
Payer: MEDICARE

## 2023-05-05 LAB
ANION GAP SERPL CALCULATED.3IONS-SCNC: 11 MEQ/L (ref 9–15)
BASOPHILS # BLD: 0 K/UL (ref 0–0.2)
BASOPHILS NFR BLD: 0.7 %
BUN SERPL-MCNC: 24 MG/DL (ref 8–23)
CALCIUM SERPL-MCNC: 8.3 MG/DL (ref 8.5–9.9)
CHLORIDE SERPL-SCNC: 102 MEQ/L (ref 95–107)
CO2 SERPL-SCNC: 23 MEQ/L (ref 20–31)
CREAT SERPL-MCNC: 1.25 MG/DL (ref 0.7–1.2)
EOSINOPHIL # BLD: 0.5 K/UL (ref 0–0.7)
EOSINOPHIL NFR BLD: 6.3 %
ERYTHROCYTE [DISTWIDTH] IN BLOOD BY AUTOMATED COUNT: 13.2 % (ref 11.5–14.5)
GLUCOSE BLD-MCNC: 164 MG/DL (ref 70–99)
GLUCOSE BLD-MCNC: 222 MG/DL (ref 70–99)
GLUCOSE BLD-MCNC: 71 MG/DL (ref 70–99)
GLUCOSE BLD-MCNC: 77 MG/DL (ref 70–99)
GLUCOSE BLD-MCNC: 98 MG/DL (ref 70–99)
GLUCOSE SERPL-MCNC: 168 MG/DL (ref 70–99)
HCT VFR BLD AUTO: 40.1 % (ref 42–52)
HGB BLD-MCNC: 13.4 G/DL (ref 14–18)
LYMPHOCYTES # BLD: 0.7 K/UL (ref 1–4.8)
LYMPHOCYTES NFR BLD: 9.3 %
MCH RBC QN AUTO: 27.2 PG (ref 27–31.3)
MCHC RBC AUTO-ENTMCNC: 33.4 % (ref 33–37)
MCV RBC AUTO: 81.5 FL (ref 79–92.2)
MONOCYTES # BLD: 0.8 K/UL (ref 0.2–0.8)
MONOCYTES NFR BLD: 10.6 %
NEUTROPHILS # BLD: 5.3 K/UL (ref 1.4–6.5)
NEUTS SEG NFR BLD: 73.1 %
PERFORMED ON: ABNORMAL
PERFORMED ON: ABNORMAL
PERFORMED ON: NORMAL
PLATELET # BLD AUTO: 195 K/UL (ref 130–400)
POTASSIUM SERPL-SCNC: 4.1 MEQ/L (ref 3.4–4.9)
RBC # BLD AUTO: 4.93 M/UL (ref 4.7–6.1)
SODIUM SERPL-SCNC: 136 MEQ/L (ref 135–144)
WBC # BLD AUTO: 7.3 K/UL (ref 4.8–10.8)

## 2023-05-05 PROCEDURE — 99232 SBSQ HOSP IP/OBS MODERATE 35: CPT | Performed by: INTERNAL MEDICINE

## 2023-05-05 PROCEDURE — 6370000000 HC RX 637 (ALT 250 FOR IP)

## 2023-05-05 PROCEDURE — 0J9Q0ZZ DRAINAGE OF RIGHT FOOT SUBCUTANEOUS TISSUE AND FASCIA, OPEN APPROACH: ICD-10-PCS

## 2023-05-05 PROCEDURE — 3600000013 HC SURGERY LEVEL 3 ADDTL 15MIN: Performed by: PODIATRIST

## 2023-05-05 PROCEDURE — 2060000000 HC ICU INTERMEDIATE R&B

## 2023-05-05 PROCEDURE — 87070 CULTURE OTHR SPECIMN AEROBIC: CPT

## 2023-05-05 PROCEDURE — 6370000000 HC RX 637 (ALT 250 FOR IP): Performed by: INTERNAL MEDICINE

## 2023-05-05 PROCEDURE — A4217 STERILE WATER/SALINE, 500 ML: HCPCS | Performed by: PODIATRIST

## 2023-05-05 PROCEDURE — 3600000003 HC SURGERY LEVEL 3 BASE: Performed by: PODIATRIST

## 2023-05-05 PROCEDURE — 2580000003 HC RX 258: Performed by: PODIATRIST

## 2023-05-05 PROCEDURE — 2580000003 HC RX 258: Performed by: STUDENT IN AN ORGANIZED HEALTH CARE EDUCATION/TRAINING PROGRAM

## 2023-05-05 PROCEDURE — 85025 COMPLETE CBC W/AUTO DIFF WBC: CPT

## 2023-05-05 PROCEDURE — 3700000001 HC ADD 15 MINUTES (ANESTHESIA): Performed by: PODIATRIST

## 2023-05-05 PROCEDURE — 86403 PARTICLE AGGLUT ANTBDY SCRN: CPT

## 2023-05-05 PROCEDURE — 80048 BASIC METABOLIC PNL TOTAL CA: CPT

## 2023-05-05 PROCEDURE — 2500000003 HC RX 250 WO HCPCS: Performed by: INTERNAL MEDICINE

## 2023-05-05 PROCEDURE — 2500000003 HC RX 250 WO HCPCS: Performed by: PODIATRIST

## 2023-05-05 PROCEDURE — 7100000000 HC PACU RECOVERY - FIRST 15 MIN: Performed by: PODIATRIST

## 2023-05-05 PROCEDURE — 87075 CULTR BACTERIA EXCEPT BLOOD: CPT

## 2023-05-05 PROCEDURE — 7100000001 HC PACU RECOVERY - ADDTL 15 MIN: Performed by: PODIATRIST

## 2023-05-05 PROCEDURE — 2709999900 HC NON-CHARGEABLE SUPPLY: Performed by: PODIATRIST

## 2023-05-05 PROCEDURE — 6360000002 HC RX W HCPCS

## 2023-05-05 PROCEDURE — 3700000000 HC ANESTHESIA ATTENDED CARE: Performed by: PODIATRIST

## 2023-05-05 PROCEDURE — 36415 COLL VENOUS BLD VENIPUNCTURE: CPT

## 2023-05-05 RX ORDER — SODIUM CHLORIDE 0.9 % (FLUSH) 0.9 %
5-40 SYRINGE (ML) INJECTION PRN
Status: DISCONTINUED | OUTPATIENT
Start: 2023-05-05 | End: 2023-05-05 | Stop reason: HOSPADM

## 2023-05-05 RX ORDER — MIDAZOLAM HYDROCHLORIDE 1 MG/ML
INJECTION INTRAMUSCULAR; INTRAVENOUS PRN
Status: DISCONTINUED | OUTPATIENT
Start: 2023-05-05 | End: 2023-05-05 | Stop reason: SDUPTHER

## 2023-05-05 RX ORDER — INSULIN LISPRO 100 [IU]/ML
8 INJECTION, SOLUTION INTRAVENOUS; SUBCUTANEOUS
Status: DISCONTINUED | OUTPATIENT
Start: 2023-05-06 | End: 2023-05-09 | Stop reason: HOSPADM

## 2023-05-05 RX ORDER — INSULIN GLARGINE 100 [IU]/ML
28 INJECTION, SOLUTION SUBCUTANEOUS NIGHTLY
Status: DISCONTINUED | OUTPATIENT
Start: 2023-05-05 | End: 2023-05-09 | Stop reason: HOSPADM

## 2023-05-05 RX ORDER — SODIUM CHLORIDE 0.9 % (FLUSH) 0.9 %
5-40 SYRINGE (ML) INJECTION EVERY 12 HOURS SCHEDULED
Status: DISCONTINUED | OUTPATIENT
Start: 2023-05-05 | End: 2023-05-05 | Stop reason: HOSPADM

## 2023-05-05 RX ORDER — MAGNESIUM HYDROXIDE 1200 MG/15ML
LIQUID ORAL CONTINUOUS PRN
Status: DISCONTINUED | OUTPATIENT
Start: 2023-05-05 | End: 2023-05-05 | Stop reason: HOSPADM

## 2023-05-05 RX ORDER — METOCLOPRAMIDE HYDROCHLORIDE 5 MG/ML
10 INJECTION INTRAMUSCULAR; INTRAVENOUS
Status: DISCONTINUED | OUTPATIENT
Start: 2023-05-05 | End: 2023-05-05 | Stop reason: HOSPADM

## 2023-05-05 RX ORDER — LIDOCAINE HYDROCHLORIDE 20 MG/ML
INJECTION, SOLUTION INTRAVENOUS PRN
Status: DISCONTINUED | OUTPATIENT
Start: 2023-05-05 | End: 2023-05-05 | Stop reason: SDUPTHER

## 2023-05-05 RX ORDER — DEXTROSE MONOHYDRATE 25 G/50ML
12.5 INJECTION, SOLUTION INTRAVENOUS
Status: DISPENSED | OUTPATIENT
Start: 2023-05-05 | End: 2023-05-06

## 2023-05-05 RX ORDER — SODIUM CHLORIDE 9 MG/ML
25 INJECTION, SOLUTION INTRAVENOUS PRN
Status: DISCONTINUED | OUTPATIENT
Start: 2023-05-05 | End: 2023-05-05 | Stop reason: HOSPADM

## 2023-05-05 RX ORDER — OXYCODONE HYDROCHLORIDE 5 MG/1
5 TABLET ORAL
Status: DISCONTINUED | OUTPATIENT
Start: 2023-05-05 | End: 2023-05-05 | Stop reason: HOSPADM

## 2023-05-05 RX ORDER — DIPHENHYDRAMINE HYDROCHLORIDE 50 MG/ML
12.5 INJECTION INTRAMUSCULAR; INTRAVENOUS
Status: DISCONTINUED | OUTPATIENT
Start: 2023-05-05 | End: 2023-05-05 | Stop reason: HOSPADM

## 2023-05-05 RX ORDER — FENTANYL CITRATE 0.05 MG/ML
50 INJECTION, SOLUTION INTRAMUSCULAR; INTRAVENOUS EVERY 10 MIN PRN
Status: DISCONTINUED | OUTPATIENT
Start: 2023-05-05 | End: 2023-05-05 | Stop reason: HOSPADM

## 2023-05-05 RX ORDER — ONDANSETRON 2 MG/ML
4 INJECTION INTRAMUSCULAR; INTRAVENOUS
Status: DISCONTINUED | OUTPATIENT
Start: 2023-05-05 | End: 2023-05-05 | Stop reason: HOSPADM

## 2023-05-05 RX ORDER — SODIUM CHLORIDE 9 MG/ML
INJECTION, SOLUTION INTRAVENOUS PRN
Status: DISCONTINUED | OUTPATIENT
Start: 2023-05-05 | End: 2023-05-05 | Stop reason: HOSPADM

## 2023-05-05 RX ORDER — LIDOCAINE HYDROCHLORIDE 10 MG/ML
1 INJECTION, SOLUTION EPIDURAL; INFILTRATION; INTRACAUDAL; PERINEURAL
Status: DISCONTINUED | OUTPATIENT
Start: 2023-05-05 | End: 2023-05-05 | Stop reason: HOSPADM

## 2023-05-05 RX ORDER — PROPOFOL 10 MG/ML
INJECTION, EMULSION INTRAVENOUS PRN
Status: DISCONTINUED | OUTPATIENT
Start: 2023-05-05 | End: 2023-05-05 | Stop reason: SDUPTHER

## 2023-05-05 RX ADMIN — PHENYLEPHRINE HYDROCHLORIDE 100 MCG: 10 INJECTION INTRAVENOUS at 14:19

## 2023-05-05 RX ADMIN — CLINDAMYCIN PHOSPHATE 900 MG: 900 INJECTION, SOLUTION INTRAVENOUS at 13:56

## 2023-05-05 RX ADMIN — SACUBITRIL AND VALSARTAN 1 TABLET: 24; 26 TABLET, FILM COATED ORAL at 09:48

## 2023-05-05 RX ADMIN — ONDANSETRON 4 MG: 2 INJECTION INTRAMUSCULAR; INTRAVENOUS at 10:43

## 2023-05-05 RX ADMIN — INSULIN GLARGINE 28 UNITS: 100 INJECTION, SOLUTION SUBCUTANEOUS at 21:32

## 2023-05-05 RX ADMIN — HYDRALAZINE HYDROCHLORIDE 25 MG: 25 TABLET, FILM COATED ORAL at 09:48

## 2023-05-05 RX ADMIN — CLINDAMYCIN PHOSPHATE 900 MG: 900 INJECTION, SOLUTION INTRAVENOUS at 05:48

## 2023-05-05 RX ADMIN — PHENYLEPHRINE HYDROCHLORIDE 100 MCG: 10 INJECTION INTRAVENOUS at 14:10

## 2023-05-05 RX ADMIN — SODIUM CHLORIDE: 9 INJECTION, SOLUTION INTRAVENOUS at 12:58

## 2023-05-05 RX ADMIN — SACUBITRIL AND VALSARTAN 1 TABLET: 24; 26 TABLET, FILM COATED ORAL at 21:31

## 2023-05-05 RX ADMIN — PHENYLEPHRINE HYDROCHLORIDE 100 MCG: 10 INJECTION INTRAVENOUS at 14:22

## 2023-05-05 RX ADMIN — CLOPIDOGREL BISULFATE 75 MG: 75 TABLET ORAL at 09:48

## 2023-05-05 RX ADMIN — PHENYLEPHRINE HYDROCHLORIDE 100 MCG: 10 INJECTION INTRAVENOUS at 14:35

## 2023-05-05 RX ADMIN — PROPOFOL 100 MCG/KG/MIN: 10 INJECTION, EMULSION INTRAVENOUS at 13:54

## 2023-05-05 RX ADMIN — CLINDAMYCIN PHOSPHATE 900 MG: 900 INJECTION, SOLUTION INTRAVENOUS at 13:57

## 2023-05-05 RX ADMIN — LIDOCAINE HYDROCHLORIDE 40 MG: 20 INJECTION, SOLUTION INTRAVENOUS at 13:52

## 2023-05-05 RX ADMIN — ROSUVASTATIN CALCIUM 20 MG: 20 TABLET, FILM COATED ORAL at 21:32

## 2023-05-05 RX ADMIN — ASPIRIN 81 MG: 81 TABLET, COATED ORAL at 09:48

## 2023-05-05 RX ADMIN — CARVEDILOL 12.5 MG: 12.5 TABLET, FILM COATED ORAL at 21:32

## 2023-05-05 RX ADMIN — PHENYLEPHRINE HYDROCHLORIDE 200 MCG: 10 INJECTION INTRAVENOUS at 14:38

## 2023-05-05 RX ADMIN — CLINDAMYCIN PHOSPHATE 900 MG: 900 INJECTION, SOLUTION INTRAVENOUS at 22:39

## 2023-05-05 RX ADMIN — HYDRALAZINE HYDROCHLORIDE 25 MG: 25 TABLET, FILM COATED ORAL at 21:32

## 2023-05-05 RX ADMIN — PHENYLEPHRINE HYDROCHLORIDE 100 MCG: 10 INJECTION INTRAVENOUS at 14:04

## 2023-05-05 RX ADMIN — PROPOFOL 100 MG: 10 INJECTION, EMULSION INTRAVENOUS at 13:52

## 2023-05-05 RX ADMIN — CARVEDILOL 12.5 MG: 12.5 TABLET, FILM COATED ORAL at 09:48

## 2023-05-05 RX ADMIN — MIDAZOLAM HYDROCHLORIDE 2 MG: 1 INJECTION, SOLUTION INTRAMUSCULAR; INTRAVENOUS at 13:46

## 2023-05-05 ASSESSMENT — PAIN SCALES - GENERAL
PAINLEVEL_OUTOF10: 0

## 2023-05-05 ASSESSMENT — PAIN DESCRIPTION - LOCATION
LOCATION: TOE (COMMENT WHICH ONE)
LOCATION: FOOT

## 2023-05-05 ASSESSMENT — PAIN DESCRIPTION - ORIENTATION
ORIENTATION: RIGHT
ORIENTATION: RIGHT

## 2023-05-05 ASSESSMENT — PAIN DESCRIPTION - DESCRIPTORS: DESCRIPTORS: SORE

## 2023-05-05 ASSESSMENT — ENCOUNTER SYMPTOMS
COLOR CHANGE: 1
SHORTNESS OF BREATH: 1

## 2023-05-05 ASSESSMENT — PAIN - FUNCTIONAL ASSESSMENT: PAIN_FUNCTIONAL_ASSESSMENT: 0-10

## 2023-05-05 NOTE — ANESTHESIA PRE PROCEDURE
Department of Anesthesiology  Preprocedure Note       Name:  Jaison Knox   Age:  65 y.o.  :  1960                                          MRN:  [id number]         Date:  2023      Surgeon: Jae Hogan):  Chepe Jarrell DPM    Procedure: Procedure(s):  RIGHT FOOT DEBRIDEMENT INCISION AND DRAINAGE ROOM 174    Medications prior to admission:   Prior to Admission medications    Medication Sig Start Date End Date Taking?  Authorizing Provider   doxycycline hyclate (VIBRA-TABS) 100 MG tablet Take 1 tablet by mouth 2 times daily for 10 days 23  Cristhian Smiley MD   JARDIANCE 10 MG tablet Take 1 tablet by mouth daily 23   NABILA Doherty   carvedilol (COREG) 12.5 MG tablet TAKE 1 TABLET BY MOUTH 2 TIMES DAILY 23   NABILA Doherty   clopidogrel (PLAVIX) 75 MG tablet Take 1 tablet by mouth daily 1/3/23   SHERIN Garcia - CNP   hydrALAZINE (APRESOLINE) 25 MG tablet Take 1 tablet by mouth in the morning and at bedtime 1/3/23   NABILA Doherty   metFORMIN (GLUCOPHAGE) 1000 MG tablet TAKE 1 TABLET BY MOUTH TWICE DAILY 1/3/23   Cyndee Pugh MD   insulin aspart protamine-insulin aspart (NOVOLOG MIX 70/30 FLEXPEN) (70-30) 100 UNIT/ML injection 30 units twice day  Patient not taking: Reported on 2023   Cyndee Pugh MD   Insulin Pen Needle (NOVOFINE) 32G X 6 MM MISC Bid 23   Cyndee Pugh MD   vitamin D (ERGOCALCIFEROL) 1.25 MG (09806 UT) CAPS capsule Take 1 capsule by mouth once a week  Patient not taking: Reported on 22   Carlyle Lehman MD   blood glucose test strips (FREESTYLE LITE) strip Tid E11.65 22   Cyndee Pugh MD   NOVOLOG 100 UNIT/ML injection vial Use via insulin pump max daily dose 100 units  Patient not taking: Reported on 2022   Historical Provider, MD   Continuous Blood Gluc  (FREESTYLE TESS 2 READER) MARIUM Give 1  22   Cyndee Pugh MD   Continuous Blood Gluc Sensor (FREESTYLE TESS 2 SENSOR)

## 2023-05-05 NOTE — ANESTHESIA POSTPROCEDURE EVALUATION
Department of Anesthesiology  Postprocedure Note    Patient: David Anderson  MRN: [id number]  YOB: 1960  Date of evaluation: 5/5/2023      Procedure Summary     Date: 05/05/23 Room / Location: [facility] OR [address] [place] [address]    Anesthesia Start: 2012 Anesthesia Stop: 1440    Procedure: RIGHT FOOT DEBRIDEMENT INCISION AND DRAINAGE ROOM 174 (Right) Diagnosis:       Abscess      (Abscess [L02.91])    Surgeons: Dylan Smith DPM Responsible Provider: Geraldo Antunez DO    Anesthesia Type: general, MAC ASA Status: 3          Anesthesia Type: No value filed.     Schuyler Phase I:      Schuyler Phase II:        Anesthesia Post Evaluation    Patient location during evaluation: PACU  Patient participation: complete - patient participated  Level of consciousness: awake and alert  Airway patency: patent  Nausea & Vomiting: no nausea and no vomiting  Complications: no  Cardiovascular status: hemodynamically stable and blood pressure returned to baseline  Respiratory status: acceptable  Hydration status: euvolemic

## 2023-05-06 PROBLEM — M86.171 ACUTE OSTEOMYELITIS OF TOE OF RIGHT FOOT (HCC): Status: ACTIVE | Noted: 2023-05-06

## 2023-05-06 PROBLEM — L97.504 DIABETIC ULCER OF TOE ASSOCIATED WITH TYPE 2 DIABETES MELLITUS, WITH NECROSIS OF BONE (HCC): Status: ACTIVE | Noted: 2023-05-06

## 2023-05-06 PROBLEM — E11.621 DIABETIC ULCER OF TOE ASSOCIATED WITH TYPE 2 DIABETES MELLITUS, WITH NECROSIS OF BONE (HCC): Status: ACTIVE | Noted: 2023-05-06

## 2023-05-06 LAB
ANION GAP SERPL CALCULATED.3IONS-SCNC: 9 MEQ/L (ref 9–15)
BASOPHILS # BLD: 0.1 K/UL (ref 0–0.2)
BASOPHILS NFR BLD: 0.8 %
BUN SERPL-MCNC: 18 MG/DL (ref 8–23)
CALCIUM SERPL-MCNC: 8.3 MG/DL (ref 8.5–9.9)
CHLORIDE SERPL-SCNC: 103 MEQ/L (ref 95–107)
CO2 SERPL-SCNC: 22 MEQ/L (ref 20–31)
CREAT SERPL-MCNC: 1.12 MG/DL (ref 0.7–1.2)
CULTURE WOUND: ABNORMAL
CULTURE WOUND: ABNORMAL
EOSINOPHIL # BLD: 0.5 K/UL (ref 0–0.7)
EOSINOPHIL NFR BLD: 5.7 %
ERYTHROCYTE [DISTWIDTH] IN BLOOD BY AUTOMATED COUNT: 13.3 % (ref 11.5–14.5)
GLUCOSE BLD-MCNC: 112 MG/DL (ref 70–99)
GLUCOSE BLD-MCNC: 176 MG/DL (ref 70–99)
GLUCOSE BLD-MCNC: 183 MG/DL (ref 70–99)
GLUCOSE BLD-MCNC: 183 MG/DL (ref 70–99)
GLUCOSE SERPL-MCNC: 129 MG/DL (ref 70–99)
HCT VFR BLD AUTO: 39.6 % (ref 42–52)
HGB BLD-MCNC: 13.4 G/DL (ref 14–18)
LYMPHOCYTES # BLD: 0.6 K/UL (ref 1–4.8)
LYMPHOCYTES NFR BLD: 7.5 %
MCH RBC QN AUTO: 27.7 PG (ref 27–31.3)
MCHC RBC AUTO-ENTMCNC: 33.9 % (ref 33–37)
MCV RBC AUTO: 81.9 FL (ref 79–92.2)
MONOCYTES # BLD: 0.9 K/UL (ref 0.2–0.8)
MONOCYTES NFR BLD: 10.5 %
NEUTROPHILS # BLD: 6.3 K/UL (ref 1.4–6.5)
NEUTS SEG NFR BLD: 75.5 %
ORGANISM: ABNORMAL
PERFORMED ON: ABNORMAL
PLATELET # BLD AUTO: 189 K/UL (ref 130–400)
POTASSIUM SERPL-SCNC: 4.5 MEQ/L (ref 3.4–4.9)
RBC # BLD AUTO: 4.84 M/UL (ref 4.7–6.1)
SODIUM SERPL-SCNC: 134 MEQ/L (ref 135–144)
WBC # BLD AUTO: 8.4 K/UL (ref 4.8–10.8)

## 2023-05-06 PROCEDURE — 6370000000 HC RX 637 (ALT 250 FOR IP): Performed by: INTERNAL MEDICINE

## 2023-05-06 PROCEDURE — 2060000000 HC ICU INTERMEDIATE R&B

## 2023-05-06 PROCEDURE — 6360000002 HC RX W HCPCS: Performed by: INTERNAL MEDICINE

## 2023-05-06 PROCEDURE — 2580000003 HC RX 258: Performed by: INTERNAL MEDICINE

## 2023-05-06 PROCEDURE — 85025 COMPLETE CBC W/AUTO DIFF WBC: CPT

## 2023-05-06 PROCEDURE — 99232 SBSQ HOSP IP/OBS MODERATE 35: CPT | Performed by: INTERNAL MEDICINE

## 2023-05-06 PROCEDURE — 2500000003 HC RX 250 WO HCPCS: Performed by: INTERNAL MEDICINE

## 2023-05-06 PROCEDURE — 36415 COLL VENOUS BLD VENIPUNCTURE: CPT

## 2023-05-06 PROCEDURE — 2700000000 HC OXYGEN THERAPY PER DAY

## 2023-05-06 PROCEDURE — 6370000000 HC RX 637 (ALT 250 FOR IP)

## 2023-05-06 PROCEDURE — 80048 BASIC METABOLIC PNL TOTAL CA: CPT

## 2023-05-06 RX ADMIN — SACUBITRIL AND VALSARTAN 1 TABLET: 24; 26 TABLET, FILM COATED ORAL at 08:41

## 2023-05-06 RX ADMIN — INSULIN LISPRO 8 UNITS: 100 INJECTION, SOLUTION INTRAVENOUS; SUBCUTANEOUS at 11:43

## 2023-05-06 RX ADMIN — ACETAMINOPHEN 650 MG: 325 TABLET ORAL at 09:07

## 2023-05-06 RX ADMIN — SACUBITRIL AND VALSARTAN 1 TABLET: 24; 26 TABLET, FILM COATED ORAL at 21:49

## 2023-05-06 RX ADMIN — PIPERACILLIN AND TAZOBACTAM 3375 MG: 3; .375 INJECTION, POWDER, LYOPHILIZED, FOR SOLUTION INTRAVENOUS at 12:32

## 2023-05-06 RX ADMIN — INSULIN GLARGINE 28 UNITS: 100 INJECTION, SOLUTION SUBCUTANEOUS at 21:47

## 2023-05-06 RX ADMIN — CLINDAMYCIN PHOSPHATE 900 MG: 900 INJECTION, SOLUTION INTRAVENOUS at 05:38

## 2023-05-06 RX ADMIN — PIPERACILLIN AND TAZOBACTAM 3375 MG: 3; .375 INJECTION, POWDER, LYOPHILIZED, FOR SOLUTION INTRAVENOUS at 22:01

## 2023-05-06 RX ADMIN — ASPIRIN 81 MG: 81 TABLET, COATED ORAL at 08:41

## 2023-05-06 RX ADMIN — HYDRALAZINE HYDROCHLORIDE 25 MG: 25 TABLET, FILM COATED ORAL at 21:49

## 2023-05-06 RX ADMIN — CLOPIDOGREL BISULFATE 75 MG: 75 TABLET ORAL at 08:41

## 2023-05-06 RX ADMIN — CARVEDILOL 12.5 MG: 12.5 TABLET, FILM COATED ORAL at 21:49

## 2023-05-06 RX ADMIN — INSULIN LISPRO 8 UNITS: 100 INJECTION, SOLUTION INTRAVENOUS; SUBCUTANEOUS at 16:44

## 2023-05-06 RX ADMIN — ROSUVASTATIN CALCIUM 20 MG: 20 TABLET, FILM COATED ORAL at 21:48

## 2023-05-06 RX ADMIN — HYDRALAZINE HYDROCHLORIDE 25 MG: 25 TABLET, FILM COATED ORAL at 08:41

## 2023-05-06 RX ADMIN — CARVEDILOL 12.5 MG: 12.5 TABLET, FILM COATED ORAL at 08:41

## 2023-05-06 ASSESSMENT — ENCOUNTER SYMPTOMS
STRIDOR: 0
WHEEZING: 0
BLOOD IN STOOL: 0
GASTROINTESTINAL NEGATIVE: 1
EYES NEGATIVE: 1
CHEST TIGHTNESS: 0
RESPIRATORY NEGATIVE: 1
SHORTNESS OF BREATH: 0
NAUSEA: 0
COUGH: 0

## 2023-05-06 ASSESSMENT — PAIN DESCRIPTION - ORIENTATION: ORIENTATION: LEFT

## 2023-05-06 ASSESSMENT — PAIN SCALES - GENERAL
PAINLEVEL_OUTOF10: 6
PAINLEVEL_OUTOF10: 0
PAINLEVEL_OUTOF10: 0

## 2023-05-06 ASSESSMENT — PAIN DESCRIPTION - LOCATION: LOCATION: FOOT

## 2023-05-07 LAB
ANION GAP SERPL CALCULATED.3IONS-SCNC: 11 MEQ/L (ref 9–15)
BASOPHILS # BLD: 0 K/UL (ref 0–0.2)
BASOPHILS NFR BLD: 0.6 %
BUN SERPL-MCNC: 17 MG/DL (ref 8–23)
CALCIUM SERPL-MCNC: 8.5 MG/DL (ref 8.5–9.9)
CHLORIDE SERPL-SCNC: 102 MEQ/L (ref 95–107)
CO2 SERPL-SCNC: 23 MEQ/L (ref 20–31)
CREAT SERPL-MCNC: 1.18 MG/DL (ref 0.7–1.2)
EOSINOPHIL # BLD: 0.4 K/UL (ref 0–0.7)
EOSINOPHIL NFR BLD: 6.7 %
ERYTHROCYTE [DISTWIDTH] IN BLOOD BY AUTOMATED COUNT: 13.4 % (ref 11.5–14.5)
GLUCOSE BLD-MCNC: 163 MG/DL (ref 70–99)
GLUCOSE BLD-MCNC: 176 MG/DL (ref 70–99)
GLUCOSE BLD-MCNC: 178 MG/DL (ref 70–99)
GLUCOSE BLD-MCNC: 230 MG/DL (ref 70–99)
GLUCOSE SERPL-MCNC: 239 MG/DL (ref 70–99)
HCT VFR BLD AUTO: 39.4 % (ref 42–52)
HGB BLD-MCNC: 13.4 G/DL (ref 14–18)
LYMPHOCYTES # BLD: 0.6 K/UL (ref 1–4.8)
LYMPHOCYTES NFR BLD: 9.5 %
MCH RBC QN AUTO: 27.5 PG (ref 27–31.3)
MCHC RBC AUTO-ENTMCNC: 33.9 % (ref 33–37)
MCV RBC AUTO: 81.1 FL (ref 79–92.2)
MONOCYTES # BLD: 0.6 K/UL (ref 0.2–0.8)
MONOCYTES NFR BLD: 10.7 %
NEUTROPHILS # BLD: 4.3 K/UL (ref 1.4–6.5)
NEUTS SEG NFR BLD: 72.5 %
PERFORMED ON: ABNORMAL
PLATELET # BLD AUTO: 170 K/UL (ref 130–400)
POTASSIUM SERPL-SCNC: 4.4 MEQ/L (ref 3.4–4.9)
RBC # BLD AUTO: 4.86 M/UL (ref 4.7–6.1)
SODIUM SERPL-SCNC: 136 MEQ/L (ref 135–144)
WBC # BLD AUTO: 5.9 K/UL (ref 4.8–10.8)

## 2023-05-07 PROCEDURE — 85025 COMPLETE CBC W/AUTO DIFF WBC: CPT

## 2023-05-07 PROCEDURE — 6370000000 HC RX 637 (ALT 250 FOR IP)

## 2023-05-07 PROCEDURE — 36415 COLL VENOUS BLD VENIPUNCTURE: CPT

## 2023-05-07 PROCEDURE — 6370000000 HC RX 637 (ALT 250 FOR IP): Performed by: INTERNAL MEDICINE

## 2023-05-07 PROCEDURE — 99231 SBSQ HOSP IP/OBS SF/LOW 25: CPT | Performed by: INTERNAL MEDICINE

## 2023-05-07 PROCEDURE — 6360000002 HC RX W HCPCS: Performed by: INTERNAL MEDICINE

## 2023-05-07 PROCEDURE — 80048 BASIC METABOLIC PNL TOTAL CA: CPT

## 2023-05-07 PROCEDURE — 99232 SBSQ HOSP IP/OBS MODERATE 35: CPT | Performed by: INTERNAL MEDICINE

## 2023-05-07 PROCEDURE — 2060000000 HC ICU INTERMEDIATE R&B

## 2023-05-07 PROCEDURE — 2580000003 HC RX 258: Performed by: INTERNAL MEDICINE

## 2023-05-07 RX ORDER — HYDRALAZINE HYDROCHLORIDE 25 MG/1
25 TABLET, FILM COATED ORAL EVERY 8 HOURS SCHEDULED
Status: DISCONTINUED | OUTPATIENT
Start: 2023-05-08 | End: 2023-05-09 | Stop reason: HOSPADM

## 2023-05-07 RX ADMIN — HYDRALAZINE HYDROCHLORIDE 25 MG: 25 TABLET, FILM COATED ORAL at 21:15

## 2023-05-07 RX ADMIN — SACUBITRIL AND VALSARTAN 1 TABLET: 24; 26 TABLET, FILM COATED ORAL at 21:15

## 2023-05-07 RX ADMIN — INSULIN GLARGINE 28 UNITS: 100 INJECTION, SOLUTION SUBCUTANEOUS at 21:14

## 2023-05-07 RX ADMIN — INSULIN LISPRO 8 UNITS: 100 INJECTION, SOLUTION INTRAVENOUS; SUBCUTANEOUS at 11:55

## 2023-05-07 RX ADMIN — INSULIN LISPRO 2 UNITS: 100 INJECTION, SOLUTION INTRAVENOUS; SUBCUTANEOUS at 09:24

## 2023-05-07 RX ADMIN — CARVEDILOL 12.5 MG: 12.5 TABLET, FILM COATED ORAL at 09:22

## 2023-05-07 RX ADMIN — CARVEDILOL 12.5 MG: 12.5 TABLET, FILM COATED ORAL at 21:15

## 2023-05-07 RX ADMIN — PIPERACILLIN AND TAZOBACTAM 3375 MG: 3; .375 INJECTION, POWDER, LYOPHILIZED, FOR SOLUTION INTRAVENOUS at 11:55

## 2023-05-07 RX ADMIN — INSULIN LISPRO 8 UNITS: 100 INJECTION, SOLUTION INTRAVENOUS; SUBCUTANEOUS at 09:24

## 2023-05-07 RX ADMIN — ROSUVASTATIN CALCIUM 20 MG: 20 TABLET, FILM COATED ORAL at 21:15

## 2023-05-07 RX ADMIN — PIPERACILLIN AND TAZOBACTAM 3375 MG: 3; .375 INJECTION, POWDER, LYOPHILIZED, FOR SOLUTION INTRAVENOUS at 21:22

## 2023-05-07 RX ADMIN — SACUBITRIL AND VALSARTAN 1 TABLET: 24; 26 TABLET, FILM COATED ORAL at 09:22

## 2023-05-07 RX ADMIN — PIPERACILLIN AND TAZOBACTAM 3375 MG: 3; .375 INJECTION, POWDER, LYOPHILIZED, FOR SOLUTION INTRAVENOUS at 04:42

## 2023-05-07 RX ADMIN — HYDRALAZINE HYDROCHLORIDE 25 MG: 25 TABLET, FILM COATED ORAL at 09:22

## 2023-05-07 RX ADMIN — INSULIN LISPRO 8 UNITS: 100 INJECTION, SOLUTION INTRAVENOUS; SUBCUTANEOUS at 16:42

## 2023-05-07 ASSESSMENT — ENCOUNTER SYMPTOMS
NAUSEA: 0
STRIDOR: 0
BLOOD IN STOOL: 0
CHEST TIGHTNESS: 0
GASTROINTESTINAL NEGATIVE: 1
SHORTNESS OF BREATH: 0
WHEEZING: 0
COUGH: 0
EYES NEGATIVE: 1
RESPIRATORY NEGATIVE: 1

## 2023-05-07 ASSESSMENT — PAIN DESCRIPTION - ORIENTATION: ORIENTATION: RIGHT

## 2023-05-07 ASSESSMENT — PAIN SCALES - GENERAL
PAINLEVEL_OUTOF10: 0
PAINLEVEL_OUTOF10: 7
PAINLEVEL_OUTOF10: 0

## 2023-05-07 ASSESSMENT — PAIN DESCRIPTION - LOCATION: LOCATION: FOOT;TOE (COMMENT WHICH ONE)

## 2023-05-08 ENCOUNTER — ANESTHESIA (OUTPATIENT)
Dept: OPERATING ROOM | Age: 63
DRG: 623 | End: 2023-05-08
Payer: MEDICARE

## 2023-05-08 ENCOUNTER — ANESTHESIA EVENT (OUTPATIENT)
Dept: OPERATING ROOM | Age: 63
DRG: 623 | End: 2023-05-08
Payer: MEDICARE

## 2023-05-08 LAB
CHP ED QC CHECK: NORMAL
EKG ATRIAL RATE: 92 BPM
EKG P AXIS: 44 DEGREES
EKG P-R INTERVAL: 136 MS
EKG Q-T INTERVAL: 380 MS
EKG QRS DURATION: 92 MS
EKG QTC CALCULATION (BAZETT): 469 MS
EKG R AXIS: -47 DEGREES
EKG T AXIS: 32 DEGREES
EKG VENTRICULAR RATE: 92 BPM
GLUCOSE BLD-MCNC: 130 MG/DL
GLUCOSE BLD-MCNC: 130 MG/DL (ref 70–99)
GLUCOSE BLD-MCNC: 141 MG/DL (ref 70–99)
GLUCOSE BLD-MCNC: 152 MG/DL (ref 70–99)
GLUCOSE BLD-MCNC: 160 MG/DL (ref 70–99)
GLUCOSE BLD-MCNC: 165 MG/DL (ref 70–99)
PERFORMED ON: ABNORMAL

## 2023-05-08 PROCEDURE — 3600000013 HC SURGERY LEVEL 3 ADDTL 15MIN: Performed by: PODIATRIST

## 2023-05-08 PROCEDURE — 2580000003 HC RX 258: Performed by: INTERNAL MEDICINE

## 2023-05-08 PROCEDURE — 6360000002 HC RX W HCPCS: Performed by: INTERNAL MEDICINE

## 2023-05-08 PROCEDURE — 2580000003 HC RX 258

## 2023-05-08 PROCEDURE — 6360000002 HC RX W HCPCS

## 2023-05-08 PROCEDURE — 3600000003 HC SURGERY LEVEL 3 BASE: Performed by: PODIATRIST

## 2023-05-08 PROCEDURE — 93010 ELECTROCARDIOGRAM REPORT: CPT | Performed by: INTERNAL MEDICINE

## 2023-05-08 PROCEDURE — 7100000001 HC PACU RECOVERY - ADDTL 15 MIN: Performed by: PODIATRIST

## 2023-05-08 PROCEDURE — 2500000003 HC RX 250 WO HCPCS: Performed by: PODIATRIST

## 2023-05-08 PROCEDURE — 2709999900 HC NON-CHARGEABLE SUPPLY: Performed by: PODIATRIST

## 2023-05-08 PROCEDURE — 0JBQ0ZZ EXCISION OF RIGHT FOOT SUBCUTANEOUS TISSUE AND FASCIA, OPEN APPROACH: ICD-10-PCS

## 2023-05-08 PROCEDURE — 3700000001 HC ADD 15 MINUTES (ANESTHESIA): Performed by: PODIATRIST

## 2023-05-08 PROCEDURE — 6370000000 HC RX 637 (ALT 250 FOR IP)

## 2023-05-08 PROCEDURE — 0QBQ0ZX EXCISION OF RIGHT TOE PHALANX, OPEN APPROACH, DIAGNOSTIC: ICD-10-PCS

## 2023-05-08 PROCEDURE — 6370000000 HC RX 637 (ALT 250 FOR IP): Performed by: INTERNAL MEDICINE

## 2023-05-08 PROCEDURE — 99232 SBSQ HOSP IP/OBS MODERATE 35: CPT | Performed by: INTERNAL MEDICINE

## 2023-05-08 PROCEDURE — 93005 ELECTROCARDIOGRAM TRACING: CPT | Performed by: STUDENT IN AN ORGANIZED HEALTH CARE EDUCATION/TRAINING PROGRAM

## 2023-05-08 PROCEDURE — 7100000000 HC PACU RECOVERY - FIRST 15 MIN: Performed by: PODIATRIST

## 2023-05-08 PROCEDURE — 2060000000 HC ICU INTERMEDIATE R&B

## 2023-05-08 PROCEDURE — A4217 STERILE WATER/SALINE, 500 ML: HCPCS | Performed by: PODIATRIST

## 2023-05-08 PROCEDURE — 3700000000 HC ANESTHESIA ATTENDED CARE: Performed by: PODIATRIST

## 2023-05-08 PROCEDURE — 2580000003 HC RX 258: Performed by: PODIATRIST

## 2023-05-08 RX ORDER — SODIUM CHLORIDE, SODIUM LACTATE, POTASSIUM CHLORIDE, CALCIUM CHLORIDE 600; 310; 30; 20 MG/100ML; MG/100ML; MG/100ML; MG/100ML
INJECTION, SOLUTION INTRAVENOUS CONTINUOUS
Status: DISCONTINUED | OUTPATIENT
Start: 2023-05-08 | End: 2023-05-08 | Stop reason: SDUPTHER

## 2023-05-08 RX ORDER — SODIUM CHLORIDE 0.9 % (FLUSH) 0.9 %
5-40 SYRINGE (ML) INJECTION EVERY 12 HOURS SCHEDULED
Status: DISCONTINUED | OUTPATIENT
Start: 2023-05-08 | End: 2023-05-09 | Stop reason: HOSPADM

## 2023-05-08 RX ORDER — OXYCODONE HYDROCHLORIDE 5 MG/1
5 TABLET ORAL PRN
Status: ACTIVE | OUTPATIENT
Start: 2023-05-08 | End: 2023-05-08

## 2023-05-08 RX ORDER — MAGNESIUM HYDROXIDE 1200 MG/15ML
LIQUID ORAL CONTINUOUS PRN
Status: COMPLETED | OUTPATIENT
Start: 2023-05-08 | End: 2023-05-08

## 2023-05-08 RX ORDER — FENTANYL CITRATE 0.05 MG/ML
25 INJECTION, SOLUTION INTRAMUSCULAR; INTRAVENOUS EVERY 5 MIN PRN
Status: DISCONTINUED | OUTPATIENT
Start: 2023-05-08 | End: 2023-05-09 | Stop reason: HOSPADM

## 2023-05-08 RX ORDER — SODIUM CHLORIDE, SODIUM LACTATE, POTASSIUM CHLORIDE, CALCIUM CHLORIDE 600; 310; 30; 20 MG/100ML; MG/100ML; MG/100ML; MG/100ML
INJECTION, SOLUTION INTRAVENOUS CONTINUOUS PRN
Status: DISCONTINUED | OUTPATIENT
Start: 2023-05-08 | End: 2023-05-08 | Stop reason: SDUPTHER

## 2023-05-08 RX ORDER — SODIUM CHLORIDE, SODIUM LACTATE, POTASSIUM CHLORIDE, CALCIUM CHLORIDE 600; 310; 30; 20 MG/100ML; MG/100ML; MG/100ML; MG/100ML
INJECTION, SOLUTION INTRAVENOUS CONTINUOUS
Status: DISCONTINUED | OUTPATIENT
Start: 2023-05-08 | End: 2023-05-08 | Stop reason: HOSPADM

## 2023-05-08 RX ORDER — PROPOFOL 10 MG/ML
INJECTION, EMULSION INTRAVENOUS PRN
Status: DISCONTINUED | OUTPATIENT
Start: 2023-05-08 | End: 2023-05-08 | Stop reason: SDUPTHER

## 2023-05-08 RX ORDER — SODIUM CHLORIDE 0.9 % (FLUSH) 0.9 %
5-40 SYRINGE (ML) INJECTION PRN
Status: DISCONTINUED | OUTPATIENT
Start: 2023-05-08 | End: 2023-05-09 | Stop reason: HOSPADM

## 2023-05-08 RX ORDER — MIDAZOLAM HYDROCHLORIDE 1 MG/ML
INJECTION INTRAMUSCULAR; INTRAVENOUS PRN
Status: DISCONTINUED | OUTPATIENT
Start: 2023-05-08 | End: 2023-05-08 | Stop reason: SDUPTHER

## 2023-05-08 RX ORDER — SODIUM CHLORIDE, SODIUM LACTATE, POTASSIUM CHLORIDE, CALCIUM CHLORIDE 600; 310; 30; 20 MG/100ML; MG/100ML; MG/100ML; MG/100ML
INJECTION, SOLUTION INTRAVENOUS
Status: COMPLETED
Start: 2023-05-08 | End: 2023-05-08

## 2023-05-08 RX ORDER — ONDANSETRON 2 MG/ML
4 INJECTION INTRAMUSCULAR; INTRAVENOUS
Status: ACTIVE | OUTPATIENT
Start: 2023-05-08 | End: 2023-05-09

## 2023-05-08 RX ORDER — FENTANYL CITRATE 0.05 MG/ML
50 INJECTION, SOLUTION INTRAMUSCULAR; INTRAVENOUS EVERY 5 MIN PRN
Status: DISCONTINUED | OUTPATIENT
Start: 2023-05-08 | End: 2023-05-09 | Stop reason: HOSPADM

## 2023-05-08 RX ORDER — MEPERIDINE HYDROCHLORIDE 25 MG/ML
12.5 INJECTION INTRAMUSCULAR; INTRAVENOUS; SUBCUTANEOUS EVERY 5 MIN PRN
Status: DISCONTINUED | OUTPATIENT
Start: 2023-05-08 | End: 2023-05-09 | Stop reason: HOSPADM

## 2023-05-08 RX ORDER — LABETALOL HYDROCHLORIDE 5 MG/ML
10 INJECTION, SOLUTION INTRAVENOUS
Status: DISCONTINUED | OUTPATIENT
Start: 2023-05-08 | End: 2023-05-09 | Stop reason: HOSPADM

## 2023-05-08 RX ORDER — SODIUM CHLORIDE 9 MG/ML
25 INJECTION, SOLUTION INTRAVENOUS PRN
Status: DISCONTINUED | OUTPATIENT
Start: 2023-05-08 | End: 2023-05-09 | Stop reason: HOSPADM

## 2023-05-08 RX ORDER — HYDRALAZINE HYDROCHLORIDE 20 MG/ML
10 INJECTION INTRAMUSCULAR; INTRAVENOUS
Status: DISCONTINUED | OUTPATIENT
Start: 2023-05-08 | End: 2023-05-09 | Stop reason: HOSPADM

## 2023-05-08 RX ORDER — DIPHENHYDRAMINE HYDROCHLORIDE 50 MG/ML
12.5 INJECTION INTRAMUSCULAR; INTRAVENOUS
Status: ACTIVE | OUTPATIENT
Start: 2023-05-08 | End: 2023-05-09

## 2023-05-08 RX ORDER — OXYCODONE HYDROCHLORIDE 5 MG/1
10 TABLET ORAL PRN
Status: ACTIVE | OUTPATIENT
Start: 2023-05-08 | End: 2023-05-08

## 2023-05-08 RX ORDER — LIDOCAINE HYDROCHLORIDE 20 MG/ML
INJECTION, SOLUTION INTRAVENOUS PRN
Status: DISCONTINUED | OUTPATIENT
Start: 2023-05-08 | End: 2023-05-08 | Stop reason: SDUPTHER

## 2023-05-08 RX ORDER — PROCHLORPERAZINE EDISYLATE 5 MG/ML
5 INJECTION INTRAMUSCULAR; INTRAVENOUS
Status: ACTIVE | OUTPATIENT
Start: 2023-05-08 | End: 2023-05-09

## 2023-05-08 RX ADMIN — SODIUM CHLORIDE, PRESERVATIVE FREE 10 ML: 5 INJECTION INTRAVENOUS at 21:12

## 2023-05-08 RX ADMIN — INSULIN LISPRO 8 UNITS: 100 INJECTION, SOLUTION INTRAVENOUS; SUBCUTANEOUS at 16:40

## 2023-05-08 RX ADMIN — LIDOCAINE HYDROCHLORIDE 40 MG: 20 INJECTION, SOLUTION INTRAVENOUS at 12:42

## 2023-05-08 RX ADMIN — SODIUM CHLORIDE, POTASSIUM CHLORIDE, SODIUM LACTATE AND CALCIUM CHLORIDE: 600; 310; 30; 20 INJECTION, SOLUTION INTRAVENOUS at 12:25

## 2023-05-08 RX ADMIN — PROPOFOL 80 MG: 10 INJECTION, EMULSION INTRAVENOUS at 12:42

## 2023-05-08 RX ADMIN — SACUBITRIL AND VALSARTAN 1 TABLET: 24; 26 TABLET, FILM COATED ORAL at 21:11

## 2023-05-08 RX ADMIN — PIPERACILLIN AND TAZOBACTAM 3375 MG: 3; .375 INJECTION, POWDER, LYOPHILIZED, FOR SOLUTION INTRAVENOUS at 04:39

## 2023-05-08 RX ADMIN — ASPIRIN 81 MG: 81 TABLET, COATED ORAL at 16:39

## 2023-05-08 RX ADMIN — PIPERACILLIN AND TAZOBACTAM 3375 MG: 3; .375 INJECTION, POWDER, LYOPHILIZED, FOR SOLUTION INTRAVENOUS at 21:18

## 2023-05-08 RX ADMIN — HYDRALAZINE HYDROCHLORIDE 25 MG: 25 TABLET, FILM COATED ORAL at 21:11

## 2023-05-08 RX ADMIN — INSULIN GLARGINE 28 UNITS: 100 INJECTION, SOLUTION SUBCUTANEOUS at 21:12

## 2023-05-08 RX ADMIN — CLOPIDOGREL BISULFATE 75 MG: 75 TABLET ORAL at 16:39

## 2023-05-08 RX ADMIN — PHENYLEPHRINE HYDROCHLORIDE 100 MCG: 10 INJECTION INTRAVENOUS at 13:01

## 2023-05-08 RX ADMIN — HYDRALAZINE HYDROCHLORIDE 25 MG: 25 TABLET, FILM COATED ORAL at 05:42

## 2023-05-08 RX ADMIN — PROPOFOL 100 MCG/KG/MIN: 10 INJECTION, EMULSION INTRAVENOUS at 12:43

## 2023-05-08 RX ADMIN — HYDRALAZINE HYDROCHLORIDE 25 MG: 25 TABLET, FILM COATED ORAL at 14:42

## 2023-05-08 RX ADMIN — PHENYLEPHRINE HYDROCHLORIDE 200 MCG: 10 INJECTION INTRAVENOUS at 13:13

## 2023-05-08 RX ADMIN — SODIUM CHLORIDE, POTASSIUM CHLORIDE, SODIUM LACTATE AND CALCIUM CHLORIDE 1000 ML: 600; 310; 30; 20 INJECTION, SOLUTION INTRAVENOUS at 12:14

## 2023-05-08 RX ADMIN — ACETAMINOPHEN 650 MG: 325 TABLET ORAL at 09:30

## 2023-05-08 RX ADMIN — CARVEDILOL 12.5 MG: 12.5 TABLET, FILM COATED ORAL at 09:30

## 2023-05-08 RX ADMIN — PIPERACILLIN AND TAZOBACTAM 3375 MG: 3; .375 INJECTION, POWDER, LYOPHILIZED, FOR SOLUTION INTRAVENOUS at 12:14

## 2023-05-08 RX ADMIN — ROSUVASTATIN CALCIUM 20 MG: 20 TABLET, FILM COATED ORAL at 21:11

## 2023-05-08 RX ADMIN — SACUBITRIL AND VALSARTAN 1 TABLET: 24; 26 TABLET, FILM COATED ORAL at 09:30

## 2023-05-08 RX ADMIN — PHENYLEPHRINE HYDROCHLORIDE 100 MCG: 10 INJECTION INTRAVENOUS at 13:05

## 2023-05-08 RX ADMIN — CARVEDILOL 12.5 MG: 12.5 TABLET, FILM COATED ORAL at 21:11

## 2023-05-08 RX ADMIN — MIDAZOLAM HYDROCHLORIDE 2 MG: 1 INJECTION, SOLUTION INTRAMUSCULAR; INTRAVENOUS at 12:34

## 2023-05-08 ASSESSMENT — PAIN SCALES - GENERAL
PAINLEVEL_OUTOF10: 0
PAINLEVEL_OUTOF10: 0
PAINLEVEL_OUTOF10: 5
PAINLEVEL_OUTOF10: 0
PAINLEVEL_OUTOF10: 5
PAINLEVEL_OUTOF10: 0
PAINLEVEL_OUTOF10: 0

## 2023-05-08 ASSESSMENT — PAIN DESCRIPTION - ORIENTATION
ORIENTATION: RIGHT

## 2023-05-08 ASSESSMENT — PAIN DESCRIPTION - LOCATION
LOCATION: FOOT

## 2023-05-08 NOTE — PLAN OF CARE
[facility] MEDICINE AND SURGERY   PLAN OF CARE    NAME: Yarelis Moore  MRN: [id number]  DATE: May 8, 2023  TIME: 1:32 PM    PLAN AND RECOMMENDATIONS:      Patient is POD #0 s/p right foot great toe debridement and delayed primary closure. Operative site was partially closed. Please do not change the right foot dressings. Podiatry will change the first postop dressing. If strike through noted, nursing can reinforce dressing with ABD, Kerlix, and ACE over top of existing dressing. PWB heel RLE in a post op shoe. Ok to use walker, crutches, or other assistive device(s) to use the restroom or for Physical Therapy. Elevate RLE at or above the level of the heart. Prevalon boots on when in bed. Patient is OK to be discharged from podiatry standpoint with outpatient follow up with Dr. John Howell in the wound care center. Will need final homegoing IV antibiotics recommendations per infectious disease before discharge.      Ailyn Israel DPM PGY-2  May 8, 2023  1:32 PM

## 2023-05-08 NOTE — PLAN OF CARE
Podiatry Plan of Care:    - Remain NPO.  - Plan for delayed primary closure today of right foot wound in the OR.  - Consent to be obtained.      Jazzy Aayla DPM PGY-2

## 2023-05-08 NOTE — PLAN OF CARE
Podiatry Plan of Care:    - Remain NPO today. - Plan for OR this afternoon for debridement/closure of wound site. - Consent to be obtained.     Greta Price DPM PGY2

## 2023-05-08 NOTE — OP NOTE
OPERATIVE/PROCEDURE REPORT    LOG ID: [id number]    NAME: Subha Leal   MRN: [id number]    DATE: 05/08/23  AGE: 65 y.o. SURGEON: Keenan Jose DPM    ASSISTANTS:  1. Barbie Talamantes DPM PGY-2    PREOPERATIVE DIAGNOSIS:  1. Right foot great toe osteomyelitis    POSTOPERATIVE DIAGNOSIS:  1. Same    OPERATION:   1. Debridement nonviable tissue, right foot. 2. Delayed primary closure, right foot. ANESTHESIA: MAC with 10cc 1:1 2% lidocaine plain and 0.5% marcaine plain preopeative palma block, right foot    OPERATIVE INDICATIONS: This is a pleasant 65 y.o.  male with a history of right foot hallux osteomyelitis. All A/B/R/C/P were discussed in detail with the patient. Answered all of the patient's questions to the patient's satisfaction. No guarantees were given. The patient understands this. Patient elects to proceed with surgery. OPERATIVE FINDINGS: Able to re approximate 95% of wound. 5% left open with packing material.     OPERATIVE PROCEDURE: Patient was transferred from the preoperative holding area to the operative suite and placed in a supine position. All monitors were applied. MAC anesthesia was administered. Prophylaxis was obtained with continuing Zosyn from the floor IV piggyback pre-operatively. The right foot, ankle, and leg were then prepped and draped in the normal sterile fashion. Time out was performed. Attention was then directed to the right foot plantar hallux where a rongeur was used to debride nonviable tissue of the right wound down to the level to subcutaneous tissue and bone. The wound was then irrigated copiously with serile saline. Wound edges were then reapproximated with 3-0 prolene in simple inerrupted fashion. Approximately 95% of the wound was able to be closed. The central wound was then packed with nugauze. Wounds were further dressed with adaptic, fluff dressings, Kerlix roll, and an ace bandage.  Patient tolerated the anesthesia and procedure very well, was transferred to

## 2023-05-08 NOTE — ANESTHESIA PRE PROCEDURE
Department of Anesthesiology  Preprocedure Note       Name:  Rubina Cole   Age:  65 y.o.  :  1960                                          MRN:  [id number]         Date:  2023      Surgeon: Maldonado Leonardo):  Berenice Dao DPM    Procedure: RIGHT FOOT  INCISION AND DRAINAGE WITH DEBRIDEMENT OF NON-VIABLE TISSUE DELAYED PRIMARY CLOSURE    Medications prior to admission:   Prior to Admission medications    Medication Sig Start Date End Date Taking?  Authorizing Provider   doxycycline hyclate (VIBRA-TABS) 100 MG tablet Take 1 tablet by mouth 2 times daily for 10 days 23  Ankit Fuentes MD   JARDIANCE 10 MG tablet Take 1 tablet by mouth daily 23   NABILA Bundy   carvedilol (COREG) 12.5 MG tablet TAKE 1 TABLET BY MOUTH 2 TIMES DAILY 23   NABILA Bundy   clopidogrel (PLAVIX) 75 MG tablet Take 1 tablet by mouth daily 1/3/23   SHERIN Diaz CNP   hydrALAZINE (APRESOLINE) 25 MG tablet Take 1 tablet by mouth in the morning and at bedtime 1/3/23   NABILA Bundy   metFORMIN (GLUCOPHAGE) 1000 MG tablet TAKE 1 TABLET BY MOUTH TWICE DAILY 1/3/23   Britton Weems MD   insulin aspart protamine-insulin aspart (NOVOLOG MIX 70/30 FLEXPEN) (70-30) 100 UNIT/ML injection 30 units twice day  Patient not taking: Reported on 2023   Britton Weems MD   Insulin Pen Needle (NOVOFINE) 32G X 6 MM MISC Bid 23   Britton Weems MD   vitamin D (ERGOCALCIFEROL) 1.25 MG (61139 UT) CAPS capsule Take 1 capsule by mouth once a week  Patient not taking: Reported on 22   Max Zeng MD   blood glucose test strips (FREESTYLE LITE) strip Tid E11.65 22   Britton Weems MD   NOVOLOG 100 UNIT/ML injection vial Use via insulin pump max daily dose 100 units  Patient not taking: Reported on 2022   Historical Provider, MD   Continuous Blood Gluc  (FREESTYLE TESS 2 READER) MARIUM Give 1  22   Britton Weems MD   Continuous Blood Gluc Sensor

## 2023-05-08 NOTE — ANESTHESIA POSTPROCEDURE EVALUATION
Department of Anesthesiology  Postprocedure Note    Patient: Fredrick Mckeon  MRN: [id number]  YOB: 1960  Date of evaluation: 5/8/2023      Procedure Summary     Date: 05/08/23 Room / Location: [address]    Anesthesia Start: 7874 Anesthesia Stop: 7978    Procedure: RIGHT FOOT  INCISION AND DRAINAGE WITH DEBRIDEMENT OF NON-VIABLE TISSUE DELAYED PRIMARY CLOSURE (Right: Foot) Diagnosis:       Osteomyelitis ([address]      (Osteomyelitis ([address] [M86.9])    Surgeons: Lyndsay Escalante DPM Responsible Provider: Renetta Malone MD    Anesthesia Type: MAC ASA Status: 3          Anesthesia Type: No value filed.     Schuyler Phase I: Schuyler Score: 10    Schuyler Phase II:        Anesthesia Post Evaluation    Patient location during evaluation: PACU  Patient participation: complete - patient participated  Level of consciousness: awake and alert  Airway patency: patent  Nausea & Vomiting: no nausea and no vomiting  Complications: no  Cardiovascular status: blood pressure returned to baseline and hemodynamically stable  Respiratory status: acceptable  Hydration status: euvolemic

## 2023-05-08 NOTE — CARE COORDINATION
SPOKE WITH DR FROST, CULTURES NEED ONE MORE DAY FOR FINAL RECOMMENDATIONS. MESSAGE LEFT FOR [facility].

## 2023-05-08 NOTE — BRIEF OP NOTE
Brief Postoperative Note      Patient: Funmi Cabrera  YOB: 1960  MRN: [id number]    Date of Procedure: 5/8/2023    Pre-Op Diagnosis Codes:     * Osteomyelitis ([facility] [M86.9]    Post-Op Diagnosis: Same       Procedure(s):  RIGHT FOOT  INCISION AND DRAINAGE WITH DEBRIDEMENT OF NON-VIABLE TISSUE DELAYED PRIMARY CLOSURE    Surgeon(s):  Moni Kam DPM    Assistant:  Resident: Karyle Brookes, DPM    Anesthesia: Choice    Estimated Blood Loss (mL): Minimal    Complications: None    Specimens:   * No specimens in log *    Implants:  * No implants in log *      Drains: * No LDAs found *    Findings: Per dictation      Electronically signed by Karyle Brookes, DPM on 5/8/2023 at 1:21 PM

## 2023-05-08 NOTE — FLOWSHEET NOTE
0930- Assessment complete. Vitals stable. Patient aware of surgery this afternoon on the right foot. Patient NPO.    1100- Transporter at bedside to take patient to surgery. 1441- Patient returned for surgery. Alert and oriented X 4. Vital signs stable. Dressing to right foot clean, dry and intact. Pedal and Posterior Tibial pulses unable to obtain due to dressing. Right popliteal pulse 1+. Patient denies any pain at this time. 1700- Right foot dressing remains clean, dry and intact. Patient still denies pain. Wife at bedside. Electronically signed by Mirza Monroe on 5/8/2023 at 6:14 PM

## 2023-05-09 ENCOUNTER — APPOINTMENT (OUTPATIENT)
Dept: INTERVENTIONAL RADIOLOGY/VASCULAR | Age: 63
DRG: 623 | End: 2023-05-09
Payer: MEDICARE

## 2023-05-09 VITALS
BODY MASS INDEX: 25.29 KG/M2 | OXYGEN SATURATION: 99 % | DIASTOLIC BLOOD PRESSURE: 83 MMHG | HEIGHT: 67 IN | TEMPERATURE: 98.2 F | SYSTOLIC BLOOD PRESSURE: 156 MMHG | RESPIRATION RATE: 20 BRPM | WEIGHT: 161.13 LBS | HEART RATE: 87 BPM

## 2023-05-09 LAB
GLUCOSE BLD-MCNC: 106 MG/DL (ref 70–99)
GLUCOSE BLD-MCNC: 118 MG/DL (ref 70–99)
GLUCOSE BLD-MCNC: 134 MG/DL (ref 70–99)
PERFORMED ON: ABNORMAL

## 2023-05-09 PROCEDURE — 6370000000 HC RX 637 (ALT 250 FOR IP)

## 2023-05-09 PROCEDURE — 02H633Z INSERTION OF INFUSION DEVICE INTO RIGHT ATRIUM, PERCUTANEOUS APPROACH: ICD-10-PCS | Performed by: RADIOLOGY

## 2023-05-09 PROCEDURE — 2580000003 HC RX 258: Performed by: INTERNAL MEDICINE

## 2023-05-09 PROCEDURE — 99232 SBSQ HOSP IP/OBS MODERATE 35: CPT | Performed by: INTERNAL MEDICINE

## 2023-05-09 PROCEDURE — 2500000003 HC RX 250 WO HCPCS: Performed by: RADIOLOGY

## 2023-05-09 PROCEDURE — 2580000003 HC RX 258

## 2023-05-09 PROCEDURE — 36573 INSJ PICC RS&I 5 YR+: CPT

## 2023-05-09 PROCEDURE — 99232 SBSQ HOSP IP/OBS MODERATE 35: CPT | Performed by: PHYSICIAN ASSISTANT

## 2023-05-09 PROCEDURE — 2580000003 HC RX 258: Performed by: RADIOLOGY

## 2023-05-09 PROCEDURE — B5181ZA FLUOROSCOPY OF SUPERIOR VENA CAVA USING LOW OSMOLAR CONTRAST, GUIDANCE: ICD-10-PCS | Performed by: RADIOLOGY

## 2023-05-09 PROCEDURE — 6360000002 HC RX W HCPCS: Performed by: INTERNAL MEDICINE

## 2023-05-09 PROCEDURE — 2709999900 IR PICC WO SQ PORT/PUMP > 5 YEARS

## 2023-05-09 PROCEDURE — 36573 INSJ PICC RS&I 5 YR+: CPT | Performed by: RADIOLOGY

## 2023-05-09 PROCEDURE — 6360000002 HC RX W HCPCS

## 2023-05-09 RX ORDER — SODIUM CHLORIDE 0.9 % (FLUSH) 0.9 %
5-40 SYRINGE (ML) INJECTION EVERY 12 HOURS SCHEDULED
Status: DISCONTINUED | OUTPATIENT
Start: 2023-05-09 | End: 2023-05-09 | Stop reason: HOSPADM

## 2023-05-09 RX ORDER — INSULIN DEGLUDEC INJECTION 100 U/ML
25 INJECTION, SOLUTION SUBCUTANEOUS NIGHTLY
Qty: 5 ADJUSTABLE DOSE PRE-FILLED PEN SYRINGE | Refills: 3 | Status: SHIPPED | OUTPATIENT
Start: 2023-05-09

## 2023-05-09 RX ORDER — INSULIN ASPART 100 [IU]/ML
8 INJECTION, SOLUTION INTRAVENOUS; SUBCUTANEOUS
Qty: 5 ADJUSTABLE DOSE PRE-FILLED PEN SYRINGE | Refills: 3 | Status: SHIPPED | OUTPATIENT
Start: 2023-05-09

## 2023-05-09 RX ORDER — LIDOCAINE HYDROCHLORIDE 20 MG/ML
5 INJECTION, SOLUTION INFILTRATION; PERINEURAL ONCE
Status: COMPLETED | OUTPATIENT
Start: 2023-05-09 | End: 2023-05-09

## 2023-05-09 RX ORDER — SODIUM CHLORIDE 9 MG/ML
INJECTION, SOLUTION INTRAVENOUS PRN
Status: DISCONTINUED | OUTPATIENT
Start: 2023-05-09 | End: 2023-05-09 | Stop reason: HOSPADM

## 2023-05-09 RX ORDER — SODIUM CHLORIDE 0.9 % (FLUSH) 0.9 %
5-40 SYRINGE (ML) INJECTION PRN
Status: DISCONTINUED | OUTPATIENT
Start: 2023-05-09 | End: 2023-05-09 | Stop reason: HOSPADM

## 2023-05-09 RX ORDER — SODIUM CHLORIDE 9 MG/ML
250 INJECTION, SOLUTION INTRAVENOUS ONCE
Status: COMPLETED | OUTPATIENT
Start: 2023-05-09 | End: 2023-05-09

## 2023-05-09 RX ADMIN — CLOPIDOGREL BISULFATE 75 MG: 75 TABLET ORAL at 08:56

## 2023-05-09 RX ADMIN — SODIUM CHLORIDE 250 ML: 9 INJECTION, SOLUTION INTRAVENOUS at 16:10

## 2023-05-09 RX ADMIN — INSULIN LISPRO 8 UNITS: 100 INJECTION, SOLUTION INTRAVENOUS; SUBCUTANEOUS at 11:42

## 2023-05-09 RX ADMIN — HYDRALAZINE HYDROCHLORIDE 25 MG: 25 TABLET, FILM COATED ORAL at 14:19

## 2023-05-09 RX ADMIN — SODIUM CHLORIDE, PRESERVATIVE FREE 10 ML: 5 INJECTION INTRAVENOUS at 08:57

## 2023-05-09 RX ADMIN — ACETAMINOPHEN 650 MG: 325 TABLET ORAL at 08:56

## 2023-05-09 RX ADMIN — CARVEDILOL 12.5 MG: 12.5 TABLET, FILM COATED ORAL at 08:56

## 2023-05-09 RX ADMIN — MEROPENEM 1000 MG: 1 INJECTION, POWDER, FOR SOLUTION INTRAVENOUS at 15:17

## 2023-05-09 RX ADMIN — INSULIN LISPRO 8 UNITS: 100 INJECTION, SOLUTION INTRAVENOUS; SUBCUTANEOUS at 08:57

## 2023-05-09 RX ADMIN — HYDRALAZINE HYDROCHLORIDE 25 MG: 25 TABLET, FILM COATED ORAL at 05:08

## 2023-05-09 RX ADMIN — INSULIN LISPRO 8 UNITS: 100 INJECTION, SOLUTION INTRAVENOUS; SUBCUTANEOUS at 17:52

## 2023-05-09 RX ADMIN — LIDOCAINE HYDROCHLORIDE 5 ML: 20 INJECTION, SOLUTION INFILTRATION; PERINEURAL at 16:12

## 2023-05-09 RX ADMIN — ASPIRIN 81 MG: 81 TABLET, COATED ORAL at 08:56

## 2023-05-09 RX ADMIN — SACUBITRIL AND VALSARTAN 1 TABLET: 24; 26 TABLET, FILM COATED ORAL at 08:56

## 2023-05-09 RX ADMIN — PIPERACILLIN AND TAZOBACTAM 3375 MG: 3; .375 INJECTION, POWDER, LYOPHILIZED, FOR SOLUTION INTRAVENOUS at 05:11

## 2023-05-09 ASSESSMENT — PAIN DESCRIPTION - LOCATION: LOCATION: HEAD;FOOT

## 2023-05-09 ASSESSMENT — PAIN SCALES - GENERAL
PAINLEVEL_OUTOF10: 1
PAINLEVEL_OUTOF10: 5

## 2023-05-09 ASSESSMENT — PAIN DESCRIPTION - ORIENTATION: ORIENTATION: RIGHT

## 2023-05-09 NOTE — DISCHARGE INSTR - COC
[XDQQU:6915]    Safety Concerns: At Risk for Falls    Impairments/Disabilities:      None    Nutrition Therapy:  Current Nutrition Therapy:   - Oral Diet:  Carb Control 4 carbs/meal (1800kcals/day) and Cardiac    Routes of Feeding: Oral  Liquids: Thin Liquids  Daily Fluid Restriction: no  Last Modified Barium Swallow with Video (Video Swallowing Test): not done    Treatments at the Time of Hospital Discharge:   Respiratory Treatments: none  Oxygen Therapy:  is not on home oxygen therapy. Ventilator:    - No ventilator support    Rehab Therapies: Physical Therapy and Occupational Therapy  Weight Bearing Status/Restrictions: Partial weight bearing (30-50%) only on leg right leg. Place weight on the heel and use surgical shoe  Other Medical Equipment (for information only, NOT a DME order):  walker  Other Treatments:     Patient's personal belongings (please select all that are sent with patient):  {P DME Belongings:341254048}    RN SIGNATURE:  Electronically signed by Stanley Hugo on 5/9/23 at 6:26 PM EDT    CASE MANAGEMENT/SOCIAL WORK SECTION    Inpatient Status Date: ***    Readmission Risk Assessment Score:  Readmission Risk              Risk of Unplanned Readmission:  27           Discharging to Facility/ Agency   Name: [facility]  Address:  Phone:  [phone]  Fax:    [facility] [phone]    / signature: {Esignature:[id number]}    PHYSICIAN SECTION    Prognosis: {Prognosis:7630431213}    Condition at Discharge: Am[facility] Patient Condition:[id number]}    Rehab Potential (if transferring to Rehab): {Prognosis:3478658854}    Recommended Labs or Other Treatments After Discharge: ***    Physician Certification: I certify the above information and transfer of Fidel Rossi  is necessary for the continuing treatment of the diagnosis listed and that he requires {Admit to Appropriate Level of Care:82084} for {GREATER/LESS:758590696} 30 days.      Update Admission

## 2023-05-09 NOTE — CARE COORDINATION
MET WITH PATIENT, PLANNING FOR DC HOME TODAY WITH [facility] FOR IV ABX. IMM REVIEWED, VERBALLY ACKNOWLEDGED AND COPY GIVEN. [facility] INFUSION CONSENTS SIGNED. AWAITING ID FOR FINAL ORDERS.

## 2023-05-09 NOTE — DISCHARGE SUMMARY
Disposition:   If discharged to Home, Any [place] [address] needs that were indicated and/or required as been addressed and set up by Social Work. Condition at discharge: Pt was medically stable at the time of discharge. Activity: activity as tolerated    Total time taken for discharging this patient: 40 minutes. Greater than 70% of time was spent focused exclusively on this patient. Time was taken to review chart, discuss plans with consultants, reconciling medications, discussing plan answering questions with patient.      Signed:  Claudean Coach, MD

## 2023-05-09 NOTE — DISCHARGE INSTR - DIET
Good nutrition is important when healing from an illness, injury, or surgery. Follow any nutrition recommendations given to you during your hospital stay. If you were given an oral nutrition supplement while in the hospital, continue to take this supplement at home. You can take it with meals, in-between meals, and/or before bedtime. These supplements can be purchased at most local grocery stores, pharmacies, and chain [employer]-stores. If you have any questions about your diet or nutrition, call the hospital and ask for the dietitian.   Diabetic, Heart Healthy

## 2023-05-09 NOTE — CARE COORDINATION
MET WITH PATIENT, IV INVANZ ONCE DAILY, PT WAS GIVEN OPTION OF [address] VS OP INFUSION. PT WOULD LIKE [address] D/T TRANSPORTATION ISSUES. MAURISIO CONFIRMED ADDRESS [address] [place] [address] [address] PHONE NUMBER [phone]. [facility] CAN ALSO CALL HIS WIFE. RX AND CONSENT SIGNED AND FAXED TO [facility]. LAWANDA CASTILLO [facility] UPDATED.      [address], [facility] WILL DELIVER IVS TOMORROW MORNING.

## 2023-05-10 ENCOUNTER — CARE COORDINATION (OUTPATIENT)
Dept: CASE MANAGEMENT | Age: 63
End: 2023-05-10

## 2023-05-10 LAB
CULTURE SURGICAL: ABNORMAL
CULTURE SURGICAL: ABNORMAL
CULTURE WOUND: ABNORMAL
CULTURE WOUND: ABNORMAL
ORGANISM: ABNORMAL
ORGANISM: ABNORMAL

## 2023-05-10 NOTE — PROGRESS NOTES
Chief Complaint   Patient presents with    Foot Pain          Patient is a 65 y.o. male who presents with a chief complaint of right foot ulceration. Patient is followed on a regular basis by Dr. Peggy Wills MD.   Patient with past medical history of Crohn disease status post CABG in 2019, diabetes, hypertension, hyperlipidemia. Presents with right foot ulceration/osteomyelitis. Status post lower extremity arterial ultrasound with elevated velocity in the right internal artery. Podiatry f planning for right helix amputation. He denies any angina or CHF type symptoms. 5/3/2023: Patient is resting comfortably denies any chest pain or shortness of breath. Obtaining a second opinion for podiatry. Renal function normalized. 5/5/2023: Going to the OR today for I&D with podiatry. 5-8-23: patient in OR. No new issues overnight.       Past Medical History        Past Medical History:   Diagnosis Date    Asthma      Back pain      CAD (coronary artery disease) 01/2020     3 vessel     chf 9/16/2022    Diabetes mellitus ([facility] [address]      Essential hypertension, benign      Hyperlipemia      Ischemic cardiomyopathy 9/16/2022    Neuropathic pain, leg, bilateral               Patient Active Problem List   Diagnosis    Essential hypertension, benign    Obesity, diabetes, and hypertension syndrome (HCC)    Irregular heart rhythm    Hyperlipidemia    Allergic rhinitis due to pollen    Staphylococcus aureus bacteremia    Hematoma of right thigh    Hematoma of left lower extremity    Hereditary peripheral neuropathy    Migraine without aura    Community acquired pneumonia of right lower lobe of lung    Acute on chronic diastolic (congestive) heart failure (HCC)    Mild intermittent asthma with exacerbation    NSTEMI (non-ST elevated myocardial infarction) ([address]    Acute bronchitis due to other specified organisms    CAD (coronary artery disease)    Body mass index (bmi) 29.0-29.9, adult    Cellulitis of right lower limb
Comprehensive Nutrition Assessment    Type and Reason for Visit:  Initial, RD Nutrition Re-Screen/LOS    Nutrition Recommendations/Plan:   Continue current plan of car  Pt declined trial of wound healing oral supplement as well as diabetic diet education     Malnutrition Assessment:  Malnutrition Status:  No malnutrition (05/08/23 1116)        Nutrition Assessment:    No malnutrition identified, poorly controlled glucose levels, declined diabetic diet review, not interested in trying the wound healing oral supplement, Continue with current plan of care    Nutrition Related Findings:    PMH significant for DM, CAD, CHF  for who podiatry was consulted for assessment of right hallux wound and cellulitis= acute osteomyelitis right foot/toe . Carolina Womack Right foot debridement (5/6), glucose 165-178, HgbA1c= 11.2,, denies any GI &/or nutrition related concerns Wound Type: Diabetic Ulcer       Current Nutrition Intake & Therapies:    Average Meal Intake: % (per pt, no EMR intake docuemented)  Average Supplements Intake: None Ordered, Refusing to take      Anthropometric Measures:  Height: 5' 7\" (170.2 cm)  Ideal Body Weight (IBW): 148 lbs (67 kg)    Admission Body Weight: 161 lb (73 kg)  Current Body Weight: 162 lb (73.5 kg), 109.5 % IBW. Current BMI (kg/m2): 25.4  Usual Body Weight: 165 lb (74.8 kg) (( 11/2022), 150# ( 12/2022))  % Weight Change (Calculated): -1.8                    BMI Categories: Overweight (BMI 25.0-29. 9)    Estimated Daily Nutrient Needs:  Energy Requirements Based On: Kcal/kg  Weight Used for Energy Requirements: Current  Energy (kcal/day): 0634-8280 kcals @ 22-25 kcal/kg  Weight Used for Protein Requirements: Ideal  Protein (g/day): 87-94 g protein @ 1.2-1.4 g/kg IBW  Method Used for Fluid Requirements: 1 ml/kcal  Fluid (ml/day): ~1800    Nutrition Diagnosis:   Altered nutrition-related lab values related to endocrine dysfuntion as evidenced by lab values  Increased nutrient needs related to increase
Hospitalist Progress Note      Date of Admission: 4/30/2023  Chief Complaint:    Chief Complaint   Patient presents with    Foot Pain     Subjective:  No new complaints.   No nausea, vomiting, chest pain, or headache      Medications:    Infusion Medications    sodium chloride      dextrose       Scheduled Medications    sodium chloride flush  5-40 mL IntraVENous 2 times per day    hydrALAZINE  25 mg Oral 3 times per day    piperacillin-tazobactam  3,375 mg IntraVENous Q8H    insulin lispro  8 Units SubCUTAneous TID WC    insulin glargine  28 Units SubCUTAneous Nightly    aspirin  81 mg Oral Daily    clopidogrel  75 mg Oral Daily    rosuvastatin  20 mg Oral Nightly    carvedilol  12.5 mg Oral BID    sacubitril-valsartan  1 tablet Oral BID    [Held by provider] enoxaparin  40 mg SubCUTAneous Daily    insulin lispro  0-8 Units SubCUTAneous TID WC    insulin lispro  0-4 Units SubCUTAneous Nightly     PRN Meds: sodium chloride flush, sodium chloride, meperidine, fentanNYL, fentanNYL, oxyCODONE **OR** oxyCODONE, ondansetron, prochlorperazine, diphenhydrAMINE, labetalol **OR** hydrALAZINE, hydrOXYzine HCl, ondansetron **OR** ondansetron, polyethylene glycol, acetaminophen **OR** acetaminophen, glucose, dextrose bolus **OR** dextrose bolus, glucagon (rDNA), dextrose    Intake/Output Summary (Last 24 hours) at 5/8/2023 2204  Last data filed at 5/8/2023 2118  Gross per 24 hour   Intake 1020 ml   Output 1200 ml   Net -180 ml     Exam:  BP (!) 154/85   Pulse 92   Temp 97.6 °F (36.4 °C) (Oral)   Resp 18   Ht 5' 7\" (1.702 m)   Wt 162 lb 1 oz (73.5 kg)   SpO2 97%   BMI 25.38 kg/m²   Head: Normocephalic, atraumatic  Sclera clear  Neck JVD flat  Lungs: Normal effort of breathing  Foot wrapped in podiatry dressing    Labs:   Recent Labs     05/06/23 0537 05/07/23 0536   WBC 8.4 5.9   HGB 13.4* 13.4*   HCT 39.6* 39.4*    170     Recent Labs     05/06/23 0537 05/07/23 0536   * 136   K 4.5 4.4    102   CO2
PODIATRIC MEDICINE AND SURGERY  CONSULT PROGRESS NOTE    DATE: May 9, 2023  TIME: 11:44 AM  NAME: Marisela Vega  MRN: [id number]    FOLLOW UP: right foot wound    ASSESSMENT:     65 y.o. male with PMH significant for DM, CAD, CHF  for who podiatry was consulted for assessment of right hallux wound and cellulitis. Now MRI demonstrating OM of distal phalanx. S/p I&D and bone cx (5/5/23), s/p delayed primary closure 5/8/23. PLAN AND RECOMMENDATIONS:     - Wound care: Betadine and DSD right foot. Patient to keep post operative dressing intact until first postoperative appointment with Dr. Madison Lockhart. - Patient to call Dr. Danielle Alaniz office for appointment in wound care center in 1 week. Instructions placed in discharge folder.  - WB to heel in surgical shoe right foot  - Homegoing antibiotics per ID. Patient's case to be discussed with staff, Dr. Madison Lockhart, who will provide final recommendations going forward    INTERVAL HISTORY:   NAEO. AF. HDS  Now pod1 DPC right foot. Past Medical History:   Diagnosis Date    Asthma     Back pain     CAD (coronary artery disease) 01/2020    3 vessel     chf 9/16/2022    Diabetes mellitus ([facility] [address]     Essential hypertension, benign     Hyperlipemia     Ischemic cardiomyopathy 9/16/2022    Neuropathic pain, leg, bilateral        Past Surgical History:   Procedure Laterality Date    CARPAL TUNNEL RELEASE Bilateral     FOOT DEBRIDEMENT Right 5/5/2023    RIGHT FOOT DEBRIDEMENT INCISION AND DRAINAGE ROOM 174 performed by Tayla Sharma DPM at [address] [facility] [address] Right 5/8/2023    RIGHT FOOT  INCISION AND DRAINAGE WITH DEBRIDEMENT OF NON-VIABLE TISSUE DELAYED PRIMARY CLOSURE performed by Tayla Sharma DPM at [facility] [address] Left     bone spur    LAMINOTOMY  2012    SPINE SURGERY N/A 11/11/2022    L1 VERETEBRAL AUGMENTATION performed by Jovon Coyle MD at [facility]       No current facility-administered medications on file prior to encounter.      Current Outpatient
PODIATRIC PRE-OPERATIVE NOTE                                 SERVICE DATE: 5/8/2023    SERVICE TIME:  9:54 AM    DIAGNOSIS: Right foot hallux wound/OM    PROCEDURE(S): Debridement right foot wound and delayed primary closure right foot    Consent on chart: To be obtained in pre op     Type & screen:  NA    Medical Clearance:  Yes    CXR/EKG:  NA    Medications/Preop Antibiotics: Zosyn    No Known Allergies    Surgical site identified:  Yes    NPO: Yes    IV Fluids:  Yes    Risks and benefits, complications, treatment options, expected outcome and rehabilitation explained,  patient understands. All questions were entertained and answered. Patient wishes to proceed with above procedure(s).     SIGNATURE: Ramírez Estrada DPM PATIENT NAME: Gilberto Fuentes   DATE: May 8, 2023 MRN: [id number]   TIME: 9:54 AM PAGER: [phone]
Patient ID:  Rubina Cole  [id number]  65 y.o.  1960  BOVIE PAD SITE CLEAR AND INTACT PRE AND POST OP. TAKEN TO PACU,   ATTACHED TO MONITOR AND REPORT GIVEN TO RN.   VSS DRSG DRY AND INTACT        Electronically signed by Walt Darnell RN on 5/8/2023
Physician Progress Note      Ronaldo Shukla  CSN #:                  [id number]  :                       1960  ADMIT DATE:       2023 6:25 PM  [address] [place] DATE:        2023 7:25 PM  RESPONDING  PROVIDER #:        Kenneth Lizarraga TEXT:    Patient admitted with Diabetic wound with cellulitis extending to right foot. Per Op note dated 23 documentation of debridement. To accurately reflect   the procedure performed please document if debridement was excisional or   nonexcisional and the deepest depth of tissue removed as down to and   including: The medical record reflects the following:    Risk Factors: 65 yom diabetic wound right foot w/ cellulitis  osteomyelitis  Clinical Indicators: Dr Wilfred Serrano DPM op note 23: \" operation nonviable   tissue, right foot  right foot plantar hallux where a rongeur was used to   debride nonviable tissue of the right wound down to the level to subcutaneous   tissue and bone. The wound was then irrigated copiously with serile saline. Wound edges were then reapproximated with 3-0 prolene in simple inerrupted   fashion. Approximately 95% of the wound was able to be closed. The central   wound was then packed with nugauze. \"  Treatment: podiatry ID consult plan for PICC line  debridement right foot    zosyn    Thank you  Jenny ALFARO RN CDS  [phone] M-F 6am-2pm  Options provided:  -- Nonexcisional debridement of subcutaneous tissue  -- Excisional debridement of subcutaneous tissue  -- Nonexcisional debridement of fascia  -- Excisional debridement of fascia  -- Nonexcisional debridement of muscle  -- Excisional debridement of muscle  -- Nonexcisional debridement of bone  -- Excisional debridement of bone  -- Other - I will add my own diagnosis  -- Disagree - Not applicable / Not valid  -- Disagree - Clinically unable to determine / Unknown  -- Refer to Clinical Documentation Reviewer    PROVIDER RESPONSE
Progress Note  Date:2023       SEQJ:[id number]  Patient Alfredo Stark     Date of Birth:[birth year]     Age:62 y.o. Chief complaint uncontrolled type 2 diabetes    Subjective    Subjective:  Symptoms:  Stable. Diet:  Adequate intake. Activity level: Impaired due to pain. Review of Systems   Skin:  Positive for wound. All other systems reviewed and are negative. Objective         Vitals Last 24 Hours:  TEMPERATURE:  Temp  Av.8 °F (36.6 °C)  Min: 97.1 °F (36.2 °C)  Max: 98.3 °F (36.8 °C)  RESPIRATIONS RANGE: Resp  Av  Min: 18  Max: 18  PULSE OXIMETRY RANGE: SpO2  Av.8 %  Min: 95 %  Max: 100 %  PULSE RANGE: Pulse  Av.2  Min: 90  Max: 93  BLOOD PRESSURE RANGE: Systolic (95XVS), VOM:372 , Min:134 , MKJ:905   ; Diastolic (74TCM), CFX:51, Min:76, Max:87    I/O (24Hr): Intake/Output Summary (Last 24 hours) at 2023 2221  Last data filed at 2023 1613  Gross per 24 hour   Intake --   Output 2050 ml   Net -2050 ml     Objective:  General Appearance:  Comfortable. Vital signs: (most recent): Blood pressure (!) 169/85, pulse 93, temperature 98.3 °F (36.8 °C), temperature source Oral, resp. rate 18, height 5' 7\" (1.702 m), weight 167 lb (75.8 kg), SpO2 97 %. Vital signs are normal.    HEENT: Normal HEENT exam.    Lungs:  Normal effort. Heart: Normal rate. Abdomen: Abdomen is soft. Extremities: (Right foot bandage)  Neurological: Patient is alert.     Labs/Imaging/Diagnostics    Labs:  CBC:  Recent Labs     23  0459 23  0537 23  0536   WBC 7.3 8.4 5.9   RBC 4.93 4.84 4.86   HGB 13.4* 13.4* 13.4*   HCT 40.1* 39.6* 39.4*   MCV 81.5 81.9 81.1   RDW 13.2 13.3 13.4    189 170     CHEMISTRIES:  Recent Labs     23  0459 23  0537 23  0536    134* 136   K 4.1 4.5 4.4    103 102   CO2 23 22 23   BUN 24* 18 17   CREATININE 1.25* 1.12 1.18   GLUCOSE 168* 129* 239*     PT/INR:No results for input(s): PROTIME, INR in the last
05/07/2023    HCT 39.4 (L) 05/07/2023    MCV 81.1 05/07/2023     05/07/2023     Lab Results   Component Value Date    CREATININE 1.18 05/07/2023    BUN 17 05/07/2023     05/07/2023    K 4.4 05/07/2023     05/07/2023    CO2 23 05/07/2023       Hepatic Function Panel:  Lab Results   Component Value Date/Time    ALKPHOS 83 04/30/2023 07:00 PM    ALT 6 04/30/2023 07:00 PM    AST 8 04/30/2023 07:00 PM    PROT 6.2 04/30/2023 07:00 PM    BILITOT 0.3 04/30/2023 07:00 PM    BILIDIR <0.2 12/08/2019 07:30 PM    IBILI see below 12/08/2019 07:30 PM    LABALBU 3.2 04/30/2023 07:00 PM    LABALBU 4.0 11/27/2021 12:35 AM         IMPRESSION:    Acute osteomyelitis right foot  Right diabetic foot ulcer associated with insulin-dependent diabetes mellitus type 2  Group A streptococcus infection    Patient Active Problem List   Diagnosis    Essential hypertension, benign    Obesity, diabetes, and hypertension syndrome (HCC)    Irregular heart rhythm    Hyperlipidemia    Allergic rhinitis due to pollen    Staphylococcus aureus bacteremia    Hematoma of right thigh    Hematoma of left lower extremity    Hereditary peripheral neuropathy    Migraine without aura    Community acquired pneumonia of right lower lobe of lung    Acute on chronic diastolic (congestive) heart failure (HCC)    Mild intermittent asthma with exacerbation    NSTEMI (non-ST elevated myocardial infarction) ([address]    Acute bronchitis due to other specified organisms    CAD (coronary artery disease)    Body mass index (bmi) 29.0-29.9, adult    Cellulitis of right lower limb    Dyspnea, unspecified    Encounter for pre-employment examination    Family history of ischemic heart disease and other diseases of the circulatory system    Other specified hypothyroidism    Inflammatory disorders of scrotum    Long term (current) use of insulin ([address]    Long term (current) use of oral hypoglycemic drugs    Other chronic sinusitis    Other idiopathic scoliosis,
Scheduled Medications:    piperacillin-tazobactam  3,375 mg IntraVENous Q8H    insulin lispro  8 Units SubCUTAneous TID WC    insulin glargine  28 Units SubCUTAneous Nightly    [Held by provider] aspirin  81 mg Oral Daily    clopidogrel  75 mg Oral Daily    hydrALAZINE  25 mg Oral BID    rosuvastatin  20 mg Oral Nightly    carvedilol  12.5 mg Oral BID    sacubitril-valsartan  1 tablet Oral BID    [Held by provider] enoxaparin  40 mg SubCUTAneous Daily    insulin lispro  0-8 Units SubCUTAneous TID WC    insulin lispro  0-4 Units SubCUTAneous Nightly     PRN Meds: hydrOXYzine HCl, ondansetron **OR** ondansetron, polyethylene glycol, acetaminophen **OR** acetaminophen, glucose, dextrose bolus **OR** dextrose bolus, glucagon (rDNA), dextrose    Labs:   Recent Labs     05/05/23 0459 05/06/23  0537 05/07/23  0536   WBC 7.3 8.4 5.9   HGB 13.4* 13.4* 13.4*   HCT 40.1* 39.6* 39.4*    189 170       Recent Labs     05/05/23 0459 05/06/23  0537 05/07/23  0536    134* 136   K 4.1 4.5 4.4    103 102   CO2 23 22 23   BUN 24* 18 17   CREATININE 1.25* 1.12 1.18   CALCIUM 8.3* 8.3* 8.5       No results for input(s): AST, ALT, BILIDIR, BILITOT, ALKPHOS in the last 72 hours. No results for input(s): INR in the last 72 hours. No results for input(s): Twyla Else in the last 72 hours. Urinalysis:   Lab Results   Component Value Date/Time    NITRU Negative 08/16/2022 12:15 PM    [address] None seen 08/16/2022 12:15 PM    WBCUA 2 11/27/2021 02:00 AM    BACTERIA Negative 08/16/2022 12:15 PM    RBCUA 10-20 08/16/2022 12:15 PM    RBCUA 4 11/27/2021 02:00 AM    BLOODU SMALL 08/16/2022 12:15 PM    SPECGRAV 1.040 08/16/2022 12:15 PM    GLUCOSEU >=1000 08/16/2022 12:15 PM       Radiology:   Most recent    Chest CT      WITH CONTRAST:No results found for this or any previous visit. WITHOUT CONTRAST: No results found for this or any previous visit.       CXR      2-view: Results for orders placed during the
effects of hyperglycemia with the patient again including but not limited to diabetic retinopathy with blindness, CKD, ANTON, AMI, CVA, and peripheral vascular disease. He verbalized understanding. He was on insulin pump previously but did not like using it. Discussed continuous glucose monitoring with him and he was open to that possibility. I will prescribe that as an outpatient. Patient underwent successful I&D of the right foot yesterday. Laying in bed, denies any pain. Dry sterile dressing intact. Plan is for discharge home today. Meds been reconciled. Medications:   Scheduled Meds:   meropenem  1,000 mg IntraVENous Q8H    sodium chloride flush  5-40 mL IntraVENous 2 times per day    sodium chloride flush  5-40 mL IntraVENous 2 times per day    hydrALAZINE  25 mg Oral 3 times per day    insulin lispro  8 Units SubCUTAneous TID WC    insulin glargine  28 Units SubCUTAneous Nightly    aspirin  81 mg Oral Daily    clopidogrel  75 mg Oral Daily    rosuvastatin  20 mg Oral Nightly    carvedilol  12.5 mg Oral BID    sacubitril-valsartan  1 tablet Oral BID    [Held by provider] enoxaparin  40 mg SubCUTAneous Daily    insulin lispro  0-8 Units SubCUTAneous TID WC    insulin lispro  0-4 Units SubCUTAneous Nightly     Continuous Infusions:   sodium chloride      sodium chloride      dextrose         Objective:   Vitals: BP (!) 146/101   Pulse 92   Temp 98.2 °F (36.8 °C)   Resp 18   Ht 5' 7\" (1.702 m)   Wt 161 lb 2 oz (73.1 kg)   SpO2 99%   BMI 25.24 kg/m²    Wt Readings from Last 3 Encounters:   05/09/23 161 lb 2 oz (73.1 kg)   04/28/23 160 lb (72.6 kg)   12/21/22 150 lb (68 kg)        General appearance: alert, appears stated age, cooperative, and no distress  Skin: Skin color, texture, turgor normal. No rashes or lesions.   Neck: thyroid normal size, non-tender,  without nodularity  Lungs: clear to auscultation bilaterally  Heart: regular rate and rhythm, S1, S2 normal, no murmur, click, rub or
10.8 K/uL     RBC 4.67 (L) 4.70 - 6.10 M/uL     Hemoglobin 12.8 (L) 14.0 - 18.0 g/dL     Hematocrit 38.0 (L) 42.0 - 52.0 %     MCV 81.5 79.0 - 92.2 fL     MCH 27.4 27.0 - 31.3 pg     MCHC 33.6 33.0 - 37.0 %     RDW 13.2 11.5 - 14.5 %     Platelets 249 990 - 788 K/uL     Neutrophils % 84.0 %     Lymphocytes % 4.7 %     Monocytes % 8.7 %     Eosinophils % 2.3 %     Basophils % 0.3 %     Neutrophils Absolute 7.0 (H) 1.4 - 6.5 K/uL     Lymphocytes Absolute 0.4 (L) 1.0 - 4.8 K/uL     Monocytes Absolute 0.7 0.2 - 0.8 K/uL     Eosinophils Absolute 0.2 0.0 - 0.7 K/uL     Basophils Absolute 0.0 0.0 - 0.2 K/uL   Basic Metabolic Panel w/ Reflex to MG     Collection Time: 05/02/23  5:23 AM   Result Value Ref Range     Sodium 134 (L) 135 - 144 mEq/L     Potassium reflex Magnesium 3.9 3.4 - 4.9 mEq/L     Chloride 99 95 - 107 mEq/L     CO2 21 20 - 31 mEq/L     Anion Gap 14 9 - 15 mEq/L     Glucose 159 (H) 70 - 99 mg/dL     BUN 17 8 - 23 mg/dL     Creatinine 1.23 (H) 0.70 - 1.20 mg/dL     Est, Glom Filt Rate >60.0 >60     Calcium 8.7 8.5 - 9.9 mg/dL   POCT Glucose     Collection Time: 05/02/23  7:01 AM   Result Value Ref Range     POC Glucose 195 (H) 70 - 99 mg/dl     Performed on ACCU-CHEK     POCT Glucose     Collection Time: 05/02/23 11:27 AM   Result Value Ref Range     POC Glucose 185 (H) 70 - 99 mg/dl     Performed on ACCU-CHEK     Vancomycin Level, Trough     Collection Time: 05/02/23 12:19 PM   Result Value Ref Range     Vancomycin Tr <4.0 (L) 10.0 - 20.0 ug/mL   POCT Glucose     Collection Time: 05/02/23  4:14 PM   Result Value Ref Range     POC Glucose 127 (H) 70 - 99 mg/dl     Performed on ACCU-CHEK           Troponin:         Lab Results   Component Value Date/Time     TROPONINI 0.017 08/17/2022 02:30 PM         EKG: not performed.          ASSESSMENT:           Active Hospital Problems     Diagnosis Date Noted    Right foot ulcer, with unspecified severity [facility]) [L97.519] 04/30/2023       Priority: Low    Inadequately

## 2023-05-10 NOTE — CARE COORDINATION
[address] Care Transitions Initial Follow Up Call    Call within 2 business days of discharge: Yes    Patient: Subha Leal Patient : 1960   MRN: [id number]  Reason for Admission: right hallux osteomyelitis  Discharge Date: 23 RARS: Readmission Risk Score: 11      Last Discharge [address]       Date Complaint Diagnosis Description Type Department Provider    23 Foot Pain Cellulitis of right foot . .. ED to Hosp-Admission (Discharged) (ADMITTED) Scot Kent MD; [place],... No call made today 5/10/23 regarding TCM/hospital discharge follow up. No valid number listed on record for patient and no secondary contact listed on communication release form date 23. Communication release form indicated not to discuss information with anyone but patient. Noted patient is scheduled for HFU appt with Dr. Andrew Lundberg (PCP) at [facility] on 5/15/23 at 1:15 pm. CTN signing off for Care Transition.      SHERIN Mayo

## 2023-05-11 ENCOUNTER — TELEPHONE (OUTPATIENT)
Dept: PRIMARY CARE CLINIC | Age: 63
End: 2023-05-11

## 2023-05-15 ENCOUNTER — HOSPITAL ENCOUNTER (OUTPATIENT)
Age: 63
Setting detail: OBSERVATION
Discharge: NURSING, REHAB OR LONG-TERM CARE | End: 2023-05-16
Attending: STUDENT IN AN ORGANIZED HEALTH CARE EDUCATION/TRAINING PROGRAM | Admitting: INTERNAL MEDICINE
Payer: MEDICARE

## 2023-05-15 ENCOUNTER — HOSPITAL ENCOUNTER (OUTPATIENT)
Dept: WOUND CARE | Age: 63
Discharge: HOME | End: 2023-05-15
Payer: MEDICARE

## 2023-05-15 VITALS
SYSTOLIC BLOOD PRESSURE: 147 MMHG | TEMPERATURE: 97.7 F | RESPIRATION RATE: 20 BRPM | DIASTOLIC BLOOD PRESSURE: 84 MMHG | HEART RATE: 82 BPM

## 2023-05-15 DIAGNOSIS — Z78.9 IMPAIRED MOBILITY AND ADLS: ICD-10-CM

## 2023-05-15 DIAGNOSIS — S91.101A OPEN WOUND OF RIGHT GREAT TOE, INITIAL ENCOUNTER: Primary | ICD-10-CM

## 2023-05-15 DIAGNOSIS — Z74.09 IMPAIRED MOBILITY AND ADLS: ICD-10-CM

## 2023-05-15 PROBLEM — L97.514 RIGHT FOOT ULCER, WITH NECROSIS OF BONE (HCC): Status: ACTIVE | Noted: 2023-04-30

## 2023-05-15 PROBLEM — R53.1 WEAKNESS: Status: ACTIVE | Noted: 2023-05-15

## 2023-05-15 LAB
CHP ED QC CHECK: 89
GLUCOSE BLD-MCNC: 153 MG/DL (ref 70–99)
GLUCOSE BLD-MCNC: 159 MG/DL (ref 70–99)
GLUCOSE BLD-MCNC: 89 MG/DL (ref 70–99)
PERFORMED ON: ABNORMAL
PERFORMED ON: ABNORMAL
PERFORMED ON: NORMAL
SARS-COV-2 RDRP RESP QL NAA+PROBE: NOT DETECTED

## 2023-05-15 PROCEDURE — G0378 HOSPITAL OBSERVATION PER HR: HCPCS

## 2023-05-15 PROCEDURE — 96372 THER/PROPH/DIAG INJ SC/IM: CPT

## 2023-05-15 PROCEDURE — 2580000003 HC RX 258: Performed by: INTERNAL MEDICINE

## 2023-05-15 PROCEDURE — 87635 SARS-COV-2 COVID-19 AMP PRB: CPT

## 2023-05-15 PROCEDURE — 6370000000 HC RX 637 (ALT 250 FOR IP): Performed by: INTERNAL MEDICINE

## 2023-05-15 PROCEDURE — 99213 OFFICE O/P EST LOW 20 MIN: CPT

## 2023-05-15 PROCEDURE — 99285 EMERGENCY DEPT VISIT HI MDM: CPT

## 2023-05-15 PROCEDURE — 97166 OT EVAL MOD COMPLEX 45 MIN: CPT

## 2023-05-15 PROCEDURE — 6360000002 HC RX W HCPCS: Performed by: INTERNAL MEDICINE

## 2023-05-15 PROCEDURE — 97162 PT EVAL MOD COMPLEX 30 MIN: CPT

## 2023-05-15 RX ORDER — ONDANSETRON 2 MG/ML
4 INJECTION INTRAMUSCULAR; INTRAVENOUS EVERY 6 HOURS PRN
Status: DISCONTINUED | OUTPATIENT
Start: 2023-05-15 | End: 2023-05-16 | Stop reason: HOSPADM

## 2023-05-15 RX ORDER — ACETAMINOPHEN 650 MG/1
650 SUPPOSITORY RECTAL EVERY 6 HOURS PRN
Status: DISCONTINUED | OUTPATIENT
Start: 2023-05-15 | End: 2023-05-16 | Stop reason: HOSPADM

## 2023-05-15 RX ORDER — ENOXAPARIN SODIUM 100 MG/ML
40 INJECTION SUBCUTANEOUS DAILY
Status: DISCONTINUED | OUTPATIENT
Start: 2023-05-15 | End: 2023-05-16 | Stop reason: HOSPADM

## 2023-05-15 RX ORDER — INSULIN LISPRO 100 [IU]/ML
0-4 INJECTION, SOLUTION INTRAVENOUS; SUBCUTANEOUS NIGHTLY
Status: DISCONTINUED | OUTPATIENT
Start: 2023-05-15 | End: 2023-05-16 | Stop reason: HOSPADM

## 2023-05-15 RX ORDER — ONDANSETRON 4 MG/1
4 TABLET, ORALLY DISINTEGRATING ORAL EVERY 8 HOURS PRN
Status: DISCONTINUED | OUTPATIENT
Start: 2023-05-15 | End: 2023-05-16 | Stop reason: HOSPADM

## 2023-05-15 RX ORDER — ACETAMINOPHEN 325 MG/1
650 TABLET ORAL EVERY 6 HOURS PRN
Status: DISCONTINUED | OUTPATIENT
Start: 2023-05-15 | End: 2023-05-16 | Stop reason: HOSPADM

## 2023-05-15 RX ORDER — INSULIN LISPRO 100 [IU]/ML
0-4 INJECTION, SOLUTION INTRAVENOUS; SUBCUTANEOUS
Status: DISCONTINUED | OUTPATIENT
Start: 2023-05-16 | End: 2023-05-16 | Stop reason: HOSPADM

## 2023-05-15 RX ORDER — CLOPIDOGREL BISULFATE 75 MG/1
75 TABLET ORAL DAILY
Status: DISCONTINUED | OUTPATIENT
Start: 2023-05-15 | End: 2023-05-16 | Stop reason: HOSPADM

## 2023-05-15 RX ORDER — HYDRALAZINE HYDROCHLORIDE 20 MG/ML
10 INJECTION INTRAMUSCULAR; INTRAVENOUS EVERY 6 HOURS PRN
Status: DISCONTINUED | OUTPATIENT
Start: 2023-05-15 | End: 2023-05-16 | Stop reason: HOSPADM

## 2023-05-15 RX ORDER — HYDRALAZINE HYDROCHLORIDE 25 MG/1
25 TABLET, FILM COATED ORAL 2 TIMES DAILY
Status: DISCONTINUED | OUTPATIENT
Start: 2023-05-15 | End: 2023-05-16 | Stop reason: HOSPADM

## 2023-05-15 RX ORDER — SODIUM CHLORIDE 0.9 % (FLUSH) 0.9 %
5-40 SYRINGE (ML) INJECTION PRN
Status: DISCONTINUED | OUTPATIENT
Start: 2023-05-15 | End: 2023-05-16 | Stop reason: HOSPADM

## 2023-05-15 RX ORDER — CARVEDILOL 12.5 MG/1
12.5 TABLET ORAL 2 TIMES DAILY
Status: DISCONTINUED | OUTPATIENT
Start: 2023-05-15 | End: 2023-05-16 | Stop reason: HOSPADM

## 2023-05-15 RX ORDER — ROSUVASTATIN CALCIUM 20 MG/1
20 TABLET, COATED ORAL NIGHTLY
Status: DISCONTINUED | OUTPATIENT
Start: 2023-05-15 | End: 2023-05-16 | Stop reason: HOSPADM

## 2023-05-15 RX ORDER — POLYETHYLENE GLYCOL 3350 17 G/17G
17 POWDER, FOR SOLUTION ORAL DAILY PRN
Status: DISCONTINUED | OUTPATIENT
Start: 2023-05-15 | End: 2023-05-16 | Stop reason: HOSPADM

## 2023-05-15 RX ORDER — INSULIN GLARGINE 100 [IU]/ML
25 INJECTION, SOLUTION SUBCUTANEOUS NIGHTLY
Status: DISCONTINUED | OUTPATIENT
Start: 2023-05-15 | End: 2023-05-16 | Stop reason: HOSPADM

## 2023-05-15 RX ORDER — SODIUM CHLORIDE 9 MG/ML
INJECTION, SOLUTION INTRAVENOUS PRN
Status: DISCONTINUED | OUTPATIENT
Start: 2023-05-15 | End: 2023-05-16 | Stop reason: HOSPADM

## 2023-05-15 RX ORDER — SODIUM CHLORIDE 0.9 % (FLUSH) 0.9 %
5-40 SYRINGE (ML) INJECTION EVERY 12 HOURS SCHEDULED
Status: DISCONTINUED | OUTPATIENT
Start: 2023-05-15 | End: 2023-05-16 | Stop reason: HOSPADM

## 2023-05-15 RX ORDER — ASPIRIN 81 MG/1
81 TABLET ORAL DAILY
Status: DISCONTINUED | OUTPATIENT
Start: 2023-05-15 | End: 2023-05-16 | Stop reason: HOSPADM

## 2023-05-15 RX ADMIN — HYDRALAZINE HYDROCHLORIDE 25 MG: 25 TABLET, FILM COATED ORAL at 21:10

## 2023-05-15 RX ADMIN — ENOXAPARIN SODIUM 40 MG: 100 INJECTION SUBCUTANEOUS at 21:10

## 2023-05-15 RX ADMIN — MEROPENEM 1000 MG: 1 INJECTION, POWDER, FOR SOLUTION INTRAVENOUS at 21:18

## 2023-05-15 RX ADMIN — INSULIN GLARGINE 25 UNITS: 100 INJECTION, SOLUTION SUBCUTANEOUS at 21:13

## 2023-05-15 RX ADMIN — ASPIRIN 81 MG: 81 TABLET, COATED ORAL at 21:10

## 2023-05-15 RX ADMIN — CLOPIDOGREL BISULFATE 75 MG: 75 TABLET ORAL at 21:10

## 2023-05-15 RX ADMIN — SACUBITRIL AND VALSARTAN 1 TABLET: 24; 26 TABLET, FILM COATED ORAL at 21:10

## 2023-05-15 RX ADMIN — CARVEDILOL 12.5 MG: 12.5 TABLET, FILM COATED ORAL at 21:10

## 2023-05-15 RX ADMIN — SODIUM CHLORIDE, PRESERVATIVE FREE 10 ML: 5 INJECTION INTRAVENOUS at 21:14

## 2023-05-15 RX ADMIN — ROSUVASTATIN CALCIUM 20 MG: 20 TABLET, FILM COATED ORAL at 21:10

## 2023-05-15 ASSESSMENT — PAIN DESCRIPTION - ORIENTATION
ORIENTATION: RIGHT
ORIENTATION: RIGHT

## 2023-05-15 ASSESSMENT — ENCOUNTER SYMPTOMS
PHOTOPHOBIA: 0
COUGH: 0
EYE PAIN: 0
SORE THROAT: 0
SHORTNESS OF BREATH: 0
RHINORRHEA: 0
ABDOMINAL PAIN: 0
NAUSEA: 0
BACK PAIN: 0
VOMITING: 0
DIARRHEA: 0

## 2023-05-15 ASSESSMENT — PAIN DESCRIPTION - LOCATION
LOCATION: TOE (COMMENT WHICH ONE)
LOCATION: TOE (COMMENT WHICH ONE)

## 2023-05-15 ASSESSMENT — PAIN DESCRIPTION - FREQUENCY: FREQUENCY: CONTINUOUS

## 2023-05-15 ASSESSMENT — PAIN DESCRIPTION - DESCRIPTORS
DESCRIPTORS: DISCOMFORT
DESCRIPTORS: THROBBING

## 2023-05-15 ASSESSMENT — PAIN DESCRIPTION - PAIN TYPE: TYPE: ACUTE PAIN;CHRONIC PAIN

## 2023-05-15 ASSESSMENT — PAIN SCALES - GENERAL
PAINLEVEL_OUTOF10: 4
PAINLEVEL_OUTOF10: 4

## 2023-05-15 ASSESSMENT — PAIN - FUNCTIONAL ASSESSMENT: PAIN_FUNCTIONAL_ASSESSMENT: 0-10

## 2023-05-15 NOTE — PROGRESS NOTES
[address]   Progress Note and Procedure Note      [address] [facility] RECORD NUMBER:  [id number]  AGE: 59 y.o. GENDER: male  : 1960  EPISODE DATE:  5/15/2023    Subjective:     Chief Complaint   Patient presents with    Wound Check     Right toe         HISTORY of PRESENT ILLNESS HPI     Sravan Eric is a 59 y.o. male who presents today for wound/ulcer evaluation. History of Wound Context: Right foot diabetic ulcer. Recently discharged from the hospital.  States he is having difficulty at home with his family situation. States his wife is leaving him and he does not have help at home. States he recently fell at home. Denies nausea, vomiting, fevers, or chills.   Wound/Ulcer Pain Timing/Severity: intermittent, waxing and waning  Quality of pain: sharp, shooting  Severity:  2 / 10   Modifying Factors: Pain worsens with walking  Associated Signs/Symptoms: drainage, numbness, and pain    Ulcer Identification:  Ulcer Type: diabetic  Contributing Factors: diabetes, poor glucose control, and shear force    Wound: N/A        PAST MEDICAL HISTORY        Diagnosis Date    Asthma     Back pain     CAD (coronary artery disease) 2020    3 vessel     chf 2022    Diabetes mellitus ([facility] [address]     Essential hypertension, benign     Hyperlipemia     Ischemic cardiomyopathy 2022    Neuropathic pain, leg, bilateral        PAST SURGICAL HISTORY    Past Surgical History:   Procedure Laterality Date    CARPAL TUNNEL RELEASE Bilateral     FOOT DEBRIDEMENT Right 2023    RIGHT FOOT DEBRIDEMENT INCISION AND DRAINAGE ROOM 174 performed by Tierney Bach DPM at [address] [facility] [address] Right 2023    RIGHT FOOT  INCISION AND DRAINAGE WITH DEBRIDEMENT OF NON-VIABLE TISSUE DELAYED PRIMARY CLOSURE performed by Tierney Bach DPM at [facility] Left     bone spur    LAMINOTOMY  2012    SPINE SURGERY N/A 2022    L1 VERETEBRAL AUGMENTATION performed [place] Onesimo Argueta MD [place] [facility]

## 2023-05-15 NOTE — ED PROVIDER NOTES
[address] [facility] ED  eMERGENCY dEPARTMENTeNCOUnter      Pt Name: Jamarcus Martines  MRN: [id number]  [place] 1960  Date ofevaluation: 5/15/2023  Provider: Rigo Lassiter PA-C    CHIEF COMPLAINT       Chief Complaint   Patient presents with    Wound Check     Pt states the wound center wanted him to be seen in the ER. Pt states he needs to be admitted for antibiotics          HISTORY OF PRESENT ILLNESS   (Location/Symptom, Timing/Onset,Context/Setting, Quality, Duration, Modifying Factors, Severity)  Note limiting factors. Jamarcus Martines is a 59 y.o. male who presents to the emergency department right foot wound. Pt was seen at podiatry today and he expressed he is having difficulty giving himself his iv atb, wound care, unsafe at home due to falls from his wound. Spoke to pts podiatrist and pt is looking for placement due to this difficulty. Wound is healing appropriate and pt does not require any change in wound care regimen pt is just unable to comply with dressing changes and meds being to difficult for him and he has noone at home to help him. HPI    NursingNotes were reviewed. REVIEW OF SYSTEMS    (2-9 systems for level 4, 10 or more for level 5)     Review of Systems   Constitutional:  Negative for chills, diaphoresis, fatigue and fever. HENT:  Negative for congestion, rhinorrhea and sore throat. Eyes:  Negative for photophobia and pain. Respiratory:  Negative for cough and shortness of breath. Cardiovascular:  Negative for chest pain and palpitations. Gastrointestinal:  Negative for abdominal pain, diarrhea, nausea and vomiting. Genitourinary:  Negative for dysuria and flank pain. Musculoskeletal:  Negative for back pain. Skin:  Positive for wound. Negative for rash. Neurological:  Negative for dizziness, light-headedness and headaches. Psychiatric/Behavioral: Negative. All other systems reviewed and are negative.     Except as noted above the remainder of the review of

## 2023-05-15 NOTE — CARE COORDINATION
05/15/23    From:HOME WITH WIFE; INDEPENDENT; HAVE [address] FOR IV ABX; NO HOME O2; NO DME; NO DIALYSIS    Admit:5/15/2023     PMH:    Anticipated Discharge Disposition: [facility] (PEND PRE-CERT)    Patient Mobility or PT/OT ordered:YES  Consults:     Clinical:     Barriers to Discharge:    Assessments: CMI/ READMISSION DONE.

## 2023-05-15 NOTE — H&P
60-year-old man is on ertapenem daily for right hallux osteomyelitis went to the podiatrist the podiatrist asend him  to the ER because he cannot do wound care at home and is difficult for himself to give himself antibiotics. Patient admitted for placement.  Refusing labs Patient already consulted social work in the ER but she is not available for placement this time patient will be admitted for observation, patient denies chest pain, shortness of breath, nausea vomiting or diarrhea      Past Medical History:   Diagnosis Date    Asthma     Back pain     CAD (coronary artery disease) 01/2020    3 vessel     chf 9/16/2022    Diabetes mellitus ([facility] [address]     Essential hypertension, benign     Hyperlipemia     Ischemic cardiomyopathy 9/16/2022    Neuropathic pain, leg, bilateral      Past Surgical History:   Procedure Laterality Date    CARPAL TUNNEL RELEASE Bilateral     FOOT DEBRIDEMENT Right 5/5/2023    RIGHT FOOT DEBRIDEMENT INCISION AND DRAINAGE ROOM 174 performed by Gus Garcia DPM at [facility] Right 5/8/2023    RIGHT FOOT  INCISION AND DRAINAGE WITH DEBRIDEMENT OF NON-VIABLE TISSUE DELAYED PRIMARY CLOSURE performed by Gus Garcia DPM at [address] [facility] Left     bone spur    LAMINOTOMY  2012    SPINE SURGERY N/A 11/11/2022    L1 VERETEBRAL AUGMENTATION performed by Michel Skaggs MD at [facility] History       Tobacco History       Smoking Status  Never      Smokeless Tobacco Use  Never              Alcohol History       Alcohol Use Status  Yes Drinks/Week  2 Cans of beer, 0 Standard drinks or equivalent per week Amount  2.0 standard drinks of alcohol/wk Comment  every 6 months or so               Drug Use       Drug Use Status  No              Sexual Activity       Sexually Active  Not Currently Partners  Female                      Problem Relation Age of Onset    Other Father         accident    Heart Failure Mother     Heart Disease Brother     Cirrhosis Brother      No

## 2023-05-15 NOTE — PLAN OF CARE
See OT evaluation for all goals and OT POC.  Electronically signed by BAO Lee on 5/15/2023 at 4:22 PM

## 2023-05-15 NOTE — ED NOTES
Pt BGL 89, Debbie DAHL advised and states to hold insulin and give pt meal. Pt was given full meal at this time.       Darlene Pacheco RN  05/15/23 4424

## 2023-05-15 NOTE — CODE DOCUMENTATION
[facility] [facility] Billing Sheet. Julia Zamudio  AGE: 59 y.o.    GENDER: male  : 1960  TODAY'S DATE:  5/15/2023    ICD-10 CODES  Active Hospital Problems    Diagnosis Date Noted    Type 2 diabetes mellitus with diabetic neuropathy [E11.40] 11/10/2022     Priority: Medium    Right foot ulcer, with necrosis of bone [facility]) [L97.514] 2023       PHYSICIAN PROCEDURES    CPT CODE  95990 - RT, 79      Electronically signed by Ami Soni DPM on 5/15/2023 at 10:29 AM

## 2023-05-15 NOTE — ED NOTES
Pt refused lab work at this time.      Debbie DAHL advised      Tamela Acosta RN  05/15/23 [facility], RN  05/15/23 9039

## 2023-05-15 NOTE — CARE COORDINATION
Case Management Assessment  Initial Evaluation    Date/Time of Evaluation: 5/15/2023 4:00 PM  Assessment Completed by: HAYDEN Moy    If patient is discharged prior to next notation, then this note serves as note for discharge by case management. Patient Name: Dana Reyes                   YOB: 1960  Diagnosis: No admission diagnoses are documented for this encounter. Date / Time: 5/15/2023 11:41 AM    Patient Admission Status: Emergency   Readmission Risk (Low < 19, Mod (19-27), High > 27): Readmission Risk Score: 11    Current PCP: Cassandra Cano MD  PCP verified by CM? Yes    Chart Reviewed: Yes      History Provided by: Patient  Patient Orientation: Alert and Oriented, Person, Place, Situation, Self    Patient Cognition: Alert    Hospitalization in the last 30 days (Readmission):  Yes    If yes, Readmission Assessment in  Navigator will be completed.     Readmission Assessment  Number of Days since last admission?: 1-7 days  Previous Disposition: Home with Family  Who is being Interviewed: Patient  What was the patient's/caregiver's perception as to why they think they needed to return back to the hospital?: Did not realize care needs would be so extensive  Did you visit your Primary Care Physician after you left the hospital, before you returned this time?: No  Why weren't you able to visit your PCP?: Did not want to go (Comment)  Did you see a specialist, such as Cardiac, Pulmonary, Orthopedic Physician, etc. after you left the hospital?: No  Who advised the patient to return to the hospital?: Self-referral  Does the patient report anything that got in the way of taking their medications?: No  In our efforts to provide the best possible care to you and others like you, can you think of anything that we could have done to help you after you left the hospital the first time, so that you might not have needed to return so soon?: Identify patient's health literacy

## 2023-05-15 NOTE — ED NOTES
Physical Therapy at bedside.  Electronically signed by Fannie Yoo RN on 5/15/2023 at 3:59 PM       Fannie Yoo RN  05/15/23 3865

## 2023-05-15 NOTE — DISCHARGE INSTRUCTIONS
[facility] and Hyperbaric Medicine   Physician Orders and Discharge Instructions  [address]  Telephone: [phone]      NAME:  Julia Zamudio          YOB: 1960  MEDICAL RECORD NUMBER:  [id number]    Your  is:  Alison [facility] Care/Facility: [address] [place] [address]     Wound Location: Right Plantar Foot     Dressing orders: 1. Cleanse wound(s) with normal saline. 2. Apply dry SILVERCEL OR CALCIUM ALGINATE WITH Ag or eqivalent to wound bed. 3. Cover with 4x4's and wrap with gauze (nestor or kerlix)  4. Change  Every other day or Monday, Wednesday, and Friday      Compression: n/a    Offloading Device: Forefoot offloading shoe     Other Instructions: Stay off of your foot as much as possible. If you feel like you are unable to care for yourself at home go to the emergency room to see if they can help arrange short term nursing home placement. Keep all dressings clean, dry and intact. Keep pressure off the wound(s) at all times. Follow up visit   1 Week May 22, 2023 at 10:15am    Please give 24 hour notice if unable to keep appointment. [phone]    If you experience any of the following, please call the Wound Care Service at  [phone] or go to the nearest emergency room. *Increase in pain *Temperature over 101 *Increase in drainage from your wound or a foul odor  *Uncontrolled swelling *Need for compression bandage changes due to slippage, breakthrough drainage       PLEASE NOTE: IF YOU ARE UNABLE TO OBTAIN WOUND SUPPLIES, CONTINUE TO USE THE SUPPLIES YOU HAVE AVAILABLE UNTIL YOU ARE ABLE TO REACH US.  IT IS MOST IMPORTANT TO KEEP THE WOUND COVERED AT ALL TIMES    Electronically signed by Ami Soni DPM on 5/15/2023 at 10:16 AM

## 2023-05-15 NOTE — CARE COORDINATION
LSW meet with pt at bedside. Pt report,\" I thought I can take care of myself at home with Home care. My wife is not helping me at home. \"  Pt report would [facility] to [facility]. [facility] Need PT/OT esteban.   Cristalvincent nava will get back to LSW. Electronically signed by HAYDEN Fischer on 5/15/2023 at 12:23 PM    [facility] unable to use pt's previous stay from ER to SNF. Still need PT/OT. Will need pre-cert. Notify Keila, House supervisor. Norma Zeng will notify ER NP.     LSW will follow.      Electronically signed by HAYDEN Fischer on 5/15/2023 at 3:55 PM

## 2023-05-15 NOTE — PLAN OF CARE
Problem: Chronic Conditions and Co-morbidities  Goal: Patient's chronic conditions and co-morbidity symptoms are monitored and maintained or improved  Outcome: Progressing     Problem: Pain  Goal: Verbalizes/displays adequate comfort level or baseline comfort level  Outcome: Progressing     Problem: ABCDS Injury Assessment  Goal: Absence of physical injury  Outcome: Progressing     Problem: Cognitive:  Goal: Knowledge of wound care  Description: Knowledge of wound care  Outcome: Progressing  Goal: Understands risk factors for wounds  Description: Understands risk factors for wounds  Outcome: Progressing     Problem: Wound:  Goal: Will show signs of wound healing; wound closure and no evidence of infection  Description: Will show signs of wound healing; wound closure and no evidence of infection  Outcome: Progressing     Problem: Falls - Risk of:  Goal: Will remain free from falls  Description: Will remain free from falls  Outcome: Progressing     Problem: Blood Glucose:  Goal: Ability to maintain appropriate glucose levels will improve  Description: Ability to maintain appropriate glucose levels will improve  Outcome: Progressing

## 2023-05-16 VITALS
HEIGHT: 67 IN | TEMPERATURE: 97.9 F | BODY MASS INDEX: 25.27 KG/M2 | RESPIRATION RATE: 16 BRPM | HEART RATE: 87 BPM | OXYGEN SATURATION: 97 % | WEIGHT: 161 LBS | DIASTOLIC BLOOD PRESSURE: 85 MMHG | SYSTOLIC BLOOD PRESSURE: 142 MMHG

## 2023-05-16 LAB
ALBUMIN SERPL-MCNC: 3.2 G/DL (ref 3.5–4.6)
ALP SERPL-CCNC: 60 U/L (ref 35–104)
ALT SERPL-CCNC: 8 U/L (ref 0–41)
ANION GAP SERPL CALCULATED.3IONS-SCNC: 12 MEQ/L (ref 9–15)
AST SERPL-CCNC: 14 U/L (ref 0–40)
BASOPHILS # BLD: 0.1 K/UL (ref 0–0.2)
BASOPHILS NFR BLD: 1.3 %
BILIRUB SERPL-MCNC: <0.2 MG/DL (ref 0.2–0.7)
BUN SERPL-MCNC: 28 MG/DL (ref 8–23)
CALCIUM SERPL-MCNC: 8.7 MG/DL (ref 8.5–9.9)
CHLORIDE SERPL-SCNC: 106 MEQ/L (ref 95–107)
CO2 SERPL-SCNC: 22 MEQ/L (ref 20–31)
CREAT SERPL-MCNC: 1.18 MG/DL (ref 0.7–1.2)
EOSINOPHIL # BLD: 0.4 K/UL (ref 0–0.7)
EOSINOPHIL NFR BLD: 6.5 %
ERYTHROCYTE [DISTWIDTH] IN BLOOD BY AUTOMATED COUNT: 13.7 % (ref 11.5–14.5)
GLOBULIN SER CALC-MCNC: 2.7 G/DL (ref 2.3–3.5)
GLUCOSE BLD-MCNC: 106 MG/DL (ref 70–99)
GLUCOSE BLD-MCNC: 219 MG/DL (ref 70–99)
GLUCOSE SERPL-MCNC: 105 MG/DL (ref 70–99)
HCT VFR BLD AUTO: 38.1 % (ref 42–52)
HGB BLD-MCNC: 12.8 G/DL (ref 14–18)
LYMPHOCYTES # BLD: 0.8 K/UL (ref 1–4.8)
LYMPHOCYTES NFR BLD: 13.8 %
MCH RBC QN AUTO: 27.2 PG (ref 27–31.3)
MCHC RBC AUTO-ENTMCNC: 33.5 % (ref 33–37)
MCV RBC AUTO: 81.1 FL (ref 79–92.2)
MONOCYTES # BLD: 0.4 K/UL (ref 0.2–0.8)
MONOCYTES NFR BLD: 7.2 %
NEUTROPHILS # BLD: 4.2 K/UL (ref 1.4–6.5)
NEUTS SEG NFR BLD: 71.2 %
PERFORMED ON: ABNORMAL
PERFORMED ON: ABNORMAL
PLATELET # BLD AUTO: 152 K/UL (ref 130–400)
POTASSIUM SERPL-SCNC: 4 MEQ/L (ref 3.4–4.9)
PROT SERPL-MCNC: 5.9 G/DL (ref 6.3–8)
RBC # BLD AUTO: 4.7 M/UL (ref 4.7–6.1)
SODIUM SERPL-SCNC: 140 MEQ/L (ref 135–144)
WBC # BLD AUTO: 6 K/UL (ref 4.8–10.8)

## 2023-05-16 PROCEDURE — 85025 COMPLETE CBC W/AUTO DIFF WBC: CPT

## 2023-05-16 PROCEDURE — 36415 COLL VENOUS BLD VENIPUNCTURE: CPT

## 2023-05-16 PROCEDURE — G0378 HOSPITAL OBSERVATION PER HR: HCPCS

## 2023-05-16 PROCEDURE — 2580000003 HC RX 258: Performed by: INTERNAL MEDICINE

## 2023-05-16 PROCEDURE — 80053 COMPREHEN METABOLIC PANEL: CPT

## 2023-05-16 PROCEDURE — 6360000002 HC RX W HCPCS: Performed by: INTERNAL MEDICINE

## 2023-05-16 PROCEDURE — 6370000000 HC RX 637 (ALT 250 FOR IP): Performed by: INTERNAL MEDICINE

## 2023-05-16 PROCEDURE — 96372 THER/PROPH/DIAG INJ SC/IM: CPT

## 2023-05-16 RX ADMIN — MEROPENEM 1000 MG: 1 INJECTION, POWDER, FOR SOLUTION INTRAVENOUS at 12:29

## 2023-05-16 RX ADMIN — CARVEDILOL 12.5 MG: 12.5 TABLET, FILM COATED ORAL at 08:29

## 2023-05-16 RX ADMIN — CLOPIDOGREL BISULFATE 75 MG: 75 TABLET ORAL at 08:29

## 2023-05-16 RX ADMIN — ASPIRIN 81 MG: 81 TABLET, COATED ORAL at 08:29

## 2023-05-16 RX ADMIN — HYDRALAZINE HYDROCHLORIDE 25 MG: 25 TABLET, FILM COATED ORAL at 08:29

## 2023-05-16 RX ADMIN — SACUBITRIL AND VALSARTAN 1 TABLET: 24; 26 TABLET, FILM COATED ORAL at 08:29

## 2023-05-16 RX ADMIN — INSULIN LISPRO 1 UNITS: 100 INJECTION, SOLUTION INTRAVENOUS; SUBCUTANEOUS at 12:29

## 2023-05-16 RX ADMIN — SODIUM CHLORIDE, PRESERVATIVE FREE 10 ML: 5 INJECTION INTRAVENOUS at 08:31

## 2023-05-16 RX ADMIN — MEROPENEM 1000 MG: 1 INJECTION, POWDER, FOR SOLUTION INTRAVENOUS at 04:24

## 2023-05-16 RX ADMIN — ENOXAPARIN SODIUM 40 MG: 100 INJECTION SUBCUTANEOUS at 08:29

## 2023-05-16 ASSESSMENT — ENCOUNTER SYMPTOMS
DIARRHEA: 0
NAUSEA: 0
COUGH: 0
VOMITING: 0
SHORTNESS OF BREATH: 0

## 2023-05-16 NOTE — CARE COORDINATION
Case Management Assessment  Initial Evaluation    Date/Time of Evaluation: 5/16/2023 12:07 PM  Assessment Completed by: JUVE Loera, SADIW    If patient is discharged prior to next notation, then this note serves as note for discharge by case management. Patient Name: Maddi Barlow                   YOB: 1960  Diagnosis: Weakness [R53.1]  Impaired mobility and ADLs [Z74.09, Z78.9]  Open wound of right great toe, initial encounter [S91.101A]                   Date / Time: 5/15/2023 11:41 AM    Patient Admission Status: Observation   Readmission Risk (Low < 19, Mod (19-27), High > 27): Readmission Risk Score: 11    Current PCP: Mackenzie Elmore MD  PCP verified by CM? Yes    Chart Reviewed: Yes      History Provided by: Patient  Patient Orientation: Alert and Oriented    Patient Cognition: Alert    Hospitalization in the last 30 days (Readmission):  No    If yes, Readmission Assessment in  Navigator will be completed. Advance Directives:      Code Status: Full Code   Patient's Primary Decision Maker is: Legal Next of Kin    Primary Decision MakerAdjuanito Johnson Spouse - [phone]    Secondary Decision Maker: Annemarie Mccormick - Brother/Sister - [phone]    Discharge Planning:    Patient lives with: Spouse/Significant Other Type of Home: House  Primary Care Giver: Self  Patient Support Systems include: Family Members   Current Financial resources: Medicare  Current community resources: None  Current services prior to admission: Home Care            Current DME:              Type of Home Care services:  IV Therapy, Nursing Services    ADLS  Prior functional level: Independent in ADLs/IADLs  Current functional level: Assistance with the following:, Bathing, Dressing    PT AM-PAC: 19 /24  OT AM-PAC: 17 /24    Family can provide assistance at DC: No  Would you like Case Management to discuss the discharge plan with any other family members/significant others, and if so, who?  No  Plans to Return

## 2023-05-16 NOTE — CARE COORDINATION
This LSW met with patient at bedside this am. Patient states that he is able to take home medications to [facility]. Patient is also aware that insurance will need to give authorization for SNF admission. VERDUGO completed with patient, copy provided to patient. LSW / CM to follow. Electronically signed by JUVE Loera, SADIW on 5/16/23 at 11:48 AM EDT   This LSW notified patient that insurance has given authorization for him to transfer to [facility]. Patient will transfer via [facility] [facility] at 4:00pm today, patient, Loly Acosta and [facility] aware. [id number] completed.   Electronically signed by JUVE Loera, HAYDEN on 5/16/23 at 3:21 PM EDT

## 2023-05-16 NOTE — TELEPHONE ENCOUNTER
Do we have coupons for Entresto? Pt is calling from hospital.. states that in order for him to go to nursing home he needs this refilled.

## 2023-05-16 NOTE — DISCHARGE INSTR - COC
Continuity of Care Form    Patient Name: Hermelindo Roberts   :  1960  MRN:  [id number]    Admit date:  5/15/2023  Discharge date:  23    Code Status Order: Full Code   Advance Directives:     Admitting Physician:  Lorenzo Jaime MD  PCP: Watson Paige MD    Discharging Nurse: [facility] Unit/Room#: Z871/Q464-72  Discharging Unit Phone Number: [phone]    Emergency Contact:   Extended Emergency Contact Information  Primary Emergency Contact: Annemarie Mccormick   [address] Phone: [phone]  Mobile Phone: [phone]  Relation: Brother/Sister  Secondary Emergency Contact: [address] [place] [address] Phone: [phone]  Relation: Spouse   needed?  Yes    Past Surgical History:  Past Surgical History:   Procedure Laterality Date    CARPAL TUNNEL RELEASE Bilateral     FOOT DEBRIDEMENT Right 2023    RIGHT FOOT DEBRIDEMENT INCISION AND DRAINAGE ROOM 174 performed by Elias Ness DPM at [address] [facility] Right 2023    RIGHT FOOT  INCISION AND DRAINAGE WITH DEBRIDEMENT OF NON-VIABLE TISSUE DELAYED PRIMARY CLOSURE performed by Elias Ness DPM at [facility] Left     bone spur    LAMINOTOMY  2012    SPINE SURGERY N/A 2022    L1 VERETEBRAL AUGMENTATION performed by Ashkan Ramos MD at [facility]       Immunization History:   Immunization History   Administered Date(s) Administered    DTaP 2015    Influenza Virus Vaccine 10/01/2013    Influenza, Emelina Orosco, 6 mo and older, IM, PF (Flulaval, Fluarix) 2019    TDaP, ADACEL (age 10y-63y), BOOSTRIX (age 10y+), IM, 0.5mL 2015       Active Problems:  Patient Active Problem List   Diagnosis Code    Essential hypertension, benign I10    Obesity, diabetes, and hypertension syndrome (HCC) E11.69, E66.9, E11.59, I15.2    Irregular heart rhythm I49.9    Hyperlipidemia E78.5    Allergic rhinitis due to pollen J30.1    Staphylococcus aureus bacteremia R78.81, B95.61    Hematoma of right thigh

## 2023-05-16 NOTE — CARE COORDINATION
Spoke with Tamela Harada with [facility], accepted patient will need auth. They are asking for patient to provide entresto and jardiance when admitted. AUTH obtained , jac nava, will plan for dc today when 39881/dc orders completed. 4pm  [facility].

## 2023-05-16 NOTE — TELEPHONE ENCOUNTER
Requesting medication refill.  Please approve or deny this request.    Rx requested:  Requested Prescriptions     Pending Prescriptions Disp Refills    sacubitril-valsartan (ENTRESTO) 24-26 MG per tablet 60 tablet 3     Sig: Take 1 tablet by mouth 2 times daily         Last Office Visit:   10/26/2022      Next Visit Date:  Future Appointments   Date Time Provider Rosaline Silveira   5/22/2023 10:15 AM Gemma Sam, [address] [place] [address]   6/12/2023  1:30 PM Helene Holstein, [address]

## 2023-05-16 NOTE — PLAN OF CARE
Nutrition Problem #1: Increased nutrient needs  Intervention: Food and/or Nutrient Delivery: Continue Current Diet  Nutritional

## 2023-05-17 NOTE — CONSULTS
Infectious Disease     Patient Name: Jill Monahan  Date: 5/16/2023  YOB: 1960  Medical Record Number: [id number]        Chief Complaint   Patient presents with    Wound Check     Pt states the wound center wanted him to be seen in the ER. Pt states he needs to be admitted for antibiotics           History of Present Illness:  Patient was on difficulty at home. Was recently here for infection of his right foot underwent debridement there was concern for bone involvement. He was sent out on Invanz for 6 weeks total.  Apparently been having much trouble getting around at home taking care of himself he said he still is not using his antibiotics for infection   No worsening of his foot no fevers chills  Review of Systems: All other ROS reviewed and are negative other than as stated in HPI            Social History     Tobacco Use    Smoking status: Never    Smokeless tobacco: Never   Vaping Use    Vaping Use: Never used   Substance Use Topics    Alcohol use:  Yes     Alcohol/week: 2.0 standard drinks     Types: 2 Cans of beer per week     Comment: every 6 months or so     Drug use: No         Past Medical History:   Diagnosis Date    Asthma     Back pain     CAD (coronary artery disease) 01/2020    3 vessel     chf 9/16/2022    Di[facility] mellitus ([facility] [address]     Essential hypertension, benign     Hyperlipemia     Ischemic cardiomyopathy 9/16/2022    Neuropathic pain, leg, bilateral            Past Surgical History:   Procedure Laterality Date    CARPAL TUNNEL RELEASE Bilateral     FOOT DEBRIDEMENT Right 5/5/2023    RIGHT FOOT DEBRIDEMENT INCISION AND DRAINAGE ROOM 174 performed by Nathalia Floyd DPM at [address] [facility] [address] Right 5/8/2023    RIGHT FOOT  INCISION AND DRAINAGE WITH DEBRIDEMENT OF NON-VIABLE TISSUE DELAYED PRIMARY CLOSURE performed by Nathalia Floyd DPM [address] Left     bone spur    LAMINOTOMY  2012    SPINE SURGERY N/A 11/11/2022    L1 VERETEBRAL AUGMENTATION performed by

## 2023-05-18 ENCOUNTER — TELEPHONE (OUTPATIENT)
Dept: ENDOCRINOLOGY | Age: 63
End: 2023-05-18

## 2023-05-22 ENCOUNTER — HOSPITAL ENCOUNTER (OUTPATIENT)
Dept: WOUND CARE | Age: 63
Discharge: HOME | End: 2023-05-22
Payer: MEDICARE

## 2023-05-22 VITALS
SYSTOLIC BLOOD PRESSURE: 116 MMHG | DIASTOLIC BLOOD PRESSURE: 69 MMHG | HEART RATE: 93 BPM | TEMPERATURE: 97.2 F | RESPIRATION RATE: 16 BRPM

## 2023-05-22 LAB
AVERAGE GLUCOSE: 240
BASOPHILS ABSOLUTE: ABNORMAL
BASOPHILS RELATIVE PERCENT: 0.4 %
BUN BLDV-MCNC: 27 MG/DL
CALCIUM SERPL-MCNC: 8.5 MG/DL
CHLORIDE BLD-SCNC: 103 MMOL/L
CO2: 26 MMOL/L
CREAT SERPL-MCNC: 1.3 MG/DL
EGFR: 66
EOSINOPHILS ABSOLUTE: 1.1 /ΜL
EOSINOPHILS RELATIVE PERCENT: 16 %
GLUCOSE BLD-MCNC: 142 MG/DL
HBA1C MFR BLD: 10 %
HCT VFR BLD CALC: 37.4 % (ref 41–53)
HEMOGLOBIN: 12.2 G/DL (ref 13.5–17.5)
LYMPHOCYTES ABSOLUTE: 0.9 /ΜL
LYMPHOCYTES RELATIVE PERCENT: 13.9 %
MCH RBC QN AUTO: 27.2 PG
MCHC RBC AUTO-ENTMCNC: 32.5 G/DL
MCV RBC AUTO: 83.5 FL
MONOCYTES ABSOLUTE: 0.6 /ΜL
MONOCYTES RELATIVE PERCENT: 8.5 %
NEUTROPHILS ABSOLUTE: 4.2 /ΜL
NEUTROPHILS RELATIVE PERCENT: 61.2 %
PLATELET # BLD: 146 K/ΜL
PMV BLD AUTO: 7.8 FL
POTASSIUM SERPL-SCNC: 4.3 MMOL/L
RBC # BLD: 4.48 10^6/ΜL
SODIUM BLD-SCNC: 140 MMOL/L
WBC # BLD: 6.8 10^3/ML

## 2023-05-22 PROCEDURE — 11043 DBRDMT MUSC&/FSCA 1ST 20/<: CPT

## 2023-05-22 ASSESSMENT — PAIN SCALES - GENERAL: PAINLEVEL_OUTOF10: 3

## 2023-05-22 ASSESSMENT — PAIN DESCRIPTION - LOCATION: LOCATION: TOE (COMMENT WHICH ONE)

## 2023-05-22 ASSESSMENT — PAIN DESCRIPTION - ORIENTATION: ORIENTATION: RIGHT

## 2023-05-22 ASSESSMENT — PAIN DESCRIPTION - DESCRIPTORS: DESCRIPTORS: OTHER (COMMENT)

## 2023-05-22 NOTE — PROGRESS NOTES
shoe 05/22/23 1118   Wound Length (cm) 2.4 cm 05/22/23 1118   Wound Width (cm) 4 cm 05/22/23 1118   Wound Depth (cm) 0.4 cm 05/22/23 1118   Wound Surface Area (cm^2) 9.6 cm^2 05/22/23 1118   Wound Volume (cm^3) 3.84 cm^3 05/22/23 1118   Post-Procedure Length (cm) 2.4 cm 05/22/23 1118   Post-Procedure Width (cm) 4 cm 05/22/23 1118   Post-Procedure Depth (cm) 0.4 cm 05/22/23 1118   Post-Procedure Surface Area (cm^2) 9.6 cm^2 05/22/23 1118   Post-Procedure Volume (cm^3) 3.84 cm^3 05/22/23 1118   Wound Assessment Pink/red 05/22/23 1111   Drainage Amount Small 05/22/23 1111   Drainage Description Serosanguinous 05/22/23 1111   Odor None 05/22/23 1111   Valeria-wound Assessment Intact 05/22/23 1111   Margins Undefined edges 05/22/23 1111   Wound Thickness Description not for Pressure Injury Full thickness 05/22/23 1111   Number of days: 0          Percent of Wound/Ulcer Debrided: 100%    Total Surface Area Debrided:  9.6 sq cm     Diabetic/Pressure/Non Pressure Ulcers:  Ulcer: Diabetic ulcer, bone necrosis      Bleeding:  Minimal    Hemostasis Achieved:  by pressure    Procedural Pain:  2  / 10     Post Procedural Pain:  2 / 10     Response to treatment:  Well tolerated by patient. Plan:     Continue offloading and anna. Plan of care was discussed with patient and he is in agreement. Treatment Note please see attached Discharge Instructions    In my professional opinion this patient would benefit from HBO Therapy: Undecided    Written patient dismissal instructions given to patient and signed by patient or POA.          Discharge [address] [facility] and Hyperbaric Medicine   Physician Orders and Discharge Instructions  [address]  Telephone: [phone]        NAME:  Key Krishnamurthy                                                                                         DATE OF BIRTH:

## 2023-05-22 NOTE — CODE DOCUMENTATION
[facility] [facility] Phys[facility] Billing Sheet. Jaquelin Book  AGE: 59 y.o.    GENDER: male  : 1960  TODAY'S DATE:  2023    ICD-10  [facility] [address] Problems    Diagnosis Date Noted    Type 2 diabetes mellitus with diabetic neuropathy [E11.40] 11/10/2022     Priority: Medium    Right foot ulcer, with necrosis of bone [facility]) [L97.514] 2023       PHYSICIAN PROCEDURES    CPT CODE  85975 - RT, 79      Electronically signed by Elaina Moncada DPM on 2023 at 11:37 AM

## 2023-05-22 NOTE — DISCHARGE INSTRUCTIONS
[facility] and Hyperbaric Medicine   Physician Orders and Discharge Instructions  [address]  Telephone: [phone]        NAME:  Fartun Cleary                                                                                         YOB: 1960  MEDICAL RECORD NUMBER:  [id number]     Your  is:  [facility]/Facility: [facility]      Wound Location: Right Plantar Foot      Dressing orders: 1. Cleanse wound(s) with normal saline. 2. Apply dry DEYANIRA  Or equivalent to wound bed. 3. Moisten DEYANIRA with a few drops of normal saline. 4. Cover with 4x4's and wrap with gauze (nestor or kerlix)  5. Change  Every other day or Monday, Wednesday, and Friday       Compression: n/a     Offloading Device: Forefoot offloading shoe      Other Instructions: Stay off of your foot as much as possible. Keep all dressings clean, dry and intact. Keep pressure off the wound(s) at all times. Follow up visit   2 Weeks June 5, 2023 at 10:15am     Please give 24 hour notice if unable to keep appointment. [phone]     If you experience any of the following, please call the Wound Care Service at  [phone] or go to the nearest emergency room. *Increase in pain         *Temperature over 101           *Increase in drainage from your wound or a foul odor  *Uncontrolled swelling            *Need for compression bandage changes due to slippage, breakthrough drainage       PLEASE NOTE: IF YOU ARE UNABLE TO OBTAIN WOUND SUPPLIES, CONTINUE TO USE THE SUPPLIES YOU HAVE AVAILABLE UNTIL YOU ARE ABLE TO REACH US.  IT IS MOST IMPORTANT TO KEEP THE WOUND COVERED AT ALL TIMES     Electronically signed by Alexsandra Grier DPM on 5/22/2023 at 11:33 AM

## 2023-05-24 ENCOUNTER — OFFICE VISIT (OUTPATIENT)
Dept: GERIATRIC MEDICINE | Age: 63
End: 2023-05-24

## 2023-05-24 DIAGNOSIS — Z79.4 TYPE 2 DIABETES MELLITUS WITH DIABETIC NEUROPATHY, WITH LONG-TERM CURRENT USE OF INSULIN (HCC): Primary | ICD-10-CM

## 2023-05-24 DIAGNOSIS — E11.40 TYPE 2 DIABETES MELLITUS WITH DIABETIC NEUROPATHY, WITH LONG-TERM CURRENT USE OF INSULIN (HCC): Primary | ICD-10-CM

## 2023-05-26 ENCOUNTER — OFFICE VISIT (OUTPATIENT)
Dept: GERIATRIC MEDICINE | Age: 63
End: 2023-05-26

## 2023-05-26 DIAGNOSIS — I50.22 CHRONIC SYSTOLIC (CONGESTIVE) HEART FAILURE (HCC): Primary | ICD-10-CM

## 2023-05-31 ENCOUNTER — OFFICE VISIT (OUTPATIENT)
Dept: GERIATRIC MEDICINE | Age: 63
End: 2023-05-31
Payer: MEDICARE

## 2023-05-31 DIAGNOSIS — Z79.4 TYPE 2 DIABETES MELLITUS WITH DIABETIC NEUROPATHY, WITH LONG-TERM CURRENT USE OF INSULIN (HCC): ICD-10-CM

## 2023-05-31 DIAGNOSIS — I25.5 ISCHEMIC CARDIOMYOPATHY: ICD-10-CM

## 2023-05-31 DIAGNOSIS — E11.40 TYPE 2 DIABETES MELLITUS WITH DIABETIC NEUROPATHY, WITH LONG-TERM CURRENT USE OF INSULIN (HCC): ICD-10-CM

## 2023-05-31 DIAGNOSIS — L03.115 CELLULITIS OF RIGHT FOOT: Primary | ICD-10-CM

## 2023-05-31 DIAGNOSIS — I50.22 CHRONIC SYSTOLIC (CONGESTIVE) HEART FAILURE (HCC): ICD-10-CM

## 2023-05-31 PROCEDURE — 99309 SBSQ NF CARE MODERATE MDM 30: CPT | Performed by: INTERNAL MEDICINE

## 2023-05-31 PROCEDURE — 3046F HEMOGLOBIN A1C LEVEL >9.0%: CPT | Performed by: INTERNAL MEDICINE

## 2023-06-02 ENCOUNTER — OFFICE VISIT (OUTPATIENT)
Dept: GERIATRIC MEDICINE | Age: 63
End: 2023-06-02

## 2023-06-02 DIAGNOSIS — I50.22 CHRONIC SYSTOLIC (CONGESTIVE) HEART FAILURE (HCC): Primary | ICD-10-CM

## 2023-06-02 DIAGNOSIS — Z79.4 TYPE 2 DIABETES MELLITUS WITH DIABETIC NEUROPATHY, WITH LONG-TERM CURRENT USE OF INSULIN (HCC): ICD-10-CM

## 2023-06-02 DIAGNOSIS — R53.1 WEAKNESS: ICD-10-CM

## 2023-06-02 DIAGNOSIS — E11.40 TYPE 2 DIABETES MELLITUS WITH DIABETIC NEUROPATHY, WITH LONG-TERM CURRENT USE OF INSULIN (HCC): ICD-10-CM

## 2023-06-05 ENCOUNTER — HOSPITAL ENCOUNTER (OUTPATIENT)
Dept: WOUND CARE | Age: 63
Discharge: HOME | End: 2023-06-05
Payer: MEDICARE

## 2023-06-05 VITALS
TEMPERATURE: 97.1 F | RESPIRATION RATE: 16 BRPM | DIASTOLIC BLOOD PRESSURE: 73 MMHG | HEART RATE: 81 BPM | SYSTOLIC BLOOD PRESSURE: 120 MMHG

## 2023-06-05 DIAGNOSIS — M86.171 ACUTE OSTEOMYELITIS OF TOE OF RIGHT FOOT (HCC): Primary | ICD-10-CM

## 2023-06-05 PROCEDURE — 11043 DBRDMT MUSC&/FSCA 1ST 20/<: CPT

## 2023-06-05 RX ORDER — LIDOCAINE HYDROCHLORIDE 20 MG/ML
JELLY TOPICAL ONCE
OUTPATIENT
Start: 2023-06-05 | End: 2023-06-05

## 2023-06-05 RX ORDER — CLOBETASOL PROPIONATE 0.5 MG/G
OINTMENT TOPICAL ONCE
OUTPATIENT
Start: 2023-06-05 | End: 2023-06-05

## 2023-06-05 RX ORDER — BETAMETHASONE DIPROPIONATE 0.05 %
OINTMENT (GRAM) TOPICAL ONCE
OUTPATIENT
Start: 2023-06-05 | End: 2023-06-05

## 2023-06-05 RX ORDER — GINSENG 100 MG
CAPSULE ORAL ONCE
OUTPATIENT
Start: 2023-06-05 | End: 2023-06-05

## 2023-06-05 RX ORDER — SODIUM CHLOR/HYPOCHLOROUS ACID 0.033 %
SOLUTION, IRRIGATION IRRIGATION ONCE
OUTPATIENT
Start: 2023-06-05 | End: 2023-06-05

## 2023-06-05 RX ORDER — IBUPROFEN 200 MG
TABLET ORAL ONCE
OUTPATIENT
Start: 2023-06-05 | End: 2023-06-05

## 2023-06-05 RX ORDER — LIDOCAINE 50 MG/G
OINTMENT TOPICAL ONCE
OUTPATIENT
Start: 2023-06-05 | End: 2023-06-05

## 2023-06-05 RX ORDER — BACITRACIN ZINC AND POLYMYXIN B SULFATE 500; 1000 [USP'U]/G; [USP'U]/G
OINTMENT TOPICAL ONCE
OUTPATIENT
Start: 2023-06-05 | End: 2023-06-05

## 2023-06-05 RX ORDER — LIDOCAINE HYDROCHLORIDE 40 MG/ML
SOLUTION TOPICAL ONCE
OUTPATIENT
Start: 2023-06-05 | End: 2023-06-05

## 2023-06-05 RX ORDER — LIDOCAINE 40 MG/G
CREAM TOPICAL ONCE
OUTPATIENT
Start: 2023-06-05 | End: 2023-06-05

## 2023-06-05 RX ORDER — GENTAMICIN SULFATE 1 MG/G
OINTMENT TOPICAL ONCE
OUTPATIENT
Start: 2023-06-05 | End: 2023-06-05

## 2023-06-05 ASSESSMENT — PAIN DESCRIPTION - ORIENTATION: ORIENTATION: RIGHT

## 2023-06-05 ASSESSMENT — PAIN DESCRIPTION - FREQUENCY: FREQUENCY: CONTINUOUS

## 2023-06-05 ASSESSMENT — PAIN DESCRIPTION - DESCRIPTORS: DESCRIPTORS: ACHING

## 2023-06-05 ASSESSMENT — PAIN DESCRIPTION - LOCATION: LOCATION: TOE (COMMENT WHICH ONE)

## 2023-06-05 ASSESSMENT — PAIN SCALES - GENERAL: PAINLEVEL_OUTOF10: 2

## 2023-06-05 NOTE — DISCHARGE INSTRUCTIONS
[facility]   Physician Orders and Discharge Instructions  [address]  Telephone: [phone]        NAME:  Danay Dalton                                                                                         YOB: 1960  MEDICAL RECORD NUMBER:  [id number]     Your  is:  [facility] Care/Facility: [facility]      Wound Location: Right Plantar Foot      Dressing orders: 1. Cleanse wound(s) with normal saline. 2. Apply dry DEYANIRA  Or equivalent to wound bed. 3. Moisten DEYANIRA with a few drops of normal saline. 4. Cover with 4x4's and wrap with gauze (nestor or kerlix)  5. Change  Every other day or Monday, Wednesday, and Friday        Compression: n/a     Offloading Device: Forefoot offloading shoe      Other Instructions: Stay off of your foot as much as possible. Continue antibiotics per Infectious Disease. Keep all dressings clean, dry and intact. Keep pressure off the wound(s) at all times. Follow up visit   2 Weeks June 19, 2023 at 10:15am     Please give 24 hour notice if unable to keep appointment. [phone]     If you experience any of the following, please call the Wound Care Service at  [phone] or go to the nearest emergency room. *Increase in pain         *Temperature over 101           *Increase in drainage from your wound or a foul odor  *Uncontrolled swelling            *Need for compression bandage changes due to slippage, breakthrough drainage       PLEASE NOTE: IF YOU ARE UNABLE TO OBTAIN WOUND SUPPLIES, CONTINUE TO USE THE SUPPLIES YOU HAVE AVAILABLE UNTIL YOU ARE ABLE TO REACH US.  IT IS MOST IMPORTANT TO KEEP THE WOUND COVERED AT ALL TIMES    Electronically signed by Amanda Sahni DPM on 6/5/2023 at 11:24 AM

## 2023-06-05 NOTE — PROGRESS NOTES
Gissel Lutz [address]   Progress Note and Procedure Note      [facility] RECORD NUMBER:  [id number]  AGE: 59 y.o. GENDER: male  : 1960  EPISODE DATE:  2023    Subjective:     Chief Complaint   Patient presents with    Wound Check     Right foot         HISTORY of PRESENT ILLNESS HPI     Kathy Mcconnell is a 59 y.o. male who presents today for wound/ulcer evaluation. History of Wound Context: Right foot diabetic ulcer. He is improving. He is on IV abx per ID. Denies nausea, vomiting, fevers, or chills.   Wound/Ulcer Pain Timing/Severity: intermittent  Quality of pain: sharp  Severity:  1 / 10   Modifying Factors: Pain is relieved/improved with rest  Associated Signs/Symptoms: drainage and numbness    Ulcer Identification:  Ulcer Type: diabetic  Contributing Factors: diabetes, poor glucose control, shear force, and obesity    Wound: N/A        PAST MEDICAL HISTORY        Diagnosis Date    Asthma     Back pain     CAD (coronary artery disease) 2020    3 vessel     chf 2022    Diabetes mellitus ([facility] [address]     Essential hypertension, benign     Hyperlipemia     Ischemic cardiomyopathy 2022    Neuropathic pain, leg, bilateral        PAST SURGICAL HISTORY    Past Surgical History:   Procedure Laterality Date    CARPAL TUNNEL RELEASE Bilateral     FOOT DEBRIDEMENT Right 2023    RIGHT FOOT DEBRIDEMENT INCISION AND DRAINAGE ROOM 174 performed by Nadeem Cruz DPM at [facility] Right 2023    RIGHT FOOT  INCISION AND DRAINAGE WITH DEBRIDEMENT OF NON-VIABLE TISSUE DELAYED PRIMARY CLOSURE performed by Nadeem Cruz DPM at [facility] [address] Left     bone spur    LAMINOTOMY      SPINE SURGERY N/A 2022    L1 VERETEBRAL AUGMENTATION performed by Gaston Joiner MD at [facility] HISTORY    Family History   Problem Relation Age of Onset    Other Father         accident    Heart Failure Mother     Heart Disease Brother     Cirrhosis Brother

## 2023-06-05 NOTE — CODE DOCUMENTATION
[address] [facility] Physician Billing Sheet. Maddi Barlow  AGE: 59 y.o.    GENDER: male  : 1960  TODAY'S DATE:  2023    ICD-10  [facility] [place] Problems    Diagnosis Date Noted    Type 2 diabetes mellitus with diabetic neuropathy [E11.40] 11/10/2022     Priority: Medium    Right foot ulcer, with necrosis of bone [facility]) [L97.514] 2023       PHYSICIAN PROCEDURES    CPT CODE  17688 - RT, 79      Electronically signed by Sabi Matson DPM on 2023 at 11:27 AM

## 2023-06-12 PROBLEM — E11.3593 PROLIFERATIVE DIABETIC RETINOPATHY OF BOTH EYES ASSOCIATED WITH TYPE 2 DIABETES MELLITUS (HCC): Status: RESOLVED | Noted: 2022-11-10 | Resolved: 2023-06-12

## 2023-06-14 ENCOUNTER — OFFICE VISIT (OUTPATIENT)
Dept: GERIATRIC MEDICINE | Age: 63
End: 2023-06-14

## 2023-06-14 DIAGNOSIS — E11.40 TYPE 2 DIABETES MELLITUS WITH DIABETIC NEUROPATHY, WITH LONG-TERM CURRENT USE OF INSULIN (HCC): ICD-10-CM

## 2023-06-14 DIAGNOSIS — R53.1 WEAKNESS: ICD-10-CM

## 2023-06-14 DIAGNOSIS — I50.22 CHRONIC SYSTOLIC (CONGESTIVE) HEART FAILURE (HCC): Primary | ICD-10-CM

## 2023-06-14 DIAGNOSIS — Z79.4 TYPE 2 DIABETES MELLITUS WITH DIABETIC NEUROPATHY, WITH LONG-TERM CURRENT USE OF INSULIN (HCC): ICD-10-CM

## 2023-06-19 ENCOUNTER — HOSPITAL ENCOUNTER (OUTPATIENT)
Dept: WOUND CARE | Age: 63
Discharge: HOME | End: 2023-06-19
Payer: MEDICARE

## 2023-06-19 ENCOUNTER — OFFICE VISIT (OUTPATIENT)
Dept: GERIATRIC MEDICINE | Age: 63
End: 2023-06-19

## 2023-06-19 VITALS
SYSTOLIC BLOOD PRESSURE: 102 MMHG | DIASTOLIC BLOOD PRESSURE: 65 MMHG | TEMPERATURE: 96.9 F | RESPIRATION RATE: 16 BRPM | HEART RATE: 85 BPM

## 2023-06-19 DIAGNOSIS — L97.514 DIABETIC ULCER OF TOE OF RIGHT FOOT ASSOCIATED WITH TYPE 2 DIABETES MELLITUS, WITH NECROSIS OF BONE (HCC): ICD-10-CM

## 2023-06-19 DIAGNOSIS — E11.40 TYPE 2 DIABETES MELLITUS WITH DIABETIC NEUROPATHY, WITH LONG-TERM CURRENT USE OF INSULIN (HCC): ICD-10-CM

## 2023-06-19 DIAGNOSIS — M86.171 ACUTE OSTEOMYELITIS OF TOE OF RIGHT FOOT (HCC): Primary | ICD-10-CM

## 2023-06-19 DIAGNOSIS — I50.22 CHRONIC SYSTOLIC (CONGESTIVE) HEART FAILURE (HCC): Primary | ICD-10-CM

## 2023-06-19 DIAGNOSIS — M86.171 ACUTE OSTEOMYELITIS OF TOE OF RIGHT FOOT (HCC): ICD-10-CM

## 2023-06-19 DIAGNOSIS — E11.621 DIABETIC ULCER OF TOE OF RIGHT FOOT ASSOCIATED WITH TYPE 2 DIABETES MELLITUS, WITH NECROSIS OF BONE (HCC): ICD-10-CM

## 2023-06-19 DIAGNOSIS — Z79.4 TYPE 2 DIABETES MELLITUS WITH DIABETIC NEUROPATHY, WITH LONG-TERM CURRENT USE OF INSULIN (HCC): ICD-10-CM

## 2023-06-19 PROCEDURE — 87077 CULTURE AEROBIC IDENTIFY: CPT

## 2023-06-19 PROCEDURE — 11043 DBRDMT MUSC&/FSCA 1ST 20/<: CPT

## 2023-06-19 PROCEDURE — 87070 CULTURE OTHR SPECIMN AEROBIC: CPT

## 2023-06-19 PROCEDURE — 87186 SC STD MICRODIL/AGAR DIL: CPT

## 2023-06-19 RX ORDER — LIDOCAINE 40 MG/G
CREAM TOPICAL ONCE
OUTPATIENT
Start: 2023-06-19 | End: 2023-06-19

## 2023-06-19 RX ORDER — SODIUM CHLOR/HYPOCHLOROUS ACID 0.033 %
SOLUTION, IRRIGATION IRRIGATION ONCE
OUTPATIENT
Start: 2023-06-19 | End: 2023-06-19

## 2023-06-19 RX ORDER — GINSENG 100 MG
CAPSULE ORAL ONCE
OUTPATIENT
Start: 2023-06-19 | End: 2023-06-19

## 2023-06-19 RX ORDER — LIDOCAINE HYDROCHLORIDE 40 MG/ML
SOLUTION TOPICAL ONCE
OUTPATIENT
Start: 2023-06-19 | End: 2023-06-19

## 2023-06-19 RX ORDER — GENTAMICIN SULFATE 1 MG/G
OINTMENT TOPICAL ONCE
OUTPATIENT
Start: 2023-06-19 | End: 2023-06-19

## 2023-06-19 RX ORDER — LIDOCAINE HYDROCHLORIDE 20 MG/ML
JELLY TOPICAL ONCE
OUTPATIENT
Start: 2023-06-19 | End: 2023-06-19

## 2023-06-19 RX ORDER — CLOBETASOL PROPIONATE 0.5 MG/G
OINTMENT TOPICAL ONCE
OUTPATIENT
Start: 2023-06-19 | End: 2023-06-19

## 2023-06-19 RX ORDER — IBUPROFEN 200 MG
TABLET ORAL ONCE
OUTPATIENT
Start: 2023-06-19 | End: 2023-06-19

## 2023-06-19 RX ORDER — BETAMETHASONE DIPROPIONATE 0.05 %
OINTMENT (GRAM) TOPICAL ONCE
OUTPATIENT
Start: 2023-06-19 | End: 2023-06-19

## 2023-06-19 RX ORDER — BACITRACIN ZINC AND POLYMYXIN B SULFATE 500; 1000 [USP'U]/G; [USP'U]/G
OINTMENT TOPICAL ONCE
OUTPATIENT
Start: 2023-06-19 | End: 2023-06-19

## 2023-06-19 RX ORDER — LIDOCAINE 50 MG/G
OINTMENT TOPICAL ONCE
OUTPATIENT
Start: 2023-06-19 | End: 2023-06-19

## 2023-06-19 NOTE — PROGRESS NOTES
Gissel Lutz 37   Progress Note and Procedure Note      [facility] RECORD NUMBER:  [id number]  AGE: 59 y.o. GENDER: male  : 1960  EPISODE DATE:  2023    Subjective:     Chief Complaint   Patient presents with    Wound Check     R foot         HISTORY of PRESENT ILLNESS STARR Tobar is a 59 y.o. male who presents today for wound/ulcer evaluation. History of Wound Context: Right foot diabetic ulcer. He is improving. Denies nausea, vomiting, fevers, or chills.   Wound/Ulcer Pain Timing/Severity: intermittent  Quality of pain: sharp  Severity:  1 / 10   Modifying Factors: Pain is relieved/improved with rest  Associated Signs/Symptoms: drainage and numbness    Ulcer Identification:  Ulcer Type: diabetic  Contributing Factors: diabetes, shear force, and obesity    Wound: N/A        PAST MEDICAL HISTORY        Diagnosis Date    Asthma     Back pain     CAD (coronary artery disease) 2020    3 vessel     chf 2022    Diabetes mellitus ([address]     Essential hypertension, benign     Hyperlipemia     Ischemic cardiomyopathy 2022    Neuropathic pain, leg, bilateral        PAST SURGICAL HISTORY    Past Surgical History:   Procedure Laterality Date    CARPAL TUNNEL RELEASE Bilateral     FOOT DEBRIDEMENT Right 2023    RIGHT FOOT DEBRIDEMENT INCISION AND DRAINAGE ROOM 174 performed by Guicho Frank DPM at [address] [facility] [address] Right 2023    RIGHT FOOT  INCISION AND DRAINAGE WITH DEBRIDEMENT OF NON-VIABLE TISSUE DELAYED PRIMARY CLOSURE performed by Guicho Frank DPM at [facility] 45 Left     bone spur    LAMINOTOMY  2012    SPINE SURGERY N/A 2022    L1 VERETEBRAL AUGMENTATION performed by James Cabot, MD at [address] [facility] Street HISTORY    Family History   Problem Relation Age of Onset    Other Father         accident    Heart Failure Mother     Heart Disease Brother     Cirrhosis Brother        SOCIAL HISTORY    Social History     Tobacco

## 2023-06-19 NOTE — CODE DOCUMENTATION
[address] [facility] Physician Billing Sheet. Olga Landing  AGE: 59 y.o.    GENDER: male  : 1960  TODAY'S DATE:  2023    ICD-10  [facility] Street Problems    Diagnosis Date Noted    Type 2 diabetes mellitus with diabetic neuropathy [E11.40] 11/10/2022     Priority: Medium    Right foot ulcer, with necrosis of bone [facility]) [L97.514] 2023       PHYSICIAN PROCEDURES    CPT CODE  86455 - RT, 79      Electronically signed by Joana Sierra DPM on 2023 at 10:47 AM

## 2023-06-19 NOTE — DISCHARGE INSTRUCTIONS
101 [facility]   Physician Orders and Discharge Instructions  [address]  Telephone: [phone]        NAME:  Amy Weldon                                                                                         YOB: 1960  MEDICAL RECORD NUMBER:  [id number]     Your  is:  [facility] Care/Facility: [facility]      Wound Location: Right Plantar Foot      Dressing orders: 1. Cleanse wound(s) with normal saline. 2. Apply dry DEYANIRA  Or equivalent to wound bed. 3. Moisten DEYANIRA with a few drops of normal saline. 4. Cover with 4x4's and wrap with gauze (nestor or kerlix)  5. Change  Every other day or Monday, Wednesday, and Friday        Compression: n/a     Offloading Device: Forefoot offloading shoe      Other Instructions: Stay off of your foot as much as possible. Continue antibiotics per Infectious Disease. Keep all dressings clean, dry and intact. Keep pressure off the wound(s) at all times. Follow up visit   3 Weeks July 10, 2023 at 10:15am     Please give 24 hour notice if unable to keep appointment. [phone]     If you experience any of the following, please call the Wound Care Service at  [phone] or go to the nearest emergency room. *Increase in pain         *Temperature over 101           *Increase in drainage from your wound or a foul odor  *Uncontrolled swelling            *Need for compression bandage changes due to slippage, breakthrough drainage       PLEASE NOTE: IF YOU ARE UNABLE TO OBTAIN WOUND SUPPLIES, CONTINUE TO USE THE SUPPLIES YOU HAVE AVAILABLE UNTIL YOU ARE ABLE TO REACH US.  IT IS MOST IMPORTANT TO KEEP THE WOUND COVERED AT ALL TIMES    Electronically signed by Daphne Guevara DPM on 6/19/2023 at 10:41 AM

## 2023-06-22 LAB
BACTERIA SPEC ANAEROBE+AEROBE CULT: ABNORMAL
ORGANISM: ABNORMAL
ORGANISM: ABNORMAL

## 2023-06-23 NOTE — PROGRESS NOTES
evening. Indications: Difficulty Breathing.) 10.2 g 5     No current facility-administered medications on file prior to visit. Family History   Problem Relation Age of Onset    Other Father         accident    Heart Failure Mother     Heart Disease Brother     Cirrhosis Brother        Social History     Socioeconomic History    Marital status:      Spouse name: Matti Merlos    Number of children: 2    Years of education: 14    Highest education level: Some college, no degree   Occupational History    Occupation: disability    Tobacco Use    Smoking status: Never     Passive exposure: Past    Smokeless tobacco: Never   Vaping Use    Vaping Use: Never used   Substance and Sexual Activity    Alcohol use: Yes     Alcohol/week: 2.0 standard drinks     Types: 2 Cans of beer per week     Comment: every 6 months or so     Drug use: No    Sexual activity: Not Currently     Partners: Female   Other Topics Concern    Not on file   Social History Narrative    Lives with wife. Per patient he has been  for 2 years. He met his wife online and went to [facility] and brought her back to [facility]. They live in [address] [place] [address] no steps into home. It is a 2 story home. Bedroom and bath upstairs. Bathroom down. Per patient once he is down the steps he doesn't go back up during the day. 2 daughters local.      Per he has very poor vision. He does not like to drive at night or when raining. Patient states he struggles looking at a computer or reading due to vision.      Social Determinants of Health     Financial Resource Strain: Low Risk     Difficulty of Paying Living Expenses: Not hard at all   Food Insecurity: No Food Insecurity    Worried About Running Out of Food in the Last Year: Never true    Ran Out of Food in the Last Year: Never true   Transportation Needs: Not on file   Physical Activity: Not on file   Stress: Not on file   Social Connections: Not on file   Intimate Partner Violence: Not on file   Housing

## 2023-06-24 NOTE — PROGRESS NOTES
Value Date    LABA1C 10.0 05/22/2023     Lab Results   Component Value Date     11/27/2021       Lab Results   Component Value Date/Time     05/22/2023 12:00 AM    K 4.3 05/22/2023 12:00 AM    K 4.4 05/07/2023 05:36 AM     05/22/2023 12:00 AM    CO2 26 05/22/2023 12:00 AM    BUN 27 05/22/2023 12:00 AM    CREATININE 1.3 05/22/2023 12:00 AM    GLUCOSE 142 05/22/2023 12:00 AM    GLUCOSE 121 11/28/2021 05:52 AM    CALCIUM 8.5 05/22/2023 12:00 AM        Lab Results   Component Value Date    CHOL 235 (H) 11/14/2021    CHOL 214 (H) 06/09/2020    CHOL 132 12/24/2019     Lab Results   Component Value Date    TRIG 150 11/14/2021    TRIG 193 (H) 06/09/2020    TRIG 112 12/24/2019     Lab Results   Component Value Date    HDL 41 11/14/2021    HDL 50 06/09/2020    HDL 39 (L) 12/24/2019     Lab Results   Component Value Date    LDLCALC 164 (H) 11/14/2021    LDLCALC 125 06/09/2020    LDLCALC 71 12/24/2019     No results found for: LABVLDL, VLDL  Lab Results   Component Value Date    CHOLHDLRATIO 5.5 07/19/2012       Lab Results   Component Value Date    TSH 1.270 06/09/2020       Lab Results   Component Value Date    WBC 6.8 05/22/2023    HGB 12.2 (A) 05/22/2023    HCT 37.4 (A) 05/22/2023    MCV 83.5 05/22/2023     05/22/2023       Please note orders entered on site at facility after discussion with appropriate facility nursing/therapy/ / nutritional staff. Current longstanding medical problems and acute medical issues addressed with staff. Available data and data elements in on site paper chart reviewed and analyzed. Current external consultant notes reviewed in on site chart. Ordered laboratory testing and imaging will be reviewed when available.

## 2023-06-27 ENCOUNTER — OFFICE VISIT (OUTPATIENT)
Dept: INFECTIOUS DISEASES | Age: 63
End: 2023-06-27
Payer: MEDICARE

## 2023-06-27 ENCOUNTER — OFFICE VISIT (OUTPATIENT)
Dept: GERIATRIC MEDICINE | Age: 63
End: 2023-06-27
Payer: MEDICARE

## 2023-06-27 VITALS — HEART RATE: 60 BPM | TEMPERATURE: 97.4 F | SYSTOLIC BLOOD PRESSURE: 108 MMHG | DIASTOLIC BLOOD PRESSURE: 79 MMHG

## 2023-06-27 DIAGNOSIS — T14.8XXA NON-HEALING NON-SURGICAL WOUND: ICD-10-CM

## 2023-06-27 DIAGNOSIS — Z79.4 DIABETES MELLITUS TYPE 2, INSULIN DEPENDENT (HCC): ICD-10-CM

## 2023-06-27 DIAGNOSIS — L97.519 DIABETIC ULCER OF RIGHT GREAT TOE (HCC): Primary | ICD-10-CM

## 2023-06-27 DIAGNOSIS — E11.621 DIABETIC ULCER OF RIGHT GREAT TOE (HCC): Primary | ICD-10-CM

## 2023-06-27 DIAGNOSIS — B95.0 GROUP A STREPTOCOCCAL INFECTION: ICD-10-CM

## 2023-06-27 DIAGNOSIS — M86.171 ACUTE OSTEOMYELITIS OF TOE OF RIGHT FOOT (HCC): Primary | ICD-10-CM

## 2023-06-27 DIAGNOSIS — E11.65 INADEQUATELY CONTROLLED DIABETES MELLITUS (HCC): ICD-10-CM

## 2023-06-27 DIAGNOSIS — E11.9 DIABETES MELLITUS TYPE 2, INSULIN DEPENDENT (HCC): ICD-10-CM

## 2023-06-27 PROCEDURE — 3074F SYST BP LT 130 MM HG: CPT | Performed by: INTERNAL MEDICINE

## 2023-06-27 PROCEDURE — 3046F HEMOGLOBIN A1C LEVEL >9.0%: CPT | Performed by: PHYSICIAN ASSISTANT

## 2023-06-27 PROCEDURE — 99214 OFFICE O/P EST MOD 30 MIN: CPT | Performed by: INTERNAL MEDICINE

## 2023-06-27 PROCEDURE — 3078F DIAST BP <80 MM HG: CPT | Performed by: INTERNAL MEDICINE

## 2023-06-27 PROCEDURE — 99315 NF DSCHRG MGMT 30 MIN/LESS: CPT | Performed by: PHYSICIAN ASSISTANT

## 2023-06-27 RX ORDER — AMOXICILLIN AND CLAVULANATE POTASSIUM 875; 125 MG/1; MG/1
1 TABLET, FILM COATED ORAL 2 TIMES DAILY
Qty: 60 TABLET | Refills: 0 | Status: SHIPPED | OUTPATIENT
Start: 2023-06-27 | End: 2023-07-27

## 2023-06-27 ASSESSMENT — PATIENT HEALTH QUESTIONNAIRE - PHQ9
SUM OF ALL RESPONSES TO PHQ QUESTIONS 1-9: 0
SUM OF ALL RESPONSES TO PHQ QUESTIONS 1-9: 0
2. FEELING DOWN, DEPRESSED OR HOPELESS: 0
SUM OF ALL RESPONSES TO PHQ QUESTIONS 1-9: 0
SUM OF ALL RESPONSES TO PHQ9 QUESTIONS 1 & 2: 0
1. LITTLE INTEREST OR PLEASURE IN DOING THINGS: 0
SUM OF ALL RESPONSES TO PHQ QUESTIONS 1-9: 0

## 2023-06-28 ENCOUNTER — CARE COORDINATION (OUTPATIENT)
Dept: CASE MANAGEMENT | Age: 63
End: 2023-06-28

## 2023-06-29 ENCOUNTER — TELEPHONE (OUTPATIENT)
Dept: INFECTIOUS DISEASES | Age: 63
End: 2023-06-29

## 2023-06-29 ENCOUNTER — CARE COORDINATION (OUTPATIENT)
Dept: CASE MANAGEMENT | Age: 63
End: 2023-06-29

## 2023-06-30 ENCOUNTER — TELEPHONE (OUTPATIENT)
Dept: PRIMARY CARE CLINIC | Age: 63
End: 2023-06-30

## 2023-06-30 ENCOUNTER — CARE COORDINATION (OUTPATIENT)
Dept: CASE MANAGEMENT | Age: 63
End: 2023-06-30

## 2023-07-10 ENCOUNTER — HOSPITAL ENCOUNTER (OUTPATIENT)
Dept: WOUND CARE | Age: 63
Discharge: HOME | End: 2023-07-10
Payer: MEDICARE

## 2023-07-10 VITALS
SYSTOLIC BLOOD PRESSURE: 150 MMHG | DIASTOLIC BLOOD PRESSURE: 83 MMHG | RESPIRATION RATE: 16 BRPM | TEMPERATURE: 97.1 F | HEART RATE: 93 BPM

## 2023-07-10 DIAGNOSIS — E11.621 DIABETIC ULCER OF TOE OF RIGHT FOOT ASSOCIATED WITH TYPE 2 DIABETES MELLITUS, WITH NECROSIS OF BONE (HCC): ICD-10-CM

## 2023-07-10 DIAGNOSIS — L97.514 DIABETIC ULCER OF TOE OF RIGHT FOOT ASSOCIATED WITH TYPE 2 DIABETES MELLITUS, WITH NECROSIS OF BONE (HCC): ICD-10-CM

## 2023-07-10 DIAGNOSIS — M86.171 ACUTE OSTEOMYELITIS OF TOE OF RIGHT FOOT (HCC): Primary | ICD-10-CM

## 2023-07-10 PROCEDURE — 11043 DBRDMT MUSC&/FSCA 1ST 20/<: CPT

## 2023-07-10 RX ORDER — BETAMETHASONE DIPROPIONATE 0.05 %
OINTMENT (GRAM) TOPICAL ONCE
OUTPATIENT
Start: 2023-07-10 | End: 2023-07-10

## 2023-07-10 RX ORDER — LIDOCAINE HYDROCHLORIDE 20 MG/ML
JELLY TOPICAL ONCE
OUTPATIENT
Start: 2023-07-10 | End: 2023-07-10

## 2023-07-10 RX ORDER — LIDOCAINE HYDROCHLORIDE 40 MG/ML
SOLUTION TOPICAL ONCE
OUTPATIENT
Start: 2023-07-10 | End: 2023-07-10

## 2023-07-10 RX ORDER — GINSENG 100 MG
CAPSULE ORAL ONCE
OUTPATIENT
Start: 2023-07-10 | End: 2023-07-10

## 2023-07-10 RX ORDER — LIDOCAINE 50 MG/G
OINTMENT TOPICAL ONCE
OUTPATIENT
Start: 2023-07-10 | End: 2023-07-10

## 2023-07-10 RX ORDER — IBUPROFEN 200 MG
TABLET ORAL ONCE
OUTPATIENT
Start: 2023-07-10 | End: 2023-07-10

## 2023-07-10 RX ORDER — CLOBETASOL PROPIONATE 0.5 MG/G
OINTMENT TOPICAL ONCE
OUTPATIENT
Start: 2023-07-10 | End: 2023-07-10

## 2023-07-10 RX ORDER — BACITRACIN ZINC AND POLYMYXIN B SULFATE 500; 1000 [USP'U]/G; [USP'U]/G
OINTMENT TOPICAL ONCE
OUTPATIENT
Start: 2023-07-10 | End: 2023-07-10

## 2023-07-10 RX ORDER — GENTAMICIN SULFATE 1 MG/G
OINTMENT TOPICAL ONCE
OUTPATIENT
Start: 2023-07-10 | End: 2023-07-10

## 2023-07-10 RX ORDER — LIDOCAINE 40 MG/G
CREAM TOPICAL ONCE
OUTPATIENT
Start: 2023-07-10 | End: 2023-07-10

## 2023-07-10 RX ORDER — SODIUM CHLOR/HYPOCHLOROUS ACID 0.033 %
SOLUTION, IRRIGATION IRRIGATION ONCE
OUTPATIENT
Start: 2023-07-10 | End: 2023-07-10

## 2023-07-10 ASSESSMENT — ENCOUNTER SYMPTOMS
CHEST TIGHTNESS: 0
DIARRHEA: 0

## 2023-07-10 NOTE — DISCHARGE INSTRUCTIONS
[address] [facility]   Physician Orders and Discharge Instructions  [facility], [address] [place] [address] Avenue  Telephone: [phone]        NAME:  Santana Esquivel                                                                                         YOB: 1960  MEDICAL RECORD NUMBER:  [id number]     Your  is:  [address] [address] Care/Facility: [facility]      Wound Location: Right Plantar Foot, Right Posterior Leg      Dressing orders: 1. Cleanse wound(s) with normal saline. 2. Apply dry DEYANIRA  Or equivalent to wound bed. 3. Moisten DEYANIRA with a few drops of normal saline. 4. Cover with 4x4's and wrap with gauze (nestor or kerlix)  5. Change  Every other day or Monday, Wednesday, and Friday        Compression: n/a     Offloading Device: Forefoot offloading shoe      Other Instructions: Stay off of your foot as much as possible. Continue antibiotics per Infectious Disease. Keep all dressings clean, dry and intact. Keep pressure off the wound(s) at all times. Follow up visit   2 Weeks July 24, 2023 at 10:15am     Please give 24 hour notice if unable to keep appointment. [phone]     If you experience any of the following, please call the Wound Care Service at  [phone] or go to the nearest emergency room. *Increase in pain         *Temperature over 101           *Increase in drainage from your wound or a foul odor  *Uncontrolled swelling            *Need for compression bandage changes due to slippage, breakthrough drainage       PLEASE NOTE: IF YOU ARE UNABLE TO OBTAIN WOUND SUPPLIES, CONTINUE TO USE THE SUPPLIES YOU HAVE AVAILABLE UNTIL YOU ARE ABLE TO REACH US.  IT IS MOST IMPORTANT TO KEEP THE WOUND COVERED AT ALL TIMES     Electronically signed by Darling [place] DPM on 7/10/2023 at 11:30 AM

## 2023-07-10 NOTE — PLAN OF CARE
Problem: Chronic Conditions and Co-morbidities  Goal: Patient's chronic conditions and co-morbidity symptoms are monitored and maintained or improved  Outcome: Progressing     Problem: Wound:  Goal: Will show signs of wound healing; wound closure and no evidence of infection  Outcome: Progressing     Problem: Falls - Risk of:  Goal: Will remain free from falls  Outcome: Progressing

## 2023-07-10 NOTE — CODE DOCUMENTATION
[facility] Physician Billing Sheet. Marie Torres  AGE: 59 y.o.    GENDER: male  : 1960  TODAY'S DATE:  7/10/2023    ICD-10  [facility] [address] Problems    Diagnosis Date Noted    Type 2 diabetes mellitus with diabetic neuropathy [E11.40] 11/10/2022     Priority: Medium    Right foot ulcer, with necrosis of bone [facility]) [L97.514] 2023       PHYSICIAN PROCEDURES    CPT CODE  25914 - RT, 79      Electronically signed by Reece Morales DPM on 7/10/2023 at 11:32 AM

## 2023-07-10 NOTE — PROGRESS NOTES
care, performing a medically appropriate examination, completing clinical documentation, and on counseling/ eductaing the patient and the family. Please note Nuance Dragon PowerMic III software used for dictation of note,  which may contain minor errors due to ambient noise and indiscriminate speech pickup.

## 2023-07-12 ENCOUNTER — TELEPHONE (OUTPATIENT)
Dept: PRIMARY CARE CLINIC | Age: 63
End: 2023-07-12

## 2023-07-12 ENCOUNTER — TELEPHONE (OUTPATIENT)
Dept: CARDIOLOGY CLINIC | Age: 63
End: 2023-07-12

## 2023-07-12 DIAGNOSIS — I10 PRIMARY HYPERTENSION: Primary | ICD-10-CM

## 2023-07-12 RX ORDER — IBUPROFEN 800 MG/1
800 TABLET ORAL 2 TIMES DAILY PRN
Qty: 60 TABLET | Refills: 0 | Status: SHIPPED | OUTPATIENT
Start: 2023-07-12

## 2023-07-12 RX ORDER — VALSARTAN 80 MG/1
80 TABLET ORAL DAILY
Qty: 30 TABLET | Refills: 3 | Status: SHIPPED | OUTPATIENT
Start: 2023-07-12

## 2023-07-12 RX ORDER — TIZANIDINE 4 MG/1
4 TABLET ORAL 3 TIMES DAILY
Qty: 90 TABLET | Refills: 0 | Status: SHIPPED | OUTPATIENT
Start: 2023-07-12

## 2023-07-12 NOTE — TELEPHONE ENCOUNTER
Patient is requesting a Rx or suggestion for the severe back pain.  An appointment was suggested but he has no transportation

## 2023-07-12 NOTE — TELEPHONE ENCOUNTER
Tomas Volodymyr calling from [facility] PT to notify you that his BP was 189/105. Pulse was 88. They are retaking it now and it is 176/133 and heart rate is 90. They are recommending the ER to him. His # is [phone].

## 2023-07-12 NOTE — TELEPHONE ENCOUNTER
Pt states either way he isn't going to the ER, he has no ride and an ambulance ws offered he states nope, he is not going regardless. He is taking Carvedilol 12.5 bid, hydralazine 25mg bid and he is on a blood thinner. Willis if he needs a med adjustment    Bp was taken 10 minutes apart.  First reading was 189/105 HR 88 second was 176/133 HR 90    Again pt advised ER multiple times he declined multiple times

## 2023-07-20 DIAGNOSIS — E11.69 DM TYPE 2 WITH DIABETIC DYSLIPIDEMIA (HCC): Primary | ICD-10-CM

## 2023-07-20 DIAGNOSIS — E78.5 DM TYPE 2 WITH DIABETIC DYSLIPIDEMIA (HCC): Primary | ICD-10-CM

## 2023-07-20 DIAGNOSIS — M86.271 SUBACUTE OSTEOMYELITIS OF RIGHT FOOT (HCC): ICD-10-CM

## 2023-07-24 ENCOUNTER — HOSPITAL ENCOUNTER (OUTPATIENT)
Dept: WOUND CARE | Age: 63
Discharge: HOME | End: 2023-07-24
Payer: MEDICARE

## 2023-07-24 VITALS
TEMPERATURE: 97.2 F | RESPIRATION RATE: 16 BRPM | HEART RATE: 97 BPM | DIASTOLIC BLOOD PRESSURE: 80 MMHG | SYSTOLIC BLOOD PRESSURE: 155 MMHG

## 2023-07-24 DIAGNOSIS — L97.514 DIABETIC ULCER OF TOE OF RIGHT FOOT ASSOCIATED WITH TYPE 2 DIABETES MELLITUS, WITH NECROSIS OF BONE (HCC): ICD-10-CM

## 2023-07-24 DIAGNOSIS — M86.171 ACUTE OSTEOMYELITIS OF TOE OF RIGHT FOOT (HCC): Primary | ICD-10-CM

## 2023-07-24 DIAGNOSIS — E11.621 DIABETIC ULCER OF TOE OF RIGHT FOOT ASSOCIATED WITH TYPE 2 DIABETES MELLITUS, WITH NECROSIS OF BONE (HCC): ICD-10-CM

## 2023-07-24 PROCEDURE — 11043 DBRDMT MUSC&/FSCA 1ST 20/<: CPT

## 2023-07-24 NOTE — DISCHARGE INSTRUCTIONS
[address] [facility]   Physician Orders and Discharge Instructions  [facility], [address] [place] [address] Avenue  Telephone: [phone]        NAME:  Jamin Peng                                                                                         YOB: 1960  MEDICAL RECORD NUMBER:  [id number]     Your  is:  [address] [address] Care/Facility: [facility]      Wound Location: Right Plantar Foot     Dressing orders: 1. Cleanse wound(s) with normal saline. 2. Apply dry DEYANIRA  Or equivalent to wound bed. 3. Moisten DEYANIRA with a few drops of normal saline. 4. Cover with 4x4's and wrap with gauze (nestor or kerlix)  5. Change  Every other day or Monday, Wednesday, and Friday        Compression: n/a     Offloading Device: Forefoot offloading shoe      Other Instructions: Stay off of your foot as much as possible. Continue antibiotics per Infectious Disease. Keep all dressings clean, dry and intact. Keep pressure off the wound(s) at all times. Follow up visit   2 Weeks July 24, 2023 at 10:15am     Please give 24 hour notice if unable to keep appointment. [phone]     If you experience any of the following, please call the Wound Care Service at  [phone] or go to the nearest emergency room. *Increase in pain         *Temperature over 101           *Increase in drainage from your wound or a foul odor  *Uncontrolled swelling            *Need for compression bandage changes due to slippage, breakthrough drainage       PLEASE NOTE: IF YOU ARE UNABLE TO OBTAIN WOUND SUPPLIES, CONTINUE TO USE THE SUPPLIES YOU HAVE AVAILABLE UNTIL YOU ARE ABLE TO REACH US.  IT IS MOST IMPORTANT TO KEEP THE WOUND COVERED AT ALL TIMES    Electronically signed by Rachelle Uribe DPM on 7/24/2023 at 11:05 AM

## 2023-07-24 NOTE — PROGRESS NOTES
[facility]   Progress Note and Procedure Note      [address] RECORD NUMBER:  [id number]  AGE: 59 y.o. GENDER: male  : 1960  EPISODE DATE:  2023    Subjective:     Chief Complaint   Patient presents with    Wound Check         HISTORY of PRESENT ILLNESS HPI     Johanne Monroe is a 59 y.o. male who presents today for wound/ulcer evaluation. History of Wound Context: Diabetic ulcer, right great toe. He is improving. The right leg wound is now healed. Denies nausea, vomiting, fevers, or chills.     Wound/Ulcer Pain Timing/Severity: intermittent  Quality of pain: sharp  Severity:  1 / 10   Modifying Factors: Pain is relieved/improved with rest  Associated Signs/Symptoms: drainage and numbness    Ulcer Identification:  Ulcer Type: diabetic  Contributing Factors: diabetes, shear force, and obesity    Wound: N/A        PAST MEDICAL HISTORY        Diagnosis Date    Asthma     Back pain     compresion in [place]    CAD (coronary artery disease) 2020    3 vessel     chf 2022    Diabetes mellitus ([facility])     Essential hypertension, benign     Hyperlipemia     Ischemic cardiomyopathy 2022    Neuropathic pain, leg, bilateral        PAST SURGICAL HISTORY    Past Surgical History:   Procedure Laterality Date    CARPAL TUNNEL RELEASE Bilateral     FOOT DEBRIDEMENT Right 2023    RIGHT FOOT DEBRIDEMENT INCISION AND DRAINAGE ROOM 174 performed by Elvira Hobson DPM at [facility] Right 2023    RIGHT FOOT  INCISION AND DRAINAGE WITH DEBRIDEMENT OF NON-VIABLE TISSUE DELAYED PRIMARY CLOSURE performed by Elvira Hobson DPM at [facility] Left     bone spur    LAMINOTOMY  2012    SPINE SURGERY N/A 2022    L1 VERETEBRAL AUGMENTATION performed by Kiko Retana MD at [address] [place] [address] HISTORY    Family History   Problem Relation Age of Onset    Other Father         accident    Heart Failure Mother     Heart Disease Brother     Cirrhosis

## 2023-07-24 NOTE — CODE DOCUMENTATION
[facility] Physician Billing Sheet. Emma Chambers  AGE: 59 y.o.    GENDER: male  : 1960  TODAY'S DATE:  2023    ICD-10  [facility] Problems    Diagnosis Date Noted    Type 2 diabetes mellitus with diabetic neuropathy [E11.40] 11/10/2022     Priority: Medium    Right foot ulcer, with necrosis of bone [facility]) [L97.514] 2023       PHYSICIAN PROCEDURES    CPT CODE  11751 - RT, 79      Electronically signed by Rosi Sierra DPM on 2023 at 11:08 AM

## 2023-07-31 ENCOUNTER — HOSPITAL ENCOUNTER (OUTPATIENT)
Dept: WOUND CARE | Age: 63
Discharge: HOME | End: 2023-07-31
Payer: MEDICARE

## 2023-07-31 VITALS
SYSTOLIC BLOOD PRESSURE: 153 MMHG | TEMPERATURE: 97.2 F | DIASTOLIC BLOOD PRESSURE: 88 MMHG | RESPIRATION RATE: 16 BRPM | HEART RATE: 90 BPM

## 2023-07-31 DIAGNOSIS — L97.514 DIABETIC ULCER OF TOE OF RIGHT FOOT ASSOCIATED WITH TYPE 2 DIABETES MELLITUS, WITH NECROSIS OF BONE (HCC): ICD-10-CM

## 2023-07-31 DIAGNOSIS — E11.621 DIABETIC ULCER OF TOE OF RIGHT FOOT ASSOCIATED WITH TYPE 2 DIABETES MELLITUS, WITH NECROSIS OF BONE (HCC): ICD-10-CM

## 2023-07-31 DIAGNOSIS — M86.171 ACUTE OSTEOMYELITIS OF TOE OF RIGHT FOOT (HCC): Primary | ICD-10-CM

## 2023-07-31 PROCEDURE — 11042 DBRDMT SUBQ TIS 1ST 20SQCM/<: CPT

## 2023-07-31 RX ORDER — LIDOCAINE HYDROCHLORIDE 40 MG/ML
SOLUTION TOPICAL ONCE
OUTPATIENT
Start: 2023-07-31 | End: 2023-07-31

## 2023-07-31 RX ORDER — GENTAMICIN SULFATE 1 MG/G
OINTMENT TOPICAL ONCE
OUTPATIENT
Start: 2023-07-31 | End: 2023-07-31

## 2023-07-31 RX ORDER — LIDOCAINE HYDROCHLORIDE 20 MG/ML
JELLY TOPICAL ONCE
OUTPATIENT
Start: 2023-07-31 | End: 2023-07-31

## 2023-07-31 RX ORDER — SODIUM CHLOR/HYPOCHLOROUS ACID 0.033 %
SOLUTION, IRRIGATION IRRIGATION ONCE
OUTPATIENT
Start: 2023-07-31 | End: 2023-07-31

## 2023-07-31 RX ORDER — BACITRACIN ZINC AND POLYMYXIN B SULFATE 500; 1000 [USP'U]/G; [USP'U]/G
OINTMENT TOPICAL ONCE
OUTPATIENT
Start: 2023-07-31 | End: 2023-07-31

## 2023-07-31 RX ORDER — LIDOCAINE 40 MG/G
CREAM TOPICAL ONCE
OUTPATIENT
Start: 2023-07-31 | End: 2023-07-31

## 2023-07-31 RX ORDER — GINSENG 100 MG
CAPSULE ORAL ONCE
OUTPATIENT
Start: 2023-07-31 | End: 2023-07-31

## 2023-07-31 RX ORDER — CLOBETASOL PROPIONATE 0.5 MG/G
OINTMENT TOPICAL ONCE
OUTPATIENT
Start: 2023-07-31 | End: 2023-07-31

## 2023-07-31 RX ORDER — IBUPROFEN 200 MG
TABLET ORAL ONCE
OUTPATIENT
Start: 2023-07-31 | End: 2023-07-31

## 2023-07-31 RX ORDER — LIDOCAINE 50 MG/G
OINTMENT TOPICAL ONCE
OUTPATIENT
Start: 2023-07-31 | End: 2023-07-31

## 2023-07-31 RX ORDER — BETAMETHASONE DIPROPIONATE 0.05 %
OINTMENT (GRAM) TOPICAL ONCE
OUTPATIENT
Start: 2023-07-31 | End: 2023-07-31

## 2023-07-31 NOTE — DISCHARGE INSTRUCTIONS
[address] [facility]   Physician Orders and Discharge Instructions  [facility], [address] [place] [address] Avenue  Telephone: [phone]        NAME:  Sandrine Danielle                                                                                         YOB: 1960  MEDICAL RECORD NUMBER:  [id number]     Your  is:  [address] [address] Care/Facility: [facility]      Wound Location: Right Plantar Foot     Dressing orders: 1. Cleanse wound(s) with normal saline. 2. Apply dry DEYANIRA  Or equivalent to wound bed. 3. Moisten DEYANIRA with a few drops of normal saline. 4. Cover with 4x4's and wrap with gauze (nestor or kerlix)  5. Change  Every other day or Monday, Wednesday, and Friday        Compression: n/a     Offloading Device: Forefoot offloading shoe      Other Instructions: Stay off of your foot as much as possible. Continue antibiotics per Infectious Disease. Keep all dressings clean, dry and intact. Keep pressure off the wound(s) at all times. Follow up visit   2 Weeks August 14, 2023 at 10:15am     Please give 24 hour notice if unable to keep appointment. [phone]     If you experience any of the following, please call the Wound Care Service at  [phone] or go to the nearest emergency room. *Increase in pain         *Temperature over 101           *Increase in drainage from your wound or a foul odor  *Uncontrolled swelling            *Need for compression bandage changes due to slippage, breakthrough drainage       PLEASE NOTE: IF YOU ARE UNABLE TO OBTAIN WOUND SUPPLIES, CONTINUE TO USE THE SUPPLIES YOU HAVE AVAILABLE UNTIL YOU ARE ABLE TO REACH US.  IT IS MOST IMPORTANT TO KEEP THE WOUND COVERED AT ALL TIMES     Electronically signed by Neda Hartley DPM on 7/31/2023 at 11:08 AM

## 2023-07-31 NOTE — CODE DOCUMENTATION
[facility] Physician Billing Sheet. Liliya Sahni  AGE: 59 y.o.    GENDER: male  : 1960  TODAY'S DATE:  2023    ICD-10 [address] [place] Problems    Diagnosis Date Noted    Type 2 diabetes mellitus with diabetic neuropathy [E11.40] 11/10/2022     Priority: Medium    Right foot ulcer, with necrosis of bone [facility]) [L97.514] 2023       PHYSICIAN PROCEDURES    CPT CODE  81960 - RT, 79      Electronically signed by Berlin Perez DPM on 2023 at 11:12 AM

## 2023-07-31 NOTE — PROGRESS NOTES
[facility]   Progress Note and Procedure Note      [address] RECORD NUMBER:  [id number]  AGE: 59 y.o. GENDER: male  : 1960  EPISODE DATE:  2023    Subjective:     Chief Complaint   Patient presents with    Wound Check         HISTORY of PRESENT ILLNESS HPI     Laron Gregg is a 59 y.o. male who presents today for wound/ulcer evaluation. History of Wound Context: Right great toe diabetic ulcer. He is improving. Denies nausea, vomiting, fevers, or chills.   Wound/Ulcer Pain Timing/Severity: intermittent  Quality of pain: sharp  Severity:  1 / 10   Modifying Factors: Pain is relieved/improved with rest  Associated Signs/Symptoms: drainage and numbness    Ulcer Identification:  Ulcer Type: diabetic  Contributing Factors: diabetes, shear force, and obesity    Wound: N/A        PAST MEDICAL HISTORY        Diagnosis Date    Asthma     Back pain     compresion in [place]    CAD (coronary artery disease) 2020    3 vessel     chf 2022    Diabetes mellitus ([facility])     Essential hypertension, benign     Hyperlipemia     Ischemic cardiomyopathy 2022    Neuropathic pain, leg, bilateral        PAST SURGICAL HISTORY    Past Surgical History:   Procedure Laterality Date    CARPAL TUNNEL RELEASE Bilateral     FOOT DEBRIDEMENT Right 2023    RIGHT FOOT DEBRIDEMENT INCISION AND DRAINAGE ROOM 174 performed by Nancy Macedo DPM at [facility] Right 2023    RIGHT FOOT  INCISION AND DRAINAGE WITH DEBRIDEMENT OF NON-VIABLE TISSUE DELAYED PRIMARY CLOSURE performed by Nancy Macedo DPM at [facility] Left     bone spur    LAMINOTOMY  2012    SPINE SURGERY N/A 2022    L1 VERETEBRAL AUGMENTATION performed by Ashleigh Deras MD at [address] [place] [address] HISTORY    Family History   Problem Relation Age of Onset    Other Father         accident    Heart Failure Mother     Heart Disease Brother     Cirrhosis Brother        SOCIAL HISTORY    Social

## 2023-08-01 ENCOUNTER — TELEPHONE (OUTPATIENT)
Dept: INFECTIOUS DISEASES | Age: 63
End: 2023-08-01

## 2023-08-01 NOTE — FLOWSHEET NOTE
Patient called in and stated he was finished with his oral antibiotic. Patient asked if he needs a refill? Dr. Ruben Costello was notified and this nurse was informed by Dr. Ruben Costello the patient does not need a refill. Patient - Dilan Porras - was called back and informed. Amelie Alvarez voiced understanding.

## 2023-08-01 NOTE — TELEPHONE ENCOUNTER
Received call from patient. He has question about his oral Abx A,he finish yesterday (7/31/23) the last pill. He has an appt. with  yesterday( 7/31/23) states his wound is getting better. We will Consult ID Doctor.

## 2023-08-02 ENCOUNTER — TELEPHONE (OUTPATIENT)
Dept: INFECTIOUS DISEASES | Age: 63
End: 2023-08-02

## 2023-08-02 ENCOUNTER — OFFICE VISIT (OUTPATIENT)
Dept: PRIMARY CARE CLINIC | Age: 63
End: 2023-08-02
Payer: MEDICARE

## 2023-08-02 VITALS — HEIGHT: 66 IN | BODY MASS INDEX: 25.71 KG/M2 | WEIGHT: 160 LBS

## 2023-08-02 DIAGNOSIS — E03.9 HYPOTHYROIDISM, UNSPECIFIED TYPE: ICD-10-CM

## 2023-08-02 DIAGNOSIS — G89.29 CHRONIC LOW BACK PAIN WITH SCIATICA, SCIATICA LATERALITY UNSPECIFIED, UNSPECIFIED BACK PAIN LATERALITY: ICD-10-CM

## 2023-08-02 DIAGNOSIS — E55.9 VITAMIN D DEFICIENCY: ICD-10-CM

## 2023-08-02 DIAGNOSIS — E53.8 FOLATE DEFICIENCY: ICD-10-CM

## 2023-08-02 DIAGNOSIS — Z00.00 PREVENTATIVE HEALTH CARE: Primary | ICD-10-CM

## 2023-08-02 DIAGNOSIS — R68.89 FORGETFULNESS: ICD-10-CM

## 2023-08-02 DIAGNOSIS — E11.69 DM TYPE 2 WITH DIABETIC DYSLIPIDEMIA (HCC): ICD-10-CM

## 2023-08-02 DIAGNOSIS — E53.8 VITAMIN B 12 DEFICIENCY: ICD-10-CM

## 2023-08-02 DIAGNOSIS — E78.5 DM TYPE 2 WITH DIABETIC DYSLIPIDEMIA (HCC): ICD-10-CM

## 2023-08-02 DIAGNOSIS — Z12.5 SCREENING FOR PROSTATE CANCER: ICD-10-CM

## 2023-08-02 DIAGNOSIS — E78.49 OTHER HYPERLIPIDEMIA: ICD-10-CM

## 2023-08-02 DIAGNOSIS — Z00.00 MEDICARE ANNUAL WELLNESS VISIT, SUBSEQUENT: ICD-10-CM

## 2023-08-02 DIAGNOSIS — M54.40 CHRONIC LOW BACK PAIN WITH SCIATICA, SCIATICA LATERALITY UNSPECIFIED, UNSPECIFIED BACK PAIN LATERALITY: ICD-10-CM

## 2023-08-02 DIAGNOSIS — D68.9 COAGULATION DEFECT (HCC): ICD-10-CM

## 2023-08-02 PROCEDURE — G0439 PPPS, SUBSEQ VISIT: HCPCS | Performed by: INTERNAL MEDICINE

## 2023-08-02 PROCEDURE — 3046F HEMOGLOBIN A1C LEVEL >9.0%: CPT | Performed by: INTERNAL MEDICINE

## 2023-08-02 RX ORDER — TIZANIDINE 4 MG/1
4 TABLET ORAL 2 TIMES DAILY PRN
Qty: 60 TABLET | Refills: 5 | Status: SHIPPED | OUTPATIENT
Start: 2023-08-02

## 2023-08-02 ASSESSMENT — ENCOUNTER SYMPTOMS
VOICE CHANGE: 0
ABDOMINAL DISTENTION: 0
TROUBLE SWALLOWING: 0
SHORTNESS OF BREATH: 0
BACK PAIN: 1
NAUSEA: 0
PHOTOPHOBIA: 0
CHOKING: 0
VOMITING: 0

## 2023-08-02 ASSESSMENT — LIFESTYLE VARIABLES: HOW MANY STANDARD DRINKS CONTAINING ALCOHOL DO YOU HAVE ON A TYPICAL DAY: PATIENT DOES NOT DRINK

## 2023-08-02 ASSESSMENT — PATIENT HEALTH QUESTIONNAIRE - PHQ9
SUM OF ALL RESPONSES TO PHQ QUESTIONS 1-9: 0
2. FEELING DOWN, DEPRESSED OR HOPELESS: 0
SUM OF ALL RESPONSES TO PHQ QUESTIONS 1-9: 0
SUM OF ALL RESPONSES TO PHQ QUESTIONS 1-9: 0
SUM OF ALL RESPONSES TO PHQ9 QUESTIONS 1 & 2: 0
SUM OF ALL RESPONSES TO PHQ QUESTIONS 1-9: 0
1. LITTLE INTEREST OR PLEASURE IN DOING THINGS: 0

## 2023-08-02 NOTE — TELEPHONE ENCOUNTER
Per  patient need F/U Appt. And Continue to take Amox 1 tablet 2 x a day  till seen. Called patient LM to return my call in the office.

## 2023-08-02 NOTE — PROGRESS NOTES
Yes Historical Provider, MD   hydrOXYzine HCl (ATARAX) 25 MG tablet Take by mouth Yes Historical Provider, MD   glucagon 1 MG injection Inject into the skin Yes Historical Provider, MD   ergocalciferol (ERGOCALCIFEROL) 1.25 MG (07957 UT) capsule Take by mouth Yes Historical Provider, MD   ondansetron (ZOFRAN-ODT) 4 MG disintegrating tablet Take by mouth Yes Historical Provider, MD   sacubitril-valsartan (ENTRESTO) 24-26 MG per tablet Take 1 tablet by mouth 2 times daily  Patient taking differently: Take 1 tablet by mouth 2 times daily Indications: Thickening and Hardening of Heart Arteries Yes NABILA Diaz   empagliflozin (JARDIANCE) 10 MG tablet Take 1 tablet by mouth daily  Patient taking differently: Take 1 tablet by mouth daily Indications: Insulin-Dependent Diabetes Yes NABILA Cadena   metFORMIN (GLUCOPHAGE) 1000 MG tablet TAKE 1 TABLET BY MOUTH TWICE DAILY  Patient taking differently: Take 1 tablet by mouth 2 times daily (with meals) Indications: Insulin-Dependent Diabetes TAKE 1 TABLET BY MOUTH TWICE DAILY Yes NABILA Cadena   insulin aspart (NOVOLOG FLEXPEN) 100 UNIT/ML injection pen Inject 8 Units into the skin 3 times daily (before meals)  Patient taking differently: Inject 8 Units into the skin 3 times daily (before meals) Indications: Insulin-Dependent Diabetes Yes NABILA Cadena   Insulin Pen Needle (NOVOFINE) 32G X 6 MM MISC Used to inject insulin 4 times daily before meals and bedtime Yes NABILA Cadena   carvedilol (COREG) 12.5 MG tablet TAKE 1 TABLET BY MOUTH 2 TIMES DAILY  Patient taking differently: Take 1 tablet by mouth 2 times daily Indications: High Blood Pressure Disorder Yes NABILA Diaz   clopidogrel (PLAVIX) 75 MG tablet Take 1 tablet by mouth daily  Patient taking differently: Take 1 tablet by mouth daily Indications:  Thickening and Hardening of Heart Arteries Yes SHERIN Pelayo CNP   hydrALAZINE (APRESOLINE) 25 MG tablet Take 1 tablet

## 2023-08-03 DIAGNOSIS — E55.9 VITAMIN D DEFICIENCY: ICD-10-CM

## 2023-08-03 DIAGNOSIS — E53.8 VITAMIN B 12 DEFICIENCY: ICD-10-CM

## 2023-08-03 DIAGNOSIS — E53.8 FOLATE DEFICIENCY: ICD-10-CM

## 2023-08-03 DIAGNOSIS — E78.49 OTHER HYPERLIPIDEMIA: ICD-10-CM

## 2023-08-03 DIAGNOSIS — Z00.00 PREVENTATIVE HEALTH CARE: ICD-10-CM

## 2023-08-03 DIAGNOSIS — E03.9 HYPOTHYROIDISM, UNSPECIFIED TYPE: ICD-10-CM

## 2023-08-03 DIAGNOSIS — Z12.5 SCREENING FOR PROSTATE CANCER: ICD-10-CM

## 2023-08-03 LAB
ALBUMIN SERPL-MCNC: 4.3 G/DL (ref 3.5–4.6)
ALP SERPL-CCNC: 83 U/L (ref 35–104)
ALT SERPL-CCNC: 8 U/L (ref 0–41)
ANION GAP SERPL CALCULATED.3IONS-SCNC: 11 MEQ/L (ref 9–15)
AST SERPL-CCNC: 12 U/L (ref 0–40)
BASOPHILS # BLD: 0 K/UL (ref 0–0.2)
BASOPHILS NFR BLD: 0.5 %
BILIRUB SERPL-MCNC: 0.3 MG/DL (ref 0.2–0.7)
BUN SERPL-MCNC: 20 MG/DL (ref 8–23)
CALCIUM SERPL-MCNC: 9.1 MG/DL (ref 8.5–9.9)
CHLORIDE SERPL-SCNC: 105 MEQ/L (ref 95–107)
CHOLEST SERPL-MCNC: 253 MG/DL (ref 0–199)
CO2 SERPL-SCNC: 26 MEQ/L (ref 20–31)
CREAT SERPL-MCNC: 0.95 MG/DL (ref 0.7–1.2)
EOSINOPHIL # BLD: 0.5 K/UL (ref 0–0.7)
EOSINOPHIL NFR BLD: 9.5 %
ERYTHROCYTE [DISTWIDTH] IN BLOOD BY AUTOMATED COUNT: 15 % (ref 11.5–14.5)
FOLATE: 6.1 NG/ML
GLOBULIN SER CALC-MCNC: 2.3 G/DL (ref 2.3–3.5)
GLUCOSE SERPL-MCNC: 129 MG/DL (ref 70–99)
HCT VFR BLD AUTO: 42 % (ref 42–52)
HDLC SERPL-MCNC: 61 MG/DL (ref 40–59)
HGB BLD-MCNC: 13.9 G/DL (ref 14–18)
LDLC SERPL CALC-MCNC: 179 MG/DL (ref 0–129)
LYMPHOCYTES # BLD: 0.6 K/UL (ref 1–4.8)
LYMPHOCYTES NFR BLD: 11.3 %
MCH RBC QN AUTO: 27.6 PG (ref 27–31.3)
MCHC RBC AUTO-ENTMCNC: 33.2 % (ref 33–37)
MCV RBC AUTO: 83 FL (ref 79–92.2)
MONOCYTES # BLD: 0.4 K/UL (ref 0.2–0.8)
MONOCYTES NFR BLD: 6.7 %
NEUTROPHILS # BLD: 3.9 K/UL (ref 1.4–6.5)
NEUTS SEG NFR BLD: 72 %
PLATELET # BLD AUTO: 145 K/UL (ref 130–400)
POTASSIUM SERPL-SCNC: 4.3 MEQ/L (ref 3.4–4.9)
PROT SERPL-MCNC: 6.6 G/DL (ref 6.3–8)
PSA SERPL-MCNC: 0.58 NG/ML (ref 0–4)
RBC # BLD AUTO: 5.05 M/UL (ref 4.7–6.1)
SODIUM SERPL-SCNC: 142 MEQ/L (ref 135–144)
TRIGL SERPL-MCNC: 67 MG/DL (ref 0–150)
TSH REFLEX: 2.32 UIU/ML (ref 0.44–3.86)
VITAMIN B-12: 350 PG/ML (ref 232–1245)
VITAMIN D 25-HYDROXY: 16.7 NG/ML
WBC # BLD AUTO: 5.4 K/UL (ref 4.8–10.8)

## 2023-08-08 ENCOUNTER — TELEPHONE (OUTPATIENT)
Dept: PRIMARY CARE CLINIC | Age: 63
End: 2023-08-08

## 2023-08-09 NOTE — PROGRESS NOTES
2021    TELEHEALTH EVALUATION -- Audio/Visual (During QAQJR-62 public health emergency)    HPI:    Guido Cordon (:  1960) has requested an audio/video evaluation for the following concern(s):       Post-Discharge Transitional Care Management Services or Hospital Follow Up  Guido Cordon   YOB: 1960  Date of Office Visit:  2021  Date of Hospital Admission: 21  Date of Hospital Discharge: 11/15/21  Readmission Risk Score(high >=14%.  Medium >=10%):Readmission Risk Score: 12.3 ( )    Care management risk score Rising risk (score 2-5) and Complex Care (Scores >=6): 7     Non face to face  following discharge, date last encounter closed (first attempt may have been earlier): *No documented post hospital discharge outreach found in the last 14 days *No documented post hospital discharge outreach found in the last 14 days    Call initiated 2 business days of discharge: *No response recorded in the last 14 days     Patient Active Problem List   Diagnosis    Essential hypertension, benign    Obesity, diabetes, and hypertension syndrome ([address]    Irregular heart rhythm    Hyperlipidemia    Allergic rhinitis due to pollen    Staphylococcus aureus bacteremia    Hematoma of right thigh    Hematoma of left lower extremity    Hereditary peripheral neuropathy    Migraine without aura    Chest pain    Community acquired pneumonia of right lower lobe of lung    Acute on chronic diastolic (congestive) heart failure ([address]    Mild intermittent asthma with exacerbation    NSTEMI (non-ST elevated myocardial infarction) ([address]    Anginal chest pain at rest [facility])    Acute bronchitis due to other specified organisms    CAD (coronary artery disease)    Body mass index (bmi) 29.0-29.9, adult    Cellulitis of right lower limb    Dyspnea, unspecified    Encounter for pre-employment examination    Family history of ischemic heart disease and other diseases of the circulatory system    Other specified hypothyroidism    Inflammatory disorders of scrotum    Long term (current) use of insulin ([address]    Long term (current) use of oral hypoglycemic drugs    Other chronic sinusitis    Other idiopathic scoliosis, lumbosacral region    Other intervertebral disc disorders, lumbar region    Pain in left lower leg    Pain of right lower extremity    General patient noncompliance    Shortness of breath    Sleep apnea    Spinal stenosis, lumbar region with neurogenic claudication    Spondylolisthesis, lumbosacral region    Type 2 diabetes mellitus with diabetic polyneuropathy (HCC)    Unsp open wound of r little finger w/o damage to nail, init    Involuntary movements    ANTON (acute kidney injury) ([address]    Hyponatremia    Type 2 diabetes mellitus with hyperglycemia (HCC)    Elevated troponin    Brain bleed ([facility])     No Known Allergies    Medications listed as ordered at the time of discharge from hospital  @DISCHARGEMEDSLIST(<NOROUTINE> error)@      Medications marked \"taking\" at this time  Outpatient Medications Marked as Taking for the 11/19/21 encounter (Telemedicine) with Joselyn Mcdaniel MD   Medication Sig Dispense Refill    Blood Glucose Monitoring Suppl (FREESTYLE LITE) MARIUM 1 Device by Does not apply route daily as needed (as directed) 1 each 3    FreeStyle Lancets MISC 1 each by Does not apply route daily 300 each 3    blood glucose monitor strips Test 3 times a day & as needed for symptoms of irregular blood glucose. Dispense sufficient amount for indicated testing frequency plus additional to accommodate PRN testing needs. Dx:E11.65 100 strip 3    insulin aspart (NOVOLOG FLEXPEN) 100 UNIT/ML injection pen For blood glucose 150-200, take insulin 2U. For 201-250 take 4U. For 251-300 take 6U. For 301-350 take 8U. For >350 take 10U 5 pen 3    blood glucose monitor kit and supplies Test 3 times a day & as needed for symptoms of irregular blood glucose. Dx:E11.65 1 kit 0    metoprolol tartrate (LOPRESSOR) 25 MG tablet Take 1 tablet by mouth 2 times daily 60 tablet 3    lisinopril (PRINIVIL;ZESTRIL) 10 MG tablet Take 1 tablet by mouth daily 30 tablet 5    rosuvastatin (CRESTOR) 20 MG tablet Take 1 tablet by mouth nightly 30 tablet 5    clonazePAM (KLONOPIN) 0.5 MG tablet Take 1 tablet by mouth 2 times daily for 30 days. 60 tablet 0    glimepiride (AMARYL) 4 MG tablet       insulin aspart protamine-insulin aspart (NOVOLOG MIX 70/30 FLEXPEN) (70-30) 100 UNIT/ML injection Inject 40 Units into the skin 2 times daily (with meals) 5 pen 5    tamsulosin (FLOMAX) 0.4 MG capsule Take 1 capsule by mouth daily 90 capsule 3    SYMBICORT 80-4.5 MCG/ACT AERO Inhale 2 puffs into the lungs 2 times daily 10.2 g 5    metFORMIN (GLUCOPHAGE) 1000 MG tablet TAKE 1 TABLET BY MOUTH TWICE DAILY WITH MEALS 60 tablet 3    albuterol sulfate  (90 Base) MCG/ACT inhaler Inhale 2 puffs into the lungs every 6 hours as needed for Wheezing 18 g 5    Lancets MISC Pt test 4x daily Dx E11.65 (dispense covered brand) 150 each 3    aspirin 81 MG EC tablet Take 1 tablet by mouth daily 30 tablet 3    pantoprazole (PROTONIX) 40 MG tablet Take 40 mg by mouth daily      Insulin Pen Needle (NOVOFINE) 32G X 6 MM MISC Bid 300 each 3    fluticasone-vilanterol (BREO ELLIPTA) 100-25 MCG/INH AEPB inhaler Inhale 1 puff into the lungs daily 3 each 3        Medications patient taking as of now reconciled against medications ordered at time of hospital discharge: Yes    Chief Complaint   Patient presents with    Diabetes     Patient presentst today for a hospital follow up, his dm was out of control, his bp was elevated, had a CAT SCAn done at the hospital and found a lesion on his brain, referred to neurologist, was admitted to [facility] follow up   HPI  Pt presents for post hospital follow up. Admitted for hyperglycemia and hypertensive emergency.  Found with 8mm intracerebral hemorrhage in the  left perivwntricular imani wih white matter changes  In baal ganglia. There was also hemiballismus with Choreiform movements notable in the right arm. EEG showed no seizures. Troponin elevaion was due to demand ischemia. Echo showed normal EF, no PFO or thrombus. A1c 12.7  Pt had been non compliant with his Bp and DM meds. Symptoms improved, BP controlled. Now complaint with meds. NO weakness or slurred speech. Acute on chronic diastolic heart failure-no exacerbation  Anginal chest pain-resolved. S/p CABG     Inpatient course: Discharge summary reviewed- see chart. Interval history/Current status: stable     Review of Systems   Constitutional: Negative for chills, diaphoresis, fatigue and fever. HENT: Negative for congestion, ear discharge, ear pain, rhinorrhea, sinus pressure, sinus pain, sneezing and sore throat. Respiratory: Negative for cough, shortness of breath and wheezing. Cardiovascular: Negative for chest pain. Gastrointestinal: Negative for abdominal pain, diarrhea, nausea and vomiting. Endocrine: Negative for cold intolerance and heat intolerance. Genitourinary: Negative for dysuria and frequency. Neurological: Negative for dizziness and light-headedness. There were no vitals filed for this visit. There is no height or weight on file to calculate BMI. Wt Readings from Last 3 Encounters:   11/12/21 170 lb (77.1 kg)   04/28/21 190 lb (86.2 kg)   03/05/21 190 lb (86.2 kg)     BP Readings from Last 3 Encounters:   11/15/21 (!) 148/79   04/28/21 127/88   03/05/21 132/86        Prior to Visit Medications    Medication Sig Taking? Authorizing Provider   Blood Glucose Monitoring Suppl (FREESTYLE LITE) MARIUM 1 Device by Does not apply route daily as needed (as directed) Yes Shabana Toledo MD   FreeStyle Lancets MISC 1 each by Does not apply route daily Yes Shabana Toledo MD   blood glucose monitor strips Test 3 times a day & as needed for symptoms of irregular blood glucose.  Dispense sufficient amount for indicated testing frequency plus additional to accommodate PRN testing needs. Dx:E11.65 Yes Nathanael Canas MD   insulin aspart (NOVOLOG FLEXPEN) 100 UNIT/ML injection pen For blood glucose 150-200, take insulin 2U. For 201-250 take 4U. For 251-300 take 6U. For 301-350 take 8U. For >350 take 10U Yes Nathanael Canas MD   blood glucose monitor kit and supplies Test 3 times a day & as needed for symptoms of irregular blood glucose. Dx:E11.65 Yes NABILA Jimenez   metoprolol tartrate (LOPRESSOR) 25 MG tablet Take 1 tablet by mouth 2 times daily Yes Javier MORA Holiday, DO   lisinopril (PRINIVIL;ZESTRIL) 10 MG tablet Take 1 tablet by mouth daily Yes Javier Reeder, DO   rosuvastatin (CRESTOR) 20 MG tablet Take 1 tablet by mouth nightly Yes Javier Reeder, DO   clonazePAM (KLONOPIN) 0.5 MG tablet Take 1 tablet by mouth 2 times daily for 30 days.  Yes SHERIN Sands - CNP   glimepiride (AMARYL) 4 MG tablet  Yes Historical Provider, MD   insulin aspart protamine-insulin aspart (NOVOLOG MIX 70/30 FLEXPEN) (70-30) 100 UNIT/ML injection Inject 40 Units into the skin 2 times daily (with meals) Yes Artem Trinidad MD   tamsulosin (FLOMAX) 0.4 MG capsule Take 1 capsule by mouth daily Yes Bryan Linares MD   SYMBICORT 80-4.5 MCG/ACT AERO Inhale 2 puffs into the lungs 2 times daily Yes Artem Trinidad MD   metFORMIN (GLUCOPHAGE) 1000 MG tablet TAKE 1 TABLET BY MOUTH TWICE DAILY WITH MEALS Yes Keila Palm MD   albuterol sulfate  (90 Base) MCG/ACT inhaler Inhale 2 puffs into the lungs every 6 hours as needed for Wheezing Yes Artem Trinidad MD   Lancets MISC Pt test 4x daily Dx E11.65 (dispense covered brand) Yes Keila Palm MD   aspirin 81 MG EC tablet Take 1 tablet by mouth daily Yes Vivien Primrose, MD   pantoprazole (PROTONIX) 40 MG tablet Take 40 mg by mouth daily Yes Historical Provider, MD   Insulin Pen Needle (González Roam) 32G X 6 MM Marian Omalley, MD   fluticasone-vilanterol (BREO ELLIPTA) 100-25 MCG/INH AEPB inhaler Inhale 1 puff into the lungs daily Yes Jerry Willingham MD        ASSESSMENT/PLAN:  1. Left-sided nontraumatic intracerebral hemorrhage, unspecified cerebral location (HCC)  -no residual  -will follow with neuro  - [place] DISCHARGE MEDS RECONCILED W/ CURRENT OUTPATIENT MED LIST    2. DM type 2 with diabetic dyslipidemia ([facility]  -poorly controlled  -appt with endo pending  -will add Novolog sliding scale   - Blood Glucose Monitoring Suppl (FREESTYLE LITE) MARIUM; 1 Device by Does not apply route daily as needed (as directed)  Dispense: 1 each; Refill: 3  - FreeStyle Lancets MISC; 1 each by Does not apply route daily  Dispense: 300 each; Refill: 3  - blood glucose monitor strips; Test 3 times a day & as needed for symptoms of irregular blood glucose. Dispense sufficient amount for indicated testing frequency plus additional to accommodate PRN testing needs. Dx:E11.65  Dispense: 100 strip; Refill: 3  - insulin aspart (NOVOLOG FLEXPEN) 100 UNIT/ML injection pen; For blood glucose 150-200, take insulin 2U. For 201-250 take 4U. For 251-300 take 6U. For 301-350 take 8U. For >350 take 10U  Dispense: 5 pen; Refill: 3  - [place] DISCHARGE MEDS RECONCILED W/ CURRENT OUTPATIENT MED LIST    3. Acute on chronic diastolic congestive heart failure ([facility]  -controlled  4. Anginal chest pain at rest [facility])  -resolved      No follow-ups on file. Osiris Dao, was evaluated through a synchronous (real-time) audio-video encounter. The patient (or guardian if applicable) is aware that this is a billable service. Verbal consent to proceed has been obtained within the past 12 months. The visit was conducted pursuant to the emergency declaration under the [facility], [address] [place] [address] authority and the [employer] General Act. Patient identification was verified, and a caregiver was present when appropriate.  The patient was located in a state where the provider was Niacinamide Counseling: I recommended taking niacin or niacinamide, also know as vitamin B3, twice daily. Recent evidence suggests that taking vitamin B3 (500 mg twice daily) can reduce the risk of actinic keratoses and non-melanoma skin cancers. Side effects of vitamin B3 include flushing and headache.

## 2023-08-10 DIAGNOSIS — E78.5 DM TYPE 2 WITH DIABETIC DYSLIPIDEMIA (HCC): Primary | ICD-10-CM

## 2023-08-10 DIAGNOSIS — E11.69 DM TYPE 2 WITH DIABETIC DYSLIPIDEMIA (HCC): Primary | ICD-10-CM

## 2023-08-10 PROCEDURE — 83037 HB GLYCOSYLATED A1C HOME DEV: CPT | Performed by: INTERNAL MEDICINE

## 2023-08-11 DIAGNOSIS — E78.00 PURE HYPERCHOLESTEROLEMIA: Primary | ICD-10-CM

## 2023-08-11 RX ORDER — ATORVASTATIN CALCIUM 20 MG/1
20 TABLET, FILM COATED ORAL DAILY
Qty: 30 TABLET | Refills: 5 | Status: SHIPPED | OUTPATIENT
Start: 2023-08-11

## 2023-08-14 ENCOUNTER — HOSPITAL ENCOUNTER (OUTPATIENT)
Dept: WOUND CARE | Age: 63
Discharge: HOME | End: 2023-08-14
Payer: MEDICARE

## 2023-08-14 VITALS
RESPIRATION RATE: 18 BRPM | HEART RATE: 95 BPM | SYSTOLIC BLOOD PRESSURE: 169 MMHG | TEMPERATURE: 98.2 F | DIASTOLIC BLOOD PRESSURE: 100 MMHG

## 2023-08-14 DIAGNOSIS — M86.171 ACUTE OSTEOMYELITIS OF TOE OF RIGHT FOOT (HCC): Primary | ICD-10-CM

## 2023-08-14 DIAGNOSIS — L97.514 DIABETIC ULCER OF TOE OF RIGHT FOOT ASSOCIATED WITH TYPE 2 DIABETES MELLITUS, WITH NECROSIS OF BONE (HCC): ICD-10-CM

## 2023-08-14 DIAGNOSIS — E11.621 DIABETIC ULCER OF TOE OF RIGHT FOOT ASSOCIATED WITH TYPE 2 DIABETES MELLITUS, WITH NECROSIS OF BONE (HCC): ICD-10-CM

## 2023-08-14 PROCEDURE — 99212 OFFICE O/P EST SF 10 MIN: CPT

## 2023-08-14 RX ORDER — LIDOCAINE 50 MG/G
OINTMENT TOPICAL ONCE
Status: CANCELLED | OUTPATIENT
Start: 2023-08-14 | End: 2023-08-14

## 2023-08-14 RX ORDER — LIDOCAINE HYDROCHLORIDE 20 MG/ML
JELLY TOPICAL ONCE
Status: CANCELLED | OUTPATIENT
Start: 2023-08-14 | End: 2023-08-14

## 2023-08-14 RX ORDER — CLOBETASOL PROPIONATE 0.5 MG/G
OINTMENT TOPICAL ONCE
Status: CANCELLED | OUTPATIENT
Start: 2023-08-14 | End: 2023-08-14

## 2023-08-14 RX ORDER — SODIUM CHLOR/HYPOCHLOROUS ACID 0.033 %
SOLUTION, IRRIGATION IRRIGATION ONCE
Status: CANCELLED | OUTPATIENT
Start: 2023-08-14 | End: 2023-08-14

## 2023-08-14 RX ORDER — GENTAMICIN SULFATE 1 MG/G
OINTMENT TOPICAL ONCE
Status: CANCELLED | OUTPATIENT
Start: 2023-08-14 | End: 2023-08-14

## 2023-08-14 RX ORDER — IBUPROFEN 200 MG
TABLET ORAL ONCE
Status: CANCELLED | OUTPATIENT
Start: 2023-08-14 | End: 2023-08-14

## 2023-08-14 RX ORDER — LIDOCAINE HYDROCHLORIDE 40 MG/ML
SOLUTION TOPICAL ONCE
Status: CANCELLED | OUTPATIENT
Start: 2023-08-14 | End: 2023-08-14

## 2023-08-14 RX ORDER — BACITRACIN ZINC AND POLYMYXIN B SULFATE 500; 1000 [USP'U]/G; [USP'U]/G
OINTMENT TOPICAL ONCE
Status: CANCELLED | OUTPATIENT
Start: 2023-08-14 | End: 2023-08-14

## 2023-08-14 RX ORDER — GINSENG 100 MG
CAPSULE ORAL ONCE
Status: CANCELLED | OUTPATIENT
Start: 2023-08-14 | End: 2023-08-14

## 2023-08-14 RX ORDER — BETAMETHASONE DIPROPIONATE 0.05 %
OINTMENT (GRAM) TOPICAL ONCE
Status: CANCELLED | OUTPATIENT
Start: 2023-08-14 | End: 2023-08-14

## 2023-08-14 RX ORDER — LIDOCAINE 40 MG/G
CREAM TOPICAL ONCE
Status: CANCELLED | OUTPATIENT
Start: 2023-08-14 | End: 2023-08-14

## 2023-08-14 NOTE — PROGRESS NOTES
[facility]   Progress Note and Procedure Note      [address] RECORD NUMBER:  [id number]  AGE: 59 y.o. GENDER: male  : 1960  EPISODE DATE:  2023    Subjective:     Chief Complaint   Patient presents with    Wound Check     Right foot         HISTORY of PRESENT ILLNESS HPI     Giancarlo Garza is a 59 y.o. male who presents today for wound/ulcer evaluation. History of Wound Context: Right foot diabetic ulcer. He is improving. States the drainage has dried up over the area. Denies nausea, vomiting, fevers, or chills.   Wound/Ulcer Pain Timing/Severity: none  Quality of pain: N/A  Severity:  0 / 10   Modifying Factors: Pain is relieved/improved with rest  Associated Signs/Symptoms: numbness    Ulcer Identification:  Ulcer Type: diabetic  Contributing Factors: diabetes, shear force, and obesity    Wound: N/A        PAST MEDICAL HISTORY        Diagnosis Date    Asthma     Back pain     compresion in [place]    CAD (coronary artery disease) 2020    3 vessel     chf 2022    Diabetes mellitus ([facility])     Essential hypertension, benign     Hyperlipemia     Ischemic cardiomyopathy 2022    Neuropathic pain, leg, bilateral        PAST SURGICAL HISTORY    Past Surgical History:   Procedure Laterality Date    CARPAL TUNNEL RELEASE Bilateral     FOOT DEBRIDEMENT Right 2023    RIGHT FOOT DEBRIDEMENT INCISION AND DRAINAGE ROOM 174 performed by Rosemarie Hodges DPM at [facility] Right 2023    RIGHT FOOT  INCISION AND DRAINAGE WITH DEBRIDEMENT OF NON-VIABLE TISSUE DELAYED PRIMARY CLOSURE performed by Rosemarie Hodges DPM at [facility] Left     bone spur    LAMINOTOMY  2012    SPINE SURGERY N/A 2022    L1 VERETEBRAL AUGMENTATION performed by Marisol Mckay MD at [address] [place] [address] HISTORY    Family History   Problem Relation Age of Onset    Other Father         accident    Heart Failure Mother     Heart Disease Brother     Cirrhosis

## 2023-08-14 NOTE — CODE DOCUMENTATION
[facility] Physician Billing Sheet. Laron Gregg  AGE: 59 y.o.    GENDER: male  : 1960  TODAY'S DATE:  2023    ICD-10  [address] Problems    Diagnosis Date Noted    Type 2 diabetes mellitus with diabetic neuropathy [E11.40] 11/10/2022     Priority: Medium    Right foot ulcer, with necrosis of bone [facility]) [L97.514] 2023       PHYSICIAN PROCEDURES    CPT CODE  69806      Electronically signed by Nancy Macedo DPM on 2023 at 11:45 AM

## 2023-08-17 ENCOUNTER — HOSPITAL ENCOUNTER (OUTPATIENT)
Age: 63
Setting detail: OBSERVATION
Discharge: LEFT BEFORE DISCHARGE | End: 2023-08-17
Attending: INTERNAL MEDICINE | Admitting: INTERNAL MEDICINE
Payer: MEDICARE

## 2023-08-17 ENCOUNTER — APPOINTMENT (OUTPATIENT)
Dept: GENERAL RADIOLOGY | Age: 63
End: 2023-08-17
Payer: MEDICARE

## 2023-08-17 ENCOUNTER — APPOINTMENT (OUTPATIENT)
Dept: CT IMAGING | Age: 63
End: 2023-08-17
Payer: MEDICARE

## 2023-08-17 VITALS
OXYGEN SATURATION: 98 % | TEMPERATURE: 97.4 F | HEIGHT: 67 IN | WEIGHT: 160 LBS | BODY MASS INDEX: 25.11 KG/M2 | SYSTOLIC BLOOD PRESSURE: 187 MMHG | DIASTOLIC BLOOD PRESSURE: 90 MMHG | RESPIRATION RATE: 20 BRPM | HEART RATE: 84 BPM

## 2023-08-17 DIAGNOSIS — I63.9 FOCAL INFARCTION OF BRAIN (HCC): Primary | ICD-10-CM

## 2023-08-17 DIAGNOSIS — R07.9 CHEST PAIN, UNSPECIFIED TYPE: ICD-10-CM

## 2023-08-17 DIAGNOSIS — Z86.73 HISTORY OF CVA (CEREBROVASCULAR ACCIDENT): ICD-10-CM

## 2023-08-17 PROBLEM — R42 DIZZINESS: Status: ACTIVE | Noted: 2023-08-17

## 2023-08-17 LAB
ALBUMIN SERPL-MCNC: 4.2 G/DL (ref 3.5–4.6)
ALP SERPL-CCNC: 76 U/L (ref 35–104)
ALT SERPL-CCNC: 11 U/L (ref 0–41)
ANION GAP SERPL CALCULATED.3IONS-SCNC: 11 MEQ/L (ref 9–15)
AST SERPL-CCNC: 15 U/L (ref 0–40)
BASOPHILS # BLD: 0 K/UL (ref 0–0.2)
BASOPHILS NFR BLD: 0.5 %
BILIRUB SERPL-MCNC: 0.3 MG/DL (ref 0.2–0.7)
BNP BLD-MCNC: 1249 PG/ML
BUN SERPL-MCNC: 18 MG/DL (ref 8–23)
CALCIUM SERPL-MCNC: 9.2 MG/DL (ref 8.5–9.9)
CHLORIDE SERPL-SCNC: 103 MEQ/L (ref 95–107)
CHP ED QC CHECK: NORMAL
CO2 SERPL-SCNC: 27 MEQ/L (ref 20–31)
CREAT SERPL-MCNC: 0.92 MG/DL (ref 0.7–1.2)
EOSINOPHIL # BLD: 0.4 K/UL (ref 0–0.7)
EOSINOPHIL NFR BLD: 6.6 %
ERYTHROCYTE [DISTWIDTH] IN BLOOD BY AUTOMATED COUNT: 14.8 % (ref 11.5–14.5)
GLOBULIN SER CALC-MCNC: 2.2 G/DL (ref 2.3–3.5)
GLUCOSE BLD-MCNC: 115 MG/DL (ref 70–99)
GLUCOSE BLD-MCNC: 94 MG/DL
GLUCOSE BLD-MCNC: 94 MG/DL (ref 70–99)
GLUCOSE SERPL-MCNC: 100 MG/DL (ref 70–99)
HCT VFR BLD AUTO: 43.7 % (ref 42–52)
HGB BLD-MCNC: 14.5 G/DL (ref 14–18)
LYMPHOCYTES # BLD: 0.7 K/UL (ref 1–4.8)
LYMPHOCYTES NFR BLD: 11 %
MAGNESIUM SERPL-MCNC: 1.7 MG/DL (ref 1.7–2.4)
MCH RBC QN AUTO: 27.4 PG (ref 27–31.3)
MCHC RBC AUTO-ENTMCNC: 33.1 % (ref 33–37)
MCV RBC AUTO: 82.8 FL (ref 79–92.2)
MONOCYTES # BLD: 0.4 K/UL (ref 0.2–0.8)
MONOCYTES NFR BLD: 6.1 %
NEUTROPHILS # BLD: 4.5 K/UL (ref 1.4–6.5)
NEUTS SEG NFR BLD: 75.8 %
PERFORMED ON: ABNORMAL
PERFORMED ON: NORMAL
PLATELET # BLD AUTO: 135 K/UL (ref 130–400)
POTASSIUM SERPL-SCNC: 4.6 MEQ/L (ref 3.4–4.9)
PROT SERPL-MCNC: 6.4 G/DL (ref 6.3–8)
RBC # BLD AUTO: 5.28 M/UL (ref 4.7–6.1)
SODIUM SERPL-SCNC: 141 MEQ/L (ref 135–144)
TROPONIN T SERPL-MCNC: 0.04 NG/ML (ref 0–0.01)
WBC # BLD AUTO: 5.9 K/UL (ref 4.8–10.8)

## 2023-08-17 PROCEDURE — 97166 OT EVAL MOD COMPLEX 45 MIN: CPT

## 2023-08-17 PROCEDURE — 71045 X-RAY EXAM CHEST 1 VIEW: CPT

## 2023-08-17 PROCEDURE — 84484 ASSAY OF TROPONIN QUANT: CPT

## 2023-08-17 PROCEDURE — 93005 ELECTROCARDIOGRAM TRACING: CPT | Performed by: PHYSICIAN ASSISTANT

## 2023-08-17 PROCEDURE — 97162 PT EVAL MOD COMPLEX 30 MIN: CPT

## 2023-08-17 PROCEDURE — 83880 ASSAY OF NATRIURETIC PEPTIDE: CPT

## 2023-08-17 PROCEDURE — G0378 HOSPITAL OBSERVATION PER HR: HCPCS

## 2023-08-17 PROCEDURE — 99285 EMERGENCY DEPT VISIT HI MDM: CPT

## 2023-08-17 PROCEDURE — 70450 CT HEAD/BRAIN W/O DYE: CPT

## 2023-08-17 PROCEDURE — 6370000000 HC RX 637 (ALT 250 FOR IP): Performed by: INTERNAL MEDICINE

## 2023-08-17 PROCEDURE — 96360 HYDRATION IV INFUSION INIT: CPT

## 2023-08-17 PROCEDURE — 6360000002 HC RX W HCPCS: Performed by: INTERNAL MEDICINE

## 2023-08-17 PROCEDURE — 99223 1ST HOSP IP/OBS HIGH 75: CPT | Performed by: INTERNAL MEDICINE

## 2023-08-17 PROCEDURE — 85025 COMPLETE CBC W/AUTO DIFF WBC: CPT

## 2023-08-17 PROCEDURE — 6370000000 HC RX 637 (ALT 250 FOR IP): Performed by: PHYSICIAN ASSISTANT

## 2023-08-17 PROCEDURE — 83735 ASSAY OF MAGNESIUM: CPT

## 2023-08-17 PROCEDURE — 96372 THER/PROPH/DIAG INJ SC/IM: CPT

## 2023-08-17 PROCEDURE — 2580000003 HC RX 258: Performed by: PHYSICIAN ASSISTANT

## 2023-08-17 PROCEDURE — 36415 COLL VENOUS BLD VENIPUNCTURE: CPT

## 2023-08-17 PROCEDURE — 80053 COMPREHEN METABOLIC PANEL: CPT

## 2023-08-17 PROCEDURE — 96361 HYDRATE IV INFUSION ADD-ON: CPT

## 2023-08-17 RX ORDER — DEXTROSE MONOHYDRATE 100 MG/ML
INJECTION, SOLUTION INTRAVENOUS CONTINUOUS PRN
Status: DISCONTINUED | OUTPATIENT
Start: 2023-08-17 | End: 2023-08-17 | Stop reason: HOSPADM

## 2023-08-17 RX ORDER — ACETAMINOPHEN 650 MG/1
650 SUPPOSITORY RECTAL EVERY 6 HOURS PRN
Status: DISCONTINUED | OUTPATIENT
Start: 2023-08-17 | End: 2023-08-17 | Stop reason: HOSPADM

## 2023-08-17 RX ORDER — METOPROLOL SUCCINATE 25 MG/1
25 TABLET, EXTENDED RELEASE ORAL DAILY
Status: DISCONTINUED | OUTPATIENT
Start: 2023-08-17 | End: 2023-08-17

## 2023-08-17 RX ORDER — INSULIN GLARGINE 100 [IU]/ML
0.25 INJECTION, SOLUTION SUBCUTANEOUS NIGHTLY
Status: DISCONTINUED | OUTPATIENT
Start: 2023-08-17 | End: 2023-08-17 | Stop reason: HOSPADM

## 2023-08-17 RX ORDER — ONDANSETRON 2 MG/ML
4 INJECTION INTRAMUSCULAR; INTRAVENOUS EVERY 6 HOURS PRN
Status: DISCONTINUED | OUTPATIENT
Start: 2023-08-17 | End: 2023-08-17 | Stop reason: HOSPADM

## 2023-08-17 RX ORDER — ASPIRIN 81 MG/1
81 TABLET ORAL DAILY
Status: DISCONTINUED | OUTPATIENT
Start: 2023-08-17 | End: 2023-08-17 | Stop reason: HOSPADM

## 2023-08-17 RX ORDER — SODIUM CHLORIDE 9 MG/ML
INJECTION, SOLUTION INTRAVENOUS PRN
Status: DISCONTINUED | OUTPATIENT
Start: 2023-08-17 | End: 2023-08-17 | Stop reason: HOSPADM

## 2023-08-17 RX ORDER — CARVEDILOL 12.5 MG/1
12.5 TABLET ORAL 2 TIMES DAILY
Status: DISCONTINUED | OUTPATIENT
Start: 2023-08-17 | End: 2023-08-17 | Stop reason: HOSPADM

## 2023-08-17 RX ORDER — ENOXAPARIN SODIUM 100 MG/ML
40 INJECTION SUBCUTANEOUS DAILY
Status: DISCONTINUED | OUTPATIENT
Start: 2023-08-18 | End: 2023-08-17 | Stop reason: HOSPADM

## 2023-08-17 RX ORDER — INSULIN LISPRO 100 [IU]/ML
0-4 INJECTION, SOLUTION INTRAVENOUS; SUBCUTANEOUS NIGHTLY
Status: DISCONTINUED | OUTPATIENT
Start: 2023-08-17 | End: 2023-08-17 | Stop reason: HOSPADM

## 2023-08-17 RX ORDER — POLYETHYLENE GLYCOL 3350 17 G/17G
17 POWDER, FOR SOLUTION ORAL DAILY PRN
Status: DISCONTINUED | OUTPATIENT
Start: 2023-08-17 | End: 2023-08-17 | Stop reason: HOSPADM

## 2023-08-17 RX ORDER — CLOPIDOGREL BISULFATE 75 MG/1
75 TABLET ORAL DAILY
Status: DISCONTINUED | OUTPATIENT
Start: 2023-08-17 | End: 2023-08-17 | Stop reason: HOSPADM

## 2023-08-17 RX ORDER — ACETAMINOPHEN 325 MG/1
650 TABLET ORAL EVERY 6 HOURS PRN
Status: DISCONTINUED | OUTPATIENT
Start: 2023-08-17 | End: 2023-08-17 | Stop reason: HOSPADM

## 2023-08-17 RX ORDER — GLUCAGON 1 MG/ML
1 KIT INJECTION PRN
Status: DISCONTINUED | OUTPATIENT
Start: 2023-08-17 | End: 2023-08-17 | Stop reason: HOSPADM

## 2023-08-17 RX ORDER — HYDRALAZINE HYDROCHLORIDE 25 MG/1
25 TABLET, FILM COATED ORAL 2 TIMES DAILY
Status: DISCONTINUED | OUTPATIENT
Start: 2023-08-17 | End: 2023-08-17 | Stop reason: HOSPADM

## 2023-08-17 RX ORDER — ACETAMINOPHEN 325 MG/1
650 TABLET ORAL ONCE
Status: COMPLETED | OUTPATIENT
Start: 2023-08-17 | End: 2023-08-17

## 2023-08-17 RX ORDER — ATORVASTATIN CALCIUM 80 MG/1
80 TABLET, FILM COATED ORAL NIGHTLY
Status: DISCONTINUED | OUTPATIENT
Start: 2023-08-17 | End: 2023-08-17 | Stop reason: HOSPADM

## 2023-08-17 RX ORDER — 0.9 % SODIUM CHLORIDE 0.9 %
1000 INTRAVENOUS SOLUTION INTRAVENOUS ONCE
Status: COMPLETED | OUTPATIENT
Start: 2023-08-17 | End: 2023-08-17

## 2023-08-17 RX ORDER — SODIUM CHLORIDE 0.9 % (FLUSH) 0.9 %
5-40 SYRINGE (ML) INJECTION EVERY 12 HOURS SCHEDULED
Status: DISCONTINUED | OUTPATIENT
Start: 2023-08-17 | End: 2023-08-17 | Stop reason: HOSPADM

## 2023-08-17 RX ORDER — ONDANSETRON 4 MG/1
4 TABLET, ORALLY DISINTEGRATING ORAL EVERY 8 HOURS PRN
Status: DISCONTINUED | OUTPATIENT
Start: 2023-08-17 | End: 2023-08-17 | Stop reason: HOSPADM

## 2023-08-17 RX ORDER — INSULIN LISPRO 100 [IU]/ML
0-8 INJECTION, SOLUTION INTRAVENOUS; SUBCUTANEOUS
Status: DISCONTINUED | OUTPATIENT
Start: 2023-08-17 | End: 2023-08-17 | Stop reason: HOSPADM

## 2023-08-17 RX ORDER — SODIUM CHLORIDE 0.9 % (FLUSH) 0.9 %
5-40 SYRINGE (ML) INJECTION PRN
Status: DISCONTINUED | OUTPATIENT
Start: 2023-08-17 | End: 2023-08-17 | Stop reason: HOSPADM

## 2023-08-17 RX ORDER — ENOXAPARIN SODIUM 100 MG/ML
1 INJECTION SUBCUTANEOUS 2 TIMES DAILY
Status: DISCONTINUED | OUTPATIENT
Start: 2023-08-17 | End: 2023-08-17

## 2023-08-17 RX ORDER — TIZANIDINE 4 MG/1
4 TABLET ORAL EVERY 8 HOURS PRN
COMMUNITY

## 2023-08-17 RX ADMIN — SODIUM CHLORIDE 1000 ML: 9 INJECTION, SOLUTION INTRAVENOUS at 09:27

## 2023-08-17 RX ADMIN — CARVEDILOL 12.5 MG: 12.5 TABLET, FILM COATED ORAL at 16:14

## 2023-08-17 RX ADMIN — ACETAMINOPHEN 650 MG: 325 TABLET ORAL at 11:59

## 2023-08-17 RX ADMIN — SACUBITRIL AND VALSARTAN 1 TABLET: 24; 26 TABLET, FILM COATED ORAL at 12:51

## 2023-08-17 RX ADMIN — ENOXAPARIN SODIUM 70 MG: 100 INJECTION SUBCUTANEOUS at 12:49

## 2023-08-17 RX ADMIN — CLOPIDOGREL BISULFATE 75 MG: 75 TABLET ORAL at 12:48

## 2023-08-17 RX ADMIN — HYDRALAZINE HYDROCHLORIDE 25 MG: 25 TABLET, FILM COATED ORAL at 14:21

## 2023-08-17 RX ADMIN — METOPROLOL SUCCINATE 25 MG: 50 TABLET, EXTENDED RELEASE ORAL at 12:48

## 2023-08-17 RX ADMIN — ASPIRIN 81 MG: 81 TABLET, COATED ORAL at 12:48

## 2023-08-17 ASSESSMENT — PAIN DESCRIPTION - LOCATION: LOCATION: HEAD

## 2023-08-17 ASSESSMENT — PAIN - FUNCTIONAL ASSESSMENT: PAIN_FUNCTIONAL_ASSESSMENT: NONE - DENIES PAIN

## 2023-08-17 ASSESSMENT — PAIN SCALES - GENERAL: PAINLEVEL_OUTOF10: 10

## 2023-08-17 ASSESSMENT — ENCOUNTER SYMPTOMS: CHEST TIGHTNESS: 1

## 2023-08-17 NOTE — ED NOTES
Dizziness x 1 week. Decreased appetite. Low blood glucose consistently.      Mark Anthony Kidd RN  08/17/23 2332

## 2023-08-17 NOTE — PROGRESS NOTES
[facility] OCCUPATIONAL THERAPY EVALUATION - ACUTE     NAME: Jamin Peng  : 1960 (59 y.o.)  MRN: [id number]  CODE STATUS: Full Code  Room: D235/D982-64    Date of Service: 2023    Patient Diagnosis(es): Dizziness [R42]  History of CVA (cerebrovascular accident) [Z86.73]  Focal infarction of brain [facility]) [I63.9]  Chest pain, unspecified type [R07.9]   Patient Active Problem List    Diagnosis Date Noted    Cellulitis of right foot 2023    Diplopia 2023    Toothache 2023    Chronic systolic (congestive) heart failure 11/10/2022    Crush fracture of vertebra due to osteoporosis ([address]) 11/10/2022    Type 2 diabetes mellitus with diabetic neuropathy 11/10/2022    Diabetes mellitus type 2, insulin dependent ([address]) 11/10/2022    chf 2022    Ischemic cardiomyopathy 2022    Hypertensive urgency 2022    Intractable nausea and vomiting 2022    Contact with and (suspected) exposure to covid-19 2021    Hypokalemia 2021    Presence of aortocoronary bypass graft 2021    Altered mental status, unspecified 2021    Moderate persistent asthma without complication     Nasal congestion 2019    Headache 2018    Illness, unspecified 2018    Nausea 2018    Lumbosacral spondylosis without myelopathy 2016    Degeneration of lumbar intervertebral disc 2015    Low back pain 2015    Displacement of lumbar intervertebral disc without myelopathy 2012    Dizziness 2023    Coagulation defect ([address]) 2023    Weakness 05/15/2023    Acute osteomyelitis of toe of right foot ([address]) 2023    Diabetic ulcer of toe associated with type 2 diabetes mellitus, with necrosis of bone ([address]) 2023    Medically noncompliant 2023    Acute hematogenous osteomyelitis of right foot ([address]) 2023    Hyperglycemia 2023    Right foot ulcer, with necrosis of bone ([address]) 2023    Right hallux osteomyelitis Irregular heart rhythm 11/21/2012    Obesity, diabetes, and hypertension syndrome ([address]) 03/16/2005    Hyperlipidemia 03/16/2005        Past Medical History:   Diagnosis Date    Asthma     Back pain     compresion in [place]    CAD (coronary artery disease) 01/2020    3 vessel     chf 09/16/2022    Diabetes mellitus ([address])     Essential hypertension, benign     Hyperlipemia     Ischemic cardiomyopathy 09/16/2022    Neuropathic pain, leg, bilateral      Past Surgical History:   Procedure Laterality Date    CARPAL TUNNEL RELEASE Bilateral     FOOT DEBRIDEMENT Right 5/5/2023    RIGHT FOOT DEBRIDEMENT INCISION AND DRAINAGE ROOM 174 performed by Neda Hartley DPM at [facility] Right 5/8/2023    RIGHT FOOT  INCISION AND DRAINAGE WITH DEBRIDEMENT OF NON-VIABLE TISSUE DELAYED PRIMARY CLOSURE performed by Neda Hartley DPM at [facility] Left     bone spur    LAMINOTOMY  2012    SPINE SURGERY N/A 11/11/2022    L1 VERETEBRAL AUGMENTATION performed by Gloria Chong MD at [address] [place]  Restrictions/Precautions: Fall Risk, Up as Tolerated      Left Lower Extremity Weight Bearing: Weight Bearing As Tolerated  Other position/activity restrictions: Pt reports he is now WBAT on RLE for previous ulcer on foot    Safety Devices: Safety Devices  Type of Devices: All fall risk precautions in place;Call light within reach; Left in bed (RN present on therapist departure)     Patient's date of birth confirmed: Yes    General:  Chart Reviewed: Yes  Patient assessed for rehabilitation services?: Yes    Subjective  Subjective: \"I'm okay.  Still kind of dizzy\"       Pain at start of treatment: No    Pain at end of treatment: No    Location:   Description:   Nursing notified: Not Applicable  RN:   Intervention: Repositioned    Prior Level of Function:  Social/Functional History  Lives With: Spouse  Type of Home: House  Home Layout: Two level, Bed/Bath upstairs  Home Access: Level entry  [place]

## 2023-08-17 NOTE — ED NOTES
Page to benita shaw @ 18  Dr Anita Rush returned call @ [address] [facility] [address]  08/17/23 2571

## 2023-08-17 NOTE — ED PROVIDER NOTES
[facility] ED  eMERGENCY dEPARTMENTeNCOUnter      Pt Name: Gerald Vale  MRN: [id number]  [address] [facility] [place] 1960  Date ofevaluation: 8/17/2023  Provider: Hammad Burns PA-C    CHIEF COMPLAINT       Chief Complaint   Patient presents with    Dizziness     Pt c/o dizziness for the past three days         HISTORY OF PRESENT ILLNESS   (Location/Symptom, Timing/Onset,Context/Setting, Quality, Duration, Modifying Factors, Severity)  Note limiting factors. Gerald Vale is a 59 y.o. male who presents to the emergency department     Bs 50 this morning ate grapes to 70s ems 135     Dizziness started last night gets dizzy when dark. Stepped on curb felt woozy and stubled to his knee did not hit his head. Another episode last week at [facility]. Was in a boot causing problems walking. Worse with changing positions. Recent med change increased bp med. Reports his equillibrium is off     Chest pressure for 1 week around dinner time. Reports under a lot of family stress its pressure that does not radiate no sob resolves on its own. HPI    NursingNotes were reviewed. REVIEW OF SYSTEMS    (2-9 systems for level 4, 10 or more for level 5)     Review of Systems   Respiratory:  Positive for chest tightness. Cardiovascular:  Positive for chest pain. Musculoskeletal:  Positive for gait problem. Neurological:  Positive for dizziness and light-headedness. All other systems reviewed and are negative. Except as noted above the remainder of the review of systems was reviewed and negative.        PAST MEDICAL HISTORY     Past Medical History:   Diagnosis Date    Asthma     Back pain     compresion in [place]    CAD (coronary artery disease) 01/2020    3 vessel     chf 09/16/2022    Diabetes mellitus ([address])     Essential hypertension, benign     Hyperlipemia     Ischemic cardiomyopathy 09/16/2022    Neuropathic pain, leg, bilateral          SURGICALHISTORY       Past Surgical History:   Procedure Laterality Date    CARPAL medication changes a previous office visit by Dr. Barber Bai was reviewed. Patient also has significant cardiac history and sees Dr. Kirstin Mathew with a history of cardiac stents as well as multiple risk factors of CHF CAD diabetes hypertension hyperlipidemia. His troponin is mildly elevated at 0.044 which is a little up from his baseline elevated troponin he has been having chest pain last episode was yesterday around 5 PM. Will trend troponins. Will admit for cardiac r/o as well for newly diagnossed infarcts on ct brain. Pt will need help with his medication as well. It does seem he has some memory issues as well, which was mentioned in previous Dr. Barber Bai note. Hospitalist Dr. Gordon David accepted the pt. Pt stable and ready for admission. REASSESSMENT          CRITICAL CARE TIME   Total Critical Care time was  minutes, excluding separatelyreportable procedures. There was a high probability ofclinically significant/life threatening deterioration in the patient's condition which required my urgent intervention. CONSULTS:  None    PROCEDURES:  Unless otherwise noted below, none     Procedures    FINAL IMPRESSION      1. Focal infarction of brain ([address])    2. Chest pain, unspecified type          DISPOSITION/PLAN   DISPOSITION Decision To Admit 08/17/2023 10:47:03 AM      PATIENT REFERREDTO:  No follow-up provider specified. DISCHARGEMEDICATIONS:  New Prescriptions    No medications on file          (Please note that portions of this note were completed with a voice recognition program.  Efforts were made to edit the dictations but occasionally words are mis-transcribed.)    Constance Castaneda PA-C (electronically signed)  Attending Emergency Physician  . en     Constance Castaneda PA-C  08/17/23 2147

## 2023-08-17 NOTE — PLAN OF CARE
Therapy evaluation completed. Please see daily notes and/or progress notes for details related to planned treatment interventions, goals and functional performance.      Electronically signed by Alton Leger PT on 8/17/2023 at 2:29 PM

## 2023-08-17 NOTE — PROGRESS NOTES
Physical Therapy Med Surg Initial Assessment  Fac[facility]/Department: [facility]  [facility]       NAME: Matheus Van  : 1960 (59 y.o.)  MRN: [id number]  CODE STATUS: Full Code    Date of Service: 2023    Patient Diagnosis(es): Dizziness [R42]  History of CVA (cerebrovascular accident) [Z86.73]  Focal infarction of brain [facility]) [I63.9]  Chest pain, unspecified type [R07.9]   Chief Complaint   Patient presents with    Dizziness     Pt c/o dizziness for the past three days     Patient Active Problem List    Diagnosis Date Noted    Cellulitis of right foot 2023    Diplopia 2023    Toothache 2023    Chronic systolic (congestive) heart failure 11/10/2022    Crush fracture of vertebra due to osteoporosis ([address]) 11/10/2022    Type 2 diabetes mellitus with diabetic neuropathy 11/10/2022    Diabetes mellitus type 2, insulin dependent ([address]) 11/10/2022    chf 2022    Ischemic cardiomyopathy 2022    Hypertensive urgency 2022    Intractable nausea and vomiting 2022    Contact with and (suspected) exposure to covid-19 2021    Hypokalemia 2021    Presence of aortocoronary bypass graft 2021    Altered mental status, unspecified 2021    Moderate persistent asthma without complication     Nasal congestion 2019    Headache 2018    Illness, unspecified 2018    Nausea 2018    Lumbosacral spondylosis without myelopathy 2016    Degeneration of lumbar intervertebral disc 2015    Low back pain 2015    Displacement of lumbar intervertebral disc without myelopathy 2012    Dizziness 2023    Coagulation defect ([address]) 2023    Weakness 05/15/2023    Acute osteomyelitis of toe of right foot ([address]) 2023    Diabetic ulcer of toe associated with type 2 diabetes mellitus, with necrosis of bone ([address]) 2023    Medically noncompliant 2023    Acute hematogenous osteomyelitis of right foot [employer]  Occupation: On disability    OBJECTIVE:   Vision Exceptions: Wears glasses at all times (reports 'new' visual changes over time; 'I need to go to the eye doctor'; at least ~1 week signs/symptoms)  Hearing: Within functional limits    Cognition:  Follows Commands: Within Functional Limits    Observation/Palpation  Observation: glasses donned, agreeable to evaluation with encouragement    Bed mobility  Supine to Sit: Supervision  Sit to Supine: Supervision  Bed Mobility Comments: HOB raised    Transfers  Sit to Stand: Minimal Assistance  Stand to Sit: Minimal Assistance  Comment: MIN A secondary to 'dizziness' of patient; no device    Ambulation  WB Status: WBAT  Ambulation  Surface: Level tile  Device: No Device  Assistance: Minimal assistance  Quality of Gait: unsteady  Gait Deviations: Slow Ani; Increased MIRANDA; Decreased step length;Decreased step height  Distance: 2-3 steps  Comments: MIN A secondary to 'dizziness' of patient; no device; therapist stopped patient from ambulating further due to 'dizziness' that did not dissipate; RN came into room & was monitoring patient following ambulation    Stairs/Curb  Stairs?: No      Activity Tolerance  Activity Tolerance: Patient tolerated evaluation without incident      ASSESSMENT:   Body Structures, Functions, Activity Limitations Requiring Skilled Therapeutic Intervention: Decreased functional mobility ; Decreased ADL status; Decreased endurance;Decreased balance;Decreased strength  Decision Making: Medium Complexity  History: medium  Exam: medium  Clinical Presentation: high    Treatment Diagnosis: decreased functional mobility, decreased ADL status, decreased endurance, decreased balance, decreased strength  Therapy Prognosis: Good;Fair    DISCHARGE RECOMMENDATIONS:  Discharge Recommendations: Continue to assess pending progress    Assessment: Patient is a 59year old male who is currently admitted who is currently functioning below reported functional mobility

## 2023-08-17 NOTE — ED NOTES
Page to [facility] @ [address] returned call @ [facility]  08/17/23 0723

## 2023-08-17 NOTE — ED NOTES
[address] [address] [place] Medication Reconciliation Note      Medication reconciliation was attempted for patient Mario Wick 1960. Admit date: 8/17/2023  Consult: Yes    Med rec status: Medrec Status: Completed    Information provided by: MED REC HISTORY: Patient Leona Tyler, Communication with pharmacy Drug Tadeo Chang, phone number [phone], and Review of EMR    Pharmacy:  [facility] [address] [place] [address] ([facility]), Phone [phone]    Pharmacy Updated: Yes    MEDICATIONS     Prior to Admission medications    Medication Sig Start Date End Date Taking? Authorizing Provider   tiZANidine (ZANAFLEX) 4 MG tablet Take 1 tablet by mouth every 8 hours as needed   Yes Historical Provider, MD   atorvastatin (LIPITOR) 20 MG tablet Take 1 tablet by mouth daily 8/11/23   Rachel Yao MD   Handicap Placard MISC by Does not apply route He needs a placard for 5 years  Expires 8/2/28  He is unable to walk 200 feet without stopping  Dx back pain 8/2/23   Rachel Yao MD   tiZANidine (ZANAFLEX) 4 MG tablet Take 1 tablet by mouth 2 times daily as needed (prn) 8/2/23 8/17/23  Rachel Yao MD   valsartan (DIOVAN) 80 MG tablet Take 1 tablet by mouth daily 7/12/23   Rachel Yao MD   ibuprofen (ADVIL;MOTRIN) 800 MG tablet Take 1 tablet by mouth 2 times daily as needed for Pain 7/12/23   Rachel Yao MD   Insulin Degludec 100 UNIT/ML SOPN Inject 25 Units into the skin daily 5/9/23   Historical Provider, MD   meperidine (DEMEROL) 25 MG/ML injection Infuse 0.5 mLs intravenously.   Patient not taking: Reported on 8/2/2023 5/8/23 8/17/23  Historical Provider, MD Ender Gonzalez 100-25 MCG/ACT inhaler 1 puff daily 6/9/23   Historical Provider, MD   glucagon 1 MG injection Inject 1 mg into the muscle as needed 4/30/23   Historical Provider, MD   ergocalciferol (ERGOCALCIFEROL) 1.25 MG (78312 UT) capsule Take 1 capsule by mouth once a week 12/21/22   Historical Provider, MD   fentaNYL (SUBLIMAZE) 50 MCG/ML injection Infuse intravenously.   Patient not taking: Reported on 8/2/2023 5/8/23 8/17/23  Historical Provider, MD   BD POSIFLUSH 0.9 % injection  6/6/23 8/17/23  Historical Provider, MD   polyethylene glycol Eduarda Jennifer) 17 GM/SCOOP powder Take by mouth 4/30/23 8/17/23  Historical Provider, MD   LANCARMELOUS SOLOSTAR 100 UNIT/ML injection pen  6/3/23 8/17/23  Historical Provider, MD   hydrOXYzine HCl (ATARAX) 25 MG tablet Take by mouth 5/2/23 8/17/23  Historical Provider, MD   heparin flush, PF, 10 UNIT/ML injection  6/6/23 8/17/23  Historical Provider, MD   enoxaparin (LOVENOX) 40 MG/0.4ML Inject into the skin  Patient not taking: Reported on 8/2/2023 5/1/23 8/17/23  Historical Provider, MD   ondansetron (ZOFRAN-ODT) 4 MG disintegrating tablet Take by mouth 4/30/23 8/17/23  Historical Provider, MD   sacubitril-valsartan (ENTRESTO) 24-26 MG per tablet Take 1 tablet by mouth 2 times daily 5/16/23 8/17/23  NABILA Willams   empagliflozin (JARDIANCE) 10 MG tablet Take 1 tablet by mouth daily 5/9/23   NABILA Sierra   metFORMIN (GLUCOPHAGE) 1000 MG tablet TAKE 1 TABLET BY MOUTH TWICE DAILY 5/9/23   NABILA Sierra   insulin aspart (NOVOLOG FLEXPEN) 100 UNIT/ML injection pen Inject 8 Units into the skin 3 times daily (before meals) 5/9/23   NABILA Sierra   Insulin Pen Needle (NOVOFINE) 32G X 6 MM MISC Used to inject insulin 4 times daily before meals and bedtime 5/9/23   NABILA Sierra   carvedilol (COREG) 12.5 MG tablet TAKE 1 TABLET BY MOUTH 2 TIMES DAILY 1/25/23   NABILA Willams   clopidogrel (PLAVIX) 75 MG tablet Take 1 tablet by mouth daily 1/3/23   SHERIN Kelley - CNP   hydrALAZINE (APRESOLINE) 25 MG tablet Take 1 tablet by mouth in the morning and at bedtime 1/3/23   NABILA Willams   aspirin 81 MG EC tablet Take 1 tablet by mouth daily 8/18/22   Rich John DO   lisinopril (PRINIVIL;ZESTRIL) 10 MG tablet Take 1 tablet by mouth daily 6/1/22 8/18/22  Humaira Carrillo MD

## 2023-08-17 NOTE — PLAN OF CARE
See OT evaluation for all goals and OT POC.  Electronically signed by BAO Macdonald on 8/17/2023 at 4:19 PM

## 2023-08-17 NOTE — H&P
HISTORY AND PHYSICAL             Date: 8/17/2023        Patient Name: Liliya Sahni     YOB: 1960      Age:  59 y.o. Chief Complaint     Chief Complaint   Patient presents with    Dizziness     Pt c/o dizziness for the past three days          History Obtained From   patient, electronic medical record    History of Present Illness   Liliya Sahni is a 81-year-old male who presents to the ED today for complaints of dizziness. Patient reports dizziness for the past several days. Patient attributes dizziness to possibly not eating well. Reports poor appetite; patient does have access to food, but reports that he has been having pain in the center of his chest; unable to describe the pain; but endorses it as a weird feeling and believes it is indigestion. No aggravating or relieving factors reported. EKG reveals NSR with sinus arrhythmia, possible right atrial enlargement, incomplete R BBB. BP elevated at 175/98, glucose 100, Hg/HCT wnl at 14.5/43. 7. Nothing concerning with electrolytes. Endorses shortness of breath with activity. No home use of oxygen reported. CXR no acute processes. Patient denies palpitations, headache, cough, fever, chills, N/V/D, and changes in appetite. Patient has a past medical history of asthma, CAD; CABG, diabetes, hypertension, hyperlipidemia, ischemia cardio neuropathy, osteoarthritis right great toe/foot, and neuropathy pain.       Past Medical History     Past Medical History:   Diagnosis Date    Asthma     Back pain     compresion in [place]    CAD (coronary artery disease) 01/2020    3 vessel     chf 09/16/2022    Diabetes mellitus ([address])     Essential hypertension, benign     Hyperlipemia     Ischemic cardiomyopathy 09/16/2022    Neuropathic pain, leg, bilateral         Past Surgical History     Past Surgical History:   Procedure Laterality Date    CARPAL TUNNEL RELEASE Bilateral     FOOT DEBRIDEMENT Right 5/5/2023    RIGHT FOOT DEBRIDEMENT INCISION AND DRAINAGE reading provider will be dictating this exam?->CRC FINDINGS: The lungs are without acute focal process. There is no effusion or pneumothorax. The cardiomediastinal silhouette is without acute process. The osseous structures are without acute process. No acute process. Assessment      Hospital Problems             Last Modified POA    * (Principal) Dizziness 8/17/2023 Yes       Plan   *Dizziness  *Asthma  *CAD; CABG, HTN, hyperlipidemia, ischemic cardiomyopathy  *Diabetes w/ neuropathy  *OM right great toe/foot     -Admit observation with telemetry  -Trend troponins  -Ultrasound carotids  -Consult neurology  -Inpatient cardiology consult  -PT OT eval and treat  -Aspirin 81 mg atorvastatin 80, Coreg 12.5 twice daily, statin, hydralazine, Entresto  -Monitor blood sugars ACHS; SSI scale coverage; hypoglycemic protocol; Lantus 18 units nightly  -DVT/PE prophylaxis [facility] medicine managing acute needs. Patient will need to follow-up with PCP for chronic disease management. Time spent with patient 35 minutes. Greater than 70% of time  spent focused exclusively on this patient ,reviewing  chart,  reconciling medications, &  answering questions with patient and discussing plan.     Consultations Ordered:  IP CONSULT TO NEUROLOGY  IP CONSULT TO CARDIOLOGY    Electronically signed by SHERIN Torres CNP on 8/17/23 at 12:49 PM EDT

## 2023-08-18 ENCOUNTER — TELEPHONE (OUTPATIENT)
Dept: PRIMARY CARE CLINIC | Age: 63
End: 2023-08-18

## 2023-08-18 ENCOUNTER — CARE COORDINATION (OUTPATIENT)
Dept: CARE COORDINATION | Age: 63
End: 2023-08-18

## 2023-08-18 LAB
EKG ATRIAL RATE: 79 BPM
EKG P AXIS: 44 DEGREES
EKG P-R INTERVAL: 136 MS
EKG Q-T INTERVAL: 412 MS
EKG QRS DURATION: 100 MS
EKG QTC CALCULATION (BAZETT): 472 MS
EKG R AXIS: -49 DEGREES
EKG T AXIS: -9 DEGREES
EKG VENTRICULAR RATE: 79 BPM

## 2023-08-18 NOTE — TELEPHONE ENCOUNTER
Addended message to [facility] F/U  Call placed to patient to schedule him for a hospital follow up as asked by MA, he stated for what reason and I told him to follow up for his hospital visit, he stated that wasn't necessary he left against medical advice and left the practice. I said okay, thank you very much sir, have a good day!

## 2023-08-18 NOTE — CARE COORDINATION
Care Transitions Outreach Attempt    Call within 2 business days of discharge: Yes   Attempted to reach patient for transitions of care follow up. Unable to reach patient. LPN/CTN left HIPAA compliant message requesting return call. Patient: Rene Lopez Patient : 1960 MRN: [id number]    Last Discharge [address]       Date Complaint Diagnosis Description Type Department Provider    23 Dizziness Focal infarction of brain ([address] ) . .. ED to Hosp-Admission (Discharged) (ADMITTED) [address], DO          Was this an external facility discharge? No [place] [facility]    Noted following upcoming appointments from discharge chart review:   [facility] follow up appointment(s):   Future Appointments   Date Time Provider [address] [place] [address]   2023 10:00 AM Edna Rangel, [address] [facility]   2023 11:45 AM Mago Hightower MD [facility] Neurology -     Non-[facility] follow up appointment(s): none    LPN routed message to PCP Clinical Staff/Office Staff to schedule Hosp F/U.

## 2023-08-21 ENCOUNTER — CARE COORDINATION (OUTPATIENT)
Dept: CARE COORDINATION | Age: 63
End: 2023-08-21

## 2023-08-21 LAB
EKG ATRIAL RATE: 79 BPM
EKG P AXIS: 49 DEGREES
EKG P-R INTERVAL: 146 MS
EKG Q-T INTERVAL: 406 MS
EKG QRS DURATION: 90 MS
EKG QTC CALCULATION (BAZETT): 465 MS
EKG R AXIS: -48 DEGREES
EKG T AXIS: 7 DEGREES
EKG VENTRICULAR RATE: 79 BPM

## 2023-08-21 PROCEDURE — 93010 ELECTROCARDIOGRAM REPORT: CPT | Performed by: INTERNAL MEDICINE

## 2023-08-21 NOTE — DISCHARGE SUMMARY
[facility] Medicine Discharge Summary    Gerald Vale  :  1960  MRN:  [id number]    Admit date:  2023    Date patient left AMA:  2023    Admitting Physician:  Tamela Londono DO  Primary Care Physician:  Vic Freeman MD    Discharge Diagnoses:    Principal Problem:    Dizziness  Resolved Problems:    * No resolved hospital problems. *    Chief Complaint   Patient presents with    Dizziness     Pt c/o dizziness for the past three days       Condition: unchanged   Disposition: left AMA  Functional Status: ambulatory    Significant Findings:     Labs Renal Latest Ref Rng & Units 2023 2023 8/3/2023 2023 2023   BUN 8 - 23 mg/dL - 18 20 - 27   Cr 0.50 - 1.30 mg/dL 0.99 0.92 0.95 1.05 1.3   K 3.5 - 5.3 mmol/L 4.1 4.6 4.3 4.1 4.3   Na 136 - 145 mmol/L 137 141 142 138 140       Hospital Course:   Medically noncompliant 81-year-old male was admitted on  for dizziness but shortly after left AGAINST MEDICAL ADVICE. Please see H&P for details.       Signed:  Tamela Londono DO  2023, 11:15 AM

## 2023-08-21 NOTE — CARE COORDINATION
Care Transitions Outreach Attempt    Call within 2 business days of discharge: Yes   Second attempt made to reach patient for transitions of care follow up. Unable to reach patient. LPN/CTN left HIPAA compliant message requesting return call. Patient: Johanne Monroe Patient : 1960 MRN: [id number]      Last Discharge [address]       Date Complaint Diagnosis Description Type Department Provider    23 Dizziness Focal infarction of brain ([address]) . .. ED to Hosp-Admission (Discharged) (ADMITTED) [address], DO          Was this an external facility discharge?  No Discharge Facility: [facility]    Noted following upcoming appointments from discharge chart review:   [facility] follow up appointment(s):   Future Appointments   Date Time Provider [address] [place] [address]   2023 10:00 AM Dewey Sloan, [address] [facility]   2023 11:45 AM Djeah Mann MD [facility] Neurology -     Non-[facility] follow up appointment(s): none

## 2023-08-26 ENCOUNTER — HOSPITAL ENCOUNTER (OUTPATIENT)
Age: 63
Setting detail: OBSERVATION
Discharge: HOME | End: 2023-08-26
Attending: INTERNAL MEDICINE
Payer: MEDICARE

## 2023-08-26 ENCOUNTER — APPOINTMENT (OUTPATIENT)
Dept: GENERAL RADIOLOGY | Age: 63
End: 2023-08-26
Payer: MEDICARE

## 2023-08-26 ENCOUNTER — APPOINTMENT (OUTPATIENT)
Dept: CT IMAGING | Age: 63
End: 2023-08-26
Payer: MEDICARE

## 2023-08-26 VITALS
RESPIRATION RATE: 17 BRPM | HEART RATE: 75 BPM | OXYGEN SATURATION: 95 % | WEIGHT: 179 LBS | TEMPERATURE: 98.9 F | SYSTOLIC BLOOD PRESSURE: 134 MMHG | BODY MASS INDEX: 28.04 KG/M2 | DIASTOLIC BLOOD PRESSURE: 85 MMHG

## 2023-08-26 DIAGNOSIS — Z53.29 LEFT AGAINST MEDICAL ADVICE: ICD-10-CM

## 2023-08-26 DIAGNOSIS — G45.9 TIA (TRANSIENT ISCHEMIC ATTACK): Primary | ICD-10-CM

## 2023-08-26 LAB
ALBUMIN SERPL-MCNC: 3.7 G/DL (ref 3.5–4.6)
ALP SERPL-CCNC: 78 U/L (ref 35–104)
ALT SERPL-CCNC: 9 U/L (ref 0–41)
ANION GAP SERPL CALCULATED.3IONS-SCNC: 9 MEQ/L (ref 9–15)
APTT PPP: 27.5 SEC (ref 24.4–36.8)
AST SERPL-CCNC: 12 U/L (ref 0–40)
BASOPHILS # BLD: 0 K/UL (ref 0–0.2)
BASOPHILS NFR BLD: 0.4 %
BILIRUB SERPL-MCNC: 0.4 MG/DL (ref 0.2–0.7)
BUN SERPL-MCNC: 25 MG/DL (ref 8–23)
CALCIUM SERPL-MCNC: 8.7 MG/DL (ref 8.5–9.9)
CHLORIDE SERPL-SCNC: 104 MEQ/L (ref 95–107)
CHP ED QC CHECK: YES
CO2 SERPL-SCNC: 24 MEQ/L (ref 20–31)
CREAT SERPL-MCNC: 1.16 MG/DL (ref 0.7–1.2)
EKG ATRIAL RATE: 69 BPM
EKG P AXIS: 39 DEGREES
EKG P-R INTERVAL: 138 MS
EKG Q-T INTERVAL: 426 MS
EKG QRS DURATION: 96 MS
EKG QTC CALCULATION (BAZETT): 456 MS
EKG R AXIS: -42 DEGREES
EKG T AXIS: -18 DEGREES
EKG VENTRICULAR RATE: 69 BPM
EOSINOPHIL # BLD: 0.4 K/UL (ref 0–0.7)
EOSINOPHIL NFR BLD: 8.2 %
ERYTHROCYTE [DISTWIDTH] IN BLOOD BY AUTOMATED COUNT: 14.4 % (ref 11.5–14.5)
GLOBULIN SER CALC-MCNC: 2.1 G/DL (ref 2.3–3.5)
GLUCOSE BLD-MCNC: 223 MG/DL
GLUCOSE BLD-MCNC: 223 MG/DL (ref 70–99)
GLUCOSE SERPL-MCNC: 206 MG/DL (ref 70–99)
HCT VFR BLD AUTO: 38.2 % (ref 42–52)
HGB BLD-MCNC: 12.8 G/DL (ref 14–18)
INR PPP: 1
LYMPHOCYTES # BLD: 0.6 K/UL (ref 1–4.8)
LYMPHOCYTES NFR BLD: 11.9 %
MAGNESIUM SERPL-MCNC: 1.8 MG/DL (ref 1.7–2.4)
MCH RBC QN AUTO: 27.5 PG (ref 27–31.3)
MCHC RBC AUTO-ENTMCNC: 33.4 % (ref 33–37)
MCV RBC AUTO: 82.4 FL (ref 79–92.2)
MONOCYTES # BLD: 0.3 K/UL (ref 0.2–0.8)
MONOCYTES NFR BLD: 7 %
NEUTROPHILS # BLD: 3.4 K/UL (ref 1.4–6.5)
NEUTS SEG NFR BLD: 72.5 %
PERFORMED ON: ABNORMAL
PLATELET # BLD AUTO: 128 K/UL (ref 130–400)
POC CREATININE WHOLE BLOOD: 1.2
POTASSIUM SERPL-SCNC: 4.7 MEQ/L (ref 3.4–4.9)
PROT SERPL-MCNC: 5.8 G/DL (ref 6.3–8)
PROTHROMBIN TIME: 13.3 SEC (ref 12.3–14.9)
RBC # BLD AUTO: 4.64 M/UL (ref 4.7–6.1)
SODIUM SERPL-SCNC: 137 MEQ/L (ref 135–144)
TROPONIN T SERPL-MCNC: 0.03 NG/ML (ref 0–0.01)
WBC # BLD AUTO: 4.8 K/UL (ref 4.8–10.8)

## 2023-08-26 PROCEDURE — 70450 CT HEAD/BRAIN W/O DYE: CPT

## 2023-08-26 PROCEDURE — 83735 ASSAY OF MAGNESIUM: CPT

## 2023-08-26 PROCEDURE — 85730 THROMBOPLASTIN TIME PARTIAL: CPT

## 2023-08-26 PROCEDURE — 84484 ASSAY OF TROPONIN QUANT: CPT

## 2023-08-26 PROCEDURE — 85025 COMPLETE CBC W/AUTO DIFF WBC: CPT

## 2023-08-26 PROCEDURE — 70496 CT ANGIOGRAPHY HEAD: CPT

## 2023-08-26 PROCEDURE — 85610 PROTHROMBIN TIME: CPT

## 2023-08-26 PROCEDURE — 96360 HYDRATION IV INFUSION INIT: CPT

## 2023-08-26 PROCEDURE — 2580000003 HC RX 258: Performed by: PHYSICIAN ASSISTANT

## 2023-08-26 PROCEDURE — 71045 X-RAY EXAM CHEST 1 VIEW: CPT

## 2023-08-26 PROCEDURE — 93005 ELECTROCARDIOGRAM TRACING: CPT | Performed by: PHYSICIAN ASSISTANT

## 2023-08-26 PROCEDURE — 36415 COLL VENOUS BLD VENIPUNCTURE: CPT

## 2023-08-26 PROCEDURE — 6360000004 HC RX CONTRAST MEDICATION: Performed by: PHYSICIAN ASSISTANT

## 2023-08-26 PROCEDURE — 80053 COMPREHEN METABOLIC PANEL: CPT

## 2023-08-26 PROCEDURE — 6370000000 HC RX 637 (ALT 250 FOR IP): Performed by: PHYSICIAN ASSISTANT

## 2023-08-26 PROCEDURE — G0378 HOSPITAL OBSERVATION PER HR: HCPCS

## 2023-08-26 PROCEDURE — 70498 CT ANGIOGRAPHY NECK: CPT

## 2023-08-26 PROCEDURE — 99285 EMERGENCY DEPT VISIT HI MDM: CPT

## 2023-08-26 RX ORDER — SACUBITRIL AND VALSARTAN 24; 26 MG/1; MG/1
1 TABLET, FILM COATED ORAL 2 TIMES DAILY
COMMUNITY

## 2023-08-26 RX ORDER — ASPIRIN 81 MG/1
81 TABLET, CHEWABLE ORAL DAILY
Status: CANCELLED | OUTPATIENT
Start: 2023-08-27

## 2023-08-26 RX ORDER — ONDANSETRON 2 MG/ML
4 INJECTION INTRAMUSCULAR; INTRAVENOUS EVERY 6 HOURS PRN
Status: CANCELLED | OUTPATIENT
Start: 2023-08-26

## 2023-08-26 RX ORDER — SODIUM CHLORIDE 9 MG/ML
INJECTION, SOLUTION INTRAVENOUS PRN
Status: CANCELLED | OUTPATIENT
Start: 2023-08-26

## 2023-08-26 RX ORDER — POLYETHYLENE GLYCOL 3350 17 G/17G
17 POWDER, FOR SOLUTION ORAL DAILY PRN
Status: CANCELLED | OUTPATIENT
Start: 2023-08-26

## 2023-08-26 RX ORDER — ASPIRIN 300 MG/1
300 SUPPOSITORY RECTAL DAILY
Status: CANCELLED | OUTPATIENT
Start: 2023-08-27

## 2023-08-26 RX ORDER — ROSUVASTATIN CALCIUM 20 MG/1
20 TABLET, COATED ORAL DAILY
COMMUNITY

## 2023-08-26 RX ORDER — SODIUM CHLORIDE 0.9 % (FLUSH) 0.9 %
5-40 SYRINGE (ML) INJECTION EVERY 12 HOURS SCHEDULED
Status: CANCELLED | OUTPATIENT
Start: 2023-08-26

## 2023-08-26 RX ORDER — INSULIN GLARGINE 100 [IU]/ML
INJECTION, SOLUTION SUBCUTANEOUS NIGHTLY
COMMUNITY
End: 2023-08-26

## 2023-08-26 RX ORDER — HYDRALAZINE HYDROCHLORIDE 20 MG/ML
10 INJECTION INTRAMUSCULAR; INTRAVENOUS EVERY 6 HOURS PRN
Status: DISCONTINUED | OUTPATIENT
Start: 2023-08-26 | End: 2023-08-26 | Stop reason: HOSPADM

## 2023-08-26 RX ORDER — ATORVASTATIN CALCIUM 80 MG/1
80 TABLET, FILM COATED ORAL NIGHTLY
Status: CANCELLED | OUTPATIENT
Start: 2023-08-26

## 2023-08-26 RX ORDER — SODIUM CHLORIDE 0.9 % (FLUSH) 0.9 %
5-40 SYRINGE (ML) INJECTION PRN
Status: CANCELLED | OUTPATIENT
Start: 2023-08-26

## 2023-08-26 RX ORDER — 0.9 % SODIUM CHLORIDE 0.9 %
1000 INTRAVENOUS SOLUTION INTRAVENOUS ONCE
Status: COMPLETED | OUTPATIENT
Start: 2023-08-26 | End: 2023-08-26

## 2023-08-26 RX ORDER — ONDANSETRON 4 MG/1
4 TABLET, ORALLY DISINTEGRATING ORAL EVERY 8 HOURS PRN
Status: CANCELLED | OUTPATIENT
Start: 2023-08-26

## 2023-08-26 RX ADMIN — IOPAMIDOL 75 ML: 755 INJECTION, SOLUTION INTRAVENOUS at 10:06

## 2023-08-26 RX ADMIN — SODIUM CHLORIDE 1000 ML: 9 INJECTION, SOLUTION INTRAVENOUS at 10:47

## 2023-08-26 RX ADMIN — APIXABAN 5 MG: 5 TABLET, FILM COATED ORAL at 12:47

## 2023-08-26 ASSESSMENT — ENCOUNTER SYMPTOMS
RHINORRHEA: 0
VOMITING: 0
DIARRHEA: 0
NAUSEA: 0
EYE PAIN: 0
BACK PAIN: 0
SHORTNESS OF BREATH: 0
SORE THROAT: 0
COUGH: 0
ABDOMINAL PAIN: 0
PHOTOPHOBIA: 0

## 2023-08-26 ASSESSMENT — PAIN - FUNCTIONAL ASSESSMENT: PAIN_FUNCTIONAL_ASSESSMENT: NONE - DENIES PAIN

## 2023-08-26 NOTE — ED NOTES
DR. Ceferino Askew WAS PAGED AT 1100  HE RETURNED THE PAGE AT [phone]    DR. HARRIS WAS PAGED AT 1150  HE RETURNED THE CALL AT [facility]  08/26/23 [address]  08/26/23 1157

## 2023-08-26 NOTE — DISCHARGE INSTRUCTIONS
STOP PLAVIX     YOUR NEW MEDICATION  IS A BLOOD THINNER NAMED ELIQUIS.  IF YOU FALL OR HAVE A HEAD INJURY ON THIS MEDICATION GO TO THE ER BECAUSE YOU HAVE A HIGHER RISK OF BLEEDING NOW

## 2023-08-26 NOTE — ED TRIAGE NOTES
Pt arrived via ems from home with c/o dizziness when driving car and becoming dizzy at 0830. Pt arrived at family's house and reported rt side weakness and difficulty swallowing and slurred speech. Pt reports he had just eaten a sandwich when his daughter was talking to him and he denies slurred speech but did report having a hard time swallowing. O/a pt a/o x 3 skin pink w/d resp non labored. Neuro unremarkable. Bs 223.

## 2023-08-26 NOTE — CONSULTS
Patient 58-year-old man comes in for dizziness since morning. Patient was admitted on the 17th of this month CT head  that time was negative. CT head angio was reviewed with the patient patient states that he told the ER physician that he wants to be transferred, ER physician has spoken with Dr. Mauricio Soriano recommend to continue with aspirin and and start Eliquis and stop Plavix. Patient NIH score is 0. Still has dizziness. Patient signed AMA in the last admission because she was not happy with the nursing.   States that he went to [facility] and had MRI states that the at [facility] they said that his dizziness was due to hypertension       Past Medical History:   Diagnosis Date    Asthma     Back pain     compresion in [place]    CAD (coronary artery disease) 01/2020    3 vessel     chf 09/16/2022    Diabetes mellitus ([facility])     Essential hypertension, benign     Hyperlipemia     Ischemic cardiomyopathy 09/16/2022    Neuropathic pain, leg, bilateral      Past Surgical History:   Procedure Laterality Date    CARPAL TUNNEL RELEASE Bilateral     FOOT DEBRIDEMENT Right 5/5/2023    RIGHT FOOT DEBRIDEMENT INCISION AND DRAINAGE ROOM 174 performed by Reece Morales DPM at [facility] Right 5/8/2023    RIGHT FOOT  INCISION AND DRAINAGE WITH DEBRIDEMENT OF NON-VIABLE TISSUE DELAYED PRIMARY CLOSURE performed by Reece Morales DPM at [facility] Left     bone spur    LAMINOTOMY  2012    SPINE SURGERY N/A 11/11/2022    L1 VERETEBRAL AUGMENTATION performed by Lili Blanco MD at [facility] History       Tobacco History       Smoking Status  Never      Passive Exposure  Never      Smokeless Tobacco Use  Never              Alcohol History       Alcohol Use Status  Yes Drinks/Week  2 Cans of beer, 0 Standard drinks or equivalent per week Amount  2.0 standard drinks of alcohol/wk Comment  every 6 months or so               Drug Use       Drug Use Status  No              Sexual Activity       Sexually Active  Not mouth daily 30 tablet 3    hydrALAZINE (APRESOLINE) 25 MG tablet Take 1 tablet by mouth in the morning and at bedtime (Patient taking differently: Take 1 tablet by mouth 3 times daily) 60 tablet 3    aspirin 81 MG EC tablet Take 1 tablet by mouth daily 30 tablet 3    [DISCONTINUED] lisinopril (PRINIVIL;ZESTRIL) 10 MG tablet Take 1 tablet by mouth daily 30 tablet 5    albuterol sulfate  (90 Base) MCG/ACT inhaler Inhale 2 puffs into the lungs every 6 hours as needed for Wheezing 18 g 5    Accu-Chek FastClix Lancets MISC Test 4x daily Dx E11.65 150 each 3    blood glucose test strips (ACCU-CHEK GUIDE) strip Test 4x daily Dx E11.65 150 each 3     Lab Results   Component Value Date    WBC 4.8 08/26/2023    HGB 12.8 (L) 08/26/2023    HCT 38.2 (L) 08/26/2023    MCV 82.4 08/26/2023     (L) 08/26/2023     Lab Results   Component Value Date/Time     08/26/2023 09:30 AM    K 4.7 08/26/2023 09:30 AM    K 4.4 05/07/2023 05:36 AM     08/26/2023 09:30 AM    CO2 24 08/26/2023 09:30 AM    BUN 25 08/26/2023 09:30 AM    CREATININE 1.16 08/26/2023 09:30 AM    GLUCOSE 206 08/26/2023 09:30 AM    GLUCOSE 174 08/22/2023 05:28 AM    CALCIUM 8.7 08/26/2023 09:30 AM    LABGLOM >60.0 08/26/2023 09:30 AM    LABGLOM 66 05/22/2023 12:00 AM      HEENT: AT/NC, PERRLA, no JVD  HEART: s1/s2 wnl w/o s3  Neck : supple and midline  LUNG: clear  ABD: soft, NT  EXT: no edema  SKin : no rash  Neuro:no focal deficits, alert and oriented CN 2 to 12 grossly intact  1) stenosis of anterior cerebral and posterior cerebral artery.   2) dizziness due above vs BPV vs elevated BP  3) HTN not controlled  Recommend resume home meds  Hydralazin prn   fU neurology  Recommend MRI  Transfer to [facility] as per PT request  Spoke with PA

## 2023-08-26 NOTE — PROGRESS NOTES
pen Inject 8 Units into the skin 3 times daily (before meals) 5/9/23   NABILA Ching   Insulin Pen Needle (NOVOFINE) 32G X 6 MM MISC Used to inject insulin 4 times daily before meals and bedtime 5/9/23   NABILA Ching   carvedilol (COREG) 12.5 MG tablet TAKE 1 TABLET BY MOUTH 2 TIMES DAILY 1/25/23   NABILA Howell   clopidogrel (PLAVIX) 75 MG tablet Take 1 tablet by mouth daily 1/3/23   SHERIN Andrade CNP   hydrALAZINE (APRESOLINE) 25 MG tablet Take 1 tablet by mouth in the morning and at bedtime  Patient taking differently: Take 1 tablet by mouth 3 times daily 1/3/23   NABILA Howell   aspirin 81 MG EC tablet Take 1 tablet by mouth daily 8/18/22   Suzan John DO   lisinopril (PRINIVIL;ZESTRIL) 10 MG tablet Take 1 tablet by mouth daily 6/1/22 8/18/22  Luis Najera MD   albuterol sulfate  (90 Base) MCG/ACT inhaler Inhale 2 puffs into the lungs every 6 hours as needed for Wheezing 4/1/22   Luis Najera MD   Accu-Chek FastClix Lancets MISC Test 4x daily Dx E11.65 3/29/22   Estrada Campos MD   blood glucose test strips (ACCU-CHEK GUIDE) strip Test 4x daily Dx E11.65 3/29/22   Estrada Campos MD       ALLERGIES   He has No Known Allergies. Medications removed from home list:   Lantus - uses [place]     Medications added to home list:  Entresto    Changes were made to the following PTA medications:  Hydralazine - takes TID  Rosuvastatin     Other notes:  Unable to interview patient and determine last dose of home medications.     Thank you,    Megan Recinos PharmD  PGY1 Pharmacy Resident  8/26/2023  12:26 PM

## 2023-08-26 NOTE — ED NOTES
Attempted to call report, patient is requesting transfer to [facility]. Did not give report to Kurt.       Carlee Jack RN  08/26/23 9543

## 2023-08-26 NOTE — ED PROVIDER NOTES
[facility] ED  eMERGENCY dEPARTMENTeNCOUnter      Pt Name: Santana Esquivel  MRN: [id number]  [address] [facility] 1960  Date ofevaluation: 8/26/2023  Provider: Wendy Lilly PA-C    CHIEF COMPLAINT       Chief Complaint   Patient presents with    Dizziness     Rt side weakness and slurred speech         HISTORY OF PRESENT ILLNESS   (Location/Symptom, Timing/Onset,Context/Setting, Quality, Duration, Modifying Factors, Severity)  Note limiting factors. Santana Esquivel is a 59 y.o. male who presents to the emergency department slurred speech weakness gait instability and dizziness that started around 830 this morning. Symptoms resolved on arrival to the emergency department. Per EMS he did have some minor weakness noted to the right side. Patient was supposed to be admitted about 2 weeks ago for cerebellar infarct but signed out [address] [place] [address]. He is on Plavix and aspirin and states he is compliant with last dose being this morning. HPI    NursingNotes were reviewed. REVIEW OF SYSTEMS    (2-9 systems for level 4, 10 or more for level 5)     Review of Systems   Constitutional:  Negative for chills, diaphoresis, fatigue and fever. HENT:  Negative for congestion, rhinorrhea and sore throat. Eyes:  Negative for photophobia and pain. Respiratory:  Negative for cough and shortness of breath. Cardiovascular:  Negative for chest pain and palpitations. Gastrointestinal:  Negative for abdominal pain, diarrhea, nausea and vomiting. Genitourinary:  Negative for dysuria and flank pain. Musculoskeletal:  Negative for back pain. Skin:  Negative for rash. Neurological:  Positive for dizziness, speech difficulty and weakness. Negative for light-headedness and headaches. Psychiatric/Behavioral: Negative. All other systems reviewed and are negative. Except as noted above the remainder of the review of systems was reviewed and negative.        PAST MEDICAL HISTORY     Past Medical History:

## 2023-08-27 LAB
PERFORMED ON: NORMAL
POC CREATININE: 1.2 MG/DL (ref 0.8–1.3)
POC SAMPLE TYPE: NORMAL

## 2023-08-28 ENCOUNTER — CARE COORDINATION (OUTPATIENT)
Dept: CARE COORDINATION | Age: 63
End: 2023-08-28

## 2023-08-28 LAB
EKG ATRIAL RATE: 69 BPM
EKG P AXIS: 39 DEGREES
EKG P-R INTERVAL: 138 MS
EKG Q-T INTERVAL: 426 MS
EKG QRS DURATION: 96 MS
EKG QTC CALCULATION (BAZETT): 456 MS
EKG R AXIS: -42 DEGREES
EKG T AXIS: -18 DEGREES
EKG VENTRICULAR RATE: 69 BPM

## 2023-08-28 PROCEDURE — 93010 ELECTROCARDIOGRAM REPORT: CPT | Performed by: INTERNAL MEDICINE

## 2023-08-28 NOTE — CARE COORDINATION
[facility] Care Transitions Initial Follow Up Call    Call within 2 business days of discharge: Yes    Patient Current Location:  Home: [address] [place] [address] [phone] [address]    LPN Care Coordinator contacted the patient by telephone to perform post hospital discharge assessment. Verified name and  with patient as identifiers. Provided introduction to self, and explanation of the LPN Care Coordinator role. Patient: Simon Malone Patient : 1960   MRN: [id number]  Reason for Admission: 23 TIA; Left AMA  Discharge Date: 23 RARS: Readmission Risk Score: 11      Last Discharge [address] [facility]       Date Complaint Diagnosis Description Type Department Provider    23 Dizziness TIA (transient ischemic attack) . .. ED (AMA) Nati Dumont MD          Was this an external facility discharge? No Discharge Facility: [facility]    Challenges to be reviewed by the provider   Additional needs identified to be addressed with provider: No  none         Method of communication with provider: none. Spoke with Pt who stated that he is currently admitted at [facility]. Pt stated that he left [facility] ED and went to [facility]. Pt stated that he was told that [facility] did not have beds when wanting to be transferred from [facility] to [facility], so he signed out [address] [facility] and went to [facility] ED himself. No further calls will be made at this time. Care Transitions 24 Hour Call    Do you have all of your prescriptions and are they filled?: Yes  Do you have support at home?: Partner/Spouse/SO  Are you an active caregiver in your home?: No  Care Transitions Interventions    Registered Dietician: Declined           Follow Up  Future Appointments   Date Time Provider [address]   2023 10:00 AM Cecilia Terrell, [address]   2023 11:45 AM MD Hiwot Terrazas [facility] -       Care Transition Nurse provided contact information.   No further follow-up call indicated based on Pt currently admitted [facility]

## 2023-08-30 NOTE — ED NOTES
Pharmacy Note    Fax received from pharmacy for bleed risk with clopidogrel and apixaban combination. Per discharge instructions, patient was instructed to start apixaban, continue baby aspirin, and discontinue clopidogrel. Called to pharmacy to confirm receipt of clopidogrel discontinue (sent by provider after visit)   Pharmacy states this was just picked up 10 days ago. Discussed with provider, Lashae Koo, who requested I call patient. Patient was called and states his other provider at outside hospital instructed him not to start the Eliquis. Informed patient of risk of taking those medications together.       Tamiko Antonio, PharmD   Clinical Pharmacist  [phone]  8/30/2023 4:14 PM

## 2023-09-06 ENCOUNTER — CARE COORDINATION (OUTPATIENT)
Dept: CARE COORDINATION | Age: 63
End: 2023-09-06

## 2023-09-06 NOTE — CARE COORDINATION
ACM called patient message requesting return call to introduce ACC/RPM program referral from PCP.   Contact information supplied letter sent via [employer] mail

## 2023-09-07 ENCOUNTER — TELEPHONE (OUTPATIENT)
Dept: PHARMACY | Facility: CLINIC | Age: 63
End: 2023-09-07

## 2023-09-07 NOTE — TELEPHONE ENCOUNTER
[facility]: ADHERENCE REVIEW  Identified care gap per [place]: fills at discount drug : Statin adherence    Patient also appears to be prescribed: Statin    ASSESSMENT   [facility] [employer] Records claims through 23 (Prior Year  [address] = not reported; YTD [address] = 59%; Potential Fail Date: 23):   ATORVASTATIN 20 MG TABLET last filled on 23 for 30 day supply. Next refill due: 09/10/23    Prescribed si tablet/capsule daily    Per Reconcile Dispense History: last filled on 23 for 30 day supply. Per [facility] drug Pharmacy: last picked up on 23 for 30 day supply. will get 30 day supply ready to  since past due. Lab Results   Component Value Date    CHOL 238 (H) 2023    TRIG 157 (H) 2023    HDL 39.8 (A) 2023    LDLCHOLESTEROL 167 (H) 2023    LDLCALC 179 (H) 2023     ALT   Date Value Ref Range Status   2023 9 0 - 41 U/L Final     AST   Date Value Ref Range Status   2023 12 0 - 40 U/L Final     The ASCVD Risk score (Krebs DK, et al., 2019) failed to calculate for the following reasons: The patient has a prior MI or stroke diagnosis     PLAN  The following are interventions that have been identified:   Patient overdue refilling ATORVASTATIN 20 MG TABLET and active on home medication list.     Attempting to reach patient to review - unable to leave message.    Wrong number on file   Called to let patient know he can  his medication today after 5pm with a 0.00 copay     Recent Visits  Date Type Provider Dept   23 Office Visit MD Ayo Trejo   23 Office Visit MD [facility]   23 Office Visit MD [facility]   22 Office Visit MD Ayo Trejo   Showing recent visits within past 540 days with a meds authorizing provider and meeting all other requirements  Future Appointments  No visits were found

## 2023-09-13 ENCOUNTER — CARE COORDINATION (OUTPATIENT)
Dept: CARE COORDINATION | Age: 63
End: 2023-09-13

## 2023-09-13 NOTE — CARE COORDINATION
ACM called patient back message requesting return call to introduce ACC/RPM referral from PCP. Contact information supplied second letter sent [address] mail.

## 2023-09-16 ENCOUNTER — CARE COORDINATION (OUTPATIENT)
Dept: CARE COORDINATION | Age: 63
End: 2023-09-16

## 2023-09-16 NOTE — CARE COORDINATION
Ambulatory Care Management Note        Date/Time:  9/16/2023 11:05 AM    This patient was received as a referral from  Provider for case management of chronic conditions. Multiple unsuccessful attempts have been made to contact patient. Ambulatory Care Management get in touch letter mailed to the patient's address on file. No further outreach attempts are scheduled by [facility] at this time.

## 2023-09-25 ENCOUNTER — APPOINTMENT (OUTPATIENT)
Dept: GENERAL RADIOLOGY | Age: 63
DRG: 041 | End: 2023-09-25
Payer: MEDICARE

## 2023-09-25 ENCOUNTER — HOSPITAL ENCOUNTER (INPATIENT)
Age: 63
DRG: 041 | End: 2023-09-25
Attending: INTERNAL MEDICINE | Admitting: INTERNAL MEDICINE
Payer: MEDICARE

## 2023-09-25 ENCOUNTER — APPOINTMENT (OUTPATIENT)
Dept: CT IMAGING | Age: 63
DRG: 041 | End: 2023-09-25
Payer: MEDICARE

## 2023-09-25 DIAGNOSIS — G45.9 TIA (TRANSIENT ISCHEMIC ATTACK): ICD-10-CM

## 2023-09-25 DIAGNOSIS — E11.65 TYPE 2 DIABETES MELLITUS WITH HYPERGLYCEMIA, WITHOUT LONG-TERM CURRENT USE OF INSULIN (HCC): ICD-10-CM

## 2023-09-25 DIAGNOSIS — R53.1 WEAKNESS: ICD-10-CM

## 2023-09-25 DIAGNOSIS — I48.0 PAF (PAROXYSMAL ATRIAL FIBRILLATION) (HCC): ICD-10-CM

## 2023-09-25 DIAGNOSIS — I10 UNCONTROLLED HYPERTENSION: ICD-10-CM

## 2023-09-25 DIAGNOSIS — R42 DIZZINESS: Primary | ICD-10-CM

## 2023-09-25 DIAGNOSIS — I16.1 HYPERTENSIVE EMERGENCY: ICD-10-CM

## 2023-09-25 LAB
ALBUMIN SERPL-MCNC: 3.9 G/DL (ref 3.5–4.6)
ALP SERPL-CCNC: 87 U/L (ref 35–104)
ALT SERPL-CCNC: 12 U/L (ref 0–41)
ANION GAP SERPL CALCULATED.3IONS-SCNC: 12 MEQ/L (ref 9–15)
APTT PPP: 26.1 SEC (ref 24.4–36.8)
AST SERPL-CCNC: 13 U/L (ref 0–40)
BACTERIA URNS QL MICRO: NEGATIVE /HPF
BASOPHILS # BLD: 0 K/UL (ref 0–0.2)
BASOPHILS NFR BLD: 0.2 %
BILIRUB SERPL-MCNC: 0.5 MG/DL (ref 0.2–0.7)
BILIRUB UR QL STRIP: NEGATIVE
BUN SERPL-MCNC: 20 MG/DL (ref 8–23)
CALCIUM SERPL-MCNC: 9.1 MG/DL (ref 8.5–9.9)
CHLORIDE SERPL-SCNC: 99 MEQ/L (ref 95–107)
CLARITY UR: CLEAR
CO2 SERPL-SCNC: 24 MEQ/L (ref 20–31)
COLOR UR: YELLOW
CREAT SERPL-MCNC: 1.11 MG/DL (ref 0.7–1.2)
EOSINOPHIL # BLD: 0.3 K/UL (ref 0–0.7)
EOSINOPHIL NFR BLD: 5.9 %
EPI CELLS #/AREA URNS AUTO: ABNORMAL /HPF (ref 0–5)
ERYTHROCYTE [DISTWIDTH] IN BLOOD BY AUTOMATED COUNT: 12.7 % (ref 11.5–14.5)
ETHANOL PERCENT: NORMAL G/DL
ETHANOLAMINE SERPL-MCNC: <10 MG/DL (ref 0–0.08)
GLOBULIN SER CALC-MCNC: 2.6 G/DL (ref 2.3–3.5)
GLUCOSE SERPL-MCNC: 415 MG/DL (ref 70–99)
GLUCOSE UR STRIP-MCNC: >=1000 MG/DL
HCT VFR BLD AUTO: 42.6 % (ref 42–52)
HGB BLD-MCNC: 14.2 G/DL (ref 14–18)
HGB UR QL STRIP: ABNORMAL
HYALINE CASTS #/AREA URNS AUTO: ABNORMAL /HPF (ref 0–5)
INR PPP: 1
KETONES UR STRIP-MCNC: NEGATIVE MG/DL
LEUKOCYTE ESTERASE UR QL STRIP: NEGATIVE
LYMPHOCYTES # BLD: 0.6 K/UL (ref 1–4.8)
LYMPHOCYTES NFR BLD: 11.4 %
MAGNESIUM SERPL-MCNC: 2 MG/DL (ref 1.7–2.4)
MCH RBC QN AUTO: 27.2 PG (ref 27–31.3)
MCHC RBC AUTO-ENTMCNC: 33.3 % (ref 33–37)
MCV RBC AUTO: 81.5 FL (ref 79–92.2)
MONOCYTES # BLD: 0.5 K/UL (ref 0.2–0.8)
MONOCYTES NFR BLD: 9.2 %
NEUTROPHILS # BLD: 3.6 K/UL (ref 1.4–6.5)
NEUTS SEG NFR BLD: 72.9 %
NITRITE UR QL STRIP: NEGATIVE
PH UR STRIP: 6.5 [PH] (ref 5–9)
PLATELET # BLD AUTO: 131 K/UL (ref 130–400)
POTASSIUM SERPL-SCNC: 4.1 MEQ/L (ref 3.4–4.9)
PROT SERPL-MCNC: 6.5 G/DL (ref 6.3–8)
PROT UR STRIP-MCNC: 100 MG/DL
PROTHROMBIN TIME: 13 SEC (ref 12.3–14.9)
RBC # BLD AUTO: 5.23 M/UL (ref 4.7–6.1)
RBC #/AREA URNS AUTO: ABNORMAL /HPF (ref 0–5)
SODIUM SERPL-SCNC: 135 MEQ/L (ref 135–144)
SP GR UR STRIP: 1.02 (ref 1–1.03)
TROPONIN T SERPL-MCNC: 0.03 NG/ML (ref 0–0.01)
TSH SERPL-MCNC: 0.64 UIU/ML (ref 0.44–3.86)
URINE REFLEX TO CULTURE: ABNORMAL
UROBILINOGEN UR STRIP-ACNC: 1 E.U./DL
WBC # BLD AUTO: 4.9 K/UL (ref 4.8–10.8)
WBC #/AREA URNS AUTO: ABNORMAL /HPF (ref 0–5)

## 2023-09-25 PROCEDURE — 36415 COLL VENOUS BLD VENIPUNCTURE: CPT

## 2023-09-25 PROCEDURE — 96361 HYDRATE IV INFUSION ADD-ON: CPT

## 2023-09-25 PROCEDURE — 96374 THER/PROPH/DIAG INJ IV PUSH: CPT

## 2023-09-25 PROCEDURE — 6360000002 HC RX W HCPCS: Performed by: PHYSICIAN ASSISTANT

## 2023-09-25 PROCEDURE — 2580000003 HC RX 258: Performed by: PHYSICIAN ASSISTANT

## 2023-09-25 PROCEDURE — 71045 X-RAY EXAM CHEST 1 VIEW: CPT

## 2023-09-25 PROCEDURE — 6370000000 HC RX 637 (ALT 250 FOR IP): Performed by: PHYSICIAN ASSISTANT

## 2023-09-25 PROCEDURE — 83735 ASSAY OF MAGNESIUM: CPT

## 2023-09-25 PROCEDURE — 96375 TX/PRO/DX INJ NEW DRUG ADDON: CPT

## 2023-09-25 PROCEDURE — 82077 ASSAY SPEC XCP UR&BREATH IA: CPT

## 2023-09-25 PROCEDURE — 96376 TX/PRO/DX INJ SAME DRUG ADON: CPT

## 2023-09-25 PROCEDURE — 93005 ELECTROCARDIOGRAM TRACING: CPT | Performed by: PHYSICIAN ASSISTANT

## 2023-09-25 PROCEDURE — 99285 EMERGENCY DEPT VISIT HI MDM: CPT

## 2023-09-25 PROCEDURE — 85025 COMPLETE CBC W/AUTO DIFF WBC: CPT

## 2023-09-25 PROCEDURE — 70450 CT HEAD/BRAIN W/O DYE: CPT

## 2023-09-25 PROCEDURE — 85610 PROTHROMBIN TIME: CPT

## 2023-09-25 PROCEDURE — 84484 ASSAY OF TROPONIN QUANT: CPT

## 2023-09-25 PROCEDURE — 80053 COMPREHEN METABOLIC PANEL: CPT

## 2023-09-25 PROCEDURE — 85730 THROMBOPLASTIN TIME PARTIAL: CPT

## 2023-09-25 PROCEDURE — 84443 ASSAY THYROID STIM HORMONE: CPT

## 2023-09-25 PROCEDURE — 2060000000 HC ICU INTERMEDIATE R&B

## 2023-09-25 PROCEDURE — 81001 URINALYSIS AUTO W/SCOPE: CPT

## 2023-09-25 RX ORDER — INSULIN GLARGINE 100 [IU]/ML
25 INJECTION, SOLUTION SUBCUTANEOUS NIGHTLY
Status: DISCONTINUED | OUTPATIENT
Start: 2023-09-25 | End: 2023-09-26

## 2023-09-25 RX ORDER — DEXTROSE MONOHYDRATE 100 MG/ML
INJECTION, SOLUTION INTRAVENOUS CONTINUOUS PRN
Status: DISCONTINUED | OUTPATIENT
Start: 2023-09-25 | End: 2023-10-03 | Stop reason: HOSPADM

## 2023-09-25 RX ORDER — ATORVASTATIN CALCIUM 40 MG/1
40 TABLET, FILM COATED ORAL NIGHTLY
Status: DISCONTINUED | OUTPATIENT
Start: 2023-09-25 | End: 2023-10-03 | Stop reason: HOSPADM

## 2023-09-25 RX ORDER — LABETALOL HYDROCHLORIDE 5 MG/ML
20 INJECTION, SOLUTION INTRAVENOUS ONCE
Status: COMPLETED | OUTPATIENT
Start: 2023-09-25 | End: 2023-09-25

## 2023-09-25 RX ORDER — POLYETHYLENE GLYCOL 3350 17 G/17G
17 POWDER, FOR SOLUTION ORAL DAILY PRN
Status: DISCONTINUED | OUTPATIENT
Start: 2023-09-25 | End: 2023-10-03 | Stop reason: HOSPADM

## 2023-09-25 RX ORDER — SODIUM CHLORIDE 9 MG/ML
INJECTION, SOLUTION INTRAVENOUS PRN
Status: DISCONTINUED | OUTPATIENT
Start: 2023-09-25 | End: 2023-10-03 | Stop reason: HOSPADM

## 2023-09-25 RX ORDER — LABETALOL HYDROCHLORIDE 5 MG/ML
10 INJECTION, SOLUTION INTRAVENOUS EVERY 4 HOURS PRN
Status: DISCONTINUED | OUTPATIENT
Start: 2023-09-25 | End: 2023-09-26

## 2023-09-25 RX ORDER — INSULIN LISPRO 100 [IU]/ML
0-8 INJECTION, SOLUTION INTRAVENOUS; SUBCUTANEOUS
Status: DISCONTINUED | OUTPATIENT
Start: 2023-09-25 | End: 2023-09-25 | Stop reason: SDUPTHER

## 2023-09-25 RX ORDER — METOCLOPRAMIDE HYDROCHLORIDE 5 MG/ML
10 INJECTION INTRAMUSCULAR; INTRAVENOUS EVERY 6 HOURS PRN
Status: DISCONTINUED | OUTPATIENT
Start: 2023-09-25 | End: 2023-10-03 | Stop reason: HOSPADM

## 2023-09-25 RX ORDER — SODIUM CHLORIDE 0.9 % (FLUSH) 0.9 %
5-40 SYRINGE (ML) INJECTION PRN
Status: DISCONTINUED | OUTPATIENT
Start: 2023-09-25 | End: 2023-10-03 | Stop reason: HOSPADM

## 2023-09-25 RX ORDER — INSULIN LISPRO 100 [IU]/ML
0-8 INJECTION, SOLUTION INTRAVENOUS; SUBCUTANEOUS
Status: DISCONTINUED | OUTPATIENT
Start: 2023-09-25 | End: 2023-09-26

## 2023-09-25 RX ORDER — CLOPIDOGREL BISULFATE 75 MG/1
75 TABLET ORAL DAILY
Status: DISCONTINUED | OUTPATIENT
Start: 2023-09-25 | End: 2023-09-28

## 2023-09-25 RX ORDER — GLUCAGON 1 MG/ML
1 KIT INJECTION PRN
Status: DISCONTINUED | OUTPATIENT
Start: 2023-09-25 | End: 2023-10-03 | Stop reason: HOSPADM

## 2023-09-25 RX ORDER — ACETAMINOPHEN 650 MG/1
650 SUPPOSITORY RECTAL EVERY 6 HOURS PRN
Status: DISCONTINUED | OUTPATIENT
Start: 2023-09-25 | End: 2023-10-03 | Stop reason: HOSPADM

## 2023-09-25 RX ORDER — CARVEDILOL 12.5 MG/1
12.5 TABLET ORAL 2 TIMES DAILY
Status: DISCONTINUED | OUTPATIENT
Start: 2023-09-25 | End: 2023-10-03 | Stop reason: HOSPADM

## 2023-09-25 RX ORDER — ASPIRIN 81 MG/1
81 TABLET ORAL DAILY
Status: DISCONTINUED | OUTPATIENT
Start: 2023-09-26 | End: 2023-10-03 | Stop reason: HOSPADM

## 2023-09-25 RX ORDER — HYDRALAZINE HYDROCHLORIDE 20 MG/ML
10 INJECTION INTRAMUSCULAR; INTRAVENOUS EVERY 4 HOURS PRN
Status: DISCONTINUED | OUTPATIENT
Start: 2023-09-25 | End: 2023-10-03 | Stop reason: HOSPADM

## 2023-09-25 RX ORDER — SODIUM CHLORIDE 0.9 % (FLUSH) 0.9 %
5-40 SYRINGE (ML) INJECTION EVERY 12 HOURS SCHEDULED
Status: DISCONTINUED | OUTPATIENT
Start: 2023-09-25 | End: 2023-10-03 | Stop reason: HOSPADM

## 2023-09-25 RX ORDER — HYDRALAZINE HYDROCHLORIDE 100 MG/1
100 TABLET, FILM COATED ORAL 3 TIMES DAILY
Status: DISCONTINUED | OUTPATIENT
Start: 2023-09-25 | End: 2023-10-03 | Stop reason: HOSPADM

## 2023-09-25 RX ORDER — 0.9 % SODIUM CHLORIDE 0.9 %
500 INTRAVENOUS SOLUTION INTRAVENOUS ONCE
Status: COMPLETED | OUTPATIENT
Start: 2023-09-25 | End: 2023-09-26

## 2023-09-25 RX ORDER — ACETAMINOPHEN 325 MG/1
650 TABLET ORAL EVERY 6 HOURS PRN
Status: DISCONTINUED | OUTPATIENT
Start: 2023-09-25 | End: 2023-10-03 | Stop reason: HOSPADM

## 2023-09-25 RX ORDER — LABETALOL HYDROCHLORIDE 5 MG/ML
10 INJECTION, SOLUTION INTRAVENOUS ONCE
Status: COMPLETED | OUTPATIENT
Start: 2023-09-25 | End: 2023-09-25

## 2023-09-25 RX ADMIN — LABETALOL HYDROCHLORIDE 20 MG: 5 INJECTION, SOLUTION INTRAVENOUS at 22:39

## 2023-09-25 RX ADMIN — LABETALOL HYDROCHLORIDE 10 MG: 5 INJECTION, SOLUTION INTRAVENOUS at 19:56

## 2023-09-25 RX ADMIN — SODIUM CHLORIDE 500 ML: 9 INJECTION, SOLUTION INTRAVENOUS at 21:08

## 2023-09-25 RX ADMIN — INSULIN HUMAN 6 UNITS: 100 INJECTION, SOLUTION PARENTERAL at 21:08

## 2023-09-25 ASSESSMENT — PATIENT HEALTH QUESTIONNAIRE - PHQ9
1. LITTLE INTEREST OR PLEASURE IN DOING THINGS: 0
SUM OF ALL RESPONSES TO PHQ QUESTIONS 1-9: 0
2. FEELING DOWN, DEPRESSED OR HOPELESS: 0
SUM OF ALL RESPONSES TO PHQ QUESTIONS 1-9: 0
SUM OF ALL RESPONSES TO PHQ9 QUESTIONS 1 & 2: 0
SUM OF ALL RESPONSES TO PHQ QUESTIONS 1-9: 0
SUM OF ALL RESPONSES TO PHQ QUESTIONS 1-9: 0

## 2023-09-25 ASSESSMENT — PAIN - FUNCTIONAL ASSESSMENT: PAIN_FUNCTIONAL_ASSESSMENT: NONE - DENIES PAIN

## 2023-09-25 NOTE — ED NOTES
Titus DAHL at the bedside at this time. Assessment completed.        Irina Quintero RN  09/25/23 7460

## 2023-09-25 NOTE — ED TRIAGE NOTES
Patient in ED with c/o hypertension, dizziness, left sided weakness. Patient alert and oriented x 3.

## 2023-09-26 ENCOUNTER — APPOINTMENT (OUTPATIENT)
Dept: MRI IMAGING | Age: 63
DRG: 041 | End: 2023-09-26
Payer: MEDICARE

## 2023-09-26 ENCOUNTER — APPOINTMENT (OUTPATIENT)
Age: 63
DRG: 041 | End: 2023-09-26
Payer: MEDICARE

## 2023-09-26 ENCOUNTER — APPOINTMENT (OUTPATIENT)
Dept: CT IMAGING | Age: 63
DRG: 041 | End: 2023-09-26
Attending: INTERNAL MEDICINE
Payer: MEDICARE

## 2023-09-26 LAB
ECHO AO ROOT DIAM: 3.2 CM
ECHO AO ROOT INDEX: 1.72 CM/M2
ECHO AV AREA PEAK VELOCITY: 2.2 CM2
ECHO AV AREA VTI: 1.9 CM2
ECHO AV AREA/BSA PEAK VELOCITY: 1.2 CM2/M2
ECHO AV AREA/BSA VTI: 1 CM2/M2
ECHO AV CUSP MM: 2.1 CM
ECHO AV MEAN GRADIENT: 2 MMHG
ECHO AV MEAN VELOCITY: 0.7 M/S
ECHO AV PEAK GRADIENT: 4 MMHG
ECHO AV PEAK VELOCITY: 1 M/S
ECHO AV VELOCITY RATIO: 0.8
ECHO AV VTI: 18.8 CM
ECHO BSA: 1.88 M2
ECHO LA DIAMETER INDEX: 1.94 CM/M2
ECHO LA DIAMETER: 3.6 CM
ECHO LA TO AORTIC ROOT RATIO: 1.13
ECHO LA VOL 2C: 48 ML (ref 18–58)
ECHO LA VOL 2C: 50 ML (ref 18–58)
ECHO LA VOL 4C: 61 ML (ref 18–58)
ECHO LA VOL 4C: 63 ML (ref 18–58)
ECHO LA VOLUME AREA LENGTH: 57 ML
ECHO LA VOLUME INDEX AREA LENGTH: 31 ML/M2 (ref 16–34)
ECHO LV E' LATERAL VELOCITY: 7 CM/S
ECHO LV E' SEPTAL VELOCITY: 8 CM/S
ECHO LV EDV A2C: 57 ML
ECHO LV EDV A4C: 69 ML
ECHO LV EDV BP: 63 ML (ref 67–155)
ECHO LV EDV INDEX A4C: 37 ML/M2
ECHO LV EDV INDEX BP: 34 ML/M2
ECHO LV EDV NDEX A2C: 31 ML/M2
ECHO LV EJECTION FRACTION A2C: 40 %
ECHO LV EJECTION FRACTION A4C: 50 %
ECHO LV EJECTION FRACTION BIPLANE: 42 % (ref 55–100)
ECHO LV ESV A2C: 35 ML
ECHO LV ESV A4C: 34 ML
ECHO LV ESV BP: 37 ML (ref 22–58)
ECHO LV ESV INDEX A2C: 19 ML/M2
ECHO LV ESV INDEX A4C: 18 ML/M2
ECHO LV ESV INDEX BP: 20 ML/M2
ECHO LV FRACTIONAL SHORTENING: 27 % (ref 28–44)
ECHO LV INTERNAL DIMENSION DIASTOLE INDEX: 1.99 CM/M2
ECHO LV INTERNAL DIMENSION DIASTOLIC: 3.7 CM (ref 4.2–5.9)
ECHO LV INTERNAL DIMENSION SYSTOLIC INDEX: 1.45 CM/M2
ECHO LV INTERNAL DIMENSION SYSTOLIC: 2.7 CM
ECHO LV IVSD: 1.3 CM (ref 0.6–1)
ECHO LV IVSS: 1.5 CM
ECHO LV MASS 2D: 197.7 G (ref 88–224)
ECHO LV MASS INDEX 2D: 106.3 G/M2 (ref 49–115)
ECHO LV POSTERIOR WALL DIASTOLIC: 1.6 CM (ref 0.6–1)
ECHO LV POSTERIOR WALL SYSTOLIC: 2.1 CM
ECHO LV RELATIVE WALL THICKNESS RATIO: 0.86
ECHO LVOT AREA: 2.8 CM2
ECHO LVOT AV VTI INDEX: 0.65
ECHO LVOT DIAM: 1.9 CM
ECHO LVOT MEAN GRADIENT: 1 MMHG
ECHO LVOT PEAK GRADIENT: 2 MMHG
ECHO LVOT PEAK VELOCITY: 0.8 M/S
ECHO LVOT STROKE VOLUME INDEX: 18.7 ML/M2
ECHO LVOT SV: 34.9 ML
ECHO LVOT VTI: 12.3 CM
ECHO MV A VELOCITY: 0.96 M/S
ECHO MV E VELOCITY: 0.58 M/S
ECHO MV E/A RATIO: 0.6
ECHO MV E/E' LATERAL: 8.29
ECHO MV E/E' RATIO (AVERAGED): 7.77
ECHO MV E/E' SEPTAL: 7.25
ECHO RV INTERNAL DIMENSION: 2.3 CM
ECHO RV TAPSE: 1.3 CM (ref 1.7–?)
EKG ATRIAL RATE: 98 BPM
EKG P AXIS: 45 DEGREES
EKG P-R INTERVAL: 150 MS
EKG Q-T INTERVAL: 378 MS
EKG QRS DURATION: 94 MS
EKG QTC CALCULATION (BAZETT): 482 MS
EKG R AXIS: -61 DEGREES
EKG T AXIS: 28 DEGREES
EKG VENTRICULAR RATE: 98 BPM
GLUCOSE BLD-MCNC: 176 MG/DL (ref 70–99)
GLUCOSE BLD-MCNC: 205 MG/DL (ref 70–99)
GLUCOSE BLD-MCNC: 260 MG/DL (ref 70–99)
GLUCOSE BLD-MCNC: 284 MG/DL (ref 70–99)
GLUCOSE BLD-MCNC: 319 MG/DL (ref 70–99)
GLUCOSE BLD-MCNC: 404 MG/DL (ref 70–99)
PERFORMED ON: ABNORMAL

## 2023-09-26 PROCEDURE — 6370000000 HC RX 637 (ALT 250 FOR IP): Performed by: INTERNAL MEDICINE

## 2023-09-26 PROCEDURE — 92523 SPEECH SOUND LANG COMPREHEN: CPT

## 2023-09-26 PROCEDURE — 2580000003 HC RX 258: Performed by: INTERNAL MEDICINE

## 2023-09-26 PROCEDURE — 99222 1ST HOSP IP/OBS MODERATE 55: CPT | Performed by: INTERNAL MEDICINE

## 2023-09-26 PROCEDURE — APPSS45 APP SPLIT SHARED TIME 31-45 MINUTES: Performed by: NURSE PRACTITIONER

## 2023-09-26 PROCEDURE — 6360000002 HC RX W HCPCS: Performed by: NURSE PRACTITIONER

## 2023-09-26 PROCEDURE — 2060000000 HC ICU INTERMEDIATE R&B

## 2023-09-26 PROCEDURE — 93306 TTE W/DOPPLER COMPLETE: CPT | Performed by: INTERNAL MEDICINE

## 2023-09-26 PROCEDURE — 93010 ELECTROCARDIOGRAM REPORT: CPT | Performed by: INTERNAL MEDICINE

## 2023-09-26 PROCEDURE — 92610 EVALUATE SWALLOWING FUNCTION: CPT

## 2023-09-26 PROCEDURE — 70496 CT ANGIOGRAPHY HEAD: CPT

## 2023-09-26 PROCEDURE — 93306 TTE W/DOPPLER COMPLETE: CPT

## 2023-09-26 PROCEDURE — 99223 1ST HOSP IP/OBS HIGH 75: CPT | Performed by: PSYCHIATRY & NEUROLOGY

## 2023-09-26 PROCEDURE — 70498 CT ANGIOGRAPHY NECK: CPT

## 2023-09-26 PROCEDURE — 6360000004 HC RX CONTRAST MEDICATION: Performed by: INTERNAL MEDICINE

## 2023-09-26 RX ORDER — SODIUM CHLORIDE 0.9 % (FLUSH) 0.9 %
5-40 SYRINGE (ML) INJECTION EVERY 12 HOURS SCHEDULED
Status: DISCONTINUED | OUTPATIENT
Start: 2023-09-26 | End: 2023-10-03 | Stop reason: HOSPADM

## 2023-09-26 RX ORDER — LORAZEPAM 2 MG/ML
1 INJECTION INTRAMUSCULAR ONCE
Status: COMPLETED | OUTPATIENT
Start: 2023-09-26 | End: 2023-09-26

## 2023-09-26 RX ORDER — SODIUM CHLORIDE 9 MG/ML
INJECTION, SOLUTION INTRAVENOUS PRN
Status: DISCONTINUED | OUTPATIENT
Start: 2023-09-26 | End: 2023-10-03 | Stop reason: HOSPADM

## 2023-09-26 RX ORDER — INSULIN LISPRO 100 [IU]/ML
8 INJECTION, SOLUTION INTRAVENOUS; SUBCUTANEOUS
Status: DISCONTINUED | OUTPATIENT
Start: 2023-09-27 | End: 2023-09-27

## 2023-09-26 RX ORDER — LABETALOL HYDROCHLORIDE 5 MG/ML
10 INJECTION, SOLUTION INTRAVENOUS EVERY 10 MIN PRN
Status: DISCONTINUED | OUTPATIENT
Start: 2023-09-26 | End: 2023-10-03 | Stop reason: HOSPADM

## 2023-09-26 RX ORDER — HALOPERIDOL 5 MG/ML
1 INJECTION INTRAMUSCULAR EVERY 6 HOURS PRN
Status: DISCONTINUED | OUTPATIENT
Start: 2023-09-26 | End: 2023-10-03 | Stop reason: HOSPADM

## 2023-09-26 RX ORDER — ONDANSETRON 4 MG/1
4 TABLET, ORALLY DISINTEGRATING ORAL EVERY 8 HOURS PRN
Status: DISCONTINUED | OUTPATIENT
Start: 2023-09-26 | End: 2023-10-03 | Stop reason: HOSPADM

## 2023-09-26 RX ORDER — INSULIN LISPRO 100 [IU]/ML
0-16 INJECTION, SOLUTION INTRAVENOUS; SUBCUTANEOUS
Status: DISCONTINUED | OUTPATIENT
Start: 2023-09-26 | End: 2023-10-02

## 2023-09-26 RX ORDER — ONDANSETRON 2 MG/ML
4 INJECTION INTRAMUSCULAR; INTRAVENOUS EVERY 6 HOURS PRN
Status: DISCONTINUED | OUTPATIENT
Start: 2023-09-26 | End: 2023-10-03 | Stop reason: HOSPADM

## 2023-09-26 RX ORDER — INSULIN GLARGINE 100 [IU]/ML
40 INJECTION, SOLUTION SUBCUTANEOUS NIGHTLY
Status: DISCONTINUED | OUTPATIENT
Start: 2023-09-26 | End: 2023-09-27

## 2023-09-26 RX ORDER — SODIUM CHLORIDE 0.9 % (FLUSH) 0.9 %
5-40 SYRINGE (ML) INJECTION PRN
Status: DISCONTINUED | OUTPATIENT
Start: 2023-09-26 | End: 2023-10-03 | Stop reason: HOSPADM

## 2023-09-26 RX ADMIN — Medication 10 ML: at 02:21

## 2023-09-26 RX ADMIN — INSULIN GLARGINE 25 UNITS: 100 INJECTION, SOLUTION SUBCUTANEOUS at 00:11

## 2023-09-26 RX ADMIN — APIXABAN 5 MG: 5 TABLET, FILM COATED ORAL at 21:38

## 2023-09-26 RX ADMIN — INSULIN GLARGINE 40 UNITS: 100 INJECTION, SOLUTION SUBCUTANEOUS at 21:38

## 2023-09-26 RX ADMIN — ATORVASTATIN CALCIUM 40 MG: 40 TABLET, FILM COATED ORAL at 00:11

## 2023-09-26 RX ADMIN — HYDRALAZINE HYDROCHLORIDE 100 MG: 100 TABLET, FILM COATED ORAL at 21:37

## 2023-09-26 RX ADMIN — APIXABAN 5 MG: 5 TABLET, FILM COATED ORAL at 01:52

## 2023-09-26 RX ADMIN — HYDRALAZINE HYDROCHLORIDE 100 MG: 100 TABLET, FILM COATED ORAL at 08:46

## 2023-09-26 RX ADMIN — IOPAMIDOL 75 ML: 612 INJECTION, SOLUTION INTRAVENOUS at 10:37

## 2023-09-26 RX ADMIN — INSULIN LISPRO 16 UNITS: 100 INJECTION, SOLUTION INTRAVENOUS; SUBCUTANEOUS at 21:36

## 2023-09-26 RX ADMIN — Medication 10 ML: at 21:47

## 2023-09-26 RX ADMIN — SACUBITRIL AND VALSARTAN 1 TABLET: 24; 26 TABLET, FILM COATED ORAL at 08:46

## 2023-09-26 RX ADMIN — ATORVASTATIN CALCIUM 40 MG: 40 TABLET, FILM COATED ORAL at 21:37

## 2023-09-26 RX ADMIN — CARVEDILOL 12.5 MG: 12.5 TABLET, FILM COATED ORAL at 21:38

## 2023-09-26 RX ADMIN — ASPIRIN 81 MG: 81 TABLET, COATED ORAL at 08:46

## 2023-09-26 RX ADMIN — LORAZEPAM 1 MG: 2 INJECTION INTRAMUSCULAR; INTRAVENOUS at 11:39

## 2023-09-26 RX ADMIN — HYDRALAZINE HYDROCHLORIDE 100 MG: 100 TABLET, FILM COATED ORAL at 01:52

## 2023-09-26 RX ADMIN — INSULIN LISPRO 6 UNITS: 100 INJECTION, SOLUTION INTRAVENOUS; SUBCUTANEOUS at 00:12

## 2023-09-26 RX ADMIN — SACUBITRIL AND VALSARTAN 1 TABLET: 24; 26 TABLET, FILM COATED ORAL at 02:20

## 2023-09-26 RX ADMIN — Medication 10 ML: at 08:47

## 2023-09-26 RX ADMIN — APIXABAN 5 MG: 5 TABLET, FILM COATED ORAL at 08:46

## 2023-09-26 RX ADMIN — CLOPIDOGREL BISULFATE 75 MG: 75 TABLET ORAL at 08:46

## 2023-09-26 RX ADMIN — SACUBITRIL AND VALSARTAN 1 TABLET: 24; 26 TABLET, FILM COATED ORAL at 21:38

## 2023-09-26 RX ADMIN — CARVEDILOL 12.5 MG: 12.5 TABLET, FILM COATED ORAL at 08:46

## 2023-09-26 RX ADMIN — CLOPIDOGREL BISULFATE 75 MG: 75 TABLET ORAL at 00:11

## 2023-09-26 RX ADMIN — CARVEDILOL 12.5 MG: 12.5 TABLET, FILM COATED ORAL at 00:11

## 2023-09-26 RX ADMIN — Medication 10 ML: at 11:55

## 2023-09-26 RX ADMIN — INSULIN LISPRO 2 UNITS: 100 INJECTION, SOLUTION INTRAVENOUS; SUBCUTANEOUS at 08:46

## 2023-09-26 ASSESSMENT — ENCOUNTER SYMPTOMS
COLOR CHANGE: 0
NAUSEA: 0
VOMITING: 0
WHEEZING: 0
SHORTNESS OF BREATH: 0
CHEST TIGHTNESS: 0
COUGH: 0
TROUBLE SWALLOWING: 0

## 2023-09-26 ASSESSMENT — PAIN SCALES - GENERAL
PAINLEVEL_OUTOF10: 0

## 2023-09-26 NOTE — PROGRESS NOTES
[facility]   Facility/Department: [facility] TELEMETRY  Speech Language Pathology  Initial Speech/Language/Cognitive Assessment    NAME:Gary Escalante  : 1960 (59 y.o.)   [x]   confirmed    MRN: [id number]  ROOM: [place] [address]  ADMISSION DATE: 2023  PATIENT DIAGNOSIS(ES): Dizziness [R42]  Uncontrolled hypertension [I10]  Hypertensive emergency [I16.1]  Type 2 diabetes mellitus with hyperglycemia, without long-term current use of insulin ([address]) [E11.65]  Chief Complaint   Patient presents with    Hypertension     Patient Active Problem List    Diagnosis Date Noted    Cellulitis of right foot 2023    Diplopia 2023    Toothache 2023    Chronic systolic (congestive) heart failure 11/10/2022    Crush fracture of vertebra due to osteoporosis ([address]) 11/10/2022    Type 2 diabetes mellitus with diabetic neuropathy 11/10/2022    Diabetes mellitus type 2, insulin dependent ([address]) 11/10/2022    chf 2022    Ischemic cardiomyopathy 2022    Hypertensive urgency 2022    Intractable nausea and vomiting 2022    Contact with and (suspected) exposure to covid-19 2021    Hypokalemia 2021    Presence of aortocoronary bypass graft 2021    Altered mental status, unspecified 2021    Moderate persistent asthma without complication     Nasal congestion 2019    Headache 2018    Illness, unspecified 2018    Nausea 2018    Lumbosacral spondylosis without myelopathy 2016    Degeneration of lumbar intervertebral disc 2015    Low back pain 2015    Displacement of lumbar intervertebral disc without myelopathy 2012    Hypertensive emergency 2023    TIA (transient ischemic attack) 2023    Dizziness 2023    Coagulation defect ([address]) 2023    Weakness 05/15/2023    Acute osteomyelitis of toe of right foot ([address]) 2023    Diabetic ulcer of toe associated with type 2 diabetes mellitus, with necrosis of mellitus ([address]) 06/13/2018    Unsp open wound of r little finger w/o damage to nail, init 06/13/2018    Encounter for pre-employment examination 04/13/2018    Allergic rhinitis due to pollen 06/07/2016    Essential hypertension, benign     Irregular heart rhythm 11/21/2012    Obesity, diabetes, and hypertension syndrome ([address]) 03/16/2005    Hyperlipidemia 03/16/2005     Past Medical History:   Diagnosis Date    Asthma     Back pain     compresion in [place]    CAD (coronary artery disease) 01/2020    3 vessel     chf 09/16/2022    Diabetes mellitus ([address])     Essential hypertension, benign     Hyperlipemia     Ischemic cardiomyopathy 09/16/2022    Neuropathic pain, leg, bilateral      Past Surgical History:   Procedure Laterality Date    CARPAL TUNNEL RELEASE Bilateral     FOOT DEBRIDEMENT Right 5/5/2023    RIGHT FOOT DEBRIDEMENT INCISION AND DRAINAGE ROOM 174 performed by Nancy Macedo DPM at [address] [place] [address] Right 5/8/2023    RIGHT FOOT  INCISION AND DRAINAGE WITH DEBRIDEMENT OF NON-VIABLE TISSUE DELAYED PRIMARY CLOSURE performed by Nancy Macedo DPM at [address] [place] [address] Left     bone spur    LAMINOTOMY  2012    SPINE SURGERY N/A 11/11/2022    L1 VERETEBRAL AUGMENTATION performed by Ashleigh Deras MD at [facility] [address]       Date of Onset: 9/25/2023    Date of Evaluation: 9/26/2023   Evaluating Therapist: Omer Gibbons, SLP        Diagnosis: Pt presents with mild to moderate cognitive-linguistic deficits characterized by deficits in STM, working memory, following multi-step directions, answering complex yes/no questions, confrontational naming, and abstract thinking negatively impacting his ability to safely perform ADLs. In addition, pt presents wtih mild dysarthria characterized by decreased breath support and impercise articulation negatively impacting his ability to be understood by unfamiliar listeners.     Requires SLP Intervention: Yes     D/C Recommendations: Ongoing speech therapy is recommended during this hospitalization    General  Subjective  Subjective: Pt alert and cooperative during evaluation. Behavior/Cognition  Behavior/Cognition: Alert; Cooperative; Impulsive   Respiratory Status: Room air  O2 Device: None (Room air)  Previous level of function and limitations:        Vision and Hearing  Vision  Vision: Impaired  Vision Exceptions: Wears glasses for reading  Hearing  Hearing: Within functional limits     Oral/Motor  Oral Motor   Labial: No impairment  Dentition: Natural;Intact  Oral Hygiene: Moist;Clean  Lingual: Incoordinated  Velum: No Impairment  Mandible: No impairment    Motor Speech  Motor Speech  Apraxic Characteristics: None  Dysarthric Characteristics: Decreased breath support; Imprecise  Overall Impairment Severity: Mild    Comprehension  Auditory Comprehension  Comprehension: Exceptions  Simple yes/no questions: WFL  Moderate yes/no questions: WFL  Complex yes/no questions: Mild  Basic Questions: Mild  One Step Commands: WFL  Two Step Commands: Mild  Multistep Commands: Moderate  Common Objects: Mild    Expression  Expression  Primary Mode of Expression: Verbal  Verbal Expression  Verbal Expression: Exceptions to functional limits  Confrontation: Moderate  Convergent: Moderate  Divergent: Moderate  Conversation: Mild  Interfering Components: Tangential speech; Impaired thought organization    Cognition  Orientation  Overall Orientation Status: Impaired  Orientation Level: Oriented to person;Oriented to place; Disoriented to situation;Disoriented to time  Attention  Attention: Within Functional Limits  Memory  Memory: Exceptions to [facility]  Short-term Memory: Moderate  Problem Solving  Problem Solving: Unable to assess  Abstract Reasoning  Abstract Reasoning: Exceptions to [facility] [place]  Convergent Thinking: Moderate  Divergent Thinking:  Moderate  Safety/Judgment  Safety/Judgment: Exceptions to [facility]  Impulsive: Mild    Recommendations  Requires SLP Intervention: Yes  D/C Recommendations:

## 2023-09-26 NOTE — CONSULTS
[address] Neurology Consult  Note  Name: Ariana Lora  Age: 59 y.o. Gender: male  CodeStatus: Full Code  Allergies: No Known Allergies    Chief Complaint:Hypertension    Primary Care Provider: Juan Anna MD  InpatientTreatment Team: Treatment Team: Attending Provider: Louis Naranjo DO; Consulting Physician: Zackary Gupta MD; Consulting Physician: Chelsie Diez MD; Consulting Physician: Keke Robertson MD; : Tara Christensen RN; Registered Nurse: Sinan Tolliver RN; Utilization Reviewer: Duong Lyons RN  Admission Date: 9/25/2023      HPI   Consulting provider: Dr. Harry Choudhury for left-sided weakness with recurrent hypertensive emergency and uncontrolled diabetes. History of TIAs, differential diagnosis FL ES versus TIA  Pt seen and examined for neurology consult. Patient is a 71-year-old  male with past medical history of neuropathy, ischemic cardiomyopathy, hyperlipidemia, hypertension, diabetes mellitus, heart failure, CAD, asthma who presented to [facility] [place] emergency room emergency room on 9/25/2023 with complaints of dizziness, right-sided weakness, elevated blood pressure. Patient reports symptoms started 3 hours prior. Poor historian. NIH stroke score 0. Vital signs on arrival 181/106, 94, 17, 97.9, 96%. EKG showed sinus rhythm with occasional PVC. CT of the head negative for any acute intracranial findings or hemorrhage. There is noted old lacunar infarcts in the right and left basal ganglia. Recent CTA of the head and neck done on 8/26/2023 showed diffuse disease involving the intracranial vasculature including irregularity involving anterior branches of the MCAs bilaterally left greater than right, moderate stenosis involving the A3 segment of the left anterior cerebral artery, moderate to severe stenosis involving the P2/P3 segment of the left posterior cerebral artery with mild stenosis involving the left P1 segment.   Mild diffuse irregularity involving the right PCA diameter data as defined by the Society of Radiologist in [address] Radiology 2003; 857;640-129. Echo No results found for this or any previous visit. Assessment/Plan:  Patient is a 61-year-old  male with past medical history of neuropathy, ischemic cardiomyopathy, hyperlipidemia, hypertension, diabetes mellitus, heart failure, CAD, asthma who presented to [facility] [place] emergency room emergency room on 9/25/2023 with complaints of dizziness, right-sided weakness, elevated blood pressure. He is noted to have right hemiparesis. Not a candidate for TNK given use of Eliquis. CT of the head negative for any acute intracranial findings. CTA of the head done last month identified multiple areas of intracranial stenosis. Repeat CTA of the head and neck ohave been ordered. We will obtain MRI of the brain  Hypertensive emergency, improving  History of cardiomyopathy, obtain echocardiogram  Diabetes mellitus with significant hyperglycemia  Possible medication noncompliance  Patient has been initiated on dual antiplatelet therapy and high intensity statin  Check lipid panel and hemoglobin A1c.  he continues on Eliquis. Unsure if he was compliant with this. He is poor historian and we will need to further evaluate compliance   Will need ongoing attention to risk factor modification and education regarding medication compliance    I have personally performed a face to face diagnostic evaluation on this patient, reviewed all data and investigations, and am the sole provider of all clinical decisions on the neurological status of this patient. Stroke as identified by clinical examination sick right-sided weakness. Patient is almost flaccid. We were not contacted the emergency room and patient may not have been a TNK candidate given that he is already on Eliquis. There is no large vessel disease identified patient was admitted here in the past as well.   Patient continues on Eliquis and will add aspirin for now unless something changes. The onset timing is very difficult to certain in this patient as well. Risk factor modification is being done. Depending on the results of the same will further advise 60% time spent on evaluating patient      Joshua Erickson MD, Timmy Acosta, American Board of Psychiatry & Neurology  Board Certified in Vascular Neurology  Board Certified in Neuromuscular Medicine  Certified in Neurorehabilitation         Collaborating physicians: Dr Lilian Erickson    Electronically signed by SHERIN Mcarthur CNP on 9/26/2023 at 10:05 AM

## 2023-09-26 NOTE — PROGRESS NOTES
Pt kicking and swearing at this writer and sitter when attempting to do pt care, vs,and blood sugar. Pt refused vs, blood sugar and hydralazine. Pt also is attempting to climb out of bed. Pt yells at staff when redirecting. Pt state\"you don't tell me what to do, I do what I want\". Pt then attempted to kick at staff again.

## 2023-09-26 NOTE — ED NOTES
Pt states he does not feel any better. Pt awaiting  CT scan results. Pt states he took himself off his metformin because \"It's a terrible drug and it's killing people but no one will tell me why\". RN attempted to explain the importance of medication and discussing with PCP prior to not taking it. Pt showed no evidence of learning.       Titi Newberry RN  09/25/23 [id number]

## 2023-09-26 NOTE — CARE COORDINATION
Case Management Assessment  Initial Evaluation    Date/Time of Evaluation: 9/26/2023 10:36 AM  Assessment Completed by: Mihir Gupta RN    If patient is discharged prior to next notation, then this note serves as note for discharge by case management. Patient Name: Adams Chong                   YOB: 1960  Diagnosis: Dizziness [R42]  Uncontrolled hypertension [I10]  Hypertensive emergency [I16.1]  Type 2 diabetes mellitus with hyperglycemia, without long-term current use of insulin ([address]) [E11.65]                   Date / Time: 9/25/2023  7:02 PM    Patient Admission Status: Inpatient   Readmission Risk (Low < 19, Mod (19-27), High > 27): Readmission Risk Score: 11    Current PCP: Reece Martin MD  PCP verified by CM? Yes    Chart Reviewed: Yes      History Provided by: Patient  Patient Orientation: Alert and Oriented    Patient Cognition: Alert    Hospitalization in the last 30 days (Readmission):  No    If yes, Readmission Assessment in CM Navigator will be completed. Advance Directives:      Code Status: Full Code   Patient's Primary Decision Maker is: Legal Next of Kin    Primary Decision MakerDelrakesh Gilford - [phone]    Secondary Decision Maker: Levar Stanley - Child - [phone]    Secondary Decision Maker: Annemarie Mccormick - Brother/Sister - [phone]    Discharge Planning:    Patient lives with: Alone Type of Home: House  Primary Care Giver: Self  Patient Support Systems include: Spouse/Significant Other   Current Financial resources:    Current community resources:    Current services prior to admission: None            Current DME:              Type of Home Care services:  None    ADLS  Prior functional level: Independent in ADLs/IADLs  Current functional level: Assistance with the following:, Other (see comment) (NEW RIGHT SIDED WEAKNESS, UNKNOWN. PT WILL NEED PT/OT EVALS)    PT AM-PAC:   /24  OT AM-PAC:   /24    Family can provide assistance at DC:     Would you like Case Management to discuss the discharge plan with any other family members/significant others, and if so, who? Plans to Return to Present Housing: Unknown at present  Other Identified Issues/Barriers to RETURNING to current housing: NA  Potential Assistance needed at discharge: Other (Comment)            Potential DME:    Patient expects to discharge to: [address] [place] [address] for transportation at discharge:      Financial    Payor: Rodri Montesinos / Plan: Reesa Nova / Product Type: *No Product type* /     Does insurance require precert for SNF: Yes    Potential assistance Purchasing Medications:    Meds-to-Beds request: Yes      [facility] [address] [facility], [address], [address] [place] [address]  [address] [facility],  Phone: [phone] Fax: [phone]      Notes:    Factors facilitating achievement of predicted outcomes: Family support, Cooperative, and Pleasant    Barriers to discharge: Medical complications    Additional Case Management Notes: MET W/ PATIENT IN ROOM TO DISCUSS D/C PLAN. PATIENT FROM HOME WITH HIS WIFE. PATIENT INDEPENDENT. STATES HE HAS WALKERS. PATIENT W/ NEW RIGHT SIDED WEAKNESS. STATES HE NEEDS HELP WITH \"EVERYTHING NOW\". D/C PLAN TBD CLINICAL COURSE. WILL NEED PT/OT EVALS WHEN MEDICALLY CLEARED. The Plan for Transition of Care is related to the following treatment goals of Dizziness [R42]  Uncontrolled hypertension [I10]  Hypertensive emergency [I16.1]  Type 2 diabetes mellitus with hyperglycemia, without long-term current use of insulin ([address]) [E41.48]    IF APPLICABLE: The Patient and/or patient representative Mikey Vega and his family were provided with a choice of provider and agrees with the discharge plan.  Freedom of choice list with basic dialogue that supports the patient's individualized plan of care/goals and shares the quality data associated with the providers was provided to:     Patient Representative Name:

## 2023-09-26 NOTE — PROGRESS NOTES
Physical Therapy Missed Treatment   Facility/Department: [facility] MED SURG Z928/I710-67    NAME: Santana Esquivel    : 1960 (59 y.o.)  MRN: [id number]    Account: [de-identified]  Gender: male    Patient has pending CTA of head and neck. Will await results before evaluation. Will follow and attempt PT evaluation again at earliest availability.        Viktoria West, PT, 23 at 11:44 AM

## 2023-09-26 NOTE — PROGRESS NOTES
Pt refused to go for an MRI. This writer attempts to get pt to go. Pt yells  at this nurse and says \"get the fuck out of here, you're getting on my fucking nerves\". Resident lea is soiled, he refused to let this writer or anyone change him. Pt wife at bedside.

## 2023-09-26 NOTE — PLAN OF CARE
Problem: Discharge Planning  Goal: Discharge to home or other facility with appropriate resources  Outcome: Progressing  Flowsheets (Taken 9/26/2023 0205)  Discharge to home or other facility with appropriate resources:   Identify barriers to discharge with patient and caregiver   Arrange for needed discharge resources and transportation as appropriate   Identify discharge learning needs (meds, wound care, etc)     Problem: ABCDS Injury Assessment  Goal: Absence of physical injury  Outcome: Progressing     Problem: Safety - Adult  Goal: Free from fall injury  Outcome: Progressing

## 2023-09-26 NOTE — BH NOTE
Attempt to see patient, who appeared very drowsy and unable to participate in the interview  Will try either later today or tomorrow morning to complete the psych assessment    Electronically signed by Keke Robertson MD on 9/26/2023 at 3:14 PM

## 2023-09-26 NOTE — PROGRESS NOTES
[facility]   [facility]  Speech Language Pathology  Clinical Bedside Swallow Evaluation    NAME:Gary Escalante  : 1960 (59 y.o.)   [x]   confirmed    MRN: [id number]  [id number]  ADMISSION DATE: 2023  PATIENT DIAGNOSIS(ES): Dizziness [R42]  Uncontrolled hypertension [I10]  Hypertensive emergency [I16.1]  Type 2 diabetes mellitus with hyperglycemia, without long-term current use of insulin ([address]) [E11.65]  Chief Complaint   Patient presents with    Hypertension     Patient Active Problem List    Diagnosis Date Noted    Cellulitis of right foot 2023    Diplopia 2023    Toothache 2023    Chronic systolic (congestive) heart failure 11/10/2022    Crush fracture of vertebra due to osteoporosis ([address]) 11/10/2022    Type 2 diabetes mellitus with diabetic neuropathy 11/10/2022    Diabetes mellitus type 2, insulin dependent ([address]) 11/10/2022    chf 2022    Ischemic cardiomyopathy 2022    Hypertensive urgency 2022    Intractable nausea and vomiting 2022    Contact with and (suspected) exposure to covid-19 2021    Hypokalemia 2021    Presence of aortocoronary bypass graft 2021    Altered mental status, unspecified 2021    Moderate persistent asthma without complication 15/74/3888    Nasal congestion 2019    Headache 2018    Illness, unspecified 2018    Nausea 2018    Lumbosacral spondylosis without myelopathy 2016    Degeneration of lumbar intervertebral disc 2015    Low back pain 2015    Displacement of lumbar intervertebral disc without myelopathy 2012    Hypertensive emergency 2023    TIA (transient ischemic attack) 2023    Dizziness 2023    Coagulation defect ([address]) 2023    Weakness 05/15/2023    Acute osteomyelitis of toe of right foot ([address]) 2023    Diabetic ulcer of toe associated with type 2 diabetes mellitus, with necrosis of bone mellitus ([address]) 06/13/2018    Unsp open wound of r little finger w/o damage to nail, init 06/13/2018    Encounter for pre-employment examination 04/13/2018    Allergic rhinitis due to pollen 06/07/2016    Essential hypertension, benign     Irregular heart rhythm 11/21/2012    Obesity, diabetes, and hypertension syndrome ([address]) 03/16/2005    Hyperlipidemia 03/16/2005     Past Medical History:   Diagnosis Date    Asthma     Back pain     compresion in [place]    CAD (coronary artery disease) 01/2020    3 vessel     chf 09/16/2022    Diabetes mellitus ([address])     Essential hypertension, benign     Hyperlipemia     Ischemic cardiomyopathy 09/16/2022    Neuropathic pain, leg, bilateral      Past Surgical History:   Procedure Laterality Date    CARPAL TUNNEL RELEASE Bilateral     FOOT DEBRIDEMENT Right 5/5/2023    RIGHT FOOT DEBRIDEMENT INCISION AND DRAINAGE ROOM 174 performed by Reji Monet DPM at [address] [place] [address] Right 5/8/2023    RIGHT FOOT  INCISION AND DRAINAGE WITH DEBRIDEMENT OF NON-VIABLE TISSUE DELAYED PRIMARY CLOSURE performed by Reji Monet DPM at [address] [place] [address] Left     bone spur    LAMINOTOMY  2012    SPINE SURGERY N/A 11/11/2022    L1 VERETEBRAL AUGMENTATION performed by Siddharth Parada MD at [address]     No Known Allergies    DATE ONSET: 9/25/2023    Date of Evaluation: 9/26/2023   Evaluating Therapist: GEE Mendenhlal    Dysphagia Diagnosis  Dysphagia Diagnosis: Mild oral stage dysphagia;Mild pharyngeal stage dysphagia  Dysphagia Impression : Pt presents with mild oral stage and suspected mild pharyngeal stage dysphagia. Pt demonstrated decreased A-P transit resulting with mild lingual residue. Pt was able to clear residue independently or with cued re-swallow. Pt observed to use finger sweep throughout eval.  Pt demonstrated immediate cough following large trials of soft and reg solids.  Required min to mod verbal cues in order to utilize safe swallowing stategies of small 1140  Time Out: 1150  Minutes: 10              Signature: Electronically signed by GEE Weir on 9/26/2023 at 1:15 PM

## 2023-09-27 ENCOUNTER — APPOINTMENT (OUTPATIENT)
Dept: MRI IMAGING | Age: 63
DRG: 041 | End: 2023-09-27
Payer: MEDICARE

## 2023-09-27 LAB
ANION GAP SERPL CALCULATED.3IONS-SCNC: 11 MEQ/L (ref 9–15)
BASOPHILS # BLD: 0 K/UL (ref 0–0.2)
BASOPHILS NFR BLD: 0.3 %
BUN SERPL-MCNC: 25 MG/DL (ref 8–23)
CALCIUM SERPL-MCNC: 8.8 MG/DL (ref 8.5–9.9)
CHLORIDE SERPL-SCNC: 107 MEQ/L (ref 95–107)
CHOLEST SERPL-MCNC: 134 MG/DL (ref 0–199)
CO2 SERPL-SCNC: 24 MEQ/L (ref 20–31)
CREAT SERPL-MCNC: 1.24 MG/DL (ref 0.7–1.2)
EOSINOPHIL # BLD: 0.4 K/UL (ref 0–0.7)
EOSINOPHIL NFR BLD: 4.9 %
ERYTHROCYTE [DISTWIDTH] IN BLOOD BY AUTOMATED COUNT: 13.2 % (ref 11.5–14.5)
GLUCOSE BLD-MCNC: 155 MG/DL (ref 70–99)
GLUCOSE BLD-MCNC: 170 MG/DL (ref 70–99)
GLUCOSE BLD-MCNC: 212 MG/DL (ref 70–99)
GLUCOSE BLD-MCNC: 88 MG/DL (ref 70–99)
GLUCOSE SERPL-MCNC: 88 MG/DL (ref 70–99)
HBA1C MFR BLD: 8 % (ref 4.8–5.9)
HBA1C MFR BLD: 8.2 % (ref 4.8–5.9)
HCT VFR BLD AUTO: 41.5 % (ref 42–52)
HDLC SERPL-MCNC: 43 MG/DL (ref 40–59)
HGB BLD-MCNC: 13.7 G/DL (ref 14–18)
LDLC SERPL CALC-MCNC: 70 MG/DL (ref 0–129)
LYMPHOCYTES # BLD: 0.4 K/UL (ref 1–4.8)
LYMPHOCYTES NFR BLD: 5.4 %
MAGNESIUM SERPL-MCNC: 2 MG/DL (ref 1.7–2.4)
MCH RBC QN AUTO: 27.1 PG (ref 27–31.3)
MCHC RBC AUTO-ENTMCNC: 33 % (ref 33–37)
MCV RBC AUTO: 82 FL (ref 79–92.2)
MONOCYTES # BLD: 0.5 K/UL (ref 0.2–0.8)
MONOCYTES NFR BLD: 7.2 %
NEUTROPHILS # BLD: 6 K/UL (ref 1.4–6.5)
NEUTS SEG NFR BLD: 82.1 %
PERFORMED ON: ABNORMAL
PERFORMED ON: NORMAL
PLATELET # BLD AUTO: 133 K/UL (ref 130–400)
POTASSIUM SERPL-SCNC: 3.4 MEQ/L (ref 3.4–4.9)
RBC # BLD AUTO: 5.06 M/UL (ref 4.7–6.1)
SODIUM SERPL-SCNC: 142 MEQ/L (ref 135–144)
TRIGL SERPL-MCNC: 105 MG/DL (ref 0–150)
WBC # BLD AUTO: 7.4 K/UL (ref 4.8–10.8)

## 2023-09-27 PROCEDURE — 99232 SBSQ HOSP IP/OBS MODERATE 35: CPT | Performed by: INTERNAL MEDICINE

## 2023-09-27 PROCEDURE — 6360000002 HC RX W HCPCS: Performed by: INTERNAL MEDICINE

## 2023-09-27 PROCEDURE — APPSS45 APP SPLIT SHARED TIME 31-45 MINUTES: Performed by: NURSE PRACTITIONER

## 2023-09-27 PROCEDURE — 1210000000 HC MED SURG R&B

## 2023-09-27 PROCEDURE — 83036 HEMOGLOBIN GLYCOSYLATED A1C: CPT

## 2023-09-27 PROCEDURE — 85025 COMPLETE CBC W/AUTO DIFF WBC: CPT

## 2023-09-27 PROCEDURE — 2580000003 HC RX 258: Performed by: INTERNAL MEDICINE

## 2023-09-27 PROCEDURE — 99232 SBSQ HOSP IP/OBS MODERATE 35: CPT | Performed by: PSYCHIATRY & NEUROLOGY

## 2023-09-27 PROCEDURE — 6370000000 HC RX 637 (ALT 250 FOR IP): Performed by: INTERNAL MEDICINE

## 2023-09-27 PROCEDURE — 36415 COLL VENOUS BLD VENIPUNCTURE: CPT

## 2023-09-27 PROCEDURE — 70551 MRI BRAIN STEM W/O DYE: CPT

## 2023-09-27 PROCEDURE — 80061 LIPID PANEL: CPT

## 2023-09-27 PROCEDURE — 83735 ASSAY OF MAGNESIUM: CPT

## 2023-09-27 PROCEDURE — 80048 BASIC METABOLIC PNL TOTAL CA: CPT

## 2023-09-27 PROCEDURE — APPSS30 APP SPLIT SHARED TIME 16-30 MINUTES: Performed by: NURSE PRACTITIONER

## 2023-09-27 RX ORDER — INSULIN GLARGINE 100 [IU]/ML
28 INJECTION, SOLUTION SUBCUTANEOUS NIGHTLY
Status: DISCONTINUED | OUTPATIENT
Start: 2023-09-27 | End: 2023-10-01

## 2023-09-27 RX ORDER — INSULIN LISPRO 100 [IU]/ML
4 INJECTION, SOLUTION INTRAVENOUS; SUBCUTANEOUS
Status: DISCONTINUED | OUTPATIENT
Start: 2023-09-27 | End: 2023-10-03 | Stop reason: HOSPADM

## 2023-09-27 RX ORDER — LORAZEPAM 2 MG/ML
1 INJECTION INTRAMUSCULAR ONCE
Status: COMPLETED | OUTPATIENT
Start: 2023-09-27 | End: 2023-09-27

## 2023-09-27 RX ORDER — LORAZEPAM 2 MG/ML
1 INJECTION INTRAMUSCULAR ONCE
Status: DISCONTINUED | OUTPATIENT
Start: 2023-09-27 | End: 2023-10-03 | Stop reason: HOSPADM

## 2023-09-27 RX ADMIN — INSULIN LISPRO 4 UNITS: 100 INJECTION, SOLUTION INTRAVENOUS; SUBCUTANEOUS at 12:20

## 2023-09-27 RX ADMIN — HYDRALAZINE HYDROCHLORIDE 100 MG: 100 TABLET, FILM COATED ORAL at 10:10

## 2023-09-27 RX ADMIN — Medication 10 ML: at 08:00

## 2023-09-27 RX ADMIN — ATORVASTATIN CALCIUM 40 MG: 40 TABLET, FILM COATED ORAL at 22:30

## 2023-09-27 RX ADMIN — INSULIN LISPRO 4 UNITS: 100 INJECTION, SOLUTION INTRAVENOUS; SUBCUTANEOUS at 17:00

## 2023-09-27 RX ADMIN — CARVEDILOL 12.5 MG: 12.5 TABLET, FILM COATED ORAL at 10:10

## 2023-09-27 RX ADMIN — HYDRALAZINE HYDROCHLORIDE 100 MG: 100 TABLET, FILM COATED ORAL at 22:29

## 2023-09-27 RX ADMIN — LORAZEPAM 1 MG: 2 INJECTION INTRAMUSCULAR; INTRAVENOUS at 12:20

## 2023-09-27 RX ADMIN — CLOPIDOGREL BISULFATE 75 MG: 75 TABLET ORAL at 10:10

## 2023-09-27 RX ADMIN — CARVEDILOL 12.5 MG: 12.5 TABLET, FILM COATED ORAL at 22:30

## 2023-09-27 RX ADMIN — SACUBITRIL AND VALSARTAN 1 TABLET: 24; 26 TABLET, FILM COATED ORAL at 22:29

## 2023-09-27 RX ADMIN — INSULIN GLARGINE 28 UNITS: 100 INJECTION, SOLUTION SUBCUTANEOUS at 22:30

## 2023-09-27 RX ADMIN — SACUBITRIL AND VALSARTAN 1 TABLET: 24; 26 TABLET, FILM COATED ORAL at 10:10

## 2023-09-27 RX ADMIN — Medication 10 ML: at 11:39

## 2023-09-27 RX ADMIN — APIXABAN 5 MG: 5 TABLET, FILM COATED ORAL at 10:10

## 2023-09-27 RX ADMIN — ASPIRIN 81 MG: 81 TABLET, COATED ORAL at 10:10

## 2023-09-27 RX ADMIN — Medication 5 ML: at 22:38

## 2023-09-27 RX ADMIN — APIXABAN 5 MG: 5 TABLET, FILM COATED ORAL at 22:30

## 2023-09-27 ASSESSMENT — ENCOUNTER SYMPTOMS
CHEST TIGHTNESS: 0
WHEEZING: 0
TROUBLE SWALLOWING: 0
VOMITING: 0
COUGH: 0
COLOR CHANGE: 0
NAUSEA: 0
SHORTNESS OF BREATH: 0

## 2023-09-27 ASSESSMENT — PAIN SCALES - GENERAL
PAINLEVEL_OUTOF10: 0
PAINLEVEL_OUTOF10: 0

## 2023-09-27 NOTE — FLOWSHEET NOTE
Patient received ativan for anxiety during MRI at noon. Patient is now drowsy and unable to follow commands for neuro checks at this time. Electronically signed by Virginie Tamayo RN on 9/27/2023 at 5:03 PM

## 2023-09-27 NOTE — CONSULTS
[facility] [place], [address] [facility]                                  CONSULTATION    PATIENT NAME: Chichi Dawkins                        :        1960  MED REC NO:   [id number]                            ROOM:       [id number]  ACCOUNT NO:   [de-identified]                           ADMIT DATE: 2023  PROVIDER:     Laura Bhatia MD    CONSULT DATE:  2023    ENDOCRINE CONSULTATION    REFERRING PROVIDE:  Trini Schmitt DO    REASON FOR CONSULTATION:  Management of uncontrolled diabetes,  hyperglycemia. CHIEF COMPLAINT AND HISTORY OF PRESENT ILLNESS:  The patient is a  61-year-old male with known history of diabetes seen by Endocrine  Service, last seen by Therese Dubose in  admitted to [facility] because of hypertensive emergency, as well as right-sided  weakness seen by neurologist had some altered mental status with  hyperglycemia, known history of neuropathy, history of coronary artery  disease, status post three-vessel disease, congestive heart failure,  ischemic cardiomyopathy. The patient's initial glucose was 400, it did  go down to 176. He was started back on Lantus 25 at night with Humalog  coverage high dose. P.o. intake has been improving. Hemoglobin A1c was  6.1. The patient overall is a poor historian. He had a C-peptide done  2 years ago, it was 0.4. In addition to insulin, the patient also on  Jardiance, metformin, was prescribed Dexcom Continuous Glucose  Monitoring. Chemistries; sodium 135, potassium was 4.1, chloride was 99, CO2 was 24,  BUN 20, creatinine 1.1. Troponin was 0.031. The patient seen by  neurologist, impression was possible right hemiparesis. PAST MEDICAL HISTORY:  Significant for type 2 diabetes, history of  coronary artery disease, back pain, asthma, hypertension,  hyperlipidemia, ischemic cardiomyopathy, and neuropathy of lower legs.     PAST SURGICAL HISTORY:  Carpal tunnel

## 2023-09-27 NOTE — PROGRESS NOTES
Physical Therapy Missed Treatment   Facility/Department: [facility] MED SURG Z571/[id number]    NAME: Rene Lopez    : 1960 (59 y.o.)  MRN: [id number]    Account: [de-identified]  Gender: male      Orders received, chart reviewed. Attempted PT evaluation at 1430. Patient too drowsy and declining at this time. Nursing staff notified, stating he was just given ativan. Will follow and attempt PT evaluation again at earliest availability.        Matthew Chahal, PT, 23 at 2:44 PM

## 2023-09-27 NOTE — PROGRESS NOTES
[facility]  Occupational Therapy        NAME:  Laron Gregg  ROOM: L399/Q125-86  :  1960  DATE: 2023    Attempted to see Laron Gregg on this date for:   [x]  Initial Evaluation   []  Treatment       Patient was unable to be seen due to:   [] Off unit for testing/procedure    [] Patient refused, stating \"    [] Therapy on hold due to     [] Nursing deferred due to    [x] Other:  Per RN, pt received ativan earlier this PM to go for MRI.  Pt awake but minimally able to follow commands, states 'no' when therapist asks if pt willing to attempt/    Discussed with Danilo Maher RN    Electronically signed by BAO Cloud on 23 at 2:43 PM EDT

## 2023-09-27 NOTE — BH NOTE
Pt off to MRI and was sedated with Ativan to prepare him for MRI.  Will try again later today or tomorrow AM. No further suicidal thoughts documented    Electronically signed by Jo Garcia MD on 9/27/2023 at 2:38 PM

## 2023-09-28 PROBLEM — R13.12 DYSPHAGIA, OROPHARYNGEAL PHASE: Status: ACTIVE | Noted: 2023-09-28

## 2023-09-28 PROBLEM — Z78.9 IMPAIRED MOBILITY AND ACTIVITIES OF DAILY LIVING: Status: ACTIVE | Noted: 2023-09-28

## 2023-09-28 PROBLEM — R47.01 APHASIA: Status: ACTIVE | Noted: 2023-09-28

## 2023-09-28 PROBLEM — R47.81 EPISODIC ATAXIA WITH SLURRED SPEECH (HCC): Status: ACTIVE | Noted: 2023-08-27

## 2023-09-28 PROBLEM — G11.8 EPISODIC ATAXIA WITH SLURRED SPEECH (HCC): Status: ACTIVE | Noted: 2023-08-27

## 2023-09-28 PROBLEM — I65.02 VERTEBRAL ARTERY OCCLUSION, LEFT: Status: ACTIVE | Noted: 2023-08-27

## 2023-09-28 PROBLEM — I63.9 CVA (CEREBRAL VASCULAR ACCIDENT) (HCC): Status: ACTIVE | Noted: 2023-09-28

## 2023-09-28 PROBLEM — F43.21 ADJUSTMENT DISORDER WITH DEPRESSED MOOD: Status: ACTIVE | Noted: 2023-09-28

## 2023-09-28 PROBLEM — R40.4 TRANSIENT ALTERATION OF AWARENESS: Status: ACTIVE | Noted: 2023-06-29

## 2023-09-28 PROBLEM — R26.81 UNSTEADINESS ON FEET: Status: ACTIVE | Noted: 2023-08-23

## 2023-09-28 PROBLEM — Z74.09 IMPAIRED MOBILITY AND ACTIVITIES OF DAILY LIVING: Status: ACTIVE | Noted: 2023-09-28

## 2023-09-28 PROBLEM — E11.51 TYPE 2 DIABETES MELLITUS WITH DIABETIC PERIPHERAL ANGIOPATHY WITHOUT GANGRENE (HCC): Status: ACTIVE | Noted: 2023-08-23

## 2023-09-28 PROBLEM — I67.4 HYPERTENSIVE ENCEPHALOPATHY: Status: ACTIVE | Noted: 2023-08-23

## 2023-09-28 PROBLEM — Z95.5 PRESENCE OF CORONARY ANGIOPLASTY IMPLANT AND GRAFT: Status: ACTIVE | Noted: 2023-08-23

## 2023-09-28 LAB
ANION GAP SERPL CALCULATED.3IONS-SCNC: 11 MEQ/L (ref 9–15)
BASOPHILS # BLD: 0 K/UL (ref 0–0.2)
BASOPHILS NFR BLD: 0.5 %
BUN SERPL-MCNC: 27 MG/DL (ref 8–23)
CALCIUM SERPL-MCNC: 8.6 MG/DL (ref 8.5–9.9)
CHLORIDE SERPL-SCNC: 103 MEQ/L (ref 95–107)
CO2 SERPL-SCNC: 24 MEQ/L (ref 20–31)
CREAT SERPL-MCNC: 1.33 MG/DL (ref 0.7–1.2)
EOSINOPHIL # BLD: 0.4 K/UL (ref 0–0.7)
EOSINOPHIL NFR BLD: 6.6 %
ERYTHROCYTE [DISTWIDTH] IN BLOOD BY AUTOMATED COUNT: 13.2 % (ref 11.5–14.5)
GLUCOSE BLD-MCNC: 141 MG/DL (ref 70–99)
GLUCOSE BLD-MCNC: 173 MG/DL (ref 70–99)
GLUCOSE BLD-MCNC: 190 MG/DL (ref 70–99)
GLUCOSE SERPL-MCNC: 320 MG/DL (ref 70–99)
HCT VFR BLD AUTO: 40.2 % (ref 42–52)
HGB BLD-MCNC: 13.1 G/DL (ref 14–18)
LYMPHOCYTES # BLD: 0.6 K/UL (ref 1–4.8)
LYMPHOCYTES NFR BLD: 9.8 %
MAGNESIUM SERPL-MCNC: 1.9 MG/DL (ref 1.7–2.4)
MCH RBC QN AUTO: 27 PG (ref 27–31.3)
MCHC RBC AUTO-ENTMCNC: 32.6 % (ref 33–37)
MCV RBC AUTO: 82.7 FL (ref 79–92.2)
MONOCYTES # BLD: 0.5 K/UL (ref 0.2–0.8)
MONOCYTES NFR BLD: 8.4 %
NEUTROPHILS # BLD: 4.4 K/UL (ref 1.4–6.5)
NEUTS SEG NFR BLD: 74.5 %
PERFORMED ON: ABNORMAL
PLATELET # BLD AUTO: 131 K/UL (ref 130–400)
POTASSIUM SERPL-SCNC: 3.5 MEQ/L (ref 3.4–4.9)
RBC # BLD AUTO: 4.86 M/UL (ref 4.7–6.1)
SODIUM SERPL-SCNC: 138 MEQ/L (ref 135–144)
WBC # BLD AUTO: 5.9 K/UL (ref 4.8–10.8)

## 2023-09-28 PROCEDURE — 1210000000 HC MED SURG R&B

## 2023-09-28 PROCEDURE — 6370000000 HC RX 637 (ALT 250 FOR IP): Performed by: INTERNAL MEDICINE

## 2023-09-28 PROCEDURE — 97167 OT EVAL HIGH COMPLEX 60 MIN: CPT

## 2023-09-28 PROCEDURE — 97162 PT EVAL MOD COMPLEX 30 MIN: CPT

## 2023-09-28 PROCEDURE — 99232 SBSQ HOSP IP/OBS MODERATE 35: CPT | Performed by: PSYCHIATRY & NEUROLOGY

## 2023-09-28 PROCEDURE — 2580000003 HC RX 258: Performed by: INTERNAL MEDICINE

## 2023-09-28 PROCEDURE — 99232 SBSQ HOSP IP/OBS MODERATE 35: CPT | Performed by: PHYSICIAN ASSISTANT

## 2023-09-28 PROCEDURE — 36415 COLL VENOUS BLD VENIPUNCTURE: CPT

## 2023-09-28 PROCEDURE — 99222 1ST HOSP IP/OBS MODERATE 55: CPT | Performed by: PHYSICAL MEDICINE & REHABILITATION

## 2023-09-28 PROCEDURE — 80048 BASIC METABOLIC PNL TOTAL CA: CPT

## 2023-09-28 PROCEDURE — 90792 PSYCH DIAG EVAL W/MED SRVCS: CPT | Performed by: PSYCHIATRY & NEUROLOGY

## 2023-09-28 PROCEDURE — 6370000000 HC RX 637 (ALT 250 FOR IP): Performed by: PSYCHIATRY & NEUROLOGY

## 2023-09-28 PROCEDURE — 83735 ASSAY OF MAGNESIUM: CPT

## 2023-09-28 PROCEDURE — 99223 1ST HOSP IP/OBS HIGH 75: CPT | Performed by: INTERNAL MEDICINE

## 2023-09-28 PROCEDURE — 85025 COMPLETE CBC W/AUTO DIFF WBC: CPT

## 2023-09-28 RX ORDER — SODIUM CHLORIDE 9 MG/ML
INJECTION, SOLUTION INTRAVENOUS CONTINUOUS
Status: DISCONTINUED | OUTPATIENT
Start: 2023-09-28 | End: 2023-09-29

## 2023-09-28 RX ADMIN — INSULIN GLARGINE 28 UNITS: 100 INJECTION, SOLUTION SUBCUTANEOUS at 22:21

## 2023-09-28 RX ADMIN — ATORVASTATIN CALCIUM 40 MG: 40 TABLET, FILM COATED ORAL at 22:21

## 2023-09-28 RX ADMIN — CARVEDILOL 12.5 MG: 12.5 TABLET, FILM COATED ORAL at 08:41

## 2023-09-28 RX ADMIN — HYDRALAZINE HYDROCHLORIDE 100 MG: 100 TABLET, FILM COATED ORAL at 22:21

## 2023-09-28 RX ADMIN — HYDRALAZINE HYDROCHLORIDE 100 MG: 100 TABLET, FILM COATED ORAL at 15:05

## 2023-09-28 RX ADMIN — SACUBITRIL AND VALSARTAN 1 TABLET: 24; 26 TABLET, FILM COATED ORAL at 08:41

## 2023-09-28 RX ADMIN — Medication 5 ML: at 08:45

## 2023-09-28 RX ADMIN — APIXABAN 5 MG: 5 TABLET, FILM COATED ORAL at 22:20

## 2023-09-28 RX ADMIN — APIXABAN 5 MG: 5 TABLET, FILM COATED ORAL at 08:41

## 2023-09-28 RX ADMIN — INSULIN LISPRO 4 UNITS: 100 INJECTION, SOLUTION INTRAVENOUS; SUBCUTANEOUS at 08:43

## 2023-09-28 RX ADMIN — Medication 10 ML: at 22:25

## 2023-09-28 RX ADMIN — ASPIRIN 81 MG: 81 TABLET, COATED ORAL at 08:41

## 2023-09-28 RX ADMIN — CLOPIDOGREL BISULFATE 75 MG: 75 TABLET ORAL at 08:41

## 2023-09-28 RX ADMIN — SODIUM CHLORIDE: 9 INJECTION, SOLUTION INTRAVENOUS at 09:57

## 2023-09-28 RX ADMIN — SERTRALINE HYDROCHLORIDE 50 MG: 50 TABLET ORAL at 15:05

## 2023-09-28 RX ADMIN — HYDRALAZINE HYDROCHLORIDE 100 MG: 100 TABLET, FILM COATED ORAL at 08:41

## 2023-09-28 RX ADMIN — CARVEDILOL 12.5 MG: 12.5 TABLET, FILM COATED ORAL at 22:21

## 2023-09-28 ASSESSMENT — ENCOUNTER SYMPTOMS
TROUBLE SWALLOWING: 1
TROUBLE SWALLOWING: 0
BLOOD IN STOOL: 0
PHOTOPHOBIA: 0
EYE REDNESS: 0
NAUSEA: 0
ABDOMINAL DISTENTION: 0
ANAL BLEEDING: 0
GASTROINTESTINAL NEGATIVE: 1
CHEST TIGHTNESS: 0
EYES NEGATIVE: 1
DIARRHEA: 0
STRIDOR: 0
VOICE CHANGE: 1
SHORTNESS OF BREATH: 0
EYE DISCHARGE: 0
VOMITING: 0
COUGH: 0
VOICE CHANGE: 0
CONSTIPATION: 0
APNEA: 0
WHEEZING: 0
RESPIRATORY NEGATIVE: 1
COLOR CHANGE: 0
ALLERGIC/IMMUNOLOGIC NEGATIVE: 1

## 2023-09-28 ASSESSMENT — PAIN SCALES - GENERAL: PAINLEVEL_OUTOF10: 0

## 2023-09-28 NOTE — PLAN OF CARE
See OT evaluation for all goals and OT POC.  Electronically signed by LAMBERT Arias/L on 9/28/2023 at 10:18 AM

## 2023-09-28 NOTE — CARE COORDINATION
[facility] Pre-Admission Screening Document      Patient Name: Rene Lopez       MRN: [id number]    : 1960    Age: 59 y.o. Gender: male   Payor: Payor: Miguel Angel Lopez / Plan: [address] [place] [address] HMO / Product Type: *No Product type* /   MSSP: No    Admitted from: [facility] Floor: 2W  Attending Care Provider: Meryle Iron, DO  Inpatient Rehab Referring Care Provider: Dr. Svitlana Plascencia  Primary Care Provider: Pastora June MD  Inpatient Treatment Team including Consults: Treatment Team: Attending Provider: Meryle Iron, DO; Consulting Physician: Mago Hightower MD; Consulting Physician: Rosario Lofton MD; Consulting Physician: Jim James MD; Utilization Reviewer: Arun Livingston RN; : Bhakti Quiroz RN; Consulting Physician: Ryan Velazquez DO; Registered Nurse: Elvira Tineo RN; Utilization Reviewer: Martha Murcia RN    Reason for Hospitalization:   1. Dizziness    2. Uncontrolled hypertension    3. Type 2 diabetes mellitus with hyperglycemia, without long-term current use of insulin ([address])    4. TIA (transient ischemic attack)    5. Hypertensive emergency    6. Weakness      Chief Complaint   Patient presents with    Hypertension     Isolation:No active isolations    Hospital Course:  Admit Date: 2023  7:02 PM  Inpatient Rehab Referral Date: 23  Narrative of hospital course/history of present illness: Patient to ER  with HTN, dizziness and left-sided weakness and has a history of TIAs. Patient in ER noted with odd statements such as how metformin is killing people and that is why the patient won't take it. Patient admitted for further medical management and work up for his symptoms as initial diagnostic testing did not reveal cause of symptoms. Neuro: At this time we will continue Eliquis and Plavix only. Awaiting MRI to see if stroke is cardioembolic appearance. Continue telemetry.   MRI-Moderate sized acute, Discharge:  Home entry accessibility  Multi-level home  [address]  [facility] accessibility  [address] [facility] accessibility  Equipment needs  Function at [facility] level  Caregiver availability  Resource availability      Rehab evaluation plan: Recommend Admission to acute inpatient rehab  Rehabilitation Impairment Group Code: ***  Rehab Impairment Group: Neurologic: Cerebral Infarct  Estimated Length of Stay (days): ***  Rehab Diagnosis: Impaired mobility and ADL's due to ***  Reviewer's Signature: ***    I have reviewed and concur with the above Preadmission Screening.    Rehab Admitting Doctor: Dr. Rosendo Castaneda DO

## 2023-09-28 NOTE — CARE COORDINATION
Precert submitted for acute inpatient rehab. If approved and patient chooses to not participate in rehab, we will re eval but precert was submitted in the mean time in. Reference# [id number].   Electronically signed by Rylee Dominguez on 9/28/2023 at 3:28 PM

## 2023-09-28 NOTE — PROGRESS NOTES
Patient became agitated with 1:1 , cussing and stating that he doesn't want her here. Patient redirected as to why he has a sitter due to continuation of suicidal ideations at this time and orders placed by physician for a 1:1 . Patient making statements saying he doesn't want to go on and has a right to die. Patient also refusing accu check and insulin. Redirection and distraction used successful at this time.

## 2023-09-28 NOTE — CONSULTS
[facility] , [place]    CARDIOLOGY CONSULTATION REPORT    DATE OF CONSULTATION  9/28/2023    CONSULTANT  Diana Tafoya DO     REQUESTING 3 Dickinson DO Keya     PRIMARY CARDIOLOGIST  Javier Reeder DO    REASON FOR CONSULTATION  Anticoagulation recommendations; concern for paroxysmal atrial fibrillation     HISTORY OF PRESENT ILLNESS  Ernike Conrad is a 59 y.o. male with history of CAD, prior three-vessel CABG (2019), ischemic cardiomyopathy with recovered EF, hypertension, hyperlipidemia, poorly controlled insulin-dependent diabetes mellitus, and family history of heart disease who presents with right-sided weakness. He has had recent admissions for hypertensive urgency and TIAs. He was recently placed on Eliquis for TIA by Dr. Praful Grace but went to [facility] and his Eliquis was subsequently discontinued in favor of dual antiplatelet therapy because of no definitive indication. He presents again with hypertensive emergency and acute encephalopathy. There is now concern for acute CVA. He was seen again by neurology who have strong suspicion for underlying atrial fibrillation and he was resumed on Eliquis. We are consulted for further anticoagulation options. Currently he denies any chest pain, palpitations, near syncope or syncope. There is questionable history of postoperative atrial fibrillation in 2019 after the initial CABG however no other definitive history of paroxysmal atrial fibrillation. His telemetry and ECGs have not shown any atrial fibrillation to date. He is now in suicidal precautions and was just seen by psychiatry. I spoke with psychiatry who does not feel he has decision making capability at this point.      Allergies  No Known Allergies    Medications   sertraline, 50 mg, Daily  insulin glargine, 28 Units, Nightly  insulin lispro, 4 Units, TID WC  LORazepam, 1 mg, Once  sodium chloride flush, 5-40 mL, 2 times per day  insulin lispro, 0-16 Units, 4x Daily AC & HS  apixaban, 5 mg,

## 2023-09-28 NOTE — PROGRESS NOTES
Endocrinology Progress Note    Assessment and Plan:   Assessment-  Type 2 insulin-dependent diabetes  CVA  Right hemiparesis    Plan-  Continue Lantus 28 units at night  Continue Humalog 4 units 3 times daily before meals  Medium dose Humalog sliding scale coverage  Monitor glycemic control closely    POC Glucose:   Recent Labs     09/27/23  1059 09/27/23  1611 09/27/23  2143 09/28/23  0751 09/28/23  1621   POCGLU 170* 212* 155* 190* 141*     HGBA1C:  Lab Results   Component Value Date    LABA1C 8.0 (H) 09/27/2023    LABA1C 8.2 (H) 09/27/2023    LABA1C 6.1 (A) 08/21/2023     CBC:   Recent Labs     09/27/23  0535 09/28/23  0519   WBC 7.4 5.9   HGB 13.7* 13.1*    131     CMP:    Lab Results   Component Value Date     09/28/2023    K 3.5 09/28/2023     09/28/2023    CO2 24 09/28/2023    BUN 27 (H) 09/28/2023    CREATININE 1.33 (H) 09/28/2023    GLUCOSE 320 (H) 09/28/2023    CALCIUM 8.6 09/28/2023    PROT 6.5 09/25/2023    LABALBU 3.9 09/25/2023    BILITOT 0.5 09/25/2023    ALKPHOS 87 09/25/2023    AST 13 09/25/2023    ALT 12 09/25/2023    LABGLOM >60.0 09/28/2023    GFRAA >60.0 09/28/2022    AGRATIO 0.6 (L) 07/08/2018    GLOB 2.6 09/25/2023       Lab Results   Component Value Date    TSH 0.641 09/25/2023    TSH 0.93 08/21/2023    TSH 1.270 06/09/2020    TSHREFLEX 2.320 08/03/2023     No results found for: \"TPOABS\"      CC:   Chief Complaint   Patient presents with    Hypertension       Subjective: Interval History: Rosetta Pathak is a 59-year-old type 2 insulin-dependent diabetic male. Presented to the emergency room with altered mental status, right-sided weakness, and headache. He was diagnosed with CVA, encephalopathy, hyperglycemia and was admitted. He was started on basal and bolus insulin with improvement in his glycemic control. We will monitor this closely and make adjustments daily as needed. Patient has been intermittently noncompliant with p.o. nutrition medications and insulin.     Review neuropathy     Migraine without aura     Community acquired pneumonia of right lower lobe of lung     Acute on chronic diastolic (congestive) heart failure (HCC)     Mild intermittent asthma with exacerbation     NSTEMI (non-ST elevated myocardial infarction) ([address])     Acute bronchitis due to other specified organisms     CAD (coronary artery disease)     Body mass index (bmi) 29.0-29.9, adult     Cellulitis of right lower limb     Dyspnea, unspecified     Encounter for pre-employment examination     Family history of ischemic heart disease and other diseases of the circulatory system     Other specified hypothyroidism     Inflammatory disorders of scrotum     Long term (current) use of insulin ([address])     Long term (current) use of oral hypoglycemic drugs     Other chronic sinusitis     Other idiopathic scoliosis, lumbosacral region     Other intervertebral disc disorders, lumbar region     Pain in left lower leg     General patient noncompliance     Sleep apnea     Spinal stenosis, lumbar region with neurogenic claudication     Spondylolisthesis, lumbosacral region     Poorly controlled type 2 diabetes mellitus ([address])     Unsp open wound of r little finger w/o damage to nail, init     Involuntary movements     ANTON (acute kidney injury) ([address])     Hyponatremia     Type 2 diabetes mellitus with hyperglycemia (HCC)     Brain bleed (HCC)     Hypertensive urgency     Intractable nausea and vomiting     chf     Ischemic cardiomyopathy     Chronic systolic (congestive) heart failure     Crush fracture of vertebra due to osteoporosis ([address])     Type 2 diabetes mellitus with diabetic neuropathy     Diabetes mellitus type 2, insulin dependent (HCC)     Altered mental status, unspecified     Contact with and (suspected) exposure to covid-19     Degeneration of lumbar intervertebral disc     Diplopia     Displacement of lumbar intervertebral disc without myelopathy     Headache     Hypokalemia     Illness, unspecified     Low back

## 2023-09-28 NOTE — CARE COORDINATION
Quality round completed with care management team. LSW tried to meet with pt at bedside. Pt is confuse and currently have 1:1 sitter at bedside. LSW called pt's daughter via phone. Daughter agree with DC plan to [facility]. Also have [facility] as back up if [facility] can not take. Referral send to [facility]. Need order. Pending acceptance. LSW will follow.    Electronically signed by HAYDEN Madrigal on 9/28/2023 at 10:56 AM

## 2023-09-28 NOTE — PROGRESS NOTES
Physical Therapy Med Surg Initial Assessment  [facility]       NAME: Magdiel La  : 1960 (59 y.o.)  MRN: [id number]  CODE STATUS: Full Code    Date of Service: 2023    Patient Diagnosis(es): Dizziness [R42]  Uncontrolled hypertension [I10]  Hypertensive emergency [I16.1]  Type 2 diabetes mellitus with hyperglycemia, without long-term current use of insulin ([address]) [E11.65]   Chief Complaint   Patient presents with    Hypertension     Patient Active Problem List    Diagnosis Date Noted    Cellulitis of right foot 2023    Diplopia 2023    Toothache 2023    Chronic systolic (congestive) heart failure 11/10/2022    Crush fracture of vertebra due to osteoporosis ([address]) 11/10/2022    Type 2 diabetes mellitus with diabetic neuropathy 11/10/2022    Diabetes mellitus type 2, insulin dependent ([address]) 11/10/2022    chf 2022    Ischemic cardiomyopathy 2022    Hypertensive urgency 2022    Intractable nausea and vomiting 2022    Contact with and (suspected) exposure to covid-19 2021    Hypokalemia 2021    Presence of aortocoronary bypass graft 2021    Altered mental status, unspecified 2021    Moderate persistent asthma without complication     Nasal congestion 2019    Headache 2018    Illness, unspecified 2018    Nausea 2018    Lumbosacral spondylosis without myelopathy 2016    Degeneration of lumbar intervertebral disc 2015    Low back pain 2015    Displacement of lumbar intervertebral disc without myelopathy 2012    Hypertensive emergency 2023    TIA (transient ischemic attack) 2023    Dizziness 2023    Coagulation defect ([address]) 2023    Weakness 05/15/2023    Acute osteomyelitis of toe of right foot ([address]) 2023    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 Long Term Goal 2: Patient will be min assist with seated balance on EOB. Long Term Goal 3: Patient will be min assist with transfers. AMPAC (6 CLICK) BASIC MOBILITY  AM-PAC Inpatient Mobility Raw Score : 7     Therapy Time:   Individual   Time In [address]   Time Out 0858   Minutes [address] [place], 09/28/23 at 9:11 AM         Definitions for assistance levels  Independent = pt does not require any physical supervision or assistance from another person for activity completion. Device may be needed.   Stand by assistance = pt requires verbal cues or instructions from another person, close to but not touching, to perform the activity  Minimal assistance= pt performs 75% or more of the activity; assistance is required to complete the activity  Moderate assistance= pt performs 50% of the activity; assistance is required to complete the activity  Maximal assistance = pt performs 25% of the activity; assistance is required to complete the activity  Dependent = pt requires total physical assistance to accomplish the task

## 2023-09-28 NOTE — CARE COORDINATION
Called dtr, Pa Narvaez, to discuss inpatient rehab as it is felt patient could benefit from our acute inpatient rehab. Did advise dtr I attempted to meet with her father earlier to discuss this but he would not speak with me. Dtr voiced she thinks her dad is just down right now and needs time to come around. Dtr felt patient would benefit from acute inpatient rehab as well and [facility]. Advised of the three hour compliance and that patient would participate in therapy sessions throughout the day to meet a total of 3hrs. Did voice concern for patient's compliance with actively participating and that he would have to for patient to be able to stay on our rehab unit. Also discussed with dtr anticipated length of stay and goal of discharge to home. All questions answered and patient verbalized understanding. Notified dtr that preauth would have to be submitted and would need to receive that approval prior. Dtr verbalized her understanding of needing approval. PM&R consult completed.   Electronically signed by Viridiana Murray on 9/28/2023 at 2:44 PM

## 2023-09-28 NOTE — CARE COORDINATION
Inpatient Rehab referral received. Attempted to meet with patient and discuss option of inpatient acute rehab prior to returning home. Patient acknowledged me when I walked into his room but looked away just as quick and did not agree to talk with me about rehab. 1:1 at bedside. Will contact dtr.   Electronically signed by Hany Ware on 9/28/2023 at 11:30 AM

## 2023-09-28 NOTE — PROGRESS NOTES
[facility] OCCUPATIONAL THERAPY EVALUATION - ACUTE     NAME: Liliya Sahni  : 1960 (59 y.o.)  MRN: [id number]  CODE STATUS: Full Code  [facility] [facility] [place] [place]    Date of Service: 2023    Patient Diagnosis(es): Dizziness [R42]  Uncontrolled hypertension [I10]  Hypertensive emergency [I16.1]  Type 2 diabetes mellitus with hyperglycemia, without long-term current use of insulin ([address]) [E11.65]   Patient Active Problem List    Diagnosis Date Noted    Cellulitis of right foot 2023    Diplopia 2023    Toothache 2023    Chronic systolic (congestive) heart failure 11/10/2022    Crush fracture of vertebra due to osteoporosis ([address]) 11/10/2022    Type 2 diabetes mellitus with diabetic neuropathy 11/10/2022    Diabetes mellitus type 2, insulin dependent ([address]) 11/10/2022    chf 2022    Ischemic cardiomyopathy 2022    Hypertensive urgency 2022    Intractable nausea and vomiting 2022    Contact with and (suspected) exposure to covid-19 2021    Hypokalemia 2021    Presence of aortocoronary bypass graft 2021    Altered mental status, unspecified 2021    Moderate persistent asthma without complication     Nasal congestion 2019    Headache 2018    Illness, unspecified 2018    Nausea 2018    Lumbosacral spondylosis without myelopathy 2016    Degeneration of lumbar intervertebral disc 2015    Low back pain 2015    Displacement of lumbar intervertebral disc without myelopathy 2012    Hypertensive emergency 2023    TIA (transient ischemic attack) 2023    Dizziness 2023    Coagulation defect ([address]) 2023    Weakness 05/15/2023    Acute osteomyelitis of toe of right foot ([address]) 2023    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

## 2023-09-28 NOTE — CONSULTS
[facility] Department of Psychiatry  Behavioral Health Consult    REASON FOR CONSULT: Depression SI    CONSULTING PHYSICIAN:  DR Espinoza Rivera    History obtained from: Patient    HISTORY OF PRESENT ILLNESS:      The patient is a 59 y.o. male with no significant past psychiatric history of  who presents with CVA and right sided weakness. Pt was threatening suicide, currently on 1 to 1 monitor. Pt report that he was living with his partner. Pt reluctantly participated during the interview. He said that he has nothing to live for and he want to end it all. Pt appeared irritable and angry, blaming that no body is updating him on whats going on with him although he refuse to talk to the doctors during their visit. Pt rate his mood to be 8/10, with hopeless, worthless feeling. Passive SI, without plan. \"Even if I want to commit suicide, I can't\"  Pt has been feeling anxious. Denies AH or VH. No paranoid thoughts      The patient is not currently receiving care for the above psychiatric illness.       Psychiatric Review of Systems       Depression: yes     Kaleigh or Hypomania:  no     Panic Attacks:  no     Phobias:  no     Obsessions and Compulsions:  no     PTSD : no     Hallucinations:  no     Delusions:  no      Substance Abuse History:  ETOH: denies  Marijuana: no  Opiates: no  Other Drugs: no      Past Psychiatric History:  Prior Diagnosis: Denies any past mental illness  Psychiatrist: no  Therapist:no  Hospitalization: no  Hx of Suicidal Attempts: no  Hx of violence:  no  ECT: no    Past Medical History:        Diagnosis Date    Asthma     Back pain     compresion in [place]    CAD (coronary artery disease) 01/2020    3 vessel     chf 09/16/2022    Diabetes mellitus ([address])     Essential hypertension, benign     Hyperlipemia     Ischemic cardiomyopathy 09/16/2022    Neuropathic pain, leg, bilateral        Past Surgical History:        Procedure Laterality Date    CARPAL TUNNEL RELEASE Bilateral     FOOT Patient states he struggles looking at a computer or reading due to vision. Type of Home: House in [facility] [address]    Home Layout: Two level    Home Access: Level entry    Bathroom Shower/Tub: Tub/Shower unit    [place] Equipment: Hand-held shower    Home Equipment: Cane, Rollator    ADL Assistance: Needs assistance (needs assist)    Homemaking Assistance:  (wife does cooking and cleaning)    Ambulation Assistance: Independent (uses rollator PRN)    Transfer Assistance: Independent    Active : Yes    Additional Comments: Pt mildly inconsistent historian             Social Determinants of Health     Financial Resource Strain: Low Risk  (12/21/2022)    Overall Financial Resource Strain (CARDIA)     Difficulty of Paying Living Expenses: Not hard at all   Food Insecurity: No Food Insecurity (12/21/2022)    Hunger Vital Sign     Worried About Running Out of Food in the Last Year: Never true     Ran Out of Food in the Last Year: Never true   Transportation Needs: No Transportation Needs (11/17/2021)    PRAPARE - Transportation     Lack of Transportation (Medical): No     Lack of Transportation (Non-Medical):  No   Physical Activity: Inactive (8/2/2023)    Exercise Vital Sign     Days of Exercise per Week: 0 days     Minutes of Exercise per Session: 0 min   Stress: Not on file   Social Connections: Not on file   Intimate Partner Violence: Not on file   Housing Stability: Not on file       REVIEW OF SYSTEMS    Constitutional: [] fever  [] chills  [] weight loss  []weakness [] Other:  Eyes:  [] photophobia  [] discharge [] acuity change   [] Diplopia   [] Other:  HENT:  [] sore throat  [] ear pain [] Tinnitus   [] Other  Respiratory:  [] Cough  [] Shortness of breath   [] Sputum   [] Other:   Cardiac: []Chest pain   []Palpitations []Edema  []PND  [] Other:  GI:  []Abdominal pain   []Nausea  []Vomiting  []Diarrhea  [] Other:  :  [] Dysuria   []Frequency  []Hematuria  []Discharge  []

## 2023-09-28 NOTE — CONSULTS
Physical Medicine & Rehabilitation  Consult Note      Admitting Physician: Flaco Alvarez DO    Primary Care Provider: Svitlana Randhawa MD     Reason for Consult:  Asses rehab needs, promote physical and mental function, analyze level of care to determine rehab needs, improve ability to actively participate in the rehabilitation process, and decrease likelihood of re-admit to the hospital after discharge. History of Present Illness:    Jordy Jones is a 59 y.o. male admitted to [facility] on 9/25/2023. Patient was initially admitted through the emergency room at [facility] on 9/25/2023 complaining of gait instability and slurred speech. He was found to have an acute nonhemorrhagic infarct of the posterior lateral left thalamus. He was admitted under the care of the hospitalist with neurology consulting    Socially he is very upset about the stroke and is very depressed. Neurologic Problem  The patient's primary symptoms include an altered mental status, memory loss, slurred speech and weakness. The current episode started in the past 7 days. The problem has been gradually improving since onset. There was right-sided, upper extremity, lower extremity and facial focality noted. Associated symptoms include confusion, dizziness, fatigue, headaches and light-headedness. Pertinent negatives include no diaphoresis, fever, nausea, palpitations or shortness of breath. Past treatments include walking, aspirin, acetaminophen and medication. The treatment provided mild relief. His past medical history is significant for a CVA. I reviewed recent nursing notes discussed care with acute care providers, \" referral received. Attempted to meet with patient and discuss option of inpatient acute rehab prior to returning home. Patient acknowledged me when I walked into his room but looked away just as quick and did not agree to talk with me about rehab. 1:1 at bedside.  Will ACCU-CHEK    POCT Glucose    Collection Time: 09/27/23  9:43 PM   Result Value Ref Range    POC Glucose 155 (H) 70 - 99 mg/dl    Performed on ACCU-CHEK    Basic Metabolic Panel w/ Reflex to MG    Collection Time: 09/28/23  5:19 AM   Result Value Ref Range    Sodium 138 135 - 144 mEq/L    Potassium reflex Magnesium 3.5 3.4 - 4.9 mEq/L    Chloride 103 95 - 107 mEq/L    CO2 24 20 - 31 mEq/L    Anion Gap 11 9 - 15 mEq/L    Glucose 320 (H) 70 - 99 mg/dL    BUN 27 (H) 8 - 23 mg/dL    Creatinine 1.33 (H) 0.70 - 1.20 mg/dL    Est, Glom Filt Rate >60.0 >60    Calcium 8.6 8.5 - 9.9 mg/dL   CBC with Auto Differential    Collection Time: 09/28/23  5:19 AM   Result Value Ref Range    WBC 5.9 4.8 - 10.8 K/uL    RBC 4.86 4.70 - 6.10 M/uL    Hemoglobin 13.1 (L) 14.0 - 18.0 g/dL    Hematocrit 40.2 (L) 42.0 - 52.0 %    MCV 82.7 79.0 - 92.2 fL    MCH 27.0 27.0 - 31.3 pg    MCHC 32.6 (L) 33.0 - 37.0 %    RDW 13.2 11.5 - 14.5 %    Platelets 071 640 - 443 K/uL    Neutrophils % 74.5 %    Lymphocytes % 9.8 %    Monocytes % 8.4 %    Eosinophils % 6.6 %    Basophils % 0.5 %    Neutrophils Absolute 4.4 1.4 - 6.5 K/uL    Lymphocytes Absolute 0.6 (L) 1.0 - 4.8 K/uL    Monocytes Absolute 0.5 0.2 - 0.8 K/uL    Eosinophils Absolute 0.4 0.0 - 0.7 K/uL    Basophils Absolute 0.0 0.0 - 0.2 K/uL   Magnesium    Collection Time: 09/28/23  5:19 AM   Result Value Ref Range    Magnesium 1.9 1.7 - 2.4 mg/dL   POCT Glucose    Collection Time: 09/28/23  7:51 AM   Result Value Ref Range    POC Glucose 190 (H) 70 - 99 mg/dl    Performed on ACCU-CHEK               Impression:    Impaired mobility and ADLs due to cva-Moderate sized acute, nonhemorrhagic infarct in the posterolateral left thalamus  Factors favoring recovery include:  good family support at home        Complex Active General Medical Issues that complicate care and require daily medical supervision: 1. Principal Problem:    Hypertensive emergency  Active Problems:    Poorly controlled type 2 discharge to home setting after acute rehab. Specialized nursing care to focus on: Bowel and bladder issues-Monitor for urinary retention-check PVRs, bladder scan--cath if no void. Wound risk and management   -pressure relief protocols-side to side turns  IVF medication administration      Monitor endurance and if necessary spread therapy out over a 7-day window-adding scheduled rest breaks when needed. Focus on energy conservation. Monitor heart rate and   cardiac medications effects on heart rate and blood pressure before, during and after therapy. Progress toward endurance training with pulse ox monitoring for saturation and heart rate. monitoring for dysphagia-- Improve hydration and nutrition by adding Vitamin B12 shot times one, adding Protein supplements and push PO fluids. Treat and monitor for higher level cognitive deficits, focus on difficulty with sequencing and problem-solving. Focus on higher-level balance and falls risk issues focusing on balance training and monitoring for orthostasis. Above recommendations are indicated to address medical complexity and need for appropriate rehab services. Will tailor individual care and rehab plan per individuals needs re his emotional and spiritual needs. Focus of today's plan-   to acute rehab if and when he is agreeable      Required Certification Data (potential inpatient rehabilitation facility patient's only)    Deficits:weakness, mobility, impaired gait, high risk for falls, cardiovascular compromise, balance, ADL's, pain limiting function, balance and righting reactions, cognitive deficits , and oropharyngeal dysphagia    Disability:mobility, locomotion, self care, bowel/ bladder status, and cognitive    Potential barriers to progress/discharge:complex medical conditions, severe pain, complex social situations, and bowel/bladder dysfunction         It was my pleasure to evaluate Duane Sánchez today.   Please call [phone]

## 2023-09-29 LAB
ANION GAP SERPL CALCULATED.3IONS-SCNC: 11 MEQ/L (ref 9–15)
BASOPHILS # BLD: 0 K/UL (ref 0–0.2)
BASOPHILS NFR BLD: 0.4 %
BUN SERPL-MCNC: 21 MG/DL (ref 8–23)
CALCIUM SERPL-MCNC: 8.3 MG/DL (ref 8.5–9.9)
CHLORIDE SERPL-SCNC: 105 MEQ/L (ref 95–107)
CO2 SERPL-SCNC: 21 MEQ/L (ref 20–31)
CREAT SERPL-MCNC: 1.09 MG/DL (ref 0.7–1.2)
ECHO BSA: 1.88 M2
EOSINOPHIL # BLD: 0.4 K/UL (ref 0–0.7)
EOSINOPHIL NFR BLD: 8.3 %
ERYTHROCYTE [DISTWIDTH] IN BLOOD BY AUTOMATED COUNT: 13 % (ref 11.5–14.5)
GLUCOSE BLD-MCNC: 119 MG/DL (ref 70–99)
GLUCOSE BLD-MCNC: 120 MG/DL (ref 70–99)
GLUCOSE BLD-MCNC: 152 MG/DL (ref 70–99)
GLUCOSE BLD-MCNC: 167 MG/DL (ref 70–99)
GLUCOSE SERPL-MCNC: 151 MG/DL (ref 70–99)
HCT VFR BLD AUTO: 39.6 % (ref 42–52)
HGB BLD-MCNC: 12.9 G/DL (ref 14–18)
LYMPHOCYTES # BLD: 0.6 K/UL (ref 1–4.8)
LYMPHOCYTES NFR BLD: 11.5 %
MCH RBC QN AUTO: 27.1 PG (ref 27–31.3)
MCHC RBC AUTO-ENTMCNC: 32.6 % (ref 33–37)
MCV RBC AUTO: 83.2 FL (ref 79–92.2)
MONOCYTES # BLD: 0.5 K/UL (ref 0.2–0.8)
MONOCYTES NFR BLD: 9.1 %
NEUTROPHILS # BLD: 3.5 K/UL (ref 1.4–6.5)
NEUTS SEG NFR BLD: 70.5 %
PERFORMED ON: ABNORMAL
PLATELET # BLD AUTO: 129 K/UL (ref 130–400)
POTASSIUM SERPL-SCNC: 3.6 MEQ/L (ref 3.4–4.9)
RBC # BLD AUTO: 4.76 M/UL (ref 4.7–6.1)
SODIUM SERPL-SCNC: 137 MEQ/L (ref 135–144)
WBC # BLD AUTO: 5 K/UL (ref 4.8–10.8)

## 2023-09-29 PROCEDURE — 99232 SBSQ HOSP IP/OBS MODERATE 35: CPT | Performed by: PHYSICIAN ASSISTANT

## 2023-09-29 PROCEDURE — 6370000000 HC RX 637 (ALT 250 FOR IP): Performed by: PSYCHIATRY & NEUROLOGY

## 2023-09-29 PROCEDURE — 0JH632Z INSERTION OF MONITORING DEVICE INTO CHEST SUBCUTANEOUS TISSUE AND FASCIA, PERCUTANEOUS APPROACH: ICD-10-PCS | Performed by: INTERNAL MEDICINE

## 2023-09-29 PROCEDURE — 33285 INSJ SUBQ CAR RHYTHM MNTR: CPT | Performed by: INTERNAL MEDICINE

## 2023-09-29 PROCEDURE — 2500000003 HC RX 250 WO HCPCS: Performed by: INTERNAL MEDICINE

## 2023-09-29 PROCEDURE — 6360000002 HC RX W HCPCS: Performed by: INTERNAL MEDICINE

## 2023-09-29 PROCEDURE — 2709999900 HC NON-CHARGEABLE SUPPLY: Performed by: INTERNAL MEDICINE

## 2023-09-29 PROCEDURE — 36415 COLL VENOUS BLD VENIPUNCTURE: CPT

## 2023-09-29 PROCEDURE — 7100000010 HC PHASE II RECOVERY - FIRST 15 MIN: Performed by: INTERNAL MEDICINE

## 2023-09-29 PROCEDURE — 7100000011 HC PHASE II RECOVERY - ADDTL 15 MIN: Performed by: INTERNAL MEDICINE

## 2023-09-29 PROCEDURE — 97112 NEUROMUSCULAR REEDUCATION: CPT

## 2023-09-29 PROCEDURE — 99232 SBSQ HOSP IP/OBS MODERATE 35: CPT | Performed by: PSYCHIATRY & NEUROLOGY

## 2023-09-29 PROCEDURE — C1894 INTRO/SHEATH, NON-LASER: HCPCS | Performed by: INTERNAL MEDICINE

## 2023-09-29 PROCEDURE — APPSS15 APP SPLIT SHARED TIME 0-15 MINUTES: Performed by: NURSE PRACTITIONER

## 2023-09-29 PROCEDURE — C1764 EVENT RECORDER, CARDIAC: HCPCS | Performed by: INTERNAL MEDICINE

## 2023-09-29 PROCEDURE — 6370000000 HC RX 637 (ALT 250 FOR IP): Performed by: INTERNAL MEDICINE

## 2023-09-29 PROCEDURE — 1210000000 HC MED SURG R&B

## 2023-09-29 PROCEDURE — 2580000003 HC RX 258: Performed by: INTERNAL MEDICINE

## 2023-09-29 PROCEDURE — 85025 COMPLETE CBC W/AUTO DIFF WBC: CPT

## 2023-09-29 PROCEDURE — 80048 BASIC METABOLIC PNL TOTAL CA: CPT

## 2023-09-29 RX ORDER — SODIUM CHLORIDE 0.9 % (FLUSH) 0.9 %
5-40 SYRINGE (ML) INJECTION PRN
Status: DISCONTINUED | OUTPATIENT
Start: 2023-09-29 | End: 2023-10-03 | Stop reason: HOSPADM

## 2023-09-29 RX ORDER — DEXTROSE MONOHYDRATE 100 MG/ML
INJECTION, SOLUTION INTRAVENOUS CONTINUOUS PRN
OUTPATIENT
Start: 2023-09-29

## 2023-09-29 RX ORDER — SODIUM CHLORIDE 0.9 % (FLUSH) 0.9 %
5-40 SYRINGE (ML) INJECTION EVERY 12 HOURS SCHEDULED
OUTPATIENT
Start: 2023-09-29

## 2023-09-29 RX ORDER — INSULIN LISPRO 100 [IU]/ML
0-16 INJECTION, SOLUTION INTRAVENOUS; SUBCUTANEOUS
Status: CANCELLED | OUTPATIENT
Start: 2023-09-29

## 2023-09-29 RX ORDER — POLYETHYLENE GLYCOL 3350 17 G/17G
17 POWDER, FOR SOLUTION ORAL DAILY PRN
OUTPATIENT
Start: 2023-09-29

## 2023-09-29 RX ORDER — HYDRALAZINE HYDROCHLORIDE 20 MG/ML
10 INJECTION INTRAMUSCULAR; INTRAVENOUS EVERY 4 HOURS PRN
OUTPATIENT
Start: 2023-09-29

## 2023-09-29 RX ORDER — METOCLOPRAMIDE HYDROCHLORIDE 5 MG/ML
10 INJECTION INTRAMUSCULAR; INTRAVENOUS EVERY 6 HOURS PRN
OUTPATIENT
Start: 2023-09-29

## 2023-09-29 RX ORDER — GLUCAGON 1 MG/ML
1 KIT INJECTION PRN
OUTPATIENT
Start: 2023-09-29

## 2023-09-29 RX ORDER — ASPIRIN 81 MG/1
81 TABLET ORAL DAILY
OUTPATIENT
Start: 2023-09-30

## 2023-09-29 RX ORDER — ONDANSETRON 2 MG/ML
4 INJECTION INTRAMUSCULAR; INTRAVENOUS EVERY 6 HOURS PRN
OUTPATIENT
Start: 2023-09-29

## 2023-09-29 RX ORDER — LORAZEPAM 2 MG/ML
2 INJECTION INTRAMUSCULAR ONCE
Status: COMPLETED | OUTPATIENT
Start: 2023-09-29 | End: 2023-09-29

## 2023-09-29 RX ORDER — ONDANSETRON 4 MG/1
4 TABLET, ORALLY DISINTEGRATING ORAL EVERY 8 HOURS PRN
OUTPATIENT
Start: 2023-09-29

## 2023-09-29 RX ORDER — HALOPERIDOL 5 MG/ML
1 INJECTION INTRAMUSCULAR EVERY 6 HOURS PRN
OUTPATIENT
Start: 2023-09-29

## 2023-09-29 RX ORDER — ACETAMINOPHEN 650 MG/1
650 SUPPOSITORY RECTAL EVERY 6 HOURS PRN
OUTPATIENT
Start: 2023-09-29

## 2023-09-29 RX ORDER — INSULIN LISPRO 100 [IU]/ML
4 INJECTION, SOLUTION INTRAVENOUS; SUBCUTANEOUS
Status: CANCELLED | OUTPATIENT
Start: 2023-09-29

## 2023-09-29 RX ORDER — HYDRALAZINE HYDROCHLORIDE 100 MG/1
100 TABLET, FILM COATED ORAL 3 TIMES DAILY
OUTPATIENT
Start: 2023-09-29

## 2023-09-29 RX ORDER — INSULIN GLARGINE 100 [IU]/ML
28 INJECTION, SOLUTION SUBCUTANEOUS NIGHTLY
Status: CANCELLED | OUTPATIENT
Start: 2023-09-29

## 2023-09-29 RX ORDER — SODIUM CHLORIDE 9 MG/ML
INJECTION, SOLUTION INTRAVENOUS PRN
Status: DISCONTINUED | OUTPATIENT
Start: 2023-09-29 | End: 2023-10-03 | Stop reason: HOSPADM

## 2023-09-29 RX ORDER — ATORVASTATIN CALCIUM 40 MG/1
40 TABLET, FILM COATED ORAL NIGHTLY
OUTPATIENT
Start: 2023-09-29

## 2023-09-29 RX ORDER — CARVEDILOL 12.5 MG/1
12.5 TABLET ORAL 2 TIMES DAILY
OUTPATIENT
Start: 2023-09-29

## 2023-09-29 RX ORDER — SODIUM CHLORIDE 0.9 % (FLUSH) 0.9 %
5-40 SYRINGE (ML) INJECTION EVERY 12 HOURS SCHEDULED
Status: DISCONTINUED | OUTPATIENT
Start: 2023-09-29 | End: 2023-10-03 | Stop reason: HOSPADM

## 2023-09-29 RX ORDER — LABETALOL HYDROCHLORIDE 5 MG/ML
10 INJECTION, SOLUTION INTRAVENOUS EVERY 10 MIN PRN
OUTPATIENT
Start: 2023-09-29

## 2023-09-29 RX ORDER — CHLORHEXIDINE GLUCONATE 4 G/100ML
SOLUTION TOPICAL ONCE
Status: DISCONTINUED | OUTPATIENT
Start: 2023-09-29 | End: 2023-10-03 | Stop reason: HOSPADM

## 2023-09-29 RX ORDER — ACETAMINOPHEN 325 MG/1
650 TABLET ORAL EVERY 6 HOURS PRN
OUTPATIENT
Start: 2023-09-29

## 2023-09-29 RX ADMIN — APIXABAN 5 MG: 5 TABLET, FILM COATED ORAL at 21:10

## 2023-09-29 RX ADMIN — HYDRALAZINE HYDROCHLORIDE 100 MG: 100 TABLET, FILM COATED ORAL at 13:17

## 2023-09-29 RX ADMIN — INSULIN GLARGINE 28 UNITS: 100 INJECTION, SOLUTION SUBCUTANEOUS at 21:10

## 2023-09-29 RX ADMIN — CARVEDILOL 12.5 MG: 12.5 TABLET, FILM COATED ORAL at 21:09

## 2023-09-29 RX ADMIN — SERTRALINE HYDROCHLORIDE 50 MG: 50 TABLET ORAL at 13:20

## 2023-09-29 RX ADMIN — ATORVASTATIN CALCIUM 40 MG: 40 TABLET, FILM COATED ORAL at 21:09

## 2023-09-29 RX ADMIN — LORAZEPAM 2 MG: 2 INJECTION INTRAMUSCULAR; INTRAVENOUS at 05:01

## 2023-09-29 RX ADMIN — HALOPERIDOL LACTATE 1 MG: 5 INJECTION, SOLUTION INTRAMUSCULAR at 04:37

## 2023-09-29 RX ADMIN — Medication 10 ML: at 21:12

## 2023-09-29 RX ADMIN — SODIUM CHLORIDE: 9 INJECTION, SOLUTION INTRAVENOUS at 03:14

## 2023-09-29 RX ADMIN — HYDRALAZINE HYDROCHLORIDE 100 MG: 100 TABLET, FILM COATED ORAL at 21:09

## 2023-09-29 ASSESSMENT — ENCOUNTER SYMPTOMS
SHORTNESS OF BREATH: 0
VOMITING: 0
COLOR CHANGE: 0
CHEST TIGHTNESS: 0
COUGH: 0
TROUBLE SWALLOWING: 0
NAUSEA: 0
WHEEZING: 0

## 2023-09-29 ASSESSMENT — PAIN SCALES - GENERAL: PAINLEVEL_OUTOF10: 0

## 2023-09-29 NOTE — CARE COORDINATION
Per Magalys, Rehab 's note, Pre-cert was submit it on 9/28/2023. Pending pre-cert at this time. LSW will follow.      Electronically signed by HAYDEN Delacruz on 9/29/2023 at 8:44 AM

## 2023-09-29 NOTE — CARE COORDINATION
Checked status of Prior Authorization for Acute Inpatient Rehab, REF# [id number]. Prior Mone Oleary is still PENDING. Will continue to monitor for response.  Electronically signed by Gemma Pina RN on 9/29/23 at 10:23 AM EDT

## 2023-09-29 NOTE — PROGRESS NOTES
Medicare Annual Wellness Visit  Name: Mady Arevalo Date: 2019   MRN: [id number] Sex: Female   Age: 71 y.o. Ethnicity: Non-/Non    : 1946 Race: Bhavani John is here for Medicare AWV    Screenings for behavioral, psychosocial and functional/safety risks, and cognitive dysfunction are all negative except as indicated below. These results, as well as other patient data from the [address] form, are documented in Flowsheets linked to this Encounter. Allergies   Allergen Reactions    Codeine Other (See Comments) and Hives     B/P DROPS PASSES OUT  Passed out    Fentanyl And Related Hives     Other reaction(s): Dizziness    Shellfish-Derived Products Other (See Comments)     Syncope/seizures    Shrimp Flavor      Passes out      Bee Venom Swelling    Hydromorphone Rash, Hives and Other (See Comments)     Full rash spreading across chest and both arms from IV hydromorphone  Passed out    Vancomycin Rash     Prior to Visit Medications    Medication Sig Taking?  Authorizing Provider   metoprolol tartrate (LOPRESSOR) 25 MG tablet TAKE 2 TABLETS BY MOUTH TWICE DAILY Yes MARILEE Wong CNP   CARTIA  MG extended release capsule TAKE 1 CAPSULE BY MOUTH DAILY Yes MARILEE Wong CNP   DULoxetine (CYMBALTA) 60 MG extended release capsule TAKE 1 CAPSULE BY MOUTH DAILY Yes MARILEE Wong CNP   buPROPion (WELLBUTRIN) 75 MG tablet TAKE 1 TABLET BY MOUTH TWICE DAILY Yes MARILEE Wong CNP   cloNIDine (CATAPRES) 0.2 MG tablet TAKE 1 TABLET BY MOUTH TWICE DAILY Yes MARILEE Wong CNP   ranitidine (ZANTAC) 150 MG tablet Take 1 tablet by mouth daily Yes MARILEE Wong CNP   Cholecalciferol (VITAMIN D3) 71938 units CAPS Take 1 capsule by mouth once a week  Patient taking differently: Take 1 capsule by mouth once a week Indications: Takes on   Yes MARILEE Wong CNP   metoprolol tartrate Pt arrived to pre/post from 2W. Consent reviewed and was discussed and singed with Dr Belgica Cleary receiving telephone consent from Daughter. PT resting with eyes closed from floor, pt drowsy upon arrived which was behavior before leaving floor due to receiving IV ativan this morning. Pt chest shaved in preparation for loop insertion this morning. Respirations are even and unlabored,no signs of distress at this time. remote memory intact. Patient's complete Health Risk Assessment and screening values have been reviewed and are found in Flowsheets. The following problems were reviewed today and where indicated follow up appointments were made and/or referrals ordered. Positive Risk Factor Screenings with Interventions:     Fall Risk:  Timed Up and Go Test > 12 seconds? (Complete if either Fall Risk answers are Yes): no  2 or more falls in past year?: no  Fall with injury in past year?: (!) yes  Fall Risk Interventions:    · Patient declines any further evaluation/treatment for this issue    General Health:  General  In general, how would you say your health is?: Fair  In the past 7 days, have you experienced any of the following? New or Increased Pain, New or Increased Fatigue, Loneliness, Social Isolation, Stress or Anger?: (!) Anger, Stress  Do you get the social and emotional support that you need?: Yes  Do you have a Living Will?: (!) No  General Health Risk Interventions:  · Stress: patient declines any further evaluation/treatment for this issue  · Anger: patient declines any further evaluation/treatment for this issue  · No Living Will: patient declined    Health Habits/Nutrition:  Health Habits/Nutrition  Do you exercise for at least 20 minutes 2-3 times per week?: (!) No  Have you lost any weight without trying in the past 3 months?: (!) Yes  Do you eat fewer than 2 meals per day?: No  Have you seen a dentist within the past year?: Yes  Body mass index is 44.79 kg/m².   Health Habits/Nutrition Interventions:  · Inadequate physical activity:  patient agrees to increase physical activity as follows: walking    Hearing/Vision:  No exam data present  Hearing/Vision  Do you or your family notice any trouble with your hearing?: (!) Yes  Do you have difficulty driving, watching TV, or doing any of your daily activities because of your eyesight?: (!) Yes  Have you had an eye exam within the past year?: (!)

## 2023-09-29 NOTE — PROGRESS NOTES
Pt returns from procedure. Received report from [facility]. Pt asleep, opens eyes to name. Placed on monitor. BP remains elevated. 98% on room air. Aquacel dressing to mid left chest is clean, dry, and intact. No signs of bleeding or hematoma. 1020 Report called to [place]. Request made for transport back to room by cart.   Electronically signed by Jacobo Teague RN on 2/23/2797 at 10:21 AM

## 2023-09-29 NOTE — DISCHARGE INSTR - COC
Continuity of Care Form    Patient Name: Marie Torres   :  1960  MRN:  [id number]    Admit date:  2023  Discharge date:  ***    Code Status Order: Full Code   Advance Directives:     Admitting Physician:  Zully Patel DO  PCP: Jesu Tijerina MD    Discharging Nurse: [facility] Unit/Room#: Q023/C741-73  Discharging Unit Phone Number: ***    Emergency Contact:   Extended Emergency Contact Information  Primary Emergency Contact: [facility] Phone: [phone]  Mobile Phone: [phone]  Relation: Child  Preferred language: English   needed?  No  Secondary Emergency Contact: Annemarie Mccormick   [facility] [address] Phone: [phone]  Mobile Phone: [phone]  Relation: Brother/Sister    Past Surgical History:  Past Surgical History:   Procedure Laterality Date    CARPAL TUNNEL RELEASE Bilateral     FOOT DEBRIDEMENT Right 2023    RIGHT FOOT DEBRIDEMENT INCISION AND DRAINAGE ROOM 174 performed by Reece Morales DPM at [address] [place] [address] Right 2023    RIGHT FOOT  INCISION AND DRAINAGE WITH DEBRIDEMENT OF NON-VIABLE TISSUE DELAYED PRIMARY CLOSURE performed by Reece Morales DPM at [address] [place] [address] Left     bone spur    LAMINOTOMY  2012    SPINE SURGERY N/A 2022    L1 VERETEBRAL AUGMENTATION performed by Lili Blanco MD at [address] [place] [address]       Immunization History:   Immunization History   Administered Date(s) Administered    COVID-19, PFIZER GRAY top, DO NOT Dilute, (age 15 y+), IM, 30 mcg/0.3 mL 06/10/2022    COVID-19, PFIZER PURPLE top, DILUTE for use, (age 15 y+), 30mcg/0.3mL 2021, 2021, 2021    DTaP 2015    Influenza Virus Vaccine 10/01/2013    Influenza, [place], 6 mo and older, IM, PF (Flulaval, Fluarix) 2019    TDaP, ADACEL (age 6y-58y), BOOSTRIX (age 10y+), IM, 0.5mL 2015       Active Problems:  Patient Active Problem List   Diagnosis Code    Essential hypertension, benign I10    Obesity,

## 2023-09-29 NOTE — PROGRESS NOTES
[facility] Neurology Daily Progress Note  Name: Jordy Jones  Age: 59 y.o. Gender: male  CodeStatus: Full Code  Allergies: No Known Allergies    Chief Complaint:Hypertension    Primary Care Provider: Svitlana Randhawa MD  InpatientTreatment Team: Treatment Team: Attending Provider: Flaco Alvarez DO; Consulting Physician: Jennifer Valenzuela MD; Consulting Physician: Amanda Magaña MD; Consulting Physician: Lolis Bowers MD; Utilization Reviewer: Hany Edge RN; Consulting Physician: Mirian Pham DO; Cardiologist: Mirian Pham DO; : Marla Gibbons RN; Registered Nurse: Genaro Walker RN; Utilization Reviewer: Shelby Abreu RN  Admission Date: 9/25/2023      HPI   Pt seen and examined for neuro follow up. Alert, no acute distress, cooperative today. Right hemiparesis noted. No dysarthria or aphasia. Denies headache. No vision changes. Mildly hypertensive. Remaining remains considerably weak in the right upper and lower extremity. Vitals:    09/29/23 1015   BP: (!) 163/88   Pulse: 77   Resp: 15   Temp:    SpO2: 97%        Review of Systems   Constitutional:  Negative for appetite change and fever. HENT:  Negative for hearing loss and trouble swallowing. Eyes:  Negative for visual disturbance. Respiratory:  Negative for cough, chest tightness, shortness of breath and wheezing. Cardiovascular:  Negative for chest pain, palpitations and leg swelling. Gastrointestinal:  Negative for nausea and vomiting. Genitourinary:  Negative for difficulty urinating. Musculoskeletal:  Positive for gait problem. Skin:  Negative for color change and rash. Neurological:  Positive for weakness. Negative for dizziness, tremors, seizures, syncope, facial asymmetry, speech difficulty, light-headedness, numbness and headaches. Psychiatric/Behavioral:  Positive for confusion. Negative for agitation and hallucinations. The patient is not nervous/anxious.           Physical Exam  Vitals and nursing note 116 cm/s  Proximal internal carotid artery             85 cm/s            87 cm/s  Mid internal carotid artery                      81 cm/s           65 cm/s  Distal internal carotid artery                  69 cm/s             60 cm/s  External carotid artery                            111 cm/s           179 cm/s  ICA/CCA ratio                                         0.72                0.78    Vertebral arteries antegrade flow         Yes                     Yes    Impression  Less than 50% stenosis predicted of the bilateral internal carotid arteries. Antegrade flow of the bilateral vertebral arteries. Validated velocity measurements with angiographic measurements, velocity criteria are extrapolated from diameter data as defined by the Society of Radiologist in [address] [place] [address] Radiology 2003; 078;492-443. Echo No results found for this or any previous visit. Assessment/Plan:  9/26/23:  Patient is a 71-year-old  male with past medical history of neuropathy, ischemic cardiomyopathy, hyperlipidemia, hypertension, diabetes mellitus, heart failure, CAD, asthma who presented to [facility] emergency room emergency room on 9/25/2023 with complaints of dizziness, right-sided weakness, elevated blood pressure. He is noted to have right hemiparesis. Not a candidate for TNK given use of Eliquis. CT of the head negative for any acute intracranial findings. CTA of the head done last month identified multiple areas of intracranial stenosis. Repeat CTA of the head and neck ohave been ordered. We will obtain MRI of the brain  Hypertensive emergency, improving  History of cardiomyopathy, obtain echocardiogram  Diabetes mellitus with significant hyperglycemia  Possible medication noncompliance  Patient has been initiated on dual antiplatelet therapy and high intensity statin  Check lipid panel and hemoglobin A1c.  he continues on Eliquis. Unsure if he was compliant with this.

## 2023-09-29 NOTE — PROGRESS NOTES
[address] [place] FOLLOW-UP NOTE       9/29/2023     Patient was seen and examined in person, Chart reviewed   Patient's case discussed with staff/team    Chief Complaint: Depression SI    Interim History:     Patient report feeling slightly better with this mood. He is not having any active suicidal thoughts. Patient is going through the grief process of him trying to come to terms with the CVA. Patient is now in the face of acceptance of this current illness and willing to work through the process of rehabilitation. He denies having any intention to harm himself or commit suicide. Patient feel helpless but denies any worthless feeling. Patient has been cooperative with the assessment and care.     Appetite:   [x] Normal/Unchanged  [] Increased  [] Decreased      Sleep:       [] Normal/Unchanged  [x] Fair       [] Poor              Energy:    [] Normal/Unchanged  [] Increased  [x] Decreased        SI [] Present  [x] Absent    HI  []Present  [x] Absent     Aggression:  [] yes  [x] no    Patient is [x] able  [] unable to CONTRACT FOR SAFETY     PAST MEDICAL/PSYCHIATRIC HISTORY:   Past Medical History:   Diagnosis Date    Asthma     Back pain     compresion in [place]    CAD (coronary artery disease) 01/2020    3 vessel     chf 09/16/2022    Diabetes mellitus ([address])     Essential hypertension, benign     Hyperlipemia     Ischemic cardiomyopathy 09/16/2022    Neuropathic pain, leg, bilateral        FAMILY/SOCIAL HISTORY:  Family History   Problem Relation Age of Onset    Other Father         accident    Heart Failure Mother     Heart Disease Brother     Cirrhosis Brother      Social History     Socioeconomic History    Marital status:      Spouse name: Hien Overall    Number of children: 2    Years of education: 14    Highest education level: Some college, no degree   Occupational History    Occupation: disability    Tobacco Use    Smoking status: Never     Passive PRN **OR** ondansetron (ZOFRAN) injection 4 mg, 4 mg, IntraVENous, Q6H PRN, Pittoribio Marcella, DO    labetalol (NORMODYNE;TRANDATE) injection 10 mg, 10 mg, IntraVENous, Q10 Min PRN, Pitney Marcella, DO    haloperidol lactate (HALDOL) injection 1 mg, 1 mg, IntraVENous, Q6H PRN, Louis Marcella, DO, 1 mg at 09/29/23 0437    insulin lispro (HUMALOG) injection vial 0-16 Units, 0-16 Units, SubCUTAneous, 4x Daily AC & HS, Casi Pipe, DO, 4 Units at 09/27/23 1700    apixaban (ELIQUIS) tablet 5 mg, 5 mg, Oral, BID, Casi Pipe, DO, 5 mg at 09/28/23 2220    atorvastatin (LIPITOR) tablet 40 mg, 40 mg, Oral, Nightly, Casi Pipe, DO, 40 mg at 09/28/23 2221    aspirin EC tablet 81 mg, 81 mg, Oral, Daily, Casi Pipe, DO, 81 mg at 09/28/23 0841    carvedilol (COREG) tablet 12.5 mg, 12.5 mg, Oral, BID, Casi Pipe, DO, 12.5 mg at 09/28/23 2221    hydrALAZINE (APRESOLINE) tablet 100 mg, 100 mg, Oral, TID, Casi Pipe, DO, 100 mg at 09/29/23 1317    glucose chewable tablet 16 g, 4 tablet, Oral, PRN, Casi Pipe, DO    dextrose bolus 10% 125 mL, 125 mL, IntraVENous, PRN **OR** dextrose bolus 10% 250 mL, 250 mL, IntraVENous, PRN, Casi Pipe, DO    glucagon injection 1 mg, 1 mg, SubCUTAneous, PRN, Emi Moran DO    dextrose 10 % infusion, , IntraVENous, Continuous PRN, Casi Pipe, DO    [Held by provider] sacubitril-valsartan (ENTRESTO) 24-26 MG per tablet 1 tablet, 1 tablet, Oral, BID, Casi Pipe, DO, 1 tablet at 09/28/23 0841    sodium chloride flush 0.9 % injection 5-40 mL, 5-40 mL, IntraVENous, 2 times per day, Casi Betancur DO, 10 mL at 09/28/23 2225    sodium chloride flush 0.9 % injection 5-40 mL, 5-40 mL, IntraVENous, PRN, Emi Moran DO    0.9 % sodium chloride infusion, , IntraVENous, PRN, Casi Betancur DO    polyethylene glycol (GLYCOLAX) packet 17 g, 17 g, Oral, Daily PRN, Casi Betancur DO    acetaminophen (TYLENOL) tablet 650 mg, 650 mg, Oral, Q6H PRN **OR** acetaminophen (TYLENOL) 72 hours. Lab Results   Component Value Date/Time    LABAMPH Neg 08/16/2022 12:15 PM    BARBSCNU Neg 08/16/2022 12:15 PM    LABBENZ Neg 08/16/2022 12:15 PM    LABMETH Neg 08/16/2022 12:15 PM    OPIATESCREENURINE Neg 08/16/2022 12:15 PM    PHENCYCLIDINESCREENURINE Neg 08/16/2022 12:15 PM    ETOH <10 09/25/2023 07:30 PM     Lab Results   Component Value Date/Time    TSH 0.641 09/25/2023 07:30 PM     No results found for: \"LITHIUM\"  No results found for: \"VALPROATE\", \"CBMZ\"    Treatment Plan:  Reviewed current Medications with the patient. Continue Zoloft 50 mg daily  Risks, benefits, side effects, drug-to-drug interactions and alternatives to treatment were discussed. Patient is willing to cooperate with the rehabilitation and denies any active suicidal thoughts currently.   I would therefore recommend patient to be transferred to rehabilitation floor for therapy while continuing to be monitored his mental health closely          Electronically signed by Jo Garcia MD on 9/29/2023 at 2:36 PM

## 2023-09-29 NOTE — PROGRESS NOTES
09/29/2023    0114: Unable to complete NIHSS. Patient is non-compliant with care and will not participate in assessment. When prompted to answer questions patient says \"I'm trying to sleep\". From what RN is able to assess patient's neuro status appears to be unchanged from last exam.    0127: Evan Pino DO aware of situation. 5427: chg bath given. Post bath patient became agitated threatening to punch staff and attempting to get out of bed. Patient is non redirectable and becoming increasingly agitated. CIT called and patient given 1mg Hadol IV. Patient is now resting comfortably in bed and not attempting to get up.    0455: Patient becoming increasingly agitated again. Dean DO at bedside to assess patient. Evan Pino DO to place orders for further management of patients agitation.

## 2023-09-29 NOTE — PROGRESS NOTES
bacteremia 07/23/2018    Hematoma of right thigh 07/23/2018    Body mass index (bmi) 29.0-29.9, adult 06/26/2018    Cellulitis of right lower limb 06/26/2018    Other specified hypothyroidism 06/26/2018    Inflammatory disorders of scrotum 06/26/2018    Long term (current) use of insulin ([address]) 06/26/2018    Long term (current) use of oral hypoglycemic drugs 06/26/2018    Dyspnea, unspecified 06/16/2018    Pain in left lower leg 06/16/2018    CAD (coronary artery disease) 06/13/2018    Family history of ischemic heart disease and other diseases of the circulatory system 06/13/2018    Other chronic sinusitis 06/13/2018    Sleep apnea 06/13/2018    Poorly controlled type 2 diabetes mellitus ([address]) 06/13/2018    Unsp open wound of r little finger w/o damage to nail, init 06/13/2018    Encounter for pre-employment examination 04/13/2018    Allergic rhinitis due to pollen 06/07/2016    Essential hypertension, benign     Irregular heart rhythm 11/21/2012    Obesity, diabetes, and hypertension syndrome ([address]) 03/16/2005    Hyperlipidemia 03/16/2005        Past Medical History:   Diagnosis Date    Asthma     Back pain     compresion in [place]    CAD (coronary artery disease) 01/2020    3 vessel     chf 09/16/2022    Diabetes mellitus ([address])     Essential hypertension, benign     Hyperlipemia     Ischemic cardiomyopathy 09/16/2022    Neuropathic pain, leg, bilateral      Past Surgical History:   Procedure Laterality Date    CARPAL TUNNEL RELEASE Bilateral     FOOT DEBRIDEMENT Right 5/5/2023    RIGHT FOOT DEBRIDEMENT INCISION AND DRAINAGE ROOM 174 performed by Celia Mortimer, DPM at [address] [place] [address] Right 5/8/2023    RIGHT FOOT  INCISION AND DRAINAGE WITH DEBRIDEMENT OF NON-VIABLE TISSUE DELAYED PRIMARY CLOSURE performed by Celia Mortimer, DPM at [facility] Left     bone spur    LAMINOTOMY  2012    SPINE SURGERY N/A 11/11/2022    L1 VERETEBRAL AUGMENTATION performed by Doc Cope MD at [address] [place] [address] Restrictions:  Restrictions/Precautions: Fall Risk    SUBJECTIVE:   Subjective: Pt with multiple questions and needing redirection to stay on task at times. Pain  Pain: 0/10 pre and post    OBJECTIVE:        Bed mobility  Rolling to Left: Moderate assistance  Rolling to Right: Stand by assistance  Supine to Sit: Maximum assistance  Sit to Supine: Maximum assistance  Scooting: Maximal assistance  Bed Mobility Comments: Second person SBA for safety and balance. Neuromuscular Education  NDT Treatment: Sitting  Facilitation techniques: Focused on sitting balance and trying to maintain midline. Pt over correcting multiple times and leaning to left and leaning head to left secondary to feeling \"Like I'm falling to the right . \"  Pt not pushing. Pt given tactile cues, visual cues and assist to maintain sitting balance. Pt with assist varying from Max to SBA for short periods. +2 for safety sitting EOB with occasional second person assisting. Activity Tolerance  Activity Tolerance: Patient tolerated evaluation without incident          ASSESSMENT   Assessment: Pt presents with very awareness of midline, poor sitting balance, poor safety awareness. Not safe to try to transfer to a chair at this time. Discharge Recommendations:  Continue to assess pending progress         Goals  Long Term Goals  Long Term Goal 1: Patient will be min assist with bed mobility. Long Term Goal 2: Patient will be min assist with seated balance on EOB. Long Term Goal 3: Patient will be min assist with transfers.     PLAN    General Plan: 3-5 times per week        [facility] (6 CLICK) [facility] Mobility Raw Score : 8     Therapy Time   Individual   Time In 1430   Time Out 5256   Minutes 12     Minutes: 12  Neuromuscular trainin  Bed mobility: [address] PTA, 23 at 3:31 PM         Definitions for assistance levels  Independent = pt does not require any

## 2023-09-29 NOTE — PROGRESS NOTES
Physician Progress Note      Cindy Gregg  CSN #:                  [id number]  :                       1960  ADMIT DATE:       2023 7:02 PM  [facility] DATE:  RESPONDING  PROVIDER #:        Morales Pearl MD          QUERY TEXT:    Pt admitted with CVA and has acute encephalopathy documented. If possible,   please document in progress notes and discharge summary further specificity   regarding the type of encephalopathy:    The medical record reflects the following:  Risk Factors: CVA, right sided weakness, hypertensive emergency  Clinical Indicators: /103; Nursing Flowsheets: Alert, Oriented to   person, place & time, Disoriented to situation with no improvement documented   at this time; ED Note: \"Moderately confused adult male\";  H&P: \"Patient making   some odd statements in the ED, complaining to one of the ED nurses that he   had stopped taking his home metformin because he felt that it was some type of   very bad drug and that \"it is killing people and nobody will tell me why\". \"  Treatment: Neurology & Psych consult, stroke protocols, neuro assessment, dual   antiplatelet therapy initiated, labetalol, Coreg, Lipitor, labs and   monitoring    Thank you,  Rosemary Herrmann   Clinical Documentation Improvement Specialist  W: [phone]  Options provided:  -- Hypertensive encephalopathy due to hypertensive emergency  -- Encephalopathy due to CVA  -- Other - I will add my own diagnosis  -- Disagree - Not applicable / Not valid  -- Disagree - Clinically unable to determine / Unknown  -- Refer to Clinical Documentation Reviewer    PROVIDER RESPONSE TEXT:    This patient has encephalopathy due to CVA    Query created by: Rosemary Herrmann on 2023 12:10 PM      Electronically signed by:  Morales Pearl MD 2023 10:41 AM

## 2023-09-29 NOTE — BRIEF OP NOTE
Brief post procedure Note    Date: 9/29/23    Procedure: Implantable loop recorder     Sedation: None -  patient already sedated from Haldol given early this morning; local anesthesia only     Complications: None    EBL: Trivial    Conclusions:  Successful [employer] loop recorder insersion. Good sensing measurements. Recommendations:  Patient will be enrolled in [facility] device clinic for Afib monitoring. Staples out in 7 days. My office will call to arrange. If patient in rehab or hospital staples can be removed by nursing. Monitor briefly post procedure. Transfer back to floor. Thank you for allowing me to participate in your patient's cardiac care. Should you have questions, please do not hesitate to contact me.      Zakiya Arteaga DO, [facility], [address] [place] [address] and [address] [place] [address]

## 2023-09-30 LAB
ANION GAP SERPL CALCULATED.3IONS-SCNC: 11 MEQ/L (ref 9–15)
BASOPHILS # BLD: 0 K/UL (ref 0–0.2)
BASOPHILS NFR BLD: 0.3 %
BUN SERPL-MCNC: 18 MG/DL (ref 8–23)
CALCIUM SERPL-MCNC: 8.7 MG/DL (ref 8.5–9.9)
CHLORIDE SERPL-SCNC: 102 MEQ/L (ref 95–107)
CO2 SERPL-SCNC: 23 MEQ/L (ref 20–31)
CREAT SERPL-MCNC: 0.91 MG/DL (ref 0.7–1.2)
EOSINOPHIL # BLD: 0.5 K/UL (ref 0–0.7)
EOSINOPHIL NFR BLD: 7.6 %
ERYTHROCYTE [DISTWIDTH] IN BLOOD BY AUTOMATED COUNT: 12.7 % (ref 11.5–14.5)
GLUCOSE BLD-MCNC: 115 MG/DL (ref 70–99)
GLUCOSE BLD-MCNC: 150 MG/DL (ref 70–99)
GLUCOSE BLD-MCNC: 71 MG/DL (ref 70–99)
GLUCOSE SERPL-MCNC: 90 MG/DL (ref 70–99)
HCT VFR BLD AUTO: 39.9 % (ref 42–52)
HGB BLD-MCNC: 13.3 G/DL (ref 14–18)
LYMPHOCYTES # BLD: 0.5 K/UL (ref 1–4.8)
LYMPHOCYTES NFR BLD: 8 %
MCH RBC QN AUTO: 27 PG (ref 27–31.3)
MCHC RBC AUTO-ENTMCNC: 33.3 % (ref 33–37)
MCV RBC AUTO: 81.1 FL (ref 79–92.2)
MONOCYTES # BLD: 0.5 K/UL (ref 0.2–0.8)
MONOCYTES NFR BLD: 7.5 %
NEUTROPHILS # BLD: 4.6 K/UL (ref 1.4–6.5)
NEUTS SEG NFR BLD: 76.1 %
PERFORMED ON: ABNORMAL
PERFORMED ON: ABNORMAL
PERFORMED ON: NORMAL
PLATELET # BLD AUTO: 156 K/UL (ref 130–400)
POTASSIUM SERPL-SCNC: 3.7 MEQ/L (ref 3.4–4.9)
RBC # BLD AUTO: 4.92 M/UL (ref 4.7–6.1)
SODIUM SERPL-SCNC: 136 MEQ/L (ref 135–144)
WBC # BLD AUTO: 6 K/UL (ref 4.8–10.8)

## 2023-09-30 PROCEDURE — 6370000000 HC RX 637 (ALT 250 FOR IP): Performed by: PSYCHIATRY & NEUROLOGY

## 2023-09-30 PROCEDURE — 2580000003 HC RX 258: Performed by: INTERNAL MEDICINE

## 2023-09-30 PROCEDURE — 80048 BASIC METABOLIC PNL TOTAL CA: CPT

## 2023-09-30 PROCEDURE — 6370000000 HC RX 637 (ALT 250 FOR IP): Performed by: INTERNAL MEDICINE

## 2023-09-30 PROCEDURE — 99232 SBSQ HOSP IP/OBS MODERATE 35: CPT | Performed by: INTERNAL MEDICINE

## 2023-09-30 PROCEDURE — 36415 COLL VENOUS BLD VENIPUNCTURE: CPT

## 2023-09-30 PROCEDURE — 1210000000 HC MED SURG R&B

## 2023-09-30 PROCEDURE — 99232 SBSQ HOSP IP/OBS MODERATE 35: CPT | Performed by: PHYSICAL MEDICINE & REHABILITATION

## 2023-09-30 PROCEDURE — 6360000002 HC RX W HCPCS: Performed by: INTERNAL MEDICINE

## 2023-09-30 PROCEDURE — 85025 COMPLETE CBC W/AUTO DIFF WBC: CPT

## 2023-09-30 RX ADMIN — APIXABAN 5 MG: 5 TABLET, FILM COATED ORAL at 20:55

## 2023-09-30 RX ADMIN — Medication 10 ML: at 20:58

## 2023-09-30 RX ADMIN — CARVEDILOL 12.5 MG: 12.5 TABLET, FILM COATED ORAL at 20:55

## 2023-09-30 RX ADMIN — INSULIN GLARGINE 28 UNITS: 100 INJECTION, SOLUTION SUBCUTANEOUS at 20:56

## 2023-09-30 RX ADMIN — CARVEDILOL 12.5 MG: 12.5 TABLET, FILM COATED ORAL at 09:26

## 2023-09-30 RX ADMIN — HYDRALAZINE HYDROCHLORIDE 100 MG: 100 TABLET, FILM COATED ORAL at 20:55

## 2023-09-30 RX ADMIN — HYDRALAZINE HYDROCHLORIDE 100 MG: 100 TABLET, FILM COATED ORAL at 15:39

## 2023-09-30 RX ADMIN — ATORVASTATIN CALCIUM 40 MG: 40 TABLET, FILM COATED ORAL at 20:55

## 2023-09-30 RX ADMIN — SERTRALINE HYDROCHLORIDE 50 MG: 50 TABLET ORAL at 09:26

## 2023-09-30 RX ADMIN — HYDRALAZINE HYDROCHLORIDE 100 MG: 100 TABLET, FILM COATED ORAL at 09:26

## 2023-09-30 RX ADMIN — APIXABAN 5 MG: 5 TABLET, FILM COATED ORAL at 09:26

## 2023-09-30 RX ADMIN — HYDRALAZINE HYDROCHLORIDE 10 MG: 20 INJECTION, SOLUTION INTRAMUSCULAR; INTRAVENOUS at 03:10

## 2023-09-30 RX ADMIN — ASPIRIN 81 MG: 81 TABLET, COATED ORAL at 09:26

## 2023-09-30 ASSESSMENT — ENCOUNTER SYMPTOMS
CHEST TIGHTNESS: 0
VOMITING: 0
WHEEZING: 0
COLOR CHANGE: 0
NAUSEA: 0
SHORTNESS OF BREATH: 0
TROUBLE SWALLOWING: 0
COUGH: 0

## 2023-09-30 NOTE — PROGRESS NOTES
09/30/2023    0050: Patient became increasingly confused. When RN entered room patient had removed surgical dressing from loop recorder placement. Sanguinous drainage noted at site, staples intact, incision well approximated. Dressing replaced with 4x4 and Tegaderm.

## 2023-09-30 NOTE — CARE COORDINATION
Voice mail this morning from Quan at [facility]. Prior Authorization for [address], REF# [id number] was denied d/t \"patient is not stable enough to be transferred off medical. Suicidal ideations, refusing to participate in therapy and assessments. \" P2P must be completed by Monday, October 2nd at 4:30 PM EST by calling [phone]. Updated clinicals can be faxed to [phone]. Informed Evan Turner that there are updated clinicals and I will fax them as soon as OT sees the patient. Patient did participate in PT 9/29/23 and displays need for rehab. Dr. Surinder Zafar did see the patient on 9/29/23 and feels the patient does not have Suicidal Ideations and stated that the patient is now willing to cooperate with rehabilitation and therefore, Dr. Surinder Zafar recommends that the patient transfer to rehabilitation for therapy if approved while continuing to monitor the patient's mental health closely.  Electronically signed by Zhang Lema RN on 9/30/23 at 8:59 AM EDT

## 2023-09-30 NOTE — CARE COORDINATION
SPOKE WITH DR Cb Schmitz AND UPDATED ABOUT NEED FOR P2P AND AWAITING IF DR Anali Marroquin WILL ACCEPT FOR [facility]. P2P # IF NEEDED IS [id number] AND REF # IS [id number]. PER PREVIOUS LSW NOTE SNF OPTION WOULD BE [facility]. IF REHAB DENIES ADMIT, WILL NOTIFY Dayana Hicks.

## 2023-09-30 NOTE — CARE COORDINATION
CALL FROM [facility] ON REHAB. D/T PT REFUSAL REHAB'S RECOMMENDATION IS SNF. WILL FOLLOW UP TOMORROW WITH LSW.

## 2023-09-30 NOTE — CARE COORDINATION
Attempted to talk to patient about Acute Rehab Unit (ARU) referral. I felt the patient might be more open to discuss ARU since he did participate in PT yesterday and he told Dr. Eloise Gutierrez that he would participate in rehabilitation. When I arrived at patient's room, the nurse was attempting to give him his meds and he refused to take them. When I entered the room and identified myself and rehab he told me to \"get out\" and \"leave me alone\". I left the patient's room as he asked. At this time, I will not be faxing the updated clinicals to [facility] as I am not sure that he would participate in ARU 3 hours per day. Will wait to see what Dr. Quiroga Hai recommends.  Electronically signed by Solo Shipman RN on 9/30/23 at 10:07 AM EDT

## 2023-10-01 VITALS
DIASTOLIC BLOOD PRESSURE: 71 MMHG | WEIGHT: 165 LBS | OXYGEN SATURATION: 97 % | HEART RATE: 78 BPM | HEIGHT: 67 IN | RESPIRATION RATE: 20 BRPM | BODY MASS INDEX: 25.9 KG/M2 | TEMPERATURE: 97.5 F | SYSTOLIC BLOOD PRESSURE: 133 MMHG

## 2023-10-01 LAB
ANION GAP SERPL CALCULATED.3IONS-SCNC: 11 MEQ/L (ref 9–15)
BUN SERPL-MCNC: 19 MG/DL (ref 8–23)
CALCIUM SERPL-MCNC: 8.8 MG/DL (ref 8.5–9.9)
CHLORIDE SERPL-SCNC: 102 MEQ/L (ref 95–107)
CO2 SERPL-SCNC: 23 MEQ/L (ref 20–31)
CREAT SERPL-MCNC: 1.24 MG/DL (ref 0.7–1.2)
GLUCOSE BLD-MCNC: 103 MG/DL (ref 70–99)
GLUCOSE BLD-MCNC: 121 MG/DL (ref 70–99)
GLUCOSE BLD-MCNC: 94 MG/DL (ref 70–99)
GLUCOSE BLD-MCNC: 97 MG/DL (ref 70–99)
GLUCOSE SERPL-MCNC: 109 MG/DL (ref 70–99)
PERFORMED ON: ABNORMAL
PERFORMED ON: ABNORMAL
PERFORMED ON: NORMAL
PERFORMED ON: NORMAL
POTASSIUM SERPL-SCNC: 3.8 MEQ/L (ref 3.4–4.9)
SODIUM SERPL-SCNC: 136 MEQ/L (ref 135–144)

## 2023-10-01 PROCEDURE — 6370000000 HC RX 637 (ALT 250 FOR IP): Performed by: PSYCHIATRY & NEUROLOGY

## 2023-10-01 PROCEDURE — 2580000003 HC RX 258: Performed by: INTERNAL MEDICINE

## 2023-10-01 PROCEDURE — 99231 SBSQ HOSP IP/OBS SF/LOW 25: CPT | Performed by: PHYSICAL MEDICINE & REHABILITATION

## 2023-10-01 PROCEDURE — 36415 COLL VENOUS BLD VENIPUNCTURE: CPT

## 2023-10-01 PROCEDURE — 80048 BASIC METABOLIC PNL TOTAL CA: CPT

## 2023-10-01 PROCEDURE — 6370000000 HC RX 637 (ALT 250 FOR IP): Performed by: INTERNAL MEDICINE

## 2023-10-01 PROCEDURE — 1210000000 HC MED SURG R&B

## 2023-10-01 RX ORDER — INSULIN GLARGINE 100 [IU]/ML
20 INJECTION, SOLUTION SUBCUTANEOUS NIGHTLY
Status: DISCONTINUED | OUTPATIENT
Start: 2023-10-01 | End: 2023-10-03 | Stop reason: HOSPADM

## 2023-10-01 RX ORDER — SODIUM CHLORIDE 9 MG/ML
INJECTION, SOLUTION INTRAVENOUS CONTINUOUS
Status: DISCONTINUED | OUTPATIENT
Start: 2023-10-01 | End: 2023-10-03 | Stop reason: HOSPADM

## 2023-10-01 RX ADMIN — APIXABAN 5 MG: 5 TABLET, FILM COATED ORAL at 22:00

## 2023-10-01 RX ADMIN — HYDRALAZINE HYDROCHLORIDE 100 MG: 100 TABLET, FILM COATED ORAL at 12:13

## 2023-10-01 RX ADMIN — Medication 10 ML: at 12:14

## 2023-10-01 RX ADMIN — SERTRALINE HYDROCHLORIDE 50 MG: 50 TABLET ORAL at 12:13

## 2023-10-01 RX ADMIN — APIXABAN 5 MG: 5 TABLET, FILM COATED ORAL at 12:13

## 2023-10-01 RX ADMIN — SODIUM CHLORIDE: 9 INJECTION, SOLUTION INTRAVENOUS at 12:24

## 2023-10-01 RX ADMIN — Medication 10 ML: at 22:00

## 2023-10-01 RX ADMIN — HYDRALAZINE HYDROCHLORIDE 100 MG: 100 TABLET, FILM COATED ORAL at 22:00

## 2023-10-01 RX ADMIN — INSULIN LISPRO 4 UNITS: 100 INJECTION, SOLUTION INTRAVENOUS; SUBCUTANEOUS at 18:39

## 2023-10-01 RX ADMIN — INSULIN LISPRO 4 UNITS: 100 INJECTION, SOLUTION INTRAVENOUS; SUBCUTANEOUS at 14:31

## 2023-10-01 RX ADMIN — CARVEDILOL 12.5 MG: 12.5 TABLET, FILM COATED ORAL at 22:00

## 2023-10-01 RX ADMIN — CARVEDILOL 12.5 MG: 12.5 TABLET, FILM COATED ORAL at 12:13

## 2023-10-01 RX ADMIN — HYDRALAZINE HYDROCHLORIDE 100 MG: 100 TABLET, FILM COATED ORAL at 17:25

## 2023-10-01 RX ADMIN — ASPIRIN 81 MG: 81 TABLET, COATED ORAL at 12:12

## 2023-10-01 RX ADMIN — ATORVASTATIN CALCIUM 40 MG: 40 TABLET, FILM COATED ORAL at 22:00

## 2023-10-01 ASSESSMENT — PAIN SCALES - GENERAL: PAINLEVEL_OUTOF10: 0

## 2023-10-01 NOTE — FLOWSHEET NOTE
Lab came in pt. Room this morning, tried to wake patient to get permission to draw blood for lab. Patient would not answer an questions and acted sleep. I sat his bed all the way up and shook the patient, he jumped up at me like he was going to push me but he did not, he gave me a mean look and closed his eyes, I told the lab lady we can make it as refused and try again later in the morning.  Electronically signed by Martell Acevedo RN on 10/1/2023 at 6:32 AM

## 2023-10-01 NOTE — PROGRESS NOTES
Left upper chest dressing changed, old dressing removed, moderate amount of bloody drainage noted on the old dressing, site cleansed with saline, dry sterile dressing with Tegaderm reapplied, staple noted intact, continues to have bloody drainage

## 2023-10-01 NOTE — CARE COORDINATION
Referral send to [facility] for backup if [employer] denies on Monday. Waiting for acceptance and will need pre-cert. LSW will follow.      Electronically signed by HAYDEN Cross on 10/1/2023 at 10:05 AM

## 2023-10-02 LAB
ANION GAP SERPL CALCULATED.3IONS-SCNC: 11 MEQ/L (ref 9–15)
BASOPHILS # BLD: 0 K/UL (ref 0–0.2)
BASOPHILS NFR BLD: 0.4 %
BUN SERPL-MCNC: 24 MG/DL (ref 8–23)
CALCIUM SERPL-MCNC: 8.7 MG/DL (ref 8.5–9.9)
CHLORIDE SERPL-SCNC: 103 MEQ/L (ref 95–107)
CO2 SERPL-SCNC: 24 MEQ/L (ref 20–31)
CREAT SERPL-MCNC: 1.16 MG/DL (ref 0.7–1.2)
EOSINOPHIL # BLD: 0.3 K/UL (ref 0–0.7)
EOSINOPHIL NFR BLD: 6.6 %
ERYTHROCYTE [DISTWIDTH] IN BLOOD BY AUTOMATED COUNT: 12.8 % (ref 11.5–14.5)
GLUCOSE BLD-MCNC: 113 MG/DL (ref 70–99)
GLUCOSE BLD-MCNC: 116 MG/DL (ref 70–99)
GLUCOSE BLD-MCNC: 120 MG/DL (ref 70–99)
GLUCOSE SERPL-MCNC: 93 MG/DL (ref 70–99)
HCT VFR BLD AUTO: 39 % (ref 42–52)
HGB BLD-MCNC: 12.7 G/DL (ref 14–18)
LYMPHOCYTES # BLD: 0.6 K/UL (ref 1–4.8)
LYMPHOCYTES NFR BLD: 13.2 %
MCH RBC QN AUTO: 27 PG (ref 27–31.3)
MCHC RBC AUTO-ENTMCNC: 32.6 % (ref 33–37)
MCV RBC AUTO: 83 FL (ref 79–92.2)
MONOCYTES # BLD: 0.4 K/UL (ref 0.2–0.8)
MONOCYTES NFR BLD: 8.6 %
NEUTROPHILS # BLD: 3.2 K/UL (ref 1.4–6.5)
NEUTS SEG NFR BLD: 70.8 %
PERFORMED ON: ABNORMAL
PLATELET # BLD AUTO: 153 K/UL (ref 130–400)
POTASSIUM SERPL-SCNC: 4.1 MEQ/L (ref 3.4–4.9)
RBC # BLD AUTO: 4.7 M/UL (ref 4.7–6.1)
SODIUM SERPL-SCNC: 138 MEQ/L (ref 135–144)
WBC # BLD AUTO: 4.6 K/UL (ref 4.8–10.8)

## 2023-10-02 PROCEDURE — 6370000000 HC RX 637 (ALT 250 FOR IP): Performed by: INTERNAL MEDICINE

## 2023-10-02 PROCEDURE — 97535 SELF CARE MNGMENT TRAINING: CPT

## 2023-10-02 PROCEDURE — 80048 BASIC METABOLIC PNL TOTAL CA: CPT

## 2023-10-02 PROCEDURE — APPSS30 APP SPLIT SHARED TIME 16-30 MINUTES: Performed by: NURSE PRACTITIONER

## 2023-10-02 PROCEDURE — 2580000003 HC RX 258: Performed by: INTERNAL MEDICINE

## 2023-10-02 PROCEDURE — 97110 THERAPEUTIC EXERCISES: CPT

## 2023-10-02 PROCEDURE — 99231 SBSQ HOSP IP/OBS SF/LOW 25: CPT | Performed by: PHYSICAL MEDICINE & REHABILITATION

## 2023-10-02 PROCEDURE — 99232 SBSQ HOSP IP/OBS MODERATE 35: CPT | Performed by: PSYCHIATRY & NEUROLOGY

## 2023-10-02 PROCEDURE — 6370000000 HC RX 637 (ALT 250 FOR IP): Performed by: PSYCHIATRY & NEUROLOGY

## 2023-10-02 PROCEDURE — 92526 ORAL FUNCTION THERAPY: CPT

## 2023-10-02 PROCEDURE — 1210000000 HC MED SURG R&B

## 2023-10-02 PROCEDURE — 36415 COLL VENOUS BLD VENIPUNCTURE: CPT

## 2023-10-02 PROCEDURE — 85025 COMPLETE CBC W/AUTO DIFF WBC: CPT

## 2023-10-02 RX ORDER — INSULIN LISPRO 100 [IU]/ML
0-16 INJECTION, SOLUTION INTRAVENOUS; SUBCUTANEOUS
DISCHARGE
Start: 2023-10-02 | End: 2023-10-02 | Stop reason: HOSPADM

## 2023-10-02 RX ORDER — ASPIRIN 81 MG/1
81 TABLET ORAL DAILY
Qty: 30 TABLET | Refills: 3
Start: 2023-10-02

## 2023-10-02 RX ORDER — INSULIN GLARGINE 100 [IU]/ML
20 INJECTION, SOLUTION SUBCUTANEOUS NIGHTLY
Qty: 10 ML | Refills: 3 | DISCHARGE
Start: 2023-10-02

## 2023-10-02 RX ORDER — INSULIN LISPRO 100 [IU]/ML
4 INJECTION, SOLUTION INTRAVENOUS; SUBCUTANEOUS
DISCHARGE
Start: 2023-10-02

## 2023-10-02 RX ORDER — INSULIN LISPRO 100 [IU]/ML
0-4 INJECTION, SOLUTION INTRAVENOUS; SUBCUTANEOUS NIGHTLY
Status: DISCONTINUED | OUTPATIENT
Start: 2023-10-02 | End: 2023-10-03 | Stop reason: HOSPADM

## 2023-10-02 RX ORDER — INSULIN LISPRO 100 [IU]/ML
0-8 INJECTION, SOLUTION INTRAVENOUS; SUBCUTANEOUS
Status: DISCONTINUED | OUTPATIENT
Start: 2023-10-02 | End: 2023-10-03 | Stop reason: HOSPADM

## 2023-10-02 RX ADMIN — HYDRALAZINE HYDROCHLORIDE 100 MG: 100 TABLET, FILM COATED ORAL at 10:53

## 2023-10-02 RX ADMIN — HYDRALAZINE HYDROCHLORIDE 100 MG: 100 TABLET, FILM COATED ORAL at 22:23

## 2023-10-02 RX ADMIN — CARVEDILOL 12.5 MG: 12.5 TABLET, FILM COATED ORAL at 22:23

## 2023-10-02 RX ADMIN — CARVEDILOL 12.5 MG: 12.5 TABLET, FILM COATED ORAL at 10:53

## 2023-10-02 RX ADMIN — Medication 10 ML: at 22:42

## 2023-10-02 RX ADMIN — SERTRALINE HYDROCHLORIDE 50 MG: 50 TABLET ORAL at 10:53

## 2023-10-02 RX ADMIN — ATORVASTATIN CALCIUM 40 MG: 40 TABLET, FILM COATED ORAL at 22:23

## 2023-10-02 RX ADMIN — APIXABAN 5 MG: 5 TABLET, FILM COATED ORAL at 22:23

## 2023-10-02 RX ADMIN — ASPIRIN 81 MG: 81 TABLET, COATED ORAL at 10:53

## 2023-10-02 RX ADMIN — APIXABAN 5 MG: 5 TABLET, FILM COATED ORAL at 10:53

## 2023-10-02 ASSESSMENT — ENCOUNTER SYMPTOMS
SHORTNESS OF BREATH: 0
CHEST TIGHTNESS: 0
COLOR CHANGE: 0
WHEEZING: 0
COUGH: 0
NAUSEA: 0
VOMITING: 0
TROUBLE SWALLOWING: 0

## 2023-10-02 NOTE — DISCHARGE INSTR - DIET

## 2023-10-02 NOTE — PROGRESS NOTES
[facility] Neurology Daily Progress Note  Name: Santana Esquivel  Age: 59 y.o. Gender: male  CodeStatus: Full Code  Allergies: No Known Allergies    Chief Complaint:Hypertension    Primary Care Provider: Zee Elliott MD  InpatientTreatment Team: Treatment Team: Attending Provider: Cordelia Briceno DO; Consulting Physician: Julio Milian MD; Consulting Physician: Annel Gerard MD; Consulting Physician: Raquel Russ MD; Utilization Reviewer: Marilu Torres, DONTRELL; Consulting Physician: Jazzy Blank DO; Cardiologist: Jazzy Blank DO; Utilization Reviewer: Philippe Woodard, DONTRELL; : Sony Beard, DONTRELL; Patient Care Tech: Ramona Prado; Registered Nurse: Helga Soriano RN; Patient Care Tech: Mirna De La Garza; Utilization Reviewer: Aurora Nino RN  Admission Date: 9/25/2023      HPI   Pt seen and examined for neuro follow up. Alert and oriented x2-3, no acute distress, cooperative. Lying in bed. Right hemiparesis noted. Denies headache or vision changes. No dysphagia, dysarthria, aphasia. No seizure activity reported. Blood pressure improved. On examination. Patient on many occasion does not like to be examined. Vitals:    10/02/23 1058   BP: (!) 152/75   Pulse: 77   Resp:    SpO2:         Review of Systems   Constitutional:  Negative for appetite change and fever. HENT:  Negative for hearing loss and trouble swallowing. Eyes:  Negative for visual disturbance. Respiratory:  Negative for cough, chest tightness, shortness of breath and wheezing. Cardiovascular:  Negative for chest pain, palpitations and leg swelling. Gastrointestinal:  Negative for nausea and vomiting. Genitourinary:  Negative for difficulty urinating. Musculoskeletal:  Positive for gait problem. Skin:  Negative for color change and rash. Neurological:  Positive for weakness. Negative for dizziness, tremors, seizures, syncope, facial asymmetry, speech difficulty, light-headedness, numbness and headaches. initiated on dual antiplatelet therapy and high intensity statin  Check lipid panel and hemoglobin A1c.  he continues on Eliquis. Unsure if he was compliant with this. He is poor historian and we will need to further evaluate compliance   Will need ongoing attention to risk factor modification and education regarding medication compliance     I have personally performed a face to face diagnostic evaluation on this patient, reviewed all data and investigations, and am the sole provider of all clinical decisions on the neurological status of this patient. Stroke as identified by clinical examination sick right-sided weakness. Patient is almost flaccid. We were not contacted the emergency room and patient may not have been a TNK candidate given that he is already on Eliquis. There is no large vessel disease identified patient was admitted here in the past as well. Patient continues on Eliquis and will add aspirin for now unless something changes. The onset timing is very difficult to certain in this patient as well. Risk factor modification is being done. Depending on the results of the same will further advise 60% time spent on evaluating patient       9/27/2023:  Patient declined MRI. Had discussion with him and encouraged him to have this done given his right-sided hemiparesis. CT of the head neck completed without any significant stenosis. Patient had a recent CTA of the head and neck done approximately 1 month ago that stated multiple areas of intracranial stenosis. Call was placed to CAT scan department to request the radiologist to reevaluate both the CTA of the head from last month and from yesterday and give us an updated reading given the significant contradiction and reports in a short period of time. cholesterol 238, triglycerides 157, HDL 39, . Continue high intensity statin. HgbA1c pending  Will need ongoing attention to risk factor modification.   Echo with bubble study

## 2023-10-02 NOTE — FLOWSHEET NOTE
Pt. Refused lab draw this morning and glucose check. Pt. yelled \"get out of my room\". This nurse will advise day shift nurse to try again once patient wakes up for the day.  Electronically signed by Alisa Ram RN on 10/2/2023

## 2023-10-02 NOTE — PROGRESS NOTES
[facility]  Facility/Department: [facility]  Speech Language Pathology   Treatment Note      Jordy Jones  1960  [id number]  [x]   confirmed      Date: 10/2/2023    Dizziness [R42]  Uncontrolled hypertension [I10]  Hypertensive emergency [I16.1]  Type 2 diabetes mellitus with hyperglycemia, without long-term current use of insulin ([address]) [E11.65]    Restrictions/Precautions: Fall Risk    Weight - Scale: 165 lb (74.8 kg)     ADULT DIET; Regular; 4 carb choices (60 gm/meal); Low Fat/Low Chol/High Fiber/2 gm Na; No Caffeine    SpO2: 97 % (10/01/23 1938)  O2 Flow Rate (L/min): 0 L/min (23)  No active isolations      Subjective:  Alert and Self Limiting        Interventions used this date: Instruction in Compensatory Strategies      Objective/Assessment:  Patient progressing towards goals:  Short Term Goals  Time Frame for Short Term Goals: 1 week or LOS until goals are met. Goal 1: Pt will follow 2-3 step directions given orally with 80% accuracy with min cues to increase the pt's ability to follow directions provided by caregivers for safe follow through with ADLs. Goal 2: Pt will complete confrontational naming tasks with 90% accuracy with mild verbal cues to help the patient express their basic wants and needs. Goal 3: To increase safety awareness and judgment for safe completion of ADLs secondary to pt's cognitive deficits, pt will complete abstract reasoning tasks (i.e. Word deduction, convergent and divergent naming, similarities/differences) with 80% accuracy and min cues. Goal 4: To address pt's cognitive deficits and promote orientation, pt will state name of facility, time within 1 hour, reason in hospital, current month and year with 100% accuracy with min assist, with use of external aid.   Attempted orientation questions, pt stated name and birthday and that he was in the hospital. When asked further orientation questions he said \"I don't care and I just want people out of here\"     Short-term Goals  Timeframe for Short-term Goals: 1 week or LOS until goals are met. Goal 3: Pt will utilize safe swallowing strategies of small bites/sips, slow rate, upright positioning, and alternating between food and drink with 90% accuracy with supervision cues. SLP educated patient on use of compensatory strategies: Alternate solids/liquids  Small bites/sips   Slow rate   Upright position    Pt verbalized understanding  Goal 4: Pt will tolerate the recommended diet level with no s/s of aspiration. SLP asked how patient was tolerating diet and he stated \"My neck has swelling and eating is difficult\". SLP offered regular texture trials and patient refused. Treatment/Activity Tolerance:  Other:     Plan:  Continue per POC    Pain Assessment:  Patient c/o pain: Location and pain level: all over body  Patient able to proceed with session. Pt verbalized he didn't want medicine    Pain Re-assessment:  Patient c/o pain: Described as same as above    Patient/Caregiver Education:  Patient educated on session and progression towards goals. Education needs reinforcement. Safety Devices:   All fall risk precautions in place      Therapy Time  SLP Individual Minutes  Time In: 1100  Time Out: 1108  Minutes: 8            Signature: Electronically signed by GEE Grayson on 10/2/2023 at 11:15 AM

## 2023-10-02 NOTE — DISCHARGE INSTRUCTIONS
Follow up with primary care physician in the next 7 days or sooner if needed. If you do not have a Primary care physician, please schedule an appointment with one. Please ask prior to discharge about a list of local providers. Please return to ER or call 911 if you develop any significant signs or symptoms. I may not have addressed all of your medical illnesses or the abnormal blood work or imaging therefore please ask your PCP to obtain [facility] record to follow up on all of the abnormal labs, imaging and findings that I have and have not addressed during your hospitalization. Discharging you from the hospital does not mean that your medical care ends here and now. You may still need additional work up, investigation, monitoring, and treatment to be handled from this point on by outside providers including your PCP, Specialists and other healthcare providers. For medication questions, contact your retail pharmacy and your PCP. Your medical team at [facility] appreciates the opportunity to work with you to get well!     Ken Loera,   8:37 AM

## 2023-10-02 NOTE — CARE COORDINATION
PT WAS ACCEPTED BY [facility] AND PRECERT WILL BE STARTED TODAY. REHAB WAS DENIED BY INSURANCE. AWAITING APPROVAL FOR SNF TRANSFER.

## 2023-10-02 NOTE — PROGRESS NOTES
Physical Therapy Med Surg Daily Treatment Note  Facility/Department: [facility]  Room: [facility]/U962-74       NAME: Magdiel La  : 1960 (59 y.o.)  MRN: [id number]  CODE STATUS: Full Code    Date of Service: 10/2/2023    Patient Diagnosis(es): Dizziness [R42]  Uncontrolled hypertension [I10]  Hypertensive emergency [I16.1]  Type 2 diabetes mellitus with hyperglycemia, without long-term current use of insulin [facility]) [E11.65]   Chief Complaint   Patient presents with    Hypertension     Patient Active Problem List    Diagnosis Date Noted    Cellulitis of right foot 2023    Diplopia 2023    Toothache 2023    Chronic systolic (congestive) heart failure 11/10/2022    Crush fracture of vertebra due to osteoporosis ([address]) 11/10/2022    Type 2 diabetes mellitus with diabetic neuropathy 11/10/2022    Diabetes mellitus type 2, insulin dependent ([address]) 11/10/2022    chf 2022    Ischemic cardiomyopathy 2022    Hypertensive urgency 2022    Intractable nausea and vomiting 2022    Contact with and (suspected) exposure to covid-19 2021    Hypokalemia 2021    Presence of aortocoronary bypass graft 2021    Altered mental status, unspecified 2021    Moderate persistent asthma without complication     Nasal congestion 2019    Headache 2018    Illness, unspecified 2018    Nausea 2018    Lumbosacral spondylosis without myelopathy 2016    Degeneration of lumbar intervertebral disc 2015    Low back pain 2015    Displacement of lumbar intervertebral disc without myelopathy 2012    Cryptogenic stroke ([address]) 2023    Impaired mobility and activities of daily living dt -Moderate sized acute, nonhemorrhagic infarct in the posterolateral left thalamus 2023    Aphasia 2023    Dysphagia, oropharyngeal phase 2023    Adjustment disorder with depressed mood 2023    Hypertensive emergency

## 2023-10-02 NOTE — PROGRESS NOTES
cardiomyopathy    Chronic systolic (congestive) heart failure    Poorly controlled type 2 diabetes mellitus ([address])    Hypertensive emergency    Impaired mobility and activities of daily living dt -Moderate sized acute, nonhemorrhagic infarct in the posterolateral left thalamus    Aphasia    Dysphagia, oropharyngeal phase    Adjustment disorder with depressed mood  Resolved Problems:    * No resolved hospital problems. *          Events and functional changes in the past 24 hours reviewed continue to monitor closely re: functional status and participation in therapy      Focus of today's plan-    He has been seen by psychiatry and is still refusing to participate in any type of therapy or therapeutic evaluation. He therefore has been denied insurance certification for acute or any level of rehab.       Gerda Mueller D.O., PM&R     Attending    [address] [place] [address]

## 2023-10-02 NOTE — FLOWSHEET NOTE
Pt refused morning labs, accu checks and vitals.  Because I do not have an accucheck result I held morning insulin

## 2023-10-03 VITALS
HEIGHT: 67 IN | RESPIRATION RATE: 18 BRPM | TEMPERATURE: 97.9 F | DIASTOLIC BLOOD PRESSURE: 59 MMHG | WEIGHT: 165 LBS | OXYGEN SATURATION: 96 % | SYSTOLIC BLOOD PRESSURE: 121 MMHG | HEART RATE: 80 BPM | BODY MASS INDEX: 25.9 KG/M2

## 2023-10-03 LAB
ECHO BSA: 1.88 M2
GLUCOSE BLD-MCNC: 112 MG/DL (ref 70–99)
GLUCOSE BLD-MCNC: 114 MG/DL (ref 70–99)
PERFORMED ON: ABNORMAL
PERFORMED ON: ABNORMAL

## 2023-10-03 PROCEDURE — 6370000000 HC RX 637 (ALT 250 FOR IP): Performed by: PSYCHIATRY & NEUROLOGY

## 2023-10-03 PROCEDURE — 97110 THERAPEUTIC EXERCISES: CPT

## 2023-10-03 PROCEDURE — 99232 SBSQ HOSP IP/OBS MODERATE 35: CPT | Performed by: PSYCHIATRY & NEUROLOGY

## 2023-10-03 PROCEDURE — 6370000000 HC RX 637 (ALT 250 FOR IP): Performed by: INTERNAL MEDICINE

## 2023-10-03 PROCEDURE — 99231 SBSQ HOSP IP/OBS SF/LOW 25: CPT | Performed by: PHYSICAL MEDICINE & REHABILITATION

## 2023-10-03 PROCEDURE — APPSS15 APP SPLIT SHARED TIME 0-15 MINUTES: Performed by: NURSE PRACTITIONER

## 2023-10-03 PROCEDURE — 97535 SELF CARE MNGMENT TRAINING: CPT

## 2023-10-03 RX ADMIN — ASPIRIN 81 MG: 81 TABLET, COATED ORAL at 11:10

## 2023-10-03 RX ADMIN — CARVEDILOL 12.5 MG: 12.5 TABLET, FILM COATED ORAL at 11:10

## 2023-10-03 RX ADMIN — SERTRALINE HYDROCHLORIDE 50 MG: 50 TABLET ORAL at 11:10

## 2023-10-03 RX ADMIN — APIXABAN 5 MG: 5 TABLET, FILM COATED ORAL at 11:10

## 2023-10-03 ASSESSMENT — ENCOUNTER SYMPTOMS
CHEST TIGHTNESS: 0
VOMITING: 0
TROUBLE SWALLOWING: 0
COLOR CHANGE: 0
WHEEZING: 0
COUGH: 0
SHORTNESS OF BREATH: 0
NAUSEA: 0

## 2023-10-03 NOTE — PROGRESS NOTES
[facility]   Facility/Department: [address] [place] [address]  Speech Language Pathology    Quoc Xavier  1960  [id number]    Date: 10/3/2023      Speech Therapy attempted to see Quoc Xavier on this date for a/an:    Treatment    Pt was unable to be seen due to:   Patient refused: Pt asleep upon arrival.  Pt stated, \"I am having difficulty with sleeping. I haven't slept all night and I don't feel like answering any questions. \"  Pt continued to refuse treatment this date. Re-attempt as able.         Electronically signed by GEE Pelletier on 10/3/23 at 1:55 PM EDT

## 2023-10-03 NOTE — CARE COORDINATION
Physical Therapy Treatment    Patient Name:  Iris Mueller   MRN:  [id number]    Recommendations:     Discharge Recommendations:  home health PT, outpatient PT   Discharge Equipment Recommendations: none   Barriers to discharge: None    Assessment:     Iris Mueller is a 66 y.o. female admitted with a medical diagnosis of Septic shock.  She presents with the following impairments/functional limitations:  weakness, impaired self care skills, impaired balance, decreased safety awareness, impaired skin, impaired endurance, impaired functional mobilty, pain, edema, gait instability, decreased lower extremity function, orthopedic precautions.  During PT tx, pt demonstrated supine<>sit with Mod A.  Pt sleeps in a recliner at home.  She then required Min A for sit<>stand with bariatric RW, and KI donned on R LE.  Pt was then able to transfer bed<>chair via step transfer and Min A +2 person for safety.      Rehab Prognosis: Good; patient would benefit from acute skilled PT services to address these deficits and reach maximum level of function.    Recent Surgery: Procedure(s) (LRB):  INCISION AND DRAINAGE, LOWER EXTREMITY (Right) 3 Days Post-Op    Plan:     During this hospitalization, patient to be seen daily to address the identified rehab impairments via gait training, therapeutic activities, therapeutic exercises and progress toward the following goals:    · Plan of Care Expires:  09/16/19    Subjective     Chief Complaint: R LE is tender to touch  Patient/Family Comments/goals: to get back home  Pain/Comfort:  Pain Rating 1: 5/10  Location - Side 1: Right  Location 1: leg  Pain Addressed 1: Pre-medicate for activity, Reposition, Distraction      Objective:     Communicated with nurse Conte prior to session.  Patient found HOB elevated with peripheral IV, telemetry, PureWick upon PT entry to room.     General Precautions: Standard, fall   Orthopedic Precautions:RLE weight bearing as tolerated  We have precert approval for the pt to go to [facility] today. [facility]pm .   Braces: Knee immobilizer     Functional Mobility:  · Bed Mobility:     · Supine to Sit: minimum assistance, moderate assistance and HOB raised  · Transfers:     · Sit to Stand:  minimum assistance and of 2 persons with rolling walker  · Bed to Chair: minimum assistance and of 2 persons with  rolling walker  using  Step Transfer  · Balance: fair      AM-PAC 6 CLICK MOBILITY  Turning over in bed (including adjusting bedclothes, sheets and blankets)?: 2  Sitting down on and standing up from a chair with arms (e.g., wheelchair, bedside commode, etc.): 3  Moving from lying on back to sitting on the side of the bed?: 2  Moving to and from a bed to a chair (including a wheelchair)?: 3  Need to walk in hospital room?: 2  Climbing 3-5 steps with a railing?: 1  Basic Mobility Total Score: 13       Therapeutic Activities and Exercises:   Supine AP's x 10 reps.    Patient left up in chair with all lines intact, call button in reach, chair alarm on and nurse Inés notified..    GOALS:   Multidisciplinary Problems     Physical Therapy Goals        Problem: Physical Therapy Goal    Goal Priority Disciplines Outcome Goal Variances Interventions   Physical Therapy Goal     PT, PT/OT Ongoing (interventions implemented as appropriate)     Description:  Goals to be met by: 2019     Patient will increase functional independence with mobility by performin. Supine to sit with Contact Guard Assistance  2. Sit to supine with Contact Guard Assistance  3. Sit to stand transfer with Stand-by Assistance  4. Bed to chair transfer with Contact Guard Assistance using Rolling Walker  5. Gait  x 150 feet with Contact Guard Assistance using Rolling Walker.   6. Lower extremity exercise program x10-20 reps per handout, with independence                      Time Tracking:     PT Received On: 19  PT Start Time: 1315     PT Stop Time: 1341  PT Total Time (min): 26 min     Billable Minutes: Therapeutic Activity 26    Treatment Type:  Treatment  PT/PTA: PT           Jazmyne Coats, PT  09/02/2019

## 2023-10-03 NOTE — PROGRESS NOTES
Physical Therapy Med Surg Daily Treatment Note  Facility/Department: [facility]  Room: Z9/T316-56       NAME: Bob Guthrie  : 1960 (59 y.o.)  MRN: [id number]  CODE STATUS: Full Code    Date of Service: 10/3/2023    Patient Diagnosis(es): Dizziness [R42]  Uncontrolled hypertension [I10]  Hypertensive emergency [I16.1]  Type 2 diabetes mellitus with hyperglycemia, without long-term current use of insulin [facility]) [E11.65]   Chief Complaint   Patient presents with    Hypertension     Patient Active Problem List    Diagnosis Date Noted    Cellulitis of right foot 2023    Diplopia 2023    Toothache 2023    Chronic systolic (congestive) heart failure 11/10/2022    Crush fracture of vertebra due to osteoporosis ([address]) 11/10/2022    Type 2 diabetes mellitus with diabetic neuropathy 11/10/2022    Diabetes mellitus type 2, insulin dependent ([address]) 11/10/2022    chf 2022    Ischemic cardiomyopathy 2022    Hypertensive urgency 2022    Intractable nausea and vomiting 2022    Contact with and (suspected) exposure to covid-19 2021    Hypokalemia 2021    Presence of aortocoronary bypass graft 2021    Altered mental status, unspecified 2021    Moderate persistent asthma without complication     Nasal congestion 2019    Headache 2018    Illness, unspecified 2018    Nausea 2018    Lumbosacral spondylosis without myelopathy 2016    Degeneration of lumbar intervertebral disc 2015    Low back pain 2015    Displacement of lumbar intervertebral disc without myelopathy 2012    Cryptogenic stroke ([address]) 2023    Impaired mobility and activities of daily living dt -Moderate sized acute, nonhemorrhagic infarct in the posterolateral left thalamus 2023    Aphasia 2023    Dysphagia, oropharyngeal phase 2023    Adjustment disorder with depressed mood 2023    Hypertensive emergency Chart Reviewed: Yes    Restrictions:  Restrictions/Precautions: Fall Risk    SUBJECTIVE:   Subjective: I can't do this. I don't know what wrong with me. (pt perseverating on weakness and not knowing whats wrong with him throughout tx required constant redirection.)    Pain  Pain: Denied pain pre and post session     OBJECTIVE:   Orientation  Orientation Level: Oriented to person;Oriented to place;Oriented to situation  Cognition  Arousal/Alertness: Appropriate responses to stimuli  Following Commands: Follows one step commands with repetition  Initiation: Requires cues for all  Sequencing: Requires cues for all  Cognition Comment: Poor awareness of R side, decreased direction following during mobility    Bed mobility  Rolling to Right: Moderate assistance;Minimal assistance  Supine to Sit: Maximum assistance;2 Person assistance  Sit to Supine: Maximum assistance;2 Person assistance  Bed Mobility Comments: Bed flat intermittent use of hand rails; hand over hand for sequencing and task completion. Poor seated balance. R sided weakness and impulsively grabs and pulls with L hand. Transfers  Sit to Stand: Moderate Assistance;2 Person Assistance;Maximum Assistance  Stand to Sit: Moderate Assistance;2 Person Assistance;Maximum Assistance  Comment: Heavy R leaning upon standing. Pt stands with Min A however required Mod/Max to stay in up right position. \"I can't do this, I don't know whats wrong with me. \"                        PT Exercises  Dynamic Sitting Balance Exercises: A/P and lateral trunk rocking with focus on midline posture; standing weight shifting. Activity Tolerance  Activity Tolerance: Patient tolerated treatment well;Patient limited by endurance; Patient limited by fatigue  Activity Tolerance Comments: minimal use of R side; Self limiting. ASSESSMENT   Assessment: Pt was self limiting this session requiring increased cuing to redirect. Pt stood x3 with R leaning and Mod/Max A. Discharge Recommendations:  Continue to assess pending progress         Goals  Long Term Goals  Long Term Goal 1: Patient will be min assist with bed mobility. Long Term Goal 2: Patient will be min assist with seated balance on EOB. Long Term Goal 3: Patient will be min assist with transfers. PLAN    General Plan: 3-5 times per week  Safety Devices  Type of Devices: All fall risk precautions in place, Bed alarm in place, Call light within reach, Left in bed, Nurse notified     [facility] (6 CLICK) [address] Mobility Raw Score : 9      Therapy Time   Individual   Time In 1043   Time Out 1106   Minutes 23      Bm/Trsf - 15 mins  Therex 8 mins       Sulema Kenyon PTA, 10/03/23 at 12:00 PM         Definitions for assistance levels  Independent = pt does not require any physical supervision or assistance from another person for activity completion. Device may be needed.   Stand by assistance = pt requires verbal cues or instructions from another person, close to but not touching, to perform the activity  Minimal assistance= pt performs 75% or more of the activity; assistance is required to complete the activity  Moderate assistance= pt performs 50% of the activity; assistance is required to complete the activity  Maximal assistance = pt performs 25% of the activity; assistance is required to complete the activity  Dependent = pt requires total physical assistance to accomplish the task

## 2023-10-04 ENCOUNTER — APPOINTMENT (OUTPATIENT)
Dept: CT IMAGING | Age: 63
DRG: 481 | End: 2023-10-04
Payer: MEDICARE

## 2023-10-04 ENCOUNTER — OFFICE VISIT (OUTPATIENT)
Dept: GERIATRIC MEDICINE | Age: 63
End: 2023-10-04
Payer: MEDICARE

## 2023-10-04 ENCOUNTER — HOSPITAL ENCOUNTER (INPATIENT)
Age: 63
LOS: 5 days | Discharge: HOME | DRG: 481 | End: 2023-10-09
Attending: EMERGENCY MEDICINE | Admitting: SURGERY
Payer: MEDICARE

## 2023-10-04 ENCOUNTER — APPOINTMENT (OUTPATIENT)
Dept: GENERAL RADIOLOGY | Age: 63
DRG: 481 | End: 2023-10-04
Payer: MEDICARE

## 2023-10-04 DIAGNOSIS — S72.145A CLOSED NONDISPLACED INTERTROCHANTERIC FRACTURE OF LEFT FEMUR, INITIAL ENCOUNTER (HCC): Primary | ICD-10-CM

## 2023-10-04 DIAGNOSIS — G89.11 ACUTE PAIN DUE TO TRAUMA: ICD-10-CM

## 2023-10-04 DIAGNOSIS — G11.8 EPISODIC ATAXIA WITH SLURRED SPEECH (HCC): ICD-10-CM

## 2023-10-04 DIAGNOSIS — S72.002A CLOSED LEFT HIP FRACTURE, INITIAL ENCOUNTER (HCC): Primary | ICD-10-CM

## 2023-10-04 DIAGNOSIS — G45.9 TIA (TRANSIENT ISCHEMIC ATTACK): ICD-10-CM

## 2023-10-04 DIAGNOSIS — R47.81 EPISODIC ATAXIA WITH SLURRED SPEECH (HCC): ICD-10-CM

## 2023-10-04 DIAGNOSIS — I63.9 CRYPTOGENIC STROKE (HCC): ICD-10-CM

## 2023-10-04 PROBLEM — S72.142A INTERTROCHANTERIC FRACTURE, CLOSED, LEFT, INITIAL ENCOUNTER (HCC): Status: ACTIVE | Noted: 2023-10-04

## 2023-10-04 LAB
ABO + RH BLD: NORMAL
ALBUMIN SERPL-MCNC: 3.6 G/DL (ref 3.5–4.6)
ALP SERPL-CCNC: 84 U/L (ref 35–104)
ALT SERPL-CCNC: 12 U/L (ref 0–41)
ANION GAP SERPL CALCULATED.3IONS-SCNC: 13 MEQ/L (ref 9–15)
AST SERPL-CCNC: 17 U/L (ref 0–40)
BASOPHILS # BLD: 0 K/UL (ref 0–0.2)
BASOPHILS NFR BLD: 0.4 %
BILIRUB SERPL-MCNC: 0.7 MG/DL (ref 0.2–0.7)
BLD GP AB SCN SERPL QL: NORMAL
BUN SERPL-MCNC: 24 MG/DL (ref 8–23)
CALCIUM SERPL-MCNC: 8.8 MG/DL (ref 8.5–9.9)
CHLORIDE SERPL-SCNC: 100 MEQ/L (ref 95–107)
CO2 SERPL-SCNC: 21 MEQ/L (ref 20–31)
CREAT SERPL-MCNC: 0.99 MG/DL (ref 0.7–1.2)
EOSINOPHIL # BLD: 0.4 K/UL (ref 0–0.7)
EOSINOPHIL NFR BLD: 4.4 %
ERYTHROCYTE [DISTWIDTH] IN BLOOD BY AUTOMATED COUNT: 12.5 % (ref 11.5–14.5)
ETHANOL PERCENT: NORMAL G/DL
ETHANOLAMINE SERPL-MCNC: <10 MG/DL (ref 0–0.08)
GLOBULIN SER CALC-MCNC: 2.4 G/DL (ref 2.3–3.5)
GLUCOSE BLD-MCNC: 107 MG/DL (ref 70–99)
GLUCOSE SERPL-MCNC: 127 MG/DL (ref 70–99)
HCT VFR BLD AUTO: 40.6 % (ref 42–52)
HGB BLD-MCNC: 13.6 G/DL (ref 14–18)
INR PPP: 1.2
LYMPHOCYTES # BLD: 0.5 K/UL (ref 1–4.8)
LYMPHOCYTES NFR BLD: 5.9 %
MCH RBC QN AUTO: 27.5 PG (ref 27–31.3)
MCHC RBC AUTO-ENTMCNC: 33.5 % (ref 33–37)
MCV RBC AUTO: 82.2 FL (ref 79–92.2)
MONOCYTES # BLD: 0.5 K/UL (ref 0.2–0.8)
MONOCYTES NFR BLD: 6.8 %
NEUTROPHILS # BLD: 6.5 K/UL (ref 1.4–6.5)
NEUTS SEG NFR BLD: 82.1 %
PERFORMED ON: ABNORMAL
PLATELET # BLD AUTO: 164 K/UL (ref 130–400)
POTASSIUM SERPL-SCNC: 4.3 MEQ/L (ref 3.4–4.9)
PROT SERPL-MCNC: 6 G/DL (ref 6.3–8)
PROTHROMBIN TIME: 15.1 SEC (ref 12.3–14.9)
RBC # BLD AUTO: 4.94 M/UL (ref 4.7–6.1)
SODIUM SERPL-SCNC: 134 MEQ/L (ref 135–144)
WBC # BLD AUTO: 7.9 K/UL (ref 4.8–10.8)

## 2023-10-04 PROCEDURE — 86850 RBC ANTIBODY SCREEN: CPT

## 2023-10-04 PROCEDURE — 2580000003 HC RX 258: Performed by: SURGERY

## 2023-10-04 PROCEDURE — 3017F COLORECTAL CA SCREEN DOC REV: CPT | Performed by: PHYSICIAN ASSISTANT

## 2023-10-04 PROCEDURE — 86901 BLOOD TYPING SEROLOGIC RH(D): CPT

## 2023-10-04 PROCEDURE — 1036F TOBACCO NON-USER: CPT | Performed by: PHYSICIAN ASSISTANT

## 2023-10-04 PROCEDURE — 71045 X-RAY EXAM CHEST 1 VIEW: CPT

## 2023-10-04 PROCEDURE — 6360000002 HC RX W HCPCS: Performed by: EMERGENCY MEDICINE

## 2023-10-04 PROCEDURE — 96375 TX/PRO/DX INJ NEW DRUG ADDON: CPT

## 2023-10-04 PROCEDURE — 93005 ELECTROCARDIOGRAM TRACING: CPT | Performed by: SURGERY

## 2023-10-04 PROCEDURE — G8484 FLU IMMUNIZE NO ADMIN: HCPCS | Performed by: PHYSICIAN ASSISTANT

## 2023-10-04 PROCEDURE — 6370000000 HC RX 637 (ALT 250 FOR IP): Performed by: SURGERY

## 2023-10-04 PROCEDURE — 99349 HOME/RES VST EST MOD MDM 40: CPT | Performed by: PHYSICIAN ASSISTANT

## 2023-10-04 PROCEDURE — 85610 PROTHROMBIN TIME: CPT

## 2023-10-04 PROCEDURE — 86900 BLOOD TYPING SEROLOGIC ABO: CPT

## 2023-10-04 PROCEDURE — 99285 EMERGENCY DEPT VISIT HI MDM: CPT

## 2023-10-04 PROCEDURE — 80053 COMPREHEN METABOLIC PANEL: CPT

## 2023-10-04 PROCEDURE — G8417 CALC BMI ABV UP PARAM F/U: HCPCS | Performed by: PHYSICIAN ASSISTANT

## 2023-10-04 PROCEDURE — 96374 THER/PROPH/DIAG INJ IV PUSH: CPT

## 2023-10-04 PROCEDURE — 36415 COLL VENOUS BLD VENIPUNCTURE: CPT

## 2023-10-04 PROCEDURE — 73700 CT LOWER EXTREMITY W/O DYE: CPT

## 2023-10-04 PROCEDURE — 73552 X-RAY EXAM OF FEMUR 2/>: CPT

## 2023-10-04 PROCEDURE — 1210000000 HC MED SURG R&B

## 2023-10-04 PROCEDURE — 85025 COMPLETE CBC W/AUTO DIFF WBC: CPT

## 2023-10-04 PROCEDURE — 99222 1ST HOSP IP/OBS MODERATE 55: CPT | Performed by: SURGERY

## 2023-10-04 PROCEDURE — 82077 ASSAY SPEC XCP UR&BREATH IA: CPT

## 2023-10-04 RX ORDER — ONDANSETRON 2 MG/ML
4 INJECTION INTRAMUSCULAR; INTRAVENOUS EVERY 6 HOURS PRN
Status: DISCONTINUED | OUTPATIENT
Start: 2023-10-04 | End: 2023-10-09 | Stop reason: HOSPADM

## 2023-10-04 RX ORDER — MORPHINE SULFATE 4 MG/ML
4 INJECTION, SOLUTION INTRAMUSCULAR; INTRAVENOUS ONCE
Status: COMPLETED | OUTPATIENT
Start: 2023-10-04 | End: 2023-10-04

## 2023-10-04 RX ORDER — OXYCODONE HYDROCHLORIDE 5 MG/1
10 TABLET ORAL EVERY 4 HOURS PRN
Status: DISCONTINUED | OUTPATIENT
Start: 2023-10-04 | End: 2023-10-09 | Stop reason: HOSPADM

## 2023-10-04 RX ORDER — SODIUM CHLORIDE 9 MG/ML
INJECTION, SOLUTION INTRAVENOUS PRN
Status: DISCONTINUED | OUTPATIENT
Start: 2023-10-04 | End: 2023-10-09 | Stop reason: HOSPADM

## 2023-10-04 RX ORDER — INSULIN LISPRO 100 [IU]/ML
0-8 INJECTION, SOLUTION INTRAVENOUS; SUBCUTANEOUS
Status: DISCONTINUED | OUTPATIENT
Start: 2023-10-04 | End: 2023-10-09 | Stop reason: HOSPADM

## 2023-10-04 RX ORDER — HYDROMORPHONE HYDROCHLORIDE 1 MG/ML
0.5 INJECTION, SOLUTION INTRAMUSCULAR; INTRAVENOUS; SUBCUTANEOUS
Status: DISCONTINUED | OUTPATIENT
Start: 2023-10-04 | End: 2023-10-06

## 2023-10-04 RX ORDER — SODIUM CHLORIDE 0.9 % (FLUSH) 0.9 %
5-40 SYRINGE (ML) INJECTION EVERY 12 HOURS SCHEDULED
Status: DISCONTINUED | OUTPATIENT
Start: 2023-10-04 | End: 2023-10-09 | Stop reason: HOSPADM

## 2023-10-04 RX ORDER — SODIUM CHLORIDE 0.9 % (FLUSH) 0.9 %
5-40 SYRINGE (ML) INJECTION PRN
Status: DISCONTINUED | OUTPATIENT
Start: 2023-10-04 | End: 2023-10-09 | Stop reason: HOSPADM

## 2023-10-04 RX ORDER — SENNA AND DOCUSATE SODIUM 50; 8.6 MG/1; MG/1
1 TABLET, FILM COATED ORAL 2 TIMES DAILY
Status: DISCONTINUED | OUTPATIENT
Start: 2023-10-04 | End: 2023-10-09 | Stop reason: HOSPADM

## 2023-10-04 RX ORDER — ACETAMINOPHEN 325 MG/1
650 TABLET ORAL EVERY 6 HOURS
Status: DISCONTINUED | OUTPATIENT
Start: 2023-10-04 | End: 2023-10-09 | Stop reason: HOSPADM

## 2023-10-04 RX ORDER — ONDANSETRON 4 MG/1
4 TABLET, ORALLY DISINTEGRATING ORAL EVERY 8 HOURS PRN
Status: DISCONTINUED | OUTPATIENT
Start: 2023-10-04 | End: 2023-10-09 | Stop reason: HOSPADM

## 2023-10-04 RX ORDER — ONDANSETRON 2 MG/ML
4 INJECTION INTRAMUSCULAR; INTRAVENOUS ONCE
Status: COMPLETED | OUTPATIENT
Start: 2023-10-04 | End: 2023-10-04

## 2023-10-04 RX ORDER — INSULIN LISPRO 100 [IU]/ML
0-4 INJECTION, SOLUTION INTRAVENOUS; SUBCUTANEOUS NIGHTLY
Status: DISCONTINUED | OUTPATIENT
Start: 2023-10-04 | End: 2023-10-09 | Stop reason: HOSPADM

## 2023-10-04 RX ORDER — OXYCODONE HYDROCHLORIDE 5 MG/1
5 TABLET ORAL EVERY 4 HOURS PRN
Status: DISCONTINUED | OUTPATIENT
Start: 2023-10-04 | End: 2023-10-09 | Stop reason: HOSPADM

## 2023-10-04 RX ADMIN — SENNOSIDES AND DOCUSATE SODIUM 1 TABLET: 50; 8.6 TABLET ORAL at 22:00

## 2023-10-04 RX ADMIN — Medication 10 ML: at 22:03

## 2023-10-04 RX ADMIN — MORPHINE SULFATE 4 MG: 4 INJECTION INTRAVENOUS at 18:33

## 2023-10-04 RX ADMIN — ACETAMINOPHEN 650 MG: 325 TABLET ORAL at 21:59

## 2023-10-04 RX ADMIN — ONDANSETRON 4 MG: 2 INJECTION INTRAMUSCULAR; INTRAVENOUS at 18:31

## 2023-10-04 ASSESSMENT — PAIN DESCRIPTION - LOCATION
LOCATION: HIP
LOCATION: HIP

## 2023-10-04 ASSESSMENT — PAIN SCALES - GENERAL
PAINLEVEL_OUTOF10: 5
PAINLEVEL_OUTOF10: 10

## 2023-10-04 ASSESSMENT — PATIENT HEALTH QUESTIONNAIRE - PHQ9
SUM OF ALL RESPONSES TO PHQ QUESTIONS 1-9: 0
SUM OF ALL RESPONSES TO PHQ QUESTIONS 1-9: 0
SUM OF ALL RESPONSES TO PHQ9 QUESTIONS 1 & 2: 0
SUM OF ALL RESPONSES TO PHQ QUESTIONS 1-9: 0
2. FEELING DOWN, DEPRESSED OR HOPELESS: 0
SUM OF ALL RESPONSES TO PHQ QUESTIONS 1-9: 0
1. LITTLE INTEREST OR PLEASURE IN DOING THINGS: 0

## 2023-10-04 ASSESSMENT — ENCOUNTER SYMPTOMS
PHOTOPHOBIA: 0
VOMITING: 0
CHEST TIGHTNESS: 0
EYE DISCHARGE: 0
SORE THROAT: 0
WHEEZING: 0
SHORTNESS OF BREATH: 0
BACK PAIN: 0
ABDOMINAL DISTENTION: 0
COUGH: 0
ABDOMINAL PAIN: 0

## 2023-10-04 ASSESSMENT — PAIN DESCRIPTION - ORIENTATION
ORIENTATION: LEFT
ORIENTATION: LEFT

## 2023-10-04 ASSESSMENT — PAIN DESCRIPTION - DESCRIPTORS
DESCRIPTORS: SHARP
DESCRIPTORS: ACHING

## 2023-10-04 ASSESSMENT — PAIN - FUNCTIONAL ASSESSMENT: PAIN_FUNCTIONAL_ASSESSMENT: NONE - DENIES PAIN

## 2023-10-04 NOTE — ED NOTES
Page to [facility] ortho @ 3023  Dr Viridiana Stack returned call @ [address] [place], [place]  10/04/23 5267

## 2023-10-04 NOTE — ED TRIAGE NOTES
Pt presents to ER from [facility] with a fall that occurred around 0400, pt did not have LOC and is on blood thinners. Pt has a left femur fracture per xray done at facility. Pt was given 50 mcg of fentanyl in route. Pt stated, \"the paramedics broke my leg this morning when they transferred me over this morning\". Pt is A&Ox4 per assessment even though he has a h/o dementia. Pt is stable at this time with no pain.

## 2023-10-04 NOTE — ED PROVIDER NOTES
[facility] ED  eMERGENCY dEPARTMENT eNCOUnter      Pt Name: Jordy Jones  MRN: [id number]  [address] [facility] 1960  Date of evaluation: 10/4/2023  Provider: Spencer Israel MD    CHIEF COMPLAINT       Chief Complaint   Patient presents with    Fall         HISTORY OF PRESENT ILLNESS   (Location/Symptom, Timing/Onset,Context/Setting, Quality, Duration, Modifying Factors, Severity)  Note limiting factors. Jordy Jones is a 59 y.o. male who presents to the emergency department for left femur fracture after a fall this morning at the nursing home. Patient is a resident of the nursing home for prior stroke and altered mental status from that and dementia. He is awake alert able to answer questions and states he fell this morning at the nursing home. I have not seen the x-rays and they do not have copies of them but apparently he broke his left femur. HPI    NursingNotes were reviewed. REVIEW OF SYSTEMS    (2-9 systems for level 4, 10 or more for level 5)     Review of Systems   Constitutional:  Negative for chills and diaphoresis. HENT:  Negative for congestion, ear pain, mouth sores and sore throat. Eyes:  Negative for photophobia and discharge. Respiratory:  Negative for cough, chest tightness, shortness of breath and wheezing. Cardiovascular:  Negative for chest pain and palpitations. Gastrointestinal:  Negative for abdominal distention, abdominal pain and vomiting. Endocrine: Negative for cold intolerance. Genitourinary:  Negative for difficulty urinating. Musculoskeletal:  Positive for gait problem. Negative for arthralgias and back pain. Skin:  Negative for pallor and rash. Allergic/Immunologic: Negative for immunocompromised state. Neurological:  Negative for dizziness and syncope. Hematological:  Negative for adenopathy. Psychiatric/Behavioral:  Negative for agitation and hallucinations. All other systems reviewed and are negative.       Except as noted above the

## 2023-10-04 NOTE — PROGRESS NOTES
Physical Therapy  Facility/Department: [facility] MED SURG S440/O080-10  Physical Therapy Discharge      NAME: Jamin Peng    : 1960 (59 y.o.)  MRN: [id number]    Account: [de-identified]  Gender: male      Patient has been discharged from acute care hospital. DC patient from current PT program.      Electronically signed by Linda Prakash PT on 10/4/23 at 4:02 PM EDT

## 2023-10-04 NOTE — H&P
Phuong Coates R, DO   insulin glargine (LANTUS) 100 UNIT/ML injection vial Inject 20 Units into the skin nightly  Patient taking differently: Inject 20 Units into the skin nightly Indications: Diabetes 10/2/23   Htelma Point R, DO   insulin lispro (HUMALOG) 100 UNIT/ML SOLN injection vial Inject 4 Units into the skin 3 times daily (with meals)  Patient taking differently: Inject 4 Units into the skin 3 times daily (with meals) Indications: Diabetes 10/2/23   Thelma Point R, DO   sertraline (ZOLOFT) 50 MG tablet Take 1 tablet by mouth daily  Patient taking differently: Take 1 tablet by mouth daily Indications: Depression 10/2/23   Thelma Point R, DO   apixaban (ELIQUIS) 5 MG TABS tablet Take 1 tablet by mouth 2 times daily  Patient taking differently: Take 1 tablet by mouth 2 times daily Indications:  Thickening and Hardening of Heart Arteries 10/2/23   Phuong Coates R, DO   sacubitril-valsartan (ENTRESTO) 24-26 MG per tablet Take 1 tablet by mouth 2 times daily Indications: Congestive Heart Failure    ProviderClaude MD   tiZANidine (ZANAFLEX) 4 MG tablet Take 1 tablet by mouth every 8 hours as needed Indications: Muscle Spasm    ProviderClaude MD   atorvastatin (LIPITOR) 20 MG tablet Take 1 tablet by mouth daily  Patient taking differently: Take 1 tablet by mouth nightly Indications: High Amount of Fats in the Blood 8/11/23   Cody Peters MD   Handicap Placcarol [place] by Does not apply route He needs a placard for 5 years  Expires 8/2/28  He is unable to walk 200 feet without stopping  Dx back pain 8/2/23   Cody Peters MD   BREO ELLIPTA 100-25 MCG/ACT inhaler 1 puff daily Indications: Asthma 6/9/23   Claude Moreno MD   glucagon 1 MG injection Inject 1 mg into the muscle as needed 4/30/23   Claude Moreno MD   ergocalciferol (ERGOCALCIFEROL) 1.25 MG (29800 UT) capsule Take 1 capsule by mouth once a week Indications: Vitamin D Deficiency 12/21/22   Claude Moreno MD

## 2023-10-04 NOTE — ED NOTES
Sister Spencer Trinidad called would like the doctor or nurse call her concerning patient and surgery     Carline Laird  10/04/23 1917

## 2023-10-05 ENCOUNTER — ANESTHESIA (OUTPATIENT)
Dept: OPERATING ROOM | Age: 63
End: 2023-10-05
Payer: MEDICARE

## 2023-10-05 ENCOUNTER — ANESTHESIA EVENT (OUTPATIENT)
Dept: OPERATING ROOM | Age: 63
End: 2023-10-05
Payer: MEDICARE

## 2023-10-05 ENCOUNTER — APPOINTMENT (OUTPATIENT)
Dept: GENERAL RADIOLOGY | Age: 63
DRG: 481 | End: 2023-10-05
Payer: MEDICARE

## 2023-10-05 PROBLEM — S72.145A CLOSED NONDISPLACED INTERTROCHANTERIC FRACTURE OF LEFT FEMUR (HCC): Status: ACTIVE | Noted: 2023-10-04

## 2023-10-05 LAB
ANION GAP SERPL CALCULATED.3IONS-SCNC: 10 MEQ/L (ref 9–15)
BASOPHILS # BLD: 0 K/UL (ref 0–0.2)
BASOPHILS NFR BLD: 0.6 %
BUN SERPL-MCNC: 25 MG/DL (ref 8–23)
CALCIUM SERPL-MCNC: 8.9 MG/DL (ref 8.5–9.9)
CHLORIDE SERPL-SCNC: 100 MEQ/L (ref 95–107)
CO2 SERPL-SCNC: 24 MEQ/L (ref 20–31)
CREAT SERPL-MCNC: 1.12 MG/DL (ref 0.7–1.2)
EOSINOPHIL # BLD: 0.6 K/UL (ref 0–0.7)
EOSINOPHIL NFR BLD: 8.7 %
ERYTHROCYTE [DISTWIDTH] IN BLOOD BY AUTOMATED COUNT: 12.6 % (ref 11.5–14.5)
GLUCOSE BLD-MCNC: 103 MG/DL (ref 70–99)
GLUCOSE BLD-MCNC: 107 MG/DL (ref 70–99)
GLUCOSE BLD-MCNC: 139 MG/DL (ref 70–99)
GLUCOSE BLD-MCNC: 153 MG/DL (ref 70–99)
GLUCOSE SERPL-MCNC: 117 MG/DL (ref 70–99)
HCT VFR BLD AUTO: 39.5 % (ref 42–52)
HGB BLD-MCNC: 13 G/DL (ref 14–18)
LYMPHOCYTES # BLD: 0.6 K/UL (ref 1–4.8)
LYMPHOCYTES NFR BLD: 8.2 %
MCH RBC QN AUTO: 27.4 PG (ref 27–31.3)
MCHC RBC AUTO-ENTMCNC: 32.9 % (ref 33–37)
MCV RBC AUTO: 83.3 FL (ref 79–92.2)
MONOCYTES # BLD: 0.6 K/UL (ref 0.2–0.8)
MONOCYTES NFR BLD: 8.7 %
NEUTROPHILS # BLD: 5 K/UL (ref 1.4–6.5)
NEUTS SEG NFR BLD: 73.5 %
PERFORMED ON: ABNORMAL
PLATELET # BLD AUTO: 150 K/UL (ref 130–400)
POTASSIUM SERPL-SCNC: 4.2 MEQ/L (ref 3.4–4.9)
RBC # BLD AUTO: 4.74 M/UL (ref 4.7–6.1)
SODIUM SERPL-SCNC: 134 MEQ/L (ref 135–144)
WBC # BLD AUTO: 6.9 K/UL (ref 4.8–10.8)

## 2023-10-05 PROCEDURE — 1210000000 HC MED SURG R&B

## 2023-10-05 PROCEDURE — 0QS736Z REPOSITION LEFT UPPER FEMUR WITH INTRAMEDULLARY INTERNAL FIXATION DEVICE, PERCUTANEOUS APPROACH: ICD-10-PCS | Performed by: STUDENT IN AN ORGANIZED HEALTH CARE EDUCATION/TRAINING PROGRAM

## 2023-10-05 PROCEDURE — 2720000010 HC SURG SUPPLY STERILE: Performed by: STUDENT IN AN ORGANIZED HEALTH CARE EDUCATION/TRAINING PROGRAM

## 2023-10-05 PROCEDURE — 85025 COMPLETE CBC W/AUTO DIFF WBC: CPT

## 2023-10-05 PROCEDURE — 6360000002 HC RX W HCPCS: Performed by: STUDENT IN AN ORGANIZED HEALTH CARE EDUCATION/TRAINING PROGRAM

## 2023-10-05 PROCEDURE — 2580000003 HC RX 258: Performed by: STUDENT IN AN ORGANIZED HEALTH CARE EDUCATION/TRAINING PROGRAM

## 2023-10-05 PROCEDURE — 3700000000 HC ANESTHESIA ATTENDED CARE: Performed by: STUDENT IN AN ORGANIZED HEALTH CARE EDUCATION/TRAINING PROGRAM

## 2023-10-05 PROCEDURE — 99223 1ST HOSP IP/OBS HIGH 75: CPT | Performed by: PSYCHIATRY & NEUROLOGY

## 2023-10-05 PROCEDURE — 2580000003 HC RX 258: Performed by: REGISTERED NURSE

## 2023-10-05 PROCEDURE — 27245 TREAT THIGH FRACTURE: CPT | Performed by: STUDENT IN AN ORGANIZED HEALTH CARE EDUCATION/TRAINING PROGRAM

## 2023-10-05 PROCEDURE — 2580000003 HC RX 258: Performed by: SURGERY

## 2023-10-05 PROCEDURE — 80048 BASIC METABOLIC PNL TOTAL CA: CPT

## 2023-10-05 PROCEDURE — 94761 N-INVAS EAR/PLS OXIMETRY MLT: CPT

## 2023-10-05 PROCEDURE — 36415 COLL VENOUS BLD VENIPUNCTURE: CPT

## 2023-10-05 PROCEDURE — APPSS45 APP SPLIT SHARED TIME 31-45 MINUTES: Performed by: NURSE PRACTITIONER

## 2023-10-05 PROCEDURE — 3600000004 HC SURGERY LEVEL 4 BASE: Performed by: STUDENT IN AN ORGANIZED HEALTH CARE EDUCATION/TRAINING PROGRAM

## 2023-10-05 PROCEDURE — 6360000002 HC RX W HCPCS: Performed by: REGISTERED NURSE

## 2023-10-05 PROCEDURE — 7100000000 HC PACU RECOVERY - FIRST 15 MIN: Performed by: STUDENT IN AN ORGANIZED HEALTH CARE EDUCATION/TRAINING PROGRAM

## 2023-10-05 PROCEDURE — 6370000000 HC RX 637 (ALT 250 FOR IP): Performed by: STUDENT IN AN ORGANIZED HEALTH CARE EDUCATION/TRAINING PROGRAM

## 2023-10-05 PROCEDURE — 6370000000 HC RX 637 (ALT 250 FOR IP): Performed by: SURGERY

## 2023-10-05 PROCEDURE — C1769 GUIDE WIRE: HCPCS | Performed by: STUDENT IN AN ORGANIZED HEALTH CARE EDUCATION/TRAINING PROGRAM

## 2023-10-05 PROCEDURE — 2500000003 HC RX 250 WO HCPCS: Performed by: REGISTERED NURSE

## 2023-10-05 PROCEDURE — 3700000001 HC ADD 15 MINUTES (ANESTHESIA): Performed by: STUDENT IN AN ORGANIZED HEALTH CARE EDUCATION/TRAINING PROGRAM

## 2023-10-05 PROCEDURE — 3600000014 HC SURGERY LEVEL 4 ADDTL 15MIN: Performed by: STUDENT IN AN ORGANIZED HEALTH CARE EDUCATION/TRAINING PROGRAM

## 2023-10-05 PROCEDURE — 2709999900 HC NON-CHARGEABLE SUPPLY: Performed by: STUDENT IN AN ORGANIZED HEALTH CARE EDUCATION/TRAINING PROGRAM

## 2023-10-05 PROCEDURE — C1713 ANCHOR/SCREW BN/BN,TIS/BN: HCPCS | Performed by: STUDENT IN AN ORGANIZED HEALTH CARE EDUCATION/TRAINING PROGRAM

## 2023-10-05 PROCEDURE — 94640 AIRWAY INHALATION TREATMENT: CPT

## 2023-10-05 PROCEDURE — 99232 SBSQ HOSP IP/OBS MODERATE 35: CPT | Performed by: SURGERY

## 2023-10-05 PROCEDURE — 7100000001 HC PACU RECOVERY - ADDTL 15 MIN: Performed by: STUDENT IN AN ORGANIZED HEALTH CARE EDUCATION/TRAINING PROGRAM

## 2023-10-05 DEVICE — IMPLANTABLE DEVICE: Type: IMPLANTABLE DEVICE | Site: HIP | Status: FUNCTIONAL

## 2023-10-05 DEVICE — SCREW BNE L95MM DIA10.35MM PROX FEM G TI CANN FOR TFN ADV: Type: IMPLANTABLE DEVICE | Site: HIP | Status: FUNCTIONAL

## 2023-10-05 DEVICE — SCREW BNE L36MM DIA5MM TIB LT GRN TI ST CANN LOK FULL THRD: Type: IMPLANTABLE DEVICE | Site: HIP | Status: FUNCTIONAL

## 2023-10-05 RX ORDER — CARVEDILOL 12.5 MG/1
12.5 TABLET ORAL 2 TIMES DAILY
Status: DISCONTINUED | OUTPATIENT
Start: 2023-10-05 | End: 2023-10-09 | Stop reason: HOSPADM

## 2023-10-05 RX ORDER — ATORVASTATIN CALCIUM 20 MG/1
20 TABLET, FILM COATED ORAL NIGHTLY
Status: DISCONTINUED | OUTPATIENT
Start: 2023-10-05 | End: 2023-10-09 | Stop reason: HOSPADM

## 2023-10-05 RX ORDER — HYDRALAZINE HYDROCHLORIDE 25 MG/1
25 TABLET, FILM COATED ORAL 2 TIMES DAILY
Status: DISCONTINUED | OUTPATIENT
Start: 2023-10-05 | End: 2023-10-09 | Stop reason: HOSPADM

## 2023-10-05 RX ORDER — SODIUM CHLORIDE 9 MG/ML
INJECTION, SOLUTION INTRAVENOUS PRN
Status: DISCONTINUED | OUTPATIENT
Start: 2023-10-05 | End: 2023-10-05 | Stop reason: HOSPADM

## 2023-10-05 RX ORDER — INSULIN GLARGINE 100 [IU]/ML
10 INJECTION, SOLUTION SUBCUTANEOUS NIGHTLY
Status: DISCONTINUED | OUTPATIENT
Start: 2023-10-05 | End: 2023-10-09 | Stop reason: HOSPADM

## 2023-10-05 RX ORDER — ONDANSETRON 2 MG/ML
4 INJECTION INTRAMUSCULAR; INTRAVENOUS
Status: DISCONTINUED | OUTPATIENT
Start: 2023-10-05 | End: 2023-10-05 | Stop reason: HOSPADM

## 2023-10-05 RX ORDER — LIDOCAINE HYDROCHLORIDE 10 MG/ML
INJECTION, SOLUTION EPIDURAL; INFILTRATION; INTRACAUDAL; PERINEURAL PRN
Status: DISCONTINUED | OUTPATIENT
Start: 2023-10-05 | End: 2023-10-05 | Stop reason: SDUPTHER

## 2023-10-05 RX ORDER — FENTANYL CITRATE 0.05 MG/ML
50 INJECTION, SOLUTION INTRAMUSCULAR; INTRAVENOUS EVERY 10 MIN PRN
Status: DISCONTINUED | OUTPATIENT
Start: 2023-10-05 | End: 2023-10-05 | Stop reason: HOSPADM

## 2023-10-05 RX ORDER — ALBUTEROL SULFATE 90 UG/1
2 AEROSOL, METERED RESPIRATORY (INHALATION) EVERY 4 HOURS PRN
Status: DISCONTINUED | OUTPATIENT
Start: 2023-10-05 | End: 2023-10-09 | Stop reason: HOSPADM

## 2023-10-05 RX ORDER — METOCLOPRAMIDE HYDROCHLORIDE 5 MG/ML
10 INJECTION INTRAMUSCULAR; INTRAVENOUS
Status: DISCONTINUED | OUTPATIENT
Start: 2023-10-05 | End: 2023-10-05 | Stop reason: HOSPADM

## 2023-10-05 RX ORDER — DIPHENHYDRAMINE HYDROCHLORIDE 50 MG/ML
12.5 INJECTION INTRAMUSCULAR; INTRAVENOUS
Status: DISCONTINUED | OUTPATIENT
Start: 2023-10-05 | End: 2023-10-05 | Stop reason: HOSPADM

## 2023-10-05 RX ORDER — FENTANYL CITRATE 50 UG/ML
INJECTION, SOLUTION INTRAMUSCULAR; INTRAVENOUS PRN
Status: DISCONTINUED | OUTPATIENT
Start: 2023-10-05 | End: 2023-10-05 | Stop reason: SDUPTHER

## 2023-10-05 RX ORDER — SODIUM CHLORIDE 0.9 % (FLUSH) 0.9 %
5-40 SYRINGE (ML) INJECTION PRN
Status: DISCONTINUED | OUTPATIENT
Start: 2023-10-05 | End: 2023-10-05 | Stop reason: HOSPADM

## 2023-10-05 RX ORDER — OXYCODONE HYDROCHLORIDE 5 MG/1
5 TABLET ORAL
Status: DISCONTINUED | OUTPATIENT
Start: 2023-10-05 | End: 2023-10-05 | Stop reason: HOSPADM

## 2023-10-05 RX ORDER — BUDESONIDE AND FORMOTEROL FUMARATE DIHYDRATE 80; 4.5 UG/1; UG/1
2 AEROSOL RESPIRATORY (INHALATION)
Status: DISCONTINUED | OUTPATIENT
Start: 2023-10-05 | End: 2023-10-09 | Stop reason: HOSPADM

## 2023-10-05 RX ORDER — EPHEDRINE SULFATE/0.9% NACL/PF 50 MG/5 ML
SYRINGE (ML) INTRAVENOUS PRN
Status: DISCONTINUED | OUTPATIENT
Start: 2023-10-05 | End: 2023-10-05 | Stop reason: SDUPTHER

## 2023-10-05 RX ORDER — ALBUTEROL SULFATE 90 UG/1
2 AEROSOL, METERED RESPIRATORY (INHALATION) 2 TIMES DAILY
Status: DISCONTINUED | OUTPATIENT
Start: 2023-10-05 | End: 2023-10-09 | Stop reason: HOSPADM

## 2023-10-05 RX ORDER — SODIUM CHLORIDE, SODIUM LACTATE, POTASSIUM CHLORIDE, CALCIUM CHLORIDE 600; 310; 30; 20 MG/100ML; MG/100ML; MG/100ML; MG/100ML
INJECTION, SOLUTION INTRAVENOUS CONTINUOUS PRN
Status: DISCONTINUED | OUTPATIENT
Start: 2023-10-05 | End: 2023-10-05 | Stop reason: SDUPTHER

## 2023-10-05 RX ORDER — PROPOFOL 10 MG/ML
INJECTION, EMULSION INTRAVENOUS PRN
Status: DISCONTINUED | OUTPATIENT
Start: 2023-10-05 | End: 2023-10-05 | Stop reason: SDUPTHER

## 2023-10-05 RX ORDER — SODIUM CHLORIDE 0.9 % (FLUSH) 0.9 %
5-40 SYRINGE (ML) INJECTION EVERY 12 HOURS SCHEDULED
Status: DISCONTINUED | OUTPATIENT
Start: 2023-10-05 | End: 2023-10-05 | Stop reason: HOSPADM

## 2023-10-05 RX ORDER — ALBUTEROL SULFATE 90 UG/1
2 AEROSOL, METERED RESPIRATORY (INHALATION) EVERY 6 HOURS PRN
Status: DISCONTINUED | OUTPATIENT
Start: 2023-10-05 | End: 2023-10-05

## 2023-10-05 RX ORDER — MEPERIDINE HYDROCHLORIDE 25 MG/ML
12.5 INJECTION INTRAMUSCULAR; INTRAVENOUS; SUBCUTANEOUS
Status: DISCONTINUED | OUTPATIENT
Start: 2023-10-05 | End: 2023-10-05 | Stop reason: HOSPADM

## 2023-10-05 RX ORDER — TRANEXAMIC ACID 650 MG/1
1300 TABLET ORAL ONCE
Status: COMPLETED | OUTPATIENT
Start: 2023-10-05 | End: 2023-10-05

## 2023-10-05 RX ADMIN — Medication 10 ML: at 20:50

## 2023-10-05 RX ADMIN — PHENYLEPHRINE HYDROCHLORIDE 100 MCG: 10 INJECTION INTRAVENOUS at 15:07

## 2023-10-05 RX ADMIN — Medication 25 MG: at 15:11

## 2023-10-05 RX ADMIN — CARVEDILOL 12.5 MG: 12.5 TABLET, FILM COATED ORAL at 12:02

## 2023-10-05 RX ADMIN — PHENYLEPHRINE HYDROCHLORIDE 100 MCG: 10 INJECTION INTRAVENOUS at 15:09

## 2023-10-05 RX ADMIN — ONDANSETRON 4 MG: 2 INJECTION INTRAMUSCULAR; INTRAVENOUS at 20:58

## 2023-10-05 RX ADMIN — LIDOCAINE HYDROCHLORIDE 100 MG: 10 INJECTION, SOLUTION EPIDURAL; INFILTRATION; INTRACAUDAL; PERINEURAL at 15:03

## 2023-10-05 RX ADMIN — FENTANYL CITRATE 100 MCG: 50 INJECTION, SOLUTION INTRAMUSCULAR; INTRAVENOUS at 15:04

## 2023-10-05 RX ADMIN — SODIUM CHLORIDE, POTASSIUM CHLORIDE, SODIUM LACTATE AND CALCIUM CHLORIDE: 600; 310; 30; 20 INJECTION, SOLUTION INTRAVENOUS at 15:21

## 2023-10-05 RX ADMIN — CEFAZOLIN 2000 MG: 2 INJECTION, POWDER, FOR SOLUTION INTRAMUSCULAR; INTRAVENOUS at 23:10

## 2023-10-05 RX ADMIN — OXYCODONE HYDROCHLORIDE 10 MG: 5 TABLET ORAL at 22:12

## 2023-10-05 RX ADMIN — BUDESONIDE AND FORMOTEROL FUMARATE DIHYDRATE 2 PUFF: 80; 4.5 AEROSOL RESPIRATORY (INHALATION) at 07:54

## 2023-10-05 RX ADMIN — OXYCODONE HYDROCHLORIDE 10 MG: 5 TABLET ORAL at 17:32

## 2023-10-05 RX ADMIN — BUDESONIDE AND FORMOTEROL FUMARATE DIHYDRATE 2 PUFF: 80; 4.5 AEROSOL RESPIRATORY (INHALATION) at 21:15

## 2023-10-05 RX ADMIN — PHENYLEPHRINE HYDROCHLORIDE 100 MCG: 10 INJECTION INTRAVENOUS at 15:05

## 2023-10-05 RX ADMIN — TRANEXAMIC ACID 1300 MG: 650 TABLET ORAL at 14:57

## 2023-10-05 RX ADMIN — SODIUM CHLORIDE, POTASSIUM CHLORIDE, SODIUM LACTATE AND CALCIUM CHLORIDE: 600; 310; 30; 20 INJECTION, SOLUTION INTRAVENOUS at 15:01

## 2023-10-05 RX ADMIN — CEFAZOLIN 2000 MG: 2 INJECTION, POWDER, FOR SOLUTION INTRAMUSCULAR; INTRAVENOUS at 15:17

## 2023-10-05 RX ADMIN — ACETAMINOPHEN 650 MG: 325 TABLET ORAL at 20:49

## 2023-10-05 RX ADMIN — PROPOFOL 200 MG: 10 INJECTION, EMULSION INTRAVENOUS at 15:03

## 2023-10-05 RX ADMIN — Medication 10 ML: at 09:00

## 2023-10-05 RX ADMIN — ALBUTEROL SULFATE 2 PUFF: 90 AEROSOL, METERED RESPIRATORY (INHALATION) at 07:54

## 2023-10-05 RX ADMIN — ALBUTEROL SULFATE 2 PUFF: 90 AEROSOL, METERED RESPIRATORY (INHALATION) at 21:15

## 2023-10-05 RX ADMIN — SENNOSIDES AND DOCUSATE SODIUM 1 TABLET: 50; 8.6 TABLET ORAL at 20:49

## 2023-10-05 RX ADMIN — ATORVASTATIN CALCIUM 20 MG: 20 TABLET, FILM COATED ORAL at 20:49

## 2023-10-05 ASSESSMENT — PAIN SCALES - GENERAL
PAINLEVEL_OUTOF10: 0
PAINLEVEL_OUTOF10: 8
PAINLEVEL_OUTOF10: 0
PAINLEVEL_OUTOF10: 10
PAINLEVEL_OUTOF10: 0

## 2023-10-05 ASSESSMENT — ENCOUNTER SYMPTOMS
COLOR CHANGE: 0
NAUSEA: 0
COUGH: 0
SHORTNESS OF BREATH: 1
SHORTNESS OF BREATH: 0
CHEST TIGHTNESS: 0
VOMITING: 0
TROUBLE SWALLOWING: 0
WHEEZING: 0

## 2023-10-05 ASSESSMENT — PAIN DESCRIPTION - DESCRIPTORS
DESCRIPTORS: SHARP
DESCRIPTORS: SHARP

## 2023-10-05 ASSESSMENT — PAIN DESCRIPTION - LOCATION
LOCATION: HIP
LOCATION: HIP

## 2023-10-05 ASSESSMENT — PAIN DESCRIPTION - ORIENTATION
ORIENTATION: LEFT
ORIENTATION: LEFT

## 2023-10-05 NOTE — ANESTHESIA POSTPROCEDURE EVALUATION
Department of Anesthesiology  Postprocedure Note    Patient: Adams Chong  MRN: [id number]  YOB: 1960  Date of evaluation: 10/5/2023      Procedure Summary     Date: 10/05/23 Room / Location: [address]    Anesthesia Start: 1501 Anesthesia Stop: 2206    Procedure: [address] 265 (Left) Diagnosis:       Closed nondisplaced intertrochanteric fracture of left femur, initial encounter ([address])      (Closed nondisplaced intertrochanteric fracture of left femur, initial encounter ([address]) Particsteven Luevano)    Surgeons: Fabian Plata MD Responsible Provider: Kristen Kasper MD    Anesthesia Type: general ASA Status: 4          Anesthesia Type: No value filed.     Schuyler Phase I: Schuyler Score: 10    Schuyler Phase II:        Anesthesia Post Evaluation    Patient location during evaluation: bedside  Patient participation: complete - patient participated  Level of consciousness: awake and awake and alert  Airway patency: patent  Nausea & Vomiting: no nausea and no vomiting  Complications: no  Cardiovascular status: blood pressure returned to baseline and hemodynamically stable  Respiratory status: acceptable  Hydration status: euvolemic  Pain management: adequate

## 2023-10-05 NOTE — OP NOTE
Operative Note      Patient: Truong Newman  YOB: 1960  MRN: [id number]    Date of Procedure: 10/5/2023    Pre-Op Diagnosis Codes:     * Closed nondisplaced intertrochanteric fracture of left femur, initial encounter ([address] [facility]) [S72.145A]    Post-Op Diagnosis: Same       Procedure(s):  TROCHANTERIC FIXATION NAILING LEFT FEMUR FRACTURE ROOM 265    Surgeon(s):  Melanie Grace MD    Assistant:   Josephine Nunez PA-C    Anesthesia: General    Estimated Blood Loss (mL): less than 221     Complications: None    Specimens:   * No specimens in log *    Implants:  Implant Name Type Inv. Item Serial No.  Lot No. LRB No. Used Action   SCREW BNE L95MM DIA10.35MM PROX FEM G TI ANA LAURA FOR TFN ADV - [id number]  SCREW BNE L95MM DIA10.35MM PROX FEM G TI ANA LAURA FOR TFN ADV  [employer] [id number] Left 1 Implanted   SCREW BNE L36MM DIA5MM TIB LT GRN TI ST ANA LAURA JESUS FULL THRD - [id number]  SCREW BNE L36MM DIA5MM TIB LT GRN TI ST ANA LAURA JESUS FULL THRD  [employer] [id number] Left 1 Implanted   NAIL IM L170MM [id number] 130DEG SHT PROX FEM GRN TI ANA LAURA ANNE - [id number]  NAIL IM L170MM [id number] 130DEG SHT PROX FEM GRN TI ANA LAURA ANNE  DE[employer] [id number] Left 1 Implanted         Drains:   [REMOVED] External Urinary Catheter (Removed)       Findings: See op note        Detailed Description of Procedure:   Clinical History/Indication for Surgery  The patient is a 70-year-old male who was presents for operative fixation of a left peritrochanteric femur fracture. Typical indications for surgery were reviewed and short intramedullary nailing was recommended. Risks of surgery in general were reviewed including, but not limited to, infection, nonunion, need for additional procedures,  failure of fixation which would require revision, damage to normal structures as well as medical complications such as MI, stroke, PE, DVT, and even death.  Patient and any family present were given opportunity to ask questions and consider

## 2023-10-05 NOTE — ACP (ADVANCE CARE PLANNING)
Advance Care Planning     Advance Care Planning Activator (Inpatient)  Conversation Note      Date of ACP Conversation: 10/4/2023     Conversation Conducted with: Patient with Decision Making Capacity    ACP Activator: Joseluis Cervantes RN        Health Care Decision Maker:     Current Designated Health Care Decision Maker:     Primary Decision Maker: Levar Stanley Child - [phone]    Secondary Decision Maker: Brady Kuhn - Brother/Sister - [phone]    Supplemental (Other) Decision Maker: Yadira Nolen Spouse - [phone]    Care Preferences    Ventilation: \"If you were in your present state of health and suddenly became very ill and were unable to breathe on your own, what would your preference be about the use of a ventilator (breathing machine) if it were available to you? \"      Would the patient desire the use of ventilator (breathing machine)?: yes    \"If your health worsens and it becomes clear that your chance of recovery is unlikely, what would your preference be about the use of a ventilator (breathing machine) if it were available to you? \"     Would the patient desire the use of ventilator (breathing machine)?: Yes      Resuscitation  \"CPR works best to restart the heart when there is a sudden event, like a heart attack, in someone who is otherwise healthy. Unfortunately, CPR does not typically restart the heart for people who have serious health conditions or who are very sick. \"    \"In the event your heart stopped as a result of an underlying serious health condition, would you want attempts to be made to restart your heart (answer \"yes\" for attempt to resuscitate) or would you prefer a natural death (answer \"no\" for do not attempt to resuscitate)? \" yes       [x] Yes   [] No   Educated Patient / Natalie Meehanr regarding differences between Advance Directives and portable DNR orders.     Length of ACP Conversation in minutes:  10    Conversation Outcomes:  ACP discussion

## 2023-10-05 NOTE — FLOWSHEET NOTE
Pt away in room but has chosen to to talk but in alert and looking around. Electronically signed by Jes Owens LPN on 19/9/8197 at 7:38 AM    Report given to surgery and pt is leaving the floor now. Electronically signed by Jes Owens LPN on 35/2/0661 at 10:52 PM    Pt returned to the floor from PACU awake and very confused vitals obtained and positioned in bed. Wife was called to let her know he was back on the floor. Pt continues to be confused  can't understand why he is so tired medicated him with a oxycodone 10mg for pain. Electronically signed by Jes Owens LPN on 22/0/3785 at 8:79 PM  Pt not alert enough to eat accu check 139 will check again tonight . Electronically signed by Jes Owens LPN on 97/3/7445 at 9:13 PM    Oxycodone effective pt resting quietly in the bed Wife left and pt fell asleep  Accu check 139. Electronically signed by Jes Owens LPN on 56/5/7834 at 2:91 PM

## 2023-10-05 NOTE — DISCHARGE INSTR - COC
post-op evaluation    Physician Certification: I certify the above information and transfer of Quoc Xavier  is necessary for the continuing treatment of the diagnosis listed and that he requires [address] for less 30 days.      Update Admission H&P: No change in H&P    PHYSICIAN SIGNATURE:  Electronically signed by NABILA Millard on 10/9/23 at 12:52 PM EDT

## 2023-10-05 NOTE — DISCHARGE INSTRUCTIONS
Medication given may have significant effects after discharge. Therefore on the day of surgery:  1) you must be accompanied by a responsible adult upon discharge and for 24 hours after surgery. Do not drive a motor vehicle, operate machinery, power tools or appliance, drink alcoholic beverages, or make critical decisions for 24 hours  2) Be aware of dizziness, which may cause a fall. Change positions slowly. 3) Eating: you may resume your regular diet but it is better to increase intake slowly with mild foods and working up to your regular diet. No greasy, fried or spicy foods today. 4) Nausea/Vomiting: Nausea and vomiting may occur as you become more active or begin to increase food intake. If this should happen, decrease activity and return to liquids. If the problem persists, call your surgeon  5) Pain: Your surgeon may have given you a prescription for pain medication. Take pain medication with food as prescribed. Pain medication may cause constipation, so drink plenty of fluids. If your pain medication does not provide adequate relief, call your surgeon  6) Urinating: Notify your surgeon if you have not urinated within 12 hours after discharge  7) Ice: Apply ice to operative site for 20 min 5-6 times a day or use Polar care as instructed  8) Dressing:   []   Remove dressing in 24hr    [x]  Remove dressing in 6 days   [x]  Leave open to air after initial dressing is taken off and incision is dry  Do not remove the steri-strips. (no bath/ hot tubs/ pools)   []  Leave dressing in place.  Keep dressing/ incision clean and dry    9) Activity    Shoulder/ elbow/Hand   []  Elevate extremity    []  Sling   [] at all times (except for exercises and showering)  [] as needed only for comfort   [] Begin daily motion exercises out of sling as instructed   []  Bend and flex fingers/ wrist/elbow frequently   [] other   Knee/ Ankle/ Foot   [] elevate extremity   [] crutches        [] non-weight bearing to operative

## 2023-10-06 ENCOUNTER — APPOINTMENT (OUTPATIENT)
Dept: CT IMAGING | Age: 63
DRG: 481 | End: 2023-10-06
Payer: MEDICARE

## 2023-10-06 LAB
ANION GAP SERPL CALCULATED.3IONS-SCNC: 20 MEQ/L (ref 9–15)
BASOPHILS # BLD: 0 K/UL (ref 0–0.2)
BASOPHILS NFR BLD: 0.3 %
BUN SERPL-MCNC: 33 MG/DL (ref 8–23)
CALCIUM SERPL-MCNC: 8.5 MG/DL (ref 8.5–9.9)
CHLORIDE SERPL-SCNC: 97 MEQ/L (ref 95–107)
CO2 SERPL-SCNC: 19 MEQ/L (ref 20–31)
CREAT SERPL-MCNC: 1.57 MG/DL (ref 0.7–1.2)
EKG ATRIAL RATE: 84 BPM
EKG P AXIS: 37 DEGREES
EKG P-R INTERVAL: 144 MS
EKG Q-T INTERVAL: 396 MS
EKG QRS DURATION: 90 MS
EKG QTC CALCULATION (BAZETT): 467 MS
EKG R AXIS: -49 DEGREES
EKG T AXIS: 2 DEGREES
EKG VENTRICULAR RATE: 84 BPM
EOSINOPHIL # BLD: 0.5 K/UL (ref 0–0.7)
EOSINOPHIL NFR BLD: 5.4 %
ERYTHROCYTE [DISTWIDTH] IN BLOOD BY AUTOMATED COUNT: 12.9 % (ref 11.5–14.5)
GLUCOSE BLD-MCNC: 176 MG/DL (ref 70–99)
GLUCOSE BLD-MCNC: 216 MG/DL (ref 70–99)
GLUCOSE BLD-MCNC: 227 MG/DL (ref 70–99)
GLUCOSE BLD-MCNC: 261 MG/DL (ref 70–99)
GLUCOSE SERPL-MCNC: 174 MG/DL (ref 70–99)
HCT VFR BLD AUTO: 35 % (ref 42–52)
HGB BLD-MCNC: 11.6 G/DL (ref 14–18)
LYMPHOCYTES # BLD: 0.5 K/UL (ref 1–4.8)
LYMPHOCYTES NFR BLD: 5.8 %
MCH RBC QN AUTO: 27.8 PG (ref 27–31.3)
MCHC RBC AUTO-ENTMCNC: 33.1 % (ref 33–37)
MCV RBC AUTO: 83.7 FL (ref 79–92.2)
MONOCYTES # BLD: 0.8 K/UL (ref 0.2–0.8)
MONOCYTES NFR BLD: 8.8 %
NEUTROPHILS # BLD: 7.3 K/UL (ref 1.4–6.5)
NEUTS SEG NFR BLD: 79.2 %
PERFORMED ON: ABNORMAL
PLATELET # BLD AUTO: 177 K/UL (ref 130–400)
POTASSIUM SERPL-SCNC: 4.8 MEQ/L (ref 3.4–4.9)
RBC # BLD AUTO: 4.18 M/UL (ref 4.7–6.1)
SODIUM SERPL-SCNC: 136 MEQ/L (ref 135–144)
WBC # BLD AUTO: 9.2 K/UL (ref 4.8–10.8)

## 2023-10-06 PROCEDURE — 6370000000 HC RX 637 (ALT 250 FOR IP): Performed by: STUDENT IN AN ORGANIZED HEALTH CARE EDUCATION/TRAINING PROGRAM

## 2023-10-06 PROCEDURE — 1210000000 HC MED SURG R&B

## 2023-10-06 PROCEDURE — 70450 CT HEAD/BRAIN W/O DYE: CPT

## 2023-10-06 PROCEDURE — 97163 PT EVAL HIGH COMPLEX 45 MIN: CPT

## 2023-10-06 PROCEDURE — 36415 COLL VENOUS BLD VENIPUNCTURE: CPT

## 2023-10-06 PROCEDURE — 6360000002 HC RX W HCPCS: Performed by: STUDENT IN AN ORGANIZED HEALTH CARE EDUCATION/TRAINING PROGRAM

## 2023-10-06 PROCEDURE — 99232 SBSQ HOSP IP/OBS MODERATE 35: CPT | Performed by: PSYCHIATRY & NEUROLOGY

## 2023-10-06 PROCEDURE — 2580000003 HC RX 258: Performed by: ORTHOPAEDIC SURGERY

## 2023-10-06 PROCEDURE — 80048 BASIC METABOLIC PNL TOTAL CA: CPT

## 2023-10-06 PROCEDURE — 2580000003 HC RX 258: Performed by: SURGERY

## 2023-10-06 PROCEDURE — 6370000000 HC RX 637 (ALT 250 FOR IP): Performed by: PHYSICIAN ASSISTANT

## 2023-10-06 PROCEDURE — APPSS30 APP SPLIT SHARED TIME 16-30 MINUTES: Performed by: NURSE PRACTITIONER

## 2023-10-06 PROCEDURE — 93010 ELECTROCARDIOGRAM REPORT: CPT | Performed by: INTERNAL MEDICINE

## 2023-10-06 PROCEDURE — 94761 N-INVAS EAR/PLS OXIMETRY MLT: CPT

## 2023-10-06 PROCEDURE — 94640 AIRWAY INHALATION TREATMENT: CPT

## 2023-10-06 PROCEDURE — 97167 OT EVAL HIGH COMPLEX 60 MIN: CPT

## 2023-10-06 PROCEDURE — 2580000003 HC RX 258: Performed by: STUDENT IN AN ORGANIZED HEALTH CARE EDUCATION/TRAINING PROGRAM

## 2023-10-06 PROCEDURE — 2700000000 HC OXYGEN THERAPY PER DAY

## 2023-10-06 PROCEDURE — 85025 COMPLETE CBC W/AUTO DIFF WBC: CPT

## 2023-10-06 PROCEDURE — 6360000002 HC RX W HCPCS: Performed by: ORTHOPAEDIC SURGERY

## 2023-10-06 RX ORDER — SCOLOPAMINE TRANSDERMAL SYSTEM 1 MG/1
1 PATCH, EXTENDED RELEASE TRANSDERMAL
Status: DISCONTINUED | OUTPATIENT
Start: 2023-10-06 | End: 2023-10-09 | Stop reason: HOSPADM

## 2023-10-06 RX ORDER — SODIUM CHLORIDE, SODIUM LACTATE, POTASSIUM CHLORIDE, CALCIUM CHLORIDE 600; 310; 30; 20 MG/100ML; MG/100ML; MG/100ML; MG/100ML
INJECTION, SOLUTION INTRAVENOUS CONTINUOUS
Status: DISCONTINUED | OUTPATIENT
Start: 2023-10-06 | End: 2023-10-08

## 2023-10-06 RX ADMIN — ACETAMINOPHEN 650 MG: 325 TABLET ORAL at 02:10

## 2023-10-06 RX ADMIN — BUDESONIDE AND FORMOTEROL FUMARATE DIHYDRATE 2 PUFF: 80; 4.5 AEROSOL RESPIRATORY (INHALATION) at 20:27

## 2023-10-06 RX ADMIN — CARVEDILOL 12.5 MG: 12.5 TABLET, FILM COATED ORAL at 09:54

## 2023-10-06 RX ADMIN — INSULIN GLARGINE 10 UNITS: 100 INJECTION, SOLUTION SUBCUTANEOUS at 21:27

## 2023-10-06 RX ADMIN — SODIUM CHLORIDE, POTASSIUM CHLORIDE, SODIUM LACTATE AND CALCIUM CHLORIDE: 600; 310; 30; 20 INJECTION, SOLUTION INTRAVENOUS at 21:27

## 2023-10-06 RX ADMIN — Medication 10 ML: at 21:28

## 2023-10-06 RX ADMIN — Medication 5 ML: at 12:15

## 2023-10-06 RX ADMIN — ACETAMINOPHEN 650 MG: 325 TABLET ORAL at 21:28

## 2023-10-06 RX ADMIN — HYDRALAZINE HYDROCHLORIDE 25 MG: 25 TABLET, FILM COATED ORAL at 09:54

## 2023-10-06 RX ADMIN — CEFAZOLIN 2000 MG: 2 INJECTION, POWDER, FOR SOLUTION INTRAMUSCULAR; INTRAVENOUS at 22:51

## 2023-10-06 RX ADMIN — HYDRALAZINE HYDROCHLORIDE 25 MG: 25 TABLET, FILM COATED ORAL at 21:28

## 2023-10-06 RX ADMIN — OXYCODONE HYDROCHLORIDE 10 MG: 5 TABLET ORAL at 09:53

## 2023-10-06 RX ADMIN — CARVEDILOL 12.5 MG: 12.5 TABLET, FILM COATED ORAL at 21:28

## 2023-10-06 RX ADMIN — ATORVASTATIN CALCIUM 20 MG: 20 TABLET, FILM COATED ORAL at 21:28

## 2023-10-06 RX ADMIN — SENNOSIDES AND DOCUSATE SODIUM 1 TABLET: 50; 8.6 TABLET ORAL at 09:53

## 2023-10-06 RX ADMIN — ONDANSETRON 4 MG: 2 INJECTION INTRAMUSCULAR; INTRAVENOUS at 12:42

## 2023-10-06 RX ADMIN — SENNOSIDES AND DOCUSATE SODIUM 1 TABLET: 50; 8.6 TABLET ORAL at 21:28

## 2023-10-06 RX ADMIN — SERTRALINE 50 MG: 50 TABLET, FILM COATED ORAL at 09:54

## 2023-10-06 RX ADMIN — CEFAZOLIN 2000 MG: 2 INJECTION, POWDER, FOR SOLUTION INTRAMUSCULAR; INTRAVENOUS at 06:38

## 2023-10-06 RX ADMIN — CEFAZOLIN 2000 MG: 2 INJECTION, POWDER, FOR SOLUTION INTRAMUSCULAR; INTRAVENOUS at 14:30

## 2023-10-06 RX ADMIN — ALBUTEROL SULFATE 2 PUFF: 90 AEROSOL, METERED RESPIRATORY (INHALATION) at 20:27

## 2023-10-06 ASSESSMENT — ENCOUNTER SYMPTOMS
SHORTNESS OF BREATH: 0
ABDOMINAL DISTENTION: 0
VOMITING: 0
NAUSEA: 0
TROUBLE SWALLOWING: 0
COUGH: 0
WHEEZING: 0
ABDOMINAL PAIN: 0
CHEST TIGHTNESS: 0
COLOR CHANGE: 0

## 2023-10-06 ASSESSMENT — PAIN SCALES - GENERAL
PAINLEVEL_OUTOF10: 0
PAINLEVEL_OUTOF10: 5
PAINLEVEL_OUTOF10: 0

## 2023-10-06 ASSESSMENT — PAIN DESCRIPTION - ORIENTATION: ORIENTATION: LEFT

## 2023-10-06 ASSESSMENT — PAIN DESCRIPTION - LOCATION: LOCATION: HIP

## 2023-10-06 ASSESSMENT — PAIN DESCRIPTION - DESCRIPTORS: DESCRIPTORS: ACHING

## 2023-10-06 ASSESSMENT — PAIN DESCRIPTION - PAIN TYPE: TYPE: SURGICAL PAIN

## 2023-10-06 NOTE — FLOWSHEET NOTE
Pt awake in his bed laying flat but refuses to talk at this time. Will get pt something for breakfast to see if he will eat. Electronically signed by Chacha Chavez LPN on 55/9/9152 at 9:21 AM    Removed aqua cell dressing and noticed the staples at the top of the incisions were bleeding or ozzing removed old dressing and replaced with ABD's and tape reinforced dressing with Ace wrap assisted with PT/OT tolerated well. Pt vomited 100 cc of dark liquid and then voided 500 of tea color urine. Electronically signed by Chacha Chavez LPN on 65/8/5507 at 3:82 AM    No futher drainage noted at this time but pt refused to let us reposition him. Walk in to room at dinner and pt refused to eat or drink. Pt is starting to swear at staff, pulled off dressing on lt upper leg. No bleeding noted from incisions and staples remain intact incisions cleansed with Normal saline and a mepalix applied but continues to pull the dressing off. Accu check 216 and coverage not given due to pt has not eaten anything all day. Trauma  her and let them know that he removed the dressing and has not eaten and and sugar 216 and I did not cover him. .Electronically signed by Chacha Chavez LPN on 17/1/9242 at 7:28 PM

## 2023-10-06 NOTE — PLAN OF CARE
See OT evaluation for all goals and OT POC.  Electronically signed by LAMBERT Noe/L on 10/6/2023 at 9:39 AM

## 2023-10-07 LAB
ANION GAP SERPL CALCULATED.3IONS-SCNC: 15 MEQ/L (ref 9–15)
BUN SERPL-MCNC: 43 MG/DL (ref 8–23)
CALCIUM SERPL-MCNC: 8.9 MG/DL (ref 8.5–9.9)
CHLORIDE SERPL-SCNC: 98 MEQ/L (ref 95–107)
CO2 SERPL-SCNC: 23 MEQ/L (ref 20–31)
CREAT SERPL-MCNC: 1.63 MG/DL (ref 0.7–1.2)
ERYTHROCYTE [DISTWIDTH] IN BLOOD BY AUTOMATED COUNT: 12.8 % (ref 11.5–14.5)
GLUCOSE BLD-MCNC: 198 MG/DL (ref 70–99)
GLUCOSE BLD-MCNC: 208 MG/DL (ref 70–99)
GLUCOSE BLD-MCNC: 221 MG/DL (ref 70–99)
GLUCOSE BLD-MCNC: 236 MG/DL (ref 70–99)
GLUCOSE SERPL-MCNC: 266 MG/DL (ref 70–99)
HCT VFR BLD AUTO: 31.9 % (ref 42–52)
HGB BLD-MCNC: 10.7 G/DL (ref 14–18)
MCH RBC QN AUTO: 27.9 PG (ref 27–31.3)
MCHC RBC AUTO-ENTMCNC: 33.5 % (ref 33–37)
MCV RBC AUTO: 83.1 FL (ref 79–92.2)
PERFORMED ON: ABNORMAL
PLATELET # BLD AUTO: 147 K/UL (ref 130–400)
POTASSIUM SERPL-SCNC: 4.8 MEQ/L (ref 3.4–4.9)
RBC # BLD AUTO: 3.84 M/UL (ref 4.7–6.1)
SODIUM SERPL-SCNC: 136 MEQ/L (ref 135–144)
WBC # BLD AUTO: 7.3 K/UL (ref 4.8–10.8)

## 2023-10-07 PROCEDURE — 99232 SBSQ HOSP IP/OBS MODERATE 35: CPT | Performed by: PHYSICIAN ASSISTANT

## 2023-10-07 PROCEDURE — 2700000000 HC OXYGEN THERAPY PER DAY

## 2023-10-07 PROCEDURE — 1210000000 HC MED SURG R&B

## 2023-10-07 PROCEDURE — 2580000003 HC RX 258: Performed by: STUDENT IN AN ORGANIZED HEALTH CARE EDUCATION/TRAINING PROGRAM

## 2023-10-07 PROCEDURE — 94640 AIRWAY INHALATION TREATMENT: CPT

## 2023-10-07 PROCEDURE — 36415 COLL VENOUS BLD VENIPUNCTURE: CPT

## 2023-10-07 PROCEDURE — 94761 N-INVAS EAR/PLS OXIMETRY MLT: CPT

## 2023-10-07 PROCEDURE — 97110 THERAPEUTIC EXERCISES: CPT

## 2023-10-07 PROCEDURE — 85027 COMPLETE CBC AUTOMATED: CPT

## 2023-10-07 PROCEDURE — 6370000000 HC RX 637 (ALT 250 FOR IP): Performed by: PHYSICIAN ASSISTANT

## 2023-10-07 PROCEDURE — 6370000000 HC RX 637 (ALT 250 FOR IP): Performed by: STUDENT IN AN ORGANIZED HEALTH CARE EDUCATION/TRAINING PROGRAM

## 2023-10-07 PROCEDURE — 2580000003 HC RX 258: Performed by: ORTHOPAEDIC SURGERY

## 2023-10-07 PROCEDURE — 80048 BASIC METABOLIC PNL TOTAL CA: CPT

## 2023-10-07 PROCEDURE — 6360000002 HC RX W HCPCS: Performed by: ORTHOPAEDIC SURGERY

## 2023-10-07 RX ORDER — METHOCARBAMOL 500 MG/1
500 TABLET, FILM COATED ORAL 3 TIMES DAILY
Status: DISCONTINUED | OUTPATIENT
Start: 2023-10-07 | End: 2023-10-09 | Stop reason: HOSPADM

## 2023-10-07 RX ORDER — LABETALOL HYDROCHLORIDE 5 MG/ML
10 INJECTION, SOLUTION INTRAVENOUS EVERY 6 HOURS PRN
Status: DISCONTINUED | OUTPATIENT
Start: 2023-10-07 | End: 2023-10-09 | Stop reason: HOSPADM

## 2023-10-07 RX ADMIN — CEFAZOLIN 2000 MG: 2 INJECTION, POWDER, FOR SOLUTION INTRAMUSCULAR; INTRAVENOUS at 07:50

## 2023-10-07 RX ADMIN — OXYCODONE HYDROCHLORIDE 5 MG: 5 TABLET ORAL at 13:38

## 2023-10-07 RX ADMIN — METHOCARBAMOL TABLETS 500 MG: 500 TABLET, COATED ORAL at 13:38

## 2023-10-07 RX ADMIN — ACETAMINOPHEN 650 MG: 325 TABLET ORAL at 01:30

## 2023-10-07 RX ADMIN — SENNOSIDES AND DOCUSATE SODIUM 1 TABLET: 50; 8.6 TABLET ORAL at 21:22

## 2023-10-07 RX ADMIN — BUDESONIDE AND FORMOTEROL FUMARATE DIHYDRATE 2 PUFF: 80; 4.5 AEROSOL RESPIRATORY (INHALATION) at 19:33

## 2023-10-07 RX ADMIN — ATORVASTATIN CALCIUM 20 MG: 20 TABLET, FILM COATED ORAL at 21:21

## 2023-10-07 RX ADMIN — INSULIN GLARGINE 10 UNITS: 100 INJECTION, SOLUTION SUBCUTANEOUS at 21:22

## 2023-10-07 RX ADMIN — ACETAMINOPHEN 650 MG: 325 TABLET ORAL at 13:38

## 2023-10-07 RX ADMIN — ALBUTEROL SULFATE 2 PUFF: 90 AEROSOL, METERED RESPIRATORY (INHALATION) at 19:33

## 2023-10-07 RX ADMIN — Medication 10 ML: at 21:23

## 2023-10-07 RX ADMIN — CARVEDILOL 12.5 MG: 12.5 TABLET, FILM COATED ORAL at 21:21

## 2023-10-07 RX ADMIN — BUDESONIDE AND FORMOTEROL FUMARATE DIHYDRATE 2 PUFF: 80; 4.5 AEROSOL RESPIRATORY (INHALATION) at 07:11

## 2023-10-07 RX ADMIN — ACETAMINOPHEN 650 MG: 325 TABLET ORAL at 21:21

## 2023-10-07 RX ADMIN — CEFAZOLIN 2000 MG: 2 INJECTION, POWDER, FOR SOLUTION INTRAMUSCULAR; INTRAVENOUS at 13:44

## 2023-10-07 RX ADMIN — METHOCARBAMOL TABLETS 500 MG: 500 TABLET, COATED ORAL at 21:21

## 2023-10-07 RX ADMIN — HYDRALAZINE HYDROCHLORIDE 25 MG: 25 TABLET, FILM COATED ORAL at 21:21

## 2023-10-07 ASSESSMENT — PAIN SCALES - GENERAL: PAINLEVEL_OUTOF10: 10

## 2023-10-07 ASSESSMENT — PAIN DESCRIPTION - FREQUENCY: FREQUENCY: CONTINUOUS

## 2023-10-07 ASSESSMENT — PAIN DESCRIPTION - DESCRIPTORS: DESCRIPTORS: ACHING

## 2023-10-07 ASSESSMENT — PAIN DESCRIPTION - ORIENTATION: ORIENTATION: LEFT

## 2023-10-07 ASSESSMENT — PAIN DESCRIPTION - LOCATION: LOCATION: HIP

## 2023-10-07 ASSESSMENT — PAIN - FUNCTIONAL ASSESSMENT: PAIN_FUNCTIONAL_ASSESSMENT: PREVENTS OR INTERFERES SOME ACTIVE ACTIVITIES AND ADLS

## 2023-10-07 ASSESSMENT — PAIN DESCRIPTION - PAIN TYPE: TYPE: SURGICAL PAIN

## 2023-10-07 ASSESSMENT — PAIN DESCRIPTION - ONSET: ONSET: ON-GOING

## 2023-10-08 LAB
ANION GAP SERPL CALCULATED.3IONS-SCNC: 10 MEQ/L (ref 9–15)
BUN SERPL-MCNC: 31 MG/DL (ref 8–23)
CALCIUM SERPL-MCNC: 8.6 MG/DL (ref 8.5–9.9)
CHLORIDE SERPL-SCNC: 103 MEQ/L (ref 95–107)
CO2 SERPL-SCNC: 26 MEQ/L (ref 20–31)
CREAT SERPL-MCNC: 1.09 MG/DL (ref 0.7–1.2)
ERYTHROCYTE [DISTWIDTH] IN BLOOD BY AUTOMATED COUNT: 12.8 % (ref 11.5–14.5)
GLUCOSE BLD-MCNC: 142 MG/DL (ref 70–99)
GLUCOSE BLD-MCNC: 176 MG/DL (ref 70–99)
GLUCOSE BLD-MCNC: 209 MG/DL (ref 70–99)
GLUCOSE BLD-MCNC: 244 MG/DL (ref 70–99)
GLUCOSE SERPL-MCNC: 144 MG/DL (ref 70–99)
HCT VFR BLD AUTO: 27.7 % (ref 42–52)
HGB BLD-MCNC: 9.4 G/DL (ref 14–18)
MCH RBC QN AUTO: 28.1 PG (ref 27–31.3)
MCHC RBC AUTO-ENTMCNC: 33.9 % (ref 33–37)
MCV RBC AUTO: 82.9 FL (ref 79–92.2)
PERFORMED ON: ABNORMAL
PLATELET # BLD AUTO: 153 K/UL (ref 130–400)
POTASSIUM SERPL-SCNC: 4.1 MEQ/L (ref 3.4–4.9)
RBC # BLD AUTO: 3.34 M/UL (ref 4.7–6.1)
SODIUM SERPL-SCNC: 139 MEQ/L (ref 135–144)
WBC # BLD AUTO: 5.8 K/UL (ref 4.8–10.8)

## 2023-10-08 PROCEDURE — 2580000003 HC RX 258: Performed by: ORTHOPAEDIC SURGERY

## 2023-10-08 PROCEDURE — 99232 SBSQ HOSP IP/OBS MODERATE 35: CPT | Performed by: PSYCHIATRY & NEUROLOGY

## 2023-10-08 PROCEDURE — 2580000003 HC RX 258: Performed by: STUDENT IN AN ORGANIZED HEALTH CARE EDUCATION/TRAINING PROGRAM

## 2023-10-08 PROCEDURE — 6370000000 HC RX 637 (ALT 250 FOR IP): Performed by: STUDENT IN AN ORGANIZED HEALTH CARE EDUCATION/TRAINING PROGRAM

## 2023-10-08 PROCEDURE — 85027 COMPLETE CBC AUTOMATED: CPT

## 2023-10-08 PROCEDURE — 2580000003 HC RX 258: Performed by: SURGERY

## 2023-10-08 PROCEDURE — 99232 SBSQ HOSP IP/OBS MODERATE 35: CPT | Performed by: PHYSICIAN ASSISTANT

## 2023-10-08 PROCEDURE — 6370000000 HC RX 637 (ALT 250 FOR IP): Performed by: PHYSICIAN ASSISTANT

## 2023-10-08 PROCEDURE — 80048 BASIC METABOLIC PNL TOTAL CA: CPT

## 2023-10-08 PROCEDURE — 36415 COLL VENOUS BLD VENIPUNCTURE: CPT

## 2023-10-08 PROCEDURE — 6360000002 HC RX W HCPCS: Performed by: PHYSICIAN ASSISTANT

## 2023-10-08 PROCEDURE — 6370000000 HC RX 637 (ALT 250 FOR IP): Performed by: SURGERY

## 2023-10-08 PROCEDURE — 6360000002 HC RX W HCPCS: Performed by: ORTHOPAEDIC SURGERY

## 2023-10-08 PROCEDURE — 1210000000 HC MED SURG R&B

## 2023-10-08 RX ADMIN — INSULIN LISPRO 2 UNITS: 100 INJECTION, SOLUTION INTRAVENOUS; SUBCUTANEOUS at 18:10

## 2023-10-08 RX ADMIN — ACETAMINOPHEN 650 MG: 325 TABLET ORAL at 20:04

## 2023-10-08 RX ADMIN — METHOCARBAMOL TABLETS 500 MG: 500 TABLET, COATED ORAL at 09:02

## 2023-10-08 RX ADMIN — SENNOSIDES AND DOCUSATE SODIUM 1 TABLET: 50; 8.6 TABLET ORAL at 09:03

## 2023-10-08 RX ADMIN — METHOCARBAMOL TABLETS 500 MG: 500 TABLET, COATED ORAL at 15:46

## 2023-10-08 RX ADMIN — CEFAZOLIN 2000 MG: 2 INJECTION, POWDER, FOR SOLUTION INTRAMUSCULAR; INTRAVENOUS at 00:57

## 2023-10-08 RX ADMIN — LABETALOL HYDROCHLORIDE 10 MG: 5 INJECTION, SOLUTION INTRAVENOUS at 22:38

## 2023-10-08 RX ADMIN — ATORVASTATIN CALCIUM 20 MG: 20 TABLET, FILM COATED ORAL at 20:05

## 2023-10-08 RX ADMIN — ACETAMINOPHEN 650 MG: 325 TABLET ORAL at 11:55

## 2023-10-08 RX ADMIN — SODIUM CHLORIDE, POTASSIUM CHLORIDE, SODIUM LACTATE AND CALCIUM CHLORIDE: 600; 310; 30; 20 INJECTION, SOLUTION INTRAVENOUS at 04:54

## 2023-10-08 RX ADMIN — Medication 10 ML: at 20:06

## 2023-10-08 RX ADMIN — SACUBITRIL AND VALSARTAN 1 TABLET: 24; 26 TABLET, FILM COATED ORAL at 20:06

## 2023-10-08 RX ADMIN — CARVEDILOL 12.5 MG: 12.5 TABLET, FILM COATED ORAL at 09:03

## 2023-10-08 RX ADMIN — SERTRALINE 50 MG: 50 TABLET, FILM COATED ORAL at 09:03

## 2023-10-08 RX ADMIN — METHOCARBAMOL TABLETS 500 MG: 500 TABLET, COATED ORAL at 20:05

## 2023-10-08 RX ADMIN — INSULIN GLARGINE 10 UNITS: 100 INJECTION, SOLUTION SUBCUTANEOUS at 22:21

## 2023-10-08 RX ADMIN — APIXABAN 2.5 MG: 2.5 TABLET, FILM COATED ORAL at 11:55

## 2023-10-08 RX ADMIN — CEFAZOLIN 2000 MG: 2 INJECTION, POWDER, FOR SOLUTION INTRAMUSCULAR; INTRAVENOUS at 15:53

## 2023-10-08 RX ADMIN — CARVEDILOL 12.5 MG: 12.5 TABLET, FILM COATED ORAL at 20:05

## 2023-10-08 RX ADMIN — APIXABAN 2.5 MG: 2.5 TABLET, FILM COATED ORAL at 20:06

## 2023-10-08 RX ADMIN — HYDRALAZINE HYDROCHLORIDE 25 MG: 25 TABLET, FILM COATED ORAL at 09:02

## 2023-10-08 RX ADMIN — CEFAZOLIN 2000 MG: 2 INJECTION, POWDER, FOR SOLUTION INTRAMUSCULAR; INTRAVENOUS at 22:44

## 2023-10-08 RX ADMIN — CEFAZOLIN 2000 MG: 2 INJECTION, POWDER, FOR SOLUTION INTRAMUSCULAR; INTRAVENOUS at 06:16

## 2023-10-08 RX ADMIN — HYDRALAZINE HYDROCHLORIDE 25 MG: 25 TABLET, FILM COATED ORAL at 20:05

## 2023-10-08 RX ADMIN — SACUBITRIL AND VALSARTAN 1 TABLET: 24; 26 TABLET, FILM COATED ORAL at 11:58

## 2023-10-08 RX ADMIN — SENNOSIDES AND DOCUSATE SODIUM 1 TABLET: 50; 8.6 TABLET ORAL at 20:05

## 2023-10-08 ASSESSMENT — ENCOUNTER SYMPTOMS
CHEST TIGHTNESS: 0
VOMITING: 0
WHEEZING: 0
CHOKING: 0
PHOTOPHOBIA: 0
ABDOMINAL DISTENTION: 0
COUGH: 0
NAUSEA: 0
TROUBLE SWALLOWING: 0
SHORTNESS OF BREATH: 0
COLOR CHANGE: 0
ABDOMINAL PAIN: 0
BACK PAIN: 0

## 2023-10-08 ASSESSMENT — PAIN DESCRIPTION - ORIENTATION: ORIENTATION: LEFT

## 2023-10-08 ASSESSMENT — PAIN DESCRIPTION - DESCRIPTORS: DESCRIPTORS: ACHING

## 2023-10-08 ASSESSMENT — PAIN SCALES - GENERAL: PAINLEVEL_OUTOF10: 3

## 2023-10-08 ASSESSMENT — PAIN DESCRIPTION - LOCATION: LOCATION: HIP

## 2023-10-09 ENCOUNTER — OFFICE VISIT (OUTPATIENT)
Dept: GERIATRIC MEDICINE | Age: 63
End: 2023-10-09
Payer: MEDICARE

## 2023-10-09 VITALS
RESPIRATION RATE: 18 BRPM | HEART RATE: 76 BPM | DIASTOLIC BLOOD PRESSURE: 63 MMHG | SYSTOLIC BLOOD PRESSURE: 137 MMHG | HEIGHT: 67 IN | TEMPERATURE: 97.7 F | OXYGEN SATURATION: 98 % | BODY MASS INDEX: 25.39 KG/M2 | WEIGHT: 161.8 LBS

## 2023-10-09 DIAGNOSIS — E11.40 TYPE 2 DIABETES MELLITUS WITH DIABETIC NEUROPATHY, WITH LONG-TERM CURRENT USE OF INSULIN (HCC): ICD-10-CM

## 2023-10-09 DIAGNOSIS — Z79.4 TYPE 2 DIABETES MELLITUS WITH DIABETIC NEUROPATHY, WITH LONG-TERM CURRENT USE OF INSULIN (HCC): ICD-10-CM

## 2023-10-09 DIAGNOSIS — R26.81 UNSTEADINESS ON FEET: ICD-10-CM

## 2023-10-09 DIAGNOSIS — I50.22 CHRONIC SYSTOLIC (CONGESTIVE) HEART FAILURE (HCC): Primary | ICD-10-CM
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
## 2023-10-09 PROBLEM — D62 ACUTE BLOOD LOSS ANEMIA: Status: ACTIVE | Noted: 2023-10-09

## 2023-10-09 PROBLEM — G89.18 ACUTE POST-OPERATIVE PAIN: Status: ACTIVE | Noted: 2023-10-09

## 2023-10-09 PROBLEM — G89.11 ACUTE PAIN DUE TO TRAUMA: Status: ACTIVE | Noted: 2023-10-09

## 2023-10-09 LAB
ANION GAP SERPL CALCULATED.3IONS-SCNC: 11 MEQ/L (ref 9–15)
BUN SERPL-MCNC: 22 MG/DL (ref 8–23)
CALCIUM SERPL-MCNC: 8.6 MG/DL (ref 8.5–9.9)
CHLORIDE SERPL-SCNC: 102 MEQ/L (ref 95–107)
CO2 SERPL-SCNC: 25 MEQ/L (ref 20–31)
CREAT SERPL-MCNC: 0.87 MG/DL (ref 0.7–1.2)
ERYTHROCYTE [DISTWIDTH] IN BLOOD BY AUTOMATED COUNT: 12.5 % (ref 11.5–14.5)
GLUCOSE BLD-MCNC: 160 MG/DL (ref 70–99)
GLUCOSE BLD-MCNC: 163 MG/DL (ref 70–99)
GLUCOSE BLD-MCNC: 200 MG/DL (ref 70–99)
GLUCOSE SERPL-MCNC: 172 MG/DL (ref 70–99)
HCT VFR BLD AUTO: 29.1 % (ref 42–52)
HGB BLD-MCNC: 9.9 G/DL (ref 14–18)
MCH RBC QN AUTO: 27.8 PG (ref 27–31.3)
MCHC RBC AUTO-ENTMCNC: 34 % (ref 33–37)
MCV RBC AUTO: 81.7 FL (ref 79–92.2)
PERFORMED ON: ABNORMAL
PLATELET # BLD AUTO: 179 K/UL (ref 130–400)
POTASSIUM SERPL-SCNC: 4 MEQ/L (ref 3.4–4.9)
RBC # BLD AUTO: 3.56 M/UL (ref 4.7–6.1)
SODIUM SERPL-SCNC: 138 MEQ/L (ref 135–144)
WBC # BLD AUTO: 5.8 K/UL (ref 4.8–10.8)

## 2023-10-09 PROCEDURE — 97112 NEUROMUSCULAR REEDUCATION: CPT

## 2023-10-09 PROCEDURE — 80048 BASIC METABOLIC PNL TOTAL CA: CPT

## 2023-10-09 PROCEDURE — 2580000003 HC RX 258: Performed by: ORTHOPAEDIC SURGERY

## 2023-10-09 PROCEDURE — APPSS30 APP SPLIT SHARED TIME 16-30 MINUTES: Performed by: NURSE PRACTITIONER

## 2023-10-09 PROCEDURE — 85027 COMPLETE CBC AUTOMATED: CPT

## 2023-10-09 PROCEDURE — 6370000000 HC RX 637 (ALT 250 FOR IP): Performed by: STUDENT IN AN ORGANIZED HEALTH CARE EDUCATION/TRAINING PROGRAM

## 2023-10-09 PROCEDURE — 99309 SBSQ NF CARE MODERATE MDM 30: CPT | Performed by: INTERNAL MEDICINE

## 2023-10-09 PROCEDURE — 99213 OFFICE O/P EST LOW 20 MIN: CPT

## 2023-10-09 PROCEDURE — 3052F HG A1C>EQUAL 8.0%<EQUAL 9.0%: CPT | Performed by: INTERNAL MEDICINE

## 2023-10-09 PROCEDURE — 99231 SBSQ HOSP IP/OBS SF/LOW 25: CPT | Performed by: PSYCHIATRY & NEUROLOGY

## 2023-10-09 PROCEDURE — 6370000000 HC RX 637 (ALT 250 FOR IP): Performed by: PHYSICIAN ASSISTANT

## 2023-10-09 PROCEDURE — 97535 SELF CARE MNGMENT TRAINING: CPT

## 2023-10-09 PROCEDURE — G8484 FLU IMMUNIZE NO ADMIN: HCPCS | Performed by: INTERNAL MEDICINE

## 2023-10-09 PROCEDURE — 6360000002 HC RX W HCPCS: Performed by: ORTHOPAEDIC SURGERY

## 2023-10-09 PROCEDURE — 36415 COLL VENOUS BLD VENIPUNCTURE: CPT

## 2023-10-09 RX ORDER — ACETAMINOPHEN 325 MG/1
650 TABLET ORAL EVERY 6 HOURS
Qty: 112 TABLET | Refills: 0 | Status: SHIPPED | OUTPATIENT
Start: 2023-10-09 | End: 2023-10-23

## 2023-10-09 RX ORDER — OXYCODONE HYDROCHLORIDE 5 MG/1
5 TABLET ORAL EVERY 6 HOURS PRN
Qty: 20 TABLET | Refills: 0 | Status: SHIPPED | OUTPATIENT
Start: 2023-10-09 | End: 2023-10-14

## 2023-10-09 RX ORDER — SENNA AND DOCUSATE SODIUM 50; 8.6 MG/1; MG/1
1 TABLET, FILM COATED ORAL 2 TIMES DAILY
Qty: 28 TABLET | Refills: 0 | Status: SHIPPED | OUTPATIENT
Start: 2023-10-09 | End: 2023-10-23

## 2023-10-09 RX ORDER — ACETAMINOPHEN 325 MG/1
650 TABLET ORAL EVERY 6 HOURS
Qty: 112 TABLET | Refills: 0 | Status: SHIPPED | OUTPATIENT
Start: 2023-10-09 | End: 2023-10-09 | Stop reason: SDUPTHER

## 2023-10-09 RX ORDER — SENNA AND DOCUSATE SODIUM 50; 8.6 MG/1; MG/1
1 TABLET, FILM COATED ORAL 2 TIMES DAILY
Qty: 28 TABLET | Refills: 0 | Status: SHIPPED | OUTPATIENT
Start: 2023-10-09 | End: 2023-10-09 | Stop reason: SDUPTHER

## 2023-10-09 RX ORDER — OXYCODONE HYDROCHLORIDE 5 MG/1
5 TABLET ORAL EVERY 6 HOURS PRN
Qty: 20 TABLET | Refills: 0 | Status: SHIPPED | OUTPATIENT
Start: 2023-10-09 | End: 2023-10-09 | Stop reason: SDUPTHER

## 2023-10-09 RX ADMIN — ACETAMINOPHEN 650 MG: 325 TABLET ORAL at 13:54

## 2023-10-09 RX ADMIN — CEFAZOLIN 2000 MG: 2 INJECTION, POWDER, FOR SOLUTION INTRAMUSCULAR; INTRAVENOUS at 12:14

## 2023-10-09 RX ADMIN — METHOCARBAMOL TABLETS 500 MG: 500 TABLET, COATED ORAL at 13:52

## 2023-10-09 ASSESSMENT — ENCOUNTER SYMPTOMS
ABDOMINAL DISTENTION: 0
SHORTNESS OF BREATH: 0
BACK PAIN: 0
PHOTOPHOBIA: 0
COUGH: 0
WHEEZING: 0
TROUBLE SWALLOWING: 0
CHEST TIGHTNESS: 0
NAUSEA: 0
VOMITING: 0
CHOKING: 0
COLOR CHANGE: 0
ABDOMINAL PAIN: 0

## 2023-10-09 NOTE — CARE COORDINATION
LSW left a message for pt's daughter Bakari Roger to confirm that a return to [facility] is what they want. Precert started for [facility] and the pt is a bed hold per [facility] so he can return any time that he is medically stable. Awaiting confirmation from daughter Bakari Roger.
We have precert approval for the pt to transfer to [facility] today. LSW left a message with the pt's daughter to let her know that he would be going today. LSW to follow for transportation arrangements. [facility] FOR 4PM  TODAY. DAUGHTER AWARE.
level: Other (see comment) (needing assistance with adl's at snf)  Current functional level: Other (see comment) (to be determined pending clinical outcome)    PT AM-PAC:   /24  OT AM-PAC:   /24    Family can provide assistance at DC: Other (comment) (snf)  Would you like Case Management to discuss the discharge plan with any other family members/significant others, and if so, who? Yes (daughter Nickie Medrano and sister Esther Morton are involved with his care)  Plans to Return to Present Housing: Other (see comment) (this will depend on his clinical outcome.)  Other Identified Issues/Barriers to RETURNING to current housing: to be determined  Potential Assistance needed at discharge: (P) [address] (resume at [employer])            Potential DME:    Patient expects to discharge to: (P) Other (comment) (to be determined: possible surgery)  Plan for transportation at discharge:      Financial    Payor: [employer] Rodney / Plan: [address] HMO / Product Type: *No Product type* /     Does insurance require precert for SNF: Yes    Potential assistance Purchasing Medications: (P) No  Meds-to-Beds request:        [address] [facility], [address] [facility],[id number] [place] [address]  Phone: [phone] Fax: [phone]      Notes:    Factors facilitating achievement of predicted outcomes: Family support, Motivated, Cooperative, Pleasant, Sense of humor, and Good insight into deficits    Barriers to discharge: possible surgery    Additional Case Management Notes: pt sustained a fall with fx of l. Femur. He is for possible surgery but date and time have not been established. His clinical outcome will need to be assessed. He had been at [facility]. We are to contact his sister Hernando Gomez for updates on his condition and she states that she will stay in contact with the patient's daughter [address].  She states that the patient's wife Sung Jeong does have some difficulty

## 2023-10-09 NOTE — DISCHARGE SUMMARY
[facility], [address] [place] [address] Bryan Cohen  MRN: [id number]  YOB: 1960  59 y.o.male      Attending  Blessing Jane MD ?   Date of Admission  10/4/2023 Date of Discharge  10/9/2023      ? DIAGNOSES:  Principal Problem:    Closed nondisplaced intertrochanteric fracture of left femur (HCC)  Active Problems:    ANTON (acute kidney injury) ([address])    Fall from standing    Acute pain due to trauma    Acute post-operative pain    Acute blood loss anemia  Resolved Problems:    * No resolved hospital problems. *         PROCEDURES:  10/5/2023: s/p TFN left femur fx (Ortho, Dr. Magda Llamas)  ? DISCHARGE MEDICATIONS:   Current Discharge Medication List             Details   oxyCODONE (ROXICODONE) 5 MG immediate release tablet Take 1 tablet by mouth every 6 hours as needed for Pain (for severe pain only (rated 7-10/10 in severity)) for up to 5 days. Max Daily Amount: 20 mg  Qty: 20 tablet, Refills: 0    Comments: Reduce doses taken as pain becomes manageable  Associated Diagnoses: Closed nondisplaced intertrochanteric fracture of left femur, initial encounter ([address]); Acute pain due to trauma      acetaminophen (TYLENOL) 325 MG tablet Take 2 tablets by mouth in the morning and 2 tablets at noon and 2 tablets in the evening and 2 tablets before bedtime. Do all this for 14 days. Qty: 112 tablet, Refills: 0      !! apixaban (ELIQUIS) 2.5 MG TABS tablet Take 1 tablet by mouth 2 times daily for 4 doses  Qty: 4 tablet, Refills: 0      sennosides-docusate sodium (SENOKOT-S) 8.6-50 MG tablet Take 1 tablet by mouth 2 times daily for 14 days  Qty: 28 tablet, Refills: 0       !! - Potential duplicate medications found. Please discuss with provider. Details   !! apixaban (ELIQUIS) 5 MG TABS tablet Take 1 tablet by mouth 2 times daily RESUME FULL DOSE ON 10/11. Continue low dose of eliquis 2.5mg twice daily until 10/11.   Qty: 60 tablet, Refills: 0       !! - Potential

## 2023-10-12 ENCOUNTER — OFFICE VISIT (OUTPATIENT)
Dept: GERIATRIC MEDICINE | Age: 63
End: 2023-10-12

## 2023-10-12 DIAGNOSIS — I50.22 CHRONIC SYSTOLIC (CONGESTIVE) HEART FAILURE (HCC): Primary | ICD-10-CM

## 2023-10-12 DIAGNOSIS — Z79.4 TYPE 2 DIABETES MELLITUS WITH DIABETIC NEUROPATHY, WITH LONG-TERM CURRENT USE OF INSULIN (HCC): ICD-10-CM

## 2023-10-12 DIAGNOSIS — M48.062 SPINAL STENOSIS, LUMBAR REGION WITH NEUROGENIC CLAUDICATION: ICD-10-CM

## 2023-10-12 DIAGNOSIS — E11.40 TYPE 2 DIABETES MELLITUS WITH DIABETIC NEUROPATHY, WITH LONG-TERM CURRENT USE OF INSULIN (HCC): ICD-10-CM

## 2023-10-14 ENCOUNTER — APPOINTMENT (OUTPATIENT)
Dept: CT IMAGING | Age: 63
End: 2023-10-14
Attending: EMERGENCY MEDICINE
Payer: MEDICARE

## 2023-10-14 ENCOUNTER — APPOINTMENT (OUTPATIENT)
Dept: GENERAL RADIOLOGY | Age: 63
End: 2023-10-14
Payer: MEDICARE

## 2023-10-14 ENCOUNTER — HOSPITAL ENCOUNTER (EMERGENCY)
Age: 63
Discharge: NURSING, REHAB OR LONG-TERM CARE | End: 2023-10-14
Attending: EMERGENCY MEDICINE
Payer: MEDICARE

## 2023-10-14 VITALS
OXYGEN SATURATION: 97 % | DIASTOLIC BLOOD PRESSURE: 69 MMHG | TEMPERATURE: 97.6 F | RESPIRATION RATE: 16 BRPM | SYSTOLIC BLOOD PRESSURE: 108 MMHG | HEART RATE: 76 BPM

## 2023-10-14 DIAGNOSIS — L89.159 PRESSURE INJURY OF SKIN OF SACRAL REGION, UNSPECIFIED INJURY STAGE: ICD-10-CM
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
## 2023-10-14 LAB
ALBUMIN SERPL-MCNC: 3.1 G/DL (ref 3.5–4.6)
ALP SERPL-CCNC: 108 U/L (ref 35–104)
ALT SERPL-CCNC: <5 U/L (ref 0–41)
ANION GAP SERPL CALCULATED.3IONS-SCNC: 9 MEQ/L (ref 9–15)
APTT PPP: 35.8 SEC (ref 24.4–36.8)
AST SERPL-CCNC: 12 U/L (ref 0–40)
BASOPHILS # BLD: 0 K/UL (ref 0–0.2)
BASOPHILS NFR BLD: 0.4 %
BILIRUB SERPL-MCNC: 0.7 MG/DL (ref 0.2–0.7)
BUN SERPL-MCNC: 21 MG/DL (ref 8–23)
CALCIUM SERPL-MCNC: 8.8 MG/DL (ref 8.5–9.9)
CHLORIDE SERPL-SCNC: 98 MEQ/L (ref 95–107)
CK SERPL-CCNC: 50 U/L (ref 0–190)
CO2 SERPL-SCNC: 26 MEQ/L (ref 20–31)
CREAT SERPL-MCNC: 0.94 MG/DL (ref 0.7–1.2)
EKG ATRIAL RATE: 85 BPM
EKG P AXIS: 51 DEGREES
EKG P-R INTERVAL: 134 MS
EKG Q-T INTERVAL: 396 MS
EKG QRS DURATION: 96 MS
EKG QTC CALCULATION (BAZETT): 471 MS
EKG R AXIS: -29 DEGREES
EKG T AXIS: 27 DEGREES
EKG VENTRICULAR RATE: 85 BPM
EOSINOPHIL # BLD: 0.4 K/UL (ref 0–0.7)
EOSINOPHIL NFR BLD: 5.2 %
ERYTHROCYTE [DISTWIDTH] IN BLOOD BY AUTOMATED COUNT: 13.3 % (ref 11.5–14.5)
GLOBULIN SER CALC-MCNC: 2.5 G/DL (ref 2.3–3.5)
GLUCOSE SERPL-MCNC: 161 MG/DL (ref 70–99)
HCT VFR BLD AUTO: 29.9 % (ref 42–52)
HGB BLD-MCNC: 9.8 G/DL (ref 14–18)
INR PPP: 1.4
LACTATE BLDV-SCNC: 1 MMOL/L (ref 0.5–2.2)
LYMPHOCYTES # BLD: 0.4 K/UL (ref 1–4.8)
LYMPHOCYTES NFR BLD: 6.2 %
MCH RBC QN AUTO: 27.5 PG (ref 27–31.3)
MCHC RBC AUTO-ENTMCNC: 32.8 % (ref 33–37)
MCV RBC AUTO: 84 FL (ref 79–92.2)
MONOCYTES # BLD: 0.5 K/UL (ref 0.2–0.8)
MONOCYTES NFR BLD: 7.3 %
NEUTROPHILS # BLD: 5.6 K/UL (ref 1.4–6.5)
NEUTS SEG NFR BLD: 80.5 %
PLATELET # BLD AUTO: 175 K/UL (ref 130–400)
POTASSIUM SERPL-SCNC: 4.3 MEQ/L (ref 3.4–4.9)
PROT SERPL-MCNC: 5.6 G/DL (ref 6.3–8)
PROTHROMBIN TIME: 17 SEC (ref 12.3–14.9)
RBC # BLD AUTO: 3.56 M/UL (ref 4.7–6.1)
SODIUM SERPL-SCNC: 133 MEQ/L (ref 135–144)
TROPONIN, HIGH SENSITIVITY: 64 NG/L (ref 0–19)
TSH SERPL-MCNC: 0.9 UIU/ML (ref 0.44–3.86)
WBC # BLD AUTO: 7 K/UL (ref 4.8–10.8)

## 2023-10-14 PROCEDURE — 80053 COMPREHEN METABOLIC PANEL: CPT

## 2023-10-14 PROCEDURE — 6830039000 HC L3 TRAUMA ALERT

## 2023-10-14 PROCEDURE — 99285 EMERGENCY DEPT VISIT HI MDM: CPT

## 2023-10-14 PROCEDURE — 72131 CT LUMBAR SPINE W/O DYE: CPT

## 2023-10-14 PROCEDURE — 85025 COMPLETE CBC W/AUTO DIFF WBC: CPT

## 2023-10-14 PROCEDURE — 84484 ASSAY OF TROPONIN QUANT: CPT

## 2023-10-14 PROCEDURE — 84443 ASSAY THYROID STIM HORMONE: CPT

## 2023-10-14 PROCEDURE — 36415 COLL VENOUS BLD VENIPUNCTURE: CPT

## 2023-10-14 PROCEDURE — 72125 CT NECK SPINE W/O DYE: CPT

## 2023-10-14 PROCEDURE — 70450 CT HEAD/BRAIN W/O DYE: CPT

## 2023-10-14 PROCEDURE — 82550 ASSAY OF CK (CPK): CPT

## 2023-10-14 PROCEDURE — 71045 X-RAY EXAM CHEST 1 VIEW: CPT

## 2023-10-14 PROCEDURE — 73502 X-RAY EXAM HIP UNI 2-3 VIEWS: CPT

## 2023-10-14 PROCEDURE — 85610 PROTHROMBIN TIME: CPT

## 2023-10-14 PROCEDURE — 93005 ELECTROCARDIOGRAM TRACING: CPT | Performed by: EMERGENCY MEDICINE

## 2023-10-14 PROCEDURE — 83605 ASSAY OF LACTIC ACID: CPT

## 2023-10-14 PROCEDURE — 85730 THROMBOPLASTIN TIME PARTIAL: CPT

## 2023-10-14 ASSESSMENT — ENCOUNTER SYMPTOMS
SHORTNESS OF BREATH: 0
COUGH: 0

## 2023-10-14 ASSESSMENT — LIFESTYLE VARIABLES
HOW OFTEN DO YOU HAVE A DRINK CONTAINING ALCOHOL: NEVER
HOW MANY STANDARD DRINKS CONTAINING ALCOHOL DO YOU HAVE ON A TYPICAL DAY: PATIENT DOES NOT DRINK

## 2023-10-14 NOTE — ED NOTES
Attempted to clean up pt and pt stated \"get away from me\" Pt was educated on risks of sitting in urine and stool. Pt responded \"there is something wrong with this scanner, did you not hear me get out\" Pt trying to climb over railing of bed. Security called and at bedside attempting to talk with pt. This RN asked pt if she could get in a more comfortable position. Pt responded \"get out\" Aleksandar Jackman RN at bedside as well.       Trang Pitt RN  10/14/23 3384

## 2023-10-14 NOTE — ED NOTES
Pt refused vital signs at this time.  Dr. Rodríguez Austyn aware     Isabel Hernandez, RN  10/14/23 5215

## 2023-10-14 NOTE — ED NOTES
Trauma team at [address], [address] [place] [address]  10/14/23 [address] [place] [address]

## 2023-10-14 NOTE — ED NOTES
Pt ripped out IV and took off BP cuff and wires to cardiac monitor      Gertrude Saunders RN  10/14/23 8315

## 2023-10-14 NOTE — CONSULTS
Trauma Consult / H & P Note    Reason for Consult: Trauma  Consulting Provider: Minal Wilcox MD      BASIC INJURY INFORMATION:  Level of activation: CAT 2  Mode of transport: EMS  Mechanism of injury:  Fall from bed  Complicating features: NA  Protective measures: NA    HISTORY OF PRESENT INJURY:   April Fernie is a 59 y.o. male with a PMHx of CAD, DM2, HTN, HLD, HFpEF, recent CVA (right-sided hemiparesis) and recent TFN of L femur following a fall presenting to the ED as a Category 2 trauma. Per ED report, patient sustained unwitnessed fall from bed at nursing facility. Unknown details surrounding the event. Trauma consulted due to difficulty obtaining examination and history. On arrival to room, patient refusing all examinations and verbally aggressive with staff. Multiple attempts made to complete trauma survey, all of which patient vehemently refused. On inspection, no obvious deformities of extremities or significant bruising. Weakness noted in RUE, consistent with recent history of CVA. Patient did report chest pain with dizziness, though pain not reproducible on palpation. ED staff made aware. PRIMARY SURVEY:  Airway: Intact  Breathing: Normal   Breath Sounds: Breath Sounds Equal Bilaterally  Circulation:    Pulses: Normal   Skin:  Limited examination; no obvious bruising noted on patient's anterior  Disability:   Pupils: PERRL   GCS:    Best Eyes: 4    Best Verbal: 4    Best Motor: 6    Total: 14    Vitals:   Vitals:    10/14/23 1300 10/14/23 1315 10/14/23 1330 10/14/23 1430   BP: 110/65   108/69   Pulse: 84 84 85 76   Resp: 15 15 12 16   Temp:       SpO2: 93% 95% 98% 97%         SECONDARY SURVEY:  Patient refusing secondary survey. Denies chest wall tenderness to palpation, otherwise unable to complete full assessment as patient yelling obscenities with increasing agitation.      PAST MEDICAL HISTORY:  Past Medical History:   Diagnosis Date    Asthma     Back pain     compresion in [place]

## 2023-10-14 NOTE — ED TRIAGE NOTES
Pt is from [facility] with c/o unwitnessed fall  Pt is on blood thinners   Unknown if pt hit his head   Pt refuses to lay on his back or lay on his left side   Pt had a previous stroke and had a deficit of his right leg (no feeling and not able to move it)  Pt isn't able to answer any questions   Pt will occasionally node or shake his head   Per squad this is pt's baseline

## 2023-10-14 NOTE — ED NOTES
Pt refused to have troponin drawn again, Dr. Pedro Luque aware at this time.  Pt was advised of risks of not repeating the troponin test and still refused     Mike Eid RN  10/14/23 8906

## 2023-10-14 NOTE — ED NOTES
Call to trauma @ 1117  Dr Ninfa Roberts spoke with [employer] [employer] concerning patient     [address], [place]  10/14/23 5901

## 2023-10-14 NOTE — ED PROVIDER NOTES
(LIPITOR) 20 MG TABLET    Take 1 tablet by mouth daily    BLOOD GLUCOSE TEST STRIPS (ACCU-CHEK GUIDE) STRIP    Test 4x daily Dx E11.65    BREO ELLIPTA 100-25 MCG/ACT INHALER    1 puff daily Indications: Asthma    CARVEDILOL (COREG) 12.5 MG TABLET    TAKE 1 TABLET BY MOUTH 2 TIMES DAILY    EMPAGLIFLOZIN (JARDIANCE) 10 MG TABLET    Take 1 tablet by mouth daily    ERGOCALCIFEROL (ERGOCALCIFEROL) 1.25 MG (34570 UT) CAPSULE    Take 1 capsule by mouth once a week Indications: Vitamin D Deficiency    GLUCAGON 1 MG INJECTION    Inject 1 mg into the muscle as needed    HANDICAP PLACARD MISC    by Does not apply route He needs a placard for 5 years  Expires 8/2/28  He is unable to walk 200 feet without stopping  Dx back pain    HYDRALAZINE (APRESOLINE) 25 MG TABLET    Take 1 tablet by mouth in the morning and at bedtime    INSULIN GLARGINE (LANTUS) 100 UNIT/ML INJECTION VIAL    Inject 20 Units into the skin nightly    INSULIN LISPRO (HUMALOG) 100 UNIT/ML SOLN INJECTION VIAL    Inject 4 Units into the skin 3 times daily (with meals)    INSULIN PEN NEEDLE (NOVOFINE) 32G X 6 MM MISC    Used to inject insulin 4 times daily before meals and bedtime    OXYCODONE (ROXICODONE) 5 MG IMMEDIATE RELEASE TABLET    Take 1 tablet by mouth every 6 hours as needed for Pain (for severe pain only (rated 7-10/10 in severity)) for up to 5 days. Max Daily Amount: 20 mg    SACUBITRIL-VALSARTAN (ENTRESTO) 24-26 MG PER TABLET    Take 1 tablet by mouth 2 times daily Indications: Congestive Heart Failure    SENNOSIDES-DOCUSATE SODIUM (SENOKOT-S) 8.6-50 MG TABLET    Take 1 tablet by mouth 2 times daily for 14 days    SERTRALINE (ZOLOFT) 50 MG TABLET    Take 1 tablet by mouth daily    TIZANIDINE (ZANAFLEX) 4 MG TABLET    Take 1 tablet by mouth every 8 hours as needed Indications: Muscle Spasm       ALLERGIES     Patient has no known allergies.     FAMILY HISTORY       Family History   Problem Relation Age of Onset    Other Father         accident    Heart Neurological:      Mental Status: He is alert. Comments: Right-sided hemiparesis. DIAGNOSTIC RESULTS     EKG: All EKG's are interpreted by the Emergency Department Physician who either signs or Co-signsthis chart in the absence of a cardiologist.    EKG demonstrates sinus rhythm. Rate is 85. There is no evidence of ectopy or ischemia. Largely unremarkable overall EKG. RADIOLOGY:   Non-plain filmimages such as CT, Ultrasound and MRI are read by the radiologist. Plain radiographic images are visualized and preliminarily interpreted by the emergency physician with the below findings:    See radiologist interpretation as per radiology evaluation. There are no findings of acute disease process or, at this time. Interpretation per the Radiologist below, if available at the time ofthis note:    CT Head W/O Contrast   Final Result   No acute intracranial abnormality. Specifically, there is no acute   intracranial hemorrhage         CT CERVICAL SPINE WO CONTRAST   Final Result   No acute abnormality of the cervical spine. CT LUMBAR SPINE WO CONTRAST   Final Result   1. Old fracture deformities of lumbar spine as above commented with previous   vertebroplasty of L1. Old fractures of the inferior aspect of the right and   left sacral wing and of S3 vertebral body. 2.  Old bilateral spondylolysis of L5 pars interarticularis with small   anterolisthesis of L5.      3.  Chronic right convex scoliotic curvature of the lumbar spine apex in L3-4   level with asymmetric degenerative disc disease and old compression deformity   and subchondral reactive sclerosis in the left side of L3 and L4 vertebral   bodies. 4.  Multiple levels of spinal canal stenosis and neural foramina stenosis   bilaterally as above described. 5.  No new acute fractures in lumbar spine/mid upper sacral spine.       6.  Suspect a fracture of undetermined age in the posteromedial aspect of   left-sided T11 rib not

## 2023-10-16 LAB
EKG ATRIAL RATE: 85 BPM
EKG P AXIS: 51 DEGREES
EKG P-R INTERVAL: 134 MS
EKG Q-T INTERVAL: 396 MS
EKG QRS DURATION: 96 MS
EKG QTC CALCULATION (BAZETT): 471 MS
EKG R AXIS: -29 DEGREES
EKG T AXIS: 27 DEGREES
EKG VENTRICULAR RATE: 85 BPM

## 2023-10-16 PROCEDURE — 93010 ELECTROCARDIOGRAM REPORT: CPT | Performed by: INTERNAL MEDICINE

## 2023-10-18 ENCOUNTER — OFFICE VISIT (OUTPATIENT)
Dept: GERIATRIC MEDICINE | Age: 63
End: 2023-10-18

## 2023-10-18 DIAGNOSIS — R41.82 ALTERED MENTAL STATUS, UNSPECIFIED ALTERED MENTAL STATUS TYPE: ICD-10-CM

## 2023-10-18 DIAGNOSIS — E11.65 TYPE 2 DIABETES MELLITUS WITH HYPERGLYCEMIA, WITH LONG-TERM CURRENT USE OF INSULIN (HCC): Primary | ICD-10-CM

## 2023-10-18 DIAGNOSIS — I50.33 ACUTE ON CHRONIC DIASTOLIC (CONGESTIVE) HEART FAILURE (HCC): ICD-10-CM

## 2023-10-18 DIAGNOSIS — Z79.4 TYPE 2 DIABETES MELLITUS WITH HYPERGLYCEMIA, WITH LONG-TERM CURRENT USE OF INSULIN (HCC): Primary | ICD-10-CM

## 2023-10-18 DIAGNOSIS — I16.0 HYPERTENSIVE URGENCY: ICD-10-CM

## 2023-10-18 DIAGNOSIS — Z53.20: ICD-10-CM

## 2023-10-18 DIAGNOSIS — Z87.81 STATUS POST FRACTURE OF LEFT HIP: ICD-10-CM

## 2023-10-20 ENCOUNTER — CLINICAL SUPPORT (OUTPATIENT)
Dept: ORTHOPEDIC SURGERY | Facility: CLINIC | Age: 63
End: 2023-10-20
Payer: MEDICARE

## 2023-10-20 ENCOUNTER — OFFICE VISIT (OUTPATIENT)
Dept: ORTHOPEDIC SURGERY | Facility: CLINIC | Age: 63
End: 2023-10-20
Payer: MEDICARE

## 2023-10-20 DIAGNOSIS — M25.552 LEFT HIP PAIN: ICD-10-CM

## 2023-10-20 PROCEDURE — 1036F TOBACCO NON-USER: CPT | Performed by: STUDENT IN AN ORGANIZED HEALTH CARE EDUCATION/TRAINING PROGRAM

## 2023-10-20 PROCEDURE — 99024 POSTOP FOLLOW-UP VISIT: CPT | Performed by: STUDENT IN AN ORGANIZED HEALTH CARE EDUCATION/TRAINING PROGRAM

## 2023-10-20 RX ORDER — AMOXICILLIN AND CLAVULANATE POTASSIUM 875; 125 MG/1; MG/1
1 TABLET, FILM COATED ORAL 2 TIMES DAILY
COMMUNITY
Start: 2023-06-27

## 2023-10-20 RX ORDER — ACETAMINOPHEN 325 MG/1
650 TABLET ORAL
COMMUNITY
Start: 2023-10-09 | End: 2023-10-23

## 2023-10-20 RX ORDER — ALBUTEROL SULFATE 90 UG/1
2 AEROSOL, METERED RESPIRATORY (INHALATION) EVERY 6 HOURS PRN
COMMUNITY
Start: 2022-04-01

## 2023-10-20 ASSESSMENT — PAIN - FUNCTIONAL ASSESSMENT: PAIN_FUNCTIONAL_ASSESSMENT: NO/DENIES PAIN

## 2023-10-20 NOTE — PROGRESS NOTES
History of Present Illness  Patient returns today endorsing mild pain.  Denies any numbness or tingling.  No calf pain, No chest pain or shortness of breath.     Exam  Mild swelling thigh  Healthy incisions, no erythema or drainage  Tolerates ROM and gentle log roll of hip without issues  + Plantar/dorsiflexion  Negative Katerina test     X-Ray    Unable to obtain today as patient is a Sheri lift and we do not have staff to get patient safely to x-ray table     Assessment  Patient status post left hip cephalomedullary nail doing well     Plan  Discussed analgesics, encouraging non-opioid modalities.  Encouraged ice, rest as need  Discussed weight bearing, DVT prophylaxis, and rehab  Follow-up 4 weeks  XR at follow up 2 views AP/lateral hip  DVT PPx Eliquis    We will put order in for x-rays of the left hip to be done on outpatient basis.  We will follow-up on these after these are collected.

## 2023-10-21 ENCOUNTER — OFFICE VISIT (OUTPATIENT)
Dept: GERIATRIC MEDICINE | Age: 63
End: 2023-10-21

## 2023-10-21 DIAGNOSIS — I50.22 CHRONIC SYSTOLIC (CONGESTIVE) HEART FAILURE (HCC): Primary | ICD-10-CM

## 2023-10-21 DIAGNOSIS — Z79.4 TYPE 2 DIABETES MELLITUS WITH DIABETIC NEUROPATHY, WITH LONG-TERM CURRENT USE OF INSULIN (HCC): ICD-10-CM

## 2023-10-21 DIAGNOSIS — R13.12 DYSPHAGIA, OROPHARYNGEAL PHASE: ICD-10-CM

## 2023-10-21 DIAGNOSIS — E11.40 TYPE 2 DIABETES MELLITUS WITH DIABETIC NEUROPATHY, WITH LONG-TERM CURRENT USE OF INSULIN (HCC): ICD-10-CM

## 2023-10-29 ENCOUNTER — LAB REQUISITION (OUTPATIENT)
Dept: LAB | Facility: HOSPITAL | Age: 63
End: 2023-10-29
Payer: MEDICARE

## 2023-10-29 DIAGNOSIS — R41.82 ALTERED MENTAL STATUS, UNSPECIFIED: ICD-10-CM

## 2023-10-29 LAB
ANION GAP SERPL CALC-SCNC: 12 MMOL/L (ref 10–20)
BASOPHILS # BLD AUTO: 0.04 X10*3/UL (ref 0–0.1)
BASOPHILS NFR BLD AUTO: 0.7 %
BUN SERPL-MCNC: 22 MG/DL (ref 6–23)
CALCIUM SERPL-MCNC: 8.7 MG/DL (ref 8.6–10.3)
CHLORIDE SERPL-SCNC: 103 MMOL/L (ref 98–107)
CO2 SERPL-SCNC: 25 MMOL/L (ref 21–32)
CREAT SERPL-MCNC: 0.96 MG/DL (ref 0.5–1.3)
EOSINOPHIL # BLD AUTO: 0.42 X10*3/UL (ref 0–0.7)
EOSINOPHIL NFR BLD AUTO: 6.9 %
ERYTHROCYTE [DISTWIDTH] IN BLOOD BY AUTOMATED COUNT: 14.6 % (ref 11.5–14.5)
GFR SERPL CREATININE-BSD FRML MDRD: 89 ML/MIN/1.73M*2
GLUCOSE SERPL-MCNC: 178 MG/DL (ref 74–99)
HCT VFR BLD AUTO: 35.7 % (ref 41–52)
HGB BLD-MCNC: 11.2 G/DL (ref 13.5–17.5)
IMM GRANULOCYTES # BLD AUTO: 0.01 X10*3/UL (ref 0–0.7)
IMM GRANULOCYTES NFR BLD AUTO: 0.2 % (ref 0–0.9)
LYMPHOCYTES # BLD AUTO: 0.69 X10*3/UL (ref 1.2–4.8)
LYMPHOCYTES NFR BLD AUTO: 11.3 %
MCH RBC QN AUTO: 27.3 PG (ref 26–34)
MCHC RBC AUTO-ENTMCNC: 31.4 G/DL (ref 32–36)
MCV RBC AUTO: 87 FL (ref 80–100)
MONOCYTES # BLD AUTO: 0.53 X10*3/UL (ref 0.1–1)
MONOCYTES NFR BLD AUTO: 8.7 %
NEUTROPHILS # BLD AUTO: 4.39 X10*3/UL (ref 1.2–7.7)
NEUTROPHILS NFR BLD AUTO: 72.2 %
NRBC BLD-RTO: 0 /100 WBCS (ref 0–0)
PLATELET # BLD AUTO: 205 X10*3/UL (ref 150–450)
PMV BLD AUTO: 9 FL (ref 7.5–11.5)
POTASSIUM SERPL-SCNC: 4.2 MMOL/L (ref 3.5–5.3)
RBC # BLD AUTO: 4.11 X10*6/UL (ref 4.5–5.9)
SODIUM SERPL-SCNC: 136 MMOL/L (ref 136–145)
WBC # BLD AUTO: 6.1 X10*3/UL (ref 4.4–11.3)

## 2023-10-29 PROCEDURE — 80048 BASIC METABOLIC PNL TOTAL CA: CPT

## 2023-10-29 PROCEDURE — 85025 COMPLETE CBC W/AUTO DIFF WBC: CPT

## 2023-10-31 ENCOUNTER — APPOINTMENT (OUTPATIENT)
Dept: GENERAL RADIOLOGY | Age: 63
End: 2023-10-31
Payer: MEDICARE

## 2023-10-31 ENCOUNTER — APPOINTMENT (OUTPATIENT)
Dept: CT IMAGING | Age: 63
End: 2023-10-31
Payer: MEDICARE

## 2023-10-31 ENCOUNTER — HOSPITAL ENCOUNTER (EMERGENCY)
Age: 63
Discharge: HOME | End: 2023-10-31
Payer: MEDICARE

## 2023-10-31 VITALS
TEMPERATURE: 97.2 F | OXYGEN SATURATION: 98 % | RESPIRATION RATE: 16 BRPM | BODY MASS INDEX: 25.27 KG/M2 | SYSTOLIC BLOOD PRESSURE: 117 MMHG | DIASTOLIC BLOOD PRESSURE: 64 MMHG | WEIGHT: 161 LBS | HEART RATE: 88 BPM | HEIGHT: 67 IN

## 2023-10-31 DIAGNOSIS — R45.1 AGITATION: Primary | ICD-10-CM

## 2023-10-31 LAB
ALBUMIN SERPL-MCNC: 3.7 G/DL (ref 3.5–4.6)
ALP SERPL-CCNC: 148 U/L (ref 35–104)
ALT SERPL-CCNC: 8 U/L (ref 0–41)
AMPHET UR QL SCN: NORMAL
ANION GAP SERPL CALCULATED.3IONS-SCNC: 12 MEQ/L (ref 9–15)
APAP SERPL-MCNC: <5 UG/ML (ref 10–30)
APTT PPP: 34.7 SEC (ref 24.4–36.8)
AST SERPL-CCNC: 18 U/L (ref 0–40)
BACTERIA URNS QL MICRO: NEGATIVE /HPF
BARBITURATES UR QL SCN: NORMAL
BASOPHILS # BLD: 0 K/UL (ref 0–0.2)
BASOPHILS NFR BLD: 0.4 %
BENZODIAZ UR QL SCN: NORMAL
BILIRUB SERPL-MCNC: 0.5 MG/DL (ref 0.2–0.7)
BILIRUB UR QL STRIP: NEGATIVE
BNP BLD-MCNC: 681 PG/ML
BUN SERPL-MCNC: 24 MG/DL (ref 8–23)
CALCIUM SERPL-MCNC: 9.6 MG/DL (ref 8.5–9.9)
CANNABINOIDS UR QL SCN: NORMAL
CHLORIDE SERPL-SCNC: 98 MEQ/L (ref 95–107)
CHOLEST SERPL-MCNC: 172 MG/DL (ref 0–199)
CHP ED QC CHECK: NORMAL
CK SERPL-CCNC: 42 U/L (ref 0–190)
CLARITY UR: CLEAR
CO2 SERPL-SCNC: 24 MEQ/L (ref 20–31)
COCAINE UR QL SCN: NORMAL
COLOR UR: YELLOW
CREAT SERPL-MCNC: 0.9 MG/DL (ref 0.7–1.2)
DRUG SCREEN COMMENT UR-IMP: NORMAL
EKG ATRIAL RATE: 82 BPM
EKG P AXIS: 49 DEGREES
EKG P-R INTERVAL: 152 MS
EKG Q-T INTERVAL: 396 MS
EKG QRS DURATION: 96 MS
EKG QTC CALCULATION (BAZETT): 462 MS
EKG R AXIS: -42 DEGREES
EKG T AXIS: 44 DEGREES
EKG VENTRICULAR RATE: 82 BPM
EOSINOPHIL # BLD: 0.6 K/UL (ref 0–0.7)
EOSINOPHIL NFR BLD: 8.1 %
EPI CELLS #/AREA URNS AUTO: NORMAL /HPF (ref 0–5)
ERYTHROCYTE [DISTWIDTH] IN BLOOD BY AUTOMATED COUNT: 14.6 % (ref 11.5–14.5)
ETHANOL PERCENT: NORMAL G/DL
ETHANOLAMINE SERPL-MCNC: <10 MG/DL (ref 0–0.08)
FENTANYL SCREEN, URINE: NORMAL
GLOBULIN SER CALC-MCNC: 3 G/DL (ref 2.3–3.5)
GLUCOSE BLD-MCNC: 140 MG/DL
GLUCOSE BLD-MCNC: 140 MG/DL (ref 70–99)
GLUCOSE SERPL-MCNC: 144 MG/DL (ref 70–99)
GLUCOSE UR STRIP-MCNC: >=1000 MG/DL
HCT VFR BLD AUTO: 39.3 % (ref 42–52)
HDLC SERPL-MCNC: 42 MG/DL (ref 40–59)
HGB BLD-MCNC: 12.6 G/DL (ref 14–18)
HGB UR QL STRIP: NEGATIVE
HYALINE CASTS #/AREA URNS AUTO: NORMAL /HPF (ref 0–5)
INR PPP: 1.2
KETONES UR STRIP-MCNC: ABNORMAL MG/DL
LACTIC ACID, SEPSIS: 1.4 MMOL/L (ref 0.5–1.9)
LDLC SERPL CALC-MCNC: 105 MG/DL (ref 0–129)
LEUKOCYTE ESTERASE UR QL STRIP: NEGATIVE
LIPASE SERPL-CCNC: 69 U/L (ref 12–95)
LYMPHOCYTES # BLD: 0.8 K/UL (ref 1–4.8)
LYMPHOCYTES NFR BLD: 11.2 %
MAGNESIUM SERPL-MCNC: 2.1 MG/DL (ref 1.7–2.4)
MCH RBC QN AUTO: 27.8 PG (ref 27–31.3)
MCHC RBC AUTO-ENTMCNC: 32.1 % (ref 33–37)
MCV RBC AUTO: 86.6 FL (ref 79–92.2)
METHADONE UR QL SCN: NORMAL
MONOCYTES # BLD: 0.6 K/UL (ref 0.2–0.8)
MONOCYTES NFR BLD: 8.8 %
NEUTROPHILS # BLD: 5 K/UL (ref 1.4–6.5)
NEUTS SEG NFR BLD: 71.1 %
NITRITE UR QL STRIP: NEGATIVE
OPIATES UR QL SCN: NORMAL
OXYCODONE UR QL SCN: NORMAL
PCP UR QL SCN: NORMAL
PERFORMED ON: ABNORMAL
PH UR STRIP: 5.5 [PH] (ref 5–9)
PLATELET # BLD AUTO: 241 K/UL (ref 130–400)
POTASSIUM SERPL-SCNC: 4.1 MEQ/L (ref 3.4–4.9)
PROCALCITONIN SERPL IA-MCNC: 0.07 NG/ML (ref 0–0.15)
PROPOXYPH UR QL SCN: NORMAL
PROT SERPL-MCNC: 6.7 G/DL (ref 6.3–8)
PROT UR STRIP-MCNC: 100 MG/DL
PROTHROMBIN TIME: 14.8 SEC (ref 12.3–14.9)
RBC # BLD AUTO: 4.54 M/UL (ref 4.7–6.1)
RBC #/AREA URNS HPF: NORMAL /HPF (ref 0–2)
SALICYLATES SERPL-MCNC: <0.3 MG/DL (ref 15–30)
SODIUM SERPL-SCNC: 134 MEQ/L (ref 135–144)
SP GR UR STRIP: 1.03 (ref 1–1.03)
TRIGL SERPL-MCNC: 125 MG/DL (ref 0–150)
TROPONIN, HIGH SENSITIVITY: 62 NG/L (ref 0–19)
TROPONIN, HIGH SENSITIVITY: 62 NG/L (ref 0–19)
TSH REFLEX: 1 UIU/ML (ref 0.44–3.86)
URINE REFLEX TO CULTURE: ABNORMAL
UROBILINOGEN UR STRIP-ACNC: 1 E.U./DL
WBC # BLD AUTO: 7.1 K/UL (ref 4.8–10.8)
WBC #/AREA URNS AUTO: NORMAL /HPF (ref 0–5)

## 2023-10-31 PROCEDURE — 84484 ASSAY OF TROPONIN QUANT: CPT

## 2023-10-31 PROCEDURE — 36415 COLL VENOUS BLD VENIPUNCTURE: CPT

## 2023-10-31 PROCEDURE — 71045 X-RAY EXAM CHEST 1 VIEW: CPT

## 2023-10-31 PROCEDURE — 80053 COMPREHEN METABOLIC PANEL: CPT

## 2023-10-31 PROCEDURE — 87040 BLOOD CULTURE FOR BACTERIA: CPT

## 2023-10-31 PROCEDURE — 84443 ASSAY THYROID STIM HORMONE: CPT

## 2023-10-31 PROCEDURE — 84145 PROCALCITONIN (PCT): CPT

## 2023-10-31 PROCEDURE — 83880 ASSAY OF NATRIURETIC PEPTIDE: CPT

## 2023-10-31 PROCEDURE — 83735 ASSAY OF MAGNESIUM: CPT

## 2023-10-31 PROCEDURE — 85730 THROMBOPLASTIN TIME PARTIAL: CPT

## 2023-10-31 PROCEDURE — 80061 LIPID PANEL: CPT

## 2023-10-31 PROCEDURE — 83605 ASSAY OF LACTIC ACID: CPT

## 2023-10-31 PROCEDURE — 99285 EMERGENCY DEPT VISIT HI MDM: CPT

## 2023-10-31 PROCEDURE — 81001 URINALYSIS AUTO W/SCOPE: CPT

## 2023-10-31 PROCEDURE — 96372 THER/PROPH/DIAG INJ SC/IM: CPT

## 2023-10-31 PROCEDURE — 82550 ASSAY OF CK (CPK): CPT

## 2023-10-31 PROCEDURE — 93005 ELECTROCARDIOGRAM TRACING: CPT | Performed by: PERSONAL EMERGENCY RESPONSE ATTENDANT

## 2023-10-31 PROCEDURE — 70450 CT HEAD/BRAIN W/O DYE: CPT

## 2023-10-31 PROCEDURE — 80307 DRUG TEST PRSMV CHEM ANLYZR: CPT

## 2023-10-31 PROCEDURE — 80179 DRUG ASSAY SALICYLATE: CPT

## 2023-10-31 PROCEDURE — 2580000003 HC RX 258: Performed by: PERSONAL EMERGENCY RESPONSE ATTENDANT

## 2023-10-31 PROCEDURE — 83690 ASSAY OF LIPASE: CPT

## 2023-10-31 PROCEDURE — 6360000002 HC RX W HCPCS

## 2023-10-31 PROCEDURE — 85025 COMPLETE CBC W/AUTO DIFF WBC: CPT

## 2023-10-31 PROCEDURE — 85610 PROTHROMBIN TIME: CPT

## 2023-10-31 PROCEDURE — 80143 DRUG ASSAY ACETAMINOPHEN: CPT

## 2023-10-31 PROCEDURE — 82077 ASSAY SPEC XCP UR&BREATH IA: CPT

## 2023-10-31 RX ORDER — LORAZEPAM 2 MG/ML
1 INJECTION INTRAMUSCULAR ONCE
Status: DISCONTINUED | OUTPATIENT
Start: 2023-10-31 | End: 2023-10-31

## 2023-10-31 RX ORDER — 0.9 % SODIUM CHLORIDE 0.9 %
500 INTRAVENOUS SOLUTION INTRAVENOUS ONCE
Status: COMPLETED | OUTPATIENT
Start: 2023-10-31 | End: 2023-10-31

## 2023-10-31 RX ORDER — LORAZEPAM 2 MG/ML
1 INJECTION INTRAMUSCULAR ONCE
Status: COMPLETED | OUTPATIENT
Start: 2023-10-31 | End: 2023-10-31

## 2023-10-31 RX ADMIN — LORAZEPAM 1 MG: 2 INJECTION INTRAMUSCULAR; INTRAVENOUS at 07:37

## 2023-10-31 RX ADMIN — SODIUM CHLORIDE 500 ML: 9 INJECTION, SOLUTION INTRAVENOUS at 01:42

## 2023-10-31 ASSESSMENT — ENCOUNTER SYMPTOMS
COUGH: 0
SHORTNESS OF BREATH: 0
WHEEZING: 0
NAUSEA: 0
DIARRHEA: 0
ABDOMINAL PAIN: 0
ABDOMINAL PAIN: 0
BLOOD IN STOOL: 0
COLOR CHANGE: 0
SHORTNESS OF BREATH: 0
DIARRHEA: 0
BACK PAIN: 1
RHINORRHEA: 0
CONSTIPATION: 0
COLOR CHANGE: 0
SORE THROAT: 0
ABDOMINAL DISTENTION: 0
VOMITING: 0

## 2023-10-31 ASSESSMENT — PAIN - FUNCTIONAL ASSESSMENT: PAIN_FUNCTIONAL_ASSESSMENT: NONE - DENIES PAIN

## 2023-10-31 NOTE — PROGRESS NOTES
Subjective:      Patient ID: Kathy Gagnon is a pleasant 59 y.o. male who presents today for:  No chief complaint on file. [facility]    Upon arrival today with patient patient did have a fall and we did order x-ray for bilateral hips. Patient does have apparent left hip fracture noted on x-ray. Patient has increased pain, however he is comfortable lying in bed today. He is sleeping and was aroused without issue. Patient is in agreement to head to ED for further eval and management. We will send out ASAP.       Patient Active Problem List   Diagnosis    Essential hypertension, benign    Obesity, diabetes, and hypertension syndrome (HCC)    Irregular heart rhythm    Hyperlipidemia    Allergic rhinitis due to pollen    Staphylococcus aureus bacteremia    Hematoma of right thigh    Hematoma of left lower extremity    Hereditary peripheral neuropathy    Migraine without aura    Community acquired pneumonia of right lower lobe of lung    Acute on chronic diastolic (congestive) heart failure (HCC)    Mild intermittent asthma with exacerbation    NSTEMI (non-ST elevated myocardial infarction) ([address])    Acute bronchitis due to other specified organisms    CAD (coronary artery disease)    Body mass index (bmi) 29.0-29.9, adult    Cellulitis of right lower limb    Dyspnea, unspecified    Encounter for pre-employment examination    Family history of ischemic heart disease and other diseases of the circulatory system    Other specified hypothyroidism    Inflammatory disorders of scrotum    Long term (current) use of insulin ([address])    Long term (current) use of oral hypoglycemic drugs    Other chronic sinusitis    Other idiopathic scoliosis, lumbosacral region    Other intervertebral disc disorders, lumbar region    Pain in left lower leg    General patient noncompliance    Sleep apnea    Spinal stenosis, lumbar region with neurogenic claudication    Spondylolisthesis, lumbosacral region    Poorly controlled type 2

## 2023-10-31 NOTE — ED NOTES
Spoke with Dr. Hermelindo Garcia for dispo and states to send pt back to NH at this time  [address] charge updated at this time      Karolyn Garcia  10/31/23 9566
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
## 2023-10-31 NOTE — ED NOTES
Patient currently yelling out, patient has been given the call button for future use. Patient is becoming increasingly more agitated and requiring redirection. Patient is still confused about where he is or the situation. Patient complains that he \"is sick. \" Patient states that he is currently having back pain from his surgery and laying in the bed. Patient removed IV himself. Notified Williams Milan NP.        Kierra Tavares RN  10/31/23 7427

## 2023-10-31 NOTE — ED NOTES
Patient straight cathed for urine sample.    NABILA Cruz notified of output     [place], [place]  10/31/23 0916

## 2023-10-31 NOTE — ED NOTES
Pt resting in bed with eyes closed, pt drowsy, see meds given, resp. Even and unlabored, pulses palp. 0 distress, NP Mikalys updated, 0 new orders.      Ladi Cade RN  10/31/23 4793
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
## 2023-10-31 NOTE — ED NOTES
Pt stable, resting in bed, eyes close,d 0 distress, 0 c/o, easy to arouse, rep. Even, non-labored.      Eliza Menendez RN  10/31/23 0900       Eliza Menendez RN  10/31/23 2902

## 2023-10-31 NOTE — ED NOTES
Upon inspection staples are present in the left chest, surgical scars on the left hip x 2. Wound bandage from home facility in place on coccyx.      Stewart Gutierrez RN  10/31/23 0202       Stewart Gutierrez RN  10/31/23 5174 ARMIDA Hicks, [facility], RN  10/31/23 2261

## 2023-10-31 NOTE — ED NOTES
TAKING OVER THE CARE OF PT. PT AWAKE, RESTING IN BED, PT A&OX3, AT TIMES PT CONFUSED. SKIN W/D/PINK, 0 PAIN, 0 SOB.      Poli Carr RN  10/31/23 2014
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
## 2023-11-02 ENCOUNTER — APPOINTMENT (OUTPATIENT)
Dept: GENERAL RADIOLOGY | Age: 63
End: 2023-11-02
Payer: MEDICARE

## 2023-11-02 ENCOUNTER — HOSPITAL ENCOUNTER (EMERGENCY)
Age: 63
Discharge: HOME | End: 2023-11-02
Attending: STUDENT IN AN ORGANIZED HEALTH CARE EDUCATION/TRAINING PROGRAM
Payer: MEDICARE

## 2023-11-02 VITALS
WEIGHT: 160 LBS | OXYGEN SATURATION: 99 % | HEART RATE: 77 BPM | BODY MASS INDEX: 25.11 KG/M2 | SYSTOLIC BLOOD PRESSURE: 155 MMHG | DIASTOLIC BLOOD PRESSURE: 72 MMHG | RESPIRATION RATE: 20 BRPM | HEIGHT: 67 IN | TEMPERATURE: 97.7 F

## 2023-11-02 DIAGNOSIS — R45.1 AGITATION: Primary | ICD-10-CM

## 2023-11-02 LAB
ALBUMIN SERPL-MCNC: 3.5 G/DL (ref 3.5–4.6)
ALP SERPL-CCNC: 132 U/L (ref 35–104)
ALT SERPL-CCNC: 6 U/L (ref 0–41)
AMPHET UR QL SCN: NORMAL
ANION GAP SERPL CALCULATED.3IONS-SCNC: 9 MEQ/L (ref 9–15)
APAP SERPL-MCNC: <5 UG/ML (ref 10–30)
AST SERPL-CCNC: 15 U/L (ref 0–40)
BACTERIA URNS QL MICRO: NEGATIVE /HPF
BARBITURATES UR QL SCN: NORMAL
BASOPHILS # BLD: 0 K/UL (ref 0–0.2)
BASOPHILS NFR BLD: 0.3 %
BENZODIAZ UR QL SCN: NORMAL
BILIRUB SERPL-MCNC: 0.4 MG/DL (ref 0.2–0.7)
BILIRUB UR QL STRIP: NEGATIVE
BUN SERPL-MCNC: 25 MG/DL (ref 8–23)
CALCIUM SERPL-MCNC: 9.1 MG/DL (ref 8.5–9.9)
CANNABINOIDS UR QL SCN: NORMAL
CHLORIDE SERPL-SCNC: 103 MEQ/L (ref 95–107)
CHOLEST SERPL-MCNC: 149 MG/DL (ref 0–199)
CK SERPL-CCNC: 44 U/L (ref 0–190)
CLARITY UR: CLEAR
CO2 SERPL-SCNC: 26 MEQ/L (ref 20–31)
COCAINE UR QL SCN: NORMAL
COLOR UR: YELLOW
CREAT SERPL-MCNC: 0.98 MG/DL (ref 0.7–1.2)
DRUG SCREEN COMMENT UR-IMP: NORMAL
EOSINOPHIL # BLD: 0.4 K/UL (ref 0–0.7)
EOSINOPHIL NFR BLD: 7 %
EPI CELLS #/AREA URNS AUTO: ABNORMAL /HPF (ref 0–5)
ERYTHROCYTE [DISTWIDTH] IN BLOOD BY AUTOMATED COUNT: 14.6 % (ref 11.5–14.5)
ETHANOL PERCENT: NORMAL G/DL
ETHANOLAMINE SERPL-MCNC: <10 MG/DL (ref 0–0.08)
FENTANYL SCREEN, URINE: NORMAL
GLOBULIN SER CALC-MCNC: 2.3 G/DL (ref 2.3–3.5)
GLUCOSE SERPL-MCNC: 102 MG/DL (ref 70–99)
GLUCOSE UR STRIP-MCNC: >=1000 MG/DL
HCT VFR BLD AUTO: 35.5 % (ref 42–52)
HDLC SERPL-MCNC: 38 MG/DL (ref 40–59)
HGB BLD-MCNC: 11.1 G/DL (ref 14–18)
HGB UR QL STRIP: NEGATIVE
HYALINE CASTS #/AREA URNS AUTO: ABNORMAL /HPF (ref 0–5)
KETONES UR STRIP-MCNC: NEGATIVE MG/DL
LDLC SERPL CALC-MCNC: 91 MG/DL (ref 0–129)
LEUKOCYTE ESTERASE UR QL STRIP: NEGATIVE
LYMPHOCYTES # BLD: 0.7 K/UL (ref 1–4.8)
LYMPHOCYTES NFR BLD: 10.6 %
MAGNESIUM SERPL-MCNC: 2 MG/DL (ref 1.7–2.4)
MCH RBC QN AUTO: 27.3 PG (ref 27–31.3)
MCHC RBC AUTO-ENTMCNC: 31.3 % (ref 33–37)
MCV RBC AUTO: 87.2 FL (ref 79–92.2)
METHADONE UR QL SCN: NORMAL
MONOCYTES # BLD: 0.6 K/UL (ref 0.2–0.8)
MONOCYTES NFR BLD: 8.7 %
NEUTROPHILS # BLD: 4.6 K/UL (ref 1.4–6.5)
NEUTS SEG NFR BLD: 73.1 %
NITRITE UR QL STRIP: NEGATIVE
OPIATES UR QL SCN: NORMAL
OXYCODONE UR QL SCN: NORMAL
PCP UR QL SCN: NORMAL
PH UR STRIP: 5.5 [PH] (ref 5–9)
PLATELET # BLD AUTO: 186 K/UL (ref 130–400)
POTASSIUM SERPL-SCNC: 3.8 MEQ/L (ref 3.4–4.9)
PROPOXYPH UR QL SCN: NORMAL
PROT SERPL-MCNC: 5.8 G/DL (ref 6.3–8)
PROT UR STRIP-MCNC: 100 MG/DL
RBC # BLD AUTO: 4.07 M/UL (ref 4.7–6.1)
RBC #/AREA URNS AUTO: ABNORMAL /HPF (ref 0–5)
SALICYLATES SERPL-MCNC: <0.3 MG/DL (ref 15–30)
SODIUM SERPL-SCNC: 138 MEQ/L (ref 135–144)
SP GR UR STRIP: 1.03 (ref 1–1.03)
TRIGL SERPL-MCNC: 99 MG/DL (ref 0–150)
TSH REFLEX: 0.96 UIU/ML (ref 0.44–3.86)
URINE REFLEX TO CULTURE: ABNORMAL
UROBILINOGEN UR STRIP-ACNC: 1 E.U./DL
WBC # BLD AUTO: 6.3 K/UL (ref 4.8–10.8)
WBC #/AREA URNS AUTO: ABNORMAL /HPF (ref 0–5)

## 2023-11-02 PROCEDURE — 99285 EMERGENCY DEPT VISIT HI MDM: CPT

## 2023-11-02 PROCEDURE — 80307 DRUG TEST PRSMV CHEM ANLYZR: CPT

## 2023-11-02 PROCEDURE — 82550 ASSAY OF CK (CPK): CPT

## 2023-11-02 PROCEDURE — 80061 LIPID PANEL: CPT

## 2023-11-02 PROCEDURE — 83735 ASSAY OF MAGNESIUM: CPT

## 2023-11-02 PROCEDURE — 96360 HYDRATION IV INFUSION INIT: CPT

## 2023-11-02 PROCEDURE — 2580000003 HC RX 258: Performed by: STUDENT IN AN ORGANIZED HEALTH CARE EDUCATION/TRAINING PROGRAM

## 2023-11-02 PROCEDURE — 84443 ASSAY THYROID STIM HORMONE: CPT

## 2023-11-02 PROCEDURE — 82077 ASSAY SPEC XCP UR&BREATH IA: CPT

## 2023-11-02 PROCEDURE — 36415 COLL VENOUS BLD VENIPUNCTURE: CPT

## 2023-11-02 PROCEDURE — 71045 X-RAY EXAM CHEST 1 VIEW: CPT

## 2023-11-02 PROCEDURE — 80053 COMPREHEN METABOLIC PANEL: CPT

## 2023-11-02 PROCEDURE — 85025 COMPLETE CBC W/AUTO DIFF WBC: CPT

## 2023-11-02 PROCEDURE — 80143 DRUG ASSAY ACETAMINOPHEN: CPT

## 2023-11-02 PROCEDURE — 93005 ELECTROCARDIOGRAM TRACING: CPT | Performed by: STUDENT IN AN ORGANIZED HEALTH CARE EDUCATION/TRAINING PROGRAM

## 2023-11-02 PROCEDURE — 80179 DRUG ASSAY SALICYLATE: CPT

## 2023-11-02 PROCEDURE — 81001 URINALYSIS AUTO W/SCOPE: CPT

## 2023-11-02 RX ORDER — 0.9 % SODIUM CHLORIDE 0.9 %
1000 INTRAVENOUS SOLUTION INTRAVENOUS ONCE
Status: COMPLETED | OUTPATIENT
Start: 2023-11-02 | End: 2023-11-02

## 2023-11-02 RX ADMIN — SODIUM CHLORIDE 1000 ML: 9 INJECTION, SOLUTION INTRAVENOUS at 12:45

## 2023-11-02 ASSESSMENT — PAIN DESCRIPTION - LOCATION: LOCATION: BACK

## 2023-11-02 ASSESSMENT — PAIN DESCRIPTION - DESCRIPTORS: DESCRIPTORS: ACHING

## 2023-11-02 ASSESSMENT — PAIN DESCRIPTION - PAIN TYPE: TYPE: ACUTE PAIN

## 2023-11-02 ASSESSMENT — PAIN - FUNCTIONAL ASSESSMENT: PAIN_FUNCTIONAL_ASSESSMENT: 0-10

## 2023-11-02 ASSESSMENT — PAIN DESCRIPTION - FREQUENCY: FREQUENCY: CONTINUOUS

## 2023-11-02 ASSESSMENT — PAIN SCALES - GENERAL: PAINLEVEL_OUTOF10: 0

## 2023-11-02 ASSESSMENT — PAIN DESCRIPTION - ORIENTATION: ORIENTATION: LOWER;MID
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
## 2023-11-02 NOTE — ED NOTES
The following labs were labeled with appropriate pt sticker and tubed to lab:     [] Blue     [x] Lavender   [] on ice  [x] Green/yellow  [] Green/black [] on ice  [x] Grey  [] on ice  [] Yellow  [x] Red  [] Pink  [] Type/ Screen  [] ABG  [] VBG    [] COVID-19 swab    [] Rapid  [] PCR  [] Flu swab  [] Peds Viral Panel     [] Urine Sample  [] Fecal Sample  [] Pelvic Cultures  [] Blood Cultures  [] X 2  [] STREP Cultures      Michelle Garcia RN  11/02/23 1971

## 2023-11-02 NOTE — ED NOTES
Patient assessed by Dr Uriel Gan 1 ulcer to left heel, no drainage  Stage III to sacrum, dressing intact  Incisions from hip surgery well approximated, no drainage  Dr Jaimie Cortes notified of patients BP      Michelle Garcia, RN  11/02/23 9658

## 2023-11-02 NOTE — ED NOTES
[facility] to discuss patient with nurse. Staff was unable to locate nurse and will have to call back.        Simin Mchugh RN  11/02/23 9848
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
## 2023-11-02 NOTE — ED NOTES
Pt asking for food, sandwich and drink given. Pt used urinal and urine sent to lab.      Karolyn Butt  11/02/23 4803

## 2023-11-02 NOTE — ED NOTES
Patient is attempting to urinate in urinal  Pt refusing straight cath        Ameya Reed RN  11/02/23 0974
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
## 2023-11-02 NOTE — DISCHARGE INSTRUCTIONS
RON RN from [facility] will evaluate patient once they return to the nursing facility.
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
## 2023-11-02 NOTE — ED NOTES
Report called to [facility] at AM. Informed her that Lizzie Pagan nurse will be there to compete kit.      Danilo Baumann, [place]  11/02/23 0810

## 2023-11-02 NOTE — ED NOTES
[address] [place] [address],4Th Floor est [facility] Surinder Laird, [place]  11/02/23 0901

## 2023-11-02 NOTE — ED NOTES
Attempted to get collateral information from [facility] and was informed the DON will call back in about 10 minutes.       Ariana Ho RN  11/02/23 2874

## 2023-11-03 LAB
EKG ATRIAL RATE: 75 BPM
EKG P AXIS: 49 DEGREES
EKG P-R INTERVAL: 136 MS
EKG Q-T INTERVAL: 406 MS
EKG QRS DURATION: 94 MS
EKG QTC CALCULATION (BAZETT): 453 MS
EKG R AXIS: -39 DEGREES
EKG T AXIS: 29 DEGREES
EKG VENTRICULAR RATE: 75 BPM

## 2023-11-05 LAB — BACTERIA BLD CULT: NORMAL

## 2023-11-06 ENCOUNTER — HOSPITAL ENCOUNTER (EMERGENCY)
Age: 63
Discharge: HOME | End: 2023-11-06
Payer: MEDICARE

## 2023-11-06 ENCOUNTER — APPOINTMENT (OUTPATIENT)
Dept: CT IMAGING | Age: 63
End: 2023-11-06
Payer: MEDICARE

## 2023-11-06 ENCOUNTER — APPOINTMENT (OUTPATIENT)
Dept: GENERAL RADIOLOGY | Age: 63
End: 2023-11-06
Payer: MEDICARE

## 2023-11-06 VITALS
DIASTOLIC BLOOD PRESSURE: 83 MMHG | HEART RATE: 95 BPM | TEMPERATURE: 97.8 F | SYSTOLIC BLOOD PRESSURE: 170 MMHG | RESPIRATION RATE: 12 BRPM | OXYGEN SATURATION: 100 %

## 2023-11-06 DIAGNOSIS — S70.02XA CONTUSION OF LEFT HIP, INITIAL ENCOUNTER: Primary | ICD-10-CM

## 2023-11-06 PROCEDURE — 99284 EMERGENCY DEPT VISIT MOD MDM: CPT

## 2023-11-06 PROCEDURE — 73552 X-RAY EXAM OF FEMUR 2/>: CPT

## 2023-11-06 PROCEDURE — 73700 CT LOWER EXTREMITY W/O DYE: CPT

## 2023-11-06 ASSESSMENT — ENCOUNTER SYMPTOMS
COLOR CHANGE: 0
RHINORRHEA: 0
SORE THROAT: 0
EYE DISCHARGE: 0
CONSTIPATION: 0
SHORTNESS OF BREATH: 0
ABDOMINAL PAIN: 0
ABDOMINAL DISTENTION: 0

## 2023-11-06 ASSESSMENT — PATIENT HEALTH QUESTIONNAIRE - PHQ9
SUM OF ALL RESPONSES TO PHQ QUESTIONS 1-9: 0
2. FEELING DOWN, DEPRESSED OR HOPELESS: 0
1. LITTLE INTEREST OR PLEASURE IN DOING THINGS: 0
SUM OF ALL RESPONSES TO PHQ QUESTIONS 1-9: 0
SUM OF ALL RESPONSES TO PHQ9 QUESTIONS 1 & 2: 0

## 2023-11-06 NOTE — ED NOTES
Report given to [facility] from [address], [address], [address]  11/06/23 9852

## 2023-11-06 NOTE — ED NOTES
[facility] here. Pt denies any further complaints.  No acute distress noted     Dwayne Davis, HENNYN  74/76/41 0615

## 2023-11-06 NOTE — ED TRIAGE NOTES
Pt arrives via EMS from [facility] c/o fall from bed. Pt was trying to get out of bed when he fell. Pt was here on 11/2 for similar incident, injured L hip. Pt complaining of R leg pain. Pt has Hx of dementia, poor historian.
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
## 2023-11-06 NOTE — ED NOTES
Returned. Put onto monitor. Call light placed within reach. Made aware we are waiting on results. Diaper dry. Pt complains of toes hurting. No acute distress noted at this time.       Hemanth Hui RN  11/06/23 2272

## 2023-11-06 NOTE — ED NOTES
Report attempted to [facility]. Noone available to take report at this time. Message left with  to call back when available.      Namita Go RN  11/06/23 0033

## 2023-11-08 NOTE — PROGRESS NOTES
SUBJECTIVE:  67-year-old gentleman seen for follow-up visit for his heart failure diabetes ataxia patient has had recurrent falls in the past has episodes of agitation patient has had no bleeding diathesis no somatic hypoglycemia no acute fluid overload      ROS: Coughing intermittently  The rest of the 14 point ROS negative    PHYSICAL EXAM: VSS per facility record  Alert and orient x2 pupils reactive oral mucosa moist chest with coarse breath sounds cardiovascular showed regular rate abdomen soft nontender extremity trace edema    ASSESSMENT & PLAN:   Diagnosis Orders   1. Chronic systolic (congestive) heart failure        2. Type 2 diabetes mellitus with diabetic neuropathy, with long-term current use of insulin ([address])        3. Unsteadiness on feet          Monitoring weights diurese intermittent monitor renal function closely. Follow-up A1c BMP pending.             Past Medical History:   Diagnosis Date    Asthma     Back pain     compresion in [place]    CAD (coronary artery disease) 01/2020    3 vessel     chf 09/16/2022    Diabetes mellitus ([address])     Essential hypertension, benign     Hyperlipemia     Ischemic cardiomyopathy 09/16/2022    Neuropathic pain, leg, bilateral          Past Surgical History:   Procedure Laterality Date    CARPAL TUNNEL RELEASE Bilateral     EP DEVICE PROCEDURE N/A 9/29/2023    Loop recorder insert  ROOM 293 [employer] NOTIFIED performed by Kelle Urrutia DO at [address] [facility] CATH LAB    FOOT DEBRIDEMENT Right 5/5/2023    RIGHT FOOT DEBRIDEMENT INCISION AND DRAINAGE ROOM 174 performed by Michael Tang DPM at [facility] Right 5/8/2023    RIGHT FOOT  INCISION AND DRAINAGE WITH DEBRIDEMENT OF NON-VIABLE TISSUE DELAYED PRIMARY CLOSURE performed by Michael Tang DPM at [facility] Left     bone spur    HIP SURGERY Left 10/5/2023    TROCHANTERIC FIXATION NAILING LEFT FEMUR FRACTURE ROOM 265 performed by Elvia Snell MD at [address] [place] [address]   2012

## 2023-11-09 ENCOUNTER — HOSPITAL ENCOUNTER (EMERGENCY)
Age: 63
Discharge: ANOTHER HOSPITAL | End: 2023-11-09
Payer: MEDICARE

## 2023-11-09 VITALS
OXYGEN SATURATION: 96 % | TEMPERATURE: 98.1 F | DIASTOLIC BLOOD PRESSURE: 80 MMHG | SYSTOLIC BLOOD PRESSURE: 128 MMHG | HEART RATE: 98 BPM | RESPIRATION RATE: 18 BRPM

## 2023-11-09 DIAGNOSIS — F03.918 DEMENTIA WITH BEHAVIORAL DISTURBANCE (HCC): ICD-10-CM

## 2023-11-09 DIAGNOSIS — R45.1 AGITATION: Primary | ICD-10-CM

## 2023-11-09 LAB
ALBUMIN SERPL-MCNC: 3.8 G/DL (ref 3.5–4.6)
ALP SERPL-CCNC: 144 U/L (ref 35–104)
ALT SERPL-CCNC: 7 U/L (ref 0–41)
ANION GAP SERPL CALCULATED.3IONS-SCNC: 12 MEQ/L (ref 9–15)
APAP SERPL-MCNC: <5 UG/ML (ref 10–30)
AST SERPL-CCNC: 12 U/L (ref 0–40)
BASOPHILS # BLD: 0 K/UL (ref 0–0.2)
BASOPHILS NFR BLD: 0.3 %
BILIRUB SERPL-MCNC: 0.3 MG/DL (ref 0.2–0.7)
BUN SERPL-MCNC: 22 MG/DL (ref 8–23)
CALCIUM SERPL-MCNC: 9.3 MG/DL (ref 8.5–9.9)
CHLORIDE SERPL-SCNC: 99 MEQ/L (ref 95–107)
CHOLEST SERPL-MCNC: 174 MG/DL (ref 0–199)
CK SERPL-CCNC: 37 U/L (ref 0–190)
CO2 SERPL-SCNC: 26 MEQ/L (ref 20–31)
CREAT SERPL-MCNC: 0.81 MG/DL (ref 0.7–1.2)
EOSINOPHIL # BLD: 0.4 K/UL (ref 0–0.7)
EOSINOPHIL NFR BLD: 5.9 %
ERYTHROCYTE [DISTWIDTH] IN BLOOD BY AUTOMATED COUNT: 14 % (ref 11.5–14.5)
ETHANOL PERCENT: NORMAL G/DL
ETHANOLAMINE SERPL-MCNC: <10 MG/DL (ref 0–0.08)
GLOBULIN SER CALC-MCNC: 2.9 G/DL (ref 2.3–3.5)
GLUCOSE SERPL-MCNC: 166 MG/DL (ref 70–99)
HCT VFR BLD AUTO: 37.3 % (ref 42–52)
HDLC SERPL-MCNC: 41 MG/DL (ref 40–59)
HGB BLD-MCNC: 12 G/DL (ref 14–18)
LDLC SERPL CALC-MCNC: 100 MG/DL (ref 0–129)
LYMPHOCYTES # BLD: 0.7 K/UL (ref 1–4.8)
LYMPHOCYTES NFR BLD: 10.7 %
MCH RBC QN AUTO: 27.8 PG (ref 27–31.3)
MCHC RBC AUTO-ENTMCNC: 32.2 % (ref 33–37)
MCV RBC AUTO: 86.3 FL (ref 79–92.2)
MONOCYTES # BLD: 0.5 K/UL (ref 0.2–0.8)
MONOCYTES NFR BLD: 7.4 %
NEUTROPHILS # BLD: 4.6 K/UL (ref 1.4–6.5)
NEUTS SEG NFR BLD: 75.4 %
PLATELET # BLD AUTO: 178 K/UL (ref 130–400)
POTASSIUM SERPL-SCNC: 4.5 MEQ/L (ref 3.4–4.9)
PROT SERPL-MCNC: 6.7 G/DL (ref 6.3–8)
RBC # BLD AUTO: 4.32 M/UL (ref 4.7–6.1)
SALICYLATES SERPL-MCNC: <0.3 MG/DL (ref 15–30)
SODIUM SERPL-SCNC: 137 MEQ/L (ref 135–144)
TRIGL SERPL-MCNC: 164 MG/DL (ref 0–150)
TSH REFLEX: 1.17 UIU/ML (ref 0.44–3.86)
WBC # BLD AUTO: 6.1 K/UL (ref 4.8–10.8)

## 2023-11-09 PROCEDURE — 84443 ASSAY THYROID STIM HORMONE: CPT

## 2023-11-09 PROCEDURE — 82550 ASSAY OF CK (CPK): CPT

## 2023-11-09 PROCEDURE — 82077 ASSAY SPEC XCP UR&BREATH IA: CPT

## 2023-11-09 PROCEDURE — 93005 ELECTROCARDIOGRAM TRACING: CPT | Performed by: PERSONAL EMERGENCY RESPONSE ATTENDANT

## 2023-11-09 PROCEDURE — 80061 LIPID PANEL: CPT

## 2023-11-09 PROCEDURE — 80143 DRUG ASSAY ACETAMINOPHEN: CPT

## 2023-11-09 PROCEDURE — 99285 EMERGENCY DEPT VISIT HI MDM: CPT

## 2023-11-09 PROCEDURE — 85025 COMPLETE CBC W/AUTO DIFF WBC: CPT

## 2023-11-09 PROCEDURE — 80179 DRUG ASSAY SALICYLATE: CPT

## 2023-11-09 PROCEDURE — 80053 COMPREHEN METABOLIC PANEL: CPT

## 2023-11-09 PROCEDURE — 36415 COLL VENOUS BLD VENIPUNCTURE: CPT

## 2023-11-09 ASSESSMENT — ENCOUNTER SYMPTOMS
COUGH: 0
RHINORRHEA: 0
VOMITING: 0
SORE THROAT: 0
DIARRHEA: 0
NAUSEA: 0
COLOR CHANGE: 0
BLOOD IN STOOL: 0
ABDOMINAL PAIN: 0
SHORTNESS OF BREATH: 0

## 2023-11-09 ASSESSMENT — PAIN - FUNCTIONAL ASSESSMENT: PAIN_FUNCTIONAL_ASSESSMENT: 0-10

## 2023-11-09 ASSESSMENT — PAIN DESCRIPTION - LOCATION: LOCATION: OTHER (COMMENT)

## 2023-11-09 ASSESSMENT — PAIN DESCRIPTION - PAIN TYPE: TYPE: CHRONIC PAIN

## 2023-11-09 ASSESSMENT — LIFESTYLE VARIABLES: HOW OFTEN DO YOU HAVE A DRINK CONTAINING ALCOHOL: NEVER

## 2023-11-09 ASSESSMENT — PAIN SCALES - GENERAL: PAINLEVEL_OUTOF10: 5

## 2023-11-09 ASSESSMENT — PAIN DESCRIPTION - FREQUENCY: FREQUENCY: CONTINUOUS

## 2023-11-09 NOTE — ED NOTES
Patient going to [facility] [phone] nurse to nurse report.  Bed 300B     Katrin Riddle, DONTRELL  11/09/23 6138

## 2023-11-09 NOTE — ED NOTES
Chart faxed to [facility] they called to inform us the doctor declined the patient  Chart faxed to [facility] they called and declined patient  Chart faxed to [facility]  Chart faxed to [facility], RN  11/09/23 [address] S. Mumtaz 43, RN  11/09/23 2339

## 2023-11-09 NOTE — ED NOTES
Daughter updated on pt situation with pt's ok. Aware pt going to [facility] for behavior issues such as agitation.      Tayla Farris, RN  11/09/23 0058

## 2023-11-09 NOTE — ED NOTES
Sitter at bedside. Pt resting in bed. Resp even and unlabored. No distress.       Claudean Harada, RN  11/09/23 3961

## 2023-11-09 NOTE — ED NOTES
[facility] called and have accepted the patient   Dr. Frank Veras 300B  Nurse to Nurse [address] [place] [address] Wilmery 43, RN  11/09/23 6148

## 2023-11-09 NOTE — ED NOTES
Pt to [facility] bed 4. Transferred with staff to bed 4. Pt cooperative. No distress noted.      Jagdish Cooley RN  11/09/23 2006

## 2023-11-09 NOTE — ED NOTES
Taking over 1:1 obs. Pt in bed, resting in with eyes closed, skin w/d/pink, resp. Even and non-labored, 0 problems.      Earl Wiggins RN  11/09/23 3440

## 2023-11-09 NOTE — ED NOTES
Patient resting on cart. Sitter at bedside. Patient awake, alert and agrees to behave at this time. Patient denies needs.       Filiberto Rodney RN  11/09/23 9056

## 2023-11-10 LAB
BACTERIA BLD CULT ORG #2: NORMAL
EKG ATRIAL RATE: 85 BPM
EKG P AXIS: 60 DEGREES
EKG P-R INTERVAL: 138 MS
EKG Q-T INTERVAL: 386 MS
EKG QRS DURATION: 100 MS
EKG QTC CALCULATION (BAZETT): 459 MS
EKG R AXIS: -45 DEGREES
EKG T AXIS: 41 DEGREES
EKG VENTRICULAR RATE: 85 BPM

## 2023-11-10 PROCEDURE — 93010 ELECTROCARDIOGRAM REPORT: CPT | Performed by: INTERNAL MEDICINE

## 2023-11-10 NOTE — PROGRESS NOTES
SUBJECTIVE:        ROS:  The rest of the 14 point ROS negative    PHYSICAL EXAM: VSS per facility record      ASSESSMENT & PLAN:   Diagnosis Orders   1. Chronic systolic (congestive) heart failure        2. Type 2 diabetes mellitus with diabetic neuropathy, with long-term current use of insulin ([address])        3. Spinal stenosis, lumbar region with neurogenic claudication                      Past Medical History:   Diagnosis Date    Asthma     Back pain     compresion in [place]    CAD (coronary artery disease) 01/2020    3 vessel     chf 09/16/2022    Diabetes mellitus ([address])     Essential hypertension, benign     Hyperlipemia     Ischemic cardiomyopathy 09/16/2022    Neuropathic pain, leg, bilateral          Past Surgical History:   Procedure Laterality Date    CARPAL TUNNEL RELEASE Bilateral     EP DEVICE PROCEDURE N/A 9/29/2023    Loop recorder insert  ROOM 293 [employer] NOTIFIED performed by Rowdy Carrillo DO at [facility] [facility] CATH LAB    FOOT DEBRIDEMENT Right 5/5/2023    RIGHT FOOT DEBRIDEMENT INCISION AND DRAINAGE ROOM 174 performed by Celia Mortimer, DPM at [facility] Right 5/8/2023    RIGHT FOOT  INCISION AND DRAINAGE WITH DEBRIDEMENT OF NON-VIABLE TISSUE DELAYED PRIMARY CLOSURE performed by Celia Mortimer, DPM at [address] [place] [address] Left     bone spur    HIP SURGERY Left 10/5/2023    TROCHANTERIC FIXATION NAILING LEFT FEMUR FRACTURE ROOM 265 performed by Ginette Gupta MD at [address] [place] [address]  2012    SPINE SURGERY N/A 11/11/2022    L1 VERETEBRAL AUGMENTATION performed by Doc Cope MD at [address] [place] [address]         Current Outpatient Medications on File Prior to Visit   Medication Sig Dispense Refill    apixaban (ELIQUIS) 5 MG TABS tablet Take 1 tablet by mouth 2 times daily RESUME FULL DOSE ON 10/11.  Continue low dose of eliquis 2.5mg twice daily until 10/11. 60 tablet 0    acetaminophen (TYLENOL) 325 MG tablet Take 2 tablets by mouth in the morning and 2 tablets at noon and 2 tablets in the

## 2023-11-16 NOTE — PROGRESS NOTES
SUBJECTIVE:  21-year-old gentleman seen in his room for follow-up visit patient has evidence of heart failure diabetes generative disease patient recently seen orthopedics for his osteomyelitis. Patient acute pain crisis time blood sugar reviewed with nursing staff      ROS: Denies chest palpitation  The rest of the 14 point ROS negative    PHYSICAL EXAM: VSS per facility record  Pupils reactive oral mucosa is moist no thrush chest with no crackles or wheezing cardiovascular showed a regular rate abdomen soft nontender    ASSESSMENT & PLAN:   Diagnosis Orders   1. Chronic systolic (congestive) heart failure        2. Type 2 diabetes mellitus with diabetic neuropathy, with long-term current use of insulin ([address])        3. Dysphagia, oropharyngeal phase          Monitor weights diurese intermittently. Monitor blood sugars local skin care per wound care protocols.             Past Medical History:   Diagnosis Date    Asthma     Back pain     compresion in [place]    CAD (coronary artery disease) 01/2020    3 vessel     chf 09/16/2022    Diabetes mellitus ([address])     Essential hypertension, benign     Hyperlipemia     Ischemic cardiomyopathy 09/16/2022    Neuropathic pain, leg, bilateral          Past Surgical History:   Procedure Laterality Date    CARPAL TUNNEL RELEASE Bilateral     EP DEVICE PROCEDURE N/A 9/29/2023    Loop recorder insert  ROOM 293 [employer] NOTIFIED performed by Shyanne Robles DO at [address] [facility] CATH LAB    FOOT DEBRIDEMENT Right 5/5/2023    RIGHT FOOT DEBRIDEMENT INCISION AND DRAINAGE ROOM 174 performed by Renu Dillon DPM at [facility] Right 5/8/2023    RIGHT FOOT  INCISION AND DRAINAGE WITH DEBRIDEMENT OF NON-VIABLE TISSUE DELAYED PRIMARY CLOSURE performed by Renu Dillon DPM at [facility] Left     bone spur    HIP SURGERY Left 10/5/2023    TROCHANTERIC FIXATION NAILING LEFT FEMUR FRACTURE ROOM 265 performed by Blayne Collins MD at [address] [place] [address]   2012

## 2023-11-19 ASSESSMENT — ENCOUNTER SYMPTOMS
ABDOMINAL PAIN: 0
CONSTIPATION: 0
DIARRHEA: 0
ABDOMINAL DISTENTION: 0
COLOR CHANGE: 0
BACK PAIN: 1
WHEEZING: 0
SHORTNESS OF BREATH: 0

## 2023-11-22 RX ORDER — DIVALPROEX SODIUM 125 MG/1
500 TABLET, DELAYED RELEASE ORAL 2 TIMES DAILY
COMMUNITY

## 2023-11-22 RX ORDER — ROSUVASTATIN CALCIUM 10 MG/1
10 TABLET, COATED ORAL NIGHTLY
COMMUNITY

## 2023-11-22 RX ORDER — ZINC OXIDE 20 %
OINTMENT (GRAM) TOPICAL PRN
COMMUNITY

## 2023-11-22 RX ORDER — SENNA AND DOCUSATE SODIUM 50; 8.6 MG/1; MG/1
1 TABLET, FILM COATED ORAL 2 TIMES DAILY
COMMUNITY

## 2023-11-22 RX ORDER — ASCORBIC ACID 500 MG
500 TABLET ORAL DAILY
COMMUNITY

## 2023-11-28 ENCOUNTER — OFFICE VISIT (OUTPATIENT)
Dept: GERIATRIC MEDICINE | Age: 63
End: 2023-11-28

## 2023-11-28 DIAGNOSIS — R53.1 WEAKNESS: ICD-10-CM

## 2023-11-28 DIAGNOSIS — Z79.4 TYPE 2 DIABETES MELLITUS WITH DIABETIC NEUROPATHY, WITH LONG-TERM CURRENT USE OF INSULIN (HCC): ICD-10-CM

## 2023-11-28 DIAGNOSIS — I50.22 CHRONIC SYSTOLIC (CONGESTIVE) HEART FAILURE (HCC): Primary | ICD-10-CM

## 2023-11-28 DIAGNOSIS — E11.40 TYPE 2 DIABETES MELLITUS WITH DIABETIC NEUROPATHY, WITH LONG-TERM CURRENT USE OF INSULIN (HCC): ICD-10-CM

## 2023-12-05 ENCOUNTER — OFFICE VISIT (OUTPATIENT)
Dept: ENDOCRINOLOGY | Age: 63
End: 2023-12-05
Payer: MEDICARE

## 2023-12-05 VITALS
HEIGHT: 67 IN | SYSTOLIC BLOOD PRESSURE: 94 MMHG | BODY MASS INDEX: 25.05 KG/M2 | DIASTOLIC BLOOD PRESSURE: 62 MMHG | HEART RATE: 83 BPM | OXYGEN SATURATION: 98 %

## 2023-12-05 DIAGNOSIS — Z79.4 TYPE 2 DIABETES MELLITUS WITH HYPERGLYCEMIA, WITH LONG-TERM CURRENT USE OF INSULIN (HCC): Primary | ICD-10-CM

## 2023-12-05 DIAGNOSIS — E11.65 TYPE 2 DIABETES MELLITUS WITH HYPERGLYCEMIA, WITH LONG-TERM CURRENT USE OF INSULIN (HCC): Primary | ICD-10-CM

## 2023-12-05 LAB
CHP ED QC CHECK: NORMAL
GLUCOSE BLD-MCNC: 187 MG/DL

## 2023-12-05 PROCEDURE — G8417 CALC BMI ABV UP PARAM F/U: HCPCS | Performed by: PHYSICIAN ASSISTANT

## 2023-12-05 PROCEDURE — 3074F SYST BP LT 130 MM HG: CPT | Performed by: PHYSICIAN ASSISTANT

## 2023-12-05 PROCEDURE — G8484 FLU IMMUNIZE NO ADMIN: HCPCS | Performed by: PHYSICIAN ASSISTANT

## 2023-12-05 PROCEDURE — 3052F HG A1C>EQUAL 8.0%<EQUAL 9.0%: CPT | Performed by: PHYSICIAN ASSISTANT

## 2023-12-05 PROCEDURE — G8428 CUR MEDS NOT DOCUMENT: HCPCS | Performed by: PHYSICIAN ASSISTANT

## 2023-12-05 PROCEDURE — 3078F DIAST BP <80 MM HG: CPT | Performed by: PHYSICIAN ASSISTANT

## 2023-12-05 PROCEDURE — 3017F COLORECTAL CA SCREEN DOC REV: CPT | Performed by: PHYSICIAN ASSISTANT

## 2023-12-05 PROCEDURE — 2022F DILAT RTA XM EVC RTNOPTHY: CPT | Performed by: PHYSICIAN ASSISTANT

## 2023-12-05 PROCEDURE — 99214 OFFICE O/P EST MOD 30 MIN: CPT | Performed by: PHYSICIAN ASSISTANT

## 2023-12-05 PROCEDURE — 1036F TOBACCO NON-USER: CPT | Performed by: PHYSICIAN ASSISTANT

## 2023-12-05 PROCEDURE — 82962 GLUCOSE BLOOD TEST: CPT | Performed by: PHYSICIAN ASSISTANT

## 2023-12-05 RX ORDER — ESCITALOPRAM OXALATE 10 MG/1
TABLET ORAL
COMMUNITY
Start: 2023-12-04

## 2023-12-05 ASSESSMENT — ENCOUNTER SYMPTOMS
DIARRHEA: 0
ABDOMINAL PAIN: 0
NAUSEA: 0
VOMITING: 0
SORE THROAT: 0
COUGH: 0
WHEEZING: 0
SINUS PRESSURE: 0
RHINORRHEA: 0
SHORTNESS OF BREATH: 0

## 2023-12-05 NOTE — PATIENT INSTRUCTIONS
Endocrinology-    Check your blood sugars 4 times a day, before meals and at night  Document these numbers in a blood glucose log and bring them with you to your follow-up appointment. If you are prescribed insulin, Do not take your mealtime insulin if your blood sugars less than 120   Call our office if you have blood sugars less than 80 or greater then 300 on two or more occasions  Call our office if you have any questions regarding your blood sugars or insulin dosing regiment  Signs of low blood sugar may include tremors, feeling shaky, sweating, dizziness, confusion and weakness. Check your blood sugar immediatly if you have any of these symptoms.      The plan as discussed at your appointment-    Humalog inject 5 units 3 times daily before meals if glucose is greater than 120  Increase Lantus 20 units BID   Jardiance 10 mg by mouth daily  Metformin 1000 mg by mouth twice daily  Repeat labs and follow-up in 6 months

## 2023-12-05 NOTE — PROGRESS NOTES
12/5/2023    Assessment:       Diagnosis Orders   1. Type 2 diabetes mellitus with hyperglycemia, with long-term current use of insulin ([facility])  POCT Glucose    Comprehensive Metabolic Panel    Hemoglobin A1C        PLAN:     Humalog inject 5 units 3 times daily before meals if glucose is greater than 120  Increase Lantus 20 units BID   Jardiance 10 mg by mouth daily  Metformin 1000 mg by mouth twice daily  Repeat labs and follow-up in 6 months      Orders Placed This Encounter   Procedures    Comprehensive Metabolic Panel     Standing Status:   Future     Standing Expiration Date:   12/5/2024    Hemoglobin A1C     Standing Status:   Future     Standing Expiration Date:   12/5/2024    POCT Glucose     No orders of the defined types were placed in this encounter. Return in about 6 months (around 6/5/2024) for Diabetes. Subjective:     Chief Complaint   Patient presents with    Follow-up    Diabetes     Vitals:    12/05/23 1014   BP: 94/62   Site: Left Upper Arm   Pulse: 83   SpO2: 98%   Height: 1.702 m (5' 7.01\")     Wt Readings from Last 3 Encounters:   11/02/23 72.6 kg (160 lb)   10/31/23 73 kg (161 lb)   10/04/23 73.4 kg (161 lb 12.8 oz)     BP Readings from Last 3 Encounters:   12/05/23 94/62   11/09/23 128/80   11/06/23 (!) 170/83     Patient is a 60-year-old type 2 insulin-dependent diabetic male discharged from [facility] 6/2023 following admission for osteomyelitis. Is currently a resident at [address] [place] [address]. HO CVA. Glycemic control is labile, insulin dosing adjustments were made. Diabetes  He presents for his follow-up diabetic visit. He has type 2 diabetes mellitus. The initial diagnosis of diabetes was made 20 years ago. His disease course has been improving. Hypoglycemia symptoms include sweats and tremors. Pertinent negatives for hypoglycemia include no dizziness or headaches. Pertinent negatives for diabetes include no chest pain, no fatigue, no polydipsia and no polyuria.  There

## 2023-12-13 ENCOUNTER — TELEPHONE (OUTPATIENT)
Dept: PRIMARY CARE CLINIC | Age: 63
End: 2023-12-13

## 2023-12-28 NOTE — PROGRESS NOTES
Socioeconomic History    Marital status:      Spouse name: Anna Vitale    Number of children: 2    Years of education: 14    Highest education level: Some college, no degree   Occupational History    Occupation: disability    Tobacco Use    Smoking status: Never     Passive exposure: Never    Smokeless tobacco: Never   Vaping Use    Vaping Use: Never used   Substance and Sexual Activity    Alcohol use: Not Currently     Alcohol/week: 2.0 standard drinks of alcohol     Types: 2 Cans of beer per week    Drug use: No    Sexual activity: Not Currently     Partners: Female   Other Topics Concern    Not on file   Social History Narrative    Lives with wife. Per patient he has been  for 2 years. He met his wife online and went to [facility] and brought her back to [facility]. They live in [address] [place] [address], [address] no steps into home. It is a 2 story home. Bedroom and bath upstairs. Bathroom down. Per patient once he is down the steps he doesn't go back up during the day. 2 daughters local.      he has very poor vision. He does not like to drive at night or when raining. Patient states he struggles looking at a computer or reading due to vision. Type of Home: House in [facility] [address]    Home Layout: Two level    Home Access: Level entry    Bathroom Shower/Tub: Tub/Shower unit    [place] Equipment: Hand-held shower    Home Equipment: Cane, Rollator    ADL Assistance: Needs assistance (needs assist)    Homemaking Assistance:  (wife does cooking and cleaning)    Ambulation Assistance: Independent (uses rollator PRN)    Transfer Assistance: Independent    Active :  Yes    Additional Comments: Pt mildly inconsistent historian             Social Determinants of Health     Financial Resource Strain: Low Risk  (12/21/2022)    Overall Financial Resource Strain (CARDIA)     Difficulty of Paying Living Expenses: Not hard at all   Food Insecurity: Not on file (12/21/2022)   Transportation Needs: No

## 2023-12-29 ENCOUNTER — OFFICE VISIT (OUTPATIENT)
Dept: NEUROLOGY | Age: 63
End: 2023-12-29

## 2023-12-29 VITALS
OXYGEN SATURATION: 97 % | HEART RATE: 93 BPM | BODY MASS INDEX: 25.11 KG/M2 | WEIGHT: 160 LBS | DIASTOLIC BLOOD PRESSURE: 68 MMHG | SYSTOLIC BLOOD PRESSURE: 114 MMHG | HEIGHT: 67 IN

## 2023-12-29 DIAGNOSIS — I61.0 THALAMIC HEMORRHAGE (HCC): Primary | ICD-10-CM

## 2023-12-29 DIAGNOSIS — R27.0 ATAXIA: ICD-10-CM

## 2023-12-29 DIAGNOSIS — I25.5 ISCHEMIC CARDIOMYOPATHY: ICD-10-CM

## 2023-12-29 DIAGNOSIS — I61.9 BRAIN BLEED (HCC): ICD-10-CM

## 2023-12-29 DIAGNOSIS — G31.84 MCI (MILD COGNITIVE IMPAIRMENT): ICD-10-CM

## 2023-12-29 RX ORDER — INSULIN GLARGINE 100 [IU]/ML
100 INJECTION, SOLUTION SUBCUTANEOUS 2 TIMES DAILY
COMMUNITY
Start: 2023-11-21

## 2023-12-29 NOTE — PROGRESS NOTES
he is still significant incoordination of the left. He does not have thalamic pain. His memory is now examined and his MMSE is 24 though this is not reliable as he is right-handed and cannot write and therefore loses at least 2 points and therefore the score is likely to be more than 26 we will reevaluate this in a few months and does not require any intervention or treatment. Risk factors for CVD was reviewed. Patient swallowing is improved. Inpatient hospital records reviewed and we are seeing him for the memory and not just for the stroke and therefore  Evaluation with the time of 40    [facility]. Kenyon Dias MD, [place], American Board of Psychiatry & Neurology  Board Certified in Vascular Neurology  Board Certified in Neuromuscular Medicine  Certified in [address] [place] [address]:      No orders of the defined types were placed in this encounter. No orders of the defined types were placed in this encounter. Return in about 4 months (around 4/29/2024).       Juan Jones MD

## 2024-01-02 LAB
ANION GAP SERPL CALCULATED.3IONS-SCNC: 8 MEQ/L (ref 9–15)
BASOPHILS # BLD: 0 K/UL (ref 0–0.2)
BASOPHILS NFR BLD: 0.2 %
BUN SERPL-MCNC: 30 MG/DL (ref 8–23)
CALCIUM SERPL-MCNC: 8.2 MG/DL (ref 8.5–9.9)
CHLORIDE SERPL-SCNC: 101 MEQ/L (ref 95–107)
CO2 SERPL-SCNC: 28 MEQ/L (ref 20–31)
CREAT SERPL-MCNC: 1.01 MG/DL (ref 0.7–1.2)
EOSINOPHIL # BLD: 0.3 K/UL (ref 0–0.7)
EOSINOPHIL NFR BLD: 7.1 %
ERYTHROCYTE [DISTWIDTH] IN BLOOD BY AUTOMATED COUNT: 14.2 % (ref 11.5–14.5)
GLUCOSE SERPL-MCNC: 216 MG/DL (ref 70–99)
HCT VFR BLD AUTO: 35.7 % (ref 42–52)
HGB BLD-MCNC: 11.4 G/DL (ref 14–18)
LYMPHOCYTES # BLD: 0.7 K/UL (ref 1–4.8)
LYMPHOCYTES NFR BLD: 15.5 %
MCH RBC QN AUTO: 27.3 PG (ref 27–31.3)
MCHC RBC AUTO-ENTMCNC: 31.9 % (ref 33–37)
MCV RBC AUTO: 85.6 FL (ref 79–92.2)
MONOCYTES # BLD: 0.4 K/UL (ref 0.2–0.8)
MONOCYTES NFR BLD: 10.4 %
NEUTROPHILS # BLD: 2.8 K/UL (ref 1.4–6.5)
NEUTS SEG NFR BLD: 66.6 %
PLATELET # BLD AUTO: 106 K/UL (ref 130–400)
POTASSIUM SERPL-SCNC: 4.5 MEQ/L (ref 3.4–4.9)
RBC # BLD AUTO: 4.17 M/UL (ref 4.7–6.1)
SODIUM SERPL-SCNC: 137 MEQ/L (ref 135–144)
WBC # BLD AUTO: 4.3 K/UL (ref 4.8–10.8)

## 2024-01-04 DIAGNOSIS — E11.69 DM TYPE 2 WITH DIABETIC DYSLIPIDEMIA (HCC): ICD-10-CM

## 2024-01-04 DIAGNOSIS — E78.5 DM TYPE 2 WITH DIABETIC DYSLIPIDEMIA (HCC): ICD-10-CM

## 2024-01-04 NOTE — TELEPHONE ENCOUNTER
Pharmacy requesting medication refill. Please approve or deny this request.    Rx requested:  Requested Prescriptions     Pending Prescriptions Disp Refills    empagliflozin (JARDIANCE) 10 MG tablet 30 tablet 3     Sig: Take 1 tablet by mouth every morning Indications: Diabetes         Last Office Visit:   12/5/2023      Next Visit Date:  Future Appointments   Date Time Provider Department Center   4/29/2024  3:00 PM Joshua Miller MD [facility] -   6/5/2024 10:30 AM Garcia, Jose Antonio S, PA [facility]

## 2024-02-01 ENCOUNTER — HOSPITAL ENCOUNTER (OUTPATIENT)
Age: 64
Discharge: HOME | End: 2024-02-01
Payer: MEDICARE

## 2024-02-01 PROCEDURE — 93298 REM INTERROG DEV EVAL SCRMS: CPT

## 2024-02-01 RX ORDER — CARVEDILOL 12.5 MG/1
12.5 TABLET ORAL 2 TIMES DAILY
Qty: 60 TABLET | Refills: 0 | Status: SHIPPED | OUTPATIENT
Start: 2024-02-01

## 2024-02-01 NOTE — TELEPHONE ENCOUNTER
30 day refill given. Patient needs OV.     Requesting medication refill. Please approve or deny this request.    Rx requested:  Requested Prescriptions     Pending Prescriptions Disp Refills    carvedilol (COREG) 12.5 MG tablet [Pharmacy Med Name: carvedilol 12.5 mg tablet] 60 tablet 5     Sig: TAKE 1 TABLET BY MOUTH 2 TIMES DAILY         Last Office Visit:   10/26/2022      Next Visit Date:  Future Appointments   Date Time Provider Department Center   4/29/2024  3:00 PM Joshua Miller MD [facility] -   6/5/2024 10:30 AM Garcia, Jose Antonio S, PA [facility]               Last refill 01/25/2023. Please approve or deny.

## 2024-02-08 ENCOUNTER — OFFICE VISIT (OUTPATIENT)
Dept: GERIATRIC MEDICINE | Age: 64
End: 2024-02-08
Payer: MEDICARE

## 2024-02-08 DIAGNOSIS — I50.33 ACUTE ON CHRONIC DIASTOLIC (CONGESTIVE) HEART FAILURE (HCC): ICD-10-CM

## 2024-02-08 DIAGNOSIS — D68.9 COAGULATION DEFECT (HCC): ICD-10-CM

## 2024-02-08 DIAGNOSIS — G81.91 HEMIPLEGIA AFFECTING RIGHT DOMINANT SIDE, UNSPECIFIED ETIOLOGY, UNSPECIFIED HEMIPLEGIA TYPE (HCC): Primary | ICD-10-CM

## 2024-02-08 DIAGNOSIS — Z79.4 TYPE 2 DIABETES MELLITUS WITH DIABETIC PERIPHERAL ANGIOPATHY WITHOUT GANGRENE, WITH LONG-TERM CURRENT USE OF INSULIN (HCC): ICD-10-CM

## 2024-02-08 DIAGNOSIS — E11.51 TYPE 2 DIABETES MELLITUS WITH DIABETIC PERIPHERAL ANGIOPATHY WITHOUT GANGRENE, WITH LONG-TERM CURRENT USE OF INSULIN (HCC): ICD-10-CM

## 2024-02-08 DIAGNOSIS — L97.514 RIGHT FOOT ULCER, WITH NECROSIS OF BONE (HCC): ICD-10-CM

## 2024-02-08 DIAGNOSIS — I50.22 CHRONIC SYSTOLIC (CONGESTIVE) HEART FAILURE (HCC): ICD-10-CM

## 2024-02-08 DIAGNOSIS — I48.0 PAF (PAROXYSMAL ATRIAL FIBRILLATION) (HCC): ICD-10-CM

## 2024-02-08 PROCEDURE — G8484 FLU IMMUNIZE NO ADMIN: HCPCS | Performed by: INTERNAL MEDICINE

## 2024-02-08 PROCEDURE — 3046F HEMOGLOBIN A1C LEVEL >9.0%: CPT | Performed by: INTERNAL MEDICINE

## 2024-02-08 PROCEDURE — 99309 SBSQ NF CARE MODERATE MDM 30: CPT | Performed by: INTERNAL MEDICINE

## 2024-02-23 ENCOUNTER — OFFICE VISIT (OUTPATIENT)
Dept: GERIATRIC MEDICINE | Age: 64
End: 2024-02-23

## 2024-02-23 DIAGNOSIS — G31.84 MCI (MILD COGNITIVE IMPAIRMENT): ICD-10-CM

## 2024-02-23 DIAGNOSIS — I25.5 ISCHEMIC CARDIOMYOPATHY: ICD-10-CM

## 2024-02-23 DIAGNOSIS — I50.22 CHRONIC SYSTOLIC (CONGESTIVE) HEART FAILURE (HCC): Primary | ICD-10-CM

## 2024-03-07 ENCOUNTER — OFFICE VISIT (OUTPATIENT)
Dept: GERIATRIC MEDICINE | Age: 64
End: 2024-03-07

## 2024-03-07 DIAGNOSIS — M47.817 LUMBOSACRAL SPONDYLOSIS WITHOUT MYELOPATHY: ICD-10-CM

## 2024-03-07 DIAGNOSIS — I48.0 PAF (PAROXYSMAL ATRIAL FIBRILLATION) (HCC): ICD-10-CM

## 2024-03-07 DIAGNOSIS — I50.22 CHRONIC SYSTOLIC (CONGESTIVE) HEART FAILURE (HCC): Primary | ICD-10-CM

## 2024-03-07 PROBLEM — E11.40 TYPE 2 DIABETES MELLITUS WITH DIABETIC NEUROPATHY (HCC): Status: RESOLVED | Noted: 2022-11-10 | Resolved: 2024-03-07

## 2024-03-07 PROBLEM — E11.51 TYPE 2 DIABETES MELLITUS WITH DIABETIC PERIPHERAL ANGIOPATHY WITHOUT GANGRENE (HCC): Status: RESOLVED | Noted: 2023-08-23 | Resolved: 2024-03-07

## 2024-03-07 PROBLEM — G11.8 EPISODIC ATAXIA WITH SLURRED SPEECH (HCC): Status: RESOLVED | Noted: 2023-08-27 | Resolved: 2024-03-07

## 2024-03-07 PROBLEM — E11.65 TYPE 2 DIABETES MELLITUS WITH HYPERGLYCEMIA (HCC): Status: RESOLVED | Noted: 2021-11-12 | Resolved: 2024-03-07

## 2024-03-07 PROBLEM — R47.81 EPISODIC ATAXIA WITH SLURRED SPEECH (HCC): Status: RESOLVED | Noted: 2023-08-27 | Resolved: 2024-03-07

## 2024-03-07 PROBLEM — E11.65 POORLY CONTROLLED TYPE 2 DIABETES MELLITUS (HCC): Status: RESOLVED | Noted: 2018-06-13 | Resolved: 2024-03-07

## 2024-03-07 PROBLEM — L97.504 DIABETIC ULCER OF TOE ASSOCIATED WITH TYPE 2 DIABETES MELLITUS, WITH NECROSIS OF BONE (HCC): Status: RESOLVED | Noted: 2023-05-06 | Resolved: 2024-03-07

## 2024-03-07 PROBLEM — E11.621 DIABETIC ULCER OF TOE ASSOCIATED WITH TYPE 2 DIABETES MELLITUS, WITH NECROSIS OF BONE (HCC): Status: RESOLVED | Noted: 2023-05-06 | Resolved: 2024-03-07

## 2024-03-07 PROBLEM — G81.91 HEMIPLEGIA AFFECTING RIGHT DOMINANT SIDE, UNSPECIFIED ETIOLOGY, UNSPECIFIED HEMIPLEGIA TYPE (HCC): Status: ACTIVE | Noted: 2024-03-07

## 2024-03-08 NOTE — PROGRESS NOTES
SUBJECTIVE:  63-year-old gentleman seen in follow-up for CAD skin breakdown A-fib  -Follow-up with protocols at this time is controlled has been anticoagulated blood sugar reviewed nursing staff      ROS: Denies emesis fevers chills  The rest of the 14 point ROS negative    PHYSICAL EXAM: VSS per facility record  Pupils reactive oral mucosa moist chest with no crackles no wheezing cardiovascular showed regular rate abdomen soft please refer to nursing notes for detailed skin assessments    ASSESSMENT & PLAN:   Diagnosis Orders   1. Hemiplegia affecting right dominant side, unspecified etiology, unspecified hemiplegia type ([facility])        2. PAF (paroxysmal atrial fibrillation) (HCC)        3. Right foot ulcer, with necrosis of bone (HCC)        4. Acute on chronic diastolic (congestive) heart failure (HCC)        5. Chronic systolic (congestive) heart failure (HCC)        6. Type 2 diabetes mellitus with diabetic peripheral angiopathy without gangrene, with long-term current use of insulin (HCC)        7. Coagulation defect (HCC)          Local wound care continue anticoagulation monitoring weight steroids intermittent follow-up A1c.  BMP pending monitor renal function closely            Past Medical History:   Diagnosis Date    Asthma     Back pain     compresion in [place]    CAD (coronary artery disease) 01/2020    3 vessel     chf 09/16/2022    Diabetes mellitus (HCC)     Essential hypertension, benign     Hyperlipemia     Ischemic cardiomyopathy 09/16/2022    Neuropathic pain, leg, bilateral          Past Surgical History:   Procedure Laterality Date    CARPAL TUNNEL RELEASE Bilateral     EP DEVICE PROCEDURE N/A 9/29/2023    Loop recorder insert  ROOM 293 [employer] NOTIFIED performed by Maxwell Tyler DO at [facility] CATH LAB    FOOT DEBRIDEMENT Right 5/5/2023    RIGHT FOOT DEBRIDEMENT INCISION AND DRAINAGE ROOM 174 performed by Jairo Garcia DPM at [facility] OR    FOOT DEBRIDEMENT Right 5/8/2023    RIGHT FOOT

## 2024-03-11 ENCOUNTER — OFFICE VISIT (OUTPATIENT)
Dept: GERIATRIC MEDICINE | Age: 64
End: 2024-03-11

## 2024-03-11 DIAGNOSIS — H61.23 BILATERAL IMPACTED CERUMEN: Primary | ICD-10-CM

## 2024-03-14 ENCOUNTER — TELEPHONE (OUTPATIENT)
Dept: PRIMARY CARE CLINIC | Age: 64
End: 2024-03-14

## 2024-03-14 ENCOUNTER — OFFICE VISIT (OUTPATIENT)
Dept: CARDIOLOGY CLINIC | Age: 64
End: 2024-03-14
Payer: MEDICARE

## 2024-03-14 VITALS
DIASTOLIC BLOOD PRESSURE: 84 MMHG | HEART RATE: 81 BPM | BODY MASS INDEX: 25.11 KG/M2 | SYSTOLIC BLOOD PRESSURE: 110 MMHG | WEIGHT: 160 LBS | HEIGHT: 67 IN | OXYGEN SATURATION: 95 %

## 2024-03-14 DIAGNOSIS — I49.9 IRREGULAR HEART RHYTHM: Primary | ICD-10-CM

## 2024-03-14 PROCEDURE — 93000 ELECTROCARDIOGRAM COMPLETE: CPT | Performed by: INTERNAL MEDICINE

## 2024-03-14 PROCEDURE — 1036F TOBACCO NON-USER: CPT | Performed by: INTERNAL MEDICINE

## 2024-03-14 PROCEDURE — G8484 FLU IMMUNIZE NO ADMIN: HCPCS | Performed by: INTERNAL MEDICINE

## 2024-03-14 PROCEDURE — 3079F DIAST BP 80-89 MM HG: CPT | Performed by: INTERNAL MEDICINE

## 2024-03-14 PROCEDURE — 3017F COLORECTAL CA SCREEN DOC REV: CPT | Performed by: INTERNAL MEDICINE

## 2024-03-14 PROCEDURE — 99214 OFFICE O/P EST MOD 30 MIN: CPT | Performed by: INTERNAL MEDICINE

## 2024-03-14 PROCEDURE — G8427 DOCREV CUR MEDS BY ELIG CLIN: HCPCS | Performed by: INTERNAL MEDICINE

## 2024-03-14 PROCEDURE — G8417 CALC BMI ABV UP PARAM F/U: HCPCS | Performed by: INTERNAL MEDICINE

## 2024-03-14 PROCEDURE — 3074F SYST BP LT 130 MM HG: CPT | Performed by: INTERNAL MEDICINE

## 2024-03-14 RX ORDER — NICOTINE POLACRILEX 4 MG
15 LOZENGE BUCCAL SEE ADMIN INSTRUCTIONS
COMMUNITY

## 2024-03-14 RX ORDER — CODEINE PHOSPHATE AND GUAIFENESIN 10; 100 MG/5ML; MG/5ML
5 SOLUTION ORAL 3 TIMES DAILY PRN
COMMUNITY

## 2024-03-14 RX ORDER — ACETAMINOPHEN 650 MG/1
650 SUPPOSITORY RECTAL EVERY 4 HOURS PRN
COMMUNITY

## 2024-03-14 RX ORDER — ACETAMINOPHEN 325 MG/1
650 TABLET ORAL EVERY 6 HOURS PRN
COMMUNITY

## 2024-03-14 RX ORDER — ENEMA 19; 7 G/133ML; G/133ML
1 ENEMA RECTAL
COMMUNITY

## 2024-03-14 RX ORDER — LOPERAMIDE HYDROCHLORIDE 2 MG/1
2 CAPSULE ORAL 4 TIMES DAILY PRN
COMMUNITY

## 2024-03-14 RX ORDER — BISACODYL 10 MG
10 SUPPOSITORY, RECTAL RECTAL DAILY
COMMUNITY

## 2024-03-14 RX ORDER — DIVALPROEX SODIUM 250 MG/1
TABLET, DELAYED RELEASE ORAL
COMMUNITY
Start: 2023-11-05

## 2024-03-14 NOTE — PROGRESS NOTES
with mild degrees of activity.  Gastrointestinal: No melena or hematochezia.   Genitourinary: No hematuria.   Hematological: + Easy bruising but no excessive bleeding  Vascular: No lower extremity edema or claudication.  Neurological: No recent TIA or CVA symptoms.  + Paresthesias.   Musculoskeletal: No chest wall pain.  Currently wheelchair dependent.  Psychiatric: No anxiety.   *All other systems were reviewed and found to be negative unless otherwise noted in the HPI*    Physical Examination: His height is 1.702 m (5' 7.01\") and weight is 72.6 kg (160 lb). His blood pressure is 110/84 and his pulse is 81. His oxygen saturation is 95%.   He is alert and oriented to person, place, and time. No distress. Neck is supple without JVD or carotid bruits. No thyroidmegaly. Lungs are clear to auscultation both anteriorly and posteriorly. No accessory muscle usage. Heart is a regular rate and rhythm.  Soft, systolic murmur heard best at the base. No S3 or S4. PMI is nondisplaced. Abdomen is soft and non tender.  No hepatosplenomegaly. No abdominal aortic bruits or masses. Lower extremities are free of edema. No clubbing or cyanosis. Neurologically, cranial nerves are grossly intact. No focal sensory and/or motor deficits.     ECG (3/14/2024): Sinus rhythm.  Heart rate 78 bpm.    2D Echocardiogram (9/26/2023: EF 50 to 55%.  Mild aortic valve sclerosis without stenosis.    Last few recorder check 2/1/2024 was negative for any pathologic arrhythmias including any A-fib.      IMPRESSIONS:   Recent cryptogenic stroke with right-sided weakness, due to suspected A-fib, status post [employer] implantable loop recorder (placed 9/29/2023).  No A-fib on checks to date.  Eliquis coagulopathy due to concern for recurrent CVA/TIA.  Stable CAD with history of three-vessel CABG (2019)  Recovered ischemic, with normal LV systolic function, EF 50 to 55%  Chronic HFpEF without overt

## 2024-03-14 NOTE — PATIENT INSTRUCTIONS
Continue current cardiac medications.  Echocardiogram in 3 months   Follow up in 3 months, sooner if needed.

## 2024-03-18 ENCOUNTER — OFFICE VISIT (OUTPATIENT)
Dept: GERIATRIC MEDICINE | Age: 64
End: 2024-03-18
Payer: MEDICARE

## 2024-03-18 DIAGNOSIS — I50.22 CHRONIC SYSTOLIC (CONGESTIVE) HEART FAILURE (HCC): ICD-10-CM

## 2024-03-18 DIAGNOSIS — R13.12 DYSPHAGIA, OROPHARYNGEAL PHASE: Primary | ICD-10-CM

## 2024-03-18 DIAGNOSIS — I16.0 HYPERTENSIVE URGENCY: ICD-10-CM

## 2024-03-18 PROCEDURE — G8484 FLU IMMUNIZE NO ADMIN: HCPCS | Performed by: PHYSICIAN ASSISTANT

## 2024-03-18 PROCEDURE — 99308 SBSQ NF CARE LOW MDM 20: CPT | Performed by: PHYSICIAN ASSISTANT

## 2024-03-21 NOTE — PROGRESS NOTES
Subjective:      Patient ID: Gary Turk is a pleasant 63 y.o. male who presents today for:  No chief complaint on file.      [facility]    Patient seen for bilateral cerumen impaction.  Difficulty hearing, difficulty visualizing TMs.  Will add Debrox orders with warm water flush.  Follow-up if any further infection or complications.      Patient Active Problem List   Diagnosis    Essential hypertension, benign    Irregular heart rhythm    Hyperlipidemia    Allergic rhinitis due to pollen    Staphylococcus aureus bacteremia    Hematoma of right thigh    Hematoma of left lower extremity    Hereditary peripheral neuropathy    Migraine without aura    Community acquired pneumonia of right lower lobe of lung    Acute on chronic diastolic (congestive) heart failure ([facility])    Mild intermittent asthma with exacerbation    NSTEMI (non-ST elevated myocardial infarction) ([facility])    Acute bronchitis due to other specified organisms    CAD (coronary artery disease)    Body mass index (bmi) 29.0-29.9, adult    Cellulitis of right lower limb    Dyspnea, unspecified    Encounter for pre-employment examination    Family history of ischemic heart disease and other diseases of the circulatory system    Other specified hypothyroidism    Inflammatory disorders of scrotum    Long term (current) use of insulin ([facility])    Long term (current) use of oral hypoglycemic drugs    Other chronic sinusitis    Other idiopathic scoliosis, lumbosacral region    Other intervertebral disc disorders, lumbar region    Pain in left lower leg    General patient noncompliance    Sleep apnea    Spinal stenosis, lumbar region with neurogenic claudication    Spondylolisthesis, lumbosacral region    Unsp open wound of r little finger w/o damage to nail, init    Involuntary movements    ANTON (acute kidney injury) ([facility])    Hyponatremia    Brain bleed ([facility])    Hypertensive urgency    Intractable nausea and vomiting    chf    Ischemic cardiomyopathy    Chronic

## 2024-03-22 NOTE — PROGRESS NOTES
(LIPITOR) 20 MG tablet Take 1 tablet by mouth daily (Patient taking differently: Take 1 tablet by mouth nightly Indications: High Amount of Fats in the Blood) 30 tablet 5    Handicap Placard MISC by Does not apply route He needs a placard for 5 years  Expires 8/2/28  He is unable to walk 200 feet without stopping  Dx back pain 1 each 0    BREO ELLIPTA 100-25 MCG/ACT inhaler Inhale 1 puff into the lungs daily Indications: Asthma, Chronic Obstructive Lung Disease      Insulin Pen Needle (NOVOFINE) 32G X 6 MM MISC Used to inject insulin 4 times daily before meals and bedtime 300 each 3    hydrALAZINE (APRESOLINE) 25 MG tablet Take 1 tablet by mouth in the morning and at bedtime (Patient taking differently: Take 1 tablet by mouth 2 times daily Indications: High Blood Pressure Disorder Hold for BP< 90/60 or AP<60) 60 tablet 3    [DISCONTINUED] lisinopril (PRINIVIL;ZESTRIL) 10 MG tablet Take 1 tablet by mouth daily 30 tablet 5    albuterol sulfate  (90 Base) MCG/ACT inhaler Inhale 2 puffs into the lungs every 6 hours as needed for Wheezing (Patient taking differently: Inhale 2 puffs into the lungs every 6 hours as needed for Wheezing or Shortness of Breath) 18 g 5    Accu-Chek FastClix Lancets MISC Test 4x daily Dx E11.65 150 each 3    blood glucose test strips (ACCU-CHEK GUIDE) strip Test 4x daily Dx E11.65 150 each 3     No current facility-administered medications on file prior to visit.         Family History   Problem Relation Age of Onset    Other Father         accident    Heart Failure Mother     Heart Disease Brother     Cirrhosis Brother        Social History     Socioeconomic History    Marital status:      Spouse name: rodríguez    Number of children: 2    Years of education: 14    Highest education level: Some college, no degree   Occupational History    Occupation: disability    Tobacco Use    Smoking status: Never     Passive exposure: Never    Smokeless tobacco: Never   Vaping Use    Vaping

## 2024-03-28 LAB
BILIRUBIN, URINE: NEGATIVE
BLOOD, URINE: POSITIVE
CLARITY: ABNORMAL
COLOR: ABNORMAL
GLUCOSE URINE: ABNORMAL
KETONES, URINE: NEGATIVE
LEUKOCYTE ESTERASE, URINE: ABNORMAL
NITRITE, URINE: NEGATIVE
PH UA: 5.5 (ref 4.5–8)
PROTEIN UA: ABNORMAL
SPECIFIC GRAVITY, URINE: 1.02
UROBILINOGEN, URINE: NORMAL

## 2024-04-01 ASSESSMENT — ENCOUNTER SYMPTOMS
COUGH: 0
CONSTIPATION: 0
SHORTNESS OF BREATH: 0
WHEEZING: 0
DIARRHEA: 0

## 2024-04-01 NOTE — PROGRESS NOTES
accident    Heart Failure Mother     Heart Disease Brother     Cirrhosis Brother      No Known Allergies        Review of Systems   Unable to perform ROS: Dementia   Constitutional:  Negative for activity change and appetite change.   HENT: Negative.     Respiratory:  Negative for cough, shortness of breath and wheezing.    Cardiovascular:  Negative for chest pain.   Gastrointestinal:  Negative for constipation and diarrhea.   Psychiatric/Behavioral:  Positive for confusion. Negative for agitation, behavioral problems and sleep disturbance. The patient is not nervous/anxious.    All other systems reviewed and are negative.      Objective:   VS: See PCC. Reviewed.    Physical Exam  Vitals reviewed.   Constitutional:       Appearance: Normal appearance. He is not ill-appearing.   HENT:      Head: Normocephalic and atraumatic.      Nose: No congestion or rhinorrhea.      Mouth/Throat:      Mouth: Mucous membranes are moist.      Pharynx: Oropharynx is clear.   Eyes:      General:         Right eye: No discharge.         Left eye: No discharge.   Cardiovascular:      Rate and Rhythm: Normal rate and regular rhythm.      Pulses: Normal pulses.      Heart sounds: Normal heart sounds.   Pulmonary:      Effort: Pulmonary effort is normal.      Breath sounds: No wheezing.   Skin:     General: Skin is warm and dry.      Coloration: Skin is pale.   Neurological:      Mental Status: He is alert. He is disoriented.   Psychiatric:         Mood and Affect: Mood normal.         Cognition and Memory: Cognition is impaired. Memory is impaired.         Assessment:       Diagnosis Orders   1. Dysphagia, oropharyngeal phase        2. Chronic systolic (congestive) heart failure        3. Hypertensive urgency              Plan:      No orders of the defined types were placed in this encounter.    No orders of the defined types were placed in this encounter.      No new acute concerns per patient, limited due to patient

## 2024-04-02 ENCOUNTER — OFFICE VISIT (OUTPATIENT)
Dept: ENDOCRINOLOGY | Age: 64
End: 2024-04-02
Payer: MEDICARE

## 2024-04-02 VITALS
BODY MASS INDEX: 24.17 KG/M2 | HEART RATE: 80 BPM | WEIGHT: 154 LBS | OXYGEN SATURATION: 96 % | SYSTOLIC BLOOD PRESSURE: 108 MMHG | HEIGHT: 67 IN | DIASTOLIC BLOOD PRESSURE: 73 MMHG

## 2024-04-02 DIAGNOSIS — E11.69 DM TYPE 2 WITH DIABETIC DYSLIPIDEMIA (HCC): Primary | ICD-10-CM

## 2024-04-02 DIAGNOSIS — E78.5 DM TYPE 2 WITH DIABETIC DYSLIPIDEMIA (HCC): Primary | ICD-10-CM

## 2024-04-02 LAB
CHP ED QC CHECK: ABNORMAL
GLUCOSE BLD-MCNC: 228 MG/DL
HBA1C MFR BLD: 7.1 %

## 2024-04-02 PROCEDURE — G8420 CALC BMI NORM PARAMETERS: HCPCS | Performed by: PHYSICIAN ASSISTANT

## 2024-04-02 PROCEDURE — G8427 DOCREV CUR MEDS BY ELIG CLIN: HCPCS | Performed by: PHYSICIAN ASSISTANT

## 2024-04-02 PROCEDURE — 82962 GLUCOSE BLOOD TEST: CPT | Performed by: PHYSICIAN ASSISTANT

## 2024-04-02 PROCEDURE — 3078F DIAST BP <80 MM HG: CPT | Performed by: PHYSICIAN ASSISTANT

## 2024-04-02 PROCEDURE — 3074F SYST BP LT 130 MM HG: CPT | Performed by: PHYSICIAN ASSISTANT

## 2024-04-02 PROCEDURE — 1036F TOBACCO NON-USER: CPT | Performed by: PHYSICIAN ASSISTANT

## 2024-04-02 PROCEDURE — 3051F HG A1C>EQUAL 7.0%<8.0%: CPT | Performed by: PHYSICIAN ASSISTANT

## 2024-04-02 PROCEDURE — 2022F DILAT RTA XM EVC RTNOPTHY: CPT | Performed by: PHYSICIAN ASSISTANT

## 2024-04-02 PROCEDURE — 99214 OFFICE O/P EST MOD 30 MIN: CPT | Performed by: PHYSICIAN ASSISTANT

## 2024-04-02 PROCEDURE — 3017F COLORECTAL CA SCREEN DOC REV: CPT | Performed by: PHYSICIAN ASSISTANT

## 2024-04-02 PROCEDURE — 83036 HEMOGLOBIN GLYCOSYLATED A1C: CPT | Performed by: PHYSICIAN ASSISTANT

## 2024-04-02 RX ORDER — NITROFURANTOIN 25; 75 MG/1; MG/1
100 CAPSULE ORAL 2 TIMES DAILY
COMMUNITY
Start: 2024-03-31

## 2024-04-02 RX ORDER — INSULIN GLARGINE 100 [IU]/ML
INJECTION, SOLUTION SUBCUTANEOUS
Qty: 10 ML | Refills: 3
Start: 2024-04-02

## 2024-04-02 RX ORDER — INSULIN LISPRO 100 [IU]/ML
5 INJECTION, SOLUTION INTRAVENOUS; SUBCUTANEOUS
Qty: 5 EACH | Refills: 3
Start: 2024-04-02

## 2024-04-02 RX ORDER — ACYCLOVIR 400 MG/1
1 TABLET ORAL DAILY
Qty: 1 EACH | Refills: 0 | Status: SHIPPED | OUTPATIENT
Start: 2024-04-02

## 2024-04-02 RX ORDER — ACYCLOVIR 400 MG/1
1 TABLET ORAL
Qty: 12 EACH | Refills: 10 | Status: SHIPPED | OUTPATIENT
Start: 2024-04-02

## 2024-04-02 ASSESSMENT — ENCOUNTER SYMPTOMS
DIARRHEA: 0
WHEEZING: 0
SHORTNESS OF BREATH: 0
SORE THROAT: 0
SINUS PRESSURE: 0
RHINORRHEA: 0
NAUSEA: 0
COUGH: 0
VOMITING: 0
ABDOMINAL PAIN: 0

## 2024-04-02 NOTE — PROGRESS NOTES
4/2/2024    Assessment:       Diagnosis Orders   1. DM type 2 with diabetic dyslipidemia (HCC)  POCT Glucose    POCT glycosylated hemoglobin (Hb A1C)    Comprehensive Metabolic Panel    Hemoglobin A1C    Microalbumin / Creatinine Urine Ratio        PLAN:     Continue Humalog inject 5 units 3 times daily before meals if glucose is greater than 120  Increase Lantus 14 units mornings, 12 units at night  Jardiance 10 mg by mouth daily (Hold for 2 weeks, stop if diarrhea improves resume if diarrhea persists.   Dexcom G7 [address] [phone] - F [phone]    Repeat labs 4-5 days before your next appointment  Follow-up in 6 months      Orders Placed This Encounter   Procedures    Comprehensive Metabolic Panel     Standing Status:   Future     Standing Expiration Date:   4/2/2025    Hemoglobin A1C     Standing Status:   Future     Standing Expiration Date:   4/2/2025    Microalbumin / Creatinine Urine Ratio     Standing Status:   Future     Standing Expiration Date:   4/2/2025    POCT Glucose    POCT glycosylated hemoglobin (Hb A1C)     Orders Placed This Encounter   Medications    insulin glargine (LANTUS) 100 UNIT/ML injection vial     Sig: Inject 14 units mornings, 12 units at night     Dispense:  10 mL     Refill:  3    insulin lispro (HUMALOG) 100 UNIT/ML SOLN injection vial     Sig: Inject 5 Units into the skin 3 times daily (with meals) Indications: Diabetes     Dispense:  5 each     Refill:  3     Return in about 6 months (around 10/2/2024) for Diabetes.  Subjective:     Chief Complaint   Patient presents with    Diabetes     Vitals:    04/02/24 1422   BP: 108/73   Pulse: 80   SpO2: 96%   Weight: 69.9 kg (154 lb)   Height: 1.702 m (5' 7.01\")     Wt Readings from Last 3 Encounters:   04/02/24 69.9 kg (154 lb)   03/14/24 72.6 kg (160 lb)   12/29/23 72.6 kg (160 lb)     BP Readings from Last 3 Encounters:   04/02/24 108/73   03/14/24 110/84

## 2024-04-02 NOTE — PATIENT INSTRUCTIONS
Endocrinology-    Check your blood sugars 4 times a day, before meals and at night  Document these numbers in a blood glucose log and bring them with you to your follow-up appointment.  If you are prescribed insulin, Do not take your mealtime insulin if your blood sugars less than 120   Call our office if you have blood sugars less than 80 or greater then 300 on two or more occasions  Call our office if you have any questions regarding your blood sugars or insulin dosing regiment  Signs of low blood sugar may include tremors, feeling shaky, sweating, dizziness, confusion and weakness. Check your blood sugar immediatly if you have any of these symptoms.     The plan as discussed at your appointment-   Continue Humalog inject 5 units 3 times daily before meals if glucose is greater than 120  Increase Lantus 14 units mornings, 12 units at night  Jardiance 10 mg by mouth daily (Hold for 2 weeks, stop if diarrhea improves resume if diarrhea persists.   Dexcom G7 [address] [phone] - F [phone]    Repeat labs 4-5 days before your next appointment  Follow-up in 6 months    You have been prescribed the Dexcom G7 continuous glucose monitor (CGM).  This device reads your glucose levels in the interstitial fluid under your skin. This is not a blood glucose monitor.  The concentration of glucose in your blood is sometimes slightly higher than the concentration of glucose in the fluid under your skin.  This is a normal variation and is usually only a 5-15 difference.  You may notice a greater difference in the numbers between the blood and the device immediately after eating or activity.  This again is a normal variance and the levels will usually equal out over time.  You can use the number on the Dexcom CGM to monitor your glucose and take your medications or insulin.  If, for example, you get a low or high reading on the Dexcom and are asymptomatic,

## 2024-04-23 ENCOUNTER — TELEPHONE (OUTPATIENT)
Dept: PRIMARY CARE CLINIC | Age: 64
End: 2024-04-23

## 2024-04-24 ENCOUNTER — OFFICE VISIT (OUTPATIENT)
Dept: GERIATRIC MEDICINE | Age: 64
End: 2024-04-24

## 2024-04-24 DIAGNOSIS — I50.22 CHRONIC SYSTOLIC (CONGESTIVE) HEART FAILURE (HCC): Primary | ICD-10-CM

## 2024-04-24 DIAGNOSIS — M48.062 SPINAL STENOSIS, LUMBAR REGION WITH NEUROGENIC CLAUDICATION: ICD-10-CM

## 2024-04-24 DIAGNOSIS — I48.0 PAF (PAROXYSMAL ATRIAL FIBRILLATION) (HCC): ICD-10-CM

## 2024-04-26 ENCOUNTER — OFFICE VISIT (OUTPATIENT)
Dept: GERIATRIC MEDICINE | Age: 64
End: 2024-04-26

## 2024-04-26 DIAGNOSIS — Z79.4 DIABETES MELLITUS TYPE 2, INSULIN DEPENDENT (HCC): Primary | ICD-10-CM

## 2024-04-26 DIAGNOSIS — E11.9 DIABETES MELLITUS TYPE 2, INSULIN DEPENDENT (HCC): Primary | ICD-10-CM

## 2024-04-29 ENCOUNTER — PATIENT MESSAGE (OUTPATIENT)
Dept: NEUROLOGY | Age: 64
End: 2024-04-29

## 2024-04-29 ENCOUNTER — OFFICE VISIT (OUTPATIENT)
Dept: NEUROLOGY | Age: 64
End: 2024-04-29
Payer: MEDICARE

## 2024-04-29 VITALS
HEART RATE: 77 BPM | WEIGHT: 154 LBS | BODY MASS INDEX: 24.11 KG/M2 | SYSTOLIC BLOOD PRESSURE: 128 MMHG | DIASTOLIC BLOOD PRESSURE: 68 MMHG

## 2024-04-29 DIAGNOSIS — I61.0 THALAMIC HEMORRHAGE (HCC): Primary | ICD-10-CM

## 2024-04-29 DIAGNOSIS — G81.91 HEMIPLEGIA AFFECTING RIGHT DOMINANT SIDE, UNSPECIFIED ETIOLOGY, UNSPECIFIED HEMIPLEGIA TYPE (HCC): ICD-10-CM

## 2024-04-29 DIAGNOSIS — Z86.73 HISTORY OF STROKE: ICD-10-CM

## 2024-04-29 DIAGNOSIS — G60.9 HEREDITARY PERIPHERAL NEUROPATHY: ICD-10-CM

## 2024-04-29 DIAGNOSIS — Z74.09 IMMOBILITY: ICD-10-CM

## 2024-04-29 DIAGNOSIS — I63.9 CRYPTOGENIC STROKE (HCC): ICD-10-CM

## 2024-04-29 DIAGNOSIS — H02.402 PTOSIS OF LEFT EYELID: ICD-10-CM

## 2024-04-29 PROCEDURE — 3074F SYST BP LT 130 MM HG: CPT | Performed by: PSYCHIATRY & NEUROLOGY

## 2024-04-29 PROCEDURE — 1036F TOBACCO NON-USER: CPT | Performed by: PSYCHIATRY & NEUROLOGY

## 2024-04-29 PROCEDURE — 3017F COLORECTAL CA SCREEN DOC REV: CPT | Performed by: PSYCHIATRY & NEUROLOGY

## 2024-04-29 PROCEDURE — 3078F DIAST BP <80 MM HG: CPT | Performed by: PSYCHIATRY & NEUROLOGY

## 2024-04-29 PROCEDURE — G8420 CALC BMI NORM PARAMETERS: HCPCS | Performed by: PSYCHIATRY & NEUROLOGY

## 2024-04-29 PROCEDURE — 99214 OFFICE O/P EST MOD 30 MIN: CPT | Performed by: PSYCHIATRY & NEUROLOGY

## 2024-04-29 PROCEDURE — G8427 DOCREV CUR MEDS BY ELIG CLIN: HCPCS | Performed by: PSYCHIATRY & NEUROLOGY

## 2024-04-29 RX ORDER — DIVALPROEX SODIUM 125 MG/1
125 CAPSULE, COATED PELLETS ORAL 2 TIMES DAILY
COMMUNITY
Start: 2024-04-04

## 2024-04-29 RX ORDER — INSULIN LISPRO 100 [IU]/ML
INJECTION, SOLUTION INTRAVENOUS; SUBCUTANEOUS
COMMUNITY
Start: 2024-04-15

## 2024-04-29 NOTE — PROGRESS NOTES
07:16 AM    FERRITIN 150 08/17/2022 08:29 AM    IRON 45 08/17/2022 08:29 AM    TIBC 218 08/17/2022 08:29 AM     Lab Results   Component Value Date/Time    TRIG 164 11/09/2023 01:30 AM    HDL 41 11/09/2023 01:30 AM    LDLCALC 100 11/09/2023 01:30 AM     Lab Results   Component Value Date/Time    LABAMPH Neg 11/02/2023 11:30 AM    BARBSCNU Neg 11/02/2023 11:30 AM    LABBENZ Neg 11/02/2023 11:30 AM    LABMETH Neg 11/02/2023 11:30 AM    OPIATESCREENURINE Neg 11/02/2023 11:30 AM    PHENCYCLIDINESCREENURINE Neg 11/02/2023 11:30 AM    ETOH <10 11/09/2023 01:30 AM     No results found for: \"LITHIUM\", \"DILFRTOT\", \"VALPROATE\"    Assessment:       Diagnosis Orders   1. Thalamic hemorrhage (HCC)        2. Hereditary peripheral neuropathy        3. History of stroke        4. Cryptogenic stroke (HCC)        5. Hemiplegia affecting right dominant side, unspecified etiology, unspecified hemiplegia type (HCC)        6. Ptosis of left eyelid        7. Immobility        Thalamic hemorrhage with multiple other strokes.  Patient is now on Eliquis and likely may have atrial fibrillation.  Patient is quite weak on the right side and has almost given up in terms of ambulation as he is not making any progress.  I had a lengthy discussion that he must carry or not to regress.  Is not any falls.  He has isolated ptosis of the left eye without disconjugate gaze he is not had any symptoms of neuromuscular junction disorders.  He is not complaining of any dysphagia or speech difficulty other than what he has.  For now 1 would continue with what ever therapeutic options we have.  In the event that he has any other changes in his eyes will reevaluate.    Joshua Miller MD, DOMINGO  Diplomate, American Board of Psychiatry & Neurology  Board Certified in Vascular Neurology  Board Certified in Neuromuscular Medicine  Certified in Neurorehabilitation         Plan:      No orders of the defined types were placed in this encounter.    No orders of the

## 2024-05-01 ENCOUNTER — OFFICE VISIT (OUTPATIENT)
Dept: GERIATRIC MEDICINE | Age: 64
End: 2024-05-01

## 2024-05-01 DIAGNOSIS — I50.22 CHRONIC SYSTOLIC (CONGESTIVE) HEART FAILURE (HCC): Primary | ICD-10-CM

## 2024-05-01 DIAGNOSIS — Z79.4 DIABETES MELLITUS TYPE 2, INSULIN DEPENDENT (HCC): ICD-10-CM

## 2024-05-01 DIAGNOSIS — E11.9 DIABETES MELLITUS TYPE 2, INSULIN DEPENDENT (HCC): ICD-10-CM

## 2024-05-01 DIAGNOSIS — M47.817 LUMBOSACRAL SPONDYLOSIS WITHOUT MYELOPATHY: ICD-10-CM

## 2024-05-06 ASSESSMENT — ENCOUNTER SYMPTOMS
SHORTNESS OF BREATH: 0
CONSTIPATION: 0
DIARRHEA: 0
COUGH: 0
WHEEZING: 0

## 2024-05-06 NOTE — PROGRESS NOTES
activity change and appetite change.   HENT: Negative.     Respiratory:  Negative for cough, shortness of breath and wheezing.    Cardiovascular:  Negative for chest pain.   Gastrointestinal:  Negative for constipation and diarrhea.   Psychiatric/Behavioral:  Positive for confusion. Negative for agitation, behavioral problems and sleep disturbance. The patient is not nervous/anxious.    All other systems reviewed and are negative.      Objective:   VS: See PCC. Reviewed.    Physical Exam  Vitals reviewed.   Constitutional:       Appearance: Normal appearance. He is not ill-appearing.   HENT:      Head: Normocephalic and atraumatic.      Nose: No congestion or rhinorrhea.      Mouth/Throat:      Mouth: Mucous membranes are moist.      Pharynx: Oropharynx is clear.   Eyes:      General:         Right eye: No discharge.         Left eye: No discharge.   Cardiovascular:      Rate and Rhythm: Normal rate and regular rhythm.      Pulses: Normal pulses.      Heart sounds: Normal heart sounds.   Pulmonary:      Effort: Pulmonary effort is normal.      Breath sounds: No wheezing.   Skin:     General: Skin is warm and dry.      Coloration: Skin is pale.   Neurological:      Mental Status: He is alert. He is disoriented.   Psychiatric:         Mood and Affect: Mood normal.         Cognition and Memory: Cognition is impaired. Memory is impaired.         Assessment:       Diagnosis Orders   1. Diabetes mellitus type 2, insulin dependent (HCC)              Plan:      No orders of the defined types were placed in this encounter.    No orders of the defined types were placed in this encounter.      Maintain Jardiance and Lantus, add 5 units insulin lispro with meals.  Hold parameters put in place.  Follow-up for any further elevations in BG.    No follow-ups on file.      NABILA Fontenot    Electronically signed by: NABILA Fontenot on 5/6/2024    Please note orders entered on site at facility after discussion with appropriate facility

## 2024-05-08 ENCOUNTER — OFFICE VISIT (OUTPATIENT)
Dept: GERIATRIC MEDICINE | Age: 64
End: 2024-05-08

## 2024-05-08 DIAGNOSIS — H02.402 PTOSIS OF EYELID, LEFT: Primary | ICD-10-CM

## 2024-05-08 DIAGNOSIS — H04.203 WATERY EYES: ICD-10-CM

## 2024-05-10 ENCOUNTER — OFFICE VISIT (OUTPATIENT)
Dept: GERIATRIC MEDICINE | Age: 64
End: 2024-05-10

## 2024-05-10 DIAGNOSIS — H02.402 PTOSIS, LEFT EYELID: Primary | ICD-10-CM

## 2024-05-10 DIAGNOSIS — H04.203 WATERY EYES: ICD-10-CM

## 2024-05-13 ENCOUNTER — OFFICE VISIT (OUTPATIENT)
Dept: GERIATRIC MEDICINE | Age: 64
End: 2024-05-13
Payer: MEDICARE

## 2024-05-13 DIAGNOSIS — H02.402: Primary | ICD-10-CM

## 2024-05-13 DIAGNOSIS — H04.203 WATERY EYES: ICD-10-CM

## 2024-05-13 PROCEDURE — 99308 SBSQ NF CARE LOW MDM 20: CPT | Performed by: PHYSICIAN ASSISTANT

## 2024-05-15 ASSESSMENT — ENCOUNTER SYMPTOMS
CONSTIPATION: 0
COUGH: 0
WHEEZING: 0
DIARRHEA: 0
SHORTNESS OF BREATH: 0

## 2024-05-15 NOTE — PROGRESS NOTES
Subjective:      Patient ID: Gary Turk is a pleasant 64 y.o. male who presents today for:  No chief complaint on file.      [facility]    See patient today due to complaint of increased tearfulness to left eye.  Lid is closed today, no drainage noted.  Patient does have some watery discharge noted.  Patient does have history of prior cryptogenic stroke and thalamic hemorrhage with residual deficits.  Patient did recently see Dr. Miller for neuro follow-up.  No new orders, however patient was encouraged to follow through in regards to PT and physical goals regarding poststroke symptoms.  Patient significantly despondent per physician.  This is noted on recent visits as well.  Did express concern, requesting patient see psych for follow-up.  No new changes to patient gaze or gross visual acuity.  Will plan to utilize some antihistamine drops for possible allergy symptoms.  Otherwise we will continue monitoring      Patient Active Problem List   Diagnosis    Essential hypertension, benign    Irregular heart rhythm    Hyperlipidemia    Allergic rhinitis due to pollen    Staphylococcus aureus bacteremia    Hematoma of right thigh    Hematoma of left lower extremity    Hereditary peripheral neuropathy    Migraine without aura    Community acquired pneumonia of right lower lobe of lung    Acute on chronic diastolic (congestive) heart failure ([facility])    Mild intermittent asthma with exacerbation    NSTEMI (non-ST elevated myocardial infarction) ([facility])    Acute bronchitis due to other specified organisms    CAD (coronary artery disease)    Body mass index (bmi) 29.0-29.9, adult    Cellulitis of right lower limb    Dyspnea, unspecified    Encounter for pre-employment examination    Family history of ischemic heart disease and other diseases of the circulatory system    Other specified hypothyroidism    Inflammatory disorders of scrotum    Long term (current) use of insulin (HCC)    Long term (current) use of oral

## 2024-05-17 ASSESSMENT — ENCOUNTER SYMPTOMS
WHEEZING: 0
DIARRHEA: 0
SHORTNESS OF BREATH: 0
COUGH: 0
CONSTIPATION: 0

## 2024-05-17 NOTE — PROGRESS NOTES
Subjective:      Patient ID: Gary Turk is a pleasant 64 y.o. male who presents today for:  Chief Complaint   Patient presents with    Other     Ptosis, left eyelid  (primary encounter diagnosis)  Watery eyes         [facility]    Patient seen again today for concern over left eye ptosis.  He has been seen by neurology unfortunately before.  Still awaiting to have antihistamine drops sent from pharmacy.  No right eye involvement.  Considering CN III palsy in your Bell's palsy.  Pupils only mildly dilated, no significant change compared to baseline.  Patient denies blurry vision or double vision.  Will perform ESR/CBC.  No further symptoms this time.  Will await drops and have nurse alert if any symptom improvement      Patient Active Problem List   Diagnosis    Essential hypertension, benign    Irregular heart rhythm    Hyperlipidemia    Allergic rhinitis due to pollen    Staphylococcus aureus bacteremia    Hematoma of right thigh    Hematoma of left lower extremity    Hereditary peripheral neuropathy    Migraine without aura    Community acquired pneumonia of right lower lobe of lung    Acute on chronic diastolic (congestive) heart failure ([facility])    Mild intermittent asthma with exacerbation    NSTEMI (non-ST elevated myocardial infarction) ([facility])    Acute bronchitis due to other specified organisms    CAD (coronary artery disease)    Body mass index (bmi) 29.0-29.9, adult    Cellulitis of right lower limb    Dyspnea, unspecified    Encounter for pre-employment examination    Family history of ischemic heart disease and other diseases of the circulatory system    Other specified hypothyroidism    Inflammatory disorders of scrotum    Long term (current) use of insulin ([facility])    Long term (current) use of oral hypoglycemic drugs    Other chronic sinusitis    Other idiopathic scoliosis, lumbosacral region    Other intervertebral disc disorders, lumbar region    Pain in left lower leg    General patient

## 2024-05-20 ASSESSMENT — VISUAL ACUITY: OU: 1

## 2024-05-20 ASSESSMENT — ENCOUNTER SYMPTOMS
COUGH: 0
CONSTIPATION: 0
WHEEZING: 0
EYE DISCHARGE: 0
EYE PAIN: 0
EYE ITCHING: 0
DIARRHEA: 0
SHORTNESS OF BREATH: 0

## 2024-05-20 NOTE — PROGRESS NOTES
issues addressed with staff. Available data and data elements in on site paper chart reviewed and analyzed.  Current external consultant notes reviewed in on site chart. Ordered laboratory testing and imaging will be reviewed when available.     Side effects, adverse effects of the medication prescribed today, as well as treatment plan and result expectations have been discussed withthe patient who expresses understanding and desires to proceed.    I spent a total of 15 minutes on the date of service which included preparing to see the patient, face-to-face patient care, performing a medically appropriate examination, completing clinical documentation, and on counseling/ eductaing the patient and the family.      Please note Nuance Dragon PowerMic III software used for dictation of note,  which may contain minor errors due to ambient noise and indiscriminate speech pickup.

## 2024-05-23 ENCOUNTER — OFFICE VISIT (OUTPATIENT)
Dept: GERIATRIC MEDICINE | Age: 64
End: 2024-05-23

## 2024-05-23 DIAGNOSIS — I50.22 CHRONIC SYSTOLIC (CONGESTIVE) HEART FAILURE (HCC): Primary | ICD-10-CM

## 2024-05-24 NOTE — PROGRESS NOTES
services/ nutritional staff. Current longstanding medical problems and acute medical issues addressed with staff. Available data and data elements in on site paper chart reviewed and analyzed.  Current external consultant notes reviewed in on site chart. Ordered laboratory testing and imaging will be reviewed when available.

## 2024-05-26 LAB
ALBUMIN SERPL-MCNC: 3.3 G/DL (ref 3.5–4.6)
ALP SERPL-CCNC: 79 U/L (ref 35–104)
ALT SERPL-CCNC: 7 U/L (ref 0–41)
ANION GAP SERPL CALCULATED.3IONS-SCNC: 11 MEQ/L (ref 9–15)
AST SERPL-CCNC: 9 U/L (ref 0–40)
BASOPHILS # BLD: 0 K/UL (ref 0–0.2)
BASOPHILS NFR BLD: 0.7 %
BILIRUB SERPL-MCNC: 0.3 MG/DL (ref 0.2–0.7)
BUN SERPL-MCNC: 18 MG/DL (ref 8–23)
CALCIUM SERPL-MCNC: 8.7 MG/DL (ref 8.5–9.9)
CHLORIDE SERPL-SCNC: 102 MEQ/L (ref 95–107)
CO2 SERPL-SCNC: 26 MEQ/L (ref 20–31)
CREAT SERPL-MCNC: 0.78 MG/DL (ref 0.7–1.2)
EOSINOPHIL # BLD: 0.2 K/UL (ref 0–0.7)
EOSINOPHIL NFR BLD: 3.6 %
ERYTHROCYTE [DISTWIDTH] IN BLOOD BY AUTOMATED COUNT: 14.9 % (ref 11.5–14.5)
GLOBULIN SER CALC-MCNC: 2.3 G/DL (ref 2.3–3.5)
GLUCOSE SERPL-MCNC: 98 MG/DL (ref 70–99)
HCT VFR BLD AUTO: 39.8 % (ref 42–52)
HGB BLD-MCNC: 13.1 G/DL (ref 14–18)
LYMPHOCYTES # BLD: 0.8 K/UL (ref 1–4.8)
LYMPHOCYTES NFR BLD: 12.4 %
MCH RBC QN AUTO: 27.6 PG (ref 27–31.3)
MCHC RBC AUTO-ENTMCNC: 32.9 % (ref 33–37)
MCV RBC AUTO: 84 FL (ref 79–92.2)
MONOCYTES # BLD: 0.6 K/UL (ref 0.2–0.8)
MONOCYTES NFR BLD: 9 %
NEUTROPHILS # BLD: 4.5 K/UL (ref 1.4–6.5)
NEUTS SEG NFR BLD: 73.5 %
PLATELET # BLD AUTO: 118 K/UL (ref 130–400)
POTASSIUM SERPL-SCNC: 4 MEQ/L (ref 3.4–4.9)
PROT SERPL-MCNC: 5.6 G/DL (ref 6.3–8)
RBC # BLD AUTO: 4.74 M/UL (ref 4.7–6.1)
SODIUM SERPL-SCNC: 139 MEQ/L (ref 135–144)
WBC # BLD AUTO: 6.1 K/UL (ref 4.8–10.8)

## 2024-05-30 ENCOUNTER — OFFICE VISIT (OUTPATIENT)
Dept: GERIATRIC MEDICINE | Age: 64
End: 2024-05-30
Payer: MEDICARE

## 2024-05-30 DIAGNOSIS — I50.22 CHRONIC SYSTOLIC (CONGESTIVE) HEART FAILURE (HCC): Primary | ICD-10-CM

## 2024-05-30 DIAGNOSIS — H02.402 ACQUIRED PTOSIS OF LEFT EYELID: ICD-10-CM

## 2024-05-30 DIAGNOSIS — I25.10 CORONARY ARTERY DISEASE INVOLVING NATIVE HEART WITHOUT ANGINA PECTORIS, UNSPECIFIED VESSEL OR LESION TYPE: ICD-10-CM

## 2024-05-30 DIAGNOSIS — H49.22: ICD-10-CM

## 2024-05-30 PROCEDURE — 99309 SBSQ NF CARE MODERATE MDM 30: CPT | Performed by: PHYSICIAN ASSISTANT

## 2024-05-30 NOTE — PROGRESS NOTES
note orders entered on site at facility after discussion with appropriate facility nursing/therapy/ / nutritional staff. Current longstanding medical problems and acute medical issues addressed with staff. Available data and data elements in on site paper chart reviewed and analyzed.  Current external consultant notes reviewed in on site chart. Ordered laboratory testing and imaging will be reviewed when available.

## 2024-06-10 ENCOUNTER — OFFICE VISIT (OUTPATIENT)
Dept: GERIATRIC MEDICINE | Age: 64
End: 2024-06-10

## 2024-06-10 ENCOUNTER — HOSPITAL ENCOUNTER (OUTPATIENT)
Dept: CARDIOLOGY | Age: 64
Discharge: HOME | End: 2024-06-10
Payer: MEDICARE

## 2024-06-10 DIAGNOSIS — I50.22 CHRONIC SYSTOLIC (CONGESTIVE) HEART FAILURE (HCC): Primary | ICD-10-CM

## 2024-06-10 DIAGNOSIS — I48.0 PAF (PAROXYSMAL ATRIAL FIBRILLATION) (HCC): ICD-10-CM

## 2024-06-10 DIAGNOSIS — Z79.4 DIABETES MELLITUS TYPE 2, INSULIN DEPENDENT (HCC): ICD-10-CM

## 2024-06-10 DIAGNOSIS — E11.9 DIABETES MELLITUS TYPE 2, INSULIN DEPENDENT (HCC): ICD-10-CM

## 2024-06-10 PROCEDURE — 93298 REM INTERROG DEV EVAL SCRMS: CPT

## 2024-06-11 DIAGNOSIS — Z45.09 ENCOUNTER FOR LOOP RECORDER CHECK: Primary | ICD-10-CM

## 2024-06-17 ASSESSMENT — ENCOUNTER SYMPTOMS
EYE DISCHARGE: 0
DIARRHEA: 0
CONSTIPATION: 0
WHEEZING: 0
SHORTNESS OF BREATH: 0
EYE ITCHING: 0
EYE PAIN: 0
COUGH: 0

## 2024-06-17 ASSESSMENT — VISUAL ACUITY: OU: 1

## 2024-06-17 NOTE — PROGRESS NOTES
today, as well as treatment plan and result expectations have been discussed withthe patient who expresses understanding and desires to proceed.    I spent a total of 25 minutes on the date of service which included preparing to see the patient, face-to-face patient care, performing a medically appropriate examination, completing clinical documentation, and on counseling/ eductaing the patient and the family.      Please note Nuance Dragon PowerMic III software used for dictation of note,  which may contain minor errors due to ambient noise and indiscriminate speech pickup.

## 2024-06-22 NOTE — PROGRESS NOTES
SUBJECTIVE:        ROS:  The rest of the 14 point ROS negative    PHYSICAL EXAM: VSS per facility record      ASSESSMENT & PLAN:   Diagnosis Orders   1. Chronic systolic (congestive) heart failure                      Past Medical History:   Diagnosis Date    Asthma     Back pain     compresion in [place]    CAD (coronary artery disease) 01/2020    3 vessel     chf 09/16/2022    Diabetes mellitus (HCC)     Essential hypertension, benign     Hyperlipemia     Ischemic cardiomyopathy 09/16/2022    Neuropathic pain, leg, bilateral          Past Surgical History:   Procedure Laterality Date    CARPAL TUNNEL RELEASE Bilateral     EP DEVICE PROCEDURE N/A 9/29/2023    Loop recorder insert  ROOM 293 MEDTRONIC NOTIFIED performed by Maxwell Tyler DO at [facility] CARDIAC CATH LAB    FOOT DEBRIDEMENT Right 5/5/2023    RIGHT FOOT DEBRIDEMENT INCISION AND DRAINAGE ROOM 174 performed by Jaior Garcia DPM at [facility] OR    FOOT DEBRIDEMENT Right 5/8/2023    RIGHT FOOT  INCISION AND DRAINAGE WITH DEBRIDEMENT OF NON-VIABLE TISSUE DELAYED PRIMARY CLOSURE performed by Jairo Garcia DPM at [facility] OR    FOOT SURGERY Left     bone spur    HIP SURGERY Left 10/5/2023    TROCHANTERIC FIXATION NAILING LEFT FEMUR FRACTURE ROOM 265 performed by Mirza Elliott MD at [facility] OR    LAMINOTOMY  2012    SPINE SURGERY N/A 11/11/2022    L1 VERETEBRAL AUGMENTATION performed by Concetta Junior MD at [facility] OR         Current Outpatient Medications on File Prior to Visit   Medication Sig Dispense Refill    divalproex (DEPAKOTE SPRINKLE) 125 MG DR capsule Take 1 capsule by mouth 2 times daily      HUMALOG KWIKPEN 100 UNIT/ML SOPN       vitamin D (CHOLECALCIFEROL) 125 MCG (5000 UT) CAPS capsule Take 1 capsule by mouth daily      MULTIPLE VITAMINS PO Take by mouth daily      insulin glargine (LANTUS) 100 UNIT/ML injection vial Inject 14 units mornings, 12 units at night 10 mL 3    Continuous Blood Gluc Sensor (DEXCOM G7 SENSOR) MISC 1 Device by Does not apply route every 
69

## 2024-06-25 ENCOUNTER — OFFICE VISIT (OUTPATIENT)
Dept: GERIATRIC MEDICINE | Age: 64
End: 2024-06-25

## 2024-06-25 DIAGNOSIS — I50.22 CHRONIC SYSTOLIC (CONGESTIVE) HEART FAILURE (HCC): ICD-10-CM

## 2024-06-25 DIAGNOSIS — R53.1 WEAKNESS: ICD-10-CM

## 2024-06-25 DIAGNOSIS — I25.5 ISCHEMIC CARDIOMYOPATHY: Primary | ICD-10-CM

## 2024-06-27 LAB
BILIRUBIN, URINE: NEGATIVE
BLOOD, URINE: POSITIVE
CLARITY, UA: CLEAR
COLOR, UA: YELLOW
GLUCOSE URINE: ABNORMAL
KETONES, URINE: NEGATIVE
LEUKOCYTE ESTERASE, URINE: ABNORMAL
NITRITE, URINE: NEGATIVE
PH UA: 6 (ref 4.5–8)
PROTEIN UA: ABNORMAL
SPECIFIC GRAVITY, URINE: 1.02
UROBILINOGEN, URINE: NORMAL

## 2024-06-28 ENCOUNTER — OFFICE VISIT (OUTPATIENT)
Dept: GERIATRIC MEDICINE | Age: 64
End: 2024-06-28

## 2024-06-28 DIAGNOSIS — Z87.440 HISTORY OF UTI: ICD-10-CM

## 2024-06-28 DIAGNOSIS — N39.0 AT RISK FOR SEPSIS DUE TO URINARY TRACT INFECTION: Primary | ICD-10-CM

## 2024-06-28 DIAGNOSIS — Z91.89 AT RISK FOR SEPSIS DUE TO URINARY TRACT INFECTION: Primary | ICD-10-CM

## 2024-07-01 ENCOUNTER — OFFICE VISIT (OUTPATIENT)
Dept: GERIATRIC MEDICINE | Age: 64
End: 2024-07-01

## 2024-07-01 DIAGNOSIS — H60.391 ACUTE INFECTION OF RIGHT EXTERNAL EAR: Primary | ICD-10-CM

## 2024-07-02 ENCOUNTER — OFFICE VISIT (OUTPATIENT)
Dept: GERIATRIC MEDICINE | Age: 64
End: 2024-07-02

## 2024-07-02 DIAGNOSIS — I50.22 CHRONIC SYSTOLIC (CONGESTIVE) HEART FAILURE (HCC): Primary | ICD-10-CM

## 2024-07-02 DIAGNOSIS — I48.0 PAF (PAROXYSMAL ATRIAL FIBRILLATION) (HCC): ICD-10-CM

## 2024-07-02 DIAGNOSIS — M43.17 SPONDYLOLISTHESIS, LUMBOSACRAL REGION: ICD-10-CM

## 2024-07-02 LAB
A/G RATIO: 1.5
ALBUMIN: 3.1 G/DL
ALP BLD-CCNC: 63 U/L
ALT SERPL-CCNC: 10 U/L
AST SERPL-CCNC: 10 U/L
BASOPHILS ABSOLUTE: ABNORMAL
BASOPHILS RELATIVE PERCENT: 0.7 %
BILIRUB SERPL-MCNC: 0.3 MG/DL (ref 0.1–1.4)
BILIRUBIN DIRECT: 0.1 MG/DL
BILIRUBIN, INDIRECT: ABNORMAL
EOSINOPHILS ABSOLUTE: 0.4 /ΜL
EOSINOPHILS RELATIVE PERCENT: 10.2 %
GLOBULIN: ABNORMAL
HCT VFR BLD CALC: 36.2 % (ref 41–53)
HEMOGLOBIN: 12.3 G/DL (ref 13.5–17.5)
LYMPHOCYTES ABSOLUTE: 0.7 /ΜL
LYMPHOCYTES RELATIVE PERCENT: 17 %
MCH RBC QN AUTO: 28.1 PG
MCHC RBC AUTO-ENTMCNC: 34 G/DL
MCV RBC AUTO: 82.5 FL
MONOCYTES ABSOLUTE: 0.4 /ΜL
MONOCYTES RELATIVE PERCENT: 9.3 %
NEUTROPHILS ABSOLUTE: 2.6 /ΜL
NEUTROPHILS RELATIVE PERCENT: 62.8 %
PLATELET # BLD: 100 K/ΜL
PMV BLD AUTO: 6.9 FL
RBC # BLD: 4.38 10^6/ΜL
TOTAL PROTEIN: 5.2 G/DL (ref 6.4–8.2)
WBC # BLD: 4.1 10^3/ML

## 2024-07-05 ENCOUNTER — OFFICE VISIT (OUTPATIENT)
Dept: GERIATRIC MEDICINE | Age: 64
End: 2024-07-05
Payer: COMMERCIAL

## 2024-07-05 DIAGNOSIS — Z91.89 AT RISK FOR INFECTION ASSOCIATED WITH FOLEY CATHETER: Primary | ICD-10-CM

## 2024-07-05 DIAGNOSIS — R45.1 AGITATION: ICD-10-CM

## 2024-07-05 PROCEDURE — 99308 SBSQ NF CARE LOW MDM 20: CPT | Performed by: PHYSICIAN ASSISTANT

## 2024-07-07 ENCOUNTER — HOSPITAL ENCOUNTER (INPATIENT)
Age: 64
LOS: 4 days | Discharge: NURSING, REHAB OR LONG-TERM CARE | End: 2024-07-13
Attending: INTERNAL MEDICINE | Admitting: FAMILY MEDICINE
Payer: COMMERCIAL

## 2024-07-07 ENCOUNTER — APPOINTMENT (OUTPATIENT)
Dept: GENERAL RADIOLOGY | Age: 64
End: 2024-07-07
Payer: COMMERCIAL

## 2024-07-07 DIAGNOSIS — R79.89 ELEVATED PROCALCITONIN: ICD-10-CM

## 2024-07-07 DIAGNOSIS — R79.89 ELEVATED TROPONIN: ICD-10-CM

## 2024-07-07 DIAGNOSIS — R50.9 FEVER, UNSPECIFIED FEVER CAUSE: ICD-10-CM

## 2024-07-07 DIAGNOSIS — L97.422 CHRONIC ULCER OF LEFT HEEL WITH FAT LAYER EXPOSED (HCC): Primary | ICD-10-CM

## 2024-07-07 DIAGNOSIS — R63.8 DECREASED ORAL INTAKE: ICD-10-CM

## 2024-07-07 DIAGNOSIS — R65.10 SIRS (SYSTEMIC INFLAMMATORY RESPONSE SYNDROME) (HCC): ICD-10-CM

## 2024-07-07 LAB
ALBUMIN SERPL-MCNC: 3.4 G/DL (ref 3.5–4.6)
ALP SERPL-CCNC: 72 U/L (ref 35–104)
ALT SERPL-CCNC: 8 U/L (ref 0–41)
AMPHET UR QL SCN: NORMAL
ANION GAP SERPL CALCULATED.3IONS-SCNC: 15 MEQ/L (ref 9–15)
AST SERPL-CCNC: 10 U/L (ref 0–40)
B PARAP IS1001 DNA NPH QL NAA+NON-PROBE: NOT DETECTED
B PERT.PT PRMT NPH QL NAA+NON-PROBE: NOT DETECTED
BACTERIA URNS QL MICRO: NEGATIVE /HPF
BARBITURATES UR QL SCN: NORMAL
BASOPHILS # BLD: 0 K/UL (ref 0–0.2)
BASOPHILS NFR BLD: 0.1 %
BENZODIAZ UR QL SCN: NORMAL
BILIRUB SERPL-MCNC: 0.4 MG/DL (ref 0.2–0.7)
BILIRUB UR QL STRIP: NEGATIVE
BNP BLD-MCNC: 361 PG/ML
BUN SERPL-MCNC: 28 MG/DL (ref 8–23)
C PNEUM DNA NPH QL NAA+NON-PROBE: NOT DETECTED
CALCIUM SERPL-MCNC: 8.9 MG/DL (ref 8.5–9.9)
CANNABINOIDS UR QL SCN: NORMAL
CHLORIDE SERPL-SCNC: 99 MEQ/L (ref 95–107)
CLARITY UR: CLEAR
CO2 SERPL-SCNC: 23 MEQ/L (ref 20–31)
COCAINE UR QL SCN: NORMAL
COLOR UR: ABNORMAL
CREAT SERPL-MCNC: 1.05 MG/DL (ref 0.7–1.2)
DRUG SCREEN COMMENT UR-IMP: NORMAL
EOSINOPHIL # BLD: 0 K/UL (ref 0–0.7)
EOSINOPHIL NFR BLD: 0.1 %
EPI CELLS #/AREA URNS AUTO: ABNORMAL /HPF (ref 0–5)
ERYTHROCYTE [DISTWIDTH] IN BLOOD BY AUTOMATED COUNT: 14.3 % (ref 11.5–14.5)
FENTANYL SCREEN, URINE: NORMAL
FLUAV RNA NPH QL NAA+NON-PROBE: NOT DETECTED
FLUBV RNA NPH QL NAA+NON-PROBE: NOT DETECTED
GLOBULIN SER CALC-MCNC: 2.6 G/DL (ref 2.3–3.5)
GLUCOSE SERPL-MCNC: 221 MG/DL (ref 70–99)
GLUCOSE UR STRIP-MCNC: NEGATIVE MG/DL
HADV DNA NPH QL NAA+NON-PROBE: NOT DETECTED
HCOV 229E RNA NPH QL NAA+NON-PROBE: NOT DETECTED
HCOV HKU1 RNA NPH QL NAA+NON-PROBE: NOT DETECTED
HCOV NL63 RNA NPH QL NAA+NON-PROBE: NOT DETECTED
HCOV OC43 RNA NPH QL NAA+NON-PROBE: NOT DETECTED
HCT VFR BLD AUTO: 38.4 % (ref 42–52)
HGB BLD-MCNC: 12.5 G/DL (ref 14–18)
HGB UR QL STRIP: ABNORMAL
HMPV RNA NPH QL NAA+NON-PROBE: NOT DETECTED
HPIV1 RNA NPH QL NAA+NON-PROBE: NOT DETECTED
HPIV2 RNA NPH QL NAA+NON-PROBE: NOT DETECTED
HPIV3 RNA NPH QL NAA+NON-PROBE: NOT DETECTED
HPIV4 RNA NPH QL NAA+NON-PROBE: NOT DETECTED
HYALINE CASTS #/AREA URNS AUTO: ABNORMAL /HPF (ref 0–5)
KETONES UR STRIP-MCNC: ABNORMAL MG/DL
LACTATE BLDV-SCNC: 1.8 MMOL/L (ref 0.5–2.2)
LEUKOCYTE ESTERASE UR QL STRIP: NEGATIVE
LIPASE SERPL-CCNC: 9 U/L (ref 12–95)
LYMPHOCYTES # BLD: 0.6 K/UL (ref 1–4.8)
LYMPHOCYTES NFR BLD: 6.1 %
M PNEUMO DNA NPH QL NAA+NON-PROBE: NOT DETECTED
MAGNESIUM SERPL-MCNC: 1.8 MG/DL (ref 1.7–2.4)
MCH RBC QN AUTO: 27.5 PG (ref 27–31.3)
MCHC RBC AUTO-ENTMCNC: 32.6 % (ref 33–37)
MCV RBC AUTO: 84.4 FL (ref 79–92.2)
METHADONE UR QL SCN: NORMAL
MONOCYTES # BLD: 0.7 K/UL (ref 0.2–0.8)
MONOCYTES NFR BLD: 7.4 %
NEUTROPHILS # BLD: 8.4 K/UL (ref 1.4–6.5)
NEUTS SEG NFR BLD: 85.9 %
NITRITE UR QL STRIP: NEGATIVE
OPIATES UR QL SCN: NORMAL
OXYCODONE UR QL SCN: NORMAL
PCP UR QL SCN: NORMAL
PH UR STRIP: 6 [PH] (ref 5–9)
PLATELET # BLD AUTO: 100 K/UL (ref 130–400)
POTASSIUM SERPL-SCNC: 4.8 MEQ/L (ref 3.4–4.9)
PROCALCITONIN SERPL IA-MCNC: 1.15 NG/ML (ref 0–0.15)
PROPOXYPH UR QL SCN: NORMAL
PROT SERPL-MCNC: 6 G/DL (ref 6.3–8)
PROT UR STRIP-MCNC: 100 MG/DL
RBC # BLD AUTO: 4.55 M/UL (ref 4.7–6.1)
RBC #/AREA URNS AUTO: ABNORMAL /HPF (ref 0–5)
RSV RNA NPH QL NAA+NON-PROBE: NOT DETECTED
RV+EV RNA NPH QL NAA+NON-PROBE: NOT DETECTED
SARS-COV-2 RNA NPH QL NAA+NON-PROBE: NOT DETECTED
SODIUM SERPL-SCNC: 137 MEQ/L (ref 135–144)
SP GR UR STRIP: 1.02 (ref 1–1.03)
TROPONIN, HIGH SENSITIVITY: 104 NG/L (ref 0–19)
TROPONIN, HIGH SENSITIVITY: 118 NG/L (ref 0–19)
TROPONIN, HIGH SENSITIVITY: 137 NG/L (ref 0–19)
TSH REFLEX: 0.98 UIU/ML (ref 0.44–3.86)
URINE REFLEX TO CULTURE: ABNORMAL
UROBILINOGEN UR STRIP-ACNC: 0.2 E.U./DL
VALPROATE SERPL-MCNC: 18 UG/ML (ref 50–100)
WBC # BLD AUTO: 9.8 K/UL (ref 4.8–10.8)
WBC #/AREA URNS AUTO: ABNORMAL /HPF (ref 0–5)

## 2024-07-07 PROCEDURE — G0378 HOSPITAL OBSERVATION PER HR: HCPCS

## 2024-07-07 PROCEDURE — 87186 SC STD MICRODIL/AGAR DIL: CPT

## 2024-07-07 PROCEDURE — 87040 BLOOD CULTURE FOR BACTERIA: CPT

## 2024-07-07 PROCEDURE — 96365 THER/PROPH/DIAG IV INF INIT: CPT

## 2024-07-07 PROCEDURE — 6360000002 HC RX W HCPCS: Performed by: PERSONAL EMERGENCY RESPONSE ATTENDANT

## 2024-07-07 PROCEDURE — 0202U NFCT DS 22 TRGT SARS-COV-2: CPT

## 2024-07-07 PROCEDURE — 2500000003 HC RX 250 WO HCPCS: Performed by: PERSONAL EMERGENCY RESPONSE ATTENDANT

## 2024-07-07 PROCEDURE — 83880 ASSAY OF NATRIURETIC PEPTIDE: CPT

## 2024-07-07 PROCEDURE — 84443 ASSAY THYROID STIM HORMONE: CPT

## 2024-07-07 PROCEDURE — 87150 DNA/RNA AMPLIFIED PROBE: CPT

## 2024-07-07 PROCEDURE — 81001 URINALYSIS AUTO W/SCOPE: CPT

## 2024-07-07 PROCEDURE — 36415 COLL VENOUS BLD VENIPUNCTURE: CPT

## 2024-07-07 PROCEDURE — 83605 ASSAY OF LACTIC ACID: CPT

## 2024-07-07 PROCEDURE — 83690 ASSAY OF LIPASE: CPT

## 2024-07-07 PROCEDURE — 96374 THER/PROPH/DIAG INJ IV PUSH: CPT

## 2024-07-07 PROCEDURE — 80307 DRUG TEST PRSMV CHEM ANLYZR: CPT

## 2024-07-07 PROCEDURE — 96375 TX/PRO/DX INJ NEW DRUG ADDON: CPT

## 2024-07-07 PROCEDURE — 6370000000 HC RX 637 (ALT 250 FOR IP): Performed by: PERSONAL EMERGENCY RESPONSE ATTENDANT

## 2024-07-07 PROCEDURE — 71045 X-RAY EXAM CHEST 1 VIEW: CPT

## 2024-07-07 PROCEDURE — 2580000003 HC RX 258: Performed by: PERSONAL EMERGENCY RESPONSE ATTENDANT

## 2024-07-07 PROCEDURE — 84484 ASSAY OF TROPONIN QUANT: CPT

## 2024-07-07 PROCEDURE — 84145 PROCALCITONIN (PCT): CPT

## 2024-07-07 PROCEDURE — 80164 ASSAY DIPROPYLACETIC ACD TOT: CPT

## 2024-07-07 PROCEDURE — 83735 ASSAY OF MAGNESIUM: CPT

## 2024-07-07 PROCEDURE — 99285 EMERGENCY DEPT VISIT HI MDM: CPT

## 2024-07-07 PROCEDURE — 85025 COMPLETE CBC W/AUTO DIFF WBC: CPT

## 2024-07-07 PROCEDURE — 80053 COMPREHEN METABOLIC PANEL: CPT

## 2024-07-07 PROCEDURE — 93005 ELECTROCARDIOGRAM TRACING: CPT | Performed by: PERSONAL EMERGENCY RESPONSE ATTENDANT

## 2024-07-07 RX ORDER — ACETAMINOPHEN 650 MG/1
650 SUPPOSITORY RECTAL ONCE
Status: COMPLETED | OUTPATIENT
Start: 2024-07-07 | End: 2024-07-07

## 2024-07-07 RX ORDER — 0.9 % SODIUM CHLORIDE 0.9 %
500 INTRAVENOUS SOLUTION INTRAVENOUS ONCE
Status: COMPLETED | OUTPATIENT
Start: 2024-07-07 | End: 2024-07-07

## 2024-07-07 RX ORDER — ACETAMINOPHEN 500 MG
1000 TABLET ORAL ONCE
Status: DISCONTINUED | OUTPATIENT
Start: 2024-07-07 | End: 2024-07-07

## 2024-07-07 RX ADMIN — ACETAMINOPHEN 650 MG: 650 SUPPOSITORY RECTAL at 20:11

## 2024-07-07 RX ADMIN — SODIUM CHLORIDE 500 ML: 9 INJECTION, SOLUTION INTRAVENOUS at 20:30

## 2024-07-07 RX ADMIN — PIPERACILLIN AND TAZOBACTAM 3375 MG: 3; .375 INJECTION, POWDER, LYOPHILIZED, FOR SOLUTION INTRAVENOUS at 22:11

## 2024-07-07 RX ADMIN — METHYLPREDNISOLONE SODIUM SUCCINATE 40 MG: 40 INJECTION INTRAMUSCULAR; INTRAVENOUS at 19:31

## 2024-07-07 RX ADMIN — SODIUM CHLORIDE 1500 MG: 9 INJECTION, SOLUTION INTRAVENOUS at 22:54

## 2024-07-07 RX ADMIN — SODIUM CHLORIDE 500 ML: 9 INJECTION, SOLUTION INTRAVENOUS at 22:55

## 2024-07-07 ASSESSMENT — LIFESTYLE VARIABLES
HOW MANY STANDARD DRINKS CONTAINING ALCOHOL DO YOU HAVE ON A TYPICAL DAY: PATIENT UNABLE TO ANSWER
HOW OFTEN DO YOU HAVE A DRINK CONTAINING ALCOHOL: PATIENT UNABLE TO ANSWER

## 2024-07-07 ASSESSMENT — PAIN - FUNCTIONAL ASSESSMENT: PAIN_FUNCTIONAL_ASSESSMENT: ADULT NONVERBAL PAIN SCALE (NPVS)

## 2024-07-07 NOTE — ED NOTES
Labs collected and sent at this time, phlebotomy at the bedside to collect the 2nd set of cultures.   Patient has a clean brief on due to having a bowel movement, there is a red area to his sacrum, a pressure dressing was applied, the patient has excoriation to his scrotum, no open or bleeding areas. A clean brief was placed, he has been placed on an external male catheter to collect urine.

## 2024-07-07 NOTE — ED PROVIDER NOTES
[facility] ED  eMERGENCY dEPARTMENT eNCOUnter      Pt Name: Gary Turk  MRN: [id number]  Birthdate 1960  Date of evaluation: 7/7/2024  Provider: NABILA BENAVIDES    My attending is Dr. Calix    HISTORY OF PRESENT ILLNESS    Gary Turk is a 64 y.o. male with PMHx of asthma, back pain, CAD, CHF, DM2, hypertension, hyperlipidemia, ischemic cardiomyopathy, bilateral leg neuropathic pain, NSTEMI, brain bleed, TIA, dysphagia, aphasia, cryptogenic stroke, PAF, right hemiplegia, dementia presents to the emergency department with decreased oral intake. Pt from [facility], Full Code.  Patient poor historian and minimally verbal but is awake/alert.  Per nursing home patient had decreased oral intake for unknown amount of time.  Patient does admit to generalized weakness and decreased oral intake.  He shakes his head yes and no for answers.  He denies fevers, chest pain, shortness of breath, abdominal pain, nausea, vomiting, diarrhea, constipation, urinary symptoms.  Patient does arrive however with emesis on blanket. On ASA and Eliquis. Arrives 88% on RA.     Per chart review: September 2023 cardiac echo showed LVEF 50 to 55% with diastolic dysfunction    HPI    Nursing Notes were reviewed.    REVIEW OF SYSTEMS       Review of Systems   Constitutional:  Positive for appetite change. Negative for chills and fever.   HENT:  Negative for congestion, rhinorrhea and sore throat.    Respiratory:  Negative for cough and shortness of breath.    Cardiovascular:  Negative for chest pain.   Gastrointestinal:  Positive for vomiting. Negative for abdominal pain, blood in stool, diarrhea and nausea.   Genitourinary:  Negative for difficulty urinating.   Musculoskeletal:  Negative for neck stiffness.   Skin:  Negative for color change and rash.   Neurological:  Positive for weakness. Negative for dizziness, syncope, light-headedness, numbness and headaches.   All other systems reviewed and are negative.            PAST

## 2024-07-08 ENCOUNTER — APPOINTMENT (OUTPATIENT)
Dept: ULTRASOUND IMAGING | Age: 64
End: 2024-07-08
Payer: COMMERCIAL

## 2024-07-08 PROBLEM — L97.422 CHRONIC ULCER OF LEFT HEEL WITH FAT LAYER EXPOSED (HCC): Status: ACTIVE | Noted: 2024-07-08

## 2024-07-08 LAB
A BAUMANNII DNA BLD POS QL NAA+NON-PROBE: NOT DETECTED
ALBUMIN SERPL-MCNC: 3 G/DL (ref 3.5–4.6)
ALP SERPL-CCNC: 56 U/L (ref 35–104)
ALT SERPL-CCNC: 8 U/L (ref 0–41)
ANION GAP SERPL CALCULATED.3IONS-SCNC: 14 MEQ/L (ref 9–15)
AST SERPL-CCNC: 9 U/L (ref 0–40)
BASOPHILS # BLD: 0 K/UL (ref 0–0.2)
BASOPHILS NFR BLD: 0.1 %
BILIRUB SERPL-MCNC: 0.3 MG/DL (ref 0.2–0.7)
BUN SERPL-MCNC: 33 MG/DL (ref 8–23)
C ALBICANS DNA BLD POS QL NAA+NON-PROBE: NOT DETECTED
C AURIS DNA BLD POS QL NAA+PROBE: NOT DETECTED
C GLABRATA DNA BLD POS QL NAA+NON-PROBE: NOT DETECTED
C KRUSEI DNA BLD POS QL NAA+NON-PROBE: NOT DETECTED
C PARAP DNA BLD POS QL NAA+NON-PROBE: NOT DETECTED
C TROPICLS DNA BLD POS QL NAA+NON-PROBE: NOT DETECTED
CALCIUM SERPL-MCNC: 8.2 MG/DL (ref 8.5–9.9)
CHLORIDE SERPL-SCNC: 100 MEQ/L (ref 95–107)
CO2 SERPL-SCNC: 21 MEQ/L (ref 20–31)
CREAT SERPL-MCNC: 1.03 MG/DL (ref 0.7–1.2)
CRYPTOCOCCUS NEOFORMANS/GATTII BY PCR: NOT DETECTED
E CLOAC COMP DNA BLD POS NAA+NON-PROBE: NOT DETECTED
E COLI DNA BLD POS QL NAA+NON-PROBE: NOT DETECTED
E FAECALIS DNA BLD POS QL NAA+PROBE: NOT DETECTED
E FAECIUM DNA BLD POS QL NAA+PROBE: NOT DETECTED
ENTEROBACT DNA BLD POS QL NAA+NON-PROBE: NOT DETECTED
ENTEROCOC DNA BLD POS QL NAA+NON-PROBE: NOT DETECTED
EOSINOPHIL # BLD: 0 K/UL (ref 0–0.7)
EOSINOPHIL NFR BLD: 0 %
ERYTHROCYTE [DISTWIDTH] IN BLOOD BY AUTOMATED COUNT: 14.4 % (ref 11.5–14.5)
GLOBULIN SER CALC-MCNC: 2.8 G/DL (ref 2.3–3.5)
GLUCOSE BLD-MCNC: 173 MG/DL (ref 70–99)
GLUCOSE BLD-MCNC: 251 MG/DL (ref 70–99)
GLUCOSE BLD-MCNC: 277 MG/DL (ref 70–99)
GLUCOSE BLD-MCNC: 306 MG/DL (ref 70–99)
GLUCOSE SERPL-MCNC: 316 MG/DL (ref 70–99)
GN BLD CULTURE PNL BLD POS NAA+PROBE: NOT DETECTED
GP B STREP DNA BLD POS QL NAA+NON-PROBE: NOT DETECTED
HCT VFR BLD AUTO: 36.1 % (ref 42–52)
HGB BLD-MCNC: 11.7 G/DL (ref 14–18)
K OXYTOCA DNA BLD POS QL NAA+NON-PROBE: NOT DETECTED
K PNEUMON DNA SPEC QL NAA+PROBE: NOT DETECTED
K. AEROGENES DNA SPEC QL NAA+PROBE: NOT DETECTED
L MONOCYTOG DNA BLD POS QL NAA+NON-PROBE: NOT DETECTED
LYMPHOCYTES # BLD: 0.4 K/UL (ref 1–4.8)
LYMPHOCYTES NFR BLD: 4.8 %
MCH RBC QN AUTO: 27 PG (ref 27–31.3)
MCHC RBC AUTO-ENTMCNC: 32.4 % (ref 33–37)
MCV RBC AUTO: 83.2 FL (ref 79–92.2)
MONOCYTES # BLD: 0.5 K/UL (ref 0.2–0.8)
MONOCYTES NFR BLD: 6.2 %
N MEN DNA BLD POS QL NAA+NON-PROBE: NOT DETECTED
NEUTROPHILS # BLD: 7.3 K/UL (ref 1.4–6.5)
NEUTS SEG NFR BLD: 88.4 %
P AERUGINOSA DNA BLD POS NAA+NON-PROBE: NOT DETECTED
PERFORMED ON: ABNORMAL
PLATELET # BLD AUTO: 77 K/UL (ref 130–400)
POTASSIUM SERPL-SCNC: 4.6 MEQ/L (ref 3.4–4.9)
PREALB SERPL-MCNC: 16.2 MG/DL (ref 20–40)
PROT SERPL-MCNC: 5.8 G/DL (ref 6.3–8)
PROTEUS SP DNA BLD POS QL NAA+NON-PROBE: NOT DETECTED
RBC # BLD AUTO: 4.34 M/UL (ref 4.7–6.1)
S AUREUS DNA BLD POS QL NAA+NON-PROBE: NOT DETECTED
S AUREUS+CONS DNA BLD POS NAA+NON-PROBE: DETECTED
S EPIDERMIDIS DNA BLD POS QL NAA+PROBE: NOT DETECTED
S LUGDUNENSIS DNA BLD POS QL NAA+PROBE: NOT DETECTED
S MALTOPH DNA BLD POS QL NAA+PROBE: NOT DETECTED
S MARCESCENS DNA BLD POS NAA+NON-PROBE: NOT DETECTED
S PNEUM DNA BLD POS QL NAA+NON-PROBE: NOT DETECTED
S PYO DNA BLD POS QL NAA+NON-PROBE: NOT DETECTED
SALMONELLA DNA BLD POS QL NAA+PROBE: NOT DETECTED
SODIUM SERPL-SCNC: 135 MEQ/L (ref 135–144)
STREPTOCOCCUS DNA BLD POS NAA+NON-PROBE: NOT DETECTED
WBC # BLD AUTO: 8.2 K/UL (ref 4.8–10.8)

## 2024-07-08 PROCEDURE — 99222 1ST HOSP IP/OBS MODERATE 55: CPT | Performed by: PSYCHIATRY & NEUROLOGY

## 2024-07-08 PROCEDURE — APPSS45 APP SPLIT SHARED TIME 31-45 MINUTES: Performed by: NURSE PRACTITIONER

## 2024-07-08 PROCEDURE — 86403 PARTICLE AGGLUT ANTBDY SCRN: CPT

## 2024-07-08 PROCEDURE — 97161 PT EVAL LOW COMPLEX 20 MIN: CPT

## 2024-07-08 PROCEDURE — 87449 NOS EACH ORGANISM AG IA: CPT

## 2024-07-08 PROCEDURE — 87075 CULTR BACTERIA EXCEPT BLOOD: CPT

## 2024-07-08 PROCEDURE — G0378 HOSPITAL OBSERVATION PER HR: HCPCS

## 2024-07-08 PROCEDURE — 87186 SC STD MICRODIL/AGAR DIL: CPT

## 2024-07-08 PROCEDURE — 84134 ASSAY OF PREALBUMIN: CPT

## 2024-07-08 PROCEDURE — 6370000000 HC RX 637 (ALT 250 FOR IP)

## 2024-07-08 PROCEDURE — 94640 AIRWAY INHALATION TREATMENT: CPT

## 2024-07-08 PROCEDURE — 2580000003 HC RX 258

## 2024-07-08 PROCEDURE — 97165 OT EVAL LOW COMPLEX 30 MIN: CPT

## 2024-07-08 PROCEDURE — 94761 N-INVAS EAR/PLS OXIMETRY MLT: CPT

## 2024-07-08 PROCEDURE — 87077 CULTURE AEROBIC IDENTIFY: CPT

## 2024-07-08 PROCEDURE — 94760 N-INVAS EAR/PLS OXIMETRY 1: CPT

## 2024-07-08 PROCEDURE — 51798 US URINE CAPACITY MEASURE: CPT

## 2024-07-08 PROCEDURE — 6360000002 HC RX W HCPCS

## 2024-07-08 PROCEDURE — 51701 INSERT BLADDER CATHETER: CPT

## 2024-07-08 PROCEDURE — 80053 COMPREHEN METABOLIC PANEL: CPT

## 2024-07-08 PROCEDURE — 87324 CLOSTRIDIUM AG IA: CPT

## 2024-07-08 PROCEDURE — 85025 COMPLETE CBC W/AUTO DIFF WBC: CPT

## 2024-07-08 PROCEDURE — 36415 COLL VENOUS BLD VENIPUNCTURE: CPT

## 2024-07-08 PROCEDURE — 93926 LOWER EXTREMITY STUDY: CPT

## 2024-07-08 PROCEDURE — 92610 EVALUATE SWALLOWING FUNCTION: CPT

## 2024-07-08 PROCEDURE — 87070 CULTURE OTHR SPECIMN AEROBIC: CPT

## 2024-07-08 RX ORDER — ASPIRIN 81 MG/1
81 TABLET ORAL DAILY
Status: DISCONTINUED | OUTPATIENT
Start: 2024-07-08 | End: 2024-07-13 | Stop reason: HOSPADM

## 2024-07-08 RX ORDER — ESCITALOPRAM OXALATE 10 MG/1
15 TABLET ORAL DAILY
Status: DISCONTINUED | OUTPATIENT
Start: 2024-07-08 | End: 2024-07-13 | Stop reason: HOSPADM

## 2024-07-08 RX ORDER — GLUCAGON 1 MG/ML
1 KIT INJECTION PRN
Status: DISCONTINUED | OUTPATIENT
Start: 2024-07-08 | End: 2024-07-13 | Stop reason: HOSPADM

## 2024-07-08 RX ORDER — POLYETHYLENE GLYCOL 3350 17 G/17G
17 POWDER, FOR SOLUTION ORAL DAILY PRN
Status: DISCONTINUED | OUTPATIENT
Start: 2024-07-08 | End: 2024-07-13 | Stop reason: HOSPADM

## 2024-07-08 RX ORDER — POTASSIUM CHLORIDE 7.45 MG/ML
10 INJECTION INTRAVENOUS PRN
Status: DISCONTINUED | OUTPATIENT
Start: 2024-07-08 | End: 2024-07-13 | Stop reason: HOSPADM

## 2024-07-08 RX ORDER — SODIUM CHLORIDE 0.9 % (FLUSH) 0.9 %
5-40 SYRINGE (ML) INJECTION PRN
Status: DISCONTINUED | OUTPATIENT
Start: 2024-07-08 | End: 2024-07-13 | Stop reason: HOSPADM

## 2024-07-08 RX ORDER — ONDANSETRON 2 MG/ML
4 INJECTION INTRAMUSCULAR; INTRAVENOUS EVERY 6 HOURS PRN
Status: DISCONTINUED | OUTPATIENT
Start: 2024-07-08 | End: 2024-07-13 | Stop reason: HOSPADM

## 2024-07-08 RX ORDER — ACETAMINOPHEN 325 MG/1
650 TABLET ORAL EVERY 6 HOURS PRN
Status: DISCONTINUED | OUTPATIENT
Start: 2024-07-08 | End: 2024-07-13 | Stop reason: HOSPADM

## 2024-07-08 RX ORDER — DEXTROSE MONOHYDRATE 100 MG/ML
INJECTION, SOLUTION INTRAVENOUS CONTINUOUS PRN
Status: DISCONTINUED | OUTPATIENT
Start: 2024-07-08 | End: 2024-07-13 | Stop reason: HOSPADM

## 2024-07-08 RX ORDER — LANOLIN ALCOHOL/MO/W.PET/CERES
3 CREAM (GRAM) TOPICAL NIGHTLY
Status: DISCONTINUED | OUTPATIENT
Start: 2024-07-08 | End: 2024-07-13 | Stop reason: HOSPADM

## 2024-07-08 RX ORDER — INSULIN LISPRO 100 [IU]/ML
5 INJECTION, SOLUTION INTRAVENOUS; SUBCUTANEOUS
Status: DISCONTINUED | OUTPATIENT
Start: 2024-07-08 | End: 2024-07-13 | Stop reason: HOSPADM

## 2024-07-08 RX ORDER — MAGNESIUM HYDROXIDE/ALUMINUM HYDROXICE/SIMETHICONE 120; 1200; 1200 MG/30ML; MG/30ML; MG/30ML
30 SUSPENSION ORAL EVERY 6 HOURS PRN
Status: DISCONTINUED | OUTPATIENT
Start: 2024-07-08 | End: 2024-07-13 | Stop reason: HOSPADM

## 2024-07-08 RX ORDER — ACETAMINOPHEN 80 MG
TABLET,CHEWABLE ORAL ONCE
Status: COMPLETED | OUTPATIENT
Start: 2024-07-08 | End: 2024-07-08

## 2024-07-08 RX ORDER — DIVALPROEX SODIUM 500 MG/1
500 TABLET, DELAYED RELEASE ORAL 2 TIMES DAILY
COMMUNITY

## 2024-07-08 RX ORDER — BUDESONIDE AND FORMOTEROL FUMARATE DIHYDRATE 80; 4.5 UG/1; UG/1
2 AEROSOL RESPIRATORY (INHALATION)
Status: DISCONTINUED | OUTPATIENT
Start: 2024-07-08 | End: 2024-07-13 | Stop reason: HOSPADM

## 2024-07-08 RX ORDER — SODIUM CHLORIDE 9 MG/ML
INJECTION, SOLUTION INTRAVENOUS PRN
Status: DISCONTINUED | OUTPATIENT
Start: 2024-07-08 | End: 2024-07-13 | Stop reason: HOSPADM

## 2024-07-08 RX ORDER — MAGNESIUM SULFATE IN WATER 40 MG/ML
2000 INJECTION, SOLUTION INTRAVENOUS PRN
Status: DISCONTINUED | OUTPATIENT
Start: 2024-07-08 | End: 2024-07-13 | Stop reason: HOSPADM

## 2024-07-08 RX ORDER — CARVEDILOL 12.5 MG/1
12.5 TABLET ORAL 2 TIMES DAILY
Status: DISCONTINUED | OUTPATIENT
Start: 2024-07-08 | End: 2024-07-13 | Stop reason: HOSPADM

## 2024-07-08 RX ORDER — HYDRALAZINE HYDROCHLORIDE 25 MG/1
25 TABLET, FILM COATED ORAL 2 TIMES DAILY
Status: DISCONTINUED | OUTPATIENT
Start: 2024-07-08 | End: 2024-07-13 | Stop reason: HOSPADM

## 2024-07-08 RX ORDER — DIVALPROEX SODIUM 500 MG/1
500 TABLET, DELAYED RELEASE ORAL 2 TIMES DAILY
Status: DISCONTINUED | OUTPATIENT
Start: 2024-07-08 | End: 2024-07-13 | Stop reason: HOSPADM

## 2024-07-08 RX ORDER — SODIUM CHLORIDE 0.9 % (FLUSH) 0.9 %
5-40 SYRINGE (ML) INJECTION EVERY 12 HOURS SCHEDULED
Status: DISCONTINUED | OUTPATIENT
Start: 2024-07-08 | End: 2024-07-13 | Stop reason: HOSPADM

## 2024-07-08 RX ORDER — ACETAMINOPHEN 650 MG/1
650 SUPPOSITORY RECTAL EVERY 6 HOURS PRN
Status: DISCONTINUED | OUTPATIENT
Start: 2024-07-08 | End: 2024-07-13 | Stop reason: HOSPADM

## 2024-07-08 RX ORDER — INSULIN LISPRO 100 [IU]/ML
0-4 INJECTION, SOLUTION INTRAVENOUS; SUBCUTANEOUS NIGHTLY
Status: DISCONTINUED | OUTPATIENT
Start: 2024-07-08 | End: 2024-07-13 | Stop reason: HOSPADM

## 2024-07-08 RX ORDER — SODIUM CHLORIDE 9 MG/ML
INJECTION, SOLUTION INTRAVENOUS CONTINUOUS
Status: DISPENSED | OUTPATIENT
Start: 2024-07-08 | End: 2024-07-10

## 2024-07-08 RX ORDER — POTASSIUM CHLORIDE 20 MEQ/1
40 TABLET, EXTENDED RELEASE ORAL PRN
Status: DISCONTINUED | OUTPATIENT
Start: 2024-07-08 | End: 2024-07-13 | Stop reason: HOSPADM

## 2024-07-08 RX ORDER — ONDANSETRON 4 MG/1
4 TABLET, ORALLY DISINTEGRATING ORAL EVERY 8 HOURS PRN
Status: DISCONTINUED | OUTPATIENT
Start: 2024-07-08 | End: 2024-07-13 | Stop reason: HOSPADM

## 2024-07-08 RX ORDER — INSULIN LISPRO 100 [IU]/ML
0-4 INJECTION, SOLUTION INTRAVENOUS; SUBCUTANEOUS
Status: DISCONTINUED | OUTPATIENT
Start: 2024-07-08 | End: 2024-07-13 | Stop reason: HOSPADM

## 2024-07-08 RX ADMIN — SODIUM CHLORIDE: 9 INJECTION, SOLUTION INTRAVENOUS at 01:50

## 2024-07-08 RX ADMIN — APIXABAN 5 MG: 5 TABLET, FILM COATED ORAL at 20:14

## 2024-07-08 RX ADMIN — SACUBITRIL AND VALSARTAN 1 TABLET: 24; 26 TABLET, FILM COATED ORAL at 20:14

## 2024-07-08 RX ADMIN — Medication 3 MG: at 20:14

## 2024-07-08 RX ADMIN — INSULIN LISPRO 5 UNITS: 100 INJECTION, SOLUTION INTRAVENOUS; SUBCUTANEOUS at 16:22

## 2024-07-08 RX ADMIN — CARVEDILOL 12.5 MG: 12.5 TABLET, FILM COATED ORAL at 08:00

## 2024-07-08 RX ADMIN — DIVALPROEX SODIUM 500 MG: 500 TABLET, DELAYED RELEASE ORAL at 20:14

## 2024-07-08 RX ADMIN — BUDESONIDE AND FORMOTEROL FUMARATE DIHYDRATE 2 PUFF: 80; 4.5 AEROSOL RESPIRATORY (INHALATION) at 07:02

## 2024-07-08 RX ADMIN — ASPIRIN 81 MG: 81 TABLET, COATED ORAL at 08:01

## 2024-07-08 RX ADMIN — SACUBITRIL AND VALSARTAN 1 TABLET: 24; 26 TABLET, FILM COATED ORAL at 08:01

## 2024-07-08 RX ADMIN — INSULIN LISPRO 2 UNITS: 100 INJECTION, SOLUTION INTRAVENOUS; SUBCUTANEOUS at 16:23

## 2024-07-08 RX ADMIN — INSULIN LISPRO 5 UNITS: 100 INJECTION, SOLUTION INTRAVENOUS; SUBCUTANEOUS at 08:02

## 2024-07-08 RX ADMIN — CARVEDILOL 12.5 MG: 12.5 TABLET, FILM COATED ORAL at 20:14

## 2024-07-08 RX ADMIN — ESCITALOPRAM OXALATE 15 MG: 10 TABLET ORAL at 08:00

## 2024-07-08 RX ADMIN — DIVALPROEX SODIUM 500 MG: 500 TABLET, DELAYED RELEASE ORAL at 08:01

## 2024-07-08 RX ADMIN — VANCOMYCIN HYDROCHLORIDE 750 MG: 750 INJECTION, POWDER, LYOPHILIZED, FOR SOLUTION INTRAVENOUS at 23:25

## 2024-07-08 RX ADMIN — APIXABAN 5 MG: 5 TABLET, FILM COATED ORAL at 08:00

## 2024-07-08 RX ADMIN — INSULIN LISPRO 3 UNITS: 100 INJECTION, SOLUTION INTRAVENOUS; SUBCUTANEOUS at 08:03

## 2024-07-08 RX ADMIN — Medication: at 09:08

## 2024-07-08 RX ADMIN — SODIUM CHLORIDE, PRESERVATIVE FREE 10 ML: 5 INJECTION INTRAVENOUS at 08:01

## 2024-07-08 RX ADMIN — VANCOMYCIN HYDROCHLORIDE 750 MG: 750 INJECTION, POWDER, LYOPHILIZED, FOR SOLUTION INTRAVENOUS at 11:25

## 2024-07-08 RX ADMIN — PIPERACILLIN AND TAZOBACTAM 3375 MG: 3; .375 INJECTION, POWDER, LYOPHILIZED, FOR SOLUTION INTRAVENOUS at 14:22

## 2024-07-08 RX ADMIN — HYDRALAZINE HYDROCHLORIDE 25 MG: 25 TABLET ORAL at 20:14

## 2024-07-08 RX ADMIN — PIPERACILLIN AND TAZOBACTAM 3375 MG: 3; .375 INJECTION, POWDER, LYOPHILIZED, FOR SOLUTION INTRAVENOUS at 06:16

## 2024-07-08 RX ADMIN — HYDRALAZINE HYDROCHLORIDE 25 MG: 25 TABLET ORAL at 08:01

## 2024-07-08 RX ADMIN — INSULIN LISPRO 2 UNITS: 100 INJECTION, SOLUTION INTRAVENOUS; SUBCUTANEOUS at 11:25

## 2024-07-08 RX ADMIN — SODIUM CHLORIDE: 9 INJECTION, SOLUTION INTRAVENOUS at 14:19

## 2024-07-08 RX ADMIN — BUDESONIDE AND FORMOTEROL FUMARATE DIHYDRATE 2 PUFF: 80; 4.5 AEROSOL RESPIRATORY (INHALATION) at 19:28

## 2024-07-08 RX ADMIN — INSULIN LISPRO 5 UNITS: 100 INJECTION, SOLUTION INTRAVENOUS; SUBCUTANEOUS at 11:26

## 2024-07-08 RX ADMIN — PIPERACILLIN AND TAZOBACTAM 3375 MG: 3; .375 INJECTION, POWDER, LYOPHILIZED, FOR SOLUTION INTRAVENOUS at 20:21

## 2024-07-08 ASSESSMENT — ENCOUNTER SYMPTOMS
TROUBLE SWALLOWING: 0
NAUSEA: 0
SHORTNESS OF BREATH: 0
CHEST TIGHTNESS: 0
WHEEZING: 0
COLOR CHANGE: 0
VOMITING: 0
COUGH: 0

## 2024-07-08 NOTE — PROGRESS NOTES
Wound Ostomy Continence Nurse  Consult Note       NAME:  Gary Turk  MEDICAL RECORD NUMBER:  [id number]  AGE: 64 y.o.   GENDER: male  : 1960  TODAY'S DATE:  2024    Subjective   Reason for WOC Nurse Evaluation and Assessment: L heel and redness to sacrum, scrotum, and R heel      Gary Turk is a 64 y.o. male referred by:   [x] Physician  [] Nursing  [] Other:     Wound Identification:  Wound Type: pressure  Contributing Factors: diabetes, chronic pressure, decreased mobility, and malnutrition    Wound History: Patient admitted to [facility] on 2024 with pressure injury to left heel and redness to multiple areas.   Current Wound Care Treatment:  Recommending 1) continue pressure injury prevention interventions including offloading boots 2) podiatry consulted for L heel - will defer to them for management and dressing orders 3) zinc BID and prn for prevention     Patient Goal of Care:  [x] Wound Healing  [] Odor Control  [] Palliative Care  [] Pain Control   [x] Other: pressure injury prevention         PAST MEDICAL HISTORY        Diagnosis Date    Asthma     Back pain     compresion in [place]    CAD (coronary artery disease) 2020    3 vessel     chf 2022    Diabetes mellitus (HCC)     Essential hypertension, benign     Hyperlipemia     Ischemic cardiomyopathy 2022    Neuropathic pain, leg, bilateral        PAST SURGICAL HISTORY    Past Surgical History:   Procedure Laterality Date    CARPAL TUNNEL RELEASE Bilateral     EP DEVICE PROCEDURE N/A 2023    Loop recorder insert  ROOM 293 MEDTRONIC NOTIFIED [place] [facility] [place]    FOOT DEBRIDEMENT Right 2023    RIGHT FOOT DEBRIDEMENT INCISION AND DRAINAGE ROOM 174 performed by Jairo Garcia DPM [place] [facility] [place]    FOOT DEBRIDEMENT Right 2023    RIGHT FOOT  INCISION AND DRAINAGE WITH DEBRIDEMENT OF NON-VIABLE TISSUE DELAYED PRIMARY CLOSURE performed by Jairo Garcia DPM [place] [facility] [place]    FOOT

## 2024-07-08 NOTE — CARE COORDINATION
Case Management Assessment  Initial Evaluation    Date/Time of Evaluation: 7/8/2024 4:27 PM  Assessment Completed by: HAYDEN Wilson    If patient is discharged prior to next notation, then this note serves as note for discharge by case management.    Patient Name: Gary Turk                   YOB: 1960  Diagnosis: Fever of unknown origin [R50.9]  Elevated troponin [R79.89]  SIRS (systemic inflammatory response syndrome) (HCC) [R65.10]  Decreased oral intake [R63.8]  Elevated procalcitonin [R79.89]  Fever, unspecified fever cause [R50.9]                   Date / Time: 7/7/2024  6:27 PM    Patient Admission Status: Observation   Readmission Risk (Low < 19, Mod (19-27), High > 27): Readmission Risk Score: 25.8    Current PCP: Lobo Monteiro MD  PCP verified by CM? Yes    Chart Reviewed: Yes      History Provided by: Patient  Patient Orientation: Alert and Oriented, Person    Patient Cognition: Dementia / Early Alzheimer's    Hospitalization in the last 30 days (Readmission):  No    If yes, Readmission Assessment in CM Navigator will be completed.    Advance Directives:      Code Status: Full Code   Patient's Primary Decision Maker is: Legal Next of Kin    Primary Decision Maker: Lela Moore - Child - [phone]    Secondary Decision Maker: Annemarie Mccormick - Brother/Sister - [phone]    Supplemental (Other) Decision Maker: Yeimy Turk - Spouse - [phone]    Discharge Planning:    Patient lives with: Other (Comment) (RETURN TO [facility]) Type of Home: Long-Term Care  Primary Care Giver: Other (Comment) (LONG TERM BED HOLD AT [facility])  Patient Support Systems include: Children, Family Members   Current Financial resources:    Current community resources:    Current services prior to admission: None            Current DME:              Type of Home Care services:       ADLS  Prior functional level: Assistance with the following:, Bathing, Dressing, Cooking, Housework,  agrees with the discharge plan. Freedom of choice list with basic dialogue that supports the patient's individualized plan of care/goals and shares the quality data associated with the providers was provided to: Patient   Patient Representative Name:       The Patient and/or Patient Representative Agree with the Discharge Plan? Yes    HAYDEN Wilson  Case Management Department  Ph: [phone] Fax: [phone]

## 2024-07-08 NOTE — PROGRESS NOTES
Pt  arrived from ED. When asked his name, he said \"I don't know.\" When asked if he is comfortable, he said \"No, I can't get up.\" Pt educated on importance of not attempting to get OOB and given call light.   Skin check completed with Nhi HUFFMAN RN. Wound noted to left heel dressed with silver dressing and wrapped in dry dressing. New dressing applied. Wound with purulent drainage and malodorous. Redness noted to right heel/posterior ankle, scrotum, and buttocks. Zinc applied. Pt's own foot drop boots placed back on.     Culture from left heel wound obtained and sent to lab per order.    0445 Pt refusing to allow his blood work to be collected. Phlebotomy will attempt later this morning.

## 2024-07-08 NOTE — PROGRESS NOTES
Hospitalist Daily Progress Note  Name: Gary Turk  Age: 64 y.o.  Gender: male  CodeStatus: Full Code  Allergies: No Known Allergies    Chief Complaint:Failure To Thrive (Not eating, failure to thrive )      Primary Care Provider: Lobo Monteiro MD    InpatientTreatment Team: Treatment Team: Attending Provider: Dave Harkins MD; Registered Nurse: Tomer Beck, RN; Consulting Physician: Henry Thayer DPM; Consulting Physician: Joshua Miller MD; Registered Nurse: Ronnie Rascon RN; : Jeffery Albert LSW; Utilization Reviewer: Aurora Lares RN    Admission Date: 7/7/2024      Subjective: No chest pain, sob, nausea.  Resting in bed, mild baseline confusion present.    Physical Exam  Vitals and nursing note reviewed.   Constitutional:       Appearance: Normal appearance.   Cardiovascular:      Rate and Rhythm: Normal rate and regular rhythm.   Pulmonary:      Effort: Pulmonary effort is normal.      Breath sounds: Normal breath sounds.   Abdominal:      General: Bowel sounds are normal.      Palpations: Abdomen is soft.   Musculoskeletal:         General: Normal range of motion.   Skin:     General: Skin is warm and dry.      Comments: Left heel wrapped   Neurological:      General: No focal deficit present.      Mental Status: He is alert. Mental status is at baseline.      Comments: Baseline dementia present per record         Medications:  Reviewed    Infusion Medications:    sodium chloride      sodium chloride 75 mL/hr at 07/08/24 0150    dextrose       Scheduled Medications:    sodium chloride flush  5-40 mL IntraVENous 2 times per day    melatonin  3 mg Oral Nightly    piperacillin-tazobactam  3,375 mg IntraVENous Q8H    insulin lispro  0-4 Units SubCUTAneous TID WC    insulin lispro  0-4 Units SubCUTAneous Nightly    apixaban  5 mg Oral BID    aspirin  81 mg Oral Daily    budesonide-formoterol  2 puff Inhalation BID RT    carvedilol  12.5 mg Oral BID    divalproex  500 mg Oral BID

## 2024-07-08 NOTE — PLAN OF CARE
See OT evaluation for all goals and OT POC. Electronically signed by Nikole Mcgarry OTKENZIE/L on 7/8/2024 at 10:46 AM

## 2024-07-08 NOTE — PROGRESS NOTES
[facility] OCCUPATIONAL THERAPY EVALUATION - ACUTE     NAME: Gary Turk  : 1960 (64 y.o.)  MRN: [id number]  CODE STATUS: Full Code  Room: [facility]/W263-01    Date of Service: 2024    Patient Diagnosis(es): Fever of unknown origin [R50.9]  Elevated troponin [R79.89]  SIRS (systemic inflammatory response syndrome) (HCC) [R65.10]  Decreased oral intake [R63.8]  Elevated procalcitonin [R79.89]  Fever, unspecified fever cause [R50.9]   Patient Active Problem List    Diagnosis Date Noted    Cellulitis of right foot 2023    Diplopia 2023    Toothache 2023    Chronic systolic (congestive) heart failure 11/10/2022    Crush fracture of vertebra due to osteoporosis ([facility]) 11/10/2022    Diabetes mellitus type 2, insulin dependent ([facility]) 11/10/2022    chf 2022    Ischemic cardiomyopathy 2022    Hypertensive urgency 2022    Intractable nausea and vomiting 2022    Contact with and (suspected) exposure to covid-19 2021    Hypokalemia 2021    Presence of aortocoronary bypass graft 2021    Altered mental status, unspecified 2021    Moderate persistent asthma without complication 2019    Nasal congestion 2019    Headache 2018    Illness, unspecified 2018    Nausea 2018    Lumbosacral spondylosis without myelopathy 2016    Degeneration of lumbar intervertebral disc 2015    Low back pain 2015    Displacement of lumbar intervertebral disc without myelopathy 2012    Fever of unknown origin 2024    History of stroke 2024    Ptosis of left eyelid 2024    Hemiplegia affecting right dominant side, unspecified etiology, unspecified hemiplegia type (HCC) 2024    PAF (paroxysmal atrial fibrillation) ([facility]) 2024    MCI (mild cognitive impairment) 2023    Thalamic hemorrhage ([facility]) 2023    Ataxia 2023    Fall from standing 10/09/2023    Acute pain due to trauma 10/09/2023

## 2024-07-08 NOTE — H&P
Hospitalist Group   History and Physical      CHIEF COMPLAINT: Decreased oral intake    History of Present Illness:  Gary Turk is a 64 y.o. male with a PMHx of CABG, CAD, CHF, cardiomyopathy, PAF on Eliquis, hyperlipidemia, TIA, right hemiplegia, and dementia presents from [facility] with a chief complaint of decreased oral intake per staff.  Patient is a poor historian.  He is oriented to self but disoriented to place time and situation.  When asked why he was here, \"that is what I would like to know.\"  When explained about the decrease oral intake, he reports that he does not eat because he does not like the food served at the nursing home.  He denies chest pain, shortness of breath, nausea, vomiting, diarrhea, headaches, lightheadedness, dizziness, chills, and myalgias.    REVIEW OF SYSTEMS:  no fevers, chills, cp, sob, n/v, ha, vision/hearing changes, wt changes, hot/cold flashes, other open skin lesions, diarrhea, constipation, dysuria/hematuria unless noted in HPI. Complete ROS performed with the patient and is otherwise negative.      PMH:  Past Medical History:   Diagnosis Date    Asthma     Back pain     compresion in [place]    CAD (coronary artery disease) 01/2020    3 vessel     chf 09/16/2022    Diabetes mellitus (HCC)     Essential hypertension, benign     Hyperlipemia     Ischemic cardiomyopathy 09/16/2022    Neuropathic pain, leg, bilateral        Surgical History:  Past Surgical History:   Procedure Laterality Date    CARPAL TUNNEL RELEASE Bilateral     EP DEVICE PROCEDURE N/A 9/29/2023    Loop recorder insert  ROOM 293 MEDTRONIC NOTIFIED [place] [facility] [place]    FOOT DEBRIDEMENT Right 5/5/2023    RIGHT FOOT DEBRIDEMENT INCISION AND DRAINAGE ROOM 174 performed by Jairo Garcia DPM [place] [facility] [place]    FOOT DEBRIDEMENT Right 5/8/2023    RIGHT FOOT  INCISION AND DRAINAGE WITH DEBRIDEMENT OF NON-VIABLE TISSUE DELAYED PRIMARY CLOSURE performed by Jairo Garcia  clinical progression.  I am managing a portion of this patient's care.  Since some medical issues may be handled by other specialist.  Additional workup and treatment should be done in outpatient setting by patient's PCP and other outpatient providers.     In addition to examining and evaluating patient, I spent additional time explaining care normal and abnormal findings and treatment plan all of patient's questions were answered.  Case will be discussed with nursing staff when appropriate.  Family will be updated if and when appropriate.      I spent 65 minutes in total reviewing labs, imaging, previous history, records, and talking to patient/family. It also includes time documenting, ordering tests and initiating treatment.     Electronically signed by SHERIN Briceno CNP on 7/7/2024 at 11:11 PM      NOTE: This report was transcribed using voice recognition software. Every effort was made to ensure accuracy; however, inadvertent computerized transcription errors may be present.

## 2024-07-08 NOTE — PROGRESS NOTES
Pharmacy Note  Vancomycin Consult    Gary Turk is a 64 y.o. male started on Vancomycin for diabetic foot wound; consult received from Bridget Gutierrez APRN - CNP  to manage therapy. Also receiving the following antibiotics: Zosyn.    Fever of unknown origin [R50.9]  Elevated troponin [R79.89]  SIRS (systemic inflammatory response syndrome) (HCC) [R65.10]  Decreased oral intake [R63.8]  Elevated procalcitonin [R79.89]  Fever, unspecified fever cause [R50.9]  Allergies: Patient has no known allergies.    Temp max: 98.4 *F    Cultures  Recent Labs     07/07/24 1916   COVID19 Not Detected     Height: 170.2 cm (5' 7\"), Weight - Scale: 68 kg (150 lb), Body mass index is 23.49 kg/m².       Recent Labs     07/07/24 1916   CREATININE 1.05   Estimated Creatinine Clearance: 67 mL/min (based on SCr of 1.05 mg/dL).  .    Goal AUC24,ss Level: 400-600 mg/L.hr    Assessment/Plan:  Will initiate vancomycin 750 mg IV every 12 hours. Vancomycin 1,500 mg IV x1 given in ED. LOT 7 days. Predicted AUC24,ss 472 mg/L.hr, Ctrough,ss 13.8 mg/L, PAUC 75 %, Pconc 11 %. Random Vancomycin level ordered for 7/9 with morning labs.  Timing of future trough levels will be determined based on culture results, renal function, and clinical response.    Thank you for the consult.  Will continue to follow.  Marquita Kaye, AubreyD [facility] 7/8/2024 4:29 AM

## 2024-07-08 NOTE — ACP (ADVANCE CARE PLANNING)
Advance Care Planning   Healthcare Decision Maker:    Primary Decision Maker: Lela Moore - Child - [phone]    Secondary Decision Maker: Annemarie Mccormick - Brother/Sister - [phone]    Supplemental (Other) Decision Maker: TurkYeimy - Spouse - [phone]    Click here to complete Healthcare Decision Makers including selection of the Healthcare Decision Maker Relationship (ie \"Primary\").  Today we documented Decision Maker(s) consistent with Legal Next of Kin hierarchy.       If the relationship to the patient does NOT follow our state's Next of Kin hierarchy, the patient MUST complete an ACP Document to allow him/her to act on the patient's behalf. :

## 2024-07-08 NOTE — PROGRESS NOTES
[facility]   Pharmacy Pharmacokinetic Monitoring Service - Vancomycin     Gary Turk is a 64 y.o. male starting on vancomycin therapy for sepsis. Pharmacy consulted by NABILA Barrera for monitoring and adjustment.    Target Concentration: Goal AUC/RAFAEL 400-600 mg*hr/L    Additional Antimicrobials: Zosyn    Pertinent Laboratory Values:   Wt Readings from Last 1 Encounters:   07/07/24 68 kg (150 lb)     Temp Readings from Last 1 Encounters:   07/07/24 (!) 101.3 °F (38.5 °C) (Rectal)     Estimated Creatinine Clearance: 67 mL/min (based on SCr of 1.05 mg/dL).  Recent Labs     07/07/24  1900 07/07/24  1916   CREATININE  --  1.05   BUN  --  28*   WBC 9.8  --      Procalcitonin:   Procalcitonin   Date Value Ref Range Status   07/07/2024 1.15 (H) 0.00 - 0.15 ng/mL Final     Comment:     Suspected Sepsis:  Low likelihood of sepsis  <.50 ng/mL    Increased likelihood of sepsis 0.50-2.00 ng/mL  Antibiotics encouraged    High risk of sepsis/shock   >2.00 ng/mL  Antibiotics strongly encouraged    Suspected Lower Respiratory Tract Infections:  Low likelihood of bacterial infection  <0.24 ng/mL    Increased likelihood of bacterial infection >0.24 ng/mL  Antibiotics encouraged    With successful antibiotic therapy, PCT levels should decrease  rapidly. (Half-life of 24 to 36 hours.)    Procalcitonin values from samples collected within the first  6 hours of systemic infection may still be low.  Retesting may be indicated.  Values from day 1 and day 4 can be entered into the Change in  Procalcitonin Calculator to determine the patient's  Mortality Risk Prognosis  ([email or web address])    In healthy neonates, plasma Procalcitonin (PCT) concentrations  increase gradually after birth, reaching peak values at about  24 hours of age then decrease to normal values below 0.5 ng/mL  by 48-72 hours of age.           Pertinent Cultures:  Procedure Component Value Units Date/Time   Respiratory Panel, Molecular, with  COVID-19 (Restricted: peds pts or suitable admitted adults) [[id number]] Collected: 07/07/24 1916   Order Status: Completed Specimen: Nasopharyngeal Updated: 07/07/24 2009    Adenovirus by PCR Not Detected    Bordetella parapertussis by PCR Not Detected    Bordetella pertussis by PCR Not Detected    Chlamydophilia pneumoniae by PCR Not Detected    Coronavirus 229E by PCR Not Detected    Coronavirus HKU1 by PCR Not Detected    Coronavirus NL63 by PCR Not Detected    Coronavirus OC43 by PCR Not Detected    SARS-CoV-2, PCR Not Detected    Human Metapneumovirus by PCR Not Detected    Human Rhinovirus/Enterovirus by PCR Not Detected    Influenza A by PCR Not Detected    Influenza B by PCR Not Detected    Mycoplasma pneumoniae by PCR Not Detected    Parainfluenza Virus 1 by PCR Not Detected    Parainfluenza Virus 2 by PCR Not Detected    Parainfluenza Virus 3 by PCR Not Detected    Parainfluenza Virus 4 by PCR Not Detected    Respiratory Syncytial Virus by PCR Not Detected   Culture, Blood 2 [[id number]] Collected: 07/07/24 1926   Order Status: Sent Specimen: Blood Updated: 07/07/24 1928   Blood Culture 1 [[id number]] Collected: 07/07/24 1916   Order Status: Sent Specimen: Blood Updated: 07/07/24 1916   MRSA Nasal Swab: N/A. Non-respiratory infection.    Plan:  Dosing recommendations based on Bayesian software  Start vancomycin 1500 mg IVPB loading dose  Renal labs as indicated   Vancomycin consult required by attending provider once admitted  Pharmacy will continue to monitor patient and adjust therapy as indicated    Thank you for the consult,  Duy Greenfield [facility]  7/7/2024 9:22 PM

## 2024-07-08 NOTE — DISCHARGE INSTR - COC
Continuity of Care Form    Patient Name: Gary Turk   :  1960  MRN:  [id number]    Admit date:  2024  Discharge date:  2024    Code Status Order: Full Code   Advance Directives:     Admitting Physician:  Emi Moran DO  PCP: Lobo Monteiro MD    Discharging Nurse: Keli JON  Discharging Hospital Unit/Room#: W263/W263-01  Discharging Unit Phone Number:[phone]    Emergency Contact:   Extended Emergency Contact Information  Primary Emergency Contact: Lela Moore  Home Phone: [phone]  Mobile Phone: [phone]  Relation: Child  Preferred language: English   needed? No  Secondary Emergency Contact: Annemarie Mccormick   [facility]  Home Phone: [phone]  Mobile Phone: [phone]  Relation: Brother/Sister    Past Surgical History:  Past Surgical History:   Procedure Laterality Date    CARPAL TUNNEL RELEASE Bilateral     EP DEVICE PROCEDURE N/A 2023    Loop recorder insert  ROOM 293 [employer] NOTIFIED performed by Maxwell Tyler DO at [facility] CARDIAC CATH LAB    FOOT DEBRIDEMENT Right 2023    RIGHT FOOT DEBRIDEMENT INCISION AND DRAINAGE ROOM 174 performed by Jairo Garcia DPM at [facility] OR    FOOT DEBRIDEMENT Right 2023    RIGHT FOOT  INCISION AND DRAINAGE WITH DEBRIDEMENT OF NON-VIABLE TISSUE DELAYED PRIMARY CLOSURE performed by Jairo Garcia DPM at [facility] OR    FOOT SURGERY Left     bone spur    HIP SURGERY Left 10/5/2023    TROCHANTERIC FIXATION NAILING LEFT FEMUR FRACTURE ROOM 265 performed by Mirza Elliott MD at [facility] OR    LAMINOTOMY  2012    SPINE SURGERY N/A 2022    L1 VERETEBRAL AUGMENTATION performed by Concetta Junior MD at [facility] OR       Immunization History:   Immunization History   Administered Date(s) Administered    COVID-19, PFIZER GRAY top, DO NOT Dilute, (age 12 y+), IM, 30 mcg/0.3 mL 06/10/2022    COVID-19, PFIZER PURPLE top, DILUTE for use, (age 12 y+), 30mcg/0.3mL 2021, 2021, 2021    DTaP 2015     vanco trough level    Physician Certification: I certify the above information and transfer of Gary Turk  is necessary for the continuing treatment of the diagnosis listed and that he requires Intermediate Nursing Care for greater 30 days.     Update Admission H&P: No change in H&P    PHYSICIAN SIGNATURE:  Electronically signed by SHELBY SHELTON MD on 7/12/24 at 9:52 AM EDT

## 2024-07-08 NOTE — PLAN OF CARE
Problem: Skin/Tissue Integrity  Goal: Absence of new skin breakdown  Description: 1.  Monitor for areas of redness and/or skin breakdown  2.  Assess vascular access sites hourly  3.  Every 4-6 hours minimum:  Change oxygen saturation probe site  4.  Every 4-6 hours:  If on nasal continuous positive airway pressure, respiratory therapy assess nares and determine need for appliance change or resting period.  Outcome: Progressing     Problem: Discharge Planning  Goal: Discharge to home or other facility with appropriate resources  Outcome: Progressing     Problem: Pain  Goal: Verbalizes/displays adequate comfort level or baseline comfort level  Outcome: Progressing     Problem: Safety - Adult  Goal: Free from fall injury  Outcome: Progressing     Problem: Neurosensory - Adult  Goal: Achieves stable or improved neurological status  Outcome: Progressing  Goal: Achieves maximal functionality and self care  Outcome: Progressing     Problem: Respiratory - Adult  Goal: Achieves optimal ventilation and oxygenation  Outcome: Progressing     Problem: Cardiovascular - Adult  Goal: Maintains optimal cardiac output and hemodynamic stability  Outcome: Progressing  Goal: Absence of cardiac dysrhythmias or at baseline  Outcome: Progressing     Problem: Skin/Tissue Integrity - Adult  Goal: Skin integrity remains intact  Outcome: Progressing  Goal: Incisions, wounds, or drain sites healing without S/S of infection  Outcome: Progressing  Goal: Oral mucous membranes remain intact  Outcome: Progressing     Problem: Musculoskeletal - Adult  Goal: Return mobility to safest level of function  Outcome: Progressing  Goal: Maintain proper alignment of affected body part  Outcome: Progressing  Goal: Return ADL status to a safe level of function  Outcome: Progressing     Problem: Gastrointestinal - Adult  Goal: Minimal or absence of nausea and vomiting  Outcome: Progressing  Goal: Maintains or returns to baseline bowel function  Outcome:

## 2024-07-08 NOTE — PROGRESS NOTES
[facility]   Facility/Department: [address] TELE  Speech Language Pathology  Clinical Bedside Swallow Evaluation    NAME:Gary Turk  : 1960 (64 y.o.)   [x]   confirmed    MRN: [id number]  ROOM: [place] [address]  ADMISSION DATE: 2024  PATIENT DIAGNOSIS(ES): Fever of unknown origin [R50.9]  Elevated troponin [R79.89]  SIRS (systemic inflammatory response syndrome) (HCC) [R65.10]  Decreased oral intake [R63.8]  Elevated procalcitonin [R79.89]  Fever, unspecified fever cause [R50.9]  Chief Complaint   Patient presents with    Failure To Thrive     Not eating, failure to thrive      Patient Active Problem List    Diagnosis Date Noted    Cellulitis of right foot 2023    Diplopia 2023    Toothache 2023    Chronic systolic (congestive) heart failure 11/10/2022    Crush fracture of vertebra due to osteoporosis ([facility]) 11/10/2022    Diabetes mellitus type 2, insulin dependent ([facility]) 11/10/2022    chf 2022    Ischemic cardiomyopathy 2022    Hypertensive urgency 2022    Intractable nausea and vomiting 2022    Contact with and (suspected) exposure to covid-19 2021    Hypokalemia 2021    Presence of aortocoronary bypass graft 2021    Altered mental status, unspecified 2021    Moderate persistent asthma without complication 2019    Nasal congestion 2019    Headache 2018    Illness, unspecified 2018    Nausea 2018    Lumbosacral spondylosis without myelopathy 2016    Degeneration of lumbar intervertebral disc 2015    Low back pain 2015    Displacement of lumbar intervertebral disc without myelopathy 2012    Fever of unknown origin 2024    History of stroke 2024    Ptosis of left eyelid 2024    Hemiplegia affecting right dominant side, unspecified etiology, unspecified hemiplegia type (HCC) 2024    PAF (paroxysmal atrial fibrillation) (HCC) 2024    MCI (mild cognitive  impairment) 12/29/2023    Thalamic hemorrhage (HCC) 12/29/2023    Ataxia 12/29/2023    Fall from standing 10/09/2023    Acute pain due to trauma 10/09/2023    Acute post-operative pain 10/09/2023    Acute blood loss anemia 10/09/2023    Closed nondisplaced intertrochanteric fracture of left femur ([facility]) 10/04/2023    Cryptogenic stroke ([facility]) 09/28/2023    Immobility 09/28/2023    Aphasia 09/28/2023    Dysphagia, oropharyngeal phase 09/28/2023    Adjustment disorder with depressed mood 09/28/2023    Hypertensive emergency 09/25/2023    Vertebral artery occlusion, left 08/27/2023    TIA (transient ischemic attack) 08/26/2023    Unsteadiness on feet 08/23/2023    Presence of coronary angioplasty implant and graft 08/23/2023    Hypertensive encephalopathy 08/23/2023    Dizziness 08/17/2023    Coagulation defect ([facility]) 08/02/2023    Transient alteration of awareness 06/29/2023    Weakness 05/15/2023    Acute osteomyelitis of toe of right foot ([facility]) 05/06/2023    Medically noncompliant 05/03/2023    Acute hematogenous osteomyelitis of right foot ([facility]) 05/03/2023    Hyperglycemia 05/02/2023    Right foot ulcer, with necrosis of bone ([facility]) 04/30/2023    Right hallux osteomyelitis ([facility]) 04/30/2023    Abscess 04/30/2023    Brain bleed ([facility]) 11/13/2021    Involuntary movements 11/12/2021    ANTON (acute kidney injury) ([facility]) 11/12/2021    Hyponatremia 11/12/2021    General patient noncompliance 06/26/2020    NSTEMI (non-ST elevated myocardial infarction) ([facility]) 12/13/2019    Mild intermittent asthma with exacerbation     Acute on chronic diastolic (congestive) heart failure ([facility]) 12/09/2019    Community acquired pneumonia of right lower lobe of lung 12/08/2019    Acute bronchitis due to other specified organisms 01/28/2019    Hereditary peripheral neuropathy 12/07/2018    Migraine without aura 12/07/2018    Other intervertebral disc disorders, lumbar region 10/16/2018    Spinal stenosis, lumbar region with neurogenic claudication

## 2024-07-08 NOTE — CONSULTS
[facility] Consult Note  Name: Gary Turk  Age: 64 y.o.  Gender: male  CodeStatus: Full Code  Allergies: No Known Allergies    Chief Complaint:Failure To Thrive (Not eating, failure to thrive )    Primary Care Provider: Lobo Monteiro MD  InpatientTreatment Team: Treatment Team: Attending Provider: Dave Harkins MD; Registered Nurse: Tomer Beck, RN; Consulting Physician: Henry Thayer DPM; Consulting Physician: Joshua Miller MD; Registered Nurse: Ronnie Rascon RN; : Jeffery Albert LSW; Utilization Reviewer: Aurora Lares RN  Admission Date: 7/7/2024      HPI   Consulting provider: Bridget Gutierrez for subtherapeutic valproic acid level on Depakote  Pt seen and examined for neurology consult.  Patient is a 64-year-old  male with past medical history of narrow pathic pain, ischemic cardiomyopathy, hyperlipidemia, hypertension, diabetes mellitus, heart failure, CAD, asthma, cognitive, thalamic CVA, mood disorder, PAF who presented to [facility] emergency room on 7/7/2024 from [facility] due to decreased p.o. intake and failure to thrive.  Patient noted to be hypoxic at 88% on room air on presentation.  On Eliquis and aspirin for previous history of CVA and PAF.    Vital signs in the emergency room 119/93, 94, 16, 99.5 °F, 88%.  Temp noted to be as high as 101.3.  Lab testing remarkable for glucose of 221, high-sensitivity troponin of 137, procalcitonin of 1.15.  Respiratory panel and urinalysis negative.    Patient is followed by Dr. 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  Last seen by Dr. Miller on 5/6/2024.  Office notes reviewed.  Office notes state patient is on Depakote for mood disorder.  No history of seizure.    Patient is currently alert and oriented x 2, ill-appearing, no acute distress, cooperative.  Patient with right-sided weakness at baseline.  He has right upper extremity  evidence of an intracranial mass or extra-axial fluid collection.  No abnormal parenchymal or leptomeningeal enhancement following contrast administration.  No significant mass effect.    Chronic Change/parenchyma:  Bilateral increased T1 signal within the basal ganglia, especially involving the bilateral putamen and caudate. This finding correlates with the hyperdensity seen on the CT from 11/12/2021 and appears new from prior MRI  12/10/2019. Mild to moderate generalized volume loss for age. Brain parenchyma otherwise unchanged from prior MRI.    Ventricles:     Ventriculomegaly corresponds to the degree of parenchymal volume loss.    Skull Base:    Hypothalamic and pituitary region are grossly normal.   Craniocervical junction is normal. No significant marrow replacement process.    Vasculature:    Major intracranial arterial structures, and dural venous sinuses show typical flow void, suggesting patency.    Other:  Minimal thickening in the paranasal sinuses. Nonspecific right mastoid effusion, similar to prior MRI. The orbits are unremarkable. Small lipoma within the left posterior scalp measuring around 2.0 cm, unchanged.    Impression  Bilateral increased T1 signal within the basal ganglia, correlating with findings on the recent CT and new from the prior MRI from 2019. Overall this finding is nonspecific, but given clinical history and history of diabetes, findings may relate to  nonketotic hyperglycemia induced hemiballism/hemichorea or hyperglycemia associated choreaballism. Differential also includes a number of other toxic/metabolic etiologies.    No evidence for acute infarct.  Results for orders placed during the hospital encounter of 09/25/23    MRI BRAIN WO CONTRAST    Narrative  EXAMINATION:  MRI OF THE BRAIN WITHOUT CONTRAST  9/27/2023 12:52 pm    TECHNIQUE:  Multiplanar multisequence MRI of the brain was performed without the  administration of intravenous contrast.    COMPARISON:  CT of the head

## 2024-07-08 NOTE — PLAN OF CARE
Problem: Skin/Tissue Integrity  Goal: Absence of new skin breakdown  Description: 1.  Monitor for areas of redness and/or skin breakdown  2.  Assess vascular access sites hourly  3.  Every 4-6 hours minimum:  Change oxygen saturation probe site  4.  Every 4-6 hours:  If on nasal continuous positive airway pressure, respiratory therapy assess nares and determine need for appliance change or resting period.  7/8/2024 0853 by Ronnie Rascon RN  Outcome: Progressing  7/8/2024 0128 by Pita Ramos RN  Outcome: Progressing     Problem: Discharge Planning  Goal: Discharge to home or other facility with appropriate resources  7/8/2024 0853 by Ronnie Rascon RN  Outcome: Progressing  7/8/2024 0128 by Pita Ramos RN  Outcome: Progressing     Problem: Pain  Goal: Verbalizes/displays adequate comfort level or baseline comfort level  7/8/2024 0853 by Ronnie Rascon RN  Outcome: Progressing  7/8/2024 0128 by Pita Ramos RN  Outcome: Progressing     Problem: Safety - Adult  Goal: Free from fall injury  7/8/2024 0853 by Ronnie Rascon RN  Outcome: Progressing  7/8/2024 0128 by Pita Ramos RN  Outcome: Progressing     Problem: Neurosensory - Adult  Goal: Achieves stable or improved neurological status  7/8/2024 0853 by Ronnie Rascon RN  Outcome: Progressing  7/8/2024 0128 by Pita Ramos RN  Outcome: Progressing  Goal: Achieves maximal functionality and self care  7/8/2024 0853 by Ronnie Rascon RN  Outcome: Progressing  7/8/2024 0128 by Pita Ramos RN  Outcome: Progressing     Problem: Respiratory - Adult  Goal: Achieves optimal ventilation and oxygenation  7/8/2024 0853 by Ronnie Rascon RN  Outcome: Progressing  7/8/2024 0128 by Pita Ramos RN  Outcome: Progressing     Problem: Cardiovascular - Adult  Goal: Maintains optimal cardiac output and hemodynamic stability  7/8/2024 0853 by Ronnie Rascon RN  Outcome: Progressing  7/8/2024 0128 by Pita Ramos RN  Outcome:  Ronnie Rascon RN  Outcome: Progressing  7/8/2024 0128 by Pita Ramos RN  Outcome: Progressing     Problem: Infection - Adult  Goal: Absence of infection at discharge  7/8/2024 0853 by Ronnie Rascon RN  Outcome: Progressing  7/8/2024 0128 by Pita Ramos RN  Outcome: Progressing  Goal: Absence of infection during hospitalization  7/8/2024 0853 by Ronnie Rascon RN  Outcome: Progressing  7/8/2024 0128 by Pita Ramos RN  Outcome: Progressing  Goal: Absence of fever/infection during anticipated neutropenic period  7/8/2024 0853 by Ronnie Rascon RN  Outcome: Progressing  7/8/2024 0128 by Pita Ramos RN  Outcome: Progressing     Problem: Metabolic/Fluid and Electrolytes - Adult  Goal: Electrolytes maintained within normal limits  7/8/2024 0853 by Ronnie Rascon RN  Outcome: Progressing  7/8/2024 0128 by Pita Ramos RN  Outcome: Progressing  Goal: Hemodynamic stability and optimal renal function maintained  7/8/2024 0853 by Ronnie Rascon RN  Outcome: Progressing  7/8/2024 0128 by Pita Ramos RN  Outcome: Progressing  Goal: Glucose maintained within prescribed range  7/8/2024 0853 by Ronnie Rascon RN  Outcome: Progressing  7/8/2024 0128 by Pita Ramos RN  Outcome: Progressing     Problem: Hematologic - Adult  Goal: Maintains hematologic stability  7/8/2024 0853 by Ronnie Rascon RN  Outcome: Progressing  7/8/2024 0128 by Pita Ramos RN  Outcome: Progressing     Problem: Chronic Conditions and Co-morbidities  Goal: Patient's chronic conditions and co-morbidity symptoms are monitored and maintained or improved  Outcome: Progressing

## 2024-07-09 ENCOUNTER — APPOINTMENT (OUTPATIENT)
Dept: MRI IMAGING | Age: 64
End: 2024-07-09
Payer: COMMERCIAL

## 2024-07-09 PROBLEM — E11.628 DIABETIC FOOT INFECTION (HCC): Status: ACTIVE | Noted: 2024-07-09

## 2024-07-09 PROBLEM — L08.9 DIABETIC FOOT INFECTION (HCC): Status: ACTIVE | Noted: 2024-07-09

## 2024-07-09 LAB
ALBUMIN SERPL-MCNC: 2.7 G/DL (ref 3.5–4.6)
ALP SERPL-CCNC: 51 U/L (ref 35–104)
ALT SERPL-CCNC: 7 U/L (ref 0–41)
ANION GAP SERPL CALCULATED.3IONS-SCNC: 12 MEQ/L (ref 9–15)
AST SERPL-CCNC: 8 U/L (ref 0–40)
BACTERIA BLD CULT ORG #2: ABNORMAL
BACTERIA BLD CULT: ABNORMAL
BASOPHILS # BLD: 0 K/UL (ref 0–0.2)
BASOPHILS NFR BLD: 0 %
BILIRUB SERPL-MCNC: <0.2 MG/DL (ref 0.2–0.7)
BUN SERPL-MCNC: 39 MG/DL (ref 8–23)
C DIFF TOX A+B STL QL IA: ABNORMAL
CALCIUM SERPL-MCNC: 7.9 MG/DL (ref 8.5–9.9)
CHLORIDE SERPL-SCNC: 101 MEQ/L (ref 95–107)
CO2 SERPL-SCNC: 22 MEQ/L (ref 20–31)
CREAT SERPL-MCNC: 1.07 MG/DL (ref 0.7–1.2)
EOSINOPHIL # BLD: 0 K/UL (ref 0–0.7)
EOSINOPHIL NFR BLD: 0.4 %
ERYTHROCYTE [DISTWIDTH] IN BLOOD BY AUTOMATED COUNT: 14.5 % (ref 11.5–14.5)
GLOBULIN SER CALC-MCNC: 2.4 G/DL (ref 2.3–3.5)
GLUCOSE BLD-MCNC: 148 MG/DL (ref 70–99)
GLUCOSE BLD-MCNC: 158 MG/DL (ref 70–99)
GLUCOSE BLD-MCNC: 190 MG/DL (ref 70–99)
GLUCOSE BLD-MCNC: 97 MG/DL (ref 70–99)
GLUCOSE SERPL-MCNC: 188 MG/DL (ref 70–99)
HCT VFR BLD AUTO: 35.2 % (ref 42–52)
HGB BLD-MCNC: 11.6 G/DL (ref 14–18)
LYMPHOCYTES # BLD: 0.4 K/UL (ref 1–4.8)
LYMPHOCYTES NFR BLD: 4.2 %
MCH RBC QN AUTO: 27.5 PG (ref 27–31.3)
MCHC RBC AUTO-ENTMCNC: 33 % (ref 33–37)
MCV RBC AUTO: 83.4 FL (ref 79–92.2)
MONOCYTES # BLD: 0.9 K/UL (ref 0.2–0.8)
MONOCYTES NFR BLD: 10.1 %
NEUTROPHILS # BLD: 7.1 K/UL (ref 1.4–6.5)
NEUTS SEG NFR BLD: 84.7 %
PERFORMED ON: ABNORMAL
PERFORMED ON: NORMAL
PLATELET # BLD AUTO: 104 K/UL (ref 130–400)
POTASSIUM SERPL-SCNC: 4.6 MEQ/L (ref 3.4–4.9)
PROT SERPL-MCNC: 5.1 G/DL (ref 6.3–8)
RBC # BLD AUTO: 4.22 M/UL (ref 4.7–6.1)
SODIUM SERPL-SCNC: 135 MEQ/L (ref 135–144)
VANCOMYCIN SERPL-MCNC: 18.1 UG/ML (ref 10–40)
WBC # BLD AUTO: 8.4 K/UL (ref 4.8–10.8)

## 2024-07-09 PROCEDURE — 73718 MRI LOWER EXTREMITY W/O DYE: CPT

## 2024-07-09 PROCEDURE — 80053 COMPREHEN METABOLIC PANEL: CPT

## 2024-07-09 PROCEDURE — 80202 ASSAY OF VANCOMYCIN: CPT

## 2024-07-09 PROCEDURE — 6370000000 HC RX 637 (ALT 250 FOR IP)

## 2024-07-09 PROCEDURE — 36415 COLL VENOUS BLD VENIPUNCTURE: CPT

## 2024-07-09 PROCEDURE — 51798 US URINE CAPACITY MEASURE: CPT

## 2024-07-09 PROCEDURE — 1210000000 HC MED SURG R&B

## 2024-07-09 PROCEDURE — 2580000003 HC RX 258

## 2024-07-09 PROCEDURE — 51701 INSERT BLADDER CATHETER: CPT

## 2024-07-09 PROCEDURE — 6370000000 HC RX 637 (ALT 250 FOR IP): Performed by: FAMILY MEDICINE

## 2024-07-09 PROCEDURE — 85025 COMPLETE CBC W/AUTO DIFF WBC: CPT

## 2024-07-09 PROCEDURE — 94640 AIRWAY INHALATION TREATMENT: CPT

## 2024-07-09 PROCEDURE — 6360000002 HC RX W HCPCS

## 2024-07-09 PROCEDURE — 6370000000 HC RX 637 (ALT 250 FOR IP): Performed by: UROLOGY

## 2024-07-09 PROCEDURE — 87077 CULTURE AEROBIC IDENTIFY: CPT

## 2024-07-09 PROCEDURE — 94760 N-INVAS EAR/PLS OXIMETRY 1: CPT

## 2024-07-09 PROCEDURE — 2700000000 HC OXYGEN THERAPY PER DAY

## 2024-07-09 RX ORDER — TAMSULOSIN HYDROCHLORIDE 0.4 MG/1
0.4 CAPSULE ORAL DAILY
Status: DISCONTINUED | OUTPATIENT
Start: 2024-07-09 | End: 2024-07-13 | Stop reason: HOSPADM

## 2024-07-09 RX ADMIN — VANCOMYCIN HYDROCHLORIDE 750 MG: 750 INJECTION, POWDER, LYOPHILIZED, FOR SOLUTION INTRAVENOUS at 11:34

## 2024-07-09 RX ADMIN — SODIUM CHLORIDE: 9 INJECTION, SOLUTION INTRAVENOUS at 09:24

## 2024-07-09 RX ADMIN — PIPERACILLIN AND TAZOBACTAM 3375 MG: 3; .375 INJECTION, POWDER, LYOPHILIZED, FOR SOLUTION INTRAVENOUS at 06:00

## 2024-07-09 RX ADMIN — INSULIN LISPRO 5 UNITS: 100 INJECTION, SOLUTION INTRAVENOUS; SUBCUTANEOUS at 11:29

## 2024-07-09 RX ADMIN — ASPIRIN 81 MG: 81 TABLET, COATED ORAL at 09:16

## 2024-07-09 RX ADMIN — INSULIN LISPRO 5 UNITS: 100 INJECTION, SOLUTION INTRAVENOUS; SUBCUTANEOUS at 09:17

## 2024-07-09 RX ADMIN — ESCITALOPRAM OXALATE 15 MG: 10 TABLET ORAL at 09:17

## 2024-07-09 RX ADMIN — PIPERACILLIN AND TAZOBACTAM 3375 MG: 3; .375 INJECTION, POWDER, LYOPHILIZED, FOR SOLUTION INTRAVENOUS at 14:45

## 2024-07-09 RX ADMIN — HYDRALAZINE HYDROCHLORIDE 25 MG: 25 TABLET ORAL at 09:17

## 2024-07-09 RX ADMIN — APIXABAN 5 MG: 5 TABLET, FILM COATED ORAL at 09:17

## 2024-07-09 RX ADMIN — Medication 125 MG: at 12:12

## 2024-07-09 RX ADMIN — PIPERACILLIN AND TAZOBACTAM 3375 MG: 3; .375 INJECTION, POWDER, LYOPHILIZED, FOR SOLUTION INTRAVENOUS at 21:30

## 2024-07-09 RX ADMIN — DIVALPROEX SODIUM 500 MG: 500 TABLET, DELAYED RELEASE ORAL at 09:17

## 2024-07-09 RX ADMIN — SODIUM CHLORIDE, PRESERVATIVE FREE 10 ML: 5 INJECTION INTRAVENOUS at 21:24

## 2024-07-09 RX ADMIN — SACUBITRIL AND VALSARTAN 1 TABLET: 24; 26 TABLET, FILM COATED ORAL at 09:16

## 2024-07-09 RX ADMIN — BUDESONIDE AND FORMOTEROL FUMARATE DIHYDRATE 2 PUFF: 80; 4.5 AEROSOL RESPIRATORY (INHALATION) at 07:01

## 2024-07-09 RX ADMIN — CARVEDILOL 12.5 MG: 12.5 TABLET, FILM COATED ORAL at 09:17

## 2024-07-09 NOTE — PROGRESS NOTES
[facility]   Facility/Department: [facility] 2W ORTHO TELE  Speech Language Pathology    Gary Turk  1960  W263/W263-01    Date: 7/9/2024      Speech Therapy attempted to see Gary Turk on this date for a/an:    Treatment    Pt was unable to be seen due to:   Patient is currently off unit: Off unit in MRI.  Re-attempt as able.        Electronically signed by Sussy Perez, SLP on 7/9/24 at 1:19 PM EDT

## 2024-07-09 NOTE — PROGRESS NOTES
[facility]   Facility/Department: [facility] 2W ORTHO TELE  Speech Language Pathology    Gary Turk  1960  W263/W263-01    Date: 7/9/2024      Speech Therapy attempted to see Gary Turk on this date for a/an:    Treatment    Pt was unable to be seen due to:   Patient refused: Pt initially agreed to treatment and then refused.  Pt was turning head away from SLP and stated \"no\" several times.  Despite gentle encouragement, pt continued to refuse.  Re-attempt as able.        Electronically signed by GEE Liu on 7/9/24 at 11:10 AM EDT

## 2024-07-09 NOTE — PROGRESS NOTES
Pt with minimal urine output this shift. Bladder scan showing 438. Order for straight cath x1 obtained.     Straight cath performed. 200 cc clear yellow urine drained. Pt tolerated well.

## 2024-07-09 NOTE — PROGRESS NOTES
[facility]   Pharmacy Pharmacokinetic Monitoring Service - Vancomycin    Consulting Provider: SHERIN Briceno-CNP   Indication: SSTI/diabetic foot wound  Target Concentration: Goal trough of 10-15 mg/L and AUC/RAFAEL <500 mg*hr/L  Day of Therapy: 3  Additional Antimicrobials: Zosyn    Pertinent Laboratory Values:   Wt Readings from Last 1 Encounters:   07/09/24 74.8 kg (164 lb 12.8 oz)     Temp Readings from Last 1 Encounters:   07/09/24 98.1 °F (36.7 °C) (Oral)     Estimated Creatinine Clearance: 66 mL/min (based on SCr of 1.07 mg/dL).  Recent Labs     07/08/24  0645 07/09/24  0441   CREATININE 1.03 1.07   BUN 33* 39*   WBC 8.2 8.4     Procalcitonin: 1.15    Pertinent Cultures:  Culture Date Source Results   7/7 Blood Gram positive cocci in clusters-resembling Staph    MRSA Nasal Swab: N/A. Non-respiratory infection.    Recent vancomycin administrations                     vancomycin (VANCOCIN) 750 mg in sodium chloride 0.9 % 250 mL IVPB (mg) 750 mg New Bag 07/08/24 2325     750 mg New Bag  1125    vancomycin (VANCOCIN) 1,500 mg in sodium chloride 0.9 % 500 mL IVPB (ADDAVIAL) (mg) 1,500 mg New Bag 07/07/24 2254                    Assessment:  Date/Time Current Dose Concentration Timing of Concentration (h)   7/9 @ 0441 750 mg Q12H 18.1 mg/L 5 hr   Note: Serum concentrations collected for AUC dosing may appear elevated if collected in close proximity to the dose administered, this is not necessarily an indication of toxicity    Plan:  Current dosing regimen is therapeutic  Continue current dose of vancomycin 750 mg Q12H  Predicted  mg/L.hr, trough 14.6 mg/L  Repeat vancomycin concentration ordered for 7/11 @ 0600   Pharmacy will continue to monitor patient and adjust therapy as indicated    Thank you for the consult,    Minal Rodríguez, PharmD, BCPS  7/9/2024 9:14 AM

## 2024-07-09 NOTE — PROGRESS NOTES
Inhalation BID RT    carvedilol  12.5 mg Oral BID    divalproex  500 mg Oral BID    escitalopram  15 mg Oral Daily    insulin lispro  5 Units SubCUTAneous TID AC    hydrALAZINE  25 mg Oral BID    sacubitril-valsartan  1 tablet Oral BID    vancomycin  750 mg IntraVENous Q12H    vancomycin (VANCOCIN) intermittent dosing (placeholder)   Other RX Placeholder     PRN Meds: sodium chloride flush, sodium chloride, potassium chloride **OR** potassium alternative oral replacement **OR** potassium chloride, magnesium sulfate, ondansetron **OR** ondansetron, polyethylene glycol, acetaminophen **OR** acetaminophen, glucose, dextrose bolus **OR** dextrose bolus, glucagon (rDNA), dextrose, aluminum & magnesium hydroxide-simethicone    Labs:   Recent Labs     07/07/24 1900 07/08/24  0645 07/09/24  0441   WBC 9.8 8.2 8.4   HGB 12.5* 11.7* 11.6*   HCT 38.4* 36.1* 35.2*   * 77* 104*       Recent Labs     07/07/24 1916 07/08/24  0645 07/09/24  0441    135 135   K 4.8 4.6 4.6   CL 99 100 101   CO2 23 21 22   BUN 28* 33* 39*   CREATININE 1.05 1.03 1.07   CALCIUM 8.9 8.2* 7.9*       Recent Labs     07/07/24 1916 07/08/24  0645 07/09/24  0441   AST 10 9 8   ALT 8 8 7   BILITOT 0.4 0.3 <0.2   ALKPHOS 72 56 51       No results for input(s): \"INR\" in the last 72 hours.  No results for input(s): \"CKTOTAL\", \"TROPONINI\" in the last 72 hours.    Urinalysis:   Lab Results   Component Value Date/Time    NITRU Negative 07/07/2024 07:00 PM    WBCUA 0-2 07/07/2024 07:00 PM    WBCUA 2 11/27/2021 02:00 AM    BACTERIA Negative 07/07/2024 07:00 PM    RBCUA 20-50 07/07/2024 07:00 PM    RBCUA 4 11/27/2021 02:00 AM    BLOODU LARGE 07/07/2024 07:00 PM    SPECGRAV 1.025 02/19/2021 09:50 AM    GLUCOSEU Negative 07/07/2024 07:00 PM       Radiology:   Most recent    Chest CT      WITH CONTRAST:No results found for this or any previous visit.       WITHOUT CONTRAST: No results found for this or any previous visit.      CXR      2-view: Results for  Problems    Diagnosis Date Noted    Chronic ulcer of left heel with fat layer exposed (HCC) [L97.422] 07/08/2024    Fever of unknown origin [R50.9] 07/07/2024     Sepsis 2/2 diabetic foot wound  Temp 101.3, treated with rectal tylenol - now 98.4  Procal 1.15  WBC 9.8  Resp viral panel negative  UA negative  CXR: Prominent pulmonary vascularity with bilateral small pleural effusions.   Suspected origin: Stage III foot ulcer to left heel with purulent drainage not previously identified in ED before call for admission  Admit to Med/Surg observation with telemetry  Follow up on blood cultures  As needed Tylenol  Gentle hydration  Monitor and trend WBCs  7/8 - cont iv abx, podiatry following, await culture.  7/9 - cdiff positive, POA, started on po vancomycin, cont iv abx for diabetic foot wound, osteomyelitis noted on MRI     Left foot wound  Left heel - stage 3/diabetic  Purulent drainage and foul smell  Approximately the size of a quarter  Consult podiatry  Consult wound care  Continue Zosyn and vancomycin with consult to pharmacy for dosing  Wound cultures  Monitor and trend WBC  Pressure reduction  7/8 - as above     3.  Subtherapeutic valproic acid level  Valproic Acid 18.0  Ordered Depakote 500 mg twice daily outpatient  For mood disorder per Dr. Miller's note.  Consult neurology for dosing  Reorder home dose  Monitor and trend valproic acid level  Consult case management for questionable medication compliance at nursing home     4. Decreased oral intake  Per nursing home staff report  Patient reports he has not been eating because he does not like the food served at the nursing home  Appears clinically dehydrated  Last echo 9- EF 50 to 55%  Consult dietitian  Consult speech for swallow eval  Normal saline at 75 an hour  Encourage oral hydration     5.  DM2 with hyperglycemia    Most recent A1C 4-2-24 was 7.1  Takes Humalog and glargine at home  Hold oral antidiabetic medications  Sliding scale insulin

## 2024-07-09 NOTE — PLAN OF CARE
Progressing  Goal: Absence of cardiac dysrhythmias or at baseline  7/8/2024 2217 by Pita Ramos RN  Outcome: Progressing  7/8/2024 0853 by Ronnie Rascon RN  Outcome: Progressing     Problem: Skin/Tissue Integrity - Adult  Goal: Skin integrity remains intact  7/8/2024 2217 by Pita Ramos RN  Outcome: Progressing  7/8/2024 0853 by Ronnie Rascon RN  Outcome: Progressing  Goal: Incisions, wounds, or drain sites healing without S/S of infection  7/8/2024 2217 by Pita Ramos RN  Outcome: Progressing  7/8/2024 0853 by Ronnie Rascon RN  Outcome: Progressing  Goal: Oral mucous membranes remain intact  7/8/2024 2217 by Pita Ramos RN  Outcome: Progressing  7/8/2024 0853 by Ronnie Rascon RN  Outcome: Progressing     Problem: Musculoskeletal - Adult  Goal: Return mobility to safest level of function  7/8/2024 2217 by Pita Ramos RN  Outcome: Progressing  7/8/2024 0853 by Ronnie Rascon RN  Outcome: Progressing  Goal: Maintain proper alignment of affected body part  7/8/2024 2217 by Pita Ramos RN  Outcome: Progressing  7/8/2024 0853 by Ronnie Rascon RN  Outcome: Progressing  Goal: Return ADL status to a safe level of function  7/8/2024 2217 by Pita Ramos RN  Outcome: Progressing  7/8/2024 0853 by Ronnie Rascon RN  Outcome: Progressing     Problem: Gastrointestinal - Adult  Goal: Minimal or absence of nausea and vomiting  7/8/2024 2217 by Pita Ramos RN  Outcome: Progressing  7/8/2024 0853 by Ronnie Rascon RN  Outcome: Progressing  Goal: Maintains or returns to baseline bowel function  7/8/2024 2217 by Pita Ramos RN  Outcome: Progressing  7/8/2024 0853 by Ronnie Rascon RN  Outcome: Progressing  Goal: Maintains adequate nutritional intake  7/8/2024 2217 by Pita Ramos RN  Outcome: Progressing  7/8/2024 0853 by Rascon, Christopher, RN  Outcome: Progressing     Problem: Genitourinary - Adult  Goal: Absence of urinary retention  7/8/2024 2217 by  Nikia Buenrostro, SLP  Outcome: Progressing

## 2024-07-09 NOTE — PLAN OF CARE
Problem: Skin/Tissue Integrity  Goal: Absence of new skin breakdown  Description: 1.  Monitor for areas of redness and/or skin breakdown  2.  Assess vascular access sites hourly  3.  Every 4-6 hours minimum:  Change oxygen saturation probe site  4.  Every 4-6 hours:  If on nasal continuous positive airway pressure, respiratory therapy assess nares and determine need for appliance change or resting period.  7/9/2024 1031 by Natasha Eubanks RN  Outcome: Progressing  7/8/2024 2217 by Pita Ramos RN  Outcome: Progressing     Problem: Discharge Planning  Goal: Discharge to home or other facility with appropriate resources  7/9/2024 1031 by Natsaha Eubanks RN  Outcome: Progressing  7/8/2024 2217 by Pita Ramos RN  Outcome: Progressing     Problem: Pain  Goal: Verbalizes/displays adequate comfort level or baseline comfort level  7/9/2024 1031 by Natasha Eubanks RN  Outcome: Progressing  7/8/2024 2217 by Pita Ramos RN  Outcome: Progressing     Problem: Safety - Adult  Goal: Free from fall injury  7/9/2024 1031 by Natasha Eubanks RN  Outcome: Progressing  7/8/2024 2217 by Pita Ramos RN  Outcome: Progressing     Problem: Neurosensory - Adult  Goal: Achieves stable or improved neurological status  7/9/2024 1031 by Natasha Eubanks RN  Outcome: Progressing  7/8/2024 2217 by Pita aRmos RN  Outcome: Progressing  Goal: Achieves maximal functionality and self care  7/9/2024 1031 by Natasha Eubanks RN  Outcome: Progressing  7/8/2024 2217 by Pita Ramos RN  Outcome: Progressing     Problem: Respiratory - Adult  Goal: Achieves optimal ventilation and oxygenation  7/9/2024 1031 by Natasha Eubanks RN  Outcome: Progressing  7/8/2024 2217 by Pita Ramos RN  Outcome: Progressing     Problem: Cardiovascular - Adult  Goal: Maintains optimal cardiac output and hemodynamic stability  7/9/2024 1031 by Natasha Eubanks RN  Outcome: Progressing  7/8/2024 2217 by Pita Ramos RN  Outcome:

## 2024-07-09 NOTE — PROGRESS NOTES
Patient bladder scanned at this time for 535 ml. Message sent to Dr Harkins regarding same. New orders received for okay to straight cath x1    1814: Straight cath performed at this time, 875 ml urine drained. Pt tolerated well.

## 2024-07-10 PROBLEM — R33.9 URINARY RETENTION: Status: ACTIVE | Noted: 2024-07-10

## 2024-07-10 PROBLEM — M86.672 CHRONIC OSTEOMYELITIS OF LEFT FOOT (HCC): Status: ACTIVE | Noted: 2024-07-10

## 2024-07-10 PROBLEM — A04.72 C. DIFFICILE DIARRHEA: Status: ACTIVE | Noted: 2024-07-10

## 2024-07-10 LAB
ALBUMIN SERPL-MCNC: 2.5 G/DL (ref 3.5–4.6)
ALP SERPL-CCNC: 47 U/L (ref 35–104)
ALT SERPL-CCNC: 7 U/L (ref 0–41)
ANION GAP SERPL CALCULATED.3IONS-SCNC: 16 MEQ/L (ref 9–15)
ANISOCYTOSIS BLD QL SMEAR: ABNORMAL
AST SERPL-CCNC: 9 U/L (ref 0–40)
BASOPHILS # BLD: 0.1 K/UL (ref 0–0.2)
BASOPHILS NFR BLD: 1 %
BILIRUB SERPL-MCNC: 0.3 MG/DL (ref 0.2–0.7)
BUN SERPL-MCNC: 44 MG/DL (ref 8–23)
BURR CELLS: ABNORMAL
CALCIUM SERPL-MCNC: 7.5 MG/DL (ref 8.5–9.9)
CHLORIDE SERPL-SCNC: 99 MEQ/L (ref 95–107)
CO2 SERPL-SCNC: 18 MEQ/L (ref 20–31)
CREAT SERPL-MCNC: 1.23 MG/DL (ref 0.7–1.2)
CULTURE, BLOOD ID SENSITIVITY: ABNORMAL
EKG ATRIAL RATE: 93 BPM
EKG P AXIS: 50 DEGREES
EKG P-R INTERVAL: 132 MS
EKG Q-T INTERVAL: 370 MS
EKG QRS DURATION: 90 MS
EKG QTC CALCULATION (BAZETT): 460 MS
EKG R AXIS: -53 DEGREES
EKG T AXIS: -3 DEGREES
EKG VENTRICULAR RATE: 93 BPM
EOSINOPHIL # BLD: 0 K/UL (ref 0–0.7)
EOSINOPHIL NFR BLD: 0.2 %
ERYTHROCYTE [DISTWIDTH] IN BLOOD BY AUTOMATED COUNT: 14.6 % (ref 11.5–14.5)
GLOBULIN SER CALC-MCNC: 2.4 G/DL (ref 2.3–3.5)
GLUCOSE BLD-MCNC: 124 MG/DL (ref 70–99)
GLUCOSE BLD-MCNC: 134 MG/DL (ref 70–99)
GLUCOSE BLD-MCNC: 180 MG/DL (ref 70–99)
GLUCOSE BLD-MCNC: 183 MG/DL (ref 70–99)
GLUCOSE SERPL-MCNC: 168 MG/DL (ref 70–99)
HCT VFR BLD AUTO: 34.6 % (ref 42–52)
HGB BLD-MCNC: 11.2 G/DL (ref 14–18)
LYMPHOCYTES # BLD: 0.3 K/UL (ref 1–4.8)
LYMPHOCYTES NFR BLD: 2 %
MCH RBC QN AUTO: 27.1 PG (ref 27–31.3)
MCHC RBC AUTO-ENTMCNC: 32.4 % (ref 33–37)
MCV RBC AUTO: 83.6 FL (ref 79–92.2)
METAMYELOCYTES NFR BLD MANUAL: 2 %
MONOCYTES # BLD: 0.6 K/UL (ref 0.2–0.8)
MONOCYTES NFR BLD: 4.8 %
MYELOCYTES NFR BLD MANUAL: 1 %
NEUTROPHILS # BLD: 10.1 K/UL (ref 1.4–6.5)
NEUTS BAND NFR BLD MANUAL: 24 %
NEUTS SEG NFR BLD: 64 %
NRBC BLD-RTO: 1 /100 WBC
ORGANISM: ABNORMAL
PERFORMED ON: ABNORMAL
PLATELET # BLD AUTO: 126 K/UL (ref 130–400)
PLATELET BLD QL SMEAR: ABNORMAL
POIKILOCYTOSIS BLD QL SMEAR: ABNORMAL
POTASSIUM SERPL-SCNC: 4.4 MEQ/L (ref 3.4–4.9)
PROT SERPL-MCNC: 4.9 G/DL (ref 6.3–8)
RBC # BLD AUTO: 4.14 M/UL (ref 4.7–6.1)
SLIDE REVIEW: ABNORMAL
SMUDGE CELLS BLD QL SMEAR: 1.9
SODIUM SERPL-SCNC: 133 MEQ/L (ref 135–144)
VARIANT LYMPHS NFR BLD: 1 %
WBC # BLD AUTO: 11.1 K/UL (ref 4.8–10.8)

## 2024-07-10 PROCEDURE — 6370000000 HC RX 637 (ALT 250 FOR IP): Performed by: UROLOGY

## 2024-07-10 PROCEDURE — 85025 COMPLETE CBC W/AUTO DIFF WBC: CPT

## 2024-07-10 PROCEDURE — 6370000000 HC RX 637 (ALT 250 FOR IP)

## 2024-07-10 PROCEDURE — 94640 AIRWAY INHALATION TREATMENT: CPT

## 2024-07-10 PROCEDURE — 2580000003 HC RX 258: Performed by: FAMILY MEDICINE

## 2024-07-10 PROCEDURE — 99221 1ST HOSP IP/OBS SF/LOW 40: CPT | Performed by: PHYSICIAN ASSISTANT

## 2024-07-10 PROCEDURE — 6370000000 HC RX 637 (ALT 250 FOR IP): Performed by: FAMILY MEDICINE

## 2024-07-10 PROCEDURE — 2580000003 HC RX 258

## 2024-07-10 PROCEDURE — 1210000000 HC MED SURG R&B

## 2024-07-10 PROCEDURE — 99222 1ST HOSP IP/OBS MODERATE 55: CPT | Performed by: INTERNAL MEDICINE

## 2024-07-10 PROCEDURE — 80053 COMPREHEN METABOLIC PANEL: CPT

## 2024-07-10 PROCEDURE — 6360000002 HC RX W HCPCS

## 2024-07-10 PROCEDURE — 36415 COLL VENOUS BLD VENIPUNCTURE: CPT

## 2024-07-10 PROCEDURE — 94761 N-INVAS EAR/PLS OXIMETRY MLT: CPT

## 2024-07-10 PROCEDURE — 6360000002 HC RX W HCPCS: Performed by: FAMILY MEDICINE

## 2024-07-10 RX ORDER — SODIUM CHLORIDE 9 MG/ML
INJECTION, SOLUTION INTRAVENOUS CONTINUOUS
Status: DISCONTINUED | OUTPATIENT
Start: 2024-07-10 | End: 2024-07-13 | Stop reason: HOSPADM

## 2024-07-10 RX ADMIN — ASPIRIN 81 MG: 81 TABLET, COATED ORAL at 08:43

## 2024-07-10 RX ADMIN — Medication 125 MG: at 06:45

## 2024-07-10 RX ADMIN — TAMSULOSIN HYDROCHLORIDE 0.4 MG: 0.4 CAPSULE ORAL at 08:43

## 2024-07-10 RX ADMIN — Medication 3 MG: at 21:05

## 2024-07-10 RX ADMIN — HYDRALAZINE HYDROCHLORIDE 25 MG: 25 TABLET ORAL at 08:46

## 2024-07-10 RX ADMIN — DIVALPROEX SODIUM 500 MG: 500 TABLET, DELAYED RELEASE ORAL at 21:04

## 2024-07-10 RX ADMIN — APIXABAN 5 MG: 5 TABLET, FILM COATED ORAL at 21:05

## 2024-07-10 RX ADMIN — SODIUM CHLORIDE: 9 INJECTION, SOLUTION INTRAVENOUS at 18:58

## 2024-07-10 RX ADMIN — CARVEDILOL 12.5 MG: 12.5 TABLET, FILM COATED ORAL at 08:46

## 2024-07-10 RX ADMIN — Medication 125 MG: at 18:40

## 2024-07-10 RX ADMIN — SODIUM CHLORIDE, PRESERVATIVE FREE 5 ML: 5 INJECTION INTRAVENOUS at 08:39

## 2024-07-10 RX ADMIN — VANCOMYCIN HYDROCHLORIDE 750 MG: 750 INJECTION, POWDER, LYOPHILIZED, FOR SOLUTION INTRAVENOUS at 13:39

## 2024-07-10 RX ADMIN — Medication 125 MG: at 12:42

## 2024-07-10 RX ADMIN — ESCITALOPRAM OXALATE 15 MG: 10 TABLET ORAL at 08:46

## 2024-07-10 RX ADMIN — Medication 125 MG: at 01:00

## 2024-07-10 RX ADMIN — SACUBITRIL AND VALSARTAN 1 TABLET: 24; 26 TABLET, FILM COATED ORAL at 21:04

## 2024-07-10 RX ADMIN — INSULIN LISPRO 5 UNITS: 100 INJECTION, SOLUTION INTRAVENOUS; SUBCUTANEOUS at 08:42

## 2024-07-10 RX ADMIN — PIPERACILLIN AND TAZOBACTAM 3375 MG: 3; .375 INJECTION, POWDER, LYOPHILIZED, FOR SOLUTION INTRAVENOUS at 06:48

## 2024-07-10 RX ADMIN — BUDESONIDE AND FORMOTEROL FUMARATE DIHYDRATE 2 PUFF: 80; 4.5 AEROSOL RESPIRATORY (INHALATION) at 19:15

## 2024-07-10 RX ADMIN — SACUBITRIL AND VALSARTAN 1 TABLET: 24; 26 TABLET, FILM COATED ORAL at 08:46

## 2024-07-10 RX ADMIN — SODIUM CHLORIDE: 9 INJECTION, SOLUTION INTRAVENOUS at 08:41

## 2024-07-10 RX ADMIN — VANCOMYCIN HYDROCHLORIDE 750 MG: 750 INJECTION, POWDER, LYOPHILIZED, FOR SOLUTION INTRAVENOUS at 00:58

## 2024-07-10 RX ADMIN — DIVALPROEX SODIUM 500 MG: 500 TABLET, DELAYED RELEASE ORAL at 08:46

## 2024-07-10 RX ADMIN — APIXABAN 5 MG: 5 TABLET, FILM COATED ORAL at 08:43

## 2024-07-10 RX ADMIN — SODIUM CHLORIDE, PRESERVATIVE FREE 10 ML: 5 INJECTION INTRAVENOUS at 21:05

## 2024-07-10 ASSESSMENT — ENCOUNTER SYMPTOMS
SHORTNESS OF BREATH: 0
COUGH: 0

## 2024-07-10 ASSESSMENT — VISUAL ACUITY: OU: 1

## 2024-07-10 NOTE — PROGRESS NOTES
Housing Stability: Not on file     Family History   Problem Relation Age of Onset    Other Father         accident    Heart Failure Mother     Heart Disease Brother     Cirrhosis Brother      No Known Allergies        Review of Systems   Constitutional:  Negative for activity change, appetite change, fatigue and fever.   HENT: Negative.     Respiratory:  Negative for cough and shortness of breath.    Cardiovascular:  Negative for chest pain.   Genitourinary:  Negative for decreased urine volume, difficulty urinating, dysuria, flank pain, hematuria and penile discharge.   Neurological:  Positive for weakness.   Psychiatric/Behavioral:  Positive for confusion and decreased concentration. Negative for agitation.    All other systems reviewed and are negative.      Objective:   VS: See PCC. Reviewed.    Physical Exam  Vitals (See PCC) reviewed.   Constitutional:       Appearance: Normal appearance. He is not ill-appearing.   HENT:      Head: Normocephalic and atraumatic.      Jaw: No tenderness or pain on movement.      Mouth/Throat:      Mouth: Mucous membranes are moist.      Pharynx: Oropharynx is clear.   Eyes:      General: Lids are normal. Vision grossly intact.         Right eye: No discharge.         Left eye: No discharge.      Conjunctiva/sclera: Conjunctivae normal.      Comments: Left eye ptosis   Pulmonary:      Effort: No respiratory distress.   Abdominal:      Tenderness: There is no abdominal tenderness. There is no guarding.   Musculoskeletal:         General: No tenderness or signs of injury.   Skin:     General: Skin is warm and dry.      Coloration: Skin is pale.      Findings: No erythema.   Neurological:      Mental Status: He is alert. He is disoriented.      Motor: Weakness present.   Psychiatric:         Mood and Affect: Mood normal.         Behavior: Behavior normal.         Cognition and Memory: Cognition is impaired. Memory is impaired.         Assessment:       Diagnosis Orders   1. At

## 2024-07-10 NOTE — PLAN OF CARE
Nutrition Problem #1: Inadequate oral intake  Intervention: Food and/or Nutrient Delivery: Continue Current Diet, Start Oral Nutrition Supplement, Start Tube Feeding  Nutritional

## 2024-07-10 NOTE — PLAN OF CARE
Problem: Skin/Tissue Integrity  Goal: Absence of new skin breakdown  Description: 1.  Monitor for areas of redness and/or skin breakdown  2.  Assess vascular access sites hourly  3.  Every 4-6 hours minimum:  Change oxygen saturation probe site  4.  Every 4-6 hours:  If on nasal continuous positive airway pressure, respiratory therapy assess nares and determine need for appliance change or resting period.  7/9/2024 2258 by Pita Ramos RN  Outcome: Progressing  7/9/2024 1031 by Natasha Eubanks RN  Outcome: Progressing     Problem: Discharge Planning  Goal: Discharge to home or other facility with appropriate resources  7/9/2024 2258 by Pita Ramos RN  Outcome: Progressing  7/9/2024 1031 by Natasha Eubanks RN  Outcome: Progressing     Problem: Pain  Goal: Verbalizes/displays adequate comfort level or baseline comfort level  7/9/2024 2258 by Pita Ramos RN  Outcome: Progressing  7/9/2024 1031 by Natasha Eubanks RN  Outcome: Progressing     Problem: Safety - Adult  Goal: Free from fall injury  7/9/2024 2258 by Pita Ramos RN  Outcome: Progressing  7/9/2024 1031 by Natasha Eubanks RN  Outcome: Progressing     Problem: Neurosensory - Adult  Goal: Achieves stable or improved neurological status  7/9/2024 2258 by Pita Ramos RN  Outcome: Progressing  7/9/2024 1031 by Natasha Eubanks RN  Outcome: Progressing  Goal: Achieves maximal functionality and self care  7/9/2024 2258 by Pita Ramos RN  Outcome: Progressing  7/9/2024 1031 by Natasha Eubanks RN  Outcome: Progressing     Problem: Respiratory - Adult  Goal: Achieves optimal ventilation and oxygenation  7/9/2024 2258 by Pita Ramos RN  Outcome: Progressing  7/9/2024 1031 by Natasha Eubanks RN  Outcome: Progressing     Problem: Cardiovascular - Adult  Goal: Maintains optimal cardiac output and hemodynamic stability  7/9/2024 2258 by Pita Ramos RN  Outcome: Progressing  7/9/2024 1031 by Natasha Eubanks RN  Outcome:

## 2024-07-10 NOTE — PROGRESS NOTES
[facility]   Facility/Department: [facility] [place] 2W ORTHO TELE  Speech Language Pathology    Gary Turk  1960  [id number]    Date: 7/10/2024      Speech Therapy attempted to see Gary Turk on this date for a/an:    Treatment    Pt was unable to be seen due to:   Patient refused Following education and rationale, pt continued to shake head 'no' and close eyes. Will re-attempt at next opportunity.        Electronically signed by GEE Joseph on 7/10/24 at 3:27 PM EDT

## 2024-07-10 NOTE — PROGRESS NOTES
Comprehensive Nutrition Assessment    Type and Reason for Visit:  Initial, Consult (poor po > 5 days)    Nutrition Recommendations/Plan:   Continue with current diet : Dysphagia Soft and bite sized, carb control 4  Add diabetic oral supplement B & D, wound healing supplement L & D  Monitor for acceptance of oral nutrition supplement  Consider addition of appetite stimulant, if medically appropriate     Malnutrition Assessment:  Malnutrition Status:  Insufficient data (07/10/24 1509)    Context:  Social/Environmental Circumstances     Findings of the 6 clinical characteristics of malnutrition:  Energy Intake:  Mild decrease in energy intake (Comment)  Weight Loss:  No significant weight loss     Body Fat Loss:  Unable to assess     Muscle Mass Loss:  Unable to assess    Fluid Accumulation:  No significant fluid accumulation     Strength:  Not Performed    Nutrition Assessment:    Pt admitted wiht hx of poor po intakes, dehydration, Current weight is within range of usual body weight range, noted SLP consult for BSE, however pt has been refusing to participate. Will begin an oral nutrition supplement to aid in meeting energy and protein needs, until adequate oral intake is established    Nutrition Related Findings:    \"PMHx of CABG, CAD, CHF, cardiomyopathy, right hemiplegia, and dementia presents from [facility] with a chief complaint of decreased oral intake per staff.  Patient is a poor historian.  He is oriented to self but disoriented to place time and situation.  When asked why he was here, \"that is what I would like to know.\"  When explained about the decrease oral intake, he reports that he does not eat because he does not like the food served at the nursing home.\", unable to obtain reliable diet hx due to confusion, abnormal labs noted, glucose variable ( 130-180), meds reviewed, IVF = .9 NS @ 100 ml/hr Wound Type: Pressure Injury, Stage III (Heel)       Current Nutrition Intake & Therapies:    Average  Meal Intake: 0%, 1-25%  Average Supplements Intake: None Ordered  ADULT DIET; Dysphagia - Soft and Bite Sized; 4 carb choices (60 gm/meal)  ADULT ORAL NUTRITION SUPPLEMENT; Breakfast, Dinner; Diabetic Oral Supplement  ADULT ORAL NUTRITION SUPPLEMENT; Breakfast, Dinner; Diabetic Oral Supplement    Anthropometric Measures:  Height: 170.2 cm (5' 7\")  Ideal Body Weight (IBW): 148 lbs (67 kg)    Admission Body Weight: 74.4 kg (164 lb)  Current Body Weight: 73.9 kg (163 lb), 110.1 % IBW.    Current BMI (kg/m2): 25.5  Usual Body Weight: 72.6 kg (160 lb) (( 8/2023), 160# ( 12/2023), 154# ( 4/2024))  % Weight Change (Calculated): 1.9                    BMI Categories: Overweight (BMI 25.0-29.9)    Estimated Daily Nutrient Needs:  Energy Requirements Based On: Kcal/kg  Weight Used for Energy Requirements: Current  Energy (kcal/day): 1268-9482 kcals @ 25 kcal/kg  Weight Used for Protein Requirements: Current  Protein (g/day): 89 g protein @ 1.2 g/kg  Method Used for Fluid Requirements: 1 ml/kcal  Fluid (ml/day): ~1900    Nutrition Diagnosis:   Inadequate oral intake related to cognitive or neurological impairment as evidenced by intake 0-25%, poor intake prior to admission  Increased nutrient needs related to increase demand for energy/nutrients as evidenced by wounds    Nutrition Interventions:   Food and/or Nutrient Delivery: Continue Current Diet, Start Oral Nutrition Supplement, Start Tube Feeding  Nutrition Education/Counseling: Education not indicated  Coordination of Nutrition Care: Continue to monitor while inpatient, Speech Therapy       Goals:     Goals: PO intake 50% or greater, Initiate nutrition support, by next RD assessment       Nutrition Monitoring and Evaluation:   Behavioral-Environmental Outcomes: None Identified  Food/Nutrient Intake Outcomes: Food and Nutrient Intake, Supplement Intake  Physical Signs/Symptoms Outcomes: Biochemical Data, Meal Time Behavior, Skin, Weight    Discharge Planning:    Continue

## 2024-07-10 NOTE — PROGRESS NOTES
PODIATRIC MEDICINE AND SURGERY  Consult Progress Note    Consulting Service:  Primary  Requesting Provider: Matt Gill  Opinion/advice regarding: Stage 3 nonhealing Left heel ulcer  Staff Doctor:  Dr. Garcia    INTERVAL HPI:   -Patient quite and lethargic during exam.  - Vital signs are stable presently, with a soft blood pressure noted.  - C. difficile precautions are intact as patient tested positive for C. difficile toxin on 7/8/2024.  -Left foot heel dressings clean dry and intact with minimal strikethrough noted.  Dressing changed by nursing staff.  -Patient has difficulty remembering previous treatment options discussed.  Does not recall talking with infectious disease.        ASSESSMENT:  64 y.o. male with PMH significant for HLD, Irregular heart rhythm, LLE hematoma, peripheral neuropathy,  for who podiatry was consulted for Left heel nonhealing ulcer. Patient admitted for decreased oral intake  and fever of unknown origin. On admission, WBC was 9.8, lactic acid was 1.8. No X-rays obtained.  MRI obtained of left foot showing osteomyelitis of the calcaneus.  Arterial duplex ultrasound reviewed showing monophasic blood flow to distal lower extremity vessels.  At this time, left heel wound remains unhealed with minimal chance of healing.  Would recommend a vascular surgery consult to address blood flow issues.      PLAN AND RECOMMENDATIONS::  Patient's case to be discussed with staff, Dr. Garcia, who will provide final recommendations going forward.  Had extensive conversation with patient in regards to prognosis of left heel wound and calcaneus osteomyelitis.  Patient was lethargic during exam, but appeared to understand the discussion.  Reviewed MRI findings and arterial duplex ultrasound findings with patient.  Discussed that due to poor blood flow, the healing potential of the heel wound and the bone infection is decreased.  Would recommend vascular surgery consult.    Wound care: Betadine WTD,

## 2024-07-10 NOTE — PROGRESS NOTES
Pharmacy Vancomycin Consult     Vancomycin Day: 4  Current Dosing: vanco 750mg IV q12h      Recent Labs     07/09/24  0441 07/10/24  0437   BUN 39* 44*   CREATININE 1.07 1.23*   WBC 8.4 11.1*       Intake/Output Summary (Last 24 hours) at 7/10/2024 1250  Last data filed at 7/10/2024 0700  Gross per 24 hour   Intake 240 ml   Output 200 ml   Net 40 ml     Cultures  Recent Labs     07/08/24  0257 07/07/24  1926 07/07/24  1926 07/07/24  1916   BC  --   --   --  Gram stain aerobic bottle  Gram positive cocci in clusters-resembling Staph  2 out of 2 blood cultures  Further results to follow  Further testing performed at [facility]  *   BLOODCULT2  --   --  Gram stain aerobic bottle  Gram positive cocci in clusters-resembling Staph  2 out of 2 blood cultures  Further results to follow  Further testing performed at [facility]  *  --    ORG Proteus mirabilis*  Staphylococcus aureus*   < > Staphylococcus epidermidis*  --    COVID19  --   --   --  Not Detected    < > = values in this interval not displayed.     Height: 170.2 cm (5' 7\"), Weight - Scale: 73.9 kg (163 lb), Body mass index is 25.53 kg/m².    Estimated Creatinine Clearance: 57 mL/min (A) (based on SCr of 1.23 mg/dL (H)).  .    Trough:  Recent Labs     07/09/24  0441   VANCORANDOM 18.1      Assessment/Plan:  Data input into [email or web address] platform. Current dosing on high end of goal with slight bump in scr today. Repeat level tomorrow am to further assess. Timing of future trough levels may be adjusted based on culture results, renal function, and clinical response.    Thank you,  Tonie Jaime, [facility] PharmD

## 2024-07-10 NOTE — PROGRESS NOTES
Pt refusing to take oral medications. Yelling and swinging at staff.     0100 Pt took oral vanco in [place].

## 2024-07-10 NOTE — CONSULTS
Urology Consult      Gary Turk  1960  [id number]    Date of Admission:  7/7/2024  6:27 PM  Date of Consultation:  7/10/2024    Consultant: Sonido Shaver PA-C  SupervisingPhysician: Dr. Zambrano  PCP:  Lobo Monteiro MD       Reason for Consultation: urinary retention      C/C:   Chief Complaint   Patient presents with    Failure To Thrive     Not eating, failure to thrive        History of Present Illness: Urinary Retention  Patient complains of urinary retention. Onset of retention was 1 day ago and was unknown in onset. Patient currently does have a urinary catheter in place.   ml of urine were drained when catheter was placed. Prior to this event voiding symptoms consisted of slow stream. Prior treatments include tamulosin. Recent medications that may have affected his voiding include none.    Allergies:No Known Allergies    PMH: Patient has a past medical history of Asthma, Back pain, CAD (coronary artery disease), chf, Diabetes mellitus (HCC), Essential hypertension, benign, Hyperlipemia, Ischemic cardiomyopathy, and Neuropathic pain, leg, bilateral.    PSH: Patient has a past surgical history that includes Laminotomy (2012); Carpal tunnel release (Bilateral); Foot surgery (Left); Spine surgery (N/A, 11/11/2022); Foot Debridement (Right, 5/5/2023); Foot Debridement (Right, 5/8/2023); ep device procedure (N/A, 9/29/2023); and hip surgery (Left, 10/5/2023).    Social History: Patient reports that he has never smoked. He has never been exposed to tobacco smoke. He has never used smokeless tobacco. He reports that he does not currently use alcohol after a past usage of about 2.0 standard drinks of alcohol per week. He reports that he does not use drugs.    Family History: Patientsfamily history includes Cirrhosis in his brother; Heart Disease in his brother; Heart Failure in his mother; Other in his father.    ROS:  Review of Systems   Unable to perform ROS: Dementia       Current Meds: 0.9 % sodium

## 2024-07-10 NOTE — CONSULTS
Infectious Disease     Patient Name: Gary Turk  Date: 7/10/2024  YOB: 1960  Medical Record Number: [id number]      Chronic osteomyelitis left foot        History of Present Illness:  History of coronary disease coronary artery bypass graft congestive heart failure cardiomyopathy hyperlipidemia history of TIA hemiplegia dementia      Comes from [facility] being treated for chronic infection  Of right foot osteomyelitis history of growing group A streptococcus on Augmentin  Had completed long-term course of IV Zosyn from May through June 2024      Admitted to [facility] 7/7/2024 for loss of appetite  Fevers  diarrhea  C. difficile toxin 7/8/2024 positive      Cultures from left foot wound growing Proteus mirabilis Staph aureus    Blood cultures growing Staph epidermidisAnd Staphylococcus hominis    Urine microscopic did not show any white cells      Review of Systems   Unable to perform ROS: Dementia       Review of Systems: All 14 review of systems negative other than as stated above    Social History     Tobacco Use    Smoking status: Never     Passive exposure: Never    Smokeless tobacco: Never   Vaping Use    Vaping Use: Never used   Substance Use Topics    Alcohol use: Not Currently     Alcohol/week: 2.0 standard drinks of alcohol     Types: 2 Cans of beer per week    Drug use: No         Past Medical History:   Diagnosis Date    Asthma     Back pain     compresion in [place]    CAD (coronary artery disease) 01/2020    3 vessel     chf 09/16/2022    Diabetes mellitus (HCC)     Essential hypertension, benign     Hyperlipemia     Ischemic cardiomyopathy 09/16/2022    Neuropathic pain, leg, bilateral            Past Surgical History:   Procedure Laterality Date    CARPAL TUNNEL RELEASE Bilateral     EP DEVICE PROCEDURE N/A 9/29/2023    Loop recorder insert  ROOM 293 [employer] NOTIFIED performed by Maxwell Tyler DO at [facility] [place] CARDIAC CATH LAB    FOOT DEBRIDEMENT Right 5/5/2023    RIGHT FOOT DEBRIDEMENT  (GUAIFENESIN AC) 100-10 MG/5ML liquid Take 5 mLs by mouth 3 times daily as needed for Cough.      loperamide (IMODIUM) 2 MG capsule Take 1 capsule by mouth 4 times daily as needed for Diarrhea      carvedilol (COREG) 12.5 MG tablet TAKE 1 TABLET BY MOUTH 2 TIMES DAILY 60 tablet 0    escitalopram (LEXAPRO) 10 MG tablet 1.5 tablets      sennosides-docusate sodium (SENOKOT-S) 8.6-50 MG tablet Take 1 tablet by mouth 2 times daily Indications: Constipation      vitamin C (ASCORBIC ACID) 500 MG tablet Take 1 tablet by mouth daily Indications: Nutritional Support      apixaban (ELIQUIS) 5 MG TABS tablet Take 1 tablet by mouth 2 times daily RESUME FULL DOSE ON 10/11. Continue low dose of eliquis 2.5mg twice daily until 10/11. 60 tablet 0    aspirin 81 MG EC tablet Take 1 tablet by mouth daily (Patient taking differently: Take 1 tablet by mouth daily Indications: Thickening and Hardening of Heart Arteries) 30 tablet 3    sacubitril-valsartan (ENTRESTO) 24-26 MG per tablet Take 1 tablet by mouth 2 times daily Indications: Congestive Heart Failure      tiZANidine (ZANAFLEX) 4 MG tablet Take 1 tablet by mouth every 8 hours as needed Indications: Muscle Spasm      Handicap Placard MISC by Does not apply route He needs a placard for 5 years  Expires 8/2/28  He is unable to walk 200 feet without stopping  Dx back pain 1 each 0    BREO ELLIPTA 100-25 MCG/ACT inhaler Inhale 1 puff into the lungs daily Indications: Asthma, Chronic Obstructive Lung Disease      hydrALAZINE (APRESOLINE) 25 MG tablet Take 1 tablet by mouth in the morning and at bedtime (Patient taking differently: Take 1 tablet by mouth 2 times daily Indications: High Blood Pressure Disorder Hold for BP< 90/60 or AP<60) 60 tablet 3    [DISCONTINUED] lisinopril (PRINIVIL;ZESTRIL) 10 MG tablet Take 1 tablet by mouth daily 30 tablet 5    Accu-Chek FastClix Lancets MISC Test 4x daily Dx E11.65 150 each 3    blood glucose test strips (ACCU-CHEK GUIDE) strip Test 4x daily Dx

## 2024-07-10 NOTE — PROGRESS NOTES
Hospitalist Daily Progress Note  Name: Gary Turk  Age: 64 y.o.  Gender: male  CodeStatus: Full Code  Allergies: No Known Allergies    Chief Complaint:Failure To Thrive (Not eating, failure to thrive )      Primary Care Provider: Lobo Monteiro MD    InpatientTreatment Team: Treatment Team: Attending Provider: Dave Harkins MD; Consulting Physician: Henry Thayer DPM; Consulting Physician: Joshua Miller MD; Consulting Physician: Ronnie Zambrano MD; Physician Assistant: Sonido Shaver PA; Registered Nurse: Natasha Eubanks, RN; Registered Nurse: Lita Hoffmann RN; Utilization Reviewer: Aurora Lares RN    Admission Date: 7/7/2024      Subjective: No chest pain, sob, nausea.  Resting in bed, more clear today    Physical Exam  Vitals and nursing note reviewed.   Constitutional:       Appearance: Normal appearance.   Cardiovascular:      Rate and Rhythm: Normal rate and regular rhythm.   Pulmonary:      Effort: Pulmonary effort is normal.      Breath sounds: Normal breath sounds.   Abdominal:      General: Bowel sounds are normal.      Palpations: Abdomen is soft.   Musculoskeletal:         General: Normal range of motion.   Skin:     General: Skin is warm and dry.      Comments: Left heel wrapped   Neurological:      General: No focal deficit present.      Mental Status: He is alert. Mental status is at baseline.      Comments: Baseline dementia present per record         Medications:  Reviewed    Infusion Medications:    sodium chloride 100 mL/hr at 07/10/24 0841    sodium chloride      dextrose       Scheduled Medications:    vancomycin  125 mg Oral 4 times per day    tamsulosin  0.4 mg Oral Daily    sodium chloride flush  5-40 mL IntraVENous 2 times per day    melatonin  3 mg Oral Nightly    piperacillin-tazobactam  3,375 mg IntraVENous Q8H    insulin lispro  0-4 Units SubCUTAneous TID     insulin lispro  0-4 Units SubCUTAneous Nightly    apixaban  5 mg Oral BID    aspirin  81 mg

## 2024-07-10 NOTE — CARE COORDINATION
SADIW UPDATED GUSTAVO REGARDING THE C-DIFF AND THE RECTAL TUBE FOR PT.  PLAN REMAINS FOR THE [facility] AND THEY WILL ISOLATE HIM FOR THE C-DIFF.

## 2024-07-11 LAB
ALBUMIN SERPL-MCNC: 2.2 G/DL (ref 3.5–4.6)
ALP SERPL-CCNC: 53 U/L (ref 35–104)
ALT SERPL-CCNC: 10 U/L (ref 0–41)
ANION GAP SERPL CALCULATED.3IONS-SCNC: 15 MEQ/L (ref 9–15)
AST SERPL-CCNC: 10 U/L (ref 0–40)
BASOPHILS # BLD: 0 K/UL (ref 0–0.2)
BASOPHILS NFR BLD: 0.2 %
BILIRUB SERPL-MCNC: <0.2 MG/DL (ref 0.2–0.7)
BUN SERPL-MCNC: 53 MG/DL (ref 8–23)
BURR CELLS: ABNORMAL
CALCIUM SERPL-MCNC: 7.3 MG/DL (ref 8.5–9.9)
CHLORIDE SERPL-SCNC: 99 MEQ/L (ref 95–107)
CO2 SERPL-SCNC: 17 MEQ/L (ref 20–31)
CREAT SERPL-MCNC: 1.4 MG/DL (ref 0.7–1.2)
CULTURE, BLOOD ID SENSITIVITY: ABNORMAL
CULTURE, BLOOD ID SENSITIVITY: ABNORMAL
EOSINOPHIL # BLD: 0.1 K/UL (ref 0–0.7)
EOSINOPHIL NFR BLD: 0.6 %
ERYTHROCYTE [DISTWIDTH] IN BLOOD BY AUTOMATED COUNT: 14.7 % (ref 11.5–14.5)
GLOBULIN SER CALC-MCNC: 2.2 G/DL (ref 2.3–3.5)
GLUCOSE BLD-MCNC: 129 MG/DL (ref 70–99)
GLUCOSE BLD-MCNC: 186 MG/DL (ref 70–99)
GLUCOSE BLD-MCNC: 196 MG/DL (ref 70–99)
GLUCOSE SERPL-MCNC: 214 MG/DL (ref 70–99)
HCT VFR BLD AUTO: 33.4 % (ref 42–52)
HGB BLD-MCNC: 11.2 G/DL (ref 14–18)
LYMPHOCYTES # BLD: 0.5 K/UL (ref 1–4.8)
LYMPHOCYTES NFR BLD: 3.4 %
MCH RBC QN AUTO: 27.3 PG (ref 27–31.3)
MCHC RBC AUTO-ENTMCNC: 33.5 % (ref 33–37)
MCV RBC AUTO: 81.3 FL (ref 79–92.2)
MONOCYTES # BLD: 1.3 K/UL (ref 0.2–0.8)
MONOCYTES NFR BLD: 10.1 %
NEUTROPHILS # BLD: 11 K/UL (ref 1.4–6.5)
NEUTS SEG NFR BLD: 83.8 %
ORGANISM: ABNORMAL
PERFORMED ON: ABNORMAL
PLATELET # BLD AUTO: 165 K/UL (ref 130–400)
PLATELET BLD QL SMEAR: ADEQUATE
POIKILOCYTOSIS BLD QL SMEAR: ABNORMAL
POTASSIUM SERPL-SCNC: 3.9 MEQ/L (ref 3.4–4.9)
PROT SERPL-MCNC: 4.4 G/DL (ref 6.3–8)
RBC # BLD AUTO: 4.11 M/UL (ref 4.7–6.1)
SODIUM SERPL-SCNC: 131 MEQ/L (ref 135–144)
VANCOMYCIN SERPL-MCNC: 22.4 UG/ML (ref 10–40)
WBC # BLD AUTO: 13.1 K/UL (ref 4.8–10.8)

## 2024-07-11 PROCEDURE — 36415 COLL VENOUS BLD VENIPUNCTURE: CPT

## 2024-07-11 PROCEDURE — 6370000000 HC RX 637 (ALT 250 FOR IP)

## 2024-07-11 PROCEDURE — 2580000003 HC RX 258: Performed by: FAMILY MEDICINE

## 2024-07-11 PROCEDURE — 6360000002 HC RX W HCPCS

## 2024-07-11 PROCEDURE — 99232 SBSQ HOSP IP/OBS MODERATE 35: CPT | Performed by: INTERNAL MEDICINE

## 2024-07-11 PROCEDURE — 85025 COMPLETE CBC W/AUTO DIFF WBC: CPT

## 2024-07-11 PROCEDURE — 6370000000 HC RX 637 (ALT 250 FOR IP): Performed by: FAMILY MEDICINE

## 2024-07-11 PROCEDURE — 02H633Z INSERTION OF INFUSION DEVICE INTO RIGHT ATRIUM, PERCUTANEOUS APPROACH: ICD-10-PCS | Performed by: FAMILY MEDICINE

## 2024-07-11 PROCEDURE — 99231 SBSQ HOSP IP/OBS SF/LOW 25: CPT | Performed by: PHYSICIAN ASSISTANT

## 2024-07-11 PROCEDURE — 80202 ASSAY OF VANCOMYCIN: CPT

## 2024-07-11 PROCEDURE — 1210000000 HC MED SURG R&B

## 2024-07-11 PROCEDURE — 80053 COMPREHEN METABOLIC PANEL: CPT

## 2024-07-11 PROCEDURE — 6370000000 HC RX 637 (ALT 250 FOR IP): Performed by: UROLOGY

## 2024-07-11 PROCEDURE — 92526 ORAL FUNCTION THERAPY: CPT

## 2024-07-11 PROCEDURE — 2580000003 HC RX 258

## 2024-07-11 RX ORDER — LIDOCAINE HYDROCHLORIDE 20 MG/ML
5 INJECTION, SOLUTION INFILTRATION; PERINEURAL ONCE
OUTPATIENT
Start: 2024-07-12

## 2024-07-11 RX ORDER — SODIUM CHLORIDE 0.9 % (FLUSH) 0.9 %
5-40 SYRINGE (ML) INJECTION PRN
Status: DISCONTINUED | OUTPATIENT
Start: 2024-07-11 | End: 2024-07-13 | Stop reason: HOSPADM

## 2024-07-11 RX ORDER — SODIUM CHLORIDE 0.9 % (FLUSH) 0.9 %
5-40 SYRINGE (ML) INJECTION EVERY 12 HOURS SCHEDULED
Status: DISCONTINUED | OUTPATIENT
Start: 2024-07-11 | End: 2024-07-13 | Stop reason: HOSPADM

## 2024-07-11 RX ORDER — SODIUM CHLORIDE 9 MG/ML
INJECTION, SOLUTION INTRAVENOUS PRN
Status: DISCONTINUED | OUTPATIENT
Start: 2024-07-11 | End: 2024-07-13 | Stop reason: HOSPADM

## 2024-07-11 RX ADMIN — Medication 125 MG: at 12:42

## 2024-07-11 RX ADMIN — ESCITALOPRAM OXALATE 15 MG: 10 TABLET ORAL at 08:36

## 2024-07-11 RX ADMIN — VANCOMYCIN HYDROCHLORIDE 750 MG: 750 INJECTION, POWDER, LYOPHILIZED, FOR SOLUTION INTRAVENOUS at 01:05

## 2024-07-11 RX ADMIN — INSULIN LISPRO 5 UNITS: 100 INJECTION, SOLUTION INTRAVENOUS; SUBCUTANEOUS at 09:16

## 2024-07-11 RX ADMIN — TAMSULOSIN HYDROCHLORIDE 0.4 MG: 0.4 CAPSULE ORAL at 08:36

## 2024-07-11 RX ADMIN — SODIUM CHLORIDE: 9 INJECTION, SOLUTION INTRAVENOUS at 15:47

## 2024-07-11 RX ADMIN — SODIUM CHLORIDE, PRESERVATIVE FREE 10 ML: 5 INJECTION INTRAVENOUS at 21:27

## 2024-07-11 RX ADMIN — ASPIRIN 81 MG: 81 TABLET, COATED ORAL at 08:36

## 2024-07-11 RX ADMIN — VANCOMYCIN HYDROCHLORIDE 1000 MG: 1 INJECTION, POWDER, LYOPHILIZED, FOR SOLUTION INTRAVENOUS at 21:23

## 2024-07-11 RX ADMIN — Medication 10 ML: at 21:26

## 2024-07-11 RX ADMIN — INSULIN LISPRO 5 UNITS: 100 INJECTION, SOLUTION INTRAVENOUS; SUBCUTANEOUS at 16:21

## 2024-07-11 RX ADMIN — APIXABAN 5 MG: 5 TABLET, FILM COATED ORAL at 08:36

## 2024-07-11 RX ADMIN — HYDRALAZINE HYDROCHLORIDE 25 MG: 25 TABLET ORAL at 08:36

## 2024-07-11 RX ADMIN — CARVEDILOL 12.5 MG: 12.5 TABLET, FILM COATED ORAL at 08:36

## 2024-07-11 RX ADMIN — DIVALPROEX SODIUM 500 MG: 500 TABLET, DELAYED RELEASE ORAL at 08:36

## 2024-07-11 RX ADMIN — Medication 125 MG: at 05:48

## 2024-07-11 RX ADMIN — SACUBITRIL AND VALSARTAN 1 TABLET: 24; 26 TABLET, FILM COATED ORAL at 08:36

## 2024-07-11 RX ADMIN — INSULIN LISPRO 5 UNITS: 100 INJECTION, SOLUTION INTRAVENOUS; SUBCUTANEOUS at 11:55

## 2024-07-11 ASSESSMENT — VISUAL ACUITY: OU: 1

## 2024-07-11 NOTE — PROGRESS NOTES
Pt's daughter Lela inquired about an increase in patient's confusion over the last month. She is concerned for another stroke. Message to Dr Harkins.    Consent for PICC completed with daughter, Lela. Per Specials, this will probably be completed tomorrow.

## 2024-07-11 NOTE — PLAN OF CARE
Problem: Skin/Tissue Integrity  Goal: Absence of new skin breakdown  Description: 1.  Monitor for areas of redness and/or skin breakdown  2.  Assess vascular access sites hourly  3.  Every 4-6 hours minimum:  Change oxygen saturation probe site  4.  Every 4-6 hours:  If on nasal continuous positive airway pressure, respiratory therapy assess nares and determine need for appliance change or resting period.  Outcome: Progressing     Problem: Discharge Planning  Goal: Discharge to home or other facility with appropriate resources  Outcome: Progressing     Problem: Pain  Goal: Verbalizes/displays adequate comfort level or baseline comfort level  Outcome: Progressing     Problem: Safety - Adult  Goal: Free from fall injury  Outcome: Progressing     Problem: Neurosensory - Adult  Goal: Achieves stable or improved neurological status  Outcome: Progressing  Goal: Achieves maximal functionality and self care  Outcome: Progressing     Problem: Respiratory - Adult  Goal: Achieves optimal ventilation and oxygenation  Outcome: Progressing     Problem: Cardiovascular - Adult  Goal: Maintains optimal cardiac output and hemodynamic stability  Outcome: Progressing  Goal: Absence of cardiac dysrhythmias or at baseline  Outcome: Progressing     Problem: Skin/Tissue Integrity - Adult  Goal: Skin integrity remains intact  Outcome: Progressing  Goal: Incisions, wounds, or drain sites healing without S/S of infection  Outcome: Progressing  Goal: Oral mucous membranes remain intact  Outcome: Progressing     Problem: Musculoskeletal - Adult  Goal: Return mobility to safest level of function  Outcome: Progressing  Goal: Maintain proper alignment of affected body part  Outcome: Progressing  Goal: Return ADL status to a safe level of function  Outcome: Progressing     Problem: Gastrointestinal - Adult  Goal: Minimal or absence of nausea and vomiting  Outcome: Progressing  Goal: Maintains or returns to baseline bowel function  Outcome:  Progressing  Goal: Maintains adequate nutritional intake  Outcome: Progressing     Problem: Genitourinary - Adult  Goal: Absence of urinary retention  Outcome: Progressing     Problem: Infection - Adult  Goal: Absence of infection at discharge  Outcome: Progressing  Goal: Absence of infection during hospitalization  Outcome: Progressing  Goal: Absence of fever/infection during anticipated neutropenic period  Outcome: Progressing     Problem: Metabolic/Fluid and Electrolytes - Adult  Goal: Electrolytes maintained within normal limits  Outcome: Progressing  Goal: Hemodynamic stability and optimal renal function maintained  Outcome: Progressing  Goal: Glucose maintained within prescribed range  Outcome: Progressing     Problem: Hematologic - Adult  Goal: Maintains hematologic stability  Outcome: Progressing     Problem: Chronic Conditions and Co-morbidities  Goal: Patient's chronic conditions and co-morbidity symptoms are monitored and maintained or improved  Outcome: Progressing     Problem: Nutrition Deficit:  Goal: Optimize nutritional status  Outcome: Progressing

## 2024-07-11 NOTE — PROGRESS NOTES
[facility]   Pharmacy Pharmacokinetic Monitoring Service - Vancomycin    Consulting Provider: Bridget Gutierrez APRN - CNP    Indication: SSTI/Diabetic Foot Infection   Target Concentration: Goal trough of 10-15 mg/L and AUC/RAFAEL <500 mg*hr/L  Day of Therapy: 5    Pertinent Laboratory Values:   Wt Readings from Last 1 Encounters:   07/10/24 73.9 kg (163 lb)     Temp Readings from Last 1 Encounters:   07/11/24 98.3 °F (36.8 °C) (Oral)     Estimated Creatinine Clearance: 50 mL/min (A) (based on SCr of 1.4 mg/dL (H)).  Recent Labs     07/10/24  0437 07/11/24  0438   CREATININE 1.23* 1.40*   BUN 44* 53*   WBC 11.1* 13.1*     Procalcitonin:   Procalcitonin   Date Value Ref Range Status   07/07/2024 1.15 (H) 0.00 - 0.15 ng/mL Final     Comment:     Suspected Sepsis:  Low likelihood of sepsis  <.50 ng/mL    Increased likelihood of sepsis 0.50-2.00 ng/mL  Antibiotics encouraged    High risk of sepsis/shock   >2.00 ng/mL  Antibiotics strongly encouraged    Suspected Lower Respiratory Tract Infections:  Low likelihood of bacterial infection  <0.24 ng/mL    Increased likelihood of bacterial infection >0.24 ng/mL  Antibiotics encouraged    With successful antibiotic therapy, PCT levels should decrease  rapidly. (Half-life of 24 to 36 hours.)    Procalcitonin values from samples collected within the first  6 hours of systemic infection may still be low.  Retesting may be indicated.  Values from day 1 and day 4 can be entered into the Change in  Procalcitonin Calculator to determine the patient's  Mortality Risk Prognosis  ([email or web address])    In healthy neonates, plasma Procalcitonin (PCT) concentrations  increase gradually after birth, reaching peak values at about  24 hours of age then decrease to normal values below 0.5 ng/mL  by 48-72 hours of age.       Pertinent Cultures:  Recent Labs     07/08/24  0257 07/07/24 1926 07/07/24  1916   BC  --   --  Gram stain aerobic bottle  Gram positive cocci in

## 2024-07-11 NOTE — PROGRESS NOTES
Infectious Disease     Patient Name: Gary Turk  Date: 7/11/2024  YOB: 1960  Medical Record Number: [id number]      Chronic osteomyelitis left foot          Comes from [facility] being treated for chronic infection  Of right foot osteomyelitis history of growing group A streptococcus on Augmentin  Had completed long-term course of IV Zosyn from May through June 2024      Admitted to [facility] 7/7/2024 for loss of appetite  Fevers  diarrhea  C. difficile toxin 7/8/2024 positive      Cultures from left foot wound growing Proteus mirabilis Staph aureus    Blood cultures growing Staph epidermidisAnd Staphylococcus hominis    Urine microscopic did not show any white cells      Review of Systems   Unable to perform ROS: Dementia     Physical Exam:      Physical Exam  Cardiovascular:      Heart sounds: Normal heart sounds. No murmur heard.  Pulmonary:      Effort: Pulmonary effort is normal. No respiratory distress.      Breath sounds: Normal breath sounds. No stridor. No wheezing, rhonchi or rales.   Chest:      Chest wall: No tenderness.   Abdominal:      General: Abdomen is flat. Bowel sounds are normal. There is no distension.      Palpations: Abdomen is soft. There is no mass.      Tenderness: There is no abdominal tenderness. There is no guarding or rebound.      Hernia: No hernia is present.   Skin:     Comments: Ulcer posterior aspect left heel deep palpable bone             Blood pressure (!) 90/53, pulse 82, temperature 98.1 °F (36.7 °C), temperature source Oral, resp. rate 16, height 1.702 m (5' 7\"), weight 73.9 kg (163 lb), SpO2 96 %.      .   Lab Results   Component Value Date    WBC 13.1 (H) 07/11/2024    HGB 11.2 (L) 07/11/2024    HCT 33.4 (L) 07/11/2024    MCV 81.3 07/11/2024     07/11/2024     Lab Results   Component Value Date/Time     07/11/2024 04:38 AM    K 3.9 07/11/2024 04:38 AM    CL 99 07/11/2024 04:38 AM    CO2 17 07/11/2024 04:38 AM    BUN 53 07/11/2024 04:38 AM     CREATININE 1.40 07/11/2024 04:38 AM    GLUCOSE 214 07/11/2024 04:38 AM    GLUCOSE 174 08/22/2023 05:28 AM    CALCIUM 7.3 07/11/2024 04:38 AM    LABGLOM 56.4 07/11/2024 04:38 AM    LABGLOM 76 01/15/2024 03:28 PM                ASSESSMENT:  PLAN:    C. difficile diarrhea      Chronic osteomyelitis left heel      Continue oral vancomycin started for C. Difficile     maintain IV vancomycin for Staph aureus growing from left heel      Blood cultures are contaminants

## 2024-07-11 NOTE — PROGRESS NOTES
previous visit.            Assessment/Plan:    Active Hospital Problems    Diagnosis Date Noted    Urinary retention [R33.9] 07/10/2024    Chronic osteomyelitis of left foot (HCC) [M86.672] 07/10/2024    C. difficile diarrhea [A04.72] 07/10/2024    Diabetic foot infection (HCC) [E11.628, L08.9] 07/09/2024    Chronic ulcer of left heel with fat layer exposed (HCC) [L97.422] 07/08/2024    Fever of unknown origin [R50.9] 07/07/2024     Sepsis 2/2 diabetic foot wound  Temp 101.3, treated with rectal tylenol - now 98.4  Procal 1.15  WBC 9.8  Resp viral panel negative  UA negative  CXR: Prominent pulmonary vascularity with bilateral small pleural effusions.   Suspected origin: Stage III foot ulcer to left heel with purulent drainage not previously identified in ED before call for admission  Admit to Med/Surg observation with telemetry  Follow up on blood cultures  As needed Tylenol  Gentle hydration  Monitor and trend WBCs  7/8 - cont iv abx, podiatry following, await culture.  7/9 - cdiff positive, POA, started on po vancomycin, cont iv abx for diabetic foot wound, osteomyelitis noted on MRI  7/10 - improved today, cont iv abx  7/11 - ID following, sensitivities pending.  C Diff infection  7/11 - po vancomycin     Left foot wound  Left heel - stage 3/diabetic  Purulent drainage and foul smell  Approximately the size of a quarter  Consult podiatry  Consult wound care  Continue Zosyn and vancomycin with consult to pharmacy for dosing  Wound cultures  Monitor and trend WBC  Pressure reduction  7/8 - as above     3.  Subtherapeutic valproic acid level  Valproic Acid 18.0  Ordered Depakote 500 mg twice daily outpatient  For mood disorder per Dr. Miller's note.  Consult neurology for dosing  Reorder home dose  Monitor and trend valproic acid level  Consult case management for questionable medication compliance at nursing home     4. Decreased oral intake  Per nursing home staff report  Patient reports he has not been eating  because he does not like the food served at the nursing home  Appears clinically dehydrated  Last echo 9- EF 50 to 55%  Consult dietitian  Consult speech for swallow eval  Normal saline at 75 an hour  Encourage oral hydration     5.  DM2 with hyperglycemia    Most recent A1C 4-2-24 was 7.1  Takes Humalog and glargine at home  Hold oral antidiabetic medications  Sliding scale insulin - maintain BG between 140-180 while inpatient  Resume home insulin  Hypoglycemia protocol  AC/HS one touches  A1C  Carb control diet      6.  Elevated troponin  137, 104  Chronically elevated  Denies chest pain, shortness of breath  K+ 4.8, Mg 1.8  Continuous telemetry  Monitor for signs of chest pain        EKG: Normal sinus rhythm  Code Status: Full  DVT/PE prophylaxis: Eliquis    Electronically signed by SHELBY SHELTON MD on 7/11/2024 at 9:42 AM

## 2024-07-11 NOTE — PROGRESS NOTES
Subjective:      Patient ID: Gary Turk is a 64 y.o. male    HPI 64 year old male who's piña catheter is draining clear yellow urine.     Past Medical History:   Diagnosis Date    Asthma     Back pain     compresion in [place]    CAD (coronary artery disease) 01/2020    3 vessel     chf 09/16/2022    Diabetes mellitus (HCC)     Essential hypertension, benign     Hyperlipemia     Ischemic cardiomyopathy 09/16/2022    Neuropathic pain, leg, bilateral      Past Surgical History:   Procedure Laterality Date    CARPAL TUNNEL RELEASE Bilateral     EP DEVICE PROCEDURE N/A 9/29/2023    Loop recorder insert  ROOM 293 [employer] NOTIFIED [place] Maxwell Tyler [place] [facility]    FOOT DEBRIDEMENT Right 5/5/2023    RIGHT FOOT DEBRIDEMENT INCISION AND DRAINAGE ROOM 174 performed [place] Jairo Garcia DPM [place] [facility] [place]    FOOT DEBRIDEMENT Right 5/8/2023    RIGHT FOOT  INCISION AND DRAINAGE WITH DEBRIDEMENT OF NON-VIABLE TISSUE DELAYED PRIMARY CLOSURE performed by Jairo Garcia DPM [place] [facility] [place]    FOOT SURGERY Left     bone spur    HIP SURGERY Left 10/5/2023    TROCHANTERIC FIXATION NAILING LEFT FEMUR FRACTURE ROOM 265 performed [place] Mirza Elliott [place] [facility] [place]    LAMINOTOMY  2012    SPINE SURGERY N/A 11/11/2022    L1 VERETEBRAL AUGMENTATION performed by Concetta Junior MD [place] [facility] [place]     Social History     Socioeconomic History    Marital status:      Spouse name: rodríguez    Number of children: 2    Years of education: 14    Highest education level: Some college, no degree   Occupational History    Occupation: disability    Tobacco Use    Smoking status: Never     Passive exposure: Never    Smokeless tobacco: Never   Vaping Use    Vaping Use: Never used   Substance and Sexual Activity    Alcohol use: Not Currently     Alcohol/week: 2.0 standard drinks of alcohol     Types: 2 Cans of beer per week    Drug use: No    Sexual activity: Not Currently     Partners: Female   Social History Narrative    Lives with wife.

## 2024-07-11 NOTE — PROGRESS NOTES
[facility]  Facility/Department: [facility] 2W ORTHO TELE  Speech Language Pathology   Treatment Note      Gary Turk  1960  [id number]  [x]   confirmed      Date: 2024    Fever of unknown origin [R50.9]  Elevated troponin [R79.89]  SIRS (systemic inflammatory response syndrome) (HCC) [R65.10]  Decreased oral intake [R63.8]  Elevated procalcitonin [R79.89]  Fever, unspecified fever cause [R50.9]  Diabetic foot infection (HCC) [E11.628, L08.9]    Restrictions/Precautions: Up as Tolerated, Fall Risk    ADULT DIET; Dysphagia - Soft and Bite Sized; 4 carb choices (60 gm/meal)  ADULT ORAL NUTRITION SUPPLEMENT; Breakfast, Dinner; Diabetic Oral Supplement  ADULT ORAL NUTRITION SUPPLEMENT; Lunch, Dinner; Wound Healing Oral Supplement     Respiratory Status:O2 Flow Rate (L/min): 0 L/min (24 0249)   C Diff Contact      Subjective:  Alert, Uncooperative, and Self Limiting      Pt confused with intermittent rambling of various conversational topics.     Interventions used this date:  Dysphagia Treatment      Objective/Assessment:  Patient progressing towards goals:  Short-term Goals  Timeframe for Short-term Goals: 2-3x week  Goal 1: Patient will tolerate a regular diet with thin liquids with adequate mastication, timely A-P, with no evidence of pocketing or s/s of aspiration during a therapeutic meal monitor.  Pt initially declined all PO due was willing to try a diet Pepsi and pretzels.   Pt consumed x3 trials of thin and x2 pretzels with adequate mastication, A-P transfer, and oral clearance. No overt s/s of aspiration on all trials. Pt declined all further trials following encouragement.   Goal 2: Patient will demonstrate recommended swallow strategies (small bites and sips, alternating bites/sips, lingual sweeps, no talking while eating) for safe and efficient swallow at min level.  SLP reviewed; limited awareness of learning.   Goal 3: Patient will complete labial/buccal/lingual

## 2024-07-11 NOTE — PROGRESS NOTES
Subjective:      Patient ID: Gary Turk is a pleasant 64 y.o. male who presents today for:  Chief Complaint   Patient presents with    Other     Acute infection of right external ear  (primary encounter diagnosis)         [facility]    Patient seen for right ear pain.  History of recurrent ear infection.  There is evidence of inflammation to right ear. Has mild pain with traction to ear. Pt unwilling to supply ROS. He is  not appearing to be willing to discuss. Does agree to treatment when brought up however. Denies change to gross hearing bilaterally. No sx to left ear. Left ear is clear on exam. No pain elicited.       Patient Active Problem List   Diagnosis    Essential hypertension, benign    Irregular heart rhythm    Hyperlipidemia    Allergic rhinitis due to pollen    Staphylococcus aureus bacteremia    Hematoma of right thigh    Hematoma of left lower extremity    Hereditary peripheral neuropathy    Migraine without aura    Community acquired pneumonia of right lower lobe of lung    Acute on chronic diastolic (congestive) heart failure ([facility])    Mild intermittent asthma with exacerbation    NSTEMI (non-ST elevated myocardial infarction) ([facility])    Acute bronchitis due to other specified organisms    CAD (coronary artery disease)    Body mass index (bmi) 29.0-29.9, adult    Cellulitis of right lower limb    Dyspnea, unspecified    Encounter for pre-employment examination    Family history of ischemic heart disease and other diseases of the circulatory system    Other specified hypothyroidism    Inflammatory disorders of scrotum    Long term (current) use of insulin ([facility])    Long term (current) use of oral hypoglycemic drugs    Other chronic sinusitis    Other idiopathic scoliosis, lumbosacral region    Other intervertebral disc disorders, lumbar region    Pain in left lower leg    General patient noncompliance    Sleep apnea    Spinal stenosis, lumbar region with neurogenic claudication

## 2024-07-12 ENCOUNTER — APPOINTMENT (OUTPATIENT)
Dept: CT IMAGING | Age: 64
End: 2024-07-12
Payer: COMMERCIAL

## 2024-07-12 ENCOUNTER — APPOINTMENT (OUTPATIENT)
Dept: INTERVENTIONAL RADIOLOGY/VASCULAR | Age: 64
End: 2024-07-12
Payer: COMMERCIAL

## 2024-07-12 LAB
ACANTHOCYTES BLD QL SMEAR: ABNORMAL
ALBUMIN SERPL-MCNC: 2.3 G/DL (ref 3.5–4.6)
ALP SERPL-CCNC: 63 U/L (ref 35–104)
ALT SERPL-CCNC: 12 U/L (ref 0–41)
AMMONIA PLAS-SCNC: 34 UMOL/L (ref 16–60)
ANION GAP SERPL CALCULATED.3IONS-SCNC: 17 MEQ/L (ref 9–15)
ANISOCYTOSIS BLD QL SMEAR: ABNORMAL
AST SERPL-CCNC: 9 U/L (ref 0–40)
BASOPHILS # BLD: 0 K/UL (ref 0–0.2)
BASOPHILS NFR BLD: 0.8 %
BILIRUB SERPL-MCNC: <0.2 MG/DL (ref 0.2–0.7)
BUN SERPL-MCNC: 57 MG/DL (ref 8–23)
BURR CELLS: ABNORMAL
CALCIUM SERPL-MCNC: 7.8 MG/DL (ref 8.5–9.9)
CHLORIDE SERPL-SCNC: 98 MEQ/L (ref 95–107)
CO2 SERPL-SCNC: 15 MEQ/L (ref 20–31)
CREAT SERPL-MCNC: 1.31 MG/DL (ref 0.7–1.2)
EOSINOPHIL # BLD: 0 K/UL (ref 0–0.7)
EOSINOPHIL NFR BLD: 0.7 %
ERYTHROCYTE [DISTWIDTH] IN BLOOD BY AUTOMATED COUNT: 15.3 % (ref 11.5–14.5)
FOLATE: 17.9 NG/ML (ref 4.8–24.2)
GLOBULIN SER CALC-MCNC: 2.4 G/DL (ref 2.3–3.5)
GLUCOSE BLD-MCNC: 147 MG/DL (ref 70–99)
GLUCOSE BLD-MCNC: 161 MG/DL (ref 70–99)
GLUCOSE BLD-MCNC: 184 MG/DL (ref 70–99)
GLUCOSE BLD-MCNC: 186 MG/DL (ref 70–99)
GLUCOSE SERPL-MCNC: 179 MG/DL (ref 70–99)
HCT VFR BLD AUTO: 37.6 % (ref 42–52)
HGB BLD-MCNC: 12.5 G/DL (ref 14–18)
LYMPHOCYTES # BLD: 0.2 K/UL (ref 1–4.8)
LYMPHOCYTES NFR BLD: 1 %
MCH RBC QN AUTO: 27.8 PG (ref 27–31.3)
MCHC RBC AUTO-ENTMCNC: 33.2 % (ref 33–37)
MCV RBC AUTO: 83.6 FL (ref 79–92.2)
METAMYELOCYTES NFR BLD MANUAL: 14 %
MONOCYTES # BLD: 0.6 K/UL (ref 0.2–0.8)
MONOCYTES NFR BLD: 3.8 %
MYELOCYTES NFR BLD MANUAL: 6 %
NEUTROPHILS # BLD: 15.4 K/UL (ref 1.4–6.5)
NEUTS BAND NFR BLD MANUAL: 13 %
NEUTS SEG NFR BLD: 62 %
PATH INTERP BLD-IMP: YES
PERFORMED ON: ABNORMAL
PLATELET # BLD AUTO: ABNORMAL K/UL (ref 130–400)
PLATELET BLD QL SMEAR: ABNORMAL
POIKILOCYTOSIS BLD QL SMEAR: ABNORMAL
POTASSIUM SERPL-SCNC: 3.8 MEQ/L (ref 3.4–4.9)
PROT SERPL-MCNC: 4.7 G/DL (ref 6.3–8)
RBC # BLD AUTO: 4.5 M/UL (ref 4.7–6.1)
SLIDE REVIEW: ABNORMAL
SMUDGE CELLS BLD QL SMEAR: 12.4
SODIUM SERPL-SCNC: 130 MEQ/L (ref 135–144)
VITAMIN B-12: 1883 PG/ML (ref 232–1245)
WBC # BLD AUTO: 16.2 K/UL (ref 4.8–10.8)

## 2024-07-12 PROCEDURE — 99231 SBSQ HOSP IP/OBS SF/LOW 25: CPT | Performed by: PHYSICIAN ASSISTANT

## 2024-07-12 PROCEDURE — 6360000002 HC RX W HCPCS

## 2024-07-12 PROCEDURE — 6370000000 HC RX 637 (ALT 250 FOR IP): Performed by: UROLOGY

## 2024-07-12 PROCEDURE — 2580000003 HC RX 258

## 2024-07-12 PROCEDURE — 36573 INSJ PICC RS&I 5 YR+: CPT

## 2024-07-12 PROCEDURE — 2580000003 HC RX 258: Performed by: FAMILY MEDICINE

## 2024-07-12 PROCEDURE — 6360000002 HC RX W HCPCS: Performed by: FAMILY MEDICINE

## 2024-07-12 PROCEDURE — 36573 INSJ PICC RS&I 5 YR+: CPT | Performed by: RADIOLOGY

## 2024-07-12 PROCEDURE — 82140 ASSAY OF AMMONIA: CPT

## 2024-07-12 PROCEDURE — 70450 CT HEAD/BRAIN W/O DYE: CPT

## 2024-07-12 PROCEDURE — 85025 COMPLETE CBC W/AUTO DIFF WBC: CPT

## 2024-07-12 PROCEDURE — 82746 ASSAY OF FOLIC ACID SERUM: CPT

## 2024-07-12 PROCEDURE — 1210000000 HC MED SURG R&B

## 2024-07-12 PROCEDURE — 80053 COMPREHEN METABOLIC PANEL: CPT

## 2024-07-12 PROCEDURE — 99232 SBSQ HOSP IP/OBS MODERATE 35: CPT | Performed by: INTERNAL MEDICINE

## 2024-07-12 PROCEDURE — 99232 SBSQ HOSP IP/OBS MODERATE 35: CPT | Performed by: PSYCHIATRY & NEUROLOGY

## 2024-07-12 PROCEDURE — 2500000003 HC RX 250 WO HCPCS: Performed by: FAMILY MEDICINE

## 2024-07-12 PROCEDURE — 6370000000 HC RX 637 (ALT 250 FOR IP)

## 2024-07-12 PROCEDURE — 2580000003 HC RX 258: Performed by: INTERNAL MEDICINE

## 2024-07-12 PROCEDURE — C1751 CATH, INF, PER/CENT/MIDLINE: HCPCS

## 2024-07-12 PROCEDURE — 6370000000 HC RX 637 (ALT 250 FOR IP): Performed by: FAMILY MEDICINE

## 2024-07-12 PROCEDURE — 82607 VITAMIN B-12: CPT

## 2024-07-12 PROCEDURE — 36415 COLL VENOUS BLD VENIPUNCTURE: CPT

## 2024-07-12 PROCEDURE — C1769 GUIDE WIRE: HCPCS

## 2024-07-12 PROCEDURE — APPSS30 APP SPLIT SHARED TIME 16-30 MINUTES: Performed by: NURSE PRACTITIONER

## 2024-07-12 RX ORDER — LORAZEPAM 2 MG/ML
1 INJECTION INTRAMUSCULAR ONCE
Status: COMPLETED | OUTPATIENT
Start: 2024-07-12 | End: 2024-07-12

## 2024-07-12 RX ORDER — VANCOMYCIN HYDROCHLORIDE 125 MG/1
125 CAPSULE ORAL 4 TIMES DAILY
Qty: 40 CAPSULE | Refills: 0
Start: 2024-07-12 | End: 2024-07-12 | Stop reason: HOSPADM

## 2024-07-12 RX ORDER — LIDOCAINE HYDROCHLORIDE 20 MG/ML
5 INJECTION, SOLUTION INFILTRATION; PERINEURAL ONCE
Status: COMPLETED | OUTPATIENT
Start: 2024-07-12 | End: 2024-07-12

## 2024-07-12 RX ORDER — TAMSULOSIN HYDROCHLORIDE 0.4 MG/1
0.4 CAPSULE ORAL DAILY
Qty: 30 CAPSULE | Refills: 3
Start: 2024-07-12

## 2024-07-12 RX ORDER — 0.9 % SODIUM CHLORIDE 0.9 %
1000 INTRAVENOUS SOLUTION INTRAVENOUS ONCE
Status: COMPLETED | OUTPATIENT
Start: 2024-07-12 | End: 2024-07-12

## 2024-07-12 RX ORDER — 0.9 % SODIUM CHLORIDE 0.9 %
500 INTRAVENOUS SOLUTION INTRAVENOUS ONCE
Status: COMPLETED | OUTPATIENT
Start: 2024-07-12 | End: 2024-07-12

## 2024-07-12 RX ORDER — VANCOMYCIN HYDROCHLORIDE 125 MG/1
125 CAPSULE ORAL 4 TIMES DAILY
Refills: 0 | DISCHARGE
Start: 2024-07-12 | End: 2024-08-23

## 2024-07-12 RX ADMIN — ASPIRIN 81 MG: 81 TABLET, COATED ORAL at 16:32

## 2024-07-12 RX ADMIN — VANCOMYCIN HYDROCHLORIDE 1000 MG: 1 INJECTION, POWDER, LYOPHILIZED, FOR SOLUTION INTRAVENOUS at 23:10

## 2024-07-12 RX ADMIN — CARVEDILOL 12.5 MG: 12.5 TABLET, FILM COATED ORAL at 11:47

## 2024-07-12 RX ADMIN — SODIUM CHLORIDE: 9 INJECTION, SOLUTION INTRAVENOUS at 23:09

## 2024-07-12 RX ADMIN — SODIUM CHLORIDE, PRESERVATIVE FREE 10 ML: 5 INJECTION INTRAVENOUS at 11:49

## 2024-07-12 RX ADMIN — Medication 1 MG: at 13:01

## 2024-07-12 RX ADMIN — SODIUM CHLORIDE: 9 INJECTION, SOLUTION INTRAVENOUS at 13:18

## 2024-07-12 RX ADMIN — SODIUM CHLORIDE 500 ML: 9 INJECTION, SOLUTION INTRAVENOUS at 20:54

## 2024-07-12 RX ADMIN — HYDRALAZINE HYDROCHLORIDE 25 MG: 25 TABLET ORAL at 11:41

## 2024-07-12 RX ADMIN — Medication 125 MG: at 06:12

## 2024-07-12 RX ADMIN — Medication 10 ML: at 11:48

## 2024-07-12 RX ADMIN — TAMSULOSIN HYDROCHLORIDE 0.4 MG: 0.4 CAPSULE ORAL at 11:41

## 2024-07-12 RX ADMIN — ESCITALOPRAM OXALATE 15 MG: 10 TABLET ORAL at 11:41

## 2024-07-12 RX ADMIN — DIVALPROEX SODIUM 500 MG: 500 TABLET, DELAYED RELEASE ORAL at 11:54

## 2024-07-12 RX ADMIN — HYDROMORPHONE HYDROCHLORIDE 0.5 MG: 1 INJECTION, SOLUTION INTRAMUSCULAR; INTRAVENOUS; SUBCUTANEOUS at 13:00

## 2024-07-12 RX ADMIN — SODIUM CHLORIDE 1000 ML: 9 INJECTION, SOLUTION INTRAVENOUS at 11:40

## 2024-07-12 RX ADMIN — LIDOCAINE HYDROCHLORIDE 5 ML: 20 INJECTION, SOLUTION INFILTRATION; PERINEURAL at 13:17

## 2024-07-12 RX ADMIN — SACUBITRIL AND VALSARTAN 1 TABLET: 24; 26 TABLET, FILM COATED ORAL at 11:54

## 2024-07-12 ASSESSMENT — ENCOUNTER SYMPTOMS
NAUSEA: 0
TROUBLE SWALLOWING: 0
WHEEZING: 0
VOMITING: 0
CHEST TIGHTNESS: 0
SHORTNESS OF BREATH: 0
COUGH: 0
COLOR CHANGE: 0

## 2024-07-12 ASSESSMENT — PAIN SCALES - GENERAL: PAINLEVEL_OUTOF10: 5

## 2024-07-12 NOTE — FLOWSHEET NOTE
Attempted to call report to Nancy Shepard, no answer after several attempts.Electronically signed by Marli Hernandez RN on 7/12/2024 at 2:53 PM

## 2024-07-12 NOTE — PROGRESS NOTES
2226 Spoke to this patients nurse Marli. This patient was brought down to Special Procedures for a PICC line. After moving the patient from his cart to the specials table, he was uncooperative with the staff in preparation for his PICC line placement. Patient would not cooperate with the staff, stating\"God Damwillam give me back my arm.\" Procedure explained and consent verified, that was signed by the daughter. Patient became aggressive with staff. Patient moved back onto cart and taken to CT for other testing. Marli aware that PICC line not completed.

## 2024-07-12 NOTE — DISCHARGE SUMMARY
Physician Discharge Summary     Patient ID:  Gary Turk  [id number]  64 y.o.  1960    Admit date: 7/7/2024    Discharge date : 07/12/24     Admitting Physician: Shelby Shelton MD     Discharge Physician: SHELBY SHELTON MD     Admission Diagnoses: Fever of unknown origin [R50.9]  Elevated troponin [R79.89]  SIRS (systemic inflammatory response syndrome) (HCC) [R65.10]  Decreased oral intake [R63.8]  Elevated procalcitonin [R79.89]  Fever, unspecified fever cause [R50.9]  Diabetic foot infection (HCC) [E11.628, L08.9]    Discharge Diagnoses: Sepsis 2/2 diabetic left heel wound, clostridium difficile infection, metabolic encephalopathy, maisha    Admission Condition: fair    Discharged Condition: fair    Hospital Course:     Sepsis 2/2 diabetic foot wound  Temp 101.3, treated with rectal tylenol - now 98.4  Procal 1.15  WBC 9.8  Resp viral panel negative  UA negative  CXR: Prominent pulmonary vascularity with bilateral small pleural effusions.   Suspected origin: Stage III foot ulcer to left heel with purulent drainage not previously identified in ED before call for admission  Admit to Med/Surg observation with telemetry  Follow up on blood cultures  As needed Tylenol  Gentle hydration  Monitor and trend WBCs  7/8 - cont iv abx, podiatry following, await culture.  7/9 - cdiff positive, POA, started on po vancomycin, cont iv abx for diabetic foot wound, osteomyelitis noted on MRI  7/10 - improved today, cont iv abx  7/11 - ID following, sensitivities pending.  7/12 - plan for 6 weeks at least for mrsa     C Diff infection  7/11 - po vancomycin  7/12 - dc on po vanco x 10 additional days     Left foot wound  Left heel - stage 3/diabetic  Purulent drainage and foul smell  Approximately the size of a quarter  Consult podiatry  Consult wound care  Continue Zosyn and vancomycin with consult to pharmacy for dosing  Wound cultures  Monitor and trend WBC  Pressure reduction  7/8 - as above       Consults: ID and

## 2024-07-12 NOTE — PROGRESS NOTES
nonspecific, but given clinical history and history of diabetes, findings may relate to  nonketotic hyperglycemia induced hemiballism/hemichorea or hyperglycemia associated choreaballism. Differential also includes a number of other toxic/metabolic etiologies.    No evidence for acute infarct.  Results for orders placed during the hospital encounter of 09/25/23    MRI BRAIN WO CONTRAST    Narrative  EXAMINATION:  MRI OF THE BRAIN WITHOUT CONTRAST  9/27/2023 12:52 pm    TECHNIQUE:  Multiplanar multisequence MRI of the brain was performed without the  administration of intravenous contrast.    COMPARISON:  CT of the head from yesterday.    HISTORY:  ORDERING SYSTEM PROVIDED HISTORY: Left-sided weakness, dizziness, multiple  areas of intracranial stenosis  TECHNOLOGIST PROVIDED HISTORY:  Reason for exam:->Left-sided weakness, dizziness, multiple areas of  intracranial stenosis  What reading provider will be dictating this exam?->CRC    FINDINGS:  INTRACRANIAL STRUCTURES/VENTRICLES: There is a moderate-sized area of  restricted diffusion in the posterior-lateral left thalamus with matching  increased T2 signal and no sign of any hemorrhage, consistent with an acute  infarction    An old lacunar infarct is seen in the anterior left putamen.  An additional  old lacunar infarction is seen in the anterior limb of the right internal  capsule old infarction is present in the anterior right putamen.    No mass effect or midline shift. No evidence of an acute intracranial  hemorrhage.    There is a tiny area of old hemorrhage in the posterior left putamen  consistent with an old tiny hemorrhagic lacunar infarct.    There is no change in mild dilatation of the ventricles and sulci  representing age-appropriate atrophy. There is mild periventricular and  subcortical white matter T2 signal abnormality representing age-appropriate  small vessel ischemia.    The brain is otherwise normal in appearance for the patient's age on  mellitus, heart failure, CAD, asthma, cognitive, thalamic CVA, mood disorder, PAF who presented to [facility] due to decreased p.o. intake and failure to thrive.  Patient noted to be hypoxic at 88% on room air on presentation.  On Eliquis and aspirin for previous history of CVA and PAF.     Vital signs in the emergency room 119/93, 94, 16, 99.5 °F, 88%.  Temp noted to be as high as 101.3.  Lab testing remarkable for glucose of 221, high-sensitivity troponin of 137, procalcitonin of 1.15.  Respiratory panel and urinalysis negative.     Patient is followed by Dr. 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  Last seen by Dr. Miller on 5/6/2024.  Office notes reviewed.  Office notes state patient is on Depakote for mood disorder.  No history of seizure.     Given that Depakote is being used for mood disturbance rather than seizures there is no need to adjust dose due to subtherapeutic level.  Dose to be determined by effectiveness on behaviors.  On the contrary, if level is supratherapeutic then we will need to decrease dose as this can have adverse effects.     I have personally performed a face to face diagnostic evaluation on this patient, reviewed all data and investigations, and am the sole provider of all clinical decisions on the neurological status of this patient.  Consult for follow-up of Depakote levels which is not required as the medication is for mood disorders and not for seizures.  The clinical effectiveness of Depakote in this situation is patient's clinical status and not the levels.  60% time spent on evaluating patient    7/12/2024:  Patient with history of CVA with right-sided weakness and behavioral disturbances secondary to mood disorder and likely underlying vascular dementia.  On Depakote for behaviors.  Behaviors controlled.  Level is subtherapeutic which is okay given the

## 2024-07-12 NOTE — PROGRESS NOTES
Infectious Disease     Patient Name: Gary Turk  Date: 7/12/2024  YOB: 1960  Medical Record Number: [id number]      Chronic osteomyelitis left foot          Comes from [facility] being treated for chronic infection  Of right foot osteomyelitis history of growing group A streptococcus on Augmentin  Had completed long-term course of IV Zosyn from May through June 2024      Admitted to [facility] 7/7/2024 for loss of appetite  Fevers  diarrhea  C. difficile toxin 7/8/2024 positive      Cultures from left foot wound growing Proteus mirabilis Staph aureus    Blood cultures growing Staph epidermidisAnd Staphylococcus hominis    Urine microscopic did not show any white cells      Review of Systems   Unable to perform ROS: Dementia     Physical Exam:      Physical Exam  Cardiovascular:      Heart sounds: Normal heart sounds. No murmur heard.  Pulmonary:      Effort: Pulmonary effort is normal. No respiratory distress.      Breath sounds: Normal breath sounds. No stridor. No wheezing, rhonchi or rales.   Chest:      Chest wall: No tenderness.   Abdominal:      General: Abdomen is flat. Bowel sounds are normal. There is no distension.      Palpations: Abdomen is soft. There is no mass.      Tenderness: There is no abdominal tenderness. There is no guarding or rebound.      Hernia: No hernia is present.   Skin:     Comments: Ulcer posterior aspect left heel deep palpable bone             Blood pressure (!) 103/57, pulse 76, temperature 98.4 °F (36.9 °C), temperature source Axillary, resp. rate 16, height 1.702 m (5' 7\"), weight 73.9 kg (163 lb), SpO2 95 %.      .   Lab Results   Component Value Date    WBC 16.2 (H) 07/12/2024    HGB 12.5 (L) 07/12/2024    HCT 37.6 (L) 07/12/2024    MCV 83.6 07/12/2024    PLT NOT MEASURED 07/12/2024     Lab Results   Component Value Date/Time     07/12/2024 11:10 AM    K 3.8 07/12/2024 11:10 AM    CL 98 07/12/2024 11:10 AM    CO2 15 07/12/2024 11:10 AM    BUN 57 07/12/2024

## 2024-07-12 NOTE — PROGRESS NOTES
Subjective:      Patient ID: Gary Turk is a 64 y.o. male    HPI 64 year old male who's piña catheter is draining clear yellow urine.             Past Medical History:   Diagnosis Date    Asthma     Back pain     compresion in [place]    CAD (coronary artery disease) 01/2020    3 vessel     chf 09/16/2022    Diabetes mellitus (HCC)     Essential hypertension, benign     Hyperlipemia     Ischemic cardiomyopathy 09/16/2022    Neuropathic pain, leg, bilateral      Past Surgical History:   Procedure Laterality Date    CARPAL TUNNEL RELEASE Bilateral     EP DEVICE PROCEDURE N/A 9/29/2023    Loop recorder insert  ROOM 293 [employer] NOTIFIED [place] Maxwell Tyler [place] [facility] [place] [facility]    FOOT DEBRIDEMENT Right 5/5/2023    RIGHT FOOT DEBRIDEMENT INCISION AND DRAINAGE ROOM 174 performed by Jairo Garcia DPM [place] [facility] [place]    FOOT DEBRIDEMENT Right 5/8/2023    RIGHT FOOT  INCISION AND DRAINAGE WITH DEBRIDEMENT OF NON-VIABLE TISSUE DELAYED PRIMARY CLOSURE performed by Jairo Garcia DPM [place] [facility]    FOOT SURGERY Left     bone spur    HIP SURGERY Left 10/5/2023    TROCHANTERIC FIXATION NAILING LEFT FEMUR FRACTURE ROOM 265 performed by Mirza Elliott MD at [facility] [place]    LAMINOTOMY  2012    SPINE SURGERY N/A 11/11/2022    L1 VERETEBRAL AUGMENTATION performed by Concetta Junior MD [place] [facility] [place]     Social History     Socioeconomic History    Marital status:      Spouse name: rodríguez    Number of children: 2    Years of education: 14    Highest education level: Some college, no degree   Occupational History    Occupation: disability    Tobacco Use    Smoking status: Never     Passive exposure: Never    Smokeless tobacco: Never   Vaping Use    Vaping Use: Never used   Substance and Sexual Activity    Alcohol use: Not Currently     Alcohol/week: 2.0 standard drinks of alcohol     Types: 2 Cans of beer per week    Drug use: No    Sexual activity: Not Currently     Partners: Female   Social History Narrative    Lives

## 2024-07-12 NOTE — FLOWSHEET NOTE
Attempted to call Nancy Shepard to give report , no answer at this time..Electronically signed by Marli Hernandez RN on 7/12/2024 at 7:36 PM

## 2024-07-13 VITALS
BODY MASS INDEX: 25.58 KG/M2 | OXYGEN SATURATION: 96 % | RESPIRATION RATE: 18 BRPM | HEIGHT: 67 IN | SYSTOLIC BLOOD PRESSURE: 111 MMHG | WEIGHT: 163 LBS | DIASTOLIC BLOOD PRESSURE: 51 MMHG | TEMPERATURE: 98.1 F | HEART RATE: 78 BPM

## 2024-07-13 LAB
ALBUMIN SERPL-MCNC: 2.1 G/DL (ref 3.5–4.6)
ALP SERPL-CCNC: 56 U/L (ref 35–104)
ALT SERPL-CCNC: 9 U/L (ref 0–41)
ANION GAP SERPL CALCULATED.3IONS-SCNC: 14 MEQ/L (ref 9–15)
AST SERPL-CCNC: 6 U/L (ref 0–40)
BASOPHILS # BLD: 0.1 K/UL (ref 0–0.2)
BASOPHILS NFR BLD: 0.5 %
BILIRUB SERPL-MCNC: <0.2 MG/DL (ref 0.2–0.7)
BUN SERPL-MCNC: 65 MG/DL (ref 8–23)
CALCIUM SERPL-MCNC: 7.8 MG/DL (ref 8.5–9.9)
CHLORIDE SERPL-SCNC: 102 MEQ/L (ref 95–107)
CO2 SERPL-SCNC: 15 MEQ/L (ref 20–31)
CREAT SERPL-MCNC: 1.67 MG/DL (ref 0.7–1.2)
CULTURE WOUND: ABNORMAL
EOSINOPHIL # BLD: 0.3 K/UL (ref 0–0.7)
EOSINOPHIL NFR BLD: 1.6 %
ERYTHROCYTE [DISTWIDTH] IN BLOOD BY AUTOMATED COUNT: 15.6 % (ref 11.5–14.5)
GLOBULIN SER CALC-MCNC: 2.1 G/DL (ref 2.3–3.5)
GLUCOSE BLD-MCNC: 182 MG/DL (ref 70–99)
GLUCOSE SERPL-MCNC: 193 MG/DL (ref 70–99)
HCT VFR BLD AUTO: 34.6 % (ref 42–52)
HGB BLD-MCNC: 11.4 G/DL (ref 14–18)
LYMPHOCYTES # BLD: 0.5 K/UL (ref 1–4.8)
LYMPHOCYTES NFR BLD: 2.9 %
MAGNESIUM SERPL-MCNC: 1.8 MG/DL (ref 1.7–2.4)
MCH RBC QN AUTO: 27 PG (ref 27–31.3)
MCHC RBC AUTO-ENTMCNC: 32.9 % (ref 33–37)
MCV RBC AUTO: 81.8 FL (ref 79–92.2)
MONOCYTES # BLD: 1.4 K/UL (ref 0.2–0.8)
MONOCYTES NFR BLD: 9 %
NEUTROPHILS # BLD: 12.6 K/UL (ref 1.4–6.5)
NEUTS SEG NFR BLD: 78.9 %
ORGANISM: ABNORMAL
PERFORMED ON: ABNORMAL
PLATELET # BLD AUTO: 179 K/UL (ref 130–400)
POTASSIUM SERPL-SCNC: 3.4 MEQ/L (ref 3.4–4.9)
PROT SERPL-MCNC: 4.2 G/DL (ref 6.3–8)
RBC # BLD AUTO: 4.23 M/UL (ref 4.7–6.1)
SODIUM SERPL-SCNC: 131 MEQ/L (ref 135–144)
VANCOMYCIN SERPL-MCNC: 25.6 UG/ML (ref 10–40)
WBC # BLD AUTO: 16 K/UL (ref 4.8–10.8)

## 2024-07-13 PROCEDURE — 80202 ASSAY OF VANCOMYCIN: CPT

## 2024-07-13 PROCEDURE — 80053 COMPREHEN METABOLIC PANEL: CPT

## 2024-07-13 PROCEDURE — 83735 ASSAY OF MAGNESIUM: CPT

## 2024-07-13 PROCEDURE — 85025 COMPLETE CBC W/AUTO DIFF WBC: CPT

## 2024-07-13 PROCEDURE — 36415 COLL VENOUS BLD VENIPUNCTURE: CPT

## 2024-07-13 NOTE — FLOWSHEET NOTE
Report called to Nette Shepard.Electronically signed by Marli Hernandez RN on 7/13/2024 at 8:29 AM

## 2024-07-13 NOTE — PROGRESS NOTES
BC  --   --  Gram stain aerobic bottle  Gram positive cocci in clusters-resembling Staph  2 out of 2 blood cultures  Further results to follow  Further testing performed at [facility]  *   BLOODCULT2  --  Gram stain aerobic bottle  Gram positive cocci in clusters-resembling Staph  2 out of 2 blood cultures  Further results to follow  Further testing performed at [facility]  *  --    ORG Proteus mirabilis*  Staph aureus MRSA* Staphylococcus epidermidis* Staphylococcus hominis*   COVID19  --   --  Not Detected       Recent vancomycin administrations                     vancomycin (VANCOCIN) 750 mg in sodium chloride 0.9 % 250 mL IVPB (mg) 750 mg New Bag 07/11/24 0105     750 mg New Bag 07/10/24 1339     750 mg New Bag  0058     750 mg New Bag 07/09/24 1134     750 mg New Bag 07/08/24 2325     750 mg New Bag  1125                    Assessment:  Date/Time Current Dose Concentration Timing of Concentration (h)   7/13 @ 0517 1000 mg IV Q12H 25.6 mg/L ~6 h post dose   Note: Serum concentrations collected for AUC dosing may appear elevated if collected in close proximity to the dose administered, this is not necessarily an indication of toxicity    Plan:  Current dosing regimen is supra-therapeutic  Worsening renal function over the last few days. Scr 1.67 today, up from 1.07 on 7/10 with a 0.36 increase in the last 24 hours  Switch to trough based dosing due to ANTON  Repeat vancomycin concentration ordered for 7/13 @ 2100   Pharmacy will continue to monitor patient and adjust therapy as indicated    Thank you for the consult,    Minal Rodríguez, PharmD, BCPS  7/13/2024 7:26 AM

## 2024-07-13 NOTE — PROGRESS NOTES
Pt discharge held until morning due to low BP.  PT has also been drowsy since medication during PICC placement.  Does respond to stimuli, but has not verbally responded or follow commands.  Does not open eyes, but did tightly hold eyes closed when tried to lift lids.  Bolus ordered.  Attempt to call family to update, mailbox full.  Avisis continues in room.      2300:  Pt more alert at this time.  Opens eyes to voice, but falls back to sleep quickly.  Flory-care preformed for loose stool.  Zinc applied to buttocks for redness.  Nancy cuenca updated earlier that pt would not be arriving tonight.       0145:  Spoke with Dr. Beard regarding pt.  Updated on BP.  Pt's temp noted to be 95.5 rectally.  Order for warming blanket given.  Pt also had flory-care preformed for loose stool.  Zinc applied to buttocks.  Pt opened eyes when turned and asked what we were doing.  Pt refused to allow oral temp to be taken.  Closing his mouth and shaking his head.  Avisis continues in room.

## 2024-07-15 ENCOUNTER — HOSPITAL ENCOUNTER (OUTPATIENT)
Dept: CARDIOLOGY | Age: 64
Discharge: HOME | End: 2024-07-15
Payer: COMMERCIAL

## 2024-07-15 PROCEDURE — 93298 REM INTERROG DEV EVAL SCRMS: CPT

## 2024-07-15 ASSESSMENT — ENCOUNTER SYMPTOMS
SHORTNESS OF BREATH: 0
COUGH: 0

## 2024-07-15 ASSESSMENT — VISUAL ACUITY: OU: 1

## 2024-07-15 NOTE — PROGRESS NOTES
of left heel with fat layer exposed (HCC)    Diabetic foot infection (HCC)    Urinary retention    Chronic osteomyelitis of left foot (HCC)    C. difficile diarrhea     Past Medical History:   Diagnosis Date    Asthma     Back pain     compresion in [place]    CAD (coronary artery disease) 01/2020    3 vessel     chf 09/16/2022    Diabetes mellitus (HCC)     Essential hypertension, benign     Hyperlipemia     Ischemic cardiomyopathy 09/16/2022    Neuropathic pain, leg, bilateral      Past Surgical History:   Procedure Laterality Date    CARPAL TUNNEL RELEASE Bilateral     EP DEVICE PROCEDURE N/A 9/29/2023    Loop recorder insert  ROOM 293 [employer] NOTIFIED performed by Maxwell Tyler DO at [facility] [place] [facility]ATH [facility]    FOOT DEBRIDEMENT Right 5/5/2023    RIGHT FOOT DEBRIDEMENT INCISION AND DRAINAGE ROOM 174 performed by Jairo Garcia DPM at [facility] [place]    FOOT DEBRIDEMENT Right 5/8/2023    RIGHT FOOT  INCISION AND DRAINAGE WITH DEBRIDEMENT OF NON-VIABLE TISSUE DELAYED PRIMARY CLOSURE performed by Jairo Garcia DPM at [facility]    FOOT SURGERY Left     bone spur    HIP SURGERY Left 10/5/2023    TROCHANTERIC FIXATION NAILING LEFT FEMUR FRACTURE ROOM 265 performed by Mirza Elliott MD at [facility]    LAMINOTOMY  2012    SPINE SURGERY N/A 11/11/2022    L1 VERETEBRAL AUGMENTATION performed by Concetta Junior MD at [facility] [place]     Social History     Socioeconomic History    Marital status:      Spouse name: rodríguez    Number of children: 2    Years of education: 14    Highest education level: Some college, no degree   Occupational History    Occupation: disability    Tobacco Use    Smoking status: Never     Passive exposure: Never    Smokeless tobacco: Never   Vaping Use    Vaping Use: Never used   Substance and Sexual Activity    Alcohol use: Not Currently     Alcohol/week: 2.0 standard drinks of alcohol     Types: 2 Cans of beer per week    Drug use: No    Sexual activity: Not Currently     Partners: Female   Other Topics

## 2024-07-16 LAB — PATH INTERP BLD-IMP: NORMAL

## 2024-07-18 ENCOUNTER — HOSPITAL ENCOUNTER (INPATIENT)
Age: 64
LOS: 8 days | Discharge: NURSING, REHAB OR LONG-TERM CARE | End: 2024-07-26
Attending: EMERGENCY MEDICINE | Admitting: INTERNAL MEDICINE
Payer: COMMERCIAL

## 2024-07-18 ENCOUNTER — APPOINTMENT (OUTPATIENT)
Dept: CT IMAGING | Age: 64
End: 2024-07-18
Payer: COMMERCIAL

## 2024-07-18 DIAGNOSIS — K62.5 RECTAL BLEEDING: Primary | ICD-10-CM

## 2024-07-18 DIAGNOSIS — K92.2 GI BLEEDING: ICD-10-CM

## 2024-07-18 LAB
ABO + RH BLD: NORMAL
ACANTHOCYTES BLD QL SMEAR: ABNORMAL
ALBUMIN SERPL-MCNC: 2.2 G/DL (ref 3.5–4.6)
ALP SERPL-CCNC: 66 U/L (ref 35–104)
ALT SERPL-CCNC: 8 U/L (ref 0–41)
ANION GAP SERPL CALCULATED.3IONS-SCNC: 8 MEQ/L (ref 9–15)
ANISOCYTOSIS BLD QL SMEAR: ABNORMAL
APTT PPP: 30.5 SEC (ref 24.4–36.8)
AST SERPL-CCNC: 8 U/L (ref 0–40)
BASOPHILS # BLD: 0.2 K/UL (ref 0–0.2)
BASOPHILS NFR BLD: 1 %
BILIRUB SERPL-MCNC: <0.2 MG/DL (ref 0.2–0.7)
BLD GP AB SCN SERPL QL: NORMAL
BUN SERPL-MCNC: 49 MG/DL (ref 8–23)
CALCIUM SERPL-MCNC: 7.7 MG/DL (ref 8.5–9.9)
CHLORIDE SERPL-SCNC: 106 MEQ/L (ref 95–107)
CHP ED QC CHECK: NORMAL
CO2 SERPL-SCNC: 18 MEQ/L (ref 20–31)
CREAT SERPL-MCNC: 0.7 MG/DL (ref 0.7–1.2)
EOSINOPHIL # BLD: 1.2 K/UL (ref 0–0.7)
EOSINOPHIL NFR BLD: 6 %
ERYTHROCYTE [DISTWIDTH] IN BLOOD BY AUTOMATED COUNT: 17 % (ref 11.5–14.5)
GLOBULIN SER CALC-MCNC: 2 G/DL (ref 2.3–3.5)
GLUCOSE BLD-MCNC: 178 MG/DL (ref 70–99)
GLUCOSE BLD-MCNC: 239 MG/DL (ref 70–99)
GLUCOSE SERPL-MCNC: 279 MG/DL (ref 70–99)
HCT VFR BLD AUTO: 35.2 % (ref 42–52)
HCT VFR BLD AUTO: 36.4 % (ref 42–52)
HEMOCCULT STL QL: POSITIVE
HGB BLD-MCNC: 12 G/DL (ref 14–18)
HGB BLD-MCNC: 12.2 G/DL (ref 14–18)
INR PPP: 1.3
LYMPHOCYTES # BLD: 0.2 K/UL (ref 1–4.8)
LYMPHOCYTES NFR BLD: 1 %
MAGNESIUM SERPL-MCNC: 2.1 MG/DL (ref 1.7–2.4)
MCH RBC QN AUTO: 27.2 PG (ref 27–31.3)
MCHC RBC AUTO-ENTMCNC: 33.5 % (ref 33–37)
MCV RBC AUTO: 81.3 FL (ref 79–92.2)
METAMYELOCYTES NFR BLD MANUAL: 1 %
MONOCYTES # BLD: 1 K/UL (ref 0.2–0.8)
MONOCYTES NFR BLD: 4.8 %
MYELOCYTES NFR BLD MANUAL: 2 %
NEUTROPHILS # BLD: 17.2 K/UL (ref 1.4–6.5)
NEUTS BAND NFR BLD MANUAL: 1 %
NEUTS SEG NFR BLD: 84 %
OVALOCYTES BLD QL SMEAR: ABNORMAL
PERFORMED ON: ABNORMAL
PERFORMED ON: ABNORMAL
PLATELET # BLD AUTO: 149 K/UL (ref 130–400)
PLATELET BLD QL SMEAR: ADEQUATE
POIKILOCYTOSIS BLD QL SMEAR: ABNORMAL
POLYCHROMASIA BLD QL SMEAR: ABNORMAL
POTASSIUM SERPL-SCNC: 3.3 MEQ/L (ref 3.4–4.9)
PROT SERPL-MCNC: 4.2 G/DL (ref 6.3–8)
PROTHROMBIN TIME: 16.7 SEC (ref 12.3–14.9)
RBC # BLD AUTO: 4.48 M/UL (ref 4.7–6.1)
SMUDGE CELLS BLD QL SMEAR: 3.8
SODIUM SERPL-SCNC: 132 MEQ/L (ref 135–144)
TOXIC GRANULATION: ABNORMAL
WBC # BLD AUTO: 19.5 K/UL (ref 4.8–10.8)

## 2024-07-18 PROCEDURE — 99223 1ST HOSP IP/OBS HIGH 75: CPT | Performed by: INTERNAL MEDICINE

## 2024-07-18 PROCEDURE — 2580000003 HC RX 258: Performed by: EMERGENCY MEDICINE

## 2024-07-18 PROCEDURE — 85025 COMPLETE CBC W/AUTO DIFF WBC: CPT

## 2024-07-18 PROCEDURE — 6360000002 HC RX W HCPCS: Performed by: INTERNAL MEDICINE

## 2024-07-18 PROCEDURE — 2580000003 HC RX 258: Performed by: INTERNAL MEDICINE

## 2024-07-18 PROCEDURE — 6360000002 HC RX W HCPCS: Performed by: EMERGENCY MEDICINE

## 2024-07-18 PROCEDURE — 80053 COMPREHEN METABOLIC PANEL: CPT

## 2024-07-18 PROCEDURE — 96374 THER/PROPH/DIAG INJ IV PUSH: CPT

## 2024-07-18 PROCEDURE — 36415 COLL VENOUS BLD VENIPUNCTURE: CPT

## 2024-07-18 PROCEDURE — 99285 EMERGENCY DEPT VISIT HI MDM: CPT

## 2024-07-18 PROCEDURE — 85730 THROMBOPLASTIN TIME PARTIAL: CPT

## 2024-07-18 PROCEDURE — 86901 BLOOD TYPING SEROLOGIC RH(D): CPT

## 2024-07-18 PROCEDURE — 86900 BLOOD TYPING SEROLOGIC ABO: CPT

## 2024-07-18 PROCEDURE — 86850 RBC ANTIBODY SCREEN: CPT

## 2024-07-18 PROCEDURE — 74176 CT ABD & PELVIS W/O CONTRAST: CPT

## 2024-07-18 PROCEDURE — 6370000000 HC RX 637 (ALT 250 FOR IP): Performed by: INTERNAL MEDICINE

## 2024-07-18 PROCEDURE — 85610 PROTHROMBIN TIME: CPT

## 2024-07-18 PROCEDURE — 85018 HEMOGLOBIN: CPT

## 2024-07-18 PROCEDURE — 83735 ASSAY OF MAGNESIUM: CPT

## 2024-07-18 PROCEDURE — 1210000000 HC MED SURG R&B

## 2024-07-18 PROCEDURE — 85014 HEMATOCRIT: CPT

## 2024-07-18 RX ORDER — INSULIN LISPRO 100 [IU]/ML
0-4 INJECTION, SOLUTION INTRAVENOUS; SUBCUTANEOUS EVERY 4 HOURS
Status: DISCONTINUED | OUTPATIENT
Start: 2024-07-18 | End: 2024-07-20

## 2024-07-18 RX ORDER — DEXTROSE MONOHYDRATE 100 MG/ML
INJECTION, SOLUTION INTRAVENOUS CONTINUOUS PRN
Status: DISCONTINUED | OUTPATIENT
Start: 2024-07-18 | End: 2024-07-26 | Stop reason: HOSPADM

## 2024-07-18 RX ORDER — ONDANSETRON 4 MG/1
4 TABLET, ORALLY DISINTEGRATING ORAL EVERY 8 HOURS PRN
Status: DISCONTINUED | OUTPATIENT
Start: 2024-07-18 | End: 2024-07-26 | Stop reason: HOSPADM

## 2024-07-18 RX ORDER — ACETAMINOPHEN 325 MG/1
650 TABLET ORAL EVERY 6 HOURS PRN
Status: DISCONTINUED | OUTPATIENT
Start: 2024-07-18 | End: 2024-07-26 | Stop reason: HOSPADM

## 2024-07-18 RX ORDER — SODIUM CHLORIDE 0.9 % (FLUSH) 0.9 %
5-40 SYRINGE (ML) INJECTION PRN
Status: DISCONTINUED | OUTPATIENT
Start: 2024-07-18 | End: 2024-07-26 | Stop reason: HOSPADM

## 2024-07-18 RX ORDER — INSULIN GLARGINE 100 [IU]/ML
0.15 INJECTION, SOLUTION SUBCUTANEOUS NIGHTLY
Status: DISCONTINUED | OUTPATIENT
Start: 2024-07-18 | End: 2024-07-26 | Stop reason: HOSPADM

## 2024-07-18 RX ORDER — SODIUM CHLORIDE 9 MG/ML
INJECTION, SOLUTION INTRAVENOUS PRN
Status: DISCONTINUED | OUTPATIENT
Start: 2024-07-18 | End: 2024-07-26 | Stop reason: HOSPADM

## 2024-07-18 RX ORDER — DEXTROSE MONOHYDRATE AND SODIUM CHLORIDE 5; .45 G/100ML; G/100ML
INJECTION, SOLUTION INTRAVENOUS CONTINUOUS
Status: DISCONTINUED | OUTPATIENT
Start: 2024-07-18 | End: 2024-07-20

## 2024-07-18 RX ORDER — 0.9 % SODIUM CHLORIDE 0.9 %
500 INTRAVENOUS SOLUTION INTRAVENOUS ONCE
Status: COMPLETED | OUTPATIENT
Start: 2024-07-18 | End: 2024-07-18

## 2024-07-18 RX ORDER — GLUCAGON 1 MG/ML
1 KIT INJECTION PRN
Status: DISCONTINUED | OUTPATIENT
Start: 2024-07-18 | End: 2024-07-26 | Stop reason: HOSPADM

## 2024-07-18 RX ORDER — SODIUM CHLORIDE 0.9 % (FLUSH) 0.9 %
5-40 SYRINGE (ML) INJECTION EVERY 12 HOURS SCHEDULED
Status: DISCONTINUED | OUTPATIENT
Start: 2024-07-18 | End: 2024-07-26 | Stop reason: HOSPADM

## 2024-07-18 RX ORDER — ACETAMINOPHEN 650 MG/1
650 SUPPOSITORY RECTAL EVERY 6 HOURS PRN
Status: DISCONTINUED | OUTPATIENT
Start: 2024-07-18 | End: 2024-07-26 | Stop reason: HOSPADM

## 2024-07-18 RX ORDER — ONDANSETRON 2 MG/ML
4 INJECTION INTRAMUSCULAR; INTRAVENOUS EVERY 6 HOURS PRN
Status: DISCONTINUED | OUTPATIENT
Start: 2024-07-18 | End: 2024-07-26 | Stop reason: HOSPADM

## 2024-07-18 RX ADMIN — INSULIN GLARGINE 11 UNITS: 100 INJECTION, SOLUTION SUBCUTANEOUS at 23:00

## 2024-07-18 RX ADMIN — SODIUM CHLORIDE, PRESERVATIVE FREE 10 ML: 5 INJECTION INTRAVENOUS at 23:01

## 2024-07-18 RX ADMIN — DEXTROSE AND SODIUM CHLORIDE: 5; 450 INJECTION, SOLUTION INTRAVENOUS at 15:10

## 2024-07-18 RX ADMIN — Medication 250 MG: at 17:32

## 2024-07-18 RX ADMIN — SODIUM CHLORIDE, PRESERVATIVE FREE 40 MG: 5 INJECTION INTRAVENOUS at 23:00

## 2024-07-18 RX ADMIN — SODIUM CHLORIDE 500 ML: 9 INJECTION, SOLUTION INTRAVENOUS at 11:37

## 2024-07-18 RX ADMIN — Medication 250 MG: at 23:00

## 2024-07-18 RX ADMIN — Medication 10 ML: at 23:01

## 2024-07-18 RX ADMIN — INSULIN LISPRO 1 UNITS: 100 INJECTION, SOLUTION INTRAVENOUS; SUBCUTANEOUS at 15:23

## 2024-07-18 RX ADMIN — PANTOPRAZOLE SODIUM 80 MG: 40 INJECTION, POWDER, FOR SOLUTION INTRAVENOUS at 11:37

## 2024-07-18 RX ADMIN — INSULIN LISPRO 1 UNITS: 100 INJECTION, SOLUTION INTRAVENOUS; SUBCUTANEOUS at 17:31

## 2024-07-18 ASSESSMENT — PAIN - FUNCTIONAL ASSESSMENT: PAIN_FUNCTIONAL_ASSESSMENT: NONE - DENIES PAIN

## 2024-07-18 NOTE — FLOWSHEET NOTE
Pt admitted from ER from [facility]. Assessment completed.VSS.Dtr at bedside.Electronically signed by Marli Hernandez RN on 7/18/2024 at 2:22 PM

## 2024-07-18 NOTE — ED TRIAGE NOTES
Arrived by ems with report of rectal bleeding that started this morning   Pt has a current C-Diff infection and being treated for vanco mycin   Pt denies any pain  Pt has a chronic piña   Pt intermittently confused at baseline

## 2024-07-18 NOTE — ACP (ADVANCE CARE PLANNING)
Advance Care Planning   Healthcare Decision Maker:    Primary Decision Maker: Lela Moore - Child - [phone]    Secondary Decision Maker: Annemarie Mccormick - Brother/Sister - [phone]    Supplemental (Other) Decision Maker: Yeimy Turk - Spouse - [phone]    Click here to complete Healthcare Decision Makers including selection of the Healthcare Decision Maker Relationship (ie \"Primary\").  Today we documented Decision Maker(s) consistent with Legal Next of Kin hierarchy.

## 2024-07-18 NOTE — ED PROVIDER NOTES
[facility] ED  EMERGENCY DEPARTMENT ENCOUNTER      Pt Name: Gary Turk  MRN: [id number]  Birthdate 1960  Date of evaluation: 7/18/2024  Provider: Evgeny Calix MD  12:16 PM    CHIEF COMPLAINT       Chief Complaint   Patient presents with    Rectal Bleeding     Currently being treated for CDIFF         HISTORY OF PRESENT ILLNESS    Gary Turk is a 64 y.o. male who presents to the emergency department via EMS from nursing facility.  Patient says that he did not want to come in.  EMS reports that facility said that he was having bright red rectal bleeding.  He denies any current acute complaints of chest pain or abdominal pain or hematemesis.  Patient is altered reportedly baseline however, therefore further history is difficult to obtain as patient is a poor historian  Chart review notes patient was discharged from the hospital 6 days ago for sepsis secondary to diabetic left heel wound and IV Zosyn for diabetic foot infection osteomyelitis, C. difficile infection on p.o. vancomycin, metabolic encephalopathy, ANTON  HPI  Chart review notes history of hypertension, cellulitis of right foot, ischemic cardiomyopathy CHF, diabetes, altered mental status, hyperlipidemia, irregular heart rhythm on Eliquis, CAD on aspirin, chronic wounds, TIA, aphasia/dysphagia, memory impairment, urinary retention, Depakote use, pressure ulcer  Nursing Notes were reviewed.    REVIEW OF SYSTEMS       Review of Systems   Unable to perform ROS: Dementia       Except as noted above the remainder of the review of systems was reviewed and negative.       PAST MEDICAL HISTORY     Past Medical History:   Diagnosis Date    Asthma     Back pain     compresion in [place]    CAD (coronary artery disease) 01/2020    3 vessel     chf 09/16/2022    Diabetes mellitus (HCC)     Essential hypertension, benign     Hyperlipemia     Ischemic cardiomyopathy 09/16/2022    Neuropathic pain, leg, bilateral          SURGICAL HISTORY       Past Surgical  1114 07/18/24 1125   BP: (!) 87/71 97/60   Pulse: 66 67   Resp: 20 20   Temp: 97 °F (36.1 °C)    TempSrc: Oral    SpO2: 95% 96%   Weight: 73.9 kg (163 lb)    Height: 1.702 m (5' 7\")          Leukocytosis in the known setting of osteomyelitis and C. difficile infection.  Hyperglycemia without anion gap.  This is not DKA.  Nontender abdomen  Medical Decision Making  Amount and/or Complexity of Data Reviewed  Labs: ordered.    Risk  Prescription drug management.  Decision regarding hospitalization.    Patient presents emergency department with rectal bleeding, recent diagnosis of C. difficile.  IV obtained, labs sent, given IV Protonix.  At this time has a nonsurgical abdomen, no indication for CT imaging.  Given the patient's age, bright red blood per rectum that just started today and anticoagulation use he does require admission to the hospital for further observation, repeat labs, GI consultation.  His York score is elevated.  Exclusion criteria - the patient is NOT to be included for SEP-1 Core Measure due to: 2+ SIRS criteria are not met      Dr james accepts    CONSULTS:  None    PROCEDURES:  Unless otherwise noted below, none     Procedures        FINAL IMPRESSION      1. Rectal bleeding          DISPOSITION/PLAN   DISPOSITION Decision To Admit 07/18/2024 12:12:02 PM      PATIENT REFERRED TO:  No follow-up provider specified.    DISCHARGE MEDICATIONS:  New Prescriptions    No medications on file     Controlled Substances Monitoring:     RX Monitoring Attestation Periodic Controlled Substance Monitoring   8/20/2018   5:07 PM The Prescription Monitoring Report for this patient was reviewed today. Possible medication side effects, risk of tolerance/dependence & alternative treatments discussed.;No signs of potential drug abuse or diversion identified.;Obtaining appropriate analgesic effect of treatment.       (Please note that portions of this note were completed with a voice recognition program.  Efforts were

## 2024-07-18 NOTE — PROGRESS NOTES
[facility]   Pharmacy Pharmacokinetic Monitoring Service - Vancomycin     Gary Turk is a 64 y.o. male starting on vancomycin therapy for diabetic foot infection and bone and joint infection. Pharmacy consulted by Dr. Hassan for monitoring and adjustment.    Target Concentration: Goal trough of 10-15 mg/L and AUC/RAFAEL <500 mg*hr/L    Additional Antimicrobials: Oral vanco    Pertinent Laboratory Values:   Wt Readings from Last 1 Encounters:   07/18/24 73.9 kg (163 lb)     Temp Readings from Last 1 Encounters:   07/18/24 97 °F (36.1 °C) (Oral)     Estimated Creatinine Clearance: 101 mL/min (based on SCr of 0.7 mg/dL).  Recent Labs     07/17/24  1437 07/18/24  1115   CREATININE 1.1 0.70   BUN 66 49*   WBC  --  19.5*         Pertinent Cultures:  Culture Date Source Results   07/08/24 Foot (wound culture) Proteus mirabilis, MRSA,    e. coli     Plan:  Dosing recommendations based on Bayesian software  Pt transferred from nursing home, pt was getting vanco 1g IV q24h, last dose given on 7/18/24 @ 0700 per RN from nursing home.   Last trough drawn 7/17/24 @ 0445, level 20.8.   Will await morning level to assess the timing of the next dose.   Renal labs as indicated   Vancomycin concentration ordered for 07/19/24 @ 0600   Pharmacy will continue to monitor patient and adjust therapy as indicated    Thank you for the consult,  Tatiana Brunson [facility]  7/18/2024 3:07 PM

## 2024-07-18 NOTE — CONSULTS
Consult to Gastroenterology  Consult performed by: Tyrell Clay MD  Consult ordered by: Vijay Hassan MD        Patient Name: Gary Turk  Admit Date: 2024 11:04 AM  MR #: [id number]  : 1960    Attending Physician: Vijay Hassan MD    History of Presenting Illness:      Gary Turk is a 64 y.o. male on hospital day 0, admitted with Rectal bleeding. Patient with  has a past medical history of Asthma, Back pain, CAD (coronary artery disease), chf, Diabetes mellitus (HCC), Essential hypertension, benign, Hyperlipemia, Ischemic cardiomyopathy, and Neuropathic pain, leg, bilateral.    Reason for GI consult: Bright red blood per rectum; C. Diff positive    History Obtained From: electronic medical record, staff nurse    Patient sent to ER from [facility] for rectal bleeding. Patient is disoriented and unable to provide information for current evaluation. Patient has dementia and is not oriented to answer questions asked. Patient was discharge from [facility] 2024 and had been on oral Vancomycin for C. Diff since 2024 and  IV vancomycin for diabetic left food infection. Hemoglobin upon arrival to ER is 12.2.  Patient has been on anticoagulation therapy of Eliquis for atrial fibrillation.      Previous endoscopic evaluation:  No reports in CareEverywhere    History:      Past Medical History:   Diagnosis Date    Asthma     Back pain     compresion in [place]    CAD (coronary artery disease) 2020    3 vessel     chf 2022    Diabetes mellitus (HCC)     Essential hypertension, benign     Hyperlipemia     Ischemic cardiomyopathy 2022    Neuropathic pain, leg, bilateral      Past Surgical History:   Procedure Laterality Date    CARPAL TUNNEL RELEASE Bilateral     EP DEVICE PROCEDURE N/A 2023    Loop recorder insert  [facility] [place] [facility] [place] [facility] [place] [place] [facility]    FOOT DEBRIDEMENT Right 2023    RIGHT FOOT DEBRIDEMENT INCISION    Weight:       Height:            Physical Examination:  General: No acute distress  HEENT: Normocephalic, no scleral icterus, no Pallor.  Neck: Supple no JVD  Heart: Regular, no murmur, no rub/gallop.  Lungs: Clear to ascultation, no rales/wheezing/rhonchi. normal chest wall excursion.  Abdomen: Abdomen soft, non-tender. BS normal. No masses,  No organomegaly  Extremities: no clubbing/cyanosis, no edema left heel wound covered.   Skin: Warm, dry, normal turgor, no rash, no bruise, no petichiae.  Neuro: Disoriented   Psych: flat affect    Results/ Medications reviewed 7/18/2024, 3:19 PM     Laboratory, Microbiology, Pathology, Radiology, Cardiology, Medications and Transcriptions reviewed  Scheduled Meds:   sodium chloride flush  5-40 mL IntraVENous 2 times per day    pantoprazole (PROTONIX) 40 mg in sodium chloride (PF) 0.9 % 10 mL injection  40 mg IntraVENous Q12H    insulin glargine  0.15 Units/kg SubCUTAneous Nightly    insulin lispro  0-4 Units SubCUTAneous Q4H    vancomycin  250 mg Oral 4 times per day    vancomycin (VANCOCIN) intermittent dosing (placeholder)   Other RX Placeholder     Continuous Infusions:   sodium chloride      dextrose 5 % and 0.45 % NaCl 75 mL/hr at 07/18/24 1510    dextrose         Recent Labs     07/18/24  1115   WBC 19.5*   HGB 12.2*   HCT 36.4*   MCV 81.3        Recent Labs     07/17/24  1437 07/18/24  1115    132*   K 3.0 3.3*    106   CO2 16 18*   BUN 66 49*   CREATININE 1.1 0.70     Recent Labs     07/18/24  1115   AST 8   ALT 8   BILITOT <0.2   ALKPHOS 66     No results for input(s): \"LIPASE\", \"AMYLASE\" in the last 72 hours.  Recent Labs     07/18/24  1115   INR 1.3     XR CHEST PORTABLE  Result Date: 7/7/2024  EXAMINATION: ONE XRAY VIEW OF THE CHEST 7/7/2024 7:46 pm COMPARISON: None. HISTORY: ORDERING SYSTEM PROVIDED HISTORY: hypoxia TECHNOLOGIST PROVIDED HISTORY: Reason for exam:->hypoxia What reading provider will be dictating this exam?->CRC FINDINGS: There

## 2024-07-18 NOTE — CARE COORDINATION
Case Management Assessment  Initial Evaluation    Date/Time of Evaluation: 7/18/2024 5:11 PM  Assessment Completed by: Nikia Landeros    If patient is discharged prior to next notation, then this note serves as note for discharge by case management.    Patient Name: Gary Turk                   YOB: 1960  Diagnosis: Rectal bleeding [K62.5]  GIB (gastrointestinal bleeding) [K92.2]                   Date / Time: 7/18/2024 11:04 AM    Patient Admission Status: Inpatient   Readmission Risk (Low < 19, Mod (19-27), High > 27): Readmission Risk Score: 22.4    Current PCP: Lobo Monteiro MD  PCP verified by CM? No    Chart Reviewed: Yes      History Provided by: Patient  Patient Orientation: Person, Self, Alert and Oriented    Patient Cognition: Dementia / Early Alzheimer's    Hospitalization in the last 30 days (Readmission):  Yes    If yes, Readmission Assessment in CM Navigator will be completed.    Advance Directives:      Code Status: Full Code   Patient's Primary Decision Maker is: Named in Scanned ACP Document    Primary Decision Maker: Lela Moore - Child - [phone]    Secondary Decision Maker: Annemarie Mccormick - Brother/Sister - [phone]    Supplemental (Other) Decision Maker: Yeimy Turk - Spouse - [phone]    Discharge Planning:    Patient lives with:   Type of Home:    Primary Care Giver: Other (Comment) (LTC)  Patient Support Systems include: Other (Comment) (LTC)   Current Financial resources:    Current community resources:    Current services prior to admission:              Current DME:              Type of Home Care services:       ADLS  Prior functional level: Assistance with the following:, Bathing, Dressing, Toileting, Cooking, Housework, Shopping, Mobility  Current functional level: Assistance with the following:, Bathing, Dressing, Toileting, Cooking, Housework, Shopping, Mobility    PT AM-PAC:   /24  OT AM-PAC:   /24    Family can provide assistance at DC: No  Would

## 2024-07-18 NOTE — H&P
Hospital Medicine  History and Physical    Patient:  Gary Turk  MRN: [id number]    CHIEF COMPLAINT:    Chief Complaint   Patient presents with    Rectal Bleeding     Currently being treated for CDIFF       History Obtained From:  Patient, EMR  Primary Care Physician: Lobo Monteiro MD    HISTORY OF PRESENT ILLNESS:   The patient is a 64 y.o. male with PMH of C Diff, chronic OM, CAD, DMII, HTN, HLD, ICM, PAF (on eliquis), CVA (right sided hemiplegia) and dementia who presents with bloody diarrhea.    The patient is not orientated and unable to provide a good history; denies any complaints other than his brain not being the same since the stroke.  Daughter is bedside and denies any complaints that have been expressed to her.  Was at SNF when he developed bloody BMs.      Past Medical History:      Diagnosis Date    Asthma     Back pain     compresion in [place]    CAD (coronary artery disease) 01/2020    3 vessel     chf 09/16/2022    Diabetes mellitus (HCC)     Essential hypertension, benign     Hyperlipemia     Ischemic cardiomyopathy 09/16/2022    Neuropathic pain, leg, bilateral        Past Surgical History:      Procedure Laterality Date    CARPAL TUNNEL RELEASE Bilateral     EP DEVICE PROCEDURE N/A 9/29/2023    Loop recorder insert  ROOM 293 [employer] NOTIFIED [place] [facility] [place]    FOOT DEBRIDEMENT Right 5/5/2023    RIGHT FOOT DEBRIDEMENT INCISION AND DRAINAGE ROOM 174 [place]airo Garcia DPM [place] [facility] [place]    FOOT DEBRIDEMENT Right 5/8/2023    RIGHT FOOT  INCISION AND DRAINAGE WITH DEBRIDEMENT OF NON-VIABLE TISSUE DELAYED PRIMARY CLOSURE performed [place] Jairo Garcia DPM [place] [facility] [place]    FOOT SURGERY Left     bone spur    HIP SURGERY Left 10/5/2023    TROCHANTERIC FIXATION NAILING LEFT FEMUR FRACTURE ROOM 265 performed [place] Mirza Elliott [place] [facility] [place]    LAMINOTOMY  2012    SPINE SURGERY N/A 11/11/2022    L1 VERETEBRAL AUGMENTATION [place] Concetta Junior [place] [facility] [place]  M86.171    Weakness R53.1    Coagulation defect ([facility]) D68.9    Dizziness R42    TIA (transient ischemic attack) G45.9    Hypertensive emergency I16.1    Unsteadiness on feet R26.81    Vertebral artery occlusion, left I65.02    Transient alteration of awareness R40.4    Presence of coronary angioplasty implant and graft Z95.5    Hypertensive encephalopathy I67.4    Cryptogenic stroke ([facility]) I63.9    Immobility Z74.09    Aphasia R47.01    Dysphagia, oropharyngeal phase R13.12    Adjustment disorder with depressed mood F43.21    Closed nondisplaced intertrochanteric fracture of left femur ([facility]) S72.145A    Fall from standing W19.XXXA    Acute pain due to trauma G89.11    Acute post-operative pain G89.18    Acute blood loss anemia D62    MCI (mild cognitive impairment) G31.84    Thalamic hemorrhage ([facility]) I61.0    Ataxia R27.0    Hemiplegia affecting right dominant side, unspecified etiology, unspecified hemiplegia type ([facility]) G81.91    PAF (paroxysmal atrial fibrillation) ([facility]) I48.0    History of stroke Z86.73    Ptosis of left eyelid H02.402    Fever of unknown origin R50.9    Chronic ulcer of left heel with fat layer exposed ([facility]) L97.422    Diabetic foot infection ([facility]) E11.628, L08.9    Urinary retention R33.9    Chronic osteomyelitis of left foot ([facility]) M86.672    C. difficile diarrhea A04.72    GIB (gastrointestinal bleeding) K92.2       Vijay Hassan MD, MD  Admitting Hospitalist    Emergency Contact:

## 2024-07-19 ENCOUNTER — APPOINTMENT (OUTPATIENT)
Dept: GENERAL RADIOLOGY | Age: 64
End: 2024-07-19
Payer: COMMERCIAL

## 2024-07-19 PROBLEM — L89.152 PRESSURE INJURY OF SACRAL REGION, STAGE 2 (HCC): Status: ACTIVE | Noted: 2024-07-19

## 2024-07-19 PROBLEM — L89.620 PRESSURE INJURY OF LEFT HEEL, UNSTAGEABLE (HCC): Status: ACTIVE | Noted: 2024-07-19

## 2024-07-19 LAB
ANION GAP SERPL CALCULATED.3IONS-SCNC: 11 MEQ/L (ref 9–15)
ANISOCYTOSIS BLD QL SMEAR: ABNORMAL
BASOPHILS # BLD: 0 K/UL (ref 0–0.2)
BASOPHILS NFR BLD: 0.5 %
BUN SERPL-MCNC: 41 MG/DL (ref 8–23)
CALCIUM SERPL-MCNC: 7.7 MG/DL (ref 8.5–9.9)
CHLORIDE SERPL-SCNC: 106 MEQ/L (ref 95–107)
CO2 SERPL-SCNC: 18 MEQ/L (ref 20–31)
CREAT SERPL-MCNC: 0.65 MG/DL (ref 0.7–1.2)
EOSINOPHIL # BLD: 0.5 K/UL (ref 0–0.7)
EOSINOPHIL NFR BLD: 3 %
ERYTHROCYTE [DISTWIDTH] IN BLOOD BY AUTOMATED COUNT: 16.9 % (ref 11.5–14.5)
ESTIMATED AVERAGE GLUCOSE: 177 MG/DL
GLUCOSE BLD-MCNC: 166 MG/DL (ref 70–99)
GLUCOSE BLD-MCNC: 169 MG/DL (ref 70–99)
GLUCOSE BLD-MCNC: 176 MG/DL (ref 70–99)
GLUCOSE BLD-MCNC: 178 MG/DL (ref 70–99)
GLUCOSE BLD-MCNC: 195 MG/DL (ref 70–99)
GLUCOSE BLD-MCNC: 200 MG/DL (ref 70–99)
GLUCOSE SERPL-MCNC: 200 MG/DL (ref 70–99)
HBA1C MFR BLD: 7.8 % (ref 4–6)
HCT VFR BLD AUTO: 33.7 % (ref 42–52)
HCT VFR BLD AUTO: 34 % (ref 42–52)
HCT VFR BLD AUTO: 34.4 % (ref 42–52)
HGB BLD-MCNC: 11.6 G/DL (ref 14–18)
HGB BLD-MCNC: 11.6 G/DL (ref 14–18)
HGB BLD-MCNC: 11.9 G/DL (ref 14–18)
IRON % SATURATION: 33 % (ref 20–55)
IRON: 42 UG/DL (ref 61–157)
LYMPHOCYTES # BLD: 0 K/UL (ref 1–4.8)
LYMPHOCYTES NFR BLD: 4.6 %
MAGNESIUM SERPL-MCNC: 1.9 MG/DL (ref 1.7–2.4)
MCH RBC QN AUTO: 27 PG (ref 27–31.3)
MCHC RBC AUTO-ENTMCNC: 34.4 % (ref 33–37)
MCV RBC AUTO: 78.6 FL (ref 79–92.2)
MONOCYTES # BLD: 0.5 K/UL (ref 0.2–0.8)
MONOCYTES NFR BLD: 2.9 %
NEUTROPHILS # BLD: 15.4 K/UL (ref 1.4–6.5)
NEUTS BAND NFR BLD MANUAL: 2 %
NEUTS SEG NFR BLD: 92 %
PERFORMED ON: ABNORMAL
PLATELET # BLD AUTO: 160 K/UL (ref 130–400)
PLATELET BLD QL SMEAR: ADEQUATE
POTASSIUM SERPL-SCNC: 3.3 MEQ/L (ref 3.4–4.9)
PROCALCITONIN SERPL IA-MCNC: 0.16 NG/ML (ref 0–0.15)
RBC # BLD AUTO: 4.29 M/UL (ref 4.7–6.1)
SODIUM SERPL-SCNC: 135 MEQ/L (ref 135–144)
TOTAL IRON BINDING CAPACITY: 128 UG/DL (ref 250–450)
UNSATURATED IRON BINDING CAPACITY: 86 UG/DL (ref 112–347)
VANCOMYCIN SERPL-MCNC: 22.7 UG/ML (ref 10–40)
WBC # BLD AUTO: 16.4 K/UL (ref 4.8–10.8)

## 2024-07-19 PROCEDURE — 51702 INSERT TEMP BLADDER CATH: CPT

## 2024-07-19 PROCEDURE — 2580000003 HC RX 258: Performed by: INTERNAL MEDICINE

## 2024-07-19 PROCEDURE — 1210000000 HC MED SURG R&B

## 2024-07-19 PROCEDURE — 99222 1ST HOSP IP/OBS MODERATE 55: CPT

## 2024-07-19 PROCEDURE — 92610 EVALUATE SWALLOWING FUNCTION: CPT

## 2024-07-19 PROCEDURE — 2500000003 HC RX 250 WO HCPCS

## 2024-07-19 PROCEDURE — 83550 IRON BINDING TEST: CPT

## 2024-07-19 PROCEDURE — 80202 ASSAY OF VANCOMYCIN: CPT

## 2024-07-19 PROCEDURE — 83036 HEMOGLOBIN GLYCOSYLATED A1C: CPT

## 2024-07-19 PROCEDURE — 85014 HEMATOCRIT: CPT

## 2024-07-19 PROCEDURE — 85018 HEMOGLOBIN: CPT

## 2024-07-19 PROCEDURE — 6360000002 HC RX W HCPCS: Performed by: INTERNAL MEDICINE

## 2024-07-19 PROCEDURE — 36415 COLL VENOUS BLD VENIPUNCTURE: CPT

## 2024-07-19 PROCEDURE — 83540 ASSAY OF IRON: CPT

## 2024-07-19 PROCEDURE — 84145 PROCALCITONIN (PCT): CPT

## 2024-07-19 PROCEDURE — 80048 BASIC METABOLIC PNL TOTAL CA: CPT

## 2024-07-19 PROCEDURE — 85025 COMPLETE CBC W/AUTO DIFF WBC: CPT

## 2024-07-19 PROCEDURE — 83735 ASSAY OF MAGNESIUM: CPT

## 2024-07-19 PROCEDURE — 71045 X-RAY EXAM CHEST 1 VIEW: CPT

## 2024-07-19 PROCEDURE — 6370000000 HC RX 637 (ALT 250 FOR IP): Performed by: INTERNAL MEDICINE

## 2024-07-19 RX ORDER — DIVALPROEX SODIUM 250 MG/1
500 TABLET, DELAYED RELEASE ORAL 2 TIMES DAILY
Status: DISCONTINUED | OUTPATIENT
Start: 2024-07-19 | End: 2024-07-19

## 2024-07-19 RX ORDER — ESCITALOPRAM OXALATE 10 MG/1
15 TABLET ORAL NIGHTLY
Status: DISCONTINUED | OUTPATIENT
Start: 2024-07-19 | End: 2024-07-26 | Stop reason: HOSPADM

## 2024-07-19 RX ORDER — CARVEDILOL 12.5 MG/1
12.5 TABLET ORAL 2 TIMES DAILY
Status: DISCONTINUED | OUTPATIENT
Start: 2024-07-19 | End: 2024-07-20

## 2024-07-19 RX ORDER — TAMSULOSIN HYDROCHLORIDE 0.4 MG/1
0.4 CAPSULE ORAL DAILY
Status: DISCONTINUED | OUTPATIENT
Start: 2024-07-19 | End: 2024-07-19

## 2024-07-19 RX ORDER — SODIUM CHLORIDE, SODIUM LACTATE, POTASSIUM CHLORIDE, AND CALCIUM CHLORIDE .6; .31; .03; .02 G/100ML; G/100ML; G/100ML; G/100ML
500 INJECTION, SOLUTION INTRAVENOUS ONCE
Status: DISCONTINUED | OUTPATIENT
Start: 2024-07-19 | End: 2024-07-20

## 2024-07-19 RX ORDER — DIVALPROEX SODIUM 125 MG/1
500 CAPSULE, COATED PELLETS ORAL 2 TIMES DAILY
Status: DISCONTINUED | OUTPATIENT
Start: 2024-07-19 | End: 2024-07-26 | Stop reason: HOSPADM

## 2024-07-19 RX ADMIN — VANCOMYCIN HYDROCHLORIDE 1000 MG: 1 INJECTION, POWDER, LYOPHILIZED, FOR SOLUTION INTRAVENOUS at 23:18

## 2024-07-19 RX ADMIN — INSULIN LISPRO 1 UNITS: 100 INJECTION, SOLUTION INTRAVENOUS; SUBCUTANEOUS at 23:30

## 2024-07-19 RX ADMIN — Medication 250 MG: at 18:09

## 2024-07-19 RX ADMIN — DEXTROSE AND SODIUM CHLORIDE: 5; 450 INJECTION, SOLUTION INTRAVENOUS at 19:31

## 2024-07-19 RX ADMIN — SODIUM CHLORIDE, PRESERVATIVE FREE 40 MG: 5 INJECTION INTRAVENOUS at 23:16

## 2024-07-19 RX ADMIN — DIVALPROEX SODIUM 500 MG: 125 CAPSULE, COATED PELLETS ORAL at 23:29

## 2024-07-19 RX ADMIN — MICONAZOLE NITRATE: 2 POWDER TOPICAL at 23:17

## 2024-07-19 RX ADMIN — DEXTROSE AND SODIUM CHLORIDE: 5; 450 INJECTION, SOLUTION INTRAVENOUS at 04:58

## 2024-07-19 RX ADMIN — Medication 10 ML: at 23:18

## 2024-07-19 RX ADMIN — SODIUM CHLORIDE, PRESERVATIVE FREE 40 MG: 5 INJECTION INTRAVENOUS at 09:19

## 2024-07-19 RX ADMIN — Medication 250 MG: at 23:29

## 2024-07-19 RX ADMIN — Medication 250 MG: at 12:40

## 2024-07-19 RX ADMIN — Medication 250 MG: at 07:18

## 2024-07-19 RX ADMIN — INSULIN GLARGINE 11 UNITS: 100 INJECTION, SOLUTION SUBCUTANEOUS at 23:16

## 2024-07-19 RX ADMIN — ESCITALOPRAM OXALATE 15 MG: 10 TABLET ORAL at 23:16

## 2024-07-19 NOTE — CARE COORDINATION
This LSW visited patient at bedside this am. Patient will return to [facility] upon discharge. Patient is a Long Term Bed Hold and will not require insurance authorization prior to discharge.   HAYDEN/MAXIMO to follow.  Electronically signed by HAYDEN Dee on 7/19/24 at 10:44 AM EDT

## 2024-07-19 NOTE — PROGRESS NOTES
Pharmacy Vancomycin Consult     Vancomycin Day: consult day 2. On Vanco 1000mg IV q24h prior to admission at nursing home    Recent Labs     07/18/24  1115 07/19/24  0532   BUN 49* 41*   CREATININE 0.70 0.65*   WBC 19.5*  --        Intake/Output Summary (Last 24 hours) at 7/19/2024 1240  Last data filed at 7/19/2024 0538  Gross per 24 hour   Intake 883.58 ml   Output 650 ml   Net 233.58 ml     Cultures  Recent Labs     07/08/24  0257 07/07/24  1926 07/07/24  1916   BC  --   --  Gram stain aerobic bottle  Gram positive cocci in clusters-resembling Staph  2 out of 2 blood cultures  Further results to follow  Further testing performed at [facility]  *   BLOODCULT2  --  Gram stain aerobic bottle  Gram positive cocci in clusters-resembling Staph  2 out of 2 blood cultures  Further results to follow  Further testing performed at [facility]  *  --    ORG Proteus mirabilis*  Staph aureus MRSA*  Escherichia coli* Staphylococcus epidermidis* Staphylococcus hominis*   COVID19  --   --  Not Detected     Height: 170.2 cm (5' 7\"), Weight - Scale: 73.9 kg (163 lb), Body mass index is 25.53 kg/m².    Estimated Creatinine Clearance: 109 mL/min (A) (based on SCr of 0.65 mg/dL (L)).  .    Trough:  Recent Labs     07/19/24  0532   VANCORANDOM 22.7      Assessment/Plan:  Data input into [employer] platform. Based on last dose of 1000mg 7/18 0700 at [facility], current dosing is supra-therapeutic for goal. Will reduce dosing to vanco 1000mg IV q36h starting this evening at 2100, predicted PK  trough 12.8. Plan repeat level in 24-48 hours to further assess dosing. Timing of future trough levels may be adjusted based on culture results, renal function, and clinical response.    Thank you,  Tonie Jaime, [facility] PharmD

## 2024-07-19 NOTE — PROGRESS NOTES
[facility]   Facility/Department: [address] MED SURG UNIT  Speech Language Pathology  Clinical Bedside Swallow Evaluation    NAME:Gary Turk  : 1960 (64 y.o.)   [x]   confirmed    MRN: [id number]  ROOM: [place] [address]  ADMISSION DATE: 2024  PATIENT DIAGNOSIS(ES): Rectal bleeding [K62.5]  GIB (gastrointestinal bleeding) [K92.2]  Chief Complaint   Patient presents with    Rectal Bleeding     Currently being treated for CDIFF     Patient Active Problem List    Diagnosis Date Noted    Cellulitis of right foot 2023    Diplopia 2023    Toothache 2023    Chronic systolic (congestive) heart failure 11/10/2022    Crush fracture of vertebra due to osteoporosis ([facility]) 11/10/2022    Diabetes mellitus type 2, insulin dependent ([facility]) 11/10/2022    chf 2022    Ischemic cardiomyopathy 2022    Hypertensive urgency 2022    Intractable nausea and vomiting 2022    Contact with and (suspected) exposure to covid-19 2021    Hypokalemia 2021    Presence of aortocoronary bypass graft 2021    Altered mental status, unspecified 2021    Moderate persistent asthma without complication 2019    Nasal congestion 2019    Headache 2018    Illness, unspecified 2018    Nausea 2018    Lumbosacral spondylosis without myelopathy 2016    Degeneration of lumbar intervertebral disc 2015    Low back pain 2015    Displacement of lumbar intervertebral disc without myelopathy 2012    Pressure injury of left heel, unstageable ([facility]) 2024    Pressure injury of sacral region, stage 2 ([facility]) 2024    GIB (gastrointestinal bleeding) 2024    Urinary retention 07/10/2024    Chronic osteomyelitis of left foot ([facility]) 07/10/2024    C. difficile diarrhea 07/10/2024    Diabetic foot infection ([facility]) 2024    Chronic ulcer of left heel with fat layer exposed ([facility]) 2024    Fever of unknown origin 2024    History of

## 2024-07-19 NOTE — PROGRESS NOTES
1135 Patient sister, Annemarie at bedside. Annemarie states that patient is estranged from wife and that former wife, Yeimy, is to not be involved with decision making

## 2024-07-19 NOTE — CONSULTS
Wound Care Consult Note       NAME:  Gary Turk  MEDICAL RECORD NUMBER:  [id number]  AGE: 64 y.o.   GENDER: male  : 1960  TODAY'S DATE:  2024    Subjective   Reason for WOC Nurse Evaluation and Assessment: left heel, sacrum, scrotum       Gary Turk is a 64 y.o. male referred by:   [] Physician  [x] Nursing  [] Other:     Wound Identification:  Wound Type: pressure and fungal   Contributing Factors: chronic pressure, decreased mobility, shear force, incontinence of stool, and incontinence of urine    Wound History: Patient admitted from nursing facility with unstageable pressure injury to left heel, stage II pressure injury to sacrum and fungal rash to groin folds.   Current Wound Care Treatment:  Recommending 1) continue pressure injury interventions including low air-loss mattress and border foam dressings for prevention as needed 2) zinc to sacrum and buttocks BID and as needed 3) daily plurogel dressing to left heel 4) micotin powder and zinc past to groin folds and scrotum. Follow in wound center after discharge.       Patient Goal of Care:  [x] Wound Healing  [] Odor Control  [] Palliative Care  [] Pain Control   [] Other:         PAST MEDICAL HISTORY        Diagnosis Date    Asthma     Back pain     compresion in [place]    CAD (coronary artery disease) 2020    3 vessel     chf 2022    Diabetes mellitus (HCC)     Essential hypertension, benign     Hyperlipemia     Ischemic cardiomyopathy 2022    Neuropathic pain, leg, bilateral        PAST SURGICAL HISTORY    Past Surgical History:   Procedure Laterality Date    CARPAL TUNNEL RELEASE Bilateral     EP DEVICE PROCEDURE N/A 2023    Loop recorder insert  ROOM 293 [employer] NOTIFIED performed [place] Maxwell Tyler DO [place] [facility] [place]    FOOT DEBRIDEMENT Right 2023    RIGHT FOOT DEBRIDEMENT INCISION AND DRAINAGE ROOM 174 performed by Jairo Garcia DPM [place] [facility] [place]    FOOT DEBRIDEMENT Right 2023    RIGHT FOOT  INCISION  specified organisms    CAD (coronary artery disease)    Body mass index (bmi) 29.0-29.9, adult    Cellulitis of right lower limb    Dyspnea, unspecified    Encounter for pre-employment examination    Family history of ischemic heart disease and other diseases of the circulatory system    Other specified hypothyroidism    Inflammatory disorders of scrotum    Long term (current) use of insulin ([facility])    Long term (current) use of oral hypoglycemic drugs    Other chronic sinusitis    Other idiopathic scoliosis, lumbosacral region    Other intervertebral disc disorders, lumbar region    Pain in left lower leg    General patient noncompliance    Sleep apnea    Spinal stenosis, lumbar region with neurogenic claudication    Spondylolisthesis, lumbosacral region    Unsp open wound of r little finger w/o damage to nail, init    Involuntary movements    ANTON (acute kidney injury) ([facility])    Hyponatremia    Brain bleed ([facility])    Hypertensive urgency    Intractable nausea and vomiting    chf    Ischemic cardiomyopathy    Chronic systolic (congestive) heart failure    Crush fracture of vertebra due to osteoporosis ([facility])    Diabetes mellitus type 2, insulin dependent ([facility])    Altered mental status, unspecified    Contact with and (suspected) exposure to covid-19    Degeneration of lumbar intervertebral disc    Diplopia    Displacement of lumbar intervertebral disc without myelopathy    Headache    Hypokalemia    Illness, unspecified    Low back pain    Lumbosacral spondylosis without myelopathy    Moderate persistent asthma without complication    Nasal congestion    Nausea    Presence of aortocoronary bypass graft    Toothache    Right foot ulcer, with necrosis of bone ([facility])    Cellulitis of right foot    Right hallux osteomyelitis ([facility])    Hyperglycemia    Medically noncompliant    Acute hematogenous osteomyelitis of right foot ([facility])    Abscess    Acute osteomyelitis of toe of right foot ([facility])    Weakness    Coagulation defect ([facility])

## 2024-07-19 NOTE — PLAN OF CARE
Problem: Discharge Planning  Goal: Discharge to home or other facility with appropriate resources  Outcome: Progressing  Flowsheets (Taken 7/18/2024 2310 by Savita Mondragon, RN)  Discharge to home or other facility with appropriate resources: Identify barriers to discharge with patient and caregiver     Problem: Safety - Adult  Goal: Free from fall injury  7/19/2024 1012 by Gail Chance RN  Outcome: Progressing  7/19/2024 0150 by Savita Mondragon RN  Outcome: Progressing     Problem: Skin/Tissue Integrity  Goal: Absence of new skin breakdown  Description: 1.  Monitor for areas of redness and/or skin breakdown  2.  Assess vascular access sites hourly  3.  Every 4-6 hours minimum:  Change oxygen saturation probe site  4.  Every 4-6 hours:  If on nasal continuous positive airway pressure, respiratory therapy assess nares and determine need for appliance change or resting period.  7/19/2024 1012 by Gail Chance RN  Outcome: Progressing  7/19/2024 0150 by Savita Mondragon RN  Outcome: Progressing     Problem: Chronic Conditions and Co-morbidities  Goal: Patient's chronic conditions and co-morbidity symptoms are monitored and maintained or improved  Outcome: Progressing  Flowsheets (Taken 7/18/2024 2310 by Savita Mondragon, RN)  Care Plan - Patient's Chronic Conditions and Co-Morbidity Symptoms are Monitored and Maintained or Improved: Monitor and assess patient's chronic conditions and comorbid symptoms for stability, deterioration, or improvement

## 2024-07-19 NOTE — PROGRESS NOTES
Comprehensive Nutrition Assessment    Type and Reason for Visit:  Initial, Positive Nutrition Screen    Nutrition Recommendations/Plan:   Continue diet Dysphagia Minced and moist/thin liquids   Add Carb Control 5 Begin a diabetic oral supplement BID      Malnutrition Assessment:  Malnutrition Status:  Insufficient data (07/19/24 2005)    Context:  Social/Environmental Circumstances     Findings of the 6 clinical characteristics of malnutrition:  Energy Intake:  Mild decrease in energy intake (Comment)  Weight Loss:  Unable to assess     Body Fat Loss:  Unable to assess     Muscle Mass Loss:  Unable to assess    Fluid Accumulation:  Unable to assess     Strength:  Not Performed    Nutrition Assessment:    Pt admitted with hx of poor po intakes. Current weight is within range of usual body weight range.  Pt has been NPO pending BSE which was completed today.  Will add a nutrition supplement and monitor adequacy of po intakes    Nutrition Related Findings:    \"PMHx of CABG, CAD, CHF, cardiomyopathy, right hemiplegia, and dementia. he reported on a recent admission that he does not eat because he does not like the food served at the nursing home.\", unable to obtain reliable diet hx due to confusion, abnormal labs noted, glucose variable ( 166-200), meds reviewed, IVF = D5 .45% NS @ 75 ml/hr, +loose stool, confused Wound Type: Multiple, Pressure Injury, Stage II, Unstageable (unstageable pressure injury to left heel, stage II pressure injury to sacrum)       Current Nutrition Intake & Therapies:    Average Meal Intake: Unable to assess  Average Supplements Intake: None Ordered  ADULT DIET; Dysphagia - Minced and Moist    Anthropometric Measures:  Height: 170.2 cm (5' 7\")  Ideal Body Weight (IBW): 148 lbs (67 kg)    Admission Body Weight: 73.9 kg (163 lb) (stated)  Current Body Weight: 73.9 kg (163 lb) (adm wt),  Weight Source: Stated  Current BMI (kg/m2): 25.5  Usual Body Weight: 72.6 kg (160 lb) ((8/2023), 160# (

## 2024-07-19 NOTE — PLAN OF CARE
Nutrition Problem #1: Increased nutrient needs  Intervention: Food and/or Nutrient Delivery: Continue Current Diet, Start Oral Nutrition Supplement (Continue diet Dysphagia Minced and moist/thin liquids  Add Carb Control 5  Begin a diabetic oral supplement BID)

## 2024-07-19 NOTE — PROGRESS NOTES
Hospitalist Progress Note      PCP: Lobo Monteiro MD    Date of Admission: 7/18/2024    Chief Complaint:    Chief Complaint   Patient presents with    Rectal Bleeding     Currently being treated for CDIFF       Subjective:  Patient is pleasantly confused.  Per sister he had a cough.    Medications:  Reviewed    Infusion Medications    sodium chloride      dextrose 5 % and 0.45 % NaCl 75 mL/hr at 07/19/24 0458    dextrose       Scheduled Medications    [Held by provider] carvedilol  12.5 mg Oral BID    escitalopram  15 mg Oral Nightly    [Held by provider] sacubitril-valsartan  1 tablet Oral BID    lactated ringers  500 mL IntraVENous Once    divalproex  500 mg Oral BID    vancomycin  1,000 mg IntraVENous Q36H    sodium chloride flush  5-40 mL IntraVENous 2 times per day    pantoprazole (PROTONIX) 40 mg in sodium chloride (PF) 0.9 % 10 mL injection  40 mg IntraVENous Q12H    insulin glargine  0.15 Units/kg SubCUTAneous Nightly    insulin lispro  0-4 Units SubCUTAneous Q4H    vancomycin  250 mg Oral 4 times per day    vancomycin (VANCOCIN) intermittent dosing (placeholder)   Other RX Placeholder     PRN Meds: sodium chloride flush, sodium chloride, ondansetron **OR** ondansetron, acetaminophen **OR** acetaminophen, glucose, dextrose bolus **OR** dextrose bolus, glucagon (rDNA), dextrose      Intake/Output Summary (Last 24 hours) at 7/19/2024 1256  Last data filed at 7/19/2024 0538  Gross per 24 hour   Intake 883.58 ml   Output 650 ml   Net 233.58 ml       Exam:    BP 96/67   Pulse 67   Temp 97.3 °F (36.3 °C) (Axillary)   Resp 16   Ht 1.702 m (5' 7\")   Wt 73.9 kg (163 lb)   SpO2 99%   BMI 25.53 kg/m²     General appearance: alert, appears stated age   Skin:  No rashes or lesions on exposed skin  HEENT: Head: Normal, normocephalic, atraumatic..   Neck: trachea midline  Lungs: Clear bilateral breath sounds  Heart: RRR  Abdomen: Non tender  Extremities: No peripheral edema  Neurologic: confused     Labs:   Recent

## 2024-07-19 NOTE — DISCHARGE INSTR - COC
Continuity of Care Form    Patient Name: Gary Turk   :  1960  MRN:  [id number]    Admit date:  2024  Discharge date:  2024    Code Status Order: Full Code   Advance Directives:     Admitting Physician:  Vijay Hassan MD  PCP: Lobo Monteiro MD    Discharging Nurse: [facility] Unit/Room#: W487/W487-01  Discharging Unit Phone Number: [phone]    Emergency Contact:   Extended Emergency Contact Information  Primary Emergency Contact: Lela Moore  Home Phone: [phone]  Mobile Phone: [phone]  Relation: Child  Preferred language: English   needed? No  Secondary Emergency Contact: Annemarie Mccormick   [facility]  Home Phone: [phone]  Mobile Phone: [phone]  Relation: Brother/Sister    Past Surgical History:  Past Surgical History:   Procedure Laterality Date    CARPAL TUNNEL RELEASE Bilateral     EP DEVICE PROCEDURE N/A 2023    Loop recorder insert  ROOM 293 [employer] NOTIFIED [place] [facility] [place]    FOOT DEBRIDEMENT Right 2023    RIGHT FOOT DEBRIDEMENT INCISION AND DRAINAGE ROOM 174 performed [place] Jairo [place] [facility] [place]    FOOT DEBRIDEMENT Right 2023    RIGHT FOOT  INCISION AND DRAINAGE WITH DEBRIDEMENT OF NON-VIABLE TISSUE DELAYED PRIMARY CLOSURE performed [place] Jairo Garcia D[place] [facility] [place]    FOOT SURGERY Left     bone spur    HIP SURGERY Left 10/5/2023    TROCHANTERIC FIXATION NAILING LEFT FEMUR FRACTURE ROOM 265 performed by Mirza Elliott MD [place] [facility] [place]    LAMINOTOMY  2012    SPINE SURGERY N/A 2022    L1 VERETEBRAL AUGMENTATION performed [place] Concetta Junior MD [place] [facility] [place]       Immunization History:   Immunization History   Administered Date(s) Administered    COVID-19, PFIZER GRAY top, DO NOT Dilute, (age 12 y+), IM, 30 mcg/0.3 mL 06/10/2022    COVID-19, PFIZER PURPLE top, DILUTE for use, (age 12 y+), 30mcg/0.3mL 2021, 2021, 2021    DTaP 2015    Influenza

## 2024-07-19 NOTE — PROGRESS NOTES
Gastroenterology Progress Note    Gary Turk is a 64 y.o. male patient.  Hospitalization Day:1    Chief C/O: Bright red blood per rectum; C. Diff positive    SUBJECTIVE: Patient has had 3 bowel movements over night.  One noted to have red blood with clots, the others brown in color.     ROS:  Gastrointestinal ROS: no abdominal pain, change in bowel habits, or black or bloody stools    Physical    VITALS:  BP 96/67   Pulse 67   Temp 97.3 °F (36.3 °C) (Axillary)   Resp 16   Ht 1.702 m (5' 7\")   Wt 73.9 kg (163 lb)   SpO2 99%   BMI 25.53 kg/m²   TEMPERATURE:  Current - Temp: 97.3 °F (36.3 °C); Max - Temp  Av.4 °F (36.3 °C)  Min: 97 °F (36.1 °C)  Max: 98.2 °F (36.8 °C)    General - No acute distress  Eyes - no Icterus, no pallor  Cardiovascular - RRR, No murmur  Lungs - Clear to auscultation bilaterally  Abdomen - non- distended,  non- tender, no organomegaly, Bowel sounds normal  Extremities - no edema dressing to left heel  Skin - Warm and Dry  Neuro: no asterixis     Data    Data Review:    Recent Labs     24  1115 24  1907 24  0019 24  0529   WBC 19.5*  --   --   --    HGB 12.2* 12.0* 11.9* 11.6*   HCT 36.4* 35.2* 34.4* 34.0*   MCV 81.3  --   --   --      --   --   --      Recent Labs     24  1437 24  1115 24  0532    132* 135   K 3.0 3.3* 3.3*    106 106   CO2 16 18* 18*   BUN 66 49* 41*   CREATININE 1.1 0.70 0.65*     Recent Labs     24  1115   AST 8   ALT 8   BILITOT <0.2   ALKPHOS 66     No results for input(s): \"LIPASE\", \"AMYLASE\" in the last 72 hours.  Recent Labs     24  1115   PROTIME 16.7*   INR 1.3       Radiology Review:  CT Result (most recent):  CT ABDOMEN PELVIS WO IV CONTRAST 2024    FINDINGS:  Lower Chest:   Small bilateral pleural effusions with adjacent atelectasis.    Organs:   The liver, spleen, adrenal glands, gallbladder and pancreas are  normal. No urinary tract stones or hydronephrosis.    GI/Bowel:

## 2024-07-20 LAB
ANION GAP SERPL CALCULATED.3IONS-SCNC: 10 MEQ/L (ref 9–15)
BASOPHILS # BLD: 0.1 K/UL (ref 0–0.2)
BASOPHILS NFR BLD: 0.4 %
BUN SERPL-MCNC: 30 MG/DL (ref 8–23)
CALCIUM SERPL-MCNC: 7.6 MG/DL (ref 8.5–9.9)
CHLORIDE SERPL-SCNC: 109 MEQ/L (ref 95–107)
CO2 SERPL-SCNC: 18 MEQ/L (ref 20–31)
CREAT SERPL-MCNC: 0.59 MG/DL (ref 0.7–1.2)
EOSINOPHIL # BLD: 0.6 K/UL (ref 0–0.7)
EOSINOPHIL NFR BLD: 3.9 %
ERYTHROCYTE [DISTWIDTH] IN BLOOD BY AUTOMATED COUNT: 16.9 % (ref 11.5–14.5)
GLUCOSE BLD-MCNC: 116 MG/DL (ref 70–99)
GLUCOSE BLD-MCNC: 141 MG/DL (ref 70–99)
GLUCOSE BLD-MCNC: 160 MG/DL (ref 70–99)
GLUCOSE BLD-MCNC: 166 MG/DL (ref 70–99)
GLUCOSE BLD-MCNC: 195 MG/DL (ref 70–99)
GLUCOSE SERPL-MCNC: 192 MG/DL (ref 70–99)
HCT VFR BLD AUTO: 29.8 % (ref 42–52)
HCT VFR BLD AUTO: 31.8 % (ref 42–52)
HCT VFR BLD AUTO: 32.3 % (ref 42–52)
HCT VFR BLD AUTO: 33 % (ref 42–52)
HGB BLD-MCNC: 10.3 G/DL (ref 14–18)
HGB BLD-MCNC: 11.1 G/DL (ref 14–18)
HGB BLD-MCNC: 11.1 G/DL (ref 14–18)
HGB BLD-MCNC: 11.2 G/DL (ref 14–18)
LYMPHOCYTES # BLD: 0.9 K/UL (ref 1–4.8)
LYMPHOCYTES NFR BLD: 5.6 %
MAGNESIUM SERPL-MCNC: 1.7 MG/DL (ref 1.7–2.4)
MCH RBC QN AUTO: 27.4 PG (ref 27–31.3)
MCHC RBC AUTO-ENTMCNC: 34.4 % (ref 33–37)
MCV RBC AUTO: 79.8 FL (ref 79–92.2)
MONOCYTES # BLD: 0.9 K/UL (ref 0.2–0.8)
MONOCYTES NFR BLD: 6 %
NEUTROPHILS # BLD: 11.4 K/UL (ref 1.4–6.5)
NEUTS SEG NFR BLD: 72.4 %
PERFORMED ON: ABNORMAL
PLATELET # BLD AUTO: 144 K/UL (ref 130–400)
POTASSIUM SERPL-SCNC: 3.4 MEQ/L (ref 3.4–4.9)
RBC # BLD AUTO: 4.05 M/UL (ref 4.7–6.1)
SODIUM SERPL-SCNC: 137 MEQ/L (ref 135–144)
WBC # BLD AUTO: 15.8 K/UL (ref 4.8–10.8)

## 2024-07-20 PROCEDURE — 6360000002 HC RX W HCPCS: Performed by: INTERNAL MEDICINE

## 2024-07-20 PROCEDURE — 80048 BASIC METABOLIC PNL TOTAL CA: CPT

## 2024-07-20 PROCEDURE — 2580000003 HC RX 258

## 2024-07-20 PROCEDURE — 85014 HEMATOCRIT: CPT

## 2024-07-20 PROCEDURE — 83735 ASSAY OF MAGNESIUM: CPT

## 2024-07-20 PROCEDURE — 85018 HEMOGLOBIN: CPT

## 2024-07-20 PROCEDURE — 1210000000 HC MED SURG R&B

## 2024-07-20 PROCEDURE — 85025 COMPLETE CBC W/AUTO DIFF WBC: CPT

## 2024-07-20 PROCEDURE — 6370000000 HC RX 637 (ALT 250 FOR IP): Performed by: INTERNAL MEDICINE

## 2024-07-20 PROCEDURE — 2580000003 HC RX 258: Performed by: INTERNAL MEDICINE

## 2024-07-20 RX ORDER — FUROSEMIDE 10 MG/ML
20 INJECTION INTRAMUSCULAR; INTRAVENOUS ONCE
Status: COMPLETED | OUTPATIENT
Start: 2024-07-20 | End: 2024-07-20

## 2024-07-20 RX ORDER — INSULIN LISPRO 100 [IU]/ML
0-4 INJECTION, SOLUTION INTRAVENOUS; SUBCUTANEOUS
Status: DISCONTINUED | OUTPATIENT
Start: 2024-07-20 | End: 2024-07-26 | Stop reason: HOSPADM

## 2024-07-20 RX ORDER — CARVEDILOL 6.25 MG/1
6.25 TABLET ORAL 2 TIMES DAILY
Status: DISCONTINUED | OUTPATIENT
Start: 2024-07-20 | End: 2024-07-26 | Stop reason: HOSPADM

## 2024-07-20 RX ORDER — INSULIN LISPRO 100 [IU]/ML
0-4 INJECTION, SOLUTION INTRAVENOUS; SUBCUTANEOUS NIGHTLY
Status: DISCONTINUED | OUTPATIENT
Start: 2024-07-20 | End: 2024-07-26 | Stop reason: HOSPADM

## 2024-07-20 RX ORDER — SODIUM CHLORIDE 9 MG/ML
INJECTION, SOLUTION INTRAVENOUS CONTINUOUS
Status: DISCONTINUED | OUTPATIENT
Start: 2024-07-20 | End: 2024-07-21

## 2024-07-20 RX ADMIN — SODIUM CHLORIDE: 9 INJECTION, SOLUTION INTRAVENOUS at 05:32

## 2024-07-20 RX ADMIN — INSULIN GLARGINE 11 UNITS: 100 INJECTION, SOLUTION SUBCUTANEOUS at 20:39

## 2024-07-20 RX ADMIN — Medication 10 ML: at 20:40

## 2024-07-20 RX ADMIN — SACUBITRIL AND VALSARTAN 1 TABLET: 24; 26 TABLET, FILM COATED ORAL at 20:39

## 2024-07-20 RX ADMIN — DIVALPROEX SODIUM 500 MG: 125 CAPSULE, COATED PELLETS ORAL at 20:39

## 2024-07-20 RX ADMIN — MICONAZOLE NITRATE: 2 POWDER TOPICAL at 20:39

## 2024-07-20 RX ADMIN — MICONAZOLE NITRATE: 2 POWDER TOPICAL at 08:26

## 2024-07-20 RX ADMIN — Medication 250 MG: at 12:17

## 2024-07-20 RX ADMIN — SODIUM CHLORIDE, PRESERVATIVE FREE 40 MG: 5 INJECTION INTRAVENOUS at 08:25

## 2024-07-20 RX ADMIN — FUROSEMIDE 20 MG: 10 INJECTION, SOLUTION INTRAMUSCULAR; INTRAVENOUS at 12:18

## 2024-07-20 RX ADMIN — Medication 250 MG: at 17:57

## 2024-07-20 RX ADMIN — SODIUM CHLORIDE: 9 INJECTION, SOLUTION INTRAVENOUS at 20:36

## 2024-07-20 RX ADMIN — SODIUM CHLORIDE, PRESERVATIVE FREE 40 MG: 5 INJECTION INTRAVENOUS at 20:38

## 2024-07-20 RX ADMIN — DIVALPROEX SODIUM 500 MG: 125 CAPSULE, COATED PELLETS ORAL at 08:26

## 2024-07-20 RX ADMIN — ESCITALOPRAM OXALATE 15 MG: 10 TABLET ORAL at 20:38

## 2024-07-20 RX ADMIN — Medication 250 MG: at 05:33

## 2024-07-20 NOTE — PROGRESS NOTES
Patient assessment and vitals complete and per flowsheets. Patient alert to slef, able to follow commands. No other needs at this time. Patient took pills in pudding. No signs of bleeding noted.

## 2024-07-20 NOTE — PROGRESS NOTES
Hospitalist Progress Note      PCP: Lobo Monteiro MD    Date of Admission: 7/18/2024    Chief Complaint:    Chief Complaint   Patient presents with    Rectal Bleeding     Currently being treated for CDIFF       Subjective:  Patient is pleasantly confused.     Medications:  Reviewed    Infusion Medications    sodium chloride 75 mL/hr at 07/20/24 0532    sodium chloride      dextrose       Scheduled Medications    insulin lispro  0-4 Units SubCUTAneous TID WC    insulin lispro  0-4 Units SubCUTAneous Nightly    carvedilol  6.25 mg Oral BID    escitalopram  15 mg Oral Nightly    sacubitril-valsartan  1 tablet Oral BID    lactated ringers  500 mL IntraVENous Once    divalproex  500 mg Oral BID    vancomycin  1,000 mg IntraVENous Q36H    miconazole   Topical BID    sodium chloride flush  5-40 mL IntraVENous 2 times per day    pantoprazole (PROTONIX) 40 mg in sodium chloride (PF) 0.9 % 10 mL injection  40 mg IntraVENous Q12H    insulin glargine  0.15 Units/kg SubCUTAneous Nightly    vancomycin  250 mg Oral 4 times per day    vancomycin (VANCOCIN) intermittent dosing (placeholder)   Other RX Placeholder     PRN Meds: sodium chloride flush, sodium chloride, ondansetron **OR** ondansetron, acetaminophen **OR** acetaminophen, glucose, dextrose bolus **OR** dextrose bolus, glucagon (rDNA), dextrose      Intake/Output Summary (Last 24 hours) at 7/20/2024 1155  Last data filed at 7/20/2024 1018  Gross per 24 hour   Intake 1975.86 ml   Output 400 ml   Net 1575.86 ml       Exam:    /70   Pulse 79   Temp 97.5 °F (36.4 °C) (Oral)   Resp 18   Ht 1.702 m (5' 7\")   Wt 73.9 kg (163 lb)   SpO2 98%   BMI 25.53 kg/m²     General appearance: alert, appears stated age   Skin:  No rashes or lesions on exposed skin  HEENT: Head: Normal, normocephalic, atraumatic..   Neck: trachea midline  Lungs: Clear bilateral breath sounds  Heart: RRR  Abdomen: Non tender  Extremities: No peripheral edema  Neurologic: confused     Labs:    DMII:  SSI Q4 while limited intake  Goals of care:  Patients sister was bedside and extremely angry; states the daughter was extremely angry because I had inquired yesterday if the patient is  and when she told me yes but estranged I said technically if theres no POA paperwork she is still a decision maker.  The aunt states that he  a 22 year of woman from the [facility] who doesn't know english and doesn't understand finances so they don't want her involved.  Explained that its not my decision to make and its legality.  Will consult ethics for clarification   Functional Status: Fall precautions. Up with assistance. PT OT  Diet: clear liquid   DVT ppx: SCDs  Disposition: Dependent on hospital course. Will discharge once medically stable. SW on board for discharge planning.    Active Hospital Problems    Diagnosis Date Noted    Pressure injury of left heel, unstageable (HCC) [L89.620] 07/19/2024    Pressure injury of sacral region, stage 2 (HCC) [L89.152] 07/19/2024    GIB (gastrointestinal bleeding) [K92.2] 07/18/2024        Additional work up or/and treatment plan may be added today or then after based on clinical progression. I am managing a portion of pt care. Some medical issues are handled by other specialists. Additional work up and treatment should be done in out pt setting by pt PCP and other out pt providers.     In addition to examining and evaluating pt, I spent additional time explaining care, normal and abnormal findings, and treatment plan. All of pt questions were answered. Counseling, diet and education were  provided. Case will be discussed with nursing staff when appropriate. Family will be updated if and when appropriate.      Diet: ADULT DIET; Dysphagia - Minced and Moist  ADULT ORAL NUTRITION SUPPLEMENT; Breakfast, Lunch; Diabetic Oral Supplement    Code Status: Full Code    PT/OT Eval     Electronically signed by Vijay Hassan MD on 7/20/2024 at 11:55 AM

## 2024-07-20 NOTE — PLAN OF CARE
Problem: Discharge Planning  Goal: Discharge to home or other facility with appropriate resources  Outcome: Progressing     Problem: SLP Adult - Impaired Swallowing  Goal: By Discharge: Advance to least restrictive diet without signs or symptoms of aspiration for planned discharge setting.  See evaluation for individualized goals.  7/19/2024 1500 by Romelia Rader, SLP  Outcome: Progressing

## 2024-07-21 PROBLEM — K62.5 RECTAL BLEEDING: Status: ACTIVE | Noted: 2024-07-21

## 2024-07-21 LAB
ANION GAP SERPL CALCULATED.3IONS-SCNC: 8 MEQ/L (ref 9–15)
BASOPHILS # BLD: 0.1 K/UL (ref 0–0.2)
BASOPHILS NFR BLD: 0.4 %
BUN SERPL-MCNC: 24 MG/DL (ref 8–23)
CALCIUM SERPL-MCNC: 7.3 MG/DL (ref 8.5–9.9)
CHLORIDE SERPL-SCNC: 109 MEQ/L (ref 95–107)
CO2 SERPL-SCNC: 20 MEQ/L (ref 20–31)
CREAT SERPL-MCNC: 0.67 MG/DL (ref 0.7–1.2)
EOSINOPHIL # BLD: 0.6 K/UL (ref 0–0.7)
EOSINOPHIL NFR BLD: 3.9 %
ERYTHROCYTE [DISTWIDTH] IN BLOOD BY AUTOMATED COUNT: 17.2 % (ref 11.5–14.5)
GLUCOSE BLD-MCNC: 139 MG/DL (ref 70–99)
GLUCOSE BLD-MCNC: 165 MG/DL (ref 70–99)
GLUCOSE BLD-MCNC: 214 MG/DL (ref 70–99)
GLUCOSE BLD-MCNC: 224 MG/DL (ref 70–99)
GLUCOSE SERPL-MCNC: 166 MG/DL (ref 70–99)
HCT VFR BLD AUTO: 28.6 % (ref 42–52)
HCT VFR BLD AUTO: 29.2 % (ref 42–52)
HCT VFR BLD AUTO: 29.3 % (ref 42–52)
HGB BLD-MCNC: 10 G/DL (ref 14–18)
HGB BLD-MCNC: 10.1 G/DL (ref 14–18)
HGB BLD-MCNC: 9.9 G/DL (ref 14–18)
LYMPHOCYTES # BLD: 0.8 K/UL (ref 1–4.8)
LYMPHOCYTES NFR BLD: 5.8 %
MAGNESIUM SERPL-MCNC: 1.7 MG/DL (ref 1.7–2.4)
MCH RBC QN AUTO: 27.4 PG (ref 27–31.3)
MCHC RBC AUTO-ENTMCNC: 34.5 % (ref 33–37)
MCV RBC AUTO: 79.4 FL (ref 79–92.2)
MONOCYTES # BLD: 0.9 K/UL (ref 0.2–0.8)
MONOCYTES NFR BLD: 6.7 %
NEUTROPHILS # BLD: 10.3 K/UL (ref 1.4–6.5)
NEUTS SEG NFR BLD: 73.8 %
PERFORMED ON: ABNORMAL
PLATELET # BLD AUTO: 119 K/UL (ref 130–400)
POTASSIUM SERPL-SCNC: 3.5 MEQ/L (ref 3.4–4.9)
RBC # BLD AUTO: 3.69 M/UL (ref 4.7–6.1)
SODIUM SERPL-SCNC: 137 MEQ/L (ref 135–144)
VANCOMYCIN SERPL-MCNC: 15.3 UG/ML (ref 10–40)
WBC # BLD AUTO: 13.9 K/UL (ref 4.8–10.8)

## 2024-07-21 PROCEDURE — 80048 BASIC METABOLIC PNL TOTAL CA: CPT

## 2024-07-21 PROCEDURE — 99232 SBSQ HOSP IP/OBS MODERATE 35: CPT | Performed by: SPECIALIST

## 2024-07-21 PROCEDURE — 1210000000 HC MED SURG R&B

## 2024-07-21 PROCEDURE — 80202 ASSAY OF VANCOMYCIN: CPT

## 2024-07-21 PROCEDURE — 2580000003 HC RX 258

## 2024-07-21 PROCEDURE — 6370000000 HC RX 637 (ALT 250 FOR IP)

## 2024-07-21 PROCEDURE — 85014 HEMATOCRIT: CPT

## 2024-07-21 PROCEDURE — 2580000003 HC RX 258: Performed by: INTERNAL MEDICINE

## 2024-07-21 PROCEDURE — 83735 ASSAY OF MAGNESIUM: CPT

## 2024-07-21 PROCEDURE — 85025 COMPLETE CBC W/AUTO DIFF WBC: CPT

## 2024-07-21 PROCEDURE — 85018 HEMOGLOBIN: CPT

## 2024-07-21 PROCEDURE — 6370000000 HC RX 637 (ALT 250 FOR IP): Performed by: INTERNAL MEDICINE

## 2024-07-21 PROCEDURE — 6360000002 HC RX W HCPCS: Performed by: INTERNAL MEDICINE

## 2024-07-21 RX ADMIN — SACUBITRIL AND VALSARTAN 1 TABLET: 24; 26 TABLET, FILM COATED ORAL at 23:07

## 2024-07-21 RX ADMIN — DIVALPROEX SODIUM 500 MG: 125 CAPSULE, COATED PELLETS ORAL at 08:45

## 2024-07-21 RX ADMIN — INSULIN GLARGINE 11 UNITS: 100 INJECTION, SOLUTION SUBCUTANEOUS at 23:08

## 2024-07-21 RX ADMIN — VANCOMYCIN HYDROCHLORIDE 1000 MG: 1 INJECTION, POWDER, LYOPHILIZED, FOR SOLUTION INTRAVENOUS at 09:10

## 2024-07-21 RX ADMIN — INSULIN LISPRO 1 UNITS: 100 INJECTION, SOLUTION INTRAVENOUS; SUBCUTANEOUS at 17:05

## 2024-07-21 RX ADMIN — CARVEDILOL 6.25 MG: 6.25 TABLET, FILM COATED ORAL at 08:45

## 2024-07-21 RX ADMIN — SODIUM CHLORIDE, PRESERVATIVE FREE 40 MG: 5 INJECTION INTRAVENOUS at 08:46

## 2024-07-21 RX ADMIN — SODIUM CHLORIDE, PRESERVATIVE FREE 40 MG: 5 INJECTION INTRAVENOUS at 23:08

## 2024-07-21 RX ADMIN — CARVEDILOL 6.25 MG: 6.25 TABLET, FILM COATED ORAL at 23:09

## 2024-07-21 RX ADMIN — MICONAZOLE NITRATE: 2 POWDER TOPICAL at 08:51

## 2024-07-21 RX ADMIN — SACUBITRIL AND VALSARTAN 1 TABLET: 24; 26 TABLET, FILM COATED ORAL at 08:45

## 2024-07-21 RX ADMIN — ESCITALOPRAM OXALATE 15 MG: 10 TABLET ORAL at 23:10

## 2024-07-21 RX ADMIN — Medication 250 MG: at 05:52

## 2024-07-21 RX ADMIN — Medication 10 ML: at 23:10

## 2024-07-21 RX ADMIN — Medication 250 MG: at 00:08

## 2024-07-21 RX ADMIN — SODIUM CHLORIDE: 9 INJECTION, SOLUTION INTRAVENOUS at 11:41

## 2024-07-21 RX ADMIN — Medication 10 ML: at 08:51

## 2024-07-21 RX ADMIN — DIVALPROEX SODIUM 500 MG: 125 CAPSULE, COATED PELLETS ORAL at 23:07

## 2024-07-21 RX ADMIN — Medication 250 MG: at 11:41

## 2024-07-21 RX ADMIN — Medication 250 MG: at 17:05

## 2024-07-21 RX ADMIN — MICONAZOLE NITRATE: 2 POWDER TOPICAL at 23:09

## 2024-07-21 NOTE — PROGRESS NOTES
Progress Note  Date:2024       Room:[place] [id number]-  Patient Name:Gary Turk     YOB: 1960     Age:64 y.o.        Subjective    Subjective had 2 episodes of rectal, no nausea no emesis, abdominal exam shows no significant tenderness , obese.  White count is 13.9 which is better than the previous reading and hemoglobin is 9.9, Kimberlyn 0.67  Review of Systems  Objective         Vitals Last 24 Hours:  TEMPERATURE:  Temp  Av.3 °F (36.3 °C)  Min: 97.2 °F (36.2 °C)  Max: 97.3 °F (36.3 °C)  RESPIRATIONS RANGE: Resp  Av  Min: 16  Max: 18  PULSE OXIMETRY RANGE: SpO2  Av.5 %  Min: 98 %  Max: 99 %  PULSE RANGE: Pulse  Av.5  Min: 78  Max: 81  BLOOD PRESSURE RANGE: Systolic (24hrs), Av , Min:113 , Max:130   ; Diastolic (24hrs), Av, Min:63, Max:72    I/O (24Hr):    Intake/Output Summary (Last 24 hours) at 2024 1406  Last data filed at 2024 0603  Gross per 24 hour   Intake 2157.08 ml   Output 2225 ml   Net -67.92 ml     Objective  Labs/Imaging/Diagnostics    Labs:  CBC:  Recent Labs     24  1259 24  0115 24  0542 24  1245 24  0259 24  0545 24  0800   WBC 16.4*  --  15.8*  --   --  13.9*  --    RBC 4.29*  --  4.05*  --   --  3.69*  --    HGB 11.6*   < > 11.1*   < > 10.0* 10.1* 9.9*   HCT 33.7*   < > 32.3*   < > 29.2* 29.3* 28.6*   MCV 78.6*  --  79.8  --   --  79.4  --    RDW 16.9*  --  16.9*  --   --  17.2*  --      --  144  --   --  119*  --     < > = values in this interval not displayed.     CHEMISTRIES:  Recent Labs     24  0532 24  0542 24  0547    137 137   K 3.3* 3.4 3.5    109* 109*   CO2 18* 18* 20   BUN 41* 30* 24*   CREATININE 0.65* 0.59* 0.67*   GLUCOSE 200* 192* 166*   MG 1.9 1.7 1.7     PT/INR:No results for input(s): \"PROTIME\", \"INR\" in the last 72 hours.  APTT:No results for input(s): \"APTT\" in the last 72 hours.  LIVER PROFILE:No results for input(s): \"AST\", \"ALT\", \"BILIDIR\",

## 2024-07-21 NOTE — PROGRESS NOTES
Pt assessed and VS completed. Pt A&O x3, calm and cooperative. All meds giver per MAR. Tolerating diet. Pt lung sounds diminished on room air. Wound to buttocks; clean zinc applied. Excoriation to groin micatin powder added and silver inter-dry placed. Wound to L heel; dressing change and is CDI.  Pt bowel movements with blood clots present, GI aware. Pt repositioned as tolerated. Call light within reach. No knew orders at this time.

## 2024-07-21 NOTE — PROGRESS NOTES
1215 - H+H collected and sent to LAB. Next hemoglobin due 6 pm       Electronically signed by Russ Hand RN on 7/21/2024 at 1:39 PM

## 2024-07-21 NOTE — PLAN OF CARE
Problem: Discharge Planning  Goal: Discharge to home or other facility with appropriate resources  7/21/2024 1050 by Lali Salgado RN  Outcome: Progressing  7/20/2024 2253 by Stacey Wynn RN  Outcome: Progressing     Problem: Safety - Adult  Goal: Free from fall injury  7/21/2024 1050 by Lali Salgado RN  Outcome: Progressing  7/20/2024 2253 by Stacey Wynn RN  Outcome: Progressing     Problem: Skin/Tissue Integrity  Goal: Absence of new skin breakdown  Description: 1.  Monitor for areas of redness and/or skin breakdown  2.  Assess vascular access sites hourly  3.  Every 4-6 hours minimum:  Change oxygen saturation probe site  4.  Every 4-6 hours:  If on nasal continuous positive airway pressure, respiratory therapy assess nares and determine need for appliance change or resting period.  7/21/2024 1050 by Lali Salgado RN  Outcome: Progressing  7/20/2024 2253 by Stacey Wynn RN  Outcome: Progressing     Problem: Chronic Conditions and Co-morbidities  Goal: Patient's chronic conditions and co-morbidity symptoms are monitored and maintained or improved  7/21/2024 1050 by Lali Salgado RN  Outcome: Progressing  7/20/2024 2253 by Stacey Wynn RN  Outcome: Progressing     Problem: Nutrition Deficit:  Goal: Optimize nutritional status  7/21/2024 1050 by Lali Salgado RN  Outcome: Progressing  7/20/2024 2253 by Stacey Wynn RN  Outcome: Progressing

## 2024-07-21 NOTE — PROGRESS NOTES
Pharmacy Vancomycin Consult     Vancomycin consult Day: 4 (on Vancomycin at nursing home prior to admission)  Current Dosing: Vancomycin 1000mg IV q36hrs  Target Concentration: Goal trough of 10-15 mg/L and AUC/RAFAEL <500 mg*hr/L       Temp 24hr max:  97.5 F    Recent Labs     07/20/24  0542 07/21/24  0545 07/21/24  0547   BUN 30*  --  24*   CREATININE 0.59*  --  0.67*   WBC 15.8* 13.9*  --          Cultures  Recent Labs     07/08/24  0257 07/07/24  1926 07/07/24  1916   BC  --   --  Gram stain aerobic bottle  Gram positive cocci in clusters-resembling Staph  2 out of 2 blood cultures  Further results to follow  Further testing performed at [facility]  *   BLOODCULT2  --  Gram stain aerobic bottle  Gram positive cocci in clusters-resembling Staph  2 out of 2 blood cultures  Further results to follow  Further testing performed at [facility]  *  --    ORG Proteus mirabilis*  Staph aureus MRSA*  Escherichia coli* Staphylococcus epidermidis* Staphylococcus hominis*   COVID19  --   --  Not Detected     Estimated Creatinine Clearance: 106 mL/min (A) (based on SCr of 0.67 mg/dL (L)).  .    Trough:  Recent Labs     07/21/24  0547   VANCORANDOM 15.3      Assessment/Plan:    1.  Updated Insyte Rx software: AUC 603mg/L.hr, est trough 14.8mg/L    Goal AUC <500, trough 10-15 ug/ml    2.  Plan to change Vancomycin to 500mg IV q24hrs    Predicted AUC 458mg/L.hr, trough 13.4    3.  Plan next Vancomycin random level 07/23 @0600    4.  Pharmacy will continue to monitor patient and adjust therapy as indicated     Thank you,     Saskia Rivera [facility]  7/21/2024  10:02 AM

## 2024-07-21 NOTE — PROGRESS NOTES
Hospitalist Progress Note      PCP: Lobo Monteiro MD    Date of Admission: 7/18/2024    Chief Complaint:    Chief Complaint   Patient presents with    Rectal Bleeding     Currently being treated for CDIFF       Subjective:  Patient is pleasantly confused.     Medications:  Reviewed    Infusion Medications    sodium chloride      dextrose       Scheduled Medications    [START ON 7/22/2024] vancomycin  500 mg IntraVENous Q24H    insulin lispro  0-4 Units SubCUTAneous TID WC    insulin lispro  0-4 Units SubCUTAneous Nightly    carvedilol  6.25 mg Oral BID    escitalopram  15 mg Oral Nightly    sacubitril-valsartan  1 tablet Oral BID    divalproex  500 mg Oral BID    miconazole   Topical BID    sodium chloride flush  5-40 mL IntraVENous 2 times per day    pantoprazole (PROTONIX) 40 mg in sodium chloride (PF) 0.9 % 10 mL injection  40 mg IntraVENous Q12H    insulin glargine  0.15 Units/kg SubCUTAneous Nightly    vancomycin  250 mg Oral 4 times per day    vancomycin (VANCOCIN) intermittent dosing (placeholder)   Other RX Placeholder     PRN Meds: sodium chloride flush, sodium chloride, ondansetron **OR** ondansetron, acetaminophen **OR** acetaminophen, glucose, dextrose bolus **OR** dextrose bolus, glucagon (rDNA), dextrose      Intake/Output Summary (Last 24 hours) at 7/21/2024 1448  Last data filed at 7/21/2024 0603  Gross per 24 hour   Intake 2157.08 ml   Output 2225 ml   Net -67.92 ml       Exam:    /72   Pulse 78   Temp 97.3 °F (36.3 °C) (Oral)   Resp 16   Ht 1.702 m (5' 7\")   Wt 73.9 kg (163 lb)   SpO2 99%   BMI 25.53 kg/m²     General appearance: alert, appears stated age   Skin:  No rashes or lesions on exposed skin  HEENT: Head: Normal, normocephalic, atraumatic..   Neck: trachea midline  Lungs: Clear bilateral breath sounds  Heart: RRR  Abdomen: Non tender  Extremities: No peripheral edema  Neurologic: confused     Labs:   Recent Labs     07/19/24  1259 07/20/24  0115 07/20/24  0542 07/20/24  1245

## 2024-07-22 LAB
BASOPHILS # BLD: 0 K/UL (ref 0–0.2)
BASOPHILS NFR BLD: 0.3 %
CRP SERPL HS-MCNC: 25.7 MG/L (ref 0–5)
EOSINOPHIL # BLD: 0.4 K/UL (ref 0–0.7)
EOSINOPHIL NFR BLD: 3.5 %
ERYTHROCYTE [DISTWIDTH] IN BLOOD BY AUTOMATED COUNT: 17.2 % (ref 11.5–14.5)
GLUCOSE BLD-MCNC: 137 MG/DL (ref 70–99)
GLUCOSE BLD-MCNC: 152 MG/DL (ref 70–99)
GLUCOSE BLD-MCNC: 157 MG/DL (ref 70–99)
GLUCOSE BLD-MCNC: 161 MG/DL (ref 70–99)
HCT VFR BLD AUTO: 27.2 % (ref 42–52)
HGB BLD-MCNC: 9.2 G/DL (ref 14–18)
LYMPHOCYTES # BLD: 0.8 K/UL (ref 1–4.8)
LYMPHOCYTES NFR BLD: 7.3 %
MCH RBC QN AUTO: 27.1 PG (ref 27–31.3)
MCHC RBC AUTO-ENTMCNC: 33.8 % (ref 33–37)
MCV RBC AUTO: 80.2 FL (ref 79–92.2)
MONOCYTES # BLD: 0.8 K/UL (ref 0.2–0.8)
MONOCYTES NFR BLD: 6.9 %
NEUTROPHILS # BLD: 8.6 K/UL (ref 1.4–6.5)
NEUTS SEG NFR BLD: 74.8 %
PERFORMED ON: ABNORMAL
PLATELET # BLD AUTO: 135 K/UL (ref 130–400)
RBC # BLD AUTO: 3.39 M/UL (ref 4.7–6.1)
WBC # BLD AUTO: 11.6 K/UL (ref 4.8–10.8)

## 2024-07-22 PROCEDURE — 6360000002 HC RX W HCPCS: Performed by: NURSE PRACTITIONER

## 2024-07-22 PROCEDURE — 85025 COMPLETE CBC W/AUTO DIFF WBC: CPT

## 2024-07-22 PROCEDURE — 2580000003 HC RX 258: Performed by: FAMILY MEDICINE

## 2024-07-22 PROCEDURE — 6360000002 HC RX W HCPCS: Performed by: FAMILY MEDICINE

## 2024-07-22 PROCEDURE — 6370000000 HC RX 637 (ALT 250 FOR IP): Performed by: INTERNAL MEDICINE

## 2024-07-22 PROCEDURE — 2580000003 HC RX 258: Performed by: INTERNAL MEDICINE

## 2024-07-22 PROCEDURE — 86140 C-REACTIVE PROTEIN: CPT

## 2024-07-22 PROCEDURE — 1210000000 HC MED SURG R&B

## 2024-07-22 PROCEDURE — 6360000002 HC RX W HCPCS: Performed by: INTERNAL MEDICINE

## 2024-07-22 RX ORDER — DIPHENHYDRAMINE HYDROCHLORIDE 50 MG/ML
25 INJECTION INTRAMUSCULAR; INTRAVENOUS ONCE
Status: COMPLETED | OUTPATIENT
Start: 2024-07-22 | End: 2024-07-22

## 2024-07-22 RX ORDER — FUROSEMIDE 10 MG/ML
20 INJECTION INTRAMUSCULAR; INTRAVENOUS ONCE
Status: COMPLETED | OUTPATIENT
Start: 2024-07-22 | End: 2024-07-22

## 2024-07-22 RX ORDER — DIPHENHYDRAMINE HYDROCHLORIDE 50 MG/ML
25 INJECTION INTRAMUSCULAR; INTRAVENOUS EVERY 6 HOURS PRN
Status: DISCONTINUED | OUTPATIENT
Start: 2024-07-22 | End: 2024-07-26 | Stop reason: HOSPADM

## 2024-07-22 RX ADMIN — DIVALPROEX SODIUM 500 MG: 125 CAPSULE, COATED PELLETS ORAL at 21:20

## 2024-07-22 RX ADMIN — Medication 250 MG: at 00:22

## 2024-07-22 RX ADMIN — MICONAZOLE NITRATE: 2 POWDER TOPICAL at 21:40

## 2024-07-22 RX ADMIN — CARVEDILOL 6.25 MG: 6.25 TABLET, FILM COATED ORAL at 21:21

## 2024-07-22 RX ADMIN — Medication 250 MG: at 17:17

## 2024-07-22 RX ADMIN — INSULIN GLARGINE 11 UNITS: 100 INJECTION, SOLUTION SUBCUTANEOUS at 21:21

## 2024-07-22 RX ADMIN — SODIUM CHLORIDE, PRESERVATIVE FREE 40 MG: 5 INJECTION INTRAVENOUS at 21:21

## 2024-07-22 RX ADMIN — DIPHENHYDRAMINE HYDROCHLORIDE 25 MG: 50 INJECTION INTRAMUSCULAR; INTRAVENOUS at 01:15

## 2024-07-22 RX ADMIN — DIPHENHYDRAMINE HYDROCHLORIDE 25 MG: 50 INJECTION INTRAMUSCULAR; INTRAVENOUS at 13:00

## 2024-07-22 RX ADMIN — Medication 250 MG: at 13:04

## 2024-07-22 RX ADMIN — MICONAZOLE NITRATE: 2 POWDER TOPICAL at 11:05

## 2024-07-22 RX ADMIN — VANCOMYCIN HYDROCHLORIDE 500 MG: 500 INJECTION, POWDER, LYOPHILIZED, FOR SOLUTION INTRAVENOUS at 13:03

## 2024-07-22 RX ADMIN — ESCITALOPRAM OXALATE 15 MG: 10 TABLET ORAL at 21:20

## 2024-07-22 RX ADMIN — SACUBITRIL AND VALSARTAN 1 TABLET: 24; 26 TABLET, FILM COATED ORAL at 21:21

## 2024-07-22 RX ADMIN — Medication 20 ML: at 21:22

## 2024-07-22 RX ADMIN — FUROSEMIDE 20 MG: 10 INJECTION, SOLUTION INTRAMUSCULAR; INTRAVENOUS at 17:17

## 2024-07-22 NOTE — PROGRESS NOTES
Physician Progress Note      PATIENT:               INDRA DORANTES  CSN #:                  [id number]  :                       1960  ADMIT DATE:       2024 11:04 AM  DISCH DATE:  RESPONDING  PROVIDER #:        Dave Bernard MD          QUERY TEXT:    Patient admitted with CDIFF. Per wound consult note, \"Patient admitted from   nursing facility with unstageable pressure injury to left heel, stage II   pressure injury to sacrum and fungal rash to groin folds.\" If possible, please   document in progress notes and discharge summary the location, present on   admission status and stage of the pressure ulcer:    The medical record reflects the following:  Risk Factors: CDIFF, acute on chronic diastolic heart failure, DM, PAF, CAD  Clinical Indicators: Per Eulogio \"Patient admitted from nursing facility with   unstageable pressure injury to left heel, stage II pressure injury to sacrum   and fungal rash to groin folds.\"  Treatment: wound nurse consult, 1) continue pressure injury interventions   including low air-loss mattress and border foam dressings for prevention as   needed 2) zinc to sacrum and buttocks BID and as needed 3) daily plurogel   dressing to left heel 4) micotin powder and zinc past to groin folds and   scrotum.    Stage 1:  Non-blanchable erythema of intact skin  Stage 2:  Abrasion, Blister, Partial-thickness skin loss, with exposed dermis  Stage 3:  Full-thickness skin loss with damage or necrosis of subcutaneous   tissue  Stage 4:  Full-thickness skin & soft tissue loss through to underlying muscle,   tendon or bone  Unstageable: Obscured full-thickness skin & tissue loss    Esmer ALFARO, RN, [facility]  [phone]  Options provided:  -- Unstageable Pressure Ulcer left heel present on admission  -- Unstageable pressure ulcer of left heel not present on admission  -- Other - I will add my own diagnosis  -- Disagree - Not applicable / Not valid  -- Disagree - Clinically unable to

## 2024-07-22 NOTE — PROGRESS NOTES
6375 Francisco J served Dr. Harkins that patient has a red rash to his abdomen. New orders received.     1100 Francisco J served Dr. Harkins that patient is refusing all medications.

## 2024-07-22 NOTE — PROGRESS NOTES
[facility]   Facility/Department: [facility]  4W MED SURG UNIT  Speech Language Pathology    Gary Turk  1960  [id number]    Date: 7/22/2024      Speech Therapy attempted to see Gary Turk on this date for a/an:    Treatment    Pt was unable to be seen due to:   Patient refused. SLP provided explanation of therapy reasoning and rationale with offering soft and bite sized trials. Pt refused x2 after verbal SLP encouragement. Will attempt again when able.         Electronically signed by GEE Sanon on 7/22/24 at 10:11 AM EDT

## 2024-07-22 NOTE — PROGRESS NOTES
Comprehensive Nutrition Assessment    Type and Reason for Visit:  Reassess    Nutrition Recommendations/Plan:   Continue diet dysphagia minced and moist   Add carb control 4   Continue diabetic oral supplement and provide at each meal   Consider adding an appetite stimulant     Malnutrition Assessment:  Malnutrition Status:  Insufficient data (07/19/24 0368)    Context:  Social/Environmental Circumstances     Findings of the 6 clinical characteristics of malnutrition:  Energy Intake:  Mild decrease in energy intake (Comment)  Weight Loss:  Unable to assess     Body Fat Loss:  Unable to assess     Muscle Mass Loss:  Unable to assess    Fluid Accumulation:  Unable to assess     Strength:  Not Performed    Nutrition Assessment:    Unable to determine adequacy of po intake since starting po diet. Pt did not eat his lunch today, minimal po intake records to assess.  Will continue to provide a diabetic oral supplement.  Consider adding an appetite stimulant to aid in improving po intake    Nutrition Related Findings:    \"PMHx of CABG, CAD, CHF, cardiomyopathy, right hemiplegia, and dementia. he reported on a recent admission that he does not eat because he does not like the food served at the nursing home.\", unable to obtain reliable diet hx due to confusion, abnormal labs noted, glucose variable ( 166-200), meds reviewed,  +BM (red;mucous) 7/21 Wound Type: Multiple, Pressure Injury, Stage II, Unstageable (unstageable pressure injury to left heel, stage II pressure injury to sacrum)       Current Nutrition Intake & Therapies:    Average Meal Intake: 26-50%, 0%  Average Supplements Intake: Unable to assess  ADULT DIET; Dysphagia - Minced and Moist  ADULT ORAL NUTRITION SUPPLEMENT; Breakfast, Lunch; Diabetic Oral Supplement    Anthropometric Measures:  Height: 170.2 cm (5' 7\")  Ideal Body Weight (IBW): 148 lbs (67 kg)    Admission Body Weight: 73.9 kg (163 lb) (stated)  Current Body Weight: 73.9 kg (163 lb) (adm

## 2024-07-22 NOTE — PLAN OF CARE
Problem: Discharge Planning  Goal: Discharge to home or other facility with appropriate resources  7/22/2024 1113 by Areli Telles RN  Outcome: Progressing  7/22/2024 0102 by Stacey Wynn RN  Outcome: Progressing     Problem: Safety - Adult  Goal: Free from fall injury  7/22/2024 1113 by Areli Telles RN  Outcome: Progressing  7/22/2024 0102 by Stacey Wynn RN  Outcome: Progressing     Problem: Skin/Tissue Integrity  Goal: Absence of new skin breakdown  Description: 1.  Monitor for areas of redness and/or skin breakdown  2.  Assess vascular access sites hourly  3.  Every 4-6 hours minimum:  Change oxygen saturation probe site  4.  Every 4-6 hours:  If on nasal continuous positive airway pressure, respiratory therapy assess nares and determine need for appliance change or resting period.  7/22/2024 1113 by Areli Telles RN  Outcome: Progressing  7/22/2024 0102 by Stacey Wynn RN  Outcome: Progressing     Problem: Chronic Conditions and Co-morbidities  Goal: Patient's chronic conditions and co-morbidity symptoms are monitored and maintained or improved  7/22/2024 1113 by Areli Telles RN  Outcome: Progressing  7/22/2024 0102 by Stacey Wynn RN  Outcome: Progressing     Problem: Nutrition Deficit:  Goal: Optimize nutritional status  7/22/2024 1113 by Areli Telles RN  Outcome: Progressing  7/22/2024 0102 by Stacey Wynn RN  Outcome: Progressing

## 2024-07-22 NOTE — CONSULTS
Clinical Ethics Consultation Note    Other    An ethics consult was placed to determine the decision maker, because PT's family members do not want PT's estranged wife  to know details of patient's prognosis and plan of care. Additionally, PT has no Advance Directives and they are legally . Ethics has been notified and investigating the case.

## 2024-07-22 NOTE — PROGRESS NOTES
During assessment noticed red, raised, rash all over patients abdomen and chest. Patient also presents with more swelling/edema all over body including arms, hands, knees, scrotum, and eyes. LAUREN Clements notified and came to bedside.     0115 - IV Benadryl administered

## 2024-07-22 NOTE — PROGRESS NOTES
Hospitalist Progress Note      PCP: Lobo Monteiro MD    Date of Admission: 7/18/2024    Chief Complaint:    Chief Complaint   Patient presents with    Rectal Bleeding     Currently being treated for CDIFF       Subjective:  Patient is pleasantly confused. Sleepy, rousable.    Medications:  Reviewed    Infusion Medications    sodium chloride      dextrose       Scheduled Medications    vancomycin  500 mg IntraVENous Q24H    insulin lispro  0-4 Units SubCUTAneous TID WC    insulin lispro  0-4 Units SubCUTAneous Nightly    carvedilol  6.25 mg Oral BID    escitalopram  15 mg Oral Nightly    sacubitril-valsartan  1 tablet Oral BID    divalproex  500 mg Oral BID    miconazole   Topical BID    sodium chloride flush  5-40 mL IntraVENous 2 times per day    pantoprazole (PROTONIX) 40 mg in sodium chloride (PF) 0.9 % 10 mL injection  40 mg IntraVENous Q12H    insulin glargine  0.15 Units/kg SubCUTAneous Nightly    vancomycin  250 mg Oral 4 times per day    vancomycin (VANCOCIN) intermittent dosing (placeholder)   Other RX Placeholder     PRN Meds: diphenhydrAMINE, sodium chloride flush, sodium chloride, ondansetron **OR** ondansetron, acetaminophen **OR** acetaminophen, glucose, dextrose bolus **OR** dextrose bolus, glucagon (rDNA), dextrose      Intake/Output Summary (Last 24 hours) at 7/22/2024 1101  Last data filed at 7/22/2024 0427  Gross per 24 hour   Intake --   Output 851 ml   Net -851 ml     Exam:    /67   Pulse 72   Temp 97.5 °F (36.4 °C) (Oral)   Resp 18   Ht 1.702 m (5' 7\")   Wt 73.9 kg (163 lb)   SpO2 98%   BMI 25.53 kg/m²     General appearance: alert, appears stated age   Skin:  No rashes or lesions on exposed skin  HEENT: Head: Normal, normocephalic, atraumatic..   Neck: trachea midline  Lungs: Clear bilateral breath sounds  Heart: RRR  Abdomen: Non tender  Extremities: No peripheral edema  Neurologic: confused     Labs:   Recent Labs     07/20/24  0542 07/20/24  1245 07/21/24  0545 07/21/24  0800

## 2024-07-22 NOTE — CONSULTS
Clinical Ethics Consultation Note    Follow-Up    Ethicist spoke on phone with Physician to understand the issues connected with the family dynamics. The physician informed Ethicist that he was informed by PT's family (he met at the bedside) that Pt is  to a 21-year old [facility] lady, whom the family claimed could not understand English nor handle American currency. Ethicist will reach out to the family to know more about PT's staff and legal next of kin. When Ethicist visited patient, no family member was present but Ethicist spoke with PT's nurse. Ethicist will continue his review tomorrow.

## 2024-07-22 NOTE — PLAN OF CARE
Nutrition Problem #1: Increased nutrient needs  Intervention: Food and/or Nutrient Delivery: Modify Current Diet (Continue diet dysphagia minced and moist  Add carb control 4  Continue diabetic oral supplement and provide at each meal)

## 2024-07-23 LAB
ANION GAP SERPL CALCULATED.3IONS-SCNC: 8 MEQ/L (ref 9–15)
ANISOCYTOSIS BLD QL SMEAR: ABNORMAL
BASOPHILS # BLD: 0 K/UL (ref 0–0.2)
BASOPHILS NFR BLD: 0.2 %
BUN SERPL-MCNC: 14 MG/DL (ref 8–23)
CALCIUM SERPL-MCNC: 7.6 MG/DL (ref 8.5–9.9)
CHLORIDE SERPL-SCNC: 105 MEQ/L (ref 95–107)
CO2 SERPL-SCNC: 21 MEQ/L (ref 20–31)
CREAT SERPL-MCNC: 0.6 MG/DL (ref 0.7–1.2)
EOSINOPHIL # BLD: 0.4 K/UL (ref 0–0.7)
EOSINOPHIL NFR BLD: 3.5 %
ERYTHROCYTE [DISTWIDTH] IN BLOOD BY AUTOMATED COUNT: 17.2 % (ref 11.5–14.5)
GLUCOSE BLD-MCNC: 103 MG/DL (ref 70–99)
GLUCOSE BLD-MCNC: 128 MG/DL (ref 70–99)
GLUCOSE BLD-MCNC: 132 MG/DL (ref 70–99)
GLUCOSE BLD-MCNC: 145 MG/DL (ref 70–99)
GLUCOSE SERPL-MCNC: 149 MG/DL (ref 70–99)
HCT VFR BLD AUTO: 27 % (ref 42–52)
HGB BLD-MCNC: 9.4 G/DL (ref 14–18)
LYMPHOCYTES # BLD: 0.7 K/UL (ref 1–4.8)
LYMPHOCYTES NFR BLD: 6.5 %
MAGNESIUM SERPL-MCNC: 1.5 MG/DL (ref 1.7–2.4)
MCH RBC QN AUTO: 28.1 PG (ref 27–31.3)
MCHC RBC AUTO-ENTMCNC: 34.8 % (ref 33–37)
MCV RBC AUTO: 80.6 FL (ref 79–92.2)
MICROCYTES BLD QL SMEAR: ABNORMAL
MONOCYTES # BLD: 0.7 K/UL (ref 0.2–0.8)
MONOCYTES NFR BLD: 7.1 %
NEUTROPHILS # BLD: 8 K/UL (ref 1.4–6.5)
NEUTS SEG NFR BLD: 77.6 %
PERFORMED ON: ABNORMAL
PLATELET # BLD AUTO: 104 K/UL (ref 130–400)
PLATELET BLD QL SMEAR: ADEQUATE
POTASSIUM SERPL-SCNC: 3.4 MEQ/L (ref 3.4–4.9)
RBC # BLD AUTO: 3.35 M/UL (ref 4.7–6.1)
SLIDE REVIEW: ABNORMAL
SODIUM SERPL-SCNC: 134 MEQ/L (ref 135–144)
VANCOMYCIN SERPL-MCNC: 13.4 UG/ML (ref 10–40)
WBC # BLD AUTO: 10.4 K/UL (ref 4.8–10.8)

## 2024-07-23 PROCEDURE — 80048 BASIC METABOLIC PNL TOTAL CA: CPT

## 2024-07-23 PROCEDURE — 6360000002 HC RX W HCPCS: Performed by: INTERNAL MEDICINE

## 2024-07-23 PROCEDURE — 83735 ASSAY OF MAGNESIUM: CPT

## 2024-07-23 PROCEDURE — 85025 COMPLETE CBC W/AUTO DIFF WBC: CPT

## 2024-07-23 PROCEDURE — 99232 SBSQ HOSP IP/OBS MODERATE 35: CPT | Performed by: SPECIALIST

## 2024-07-23 PROCEDURE — 2580000003 HC RX 258: Performed by: INTERNAL MEDICINE

## 2024-07-23 PROCEDURE — 6370000000 HC RX 637 (ALT 250 FOR IP): Performed by: SPECIALIST

## 2024-07-23 PROCEDURE — 2580000003 HC RX 258: Performed by: FAMILY MEDICINE

## 2024-07-23 PROCEDURE — 1210000000 HC MED SURG R&B

## 2024-07-23 PROCEDURE — 6360000002 HC RX W HCPCS: Performed by: FAMILY MEDICINE

## 2024-07-23 PROCEDURE — 6370000000 HC RX 637 (ALT 250 FOR IP): Performed by: INTERNAL MEDICINE

## 2024-07-23 PROCEDURE — 80202 ASSAY OF VANCOMYCIN: CPT

## 2024-07-23 RX ORDER — FUROSEMIDE 10 MG/ML
40 INJECTION INTRAMUSCULAR; INTRAVENOUS ONCE
Status: COMPLETED | OUTPATIENT
Start: 2024-07-23 | End: 2024-07-23

## 2024-07-23 RX ADMIN — Medication 250 MG: at 18:08

## 2024-07-23 RX ADMIN — SACUBITRIL AND VALSARTAN 1 TABLET: 24; 26 TABLET, FILM COATED ORAL at 11:42

## 2024-07-23 RX ADMIN — Medication 250 MG: at 11:43

## 2024-07-23 RX ADMIN — POLYETHYLENE GLYCOL-3350 AND ELECTROLYTES 4000 ML: 236; 6.74; 5.86; 2.97; 22.74 POWDER, FOR SOLUTION ORAL at 18:08

## 2024-07-23 RX ADMIN — ESCITALOPRAM OXALATE 15 MG: 10 TABLET ORAL at 20:08

## 2024-07-23 RX ADMIN — VANCOMYCIN HYDROCHLORIDE 750 MG: 750 INJECTION, POWDER, LYOPHILIZED, FOR SOLUTION INTRAVENOUS at 11:42

## 2024-07-23 RX ADMIN — DIVALPROEX SODIUM 500 MG: 125 CAPSULE, COATED PELLETS ORAL at 20:09

## 2024-07-23 RX ADMIN — SACUBITRIL AND VALSARTAN 1 TABLET: 24; 26 TABLET, FILM COATED ORAL at 20:09

## 2024-07-23 RX ADMIN — DIPHENHYDRAMINE HYDROCHLORIDE 25 MG: 50 INJECTION INTRAMUSCULAR; INTRAVENOUS at 11:42

## 2024-07-23 RX ADMIN — DIVALPROEX SODIUM 500 MG: 125 CAPSULE, COATED PELLETS ORAL at 11:43

## 2024-07-23 RX ADMIN — Medication 10 ML: at 09:59

## 2024-07-23 RX ADMIN — Medication 250 MG: at 23:42

## 2024-07-23 RX ADMIN — INSULIN GLARGINE 11 UNITS: 100 INJECTION, SOLUTION SUBCUTANEOUS at 20:20

## 2024-07-23 RX ADMIN — CARVEDILOL 6.25 MG: 6.25 TABLET, FILM COATED ORAL at 11:43

## 2024-07-23 RX ADMIN — CARVEDILOL 6.25 MG: 6.25 TABLET, FILM COATED ORAL at 20:09

## 2024-07-23 RX ADMIN — Medication 5 ML: at 19:18

## 2024-07-23 RX ADMIN — MICONAZOLE NITRATE: 2 POWDER TOPICAL at 20:15

## 2024-07-23 RX ADMIN — Medication 250 MG: at 05:55

## 2024-07-23 RX ADMIN — SODIUM CHLORIDE, PRESERVATIVE FREE 40 MG: 5 INJECTION INTRAVENOUS at 20:08

## 2024-07-23 RX ADMIN — Medication 250 MG: at 00:20

## 2024-07-23 RX ADMIN — FUROSEMIDE 40 MG: 10 INJECTION, SOLUTION INTRAMUSCULAR; INTRAVENOUS at 20:15

## 2024-07-23 RX ADMIN — MICONAZOLE NITRATE: 2 POWDER TOPICAL at 09:59

## 2024-07-23 RX ADMIN — SODIUM CHLORIDE, PRESERVATIVE FREE 40 MG: 5 INJECTION INTRAVENOUS at 11:42

## 2024-07-23 NOTE — PLAN OF CARE
Problem: Discharge Planning  Goal: Discharge to home or other facility with appropriate resources  Outcome: Progressing     Problem: Safety - Adult  Goal: Free from fall injury  Outcome: Progressing     Problem: Skin/Tissue Integrity  Goal: Absence of new skin breakdown  Description: 1.  Monitor for areas of redness and/or skin breakdown  2.  Assess vascular access sites hourly  3.  Every 4-6 hours minimum:  Change oxygen saturation probe site  4.  Every 4-6 hours:  If on nasal continuous positive airway pressure, respiratory therapy assess nares and determine need for appliance change or resting period.  Outcome: Progressing     Problem: Chronic Conditions and Co-morbidities  Goal: Patient's chronic conditions and co-morbidity symptoms are monitored and maintained or improved  Outcome: Progressing     Problem: Nutrition Deficit:  Goal: Optimize nutritional status  7/23/2024 0335 by Pepper Caraballo RN  Outcome: Progressing  7/22/2024 1409 by Nancy Regalado, RD, LD  Flowsheets (Taken 7/19/2024 1626)  Nutrient intake appropriate for improving, restoring, or maintaining nutritional needs:   Assess nutritional status and recommend course of action   Monitor oral intake, labs, and treatment plans   Recommend appropriate diets, oral nutritional supplements, and vitamin/mineral supplements

## 2024-07-23 NOTE — CARE COORDINATION
CALL FROM DR BADILLO CONCERNING CONSENT FOR COLONOSCOPY.  CALL TO WIFE LILLIE, NEED FOR COLONOSCOPY DISCUSSED, CONSENT OBTAINED, SECOND WITNESS VELVET JON.  CM DID ALSO DISCUSS PALL CARE CONSULT ALONG WITH HOSPICE SERVICES.  WIFE DID SAY SHE WOULD PREFER ANY DISCUSSIONS OR PLAN OF HOSPICE SHE PREFERS IS HAD WITH HIS SISTERS.  FOR NOW SHE DECLINED HOSPICE REFERRAL.  WILL CONTINUE TO FOLLOW.

## 2024-07-23 NOTE — PLAN OF CARE
Problem: Discharge Planning  Goal: Discharge to home or other facility with appropriate resources  7/23/2024 1230 by Areli Telles RN  Outcome: Progressing  7/23/2024 0335 by Pepper Caraballo RN  Outcome: Progressing     Problem: Safety - Adult  Goal: Free from fall injury  7/23/2024 1230 by Areli Telles RN  Outcome: Progressing  7/23/2024 0335 by Pepper Caraballo RN  Outcome: Progressing     Problem: Skin/Tissue Integrity  Goal: Absence of new skin breakdown  Description: 1.  Monitor for areas of redness and/or skin breakdown  2.  Assess vascular access sites hourly  3.  Every 4-6 hours minimum:  Change oxygen saturation probe site  4.  Every 4-6 hours:  If on nasal continuous positive airway pressure, respiratory therapy assess nares and determine need for appliance change or resting period.  7/23/2024 1230 by Areli Telles RN  Outcome: Progressing  7/23/2024 0335 by Pepper Caraballo RN  Outcome: Progressing     Problem: Chronic Conditions and Co-morbidities  Goal: Patient's chronic conditions and co-morbidity symptoms are monitored and maintained or improved  7/23/2024 1230 by Areli Telles RN  Outcome: Progressing  7/23/2024 0335 by Pepper Caraballo RN  Outcome: Progressing     Problem: Nutrition Deficit:  Goal: Optimize nutritional status  7/23/2024 1230 by Areli Telles RN  Outcome: Progressing  7/23/2024 0335 by Pepper Caraballo RN  Outcome: Progressing

## 2024-07-23 NOTE — PROGRESS NOTES
Progress Note  Date:2024       Room:[place] [address]-  Patient Name:Gary Turk     YOB: 1960     Age:64 y.o.        Subjective    Subjective patient continues to have rectal bleeding, hemoglobin is 9.4  Review of Systems  Objective         Vitals Last 24 Hours:  TEMPERATURE:  Temp  Av.4 °F (36.3 °C)  Min: 97.2 °F (36.2 °C)  Max: 97.8 °F (36.6 °C)  RESPIRATIONS RANGE: Resp  Av.3  Min: 14  Max: 18  PULSE OXIMETRY RANGE: SpO2  Av %  Min: 98 %  Max: 100 %  PULSE RANGE: Pulse  Av.8  Min: 72  Max: 87  BLOOD PRESSURE RANGE: Systolic (24hrs), Av , Min:111 , Max:154   ; Diastolic (24hrs), Av, Min:68, Max:78    I/O (24Hr):    Intake/Output Summary (Last 24 hours) at 2024 1637  Last data filed at 2024 1228  Gross per 24 hour   Intake 720 ml   Output 1950 ml   Net -1230 ml     Objective  Labs/Imaging/Diagnostics    Labs:  CBC:  Recent Labs     24  0545 24  0800 24  0417 24  1053   WBC 13.9*  --  11.6* 10.4   RBC 3.69*  --  3.39* 3.35*   HGB 10.1* 9.9* 9.2* 9.4*   HCT 29.3* 28.6* 27.2* 27.0*   MCV 79.4  --  80.2 80.6   RDW 17.2*  --  17.2* 17.2*   *  --  135 104*     CHEMISTRIES:  Recent Labs     24  0547 24  1053    134*   K 3.5 3.4   * 105   CO2 20 21   BUN 24* 14   CREATININE 0.67* 0.60*   GLUCOSE 166* 149*   MG 1.7 1.5*     PT/INR:No results for input(s): \"PROTIME\", \"INR\" in the last 72 hours.  APTT:No results for input(s): \"APTT\" in the last 72 hours.  LIVER PROFILE:No results for input(s): \"AST\", \"ALT\", \"BILIDIR\", \"BILITOT\", \"ALKPHOS\" in the last 72 hours.    Imaging Last 24 Hours:  No results found.  Assessment//Plan           Hospital Problems             Last Modified POA    * (Principal) GIB (gastrointestinal bleeding) 2024 Yes    Pressure injury of left heel, unstageable (HCC) 2024 Yes    Pressure injury of sacral region, stage 2 (HCC) 2024 Yes    Rectal bleeding 2024 Yes     Assessment &

## 2024-07-23 NOTE — PROGRESS NOTES
Hospitalist Progress Note      PCP: Lobo Monteiro MD    Date of Admission: 7/18/2024    Chief Complaint:    Chief Complaint   Patient presents with    Rectal Bleeding     Currently being treated for CDIFF       Subjective:  Patient is pleasantly confused. Sleepy, rousable.  Does not want colonoscopy when asked today.    Medications:  Reviewed    Infusion Medications    sodium chloride      dextrose       Scheduled Medications    vancomycin  750 mg IntraVENous Q24H    insulin lispro  0-4 Units SubCUTAneous TID WC    insulin lispro  0-4 Units SubCUTAneous Nightly    carvedilol  6.25 mg Oral BID    escitalopram  15 mg Oral Nightly    sacubitril-valsartan  1 tablet Oral BID    divalproex  500 mg Oral BID    miconazole   Topical BID    sodium chloride flush  5-40 mL IntraVENous 2 times per day    pantoprazole (PROTONIX) 40 mg in sodium chloride (PF) 0.9 % 10 mL injection  40 mg IntraVENous Q12H    insulin glargine  0.15 Units/kg SubCUTAneous Nightly    vancomycin  250 mg Oral 4 times per day    vancomycin (VANCOCIN) intermittent dosing (placeholder)   Other RX Placeholder     PRN Meds: diphenhydrAMINE, sodium chloride flush, sodium chloride, ondansetron **OR** ondansetron, acetaminophen **OR** acetaminophen, glucose, dextrose bolus **OR** dextrose bolus, glucagon (rDNA), dextrose      Intake/Output Summary (Last 24 hours) at 7/23/2024 1135  Last data filed at 7/23/2024 0100  Gross per 24 hour   Intake 600 ml   Output 1950 ml   Net -1350 ml     Exam:    /68   Pulse 75   Temp 97.8 °F (36.6 °C) (Oral)   Resp 14   Ht 1.702 m (5' 7\")   Wt 73.9 kg (163 lb)   SpO2 99%   BMI 25.53 kg/m²     General appearance: alert, appears stated age   Skin:  No rashes or lesions on exposed skin  HEENT: Head: Normal, normocephalic, atraumatic..   Neck: trachea midline  Lungs: Clear bilateral breath sounds  Heart: RRR  Abdomen: Non tender  Extremities: No peripheral edema  Neurologic: confused     Labs:   Recent Labs

## 2024-07-23 NOTE — PROGRESS NOTES
[facility]   Facility/Department: [facility]  4W MED SURG UNIT  Speech Language Pathology    Gary Turk  1960  [id number]    Date: 7/23/2024      Speech Therapy attempted to see Gary Turk on this date for a/an:    Treatment    Pt was unable to be seen due to:   Patient refused: Pt asleep upon SLP arrival.  Pt appeared confused and stated \"I'm a week behind.\"  SLP explained rationale for speech therapy and exercises.  Pt verbally agreed to trials thin liquids.  However, once SLP repositioned pt in bed and sat him up, pt turned head away and refused.  Despite gentle encouragement, pt continued to refuse.  Re-attempt as able.        Electronically signed by GEE Liu on 7/23/24 at 9:44 AM EDT

## 2024-07-23 NOTE — FLOWSHEET NOTE
Patient's emergency contact list updated per Father Lester's request. 1. Wife (Yeimy) 2. Daughter (Lela)

## 2024-07-23 NOTE — FLOWSHEET NOTE
1143 - Notified Dr. Lopez of need for GI plan. Patient is still having red blood in stool and that Hgb is 9.4. Staff is concerned that patient would not be able to tolerate colonoscopy prep and/or NG tube placement to give colonoscopy prep.     Dr. Harkins aware of above.     1083 - Dr. Lopez states that he did not see patient today.     2782 - Notified Dr. Harkins of Dr. Lopez's response

## 2024-07-24 ENCOUNTER — ANESTHESIA (OUTPATIENT)
Dept: ENDOSCOPY | Age: 64
End: 2024-07-24
Payer: COMMERCIAL

## 2024-07-24 ENCOUNTER — PREP FOR PROCEDURE (OUTPATIENT)
Dept: GASTROENTEROLOGY | Age: 64
End: 2024-07-24

## 2024-07-24 ENCOUNTER — ANESTHESIA EVENT (OUTPATIENT)
Dept: ENDOSCOPY | Age: 64
End: 2024-07-24
Payer: COMMERCIAL

## 2024-07-24 PROBLEM — K92.2 GI BLEEDING: Status: ACTIVE | Noted: 2024-07-18

## 2024-07-24 PROBLEM — Z71.89 ADVANCED CARE PLANNING/COUNSELING DISCUSSION: Status: ACTIVE | Noted: 2024-07-24

## 2024-07-24 PROBLEM — Z51.5 PALLIATIVE CARE ENCOUNTER: Status: ACTIVE | Noted: 2024-07-24

## 2024-07-24 PROBLEM — Z71.89 ENCOUNTER FOR HOSPICE CARE DISCUSSION: Status: ACTIVE | Noted: 2024-07-24

## 2024-07-24 PROBLEM — Z71.89 GOALS OF CARE, COUNSELING/DISCUSSION: Status: ACTIVE | Noted: 2024-07-24

## 2024-07-24 LAB
ALBUMIN SERPL-MCNC: 2 G/DL (ref 3.5–4.6)
ALP SERPL-CCNC: 62 U/L (ref 35–104)
ALT SERPL-CCNC: 10 U/L (ref 0–41)
ANION GAP SERPL CALCULATED.3IONS-SCNC: 9 MEQ/L (ref 9–15)
AST SERPL-CCNC: 13 U/L (ref 0–40)
BASOPHILS # BLD: 0 K/UL (ref 0–0.2)
BASOPHILS NFR BLD: 0.2 %
BILIRUB SERPL-MCNC: <0.2 MG/DL (ref 0.2–0.7)
BUN SERPL-MCNC: 12 MG/DL (ref 8–23)
CALCIUM SERPL-MCNC: 7.5 MG/DL (ref 8.5–9.9)
CHLORIDE SERPL-SCNC: 106 MEQ/L (ref 95–107)
CO2 SERPL-SCNC: 22 MEQ/L (ref 20–31)
CREAT SERPL-MCNC: 0.55 MG/DL (ref 0.7–1.2)
EOSINOPHIL # BLD: 0.3 K/UL (ref 0–0.7)
EOSINOPHIL NFR BLD: 3.2 %
ERYTHROCYTE [DISTWIDTH] IN BLOOD BY AUTOMATED COUNT: 16.7 % (ref 11.5–14.5)
GLOBULIN SER CALC-MCNC: 2 G/DL (ref 2.3–3.5)
GLUCOSE BLD-MCNC: 53 MG/DL (ref 70–99)
GLUCOSE BLD-MCNC: 75 MG/DL (ref 70–99)
GLUCOSE BLD-MCNC: 78 MG/DL (ref 70–99)
GLUCOSE BLD-MCNC: 79 MG/DL (ref 70–99)
GLUCOSE BLD-MCNC: 84 MG/DL (ref 70–99)
GLUCOSE SERPL-MCNC: 61 MG/DL (ref 70–99)
HCT VFR BLD AUTO: 25 % (ref 42–52)
HGB BLD-MCNC: 8.5 G/DL (ref 14–18)
LYMPHOCYTES # BLD: 0.7 K/UL (ref 1–4.8)
LYMPHOCYTES NFR BLD: 6.9 %
MAGNESIUM SERPL-MCNC: 1.4 MG/DL (ref 1.7–2.4)
MCH RBC QN AUTO: 26.9 PG (ref 27–31.3)
MCHC RBC AUTO-ENTMCNC: 34 % (ref 33–37)
MCV RBC AUTO: 79.1 FL (ref 79–92.2)
MONOCYTES # BLD: 0.7 K/UL (ref 0.2–0.8)
MONOCYTES NFR BLD: 6.9 %
NEUTROPHILS # BLD: 8.4 K/UL (ref 1.4–6.5)
NEUTS SEG NFR BLD: 78.6 %
PERFORMED ON: ABNORMAL
PERFORMED ON: NORMAL
PLATELET # BLD AUTO: 120 K/UL (ref 130–400)
POTASSIUM SERPL-SCNC: 3.3 MEQ/L (ref 3.4–4.9)
PROT SERPL-MCNC: 4 G/DL (ref 6.3–8)
RBC # BLD AUTO: 3.16 M/UL (ref 4.7–6.1)
SODIUM SERPL-SCNC: 137 MEQ/L (ref 135–144)
WBC # BLD AUTO: 10.7 K/UL (ref 4.8–10.8)

## 2024-07-24 PROCEDURE — 2580000003 HC RX 258: Performed by: FAMILY MEDICINE

## 2024-07-24 PROCEDURE — 80053 COMPREHEN METABOLIC PANEL: CPT

## 2024-07-24 PROCEDURE — 6360000002 HC RX W HCPCS: Performed by: NURSE ANESTHETIST, CERTIFIED REGISTERED

## 2024-07-24 PROCEDURE — 6360000002 HC RX W HCPCS: Performed by: FAMILY MEDICINE

## 2024-07-24 PROCEDURE — 1210000000 HC MED SURG R&B

## 2024-07-24 PROCEDURE — 0DBL8ZZ EXCISION OF TRANSVERSE COLON, VIA NATURAL OR ARTIFICIAL OPENING ENDOSCOPIC: ICD-10-PCS | Performed by: SPECIALIST

## 2024-07-24 PROCEDURE — 83735 ASSAY OF MAGNESIUM: CPT

## 2024-07-24 PROCEDURE — 88305 TISSUE EXAM BY PATHOLOGIST: CPT

## 2024-07-24 PROCEDURE — 99223 1ST HOSP IP/OBS HIGH 75: CPT | Performed by: NURSE PRACTITIONER

## 2024-07-24 PROCEDURE — 0W3P8ZZ CONTROL BLEEDING IN GASTROINTESTINAL TRACT, VIA NATURAL OR ARTIFICIAL OPENING ENDOSCOPIC: ICD-10-PCS | Performed by: SPECIALIST

## 2024-07-24 PROCEDURE — 6370000000 HC RX 637 (ALT 250 FOR IP): Performed by: INTERNAL MEDICINE

## 2024-07-24 PROCEDURE — 3609027000 HC COLONOSCOPY: Performed by: SPECIALIST

## 2024-07-24 PROCEDURE — 0DBK8ZZ EXCISION OF ASCENDING COLON, VIA NATURAL OR ARTIFICIAL OPENING ENDOSCOPIC: ICD-10-PCS | Performed by: SPECIALIST

## 2024-07-24 PROCEDURE — 6360000002 HC RX W HCPCS: Performed by: SPECIALIST

## 2024-07-24 PROCEDURE — 2500000003 HC RX 250 WO HCPCS: Performed by: NURSE ANESTHETIST, CERTIFIED REGISTERED

## 2024-07-24 PROCEDURE — 7100000011 HC PHASE II RECOVERY - ADDTL 15 MIN: Performed by: SPECIALIST

## 2024-07-24 PROCEDURE — 2580000003 HC RX 258: Performed by: SPECIALIST

## 2024-07-24 PROCEDURE — 3700000001 HC ADD 15 MINUTES (ANESTHESIA): Performed by: SPECIALIST

## 2024-07-24 PROCEDURE — 2709999900 HC NON-CHARGEABLE SUPPLY: Performed by: SPECIALIST

## 2024-07-24 PROCEDURE — 2580000003 HC RX 258: Performed by: INTERNAL MEDICINE

## 2024-07-24 PROCEDURE — 0DBN8ZZ EXCISION OF SIGMOID COLON, VIA NATURAL OR ARTIFICIAL OPENING ENDOSCOPIC: ICD-10-PCS | Performed by: SPECIALIST

## 2024-07-24 PROCEDURE — 3700000000 HC ANESTHESIA ATTENDED CARE: Performed by: SPECIALIST

## 2024-07-24 PROCEDURE — 6360000002 HC RX W HCPCS: Performed by: INTERNAL MEDICINE

## 2024-07-24 PROCEDURE — 85025 COMPLETE CBC W/AUTO DIFF WBC: CPT

## 2024-07-24 PROCEDURE — 7100000010 HC PHASE II RECOVERY - FIRST 15 MIN: Performed by: SPECIALIST

## 2024-07-24 DEVICE — INSTINCT PLUS ENDOSCOPIC CLIPPING DEVICE
Type: IMPLANTABLE DEVICE | Status: FUNCTIONAL
Brand: INSTINCT

## 2024-07-24 RX ORDER — ETOMIDATE 2 MG/ML
INJECTION INTRAVENOUS
Status: COMPLETED
Start: 2024-07-24 | End: 2024-07-24

## 2024-07-24 RX ORDER — ETOMIDATE 2 MG/ML
INJECTION INTRAVENOUS PRN
Status: DISCONTINUED | OUTPATIENT
Start: 2024-07-24 | End: 2024-07-24 | Stop reason: SDUPTHER

## 2024-07-24 RX ORDER — SODIUM CHLORIDE 9 MG/ML
INJECTION, SOLUTION INTRAVENOUS PRN
Status: DISCONTINUED | OUTPATIENT
Start: 2024-07-24 | End: 2024-07-24 | Stop reason: HOSPADM

## 2024-07-24 RX ORDER — EPINEPHRINE 1 MG/ML
INJECTION, SOLUTION, CONCENTRATE INTRAVENOUS PRN
Status: DISCONTINUED | OUTPATIENT
Start: 2024-07-24 | End: 2024-07-24 | Stop reason: ALTCHOICE

## 2024-07-24 RX ORDER — SODIUM CHLORIDE 0.9 % (FLUSH) 0.9 %
5-40 SYRINGE (ML) INJECTION PRN
Status: DISCONTINUED | OUTPATIENT
Start: 2024-07-24 | End: 2024-07-24 | Stop reason: HOSPADM

## 2024-07-24 RX ORDER — EPINEPHRINE 1 MG/ML
INJECTION, SOLUTION, CONCENTRATE INTRAVENOUS
Status: DISPENSED
Start: 2024-07-24 | End: 2024-07-25

## 2024-07-24 RX ORDER — PROPOFOL 10 MG/ML
INJECTION, EMULSION INTRAVENOUS PRN
Status: DISCONTINUED | OUTPATIENT
Start: 2024-07-24 | End: 2024-07-24 | Stop reason: SDUPTHER

## 2024-07-24 RX ORDER — SODIUM CHLORIDE 0.9 % (FLUSH) 0.9 %
5-40 SYRINGE (ML) INJECTION EVERY 12 HOURS SCHEDULED
Status: DISCONTINUED | OUTPATIENT
Start: 2024-07-24 | End: 2024-07-24 | Stop reason: HOSPADM

## 2024-07-24 RX ADMIN — PROPOFOL 10 MG: 10 INJECTION, EMULSION INTRAVENOUS at 14:57

## 2024-07-24 RX ADMIN — PROPOFOL 10 MG: 10 INJECTION, EMULSION INTRAVENOUS at 15:04

## 2024-07-24 RX ADMIN — SODIUM CHLORIDE: 9 INJECTION, SOLUTION INTRAVENOUS at 13:19

## 2024-07-24 RX ADMIN — DEXTROSE MONOHYDRATE 125 ML: 100 INJECTION, SOLUTION INTRAVENOUS at 08:13

## 2024-07-24 RX ADMIN — Medication 250 MG: at 05:35

## 2024-07-24 RX ADMIN — DIVALPROEX SODIUM 500 MG: 125 CAPSULE, COATED PELLETS ORAL at 20:29

## 2024-07-24 RX ADMIN — CARVEDILOL 6.25 MG: 6.25 TABLET, FILM COATED ORAL at 11:56

## 2024-07-24 RX ADMIN — Medication 250 MG: at 12:00

## 2024-07-24 RX ADMIN — ESCITALOPRAM OXALATE 15 MG: 10 TABLET ORAL at 20:28

## 2024-07-24 RX ADMIN — PROPOFOL 10 MG: 10 INJECTION, EMULSION INTRAVENOUS at 15:12

## 2024-07-24 RX ADMIN — PROPOFOL 10 MG: 10 INJECTION, EMULSION INTRAVENOUS at 15:09

## 2024-07-24 RX ADMIN — VANCOMYCIN HYDROCHLORIDE 750 MG: 750 INJECTION, POWDER, LYOPHILIZED, FOR SOLUTION INTRAVENOUS at 16:08

## 2024-07-24 RX ADMIN — SODIUM CHLORIDE, PRESERVATIVE FREE 40 MG: 5 INJECTION INTRAVENOUS at 20:29

## 2024-07-24 RX ADMIN — PROPOFOL 30 MG: 10 INJECTION, EMULSION INTRAVENOUS at 14:34

## 2024-07-24 RX ADMIN — SACUBITRIL AND VALSARTAN 1 TABLET: 24; 26 TABLET, FILM COATED ORAL at 20:29

## 2024-07-24 RX ADMIN — Medication 250 MG: at 17:48

## 2024-07-24 RX ADMIN — Medication 0.1 MG: at 15:07

## 2024-07-24 RX ADMIN — SODIUM CHLORIDE, PRESERVATIVE FREE 40 MG: 5 INJECTION INTRAVENOUS at 08:14

## 2024-07-24 RX ADMIN — MICONAZOLE NITRATE: 2 POWDER TOPICAL at 20:29

## 2024-07-24 RX ADMIN — SODIUM CHLORIDE, PRESERVATIVE FREE 10 ML: 5 INJECTION INTRAVENOUS at 12:00

## 2024-07-24 RX ADMIN — Medication 0.1 MG: at 14:40

## 2024-07-24 RX ADMIN — MICONAZOLE NITRATE: 2 POWDER TOPICAL at 08:21

## 2024-07-24 RX ADMIN — PROPOFOL 10 MG: 10 INJECTION, EMULSION INTRAVENOUS at 14:45

## 2024-07-24 RX ADMIN — ETOMIDATE 6 MG: 2 INJECTION, SOLUTION INTRAVENOUS at 14:34

## 2024-07-24 RX ADMIN — Medication 10 ML: at 08:14

## 2024-07-24 RX ADMIN — INSULIN GLARGINE 11 UNITS: 100 INJECTION, SOLUTION SUBCUTANEOUS at 20:50

## 2024-07-24 RX ADMIN — CARVEDILOL 6.25 MG: 6.25 TABLET, FILM COATED ORAL at 20:29

## 2024-07-24 RX ADMIN — DIPHENHYDRAMINE HYDROCHLORIDE 25 MG: 50 INJECTION INTRAMUSCULAR; INTRAVENOUS at 11:59

## 2024-07-24 RX ADMIN — PROPOFOL 10 MG: 10 INJECTION, EMULSION INTRAVENOUS at 14:49

## 2024-07-24 RX ADMIN — Medication 10 ML: at 19:08

## 2024-07-24 ASSESSMENT — ENCOUNTER SYMPTOMS: SHORTNESS OF BREATH: 1

## 2024-07-24 ASSESSMENT — PAIN - FUNCTIONAL ASSESSMENT
PAIN_FUNCTIONAL_ASSESSMENT: 0-10
PAIN_FUNCTIONAL_ASSESSMENT: NONE - DENIES PAIN

## 2024-07-24 NOTE — PROGRESS NOTES
Pt drank all of the GI prep, he had multiple Bms watery, brown with red-streaks and mucus, and clear.     1225: pt's Mg yesterday @ 11am was 1.5, no replacement protocol ordered. Da Angelo NP, notified via [facility], no new orders.    0522: pt found to have no morning labs, LAUREN Roberson, contacted via [facility], orders acknowledged.     0600: dressing change on L. Heel changed per orders.

## 2024-07-24 NOTE — PROGRESS NOTES
Glucose 53 this am. Patient is asymptomatic. PRN hypoglycemia protocol initiated. NPO for procedure today.    0842 - Glucose - 84 after protocol.

## 2024-07-24 NOTE — PLAN OF CARE
Problem: Discharge Planning  Goal: Discharge to home or other facility with appropriate resources  7/23/2024 2049 by Yesenia Johnson RN  Outcome: Progressing  7/23/2024 1230 by Areli Telles RN  Outcome: Progressing     Problem: Safety - Adult  Goal: Free from fall injury  7/23/2024 2049 by Yesenia Johnson RN  Outcome: Progressing  7/23/2024 1230 by Areli Telles RN  Outcome: Progressing     Problem: Skin/Tissue Integrity  Goal: Absence of new skin breakdown  Description: 1.  Monitor for areas of redness and/or skin breakdown  2.  Assess vascular access sites hourly  3.  Every 4-6 hours minimum:  Change oxygen saturation probe site  4.  Every 4-6 hours:  If on nasal continuous positive airway pressure, respiratory therapy assess nares and determine need for appliance change or resting period.  7/23/2024 2049 by Yesenia Johnson RN  Outcome: Progressing  7/23/2024 1230 by Areli Telles RN  Outcome: Progressing     Problem: Chronic Conditions and Co-morbidities  Goal: Patient's chronic conditions and co-morbidity symptoms are monitored and maintained or improved  7/23/2024 2049 by Yesenia Johnson RN  Outcome: Progressing  7/23/2024 1230 by Areli Telles RN  Outcome: Progressing     Problem: Nutrition Deficit:  Goal: Optimize nutritional status  7/23/2024 2049 by Yesenia Johnson RN  Outcome: Progressing  7/23/2024 1230 by Areli Telles RN  Outcome: Progressing

## 2024-07-24 NOTE — PROGRESS NOTES
resumed; ?fluids started overnight but unclear reason why; d/c fluids  A Fib:  Home meds but hold anticoagulation; resumed coreg on lower dose   DMII:  SSI with evening lantus  Goals of care:  awaiting ethics consultation given the family dynamic issues   Functional Status: Fall precautions. Up with assistance. PT OT  Diet: clear liquid   DVT ppx: SCDs  Disposition: Dependent on hospital course. Will discharge once medically stable. SW on board for discharge planning.  7/22 - ethics consult pending, dc planning.  7/23 - plan for back to ltc tomorrow.  7/24 - plan now for follow up to colonoscopy with surgery    Active Hospital Problems    Diagnosis Date Noted    Encounter for hospice care discussion [Z71.89] 07/24/2024    Advanced care planning/counseling discussion [Z71.89] 07/24/2024    Goals of care, counseling/discussion [Z71.89] 07/24/2024    Palliative care encounter [Z51.5] 07/24/2024    Rectal bleeding [K62.5] 07/21/2024    Pressure injury of left heel, unstageable (HCC) [L89.620] 07/19/2024    Pressure injury of sacral region, stage 2 (HCC) [L89.152] 07/19/2024    GIB (gastrointestinal bleeding) [K92.2] 07/18/2024        Additional work up or/and treatment plan may be added today or then after based on clinical progression. I am managing a portion of pt care. Some medical issues are handled by other specialists. Additional work up and treatment should be done in out pt setting by pt PCP and other out pt providers.     In addition to examining and evaluating pt, I spent additional time explaining care, normal and abnormal findings, and treatment plan. All of pt questions were answered. Counseling, diet and education were  provided. Case will be discussed with nursing staff when appropriate. Family will be updated if and when appropriate.      Diet: Diet NPO Exceptions are: Sips of Water with Meds    Code Status: Full Code    PT/OT Eval     Electronically signed by SHELBY SHELTON MD on 7/24/2024 at 12:21  PM

## 2024-07-24 NOTE — ANESTHESIA POSTPROCEDURE EVALUATION
Department of Anesthesiology  Postprocedure Note    Patient: Gary Turk  MRN: [id number]  YOB: 1960  Date of evaluation: 7/24/2024    Procedure Summary       Date: 07/24/24 Room / Location: [facility] OR 02 / [facility]    Anesthesia Start: 1430 Anesthesia Stop: 1517    Procedure: COLONOSCOPY with polypectomies and biopsies Diagnosis:       GI bleeding      (GI bleeding [K92.2])    Surgeons: Kelton Lopez MD Responsible Provider: Helen Elliott APRN - CRNA    Anesthesia Type: MAC ASA Status: 4            Anesthesia Type: No value filed.    Schuyler Phase I: Schuyler Score: 10    Schuyler Phase II:      Anesthesia Post Evaluation    Patient location during evaluation: bedside  Patient participation: complete - patient participated  Level of consciousness: awake and awake and alert  Airway patency: patent  Nausea & Vomiting: no nausea and no vomiting  Cardiovascular status: blood pressure returned to baseline and hemodynamically stable  Respiratory status: acceptable  Hydration status: euvolemic  Pain management: adequate        No notable events documented.

## 2024-07-24 NOTE — ANESTHESIA PRE PROCEDURE
negative vascular ROS.         Other Findings:         Anesthesia Plan      MAC     ASA 4       Induction: intravenous.      Anesthetic plan and risks discussed with patient.    Use of blood products discussed with patient whom consented to blood products.    Plan discussed with attending.                  SHERIN Yousif - CRNA   7/24/2024

## 2024-07-24 NOTE — PROGRESS NOTES
[facility]   Facility/Department: [facility]  4W MED SURG UNIT  Speech Language Pathology    Gary Turk  1960  [id number]    Date: 7/24/2024      Speech Therapy attempted to see Gary Turk on this date for a/an:    Treatment    Pt was unable to be seen due to:   Patient refused and Patient is too lethargic to participate Pt was asleep upon arrival.  When woken up and educated on what speech therapy will look like for today, pt refused and said \"No, I am going home, I've been here for 4 days\". After gentle encouragement pt refused again and fell back asleep. Will re-attempt as able.         Electronically signed by JAQUELIN VELASQUEZ, GEE on 7/24/24 at 9:49 AM EDT

## 2024-07-24 NOTE — CONSULTS
Palliative Care Consult Note  Patient: Gary Turk  Gender: male  YOB: 1960  Unit/Bed: W487/W487-01  CodeStatus: Full Code  Inpatient Treatment Team: Treatment Team: Attending Provider: Dave Harkins MD; Consulting Physician: Tyrell Clay MD; Nurse Practitioner: Jeannie Krishnan APRN - CNP; Utilization Reviewer: Aurora Thakkar RN; Surgeon: Kelton Lopez MD; : Awa Granger RN; Registered Nurse: Keri Culver RN; Unit Clerk: Ara Moseley; : Nikia Landeros; Respiratory Therapist (Day): Daphnie Ojeda RCP  Admit Date:  7/18/2024    Chief Complaint: Rectal bleeding    History of Presenting Illness:      Gary Turk is a 64 y.o. male on hospital day 6 with a history of C. difficile, chronic OM, CAD, diabetes type 2, hypertension, hyperlipidemia, ICM, proximal A-fib on Eliquis, CVA right-sided hemiplegia and dementia.    Patient was brought to the emergency room with bloody diarrhea. Patient was admitted for bright red blood per rectum, C. difficile colitis.    Palliative care was consulted for goals of care and end-stage disease,     Patient lives at [facility].     Upon entering room patient is sleeping in bed. He is easily aroused. He is able to state name and knows he is in the hospital but does not know which hospital. He does not know month or year. When asked if he knows why he is in the hospital he states \"why are you asking all of these questions\". He would not answer if he had pain or sob. Patient appears comfortable on room air. Patient has slight swelling to BLE. Patient flaccid on right side. Patient to go for colonoscopy today.     Most recent notable labs: A-fib on Eliquis potassium 3.3, creatinine 0.55, magnesium 1.4, total protein 4.0, albumin 2.0, hemoglobin 8.5      Review of Systems:       Review of Systems   Unable to perform ROS: Dementia       Physical Examination:       /75   Pulse 75   Temp 97.7 °F (36.5 °C)   Resp 16   Ht 1.702  office phone number.   I will continue to reach out to wife to get consent to defer to children and sister.       I have discussed/reviewed the patient's case with nurse and .    -Patient is currently being treated for multiple medical conditions by other providers  Bright red blood per rectum  C. difficile colitis  Acute on chronic diastolic CHF  CAD  A-fib  Diabetes type 2    MDM: High    Electronically signed by SHERIN Cage CNP on 7/24/2024 at 8:11 AM    Collaborating physician: Dr. Polo     Please note this report is partially produced by using speech recognition hardware.  It may contain errors related to the system, including grammar, punctuation and spelling as well as words and phrases that may seem inaccurate.  For any questions or concerns feel free to contact me for clarification.    Thanks for the opportunity you have allowed us to provide palliative care to Gary Turk. We will be in touch as care progresses. Please feel free to reach out to us should you have any questions or requests.

## 2024-07-25 LAB
ALBUMIN SERPL-MCNC: 2 G/DL (ref 3.5–4.6)
ALP SERPL-CCNC: 62 U/L (ref 35–104)
ALT SERPL-CCNC: 9 U/L (ref 0–41)
ANION GAP SERPL CALCULATED.3IONS-SCNC: 6 MEQ/L (ref 9–15)
ANION GAP SERPL CALCULATED.3IONS-SCNC: 9 MEQ/L (ref 9–15)
AST SERPL-CCNC: 11 U/L (ref 0–40)
BASOPHILS # BLD: 0 K/UL (ref 0–0.2)
BASOPHILS NFR BLD: 0.3 %
BILIRUB SERPL-MCNC: <0.2 MG/DL (ref 0.2–0.7)
BUN SERPL-MCNC: 10 MG/DL (ref 8–23)
BUN SERPL-MCNC: 9 MG/DL (ref 8–23)
CALCIUM SERPL-MCNC: 7.4 MG/DL (ref 8.5–9.9)
CALCIUM SERPL-MCNC: 7.4 MG/DL (ref 8.5–9.9)
CHLORIDE SERPL-SCNC: 105 MEQ/L (ref 95–107)
CHLORIDE SERPL-SCNC: 106 MEQ/L (ref 95–107)
CO2 SERPL-SCNC: 22 MEQ/L (ref 20–31)
CO2 SERPL-SCNC: 25 MEQ/L (ref 20–31)
CREAT SERPL-MCNC: 0.51 MG/DL (ref 0.7–1.2)
CREAT SERPL-MCNC: 0.57 MG/DL (ref 0.7–1.2)
EOSINOPHIL # BLD: 0.3 K/UL (ref 0–0.7)
EOSINOPHIL NFR BLD: 4.1 %
ERYTHROCYTE [DISTWIDTH] IN BLOOD BY AUTOMATED COUNT: 16.9 % (ref 11.5–14.5)
GLOBULIN SER CALC-MCNC: 2 G/DL (ref 2.3–3.5)
GLUCOSE BLD-MCNC: 112 MG/DL (ref 70–99)
GLUCOSE BLD-MCNC: 141 MG/DL (ref 70–99)
GLUCOSE BLD-MCNC: 145 MG/DL (ref 70–99)
GLUCOSE BLD-MCNC: 96 MG/DL (ref 70–99)
GLUCOSE SERPL-MCNC: 100 MG/DL (ref 70–99)
GLUCOSE SERPL-MCNC: 140 MG/DL (ref 70–99)
HCT VFR BLD AUTO: 25.1 % (ref 42–52)
HGB BLD-MCNC: 8.4 G/DL (ref 14–18)
LYMPHOCYTES # BLD: 0.7 K/UL (ref 1–4.8)
LYMPHOCYTES NFR BLD: 9.7 %
MAGNESIUM SERPL-MCNC: 1.4 MG/DL (ref 1.7–2.4)
MAGNESIUM SERPL-MCNC: 2.4 MG/DL (ref 1.7–2.4)
MCH RBC QN AUTO: 26.9 PG (ref 27–31.3)
MCHC RBC AUTO-ENTMCNC: 33.5 % (ref 33–37)
MCV RBC AUTO: 80.4 FL (ref 79–92.2)
MONOCYTES # BLD: 0.6 K/UL (ref 0.2–0.8)
MONOCYTES NFR BLD: 7.7 %
NEUTROPHILS # BLD: 5.3 K/UL (ref 1.4–6.5)
NEUTS SEG NFR BLD: 75 %
PERFORMED ON: ABNORMAL
PERFORMED ON: NORMAL
PLATELET # BLD AUTO: 106 K/UL (ref 130–400)
POTASSIUM SERPL-SCNC: 3.2 MEQ/L (ref 3.4–4.9)
POTASSIUM SERPL-SCNC: 4 MEQ/L (ref 3.4–4.9)
PROT SERPL-MCNC: 4 G/DL (ref 6.3–8)
RBC # BLD AUTO: 3.12 M/UL (ref 4.7–6.1)
SODIUM SERPL-SCNC: 136 MEQ/L (ref 135–144)
SODIUM SERPL-SCNC: 137 MEQ/L (ref 135–144)
VANCOMYCIN SERPL-MCNC: 12.2 UG/ML (ref 10–40)
WBC # BLD AUTO: 7.1 K/UL (ref 4.8–10.8)

## 2024-07-25 PROCEDURE — 6370000000 HC RX 637 (ALT 250 FOR IP): Performed by: INTERNAL MEDICINE

## 2024-07-25 PROCEDURE — 2580000003 HC RX 258: Performed by: INTERNAL MEDICINE

## 2024-07-25 PROCEDURE — 99232 SBSQ HOSP IP/OBS MODERATE 35: CPT | Performed by: NURSE PRACTITIONER

## 2024-07-25 PROCEDURE — 6360000002 HC RX W HCPCS: Performed by: FAMILY MEDICINE

## 2024-07-25 PROCEDURE — 6360000002 HC RX W HCPCS: Performed by: INTERNAL MEDICINE

## 2024-07-25 PROCEDURE — 6370000000 HC RX 637 (ALT 250 FOR IP): Performed by: NURSE PRACTITIONER

## 2024-07-25 PROCEDURE — 83735 ASSAY OF MAGNESIUM: CPT

## 2024-07-25 PROCEDURE — 80053 COMPREHEN METABOLIC PANEL: CPT

## 2024-07-25 PROCEDURE — 80202 ASSAY OF VANCOMYCIN: CPT

## 2024-07-25 PROCEDURE — 85025 COMPLETE CBC W/AUTO DIFF WBC: CPT

## 2024-07-25 PROCEDURE — 99221 1ST HOSP IP/OBS SF/LOW 40: CPT | Performed by: COLON & RECTAL SURGERY

## 2024-07-25 PROCEDURE — 11042 DBRDMT SUBQ TIS 1ST 20SQCM/<: CPT

## 2024-07-25 PROCEDURE — 6370000000 HC RX 637 (ALT 250 FOR IP)

## 2024-07-25 PROCEDURE — 1210000000 HC MED SURG R&B

## 2024-07-25 PROCEDURE — 6360000002 HC RX W HCPCS: Performed by: NURSE PRACTITIONER

## 2024-07-25 RX ORDER — MAGNESIUM SULFATE IN WATER 40 MG/ML
4000 INJECTION, SOLUTION INTRAVENOUS ONCE
Status: COMPLETED | OUTPATIENT
Start: 2024-07-25 | End: 2024-07-25

## 2024-07-25 RX ORDER — POTASSIUM CHLORIDE 7.45 MG/ML
10 INJECTION INTRAVENOUS
Status: COMPLETED | OUTPATIENT
Start: 2024-07-25 | End: 2024-07-25

## 2024-07-25 RX ADMIN — Medication 250 MG: at 00:10

## 2024-07-25 RX ADMIN — SODIUM CHLORIDE, PRESERVATIVE FREE 40 MG: 5 INJECTION INTRAVENOUS at 22:58

## 2024-07-25 RX ADMIN — MICONAZOLE NITRATE: 2 POWDER TOPICAL at 10:21

## 2024-07-25 RX ADMIN — DIVALPROEX SODIUM 500 MG: 125 CAPSULE, COATED PELLETS ORAL at 22:57

## 2024-07-25 RX ADMIN — INSULIN GLARGINE 11 UNITS: 100 INJECTION, SOLUTION SUBCUTANEOUS at 22:59

## 2024-07-25 RX ADMIN — ESCITALOPRAM OXALATE 15 MG: 10 TABLET ORAL at 22:56

## 2024-07-25 RX ADMIN — MAGNESIUM SULFATE HEPTAHYDRATE 4000 MG: 40 INJECTION, SOLUTION INTRAVENOUS at 01:44

## 2024-07-25 RX ADMIN — CARVEDILOL 6.25 MG: 6.25 TABLET, FILM COATED ORAL at 22:58

## 2024-07-25 RX ADMIN — POTASSIUM BICARBONATE 50 MEQ: 978 TABLET, EFFERVESCENT ORAL at 01:38

## 2024-07-25 RX ADMIN — SACUBITRIL AND VALSARTAN 1 TABLET: 24; 26 TABLET, FILM COATED ORAL at 09:30

## 2024-07-25 RX ADMIN — POTASSIUM CHLORIDE 10 MEQ: 7.46 INJECTION, SOLUTION INTRAVENOUS at 02:38

## 2024-07-25 RX ADMIN — Medication 10 ML: at 22:59

## 2024-07-25 RX ADMIN — Medication 250 MG: at 12:01

## 2024-07-25 RX ADMIN — POTASSIUM CHLORIDE 10 MEQ: 7.46 INJECTION, SOLUTION INTRAVENOUS at 03:44

## 2024-07-25 RX ADMIN — DIPHENHYDRAMINE HYDROCHLORIDE 25 MG: 50 INJECTION INTRAMUSCULAR; INTRAVENOUS at 09:30

## 2024-07-25 RX ADMIN — Medication 250 MG: at 05:47

## 2024-07-25 RX ADMIN — POTASSIUM CHLORIDE 10 MEQ: 7.46 INJECTION, SOLUTION INTRAVENOUS at 01:45

## 2024-07-25 RX ADMIN — VANCOMYCIN HYDROCHLORIDE 750 MG: 750 INJECTION, POWDER, LYOPHILIZED, FOR SOLUTION INTRAVENOUS at 09:34

## 2024-07-25 RX ADMIN — SACUBITRIL AND VALSARTAN 1 TABLET: 24; 26 TABLET, FILM COATED ORAL at 22:57

## 2024-07-25 RX ADMIN — CARVEDILOL 6.25 MG: 6.25 TABLET, FILM COATED ORAL at 09:30

## 2024-07-25 RX ADMIN — MUPIROCIN: 20 OINTMENT TOPICAL at 23:32

## 2024-07-25 RX ADMIN — Medication 10 ML: at 09:30

## 2024-07-25 RX ADMIN — DIVALPROEX SODIUM 500 MG: 125 CAPSULE, COATED PELLETS ORAL at 09:30

## 2024-07-25 RX ADMIN — MICONAZOLE NITRATE: 2 POWDER TOPICAL at 22:59

## 2024-07-25 RX ADMIN — Medication 250 MG: at 16:52

## 2024-07-25 RX ADMIN — SODIUM CHLORIDE, PRESERVATIVE FREE 40 MG: 5 INJECTION INTRAVENOUS at 09:30

## 2024-07-25 NOTE — PROGRESS NOTES
Pharmacy Vancomycin Consult     Vancomycin Day: 7  Current Dosing: Vanco 750mg q 24h      Recent Labs     07/24/24  0551 07/25/24  0000 07/25/24  0600   BUN 12 10 9   CREATININE 0.55* 0.57* 0.51*   WBC 10.7  --  7.1       Intake/Output Summary (Last 24 hours) at 7/25/2024 0658  Last data filed at 7/25/2024 0649  Gross per 24 hour   Intake 2248.25 ml   Output 1450 ml   Net 798.25 ml     Cultures  Recent Labs     07/08/24  0257 07/07/24  1926 07/07/24  1916   BC  --   --  Gram stain aerobic bottle  Gram positive cocci in clusters-resembling Staph  2 out of 2 blood cultures  Further results to follow  Further testing performed at [facility]  *   BLOODCULT2  --  Gram stain aerobic bottle  Gram positive cocci in clusters-resembling Staph  2 out of 2 blood cultures  Further results to follow  Further testing performed at [facility]  *  --    ORG Proteus mirabilis*  Staph aureus MRSA*  Escherichia coli* Staphylococcus epidermidis* Staphylococcus hominis*   COVID19  --   --  Not Detected     Height: 170.2 cm (5' 7\"), Weight - Scale: 73.9 kg (163 lb), Body mass index is 25.53 kg/m².    Estimated Creatinine Clearance: 139 mL/min (A) (based on SCr of 0.51 mg/dL (L)).  .    Trough:  Recent Labs     07/25/24  0600   VANCORANDOM 12.2      Assessment/Plan:  Data input into [employer] platform. Current dosing sub-therapeutic for goal. Will increase dosing to Vanco 750mg IV q18h. Predicted therapeutic  trough 14.1. Plan repeat level in 24-48 hours to further assess dosing.  Timing of future trough levels may be adjusted based on culture results, renal function, and clinical response.    Thank you,  Tonie Jaime, RPH PharmD

## 2024-07-25 NOTE — PROGRESS NOTES
Unstable Housing in the Last Year: No         Lab Results   Component Value Date    LABA1C 7.8 (H) 07/19/2024     Lab Results   Component Value Date     07/19/2024       Lab Results   Component Value Date/Time     07/24/2024 05:51 AM    K 3.3 07/24/2024 05:51 AM    K 3.4 07/23/2024 10:53 AM     07/24/2024 05:51 AM    CO2 22 07/24/2024 05:51 AM    BUN 12 07/24/2024 05:51 AM    CREATININE 0.55 07/24/2024 05:51 AM    GLUCOSE 61 07/24/2024 05:51 AM    GLUCOSE 174 08/22/2023 05:28 AM    CALCIUM 7.5 07/24/2024 05:51 AM        Lab Results   Component Value Date    CHOL 174 11/09/2023    CHOL 149 11/02/2023    CHOL 172 10/31/2023     Lab Results   Component Value Date    TRIG 164 (H) 11/09/2023    TRIG 99 11/02/2023    TRIG 125 10/31/2023     Lab Results   Component Value Date    HDL 41 11/09/2023    HDL 38 (L) 11/02/2023    HDL 42 10/31/2023     No components found for: \"LDLCHOLESTEROL\", \"LDLCALC\"  Lab Results   Component Value Date    VLDL 31 08/21/2023     Lab Results   Component Value Date    CHOLHDLRATIO 6.0 (A) 08/21/2023    CHOLHDLRATIO 5.5 07/19/2012       Lab Results   Component Value Date    TSH 0.976 07/07/2024       Lab Results   Component Value Date    WBC 10.7 07/24/2024    HGB 8.5 (L) 07/24/2024    HCT 25.0 (L) 07/24/2024    MCV 79.1 07/24/2024     (L) 07/24/2024       Please note orders entered on site at facility after discussion with appropriate facility nursing/therapy/ / nutritional staff. Current longstanding medical problems and acute medical issues addressed with staff. Available data and data elements in on site paper chart reviewed and analyzed.  Current external consultant notes reviewed in on site chart. Ordered laboratory testing and imaging will be reviewed when available.

## 2024-07-25 NOTE — PROGRESS NOTES
Patient seen by Palliative Care.  Once patient is medically cleared he will return to [facility].    HAYDEN/MAXIMO to follow  Electronically signed by HAYDEN Garcia on 7/25/24 at 3:34 PM EDT

## 2024-07-25 NOTE — CONSULTS
Department of General Surgery - Adult  Surgical Service colorectal surgery  Attending Consult Note      Reason for Consult: Rectal bleeding/C. difficile colitis/abnormal colonoscopy      CHIEF COMPLAINT: Anemia/rectal bleeding    History Obtained From:  patient, electronic medical record    HISTORY OF PRESENT ILLNESS:                The patient is a 64 y.o. male who presents with rectal bleeding.  Patient is on Eliquis for atrial fibrillation.  I have reviewed gastroenterology notes which documented history of C. difficile colitis since early July being treated with oral antibiotics.    Patient had a colonoscopy for rectal bleeding and anemia yesterday.  I reviewed the images.  There were number of polyps.  There was concern regarding abnormal mucosa near the rectosigmoid junction.  From the images available, I cannot appreciate whether this is reflective of pseudopolyposis from underlying infectious colitis.    Patient is not oriented to place.  He is a poor historian and does not recall many if any facets of his hospitalizations over the past month.    I reviewed a CAT scan from July 18, 2024 along with the colonoscopy report as outlined above    Past Medical History:        Diagnosis Date    Asthma     Back pain     compresion in [place]    CAD (coronary artery disease) 01/2020    3 vessel     chf 09/16/2022    CVA (cerebral vascular accident) (HCC)     Diabetes mellitus (HCC)     Essential hypertension, benign     Hyperlipemia     Ischemic cardiomyopathy 09/16/2022    Neuropathic pain, leg, bilateral      Past Surgical History:        Procedure Laterality Date    CARPAL TUNNEL RELEASE Bilateral     COLONOSCOPY N/A 7/24/2024    COLONOSCOPY with polypectomies and biopsies performed by Kelton Lopez MD at [facility]    EP DEVICE PROCEDURE N/A 9/29/2023    Loop recorder insert  ROOM 293 [employer] NOTIFIED performed by Maxwell Tyler DO at [facility]    FOOT DEBRIDEMENT Right 5/5/2023    RIGHT

## 2024-07-25 NOTE — PROGRESS NOTES
[facility]   Facility/Department: [facility]  4W MED SURG UNIT  Speech Language Pathology    Gary Turk  1960  [id number]    Date: 7/25/2024      Speech Therapy attempted to see Gary Turk on this date for a/an:    Treatment    Pt was unable to be seen due to:   Patient refused - Following introduction, pt declined stating he wants to \"sleep\" and that he doesn't get any rest here. SLP provided encouragement, pt continued to decline. Will re-att as able.         Electronically signed by GEE Joseph on 7/25/24 at 11:12 AM EDT

## 2024-07-25 NOTE — PLAN OF CARE
Problem: Discharge Planning  Goal: Discharge to home or other facility with appropriate resources  Outcome: Progressing  Flowsheets (Taken 7/24/2024 0825 by Keri Culver, RN)  Discharge to home or other facility with appropriate resources:   Identify barriers to discharge with patient and caregiver   Arrange for needed discharge resources and transportation as appropriate     Problem: Safety - Adult  Goal: Free from fall injury  Outcome: Progressing     Problem: Skin/Tissue Integrity  Goal: Absence of new skin breakdown  Description: 1.  Monitor for areas of redness and/or skin breakdown  2.  Assess vascular access sites hourly  3.  Every 4-6 hours minimum:  Change oxygen saturation probe site  4.  Every 4-6 hours:  If on nasal continuous positive airway pressure, respiratory therapy assess nares and determine need for appliance change or resting period.  Outcome: Progressing     Problem: Chronic Conditions and Co-morbidities  Goal: Patient's chronic conditions and co-morbidity symptoms are monitored and maintained or improved  Outcome: Progressing  Flowsheets (Taken 7/24/2024 0825 by Keri Culver, RN)  Care Plan - Patient's Chronic Conditions and Co-Morbidity Symptoms are Monitored and Maintained or Improved:   Monitor and assess patient's chronic conditions and comorbid symptoms for stability, deterioration, or improvement   Collaborate with multidisciplinary team to address chronic and comorbid conditions and prevent exacerbation or deterioration     Problem: Nutrition Deficit:  Goal: Optimize nutritional status  Outcome: Progressing     Problem: Pain  Goal: Verbalizes/displays adequate comfort level or baseline comfort level  Outcome: Progressing

## 2024-07-25 NOTE — PROGRESS NOTES
Hospitalist Progress Note      PCP: Lobo Monteiro MD    Date of Admission: 7/18/2024    Chief Complaint:    Chief Complaint   Patient presents with    Rectal Bleeding     Currently being treated for CDIFF       Subjective:  Patient is pleasantly confused. Sleepy, rousable.  One bloody bm overnight.    Medications:  Reviewed    Infusion Medications    sodium chloride      dextrose       Scheduled Medications    vancomycin  750 mg IntraVENous Q18H    mupirocin   Topical BID    insulin lispro  0-4 Units SubCUTAneous TID WC    insulin lispro  0-4 Units SubCUTAneous Nightly    carvedilol  6.25 mg Oral BID    escitalopram  15 mg Oral Nightly    sacubitril-valsartan  1 tablet Oral BID    divalproex  500 mg Oral BID    miconazole   Topical BID    sodium chloride flush  5-40 mL IntraVENous 2 times per day    pantoprazole (PROTONIX) 40 mg in sodium chloride (PF) 0.9 % 10 mL injection  40 mg IntraVENous Q12H    insulin glargine  0.15 Units/kg SubCUTAneous Nightly    vancomycin  250 mg Oral 4 times per day    vancomycin (VANCOCIN) intermittent dosing (placeholder)   Other RX Placeholder     PRN Meds: diphenhydrAMINE, sodium chloride flush, sodium chloride, ondansetron **OR** ondansetron, acetaminophen **OR** acetaminophen, glucose, dextrose bolus **OR** dextrose bolus, glucagon (rDNA), dextrose      Intake/Output Summary (Last 24 hours) at 7/25/2024 1115  Last data filed at 7/25/2024 0649  Gross per 24 hour   Intake 2228.25 ml   Output 1450 ml   Net 778.25 ml     Exam:    /69   Pulse 70   Temp 97.7 °F (36.5 °C) (Oral)   Resp 18   Ht 1.702 m (5' 7\")   Wt 73.9 kg (163 lb)   SpO2 100%   BMI 25.53 kg/m²     General appearance: alert, appears stated age   Skin:  No rashes or lesions on exposed skin  HEENT: Head: Normal, normocephalic, atraumatic..   Neck: trachea midline  Lungs: Clear bilateral breath sounds  Heart: RRR  Abdomen: Non tender  Extremities: No peripheral edema  Neurologic: confused     Labs:   Recent Labs  chronic diastolic CHF:  slightly overloaded; IV lasix ordered; home meds resumed; ?fluids started overnight but unclear reason why; d/c fluids  A Fib:  Home meds but hold anticoagulation; resumed coreg on lower dose   DMII:  SSI with evening lantus  Goals of care:  awaiting ethics consultation given the family dynamic issues   Functional Status: Fall precautions. Up with assistance. PT OT  Diet: clear liquid   DVT ppx: SCDs  Disposition: Dependent on hospital course. Will discharge once medically stable. DANDRE on board for discharge planning.  7/22 - ethics consult pending, dc planning.  7/23 - plan for back to ltc tomorrow.  7/24 - plan now for follow up to colonoscopy with surgery    Active Hospital Problems    Diagnosis Date Noted    Encounter for hospice care discussion [Z71.89] 07/24/2024    Advanced care planning/counseling discussion [Z71.89] 07/24/2024    Goals of care, counseling/discussion [Z71.89] 07/24/2024    Palliative care encounter [Z51.5] 07/24/2024    Rectal bleeding [K62.5] 07/21/2024    Pressure injury of left heel, unstageable (HCC) [L89.620] 07/19/2024    Pressure injury of sacral region, stage 2 (HCC) [L89.152] 07/19/2024    GIB (gastrointestinal bleeding) [K92.2] 07/18/2024        Additional work up or/and treatment plan may be added today or then after based on clinical progression. I am managing a portion of pt care. Some medical issues are handled by other specialists. Additional work up and treatment should be done in out pt setting by pt PCP and other out pt providers.     In addition to examining and evaluating pt, I spent additional time explaining care, normal and abnormal findings, and treatment plan. All of pt questions were answered. Counseling, diet and education were  provided. Case will be discussed with nursing staff when appropriate. Family will be updated if and when appropriate.      Diet: ADULT DIET; Full Liquid  ADULT ORAL NUTRITION SUPPLEMENT; Breakfast, Lunch, Dinner;

## 2024-07-25 NOTE — PROGRESS NOTES
Patient wife Yeimy at bedside. This RN and Lali HILLS RN witnessed her saying that she will defer decision making to patient daughter.

## 2024-07-25 NOTE — PROGRESS NOTES
Wound Follow-up Progress Note       NAME:  Gary Turk  MEDICAL RECORD NUMBER:  [id number]  AGE:  64 y.o.   GENDER:  male  :  1960  TODAY'S DATE:  2024    Subjective:   Wound Identification:  Wound Type: pressure and traumatic   Contributing Factors: chronic pressure, decreased mobility, and shear force        Patient Goal of Care:  [x] Wound Healing  [] Odor Control  [] Palliative Care  [] Pain Control   [] Other:     Objective:    /69   Pulse 70   Temp 97.7 °F (36.5 °C) (Oral)   Resp 18   Ht 1.702 m (5' 7\")   Wt 73.9 kg (163 lb)   SpO2 100%   BMI 25.53 kg/m²   Johnny Risk Score: Johnny Scale Score: 16  Assessment:   Measurements:  Wound 10/07/23 Sacrum (Active)   Number of days: 291       Wound 10/14/23 Heel Left (Active)   Wound Image   24 1300   Wound Etiology Pressure Unstageable 24   Dressing Status New dressing applied 24   Wound Cleansed Cleansed with saline 24   Dressing/Treatment Other (comment) 24   Dressing Change Due 24   Wound Length (cm) 1.5 cm 24   Wound Width (cm) 2 cm 24   Wound Surface Area (cm^2) 3 cm^2 24   Change in Wound Size % (l*w) 0 24   Wound Assessment Eschar moist;Slough 24   Drainage Amount Scant (moist but unmeasurable) 24   Drainage Description Serosanguinous 24   Odor Mild 24   Valeria-wound Assessment Blisters;Blanchable erythema 24   Margins Defined edges 24   Wound Thickness Description not for Pressure Injury Full thickness 24   Number of days: 285       Wound 10/14/23 Sacrum (Active)   Wound Etiology Pressure Stage 2 24   Dressing Status New dressing applied 24   Wound Cleansed Not Cleansed 24   Dressing/Treatment Zinc paste 07/25/24 0900   Offloading for Diabetic Foot Ulcers Offloading ordered  07/19/24 1300   Wound Etiology Pressure Unstageable 07/25/24 0900   Dressing Status New dressing applied 07/25/24 0900   Wound Cleansed Cleansed with saline 07/25/24 0900   Dressing/Treatment Other (comment) 07/25/24 0900   Dressing Change Due 07/26/24 07/25/24 0900   Wound Length (cm) 1.5 cm 07/25/24 0900   Wound Width (cm) 2 cm 07/25/24 0900   Wound Surface Area (cm^2) 3 cm^2 07/25/24 0900   Change in Wound Size % (l*w) 0 07/25/24 0900   Wound Assessment Eschar moist;Slough 07/25/24 0900   Drainage Amount Scant (moist but unmeasurable) 07/25/24 0900   Drainage Description Serosanguinous 07/25/24 0900   Odor Mild 07/25/24 0900   Valeria-wound Assessment Blisters;Blanchable erythema 07/25/24 0900   Margins Defined edges 07/25/24 0900   Wound Thickness Description not for Pressure Injury Full thickness 07/25/24 0900   Number of days: 285       Wound 10/14/23 Sacrum (Active)   Wound Etiology Pressure Stage 2 07/25/24 0900   Dressing Status New dressing applied 07/25/24 0900   Wound Cleansed Not Cleansed 07/25/24 0900   Dressing/Treatment Zinc paste 07/25/24 0900   Offloading for Diabetic Foot Ulcers Offloading ordered 07/19/24 2310   Dressing Change Due 07/26/24 07/25/24 0900   Wound Length (cm) 0.5 cm 07/25/24 0900   Wound Width (cm) 0.5 cm 07/25/24 0900   Wound Surface Area (cm^2) 0.25 cm^2 07/25/24 0900   Change in Wound Size % (l*w) 0 07/25/24 0900   Wound Assessment Pink/red;Eschar dry 07/25/24 0900   Drainage Amount None (dry) 07/25/24 0900   Drainage Description Serosanguinous 07/24/24 2217   Odor None 07/25/24 0900   Valeria-wound Assessment Dry/flaky;Blanchable erythema 07/25/24 0900   Margins Defined edges 07/25/24 0900   Wound Thickness Description not for Pressure Injury Partial thickness 07/25/24 0900   Number of days: 285       Wound 07/18/24 Scrotum Proximal skin tear (Active)   Wound Etiology Skin Tear 07/24/24 0825   Dressing Status New dressing applied 07/24/24 0825   Wound Cleansed Soap and water 07/24/24

## 2024-07-25 NOTE — PROGRESS NOTES
Progress Note  Date:2024       Room:[place] [address]-  Patient Name:Indra Dorantes     YOB: 1960     Age:64 y.o.        Subjective    Subjective had rectal bleeding last night, nothing today, tolerating liquids well.  Review of Systems  Objective         Vitals Last 24 Hours:  TEMPERATURE:  Temp  Av.5 °F (36.4 °C)  Min: 97.2 °F (36.2 °C)  Max: 97.7 °F (36.5 °C)  RESPIRATIONS RANGE: Resp  Av.5  Min: 16  Max: 18  PULSE OXIMETRY RANGE: SpO2  Av %  Min: 98 %  Max: 100 %  PULSE RANGE: Pulse  Av.3  Min: 70  Max: 76  BLOOD PRESSURE RANGE: Systolic (24hrs), Av , Min:102 , Max:154   ; Diastolic (24hrs), Av, Min:58, Max:91    I/O (24Hr):    Intake/Output Summary (Last 24 hours) at 2024 1706  Last data filed at 2024 1635  Gross per 24 hour   Intake 2728.75 ml   Output 1450 ml   Net 1278.75 ml     Objective  Labs/Imaging/Diagnostics    Labs:  CBC:  Recent Labs     24  1053 24  0551 24  0600   WBC 10.4 10.7 7.1   RBC 3.35* 3.16* 3.12*   HGB 9.4* 8.5* 8.4*   HCT 27.0* 25.0* 25.1*   MCV 80.6 79.1 80.4   RDW 17.2* 16.7* 16.9*   * 120* 106*     CHEMISTRIES:  Recent Labs     24  0551 24  0000 24  0600    137 136   K 3.3* 3.2* 4.0    106 105   CO2 22 22 25   BUN 12 10 9   CREATININE 0.55* 0.57* 0.51*   GLUCOSE 61* 140* 100*   MG 1.4* 1.4* 2.4     PT/INR:No results for input(s): \"PROTIME\", \"INR\" in the last 72 hours.  APTT:No results for input(s): \"APTT\" in the last 72 hours.  LIVER PROFILE:  Recent Labs     24  0551 24  0600   AST 13 11   ALT 10 9   BILITOT <0.2 <0.2   ALKPHOS 62 62       Imaging Last 24 Hours:  Colonoscopy    Result Date: 2024  [facility] Patient: INDRA DORANTES MRN: [id number] : 1960 Account: [id number] Sex at Birth: Male Age: 64 Years Procedure: Colonoscopy Date: 2024 Attending Physician: Kelton Lopez Indications:        -  Rectal bleeding        - History of C. difficile

## 2024-07-25 NOTE — PROGRESS NOTES
Palliative Care Progress Note  Patient: Gary Turk  Gender: male  YOB: 1960  Unit/Bed: W487/W487-01  CodeStatus: Full Code  Inpatient Treatment Team: Treatment Team: Attending Provider: Dave Harkins MD; Nurse Practitioner: Jeannie Krishnan APRN - CNP; Utilization Reviewer: Aurora Thakkar RN; Surgeon: Kelton Lopez MD; : Awa Granger RN; Consulting Physician: Polo Jaime MD; Registered Nurse: Areli Jean RN; : Nikia Landeros; Utilization Reviewer: Aurora Lares RN  Admit Date:  7/18/2024    Chief Complaint: Rectal bleeding    History of Presenting Illness:      Gary Turk is a 64 y.o. male on hospital day 7 with a history of C. difficile, chronic OM, CAD, diabetes type 2, hypertension, hyperlipidemia, ICM, proximal A-fib on Eliquis, CVA right-sided hemiplegia and dementia.    Patient was brought to the emergency room with bloody diarrhea. Patient was admitted for bright red blood per rectum, C. difficile colitis.    Palliative care was consulted for goals of care and end-stage disease,     Patient lives at [facility].     Upon entering room patient is sleeping in bed. He is easily aroused. He is able to state name and knows he is in the hospital but does not know which hospital. He does not know month or year. When asked if he knows why he is in the hospital he states \"why are you asking all of these questions\". He would not answer if he had pain or sob. Patient appears comfortable on room air. Patient has slight swelling to BLE. Patient flaccid on right side. Patient to go for colonoscopy today.     Most recent notable labs: A-fib on Eliquis potassium 3.3, creatinine 0.55, magnesium 1.4, total protein 4.0, albumin 2.0, hemoglobin 8.5      7/25/24    Patient alert and oriented to person only.  Patient in no apparent distress.  Per nurse patient has had no shortness of breath, coughing, fevers or complaints of pain.  Patient had a bloody bowel  DRAINAGE ROOM 174 performed by Jairo Garcia DPM at [facility]    FOOT DEBRIDEMENT Right 5/8/2023    RIGHT FOOT  INCISION AND DRAINAGE WITH DEBRIDEMENT OF NON-VIABLE TISSUE DELAYED PRIMARY CLOSURE performed by Jairo Garcia DPM at [facility]    FOOT SURGERY Left     bone spur    HIP SURGERY Left 10/5/2023    TROCHANTERIC FIXATION NAILING LEFT FEMUR FRACTURE ROOM 265 performed by Mirza Elliott MD at [facility]    LAMINOTOMY  2012    SPINE SURGERY N/A 11/11/2022    L1 VERETEBRAL AUGMENTATION performed by Concetta Junior MD at [facility]       Social Hx:  Social History     Socioeconomic History    Marital status:      Spouse name: rodríguez    Number of children: 2    Years of education: 14    Highest education level: Some college, no degree   Occupational History    Occupation: disability    Tobacco Use    Smoking status: Never     Passive exposure: Never    Smokeless tobacco: Never   Vaping Use    Vaping Use: Never used   Substance and Sexual Activity    Alcohol use: Not Currently     Alcohol/week: 2.0 standard drinks of alcohol     Types: 2 Cans of beer per week    Drug use: No    Sexual activity: Not Currently     Partners: Female   Social History Narrative    Lives with wife.  Per patient he has been  for 2 years.  He met his wife online and went to [facility] and brought her back to [facility].  They live in [facility] into home. It is a 2 story home. Bedroom and bath upstairs. Bathroom down. Per patient once he is down the steps he doesn't go back up during the day.    2 daughters local.      he has very poor vision.  He does not like to drive at night or when raining.  Patient states he struggles looking at a computer or reading due to vision.         Type of Home: [place] in [address]    Home Layout: Two level    Home Access: Level entry    Bathroom Shower/Tub: Tub/Shower unit    Bathroom Equipment: Hand-held shower    Home Equipment: Cane, Rollator    ADL Assistance: Needs assistance

## 2024-07-26 VITALS
TEMPERATURE: 98.1 F | BODY MASS INDEX: 31.48 KG/M2 | OXYGEN SATURATION: 97 % | HEART RATE: 86 BPM | DIASTOLIC BLOOD PRESSURE: 60 MMHG | SYSTOLIC BLOOD PRESSURE: 117 MMHG | HEIGHT: 67 IN | WEIGHT: 200.6 LBS | RESPIRATION RATE: 14 BRPM

## 2024-07-26 LAB
ALBUMIN SERPL-MCNC: 2.1 G/DL (ref 3.5–4.6)
ALP SERPL-CCNC: 66 U/L (ref 35–104)
ALT SERPL-CCNC: 6 U/L (ref 0–41)
ANION GAP SERPL CALCULATED.3IONS-SCNC: 5 MEQ/L (ref 9–15)
AST SERPL-CCNC: 8 U/L (ref 0–40)
BASOPHILS # BLD: 0 K/UL (ref 0–0.2)
BASOPHILS NFR BLD: 0.3 %
BILIRUB SERPL-MCNC: <0.2 MG/DL (ref 0.2–0.7)
BUN SERPL-MCNC: 6 MG/DL (ref 8–23)
CALCIUM SERPL-MCNC: 7.4 MG/DL (ref 8.5–9.9)
CHLORIDE SERPL-SCNC: 103 MEQ/L (ref 95–107)
CO2 SERPL-SCNC: 26 MEQ/L (ref 20–31)
CREAT SERPL-MCNC: 0.51 MG/DL (ref 0.7–1.2)
EOSINOPHIL # BLD: 0.2 K/UL (ref 0–0.7)
EOSINOPHIL NFR BLD: 3.7 %
ERYTHROCYTE [DISTWIDTH] IN BLOOD BY AUTOMATED COUNT: 16.7 % (ref 11.5–14.5)
GLOBULIN SER CALC-MCNC: 1.9 G/DL (ref 2.3–3.5)
GLUCOSE BLD-MCNC: 113 MG/DL (ref 70–99)
GLUCOSE BLD-MCNC: 83 MG/DL (ref 70–99)
GLUCOSE SERPL-MCNC: 82 MG/DL (ref 70–99)
HCT VFR BLD AUTO: 24.8 % (ref 42–52)
HGB BLD-MCNC: 8.3 G/DL (ref 14–18)
LYMPHOCYTES # BLD: 0.6 K/UL (ref 1–4.8)
LYMPHOCYTES NFR BLD: 9 %
MAGNESIUM SERPL-MCNC: 1.8 MG/DL (ref 1.7–2.4)
MCH RBC QN AUTO: 26.9 PG (ref 27–31.3)
MCHC RBC AUTO-ENTMCNC: 33.5 % (ref 33–37)
MCV RBC AUTO: 80.3 FL (ref 79–92.2)
MONOCYTES # BLD: 0.6 K/UL (ref 0.2–0.8)
MONOCYTES NFR BLD: 8.7 %
NEUTROPHILS # BLD: 4.9 K/UL (ref 1.4–6.5)
NEUTS SEG NFR BLD: 76.4 %
PERFORMED ON: ABNORMAL
PERFORMED ON: NORMAL
PLATELET # BLD AUTO: 110 K/UL (ref 130–400)
POTASSIUM SERPL-SCNC: 4.2 MEQ/L (ref 3.4–4.9)
PROT SERPL-MCNC: 4 G/DL (ref 6.3–8)
RBC # BLD AUTO: 3.09 M/UL (ref 4.7–6.1)
SODIUM SERPL-SCNC: 134 MEQ/L (ref 135–144)
WBC # BLD AUTO: 6.4 K/UL (ref 4.8–10.8)

## 2024-07-26 PROCEDURE — 80053 COMPREHEN METABOLIC PANEL: CPT

## 2024-07-26 PROCEDURE — 99232 SBSQ HOSP IP/OBS MODERATE 35: CPT | Performed by: NURSE PRACTITIONER

## 2024-07-26 PROCEDURE — 6370000000 HC RX 637 (ALT 250 FOR IP): Performed by: INTERNAL MEDICINE

## 2024-07-26 PROCEDURE — 6370000000 HC RX 637 (ALT 250 FOR IP)

## 2024-07-26 PROCEDURE — 83735 ASSAY OF MAGNESIUM: CPT

## 2024-07-26 PROCEDURE — 6360000002 HC RX W HCPCS: Performed by: FAMILY MEDICINE

## 2024-07-26 PROCEDURE — 6360000002 HC RX W HCPCS: Performed by: INTERNAL MEDICINE

## 2024-07-26 PROCEDURE — 2580000003 HC RX 258: Performed by: INTERNAL MEDICINE

## 2024-07-26 PROCEDURE — 85025 COMPLETE CBC W/AUTO DIFF WBC: CPT

## 2024-07-26 RX ORDER — CARVEDILOL 6.25 MG/1
6.25 TABLET ORAL 2 TIMES DAILY
Qty: 60 TABLET | Refills: 3 | DISCHARGE
Start: 2024-07-26

## 2024-07-26 RX ORDER — FUROSEMIDE 10 MG/ML
40 INJECTION INTRAMUSCULAR; INTRAVENOUS ONCE
Status: COMPLETED | OUTPATIENT
Start: 2024-07-26 | End: 2024-07-26

## 2024-07-26 RX ORDER — VANCOMYCIN HYDROCHLORIDE 125 MG/1
250 CAPSULE ORAL 4 TIMES DAILY
Qty: 336 CAPSULE | Refills: 0 | DISCHARGE
Start: 2024-07-26 | End: 2024-09-06

## 2024-07-26 RX ADMIN — SACUBITRIL AND VALSARTAN 1 TABLET: 24; 26 TABLET, FILM COATED ORAL at 07:57

## 2024-07-26 RX ADMIN — Medication 250 MG: at 06:06

## 2024-07-26 RX ADMIN — DIVALPROEX SODIUM 500 MG: 125 CAPSULE, COATED PELLETS ORAL at 07:57

## 2024-07-26 RX ADMIN — SODIUM CHLORIDE, PRESERVATIVE FREE 40 MG: 5 INJECTION INTRAVENOUS at 07:56

## 2024-07-26 RX ADMIN — Medication 250 MG: at 00:29

## 2024-07-26 RX ADMIN — MICONAZOLE NITRATE: 2 POWDER TOPICAL at 07:59

## 2024-07-26 RX ADMIN — FUROSEMIDE 40 MG: 10 INJECTION, SOLUTION INTRAMUSCULAR; INTRAVENOUS at 11:42

## 2024-07-26 RX ADMIN — CARVEDILOL 6.25 MG: 6.25 TABLET, FILM COATED ORAL at 07:57

## 2024-07-26 RX ADMIN — Medication 10 ML: at 08:00

## 2024-07-26 RX ADMIN — Medication 250 MG: at 11:42

## 2024-07-26 RX ADMIN — MUPIROCIN: 20 OINTMENT TOPICAL at 10:02

## 2024-07-26 RX ADMIN — VANCOMYCIN HYDROCHLORIDE 750 MG: 750 INJECTION, POWDER, LYOPHILIZED, FOR SOLUTION INTRAVENOUS at 04:15

## 2024-07-26 ASSESSMENT — PAIN SCALES - GENERAL: PAINLEVEL_OUTOF10: 0

## 2024-07-26 NOTE — PROGRESS NOTES
[facility]   Facility/Department: [facility]  4W MED SURG UNIT  Speech Language Pathology    Gary Turk  1960  W487/W487-01    Date: 7/26/2024      Speech Therapy attempted to see Gary Turk on this date for a/an:    Treatment    Pt was unable to be seen due to:   Patient refused despite gentle encouragement.     SLP spoke with RN regarding recommended diet level. When speech therapy last saw patient, It was recommended he be placed on a minced and moist and thin liquid diet. Patient was since changed to a full liquid diet by GI. Once cleared for solids by GI, patient was placed back on regular solids. RN notified that speech therapy recommendation was minced and moist until patient agreeable for PO trials.         Electronically signed by GEE Dubois on 7/26/24 at 11:29 AM EDT

## 2024-07-26 NOTE — PROGRESS NOTES
Palliative Care Progress Note  Patient: Gary Turk  Gender: male  YOB: 1960  Unit/Bed: W487/W487-01  CodeStatus: Full Code  Inpatient Treatment Team: Treatment Team: Attending Provider: Dave Harkins MD; Nurse Practitioner: Jeannie Krishnan APRN - CNP; Utilization Reviewer: Aurora Thakkar RN; Surgeon: Kelton Lopez MD; Consulting Physician: Polo Jaime MD; Patient Care Tech: Sherlyn Weller; Registered Nurse: Russ Hand, DONTRELL; : Awa Granger RN; Registered Nurse: Lali Salgado, RN; Utilization Reviewer: Luís Liu RN  Admit Date:  7/18/2024    Chief Complaint: Rectal bleeding    History of Presenting Illness:      Gary Turk is a 64 y.o. male on hospital day 8 with a history of C. difficile, chronic OM, CAD, diabetes type 2, hypertension, hyperlipidemia, ICM, proximal A-fib on Eliquis, CVA right-sided hemiplegia and dementia.    Patient was brought to the emergency room with bloody diarrhea. Patient was admitted for bright red blood per rectum, C. difficile colitis.    Palliative care was consulted for goals of care and end-stage disease.     Patient lives at [facility].     Upon entering room patient is sleeping in bed. He is easily aroused. He is able to state name and knows he is in the hospital but does not know which hospital. He does not know month or year. When asked if he knows why he is in the hospital he states \"why are you asking all of these questions\". He would not answer if he had pain or sob. Patient appears comfortable on room air. Patient has slight swelling to BLE. Patient flaccid on right side. Patient to go for colonoscopy today.     Most recent notable labs: A-fib on Eliquis potassium 3.3, creatinine 0.55, magnesium 1.4, total protein 4.0, albumin 2.0, hemoglobin 8.5      7/25/24    Patient alert and oriented to person only.  Patient in no apparent distress.  Per nurse patient has had no shortness of breath, coughing, fevers or  07/08/2018 05:05 AM    HGB 8.3 07/26/2024 06:00 AM    HCT 24.8 07/26/2024 06:00 AM    MCV 80.3 07/26/2024 06:00 AM    MCH 26.9 07/26/2024 06:00 AM    MCHC 33.5 07/26/2024 06:00 AM    RDW 16.7 07/26/2024 06:00 AM     07/26/2024 06:00 AM    MPV 6.9 07/02/2024 02:08 PM     CBC with Differential:   Lab Results   Component Value Date/Time    WBC 6.4 07/26/2024 06:00 AM    RBC 3.09 07/26/2024 06:00 AM    RBC 3.86 07/08/2018 05:05 AM    HGB 8.3 07/26/2024 06:00 AM    HCT 24.8 07/26/2024 06:00 AM     07/26/2024 06:00 AM    MCV 80.3 07/26/2024 06:00 AM    MCH 26.9 07/26/2024 06:00 AM    MCHC 33.5 07/26/2024 06:00 AM    RDW 16.7 07/26/2024 06:00 AM    NRBC 1 07/10/2024 04:37 AM    BANDSPCT 2 07/19/2024 12:59 PM    METASPCT 1 07/18/2024 11:15 AM    LYMPHOPCT 9.0 07/26/2024 06:00 AM    MONOPCT 8.7 07/26/2024 06:00 AM    MYELOPCT 2 07/18/2024 11:15 AM    EOSPCT 3.7 07/26/2024 06:00 AM    BASOPCT 0.3 07/26/2024 06:00 AM    MONOSABS 0.6 07/26/2024 06:00 AM    LYMPHSABS 0.6 07/26/2024 06:00 AM    EOSABS 0.2 07/26/2024 06:00 AM    BASOSABS 0.0 07/26/2024 06:00 AM     CMP:    Lab Results   Component Value Date/Time     07/26/2024 06:00 AM    K 4.2 07/26/2024 06:00 AM    K 3.4 07/23/2024 10:53 AM     07/26/2024 06:00 AM    CO2 26 07/26/2024 06:00 AM    BUN 6 07/26/2024 06:00 AM    CREATININE 0.51 07/26/2024 06:00 AM    GFRAA >60.0 09/28/2022 11:35 AM    AGRATIO 1.5 07/02/2024 02:08 PM    LABGLOM >90.0 07/26/2024 06:00 AM    LABGLOM 68 07/17/2024 02:37 PM    GLUCOSE 82 07/26/2024 06:00 AM    GLUCOSE 174 08/22/2023 05:28 AM    CALCIUM 7.4 07/26/2024 06:00 AM    BILITOT <0.2 07/26/2024 06:00 AM    ALKPHOS 66 07/26/2024 06:00 AM    AST 8 07/26/2024 06:00 AM    ALT 6 07/26/2024 06:00 AM     BMP:    Lab Results   Component Value Date/Time     07/26/2024 06:00 AM    K 4.2 07/26/2024 06:00 AM    K 3.4 07/23/2024 10:53 AM     07/26/2024 06:00 AM    CO2 26 07/26/2024 06:00 AM    BUN 6 07/26/2024 06:00 AM

## 2024-07-26 NOTE — PLAN OF CARE
Problem: Discharge Planning  Goal: Discharge to home or other facility with appropriate resources  7/26/2024 1022 by Lali Salgado RN  Outcome: Progressing  7/25/2024 2339 by Cristal Gamboa RN  Outcome: Progressing     Problem: Safety - Adult  Goal: Free from fall injury  7/26/2024 1022 by Lali Salgado RN  Outcome: Progressing  7/25/2024 2339 by Cristal Gamboa RN  Outcome: Progressing     Problem: Skin/Tissue Integrity  Goal: Absence of new skin breakdown  Description: 1.  Monitor for areas of redness and/or skin breakdown  2.  Assess vascular access sites hourly  3.  Every 4-6 hours minimum:  Change oxygen saturation probe site  4.  Every 4-6 hours:  If on nasal continuous positive airway pressure, respiratory therapy assess nares and determine need for appliance change or resting period.  Outcome: Progressing     Problem: Chronic Conditions and Co-morbidities  Goal: Patient's chronic conditions and co-morbidity symptoms are monitored and maintained or improved  Outcome: Progressing     Problem: Nutrition Deficit:  Goal: Optimize nutritional status  Outcome: Progressing     Problem: Pain  Goal: Verbalizes/displays adequate comfort level or baseline comfort level  Outcome: Progressing

## 2024-07-26 NOTE — PROGRESS NOTES
1225 - Per Naz Landers with GI, Patient is to follow up with Dr Lopez in 2 weeks to review Biopsy results.     Electronically signed by Russ Hand RN on 7/26/2024 at 1:08 PM

## 2024-07-26 NOTE — PROGRESS NOTES
Pt assessment and VS completed. Pt O&A x2, calm and cooperative.Lung sounds, on room air. On tele SR, denies having chest discomfort. Dressing to L heel changed per order and is clean, dry and intact. Zinc applied to buttocks, pt repositioned as tolerated. Medications given per MAR. Tolerating regular diet. Currently in bed with call light within reach. Pt to transfer to [facility]. GI okay with D/C. PICC line to remain in place for long-term antibiotics. Pt still having bloody bowel movements. Attending aware.    Attempted to call and give report to [facility], nurse unavailable.     1402: Report given to Fawn JON at [facility].    1514: Pt's daughter Lela, notified on D/C to [facility].       Electronically signed by Lali Salgado RN on 7/26/2024 at 1:46 PM

## 2024-07-26 NOTE — DISCHARGE SUMMARY
Physician Discharge Summary     Patient ID:  Gary Turk  [id number]  64 y.o.  1960    Admit date: 7/18/2024    Discharge date : 07/26/24     Admitting Physician: Vijay Hassan MD     Discharge Physician: SHELBY SHELTON MD     Admission Diagnoses: Rectal bleeding [K62.5]  GIB (gastrointestinal bleeding) [K92.2]    Discharge Diagnoses: GI bleed acute, c diff colitis, acute blood loss anemia, rectosigmoid ulceration    Admission Condition: fair    Discharged Condition: fair    Hospital Course:   Acute blood loss anemia, acute lower gi bleed: advance diet per GI; per GI today they are again considering possible colonoscopy  7/22 - pending plan  7/24 - s/p colonoscopy, rectosigmoid bleeding poss mass, c/s colorectal surgery  7/25 - appreciate surgery recs, plan for outpt follow up of biopsy.  Monitor o/n h/h.  7/26 - h/h stable, dc back to facility  C Diff colitis; Chronic OM:  Continue PO and IV vancomycin; leucocytosis improving  7/22 - cont abx  CAD:  continue home meds   Acute on chronic diastolic CHF:  slightly overloaded; IV lasix ordered; home meds resumed; ?fluids started overnight but unclear reason why; d/c fluids  A Fib:  Home meds but hold anticoagulation; resumed coreg on lower dose   DMII:  SSI with evening lantus    Consults: GI and general surgery    Significant Diagnostic Studies: as below    Discharge Exam:  /63   Pulse 72   Temp 98.1 °F (36.7 °C) (Oral)   Resp 18   Ht 1.702 m (5' 7\")   Wt 91 kg (200 lb 9.6 oz)   SpO2 96%   BMI 31.42 kg/m²   General appearance: alert, appears stated age, and cooperative  Lungs: clear to auscultation bilaterally  Heart: regular rate and rhythm, S1, S2 normal, no murmur, click, rub or gallop  Abdomen: soft, non-tender; bowel sounds normal; no masses,  no organomegaly  Extremities: extremities normal, atraumatic, no cyanosis or edema  Skin: Skin color, texture, turgor normal. No rashes or lesions    Labs:   Recent Labs     07/24/24  0551 07/25/24  0600

## 2024-07-26 NOTE — PLAN OF CARE
Problem: Discharge Planning  Goal: Discharge to home or other facility with appropriate resources  7/26/2024 1308 by Lali Salgado RN  Outcome: Completed  7/26/2024 1022 by Lali Salgado RN  Outcome: Progressing  7/25/2024 2339 by Cristal Gamboa RN  Outcome: Progressing     Problem: Safety - Adult  Goal: Free from fall injury  7/26/2024 1308 by Lali Salgado RN  Outcome: Completed  7/26/2024 1022 by Lali Salgado RN  Outcome: Progressing  7/25/2024 2339 by Cristal Gambao RN  Outcome: Progressing     Problem: Skin/Tissue Integrity  Goal: Absence of new skin breakdown  Description: 1.  Monitor for areas of redness and/or skin breakdown  2.  Assess vascular access sites hourly  3.  Every 4-6 hours minimum:  Change oxygen saturation probe site  4.  Every 4-6 hours:  If on nasal continuous positive airway pressure, respiratory therapy assess nares and determine need for appliance change or resting period.  7/26/2024 1308 by Lali Salgado RN  Outcome: Completed  7/26/2024 1022 by Lali Salgado RN  Outcome: Progressing     Problem: Chronic Conditions and Co-morbidities  Goal: Patient's chronic conditions and co-morbidity symptoms are monitored and maintained or improved  7/26/2024 1308 by Lali Salgado RN  Outcome: Completed  7/26/2024 1022 by Lali Salgado RN  Outcome: Progressing     Problem: Nutrition Deficit:  Goal: Optimize nutritional status  7/26/2024 1308 by Lali Salgado RN  Outcome: Completed  7/26/2024 1022 by Lali Salgado RN  Outcome: Progressing     Problem: Pain  Goal: Verbalizes/displays adequate comfort level or baseline comfort level  7/26/2024 1308 by Lali Salgado RN  Outcome: Completed  7/26/2024 1022 by Lali Salgado RN  Outcome: Progressing

## 2024-07-26 NOTE — PROGRESS NOTES
This LSW attempted to call patients wife and daughter to notify them patient is returning to [facility] this afternoon, 2:30 .  Both of their voicemail mailboxes were full...will attempt to call them again.  Facility notified of patients 2:30 .  Electronically signed by HAYDEN Garcia on 7/26/24 at 12:40 PM EDT

## 2024-07-29 ENCOUNTER — TELEPHONE (OUTPATIENT)
Dept: GASTROENTEROLOGY | Age: 64
End: 2024-07-29

## 2024-07-29 ENCOUNTER — TELEPHONE (OUTPATIENT)
Dept: WOUND CARE | Age: 64
End: 2024-07-29

## 2024-07-29 NOTE — TELEPHONE ENCOUNTER
----- Message from Naz Landers, SHERIN - CNP sent at 7/26/2024 12:34 PM EDT -----  Please schedule the patient for a 1 week hospital follow up with Dr Lopez

## 2024-07-29 NOTE — TELEPHONE ENCOUNTER
The patient nurse will check with the manager, they will call back to schedule a hosp follow up visit.

## 2024-07-30 ENCOUNTER — OFFICE VISIT (OUTPATIENT)
Dept: PALLATIVE CARE | Age: 64
End: 2024-07-30
Payer: COMMERCIAL

## 2024-07-30 VITALS
RESPIRATION RATE: 16 BRPM | DIASTOLIC BLOOD PRESSURE: 76 MMHG | HEART RATE: 83 BPM | OXYGEN SATURATION: 96 % | SYSTOLIC BLOOD PRESSURE: 126 MMHG | TEMPERATURE: 98.6 F

## 2024-07-30 DIAGNOSIS — Z71.89 ADVANCED CARE PLANNING/COUNSELING DISCUSSION: ICD-10-CM

## 2024-07-30 DIAGNOSIS — Z71.89 GOALS OF CARE, COUNSELING/DISCUSSION: ICD-10-CM

## 2024-07-30 DIAGNOSIS — F03.B0 MODERATE DEMENTIA WITHOUT BEHAVIORAL DISTURBANCE, PSYCHOTIC DISTURBANCE, MOOD DISTURBANCE, OR ANXIETY, UNSPECIFIED DEMENTIA TYPE (HCC): Primary | ICD-10-CM

## 2024-07-30 DIAGNOSIS — Z51.5 PALLIATIVE CARE ENCOUNTER: ICD-10-CM

## 2024-07-30 PROCEDURE — 99309 SBSQ NF CARE MODERATE MDM 30: CPT

## 2024-07-30 RX ORDER — NITROGLYCERIN 0.4 MG/1
0.4 TABLET SUBLINGUAL EVERY 5 MIN PRN
COMMUNITY

## 2024-07-30 RX ORDER — FLUTICASONE PROPIONATE AND SALMETEROL 250; 50 UG/1; UG/1
1 POWDER RESPIRATORY (INHALATION) EVERY 12 HOURS
COMMUNITY

## 2024-07-30 ASSESSMENT — PATIENT HEALTH QUESTIONNAIRE - PHQ9: DEPRESSION UNABLE TO ASSESS: FUNCTIONAL CAPACITY MOTIVATION LIMITS ACCURACY

## 2024-07-30 NOTE — TELEPHONE ENCOUNTER
Dr. Lopez said the patient does not need to be seen in the office, he would like the patient to schedule a Flex Sigmoid in 4 weeks. Instructions should be faxed to [facility] at Fax [phone].

## 2024-07-31 DIAGNOSIS — K92.0 GASTROINTESTINAL HEMORRHAGE WITH HEMATEMESIS: Primary | ICD-10-CM

## 2024-07-31 RX ORDER — SODIUM CHLORIDE 0.9 % (FLUSH) 0.9 %
5-40 SYRINGE (ML) INJECTION PRN
Status: CANCELLED | OUTPATIENT
Start: 2024-08-02

## 2024-07-31 RX ORDER — SODIUM CHLORIDE 9 MG/ML
20 INJECTION, SOLUTION INTRAVENOUS CONTINUOUS
Status: CANCELLED | OUTPATIENT
Start: 2024-08-02

## 2024-07-31 RX ORDER — SODIUM CHLORIDE 9 MG/ML
25 INJECTION, SOLUTION INTRAVENOUS PRN
Status: CANCELLED | OUTPATIENT
Start: 2024-08-02

## 2024-07-31 NOTE — PROGRESS NOTES
SUBJECTIVE:        ROS:  The rest of the 14 point ROS negative    PHYSICAL EXAM: VSS per facility record      ASSESSMENT & PLAN:   Diagnosis Orders   1. Chronic systolic (congestive) heart failure        2. PAF (paroxysmal atrial fibrillation) ([facility])        3. Spondylolisthesis, lumbosacral region                      Past Medical History:   Diagnosis Date    Asthma     Back pain     compresion in [place]    CAD (coronary artery disease) 01/2020    3 vessel     chf 09/16/2022    CVA (cerebral vascular accident) ([facility])     Diabetes mellitus ([facility])     Essential hypertension, benign     Hyperlipemia     Ischemic cardiomyopathy 09/16/2022    Neuropathic pain, leg, bilateral          Past Surgical History:   Procedure Laterality Date    CARPAL TUNNEL RELEASE Bilateral     COLONOSCOPY N/A 7/24/2024    COLONOSCOPY with polypectomies and biopsies performed by Kelton Lopez MD at [facility]    EP DEVICE PROCEDURE N/A 9/29/2023    Loop recorder insert  ROOM 293 [employer] NOTIFIED performed by Maxwell Tyler DO at [facility]    FOOT DEBRIDEMENT Right 5/5/2023    RIGHT FOOT DEBRIDEMENT INCISION AND DRAINAGE ROOM 174 performed by Jairo Garcia DPM at [facility] OR    FOOT DEBRIDEMENT Right 5/8/2023    RIGHT FOOT  INCISION AND DRAINAGE WITH DEBRIDEMENT OF NON-VIABLE TISSUE DELAYED PRIMARY CLOSURE performed by Jairo Garcia DPM at [facility]    FOOT SURGERY Left     bone spur    HIP SURGERY Left 10/5/2023    TROCHANTERIC FIXATION NAILING LEFT FEMUR FRACTURE ROOM 265 performed by Mirza Elliott MD at [facility]    LAMINOTOMY  2012    SPINE SURGERY N/A 11/11/2022    L1 VERETEBRAL AUGMENTATION performed by Concetta Junior MD at [facility]         Current Outpatient Medications on File Prior to Visit   Medication Sig Dispense Refill    HUMALOG KWIKPEN 100 UNIT/ML SOPN Inject 5 Units into the skin 3 times daily (before meals) Hold if BS below 120      MULTIPLE VITAMINS PO Take by mouth daily      Continuous Blood Gluc Sensor

## 2024-08-01 ENCOUNTER — OFFICE VISIT (OUTPATIENT)
Dept: GERIATRIC MEDICINE | Age: 64
End: 2024-08-01

## 2024-08-01 DIAGNOSIS — L08.9 DIABETIC FOOT INFECTION (HCC): ICD-10-CM

## 2024-08-01 DIAGNOSIS — E11.628 DIABETIC FOOT INFECTION (HCC): ICD-10-CM

## 2024-08-01 DIAGNOSIS — I50.22 CHRONIC SYSTOLIC (CONGESTIVE) HEART FAILURE (HCC): Primary | ICD-10-CM

## 2024-08-01 DIAGNOSIS — G31.84 MCI (MILD COGNITIVE IMPAIRMENT): ICD-10-CM

## 2024-08-02 ENCOUNTER — HOSPITAL ENCOUNTER (OUTPATIENT)
Dept: INFUSION THERAPY | Age: 64
Setting detail: INFUSION SERIES
Discharge: HOME | End: 2024-08-02
Payer: COMMERCIAL

## 2024-08-02 VITALS
TEMPERATURE: 98.5 F | BODY MASS INDEX: 29.13 KG/M2 | DIASTOLIC BLOOD PRESSURE: 76 MMHG | WEIGHT: 186 LBS | HEART RATE: 70 BPM | RESPIRATION RATE: 18 BRPM | SYSTOLIC BLOOD PRESSURE: 137 MMHG | OXYGEN SATURATION: 94 %

## 2024-08-02 DIAGNOSIS — K92.0 GASTROINTESTINAL HEMORRHAGE WITH HEMATEMESIS: Primary | ICD-10-CM

## 2024-08-02 LAB
ABO + RH BLD: NORMAL
BLD GP AB SCN SERPL QL: NORMAL
BLOOD BANK DISPENSE STATUS: NORMAL
BLOOD BANK PRODUCT CODE: NORMAL
BPU ID: NORMAL
DESCRIPTION BLOOD BANK: NORMAL

## 2024-08-02 PROCEDURE — P9016 RBC LEUKOCYTES REDUCED: HCPCS

## 2024-08-02 PROCEDURE — 86900 BLOOD TYPING SEROLOGIC ABO: CPT

## 2024-08-02 PROCEDURE — 86923 COMPATIBILITY TEST ELECTRIC: CPT

## 2024-08-02 PROCEDURE — 86901 BLOOD TYPING SEROLOGIC RH(D): CPT

## 2024-08-02 PROCEDURE — 36430 TRANSFUSION BLD/BLD COMPNT: CPT

## 2024-08-02 PROCEDURE — 86850 RBC ANTIBODY SCREEN: CPT

## 2024-08-02 PROCEDURE — 2580000003 HC RX 258

## 2024-08-02 RX ORDER — SODIUM CHLORIDE 9 MG/ML
20 INJECTION, SOLUTION INTRAVENOUS CONTINUOUS
Status: DISCONTINUED | OUTPATIENT
Start: 2024-08-02 | End: 2024-08-03 | Stop reason: HOSPADM

## 2024-08-02 RX ORDER — SODIUM CHLORIDE 9 MG/ML
25 INJECTION, SOLUTION INTRAVENOUS PRN
Status: CANCELLED | OUTPATIENT
Start: 2024-08-02

## 2024-08-02 RX ORDER — SODIUM CHLORIDE 9 MG/ML
20 INJECTION, SOLUTION INTRAVENOUS CONTINUOUS
Status: CANCELLED | OUTPATIENT
Start: 2024-08-02

## 2024-08-02 RX ORDER — SODIUM CHLORIDE 0.9 % (FLUSH) 0.9 %
5-40 SYRINGE (ML) INJECTION PRN
Status: DISCONTINUED | OUTPATIENT
Start: 2024-08-02 | End: 2024-08-03 | Stop reason: HOSPADM

## 2024-08-02 RX ORDER — SODIUM CHLORIDE 0.9 % (FLUSH) 0.9 %
5-40 SYRINGE (ML) INJECTION PRN
Status: CANCELLED | OUTPATIENT
Start: 2024-08-02

## 2024-08-02 RX ORDER — SODIUM CHLORIDE 9 MG/ML
25 INJECTION, SOLUTION INTRAVENOUS PRN
Status: DISCONTINUED | OUTPATIENT
Start: 2024-08-02 | End: 2024-08-03 | Stop reason: HOSPADM

## 2024-08-02 RX ORDER — SODIUM CHLORIDE 9 MG/ML
INJECTION, SOLUTION INTRAVENOUS
Status: COMPLETED
Start: 2024-08-02 | End: 2024-08-02

## 2024-08-02 RX ADMIN — SODIUM CHLORIDE 20 ML/HR: 9 INJECTION, SOLUTION INTRAVENOUS at 10:41

## 2024-08-02 NOTE — FLOWSHEET NOTE
[facility] here to  patient and take [facility]. Attempted to call report to nursing home but no answer.

## 2024-08-02 NOTE — PROGRESS NOTES
[facility]     PT arrived via:  [x] Ambulatory         [] Wheelchair  [x]With transport                [] Other:     [x]PT oriented to room and call light in reach.   [x] Vital signs obtained and are stable.   []Medication education provided and reviewed with patient.

## 2024-08-05 ENCOUNTER — TELEPHONE (OUTPATIENT)
Dept: GASTROENTEROLOGY | Age: 64
End: 2024-08-05

## 2024-08-05 NOTE — TELEPHONE ENCOUNTER
CARMEN with [facility] to schedule a Flex Sigmoid with Dr. Lopez the end of August. The patient does not need to be seen on 8/12/24 in the office.

## 2024-08-06 ENCOUNTER — TELEPHONE (OUTPATIENT)
Dept: INFECTIOUS DISEASES | Age: 64
End: 2024-08-06

## 2024-08-06 ENCOUNTER — PREP FOR PROCEDURE (OUTPATIENT)
Dept: GASTROENTEROLOGY | Age: 64
End: 2024-08-06

## 2024-08-06 DIAGNOSIS — K92.2 ACUTE GI BLEEDING: ICD-10-CM

## 2024-08-06 RX ORDER — SODIUM CHLORIDE 9 MG/ML
INJECTION, SOLUTION INTRAVENOUS CONTINUOUS
Status: CANCELLED | OUTPATIENT
Start: 2024-08-06

## 2024-08-06 RX ORDER — SODIUM CHLORIDE 0.9 % (FLUSH) 0.9 %
5-40 SYRINGE (ML) INJECTION PRN
Status: CANCELLED | OUTPATIENT
Start: 2024-08-06

## 2024-08-06 RX ORDER — SODIUM CHLORIDE 0.9 % (FLUSH) 0.9 %
5-40 SYRINGE (ML) INJECTION EVERY 12 HOURS SCHEDULED
Status: CANCELLED | OUTPATIENT
Start: 2024-08-06

## 2024-08-06 RX ORDER — SODIUM CHLORIDE 9 MG/ML
INJECTION, SOLUTION INTRAVENOUS PRN
Status: CANCELLED | OUTPATIENT
Start: 2024-08-06

## 2024-08-06 NOTE — TELEPHONE ENCOUNTER
Called [facility] @ [phone] and spoke to nurse Vogel pertaining to a Vanco trough elevated @ 26.9.     V.O. given per Dr. Livingston:  Change IV Vanco to Q 12 hours. Repeat labs before the 3rd dose.  Nurse Vogel read back the orders.    This LPN sent fax @ [phone] with above orders.

## 2024-08-06 NOTE — TELEPHONE ENCOUNTER
Received a phone call from nurse Vogel from [facility] to inform clinic that the pharmacy is dosing the Vanco for the pt.    Per Lela, the PA Reji Huerta asked the pharmacy to dose the pt. The pharmacy has been dosing the pt since 08/01.  The dose is 1000mg Q 12 hours. A dose was held yesterday (08/05) and will restart today (08/06). A lab redraw will be done tomorrow (08/07).  The pt is under Dr. Macias's care.    This LPN informed Dr. Livingston of the situation.

## 2024-08-07 ENCOUNTER — OFFICE VISIT (OUTPATIENT)
Dept: GERIATRIC MEDICINE | Age: 64
End: 2024-08-07

## 2024-08-07 DIAGNOSIS — L97.422 CHRONIC ULCER OF LEFT HEEL WITH FAT LAYER EXPOSED (HCC): ICD-10-CM

## 2024-08-07 DIAGNOSIS — Z71.89 GOALS OF CARE, COUNSELING/DISCUSSION: ICD-10-CM

## 2024-08-07 DIAGNOSIS — Z87.19 HISTORY OF RECTAL BLEEDING: Primary | ICD-10-CM

## 2024-08-07 DIAGNOSIS — D50.9 IRON DEFICIENCY ANEMIA, UNSPECIFIED IRON DEFICIENCY ANEMIA TYPE: ICD-10-CM

## 2024-08-07 DIAGNOSIS — R60.9 WEIGHT GAIN WITH EDEMA: ICD-10-CM

## 2024-08-07 DIAGNOSIS — R63.5 WEIGHT GAIN WITH EDEMA: ICD-10-CM

## 2024-08-08 RX ORDER — ACYCLOVIR 400 MG/1
TABLET ORAL
Qty: 3 EACH | Refills: 2 | Status: SHIPPED | OUTPATIENT
Start: 2024-08-08

## 2024-08-12 ENCOUNTER — OFFICE VISIT (OUTPATIENT)
Dept: GERIATRIC MEDICINE | Age: 64
End: 2024-08-12

## 2024-08-12 DIAGNOSIS — I10 ESSENTIAL HYPERTENSION, BENIGN: ICD-10-CM

## 2024-08-12 DIAGNOSIS — M86.672 CHRONIC OSTEOMYELITIS OF LEFT FOOT (HCC): Primary | ICD-10-CM

## 2024-08-12 DIAGNOSIS — I48.0 PAF (PAROXYSMAL ATRIAL FIBRILLATION) (HCC): ICD-10-CM

## 2024-08-14 ENCOUNTER — OFFICE VISIT (OUTPATIENT)
Dept: GERIATRIC MEDICINE | Age: 64
End: 2024-08-14

## 2024-08-14 DIAGNOSIS — N50.89 SCROTAL SWELLING: ICD-10-CM

## 2024-08-14 DIAGNOSIS — R63.5 WEIGHT GAIN: ICD-10-CM

## 2024-08-14 DIAGNOSIS — L89.629 PRESSURE INJURY OF SKIN OF LEFT HEEL, UNSPECIFIED INJURY STAGE: Primary | ICD-10-CM

## 2024-08-19 ENCOUNTER — HOSPITAL ENCOUNTER (OUTPATIENT)
Dept: CARDIOLOGY | Age: 64
Discharge: HOME | End: 2024-08-19
Payer: COMMERCIAL

## 2024-08-19 PROCEDURE — 93298 REM INTERROG DEV EVAL SCRMS: CPT

## 2024-08-19 PROCEDURE — 93298 REM INTERROG DEV EVAL SCRMS: CPT | Performed by: INTERNAL MEDICINE

## 2024-08-21 ENCOUNTER — OFFICE VISIT (OUTPATIENT)
Dept: INFECTIOUS DISEASES | Age: 64
End: 2024-08-21
Payer: COMMERCIAL

## 2024-08-21 VITALS
HEART RATE: 81 BPM | SYSTOLIC BLOOD PRESSURE: 147 MMHG | DIASTOLIC BLOOD PRESSURE: 79 MMHG | TEMPERATURE: 98.1 F | RESPIRATION RATE: 16 BRPM

## 2024-08-21 DIAGNOSIS — M86.171 ACUTE OSTEOMYELITIS OF TOE OF RIGHT FOOT (HCC): Primary | ICD-10-CM

## 2024-08-21 PROCEDURE — G8417 CALC BMI ABV UP PARAM F/U: HCPCS | Performed by: INTERNAL MEDICINE

## 2024-08-21 PROCEDURE — G8427 DOCREV CUR MEDS BY ELIG CLIN: HCPCS | Performed by: INTERNAL MEDICINE

## 2024-08-21 PROCEDURE — G9710 PT PROV HOSP SRV MSMT PER: HCPCS | Performed by: INTERNAL MEDICINE

## 2024-08-21 PROCEDURE — 1036F TOBACCO NON-USER: CPT | Performed by: INTERNAL MEDICINE

## 2024-08-21 PROCEDURE — 3077F SYST BP >= 140 MM HG: CPT | Performed by: INTERNAL MEDICINE

## 2024-08-21 PROCEDURE — G9691 PT HOSP DUR MSMT PERIOD: HCPCS | Performed by: INTERNAL MEDICINE

## 2024-08-21 PROCEDURE — 3078F DIAST BP <80 MM HG: CPT | Performed by: INTERNAL MEDICINE

## 2024-08-21 PROCEDURE — 99213 OFFICE O/P EST LOW 20 MIN: CPT | Performed by: INTERNAL MEDICINE

## 2024-08-21 ASSESSMENT — PATIENT HEALTH QUESTIONNAIRE - PHQ9
SUM OF ALL RESPONSES TO PHQ QUESTIONS 1-9: 0
SUM OF ALL RESPONSES TO PHQ9 QUESTIONS 1 & 2: 0
1. LITTLE INTEREST OR PLEASURE IN DOING THINGS: NOT AT ALL
SUM OF ALL RESPONSES TO PHQ QUESTIONS 1-9: 0
2. FEELING DOWN, DEPRESSED OR HOPELESS: NOT AT ALL

## 2024-08-21 NOTE — PROGRESS NOTES
Infectious Disease     Patient Name: Gary Turk  Date: 8/21/2024  YOB: 1960  Medical Record Number: [id number]      Chronic osteomyelitis left foot           from [facility] being treated for chronic infection  Of right foot osteomyelitis history of growing group A streptococcus on Augmentin  Had completed long-term course of IV Zosyn from May through June 2024      Admitted to [facility] 7/7/2024 for loss of appetite  Fevers  diarrhea  C. difficile toxin 7/8/2024 positive      Cultures from left foot wound growing Proteus mirabilis Staph aureus      Discharged 7/12/2024  Patient discharged with a plan of 6 weeks of IV vancomycin for osteomyelitis of foot  and oral vancomycin for C. difficile          Review of Systems   Unable to perform ROS: Dementia     Physical Exam:      Physical Exam  Cardiovascular:      Heart sounds: Normal heart sounds. No murmur heard.  Pulmonary:      Effort: Pulmonary effort is normal. No respiratory distress.      Breath sounds: Normal breath sounds. No stridor. No wheezing, rhonchi or rales.   Chest:      Chest wall: No tenderness.   Abdominal:      General: Abdomen is flat. Bowel sounds are normal. There is no distension.      Palpations: Abdomen is soft. There is no mass.      Tenderness: There is no abdominal tenderness. There is no guarding or rebound.      Hernia: No hernia is present.   Skin:     Comments: Ulcer posterior aspect left heel some granulation bone is now covered         BP (!) 147/79   Pulse 81   Temp 98.1 °F (36.7 °C) (Temporal)   Resp 16             .   Lab Results   Component Value Date    WBC 4.1 08/13/2024    HGB 8.5 (A) 08/13/2024    HCT 26.3 (A) 08/13/2024    MCV 84.0 08/13/2024     08/13/2024     Lab Results   Component Value Date/Time     08/13/2024 02:28 PM    K 3.8 08/13/2024 02:28 PM    K 3.4 07/23/2024 10:53 AM     08/13/2024 02:28 PM    CO2 26 08/13/2024 02:28 PM    BUN 14 08/13/2024 02:28 PM    CREATININE 0.6 08/13/2024

## 2024-08-24 PROBLEM — K92.2 ACUTE GI BLEEDING: Status: RESOLVED | Noted: 2024-08-06 | Resolved: 2024-08-24

## 2024-08-24 PROBLEM — I50.33 ACUTE ON CHRONIC DIASTOLIC (CONGESTIVE) HEART FAILURE (HCC): Status: RESOLVED | Noted: 2019-12-09 | Resolved: 2024-08-24

## 2024-08-24 PROBLEM — D62 ACUTE BLOOD LOSS ANEMIA: Status: RESOLVED | Noted: 2023-10-09 | Resolved: 2024-08-24

## 2024-08-24 PROBLEM — I21.4 NSTEMI (NON-ST ELEVATED MYOCARDIAL INFARCTION) (HCC): Status: RESOLVED | Noted: 2019-12-13 | Resolved: 2024-08-24

## 2024-08-24 PROBLEM — I16.0 HYPERTENSIVE URGENCY: Status: RESOLVED | Noted: 2022-08-16 | Resolved: 2024-08-24

## 2024-08-24 PROBLEM — J20.8 ACUTE BRONCHITIS DUE TO OTHER SPECIFIED ORGANISMS: Status: RESOLVED | Noted: 2019-01-28 | Resolved: 2024-08-24

## 2024-08-24 ASSESSMENT — ENCOUNTER SYMPTOMS
COUGH: 0
VOMITING: 0
SHORTNESS OF BREATH: 0
WHEEZING: 0
ABDOMINAL DISTENTION: 1
DIARRHEA: 0
NAUSEA: 0
ABDOMINAL PAIN: 0
CONSTIPATION: 0

## 2024-08-24 ASSESSMENT — VISUAL ACUITY: OU: 1

## 2024-08-24 NOTE — PROGRESS NOTES
increase in appetite since prior to hospital stay.), fatigue and unexpected weight change (Approximately 15-20 pounds in weight gain). Negative for fever.   Respiratory:  Negative for cough, shortness of breath and wheezing.    Cardiovascular:  Positive for leg swelling (Mild bilateral). Negative for chest pain and palpitations.   Gastrointestinal:  Positive for abdominal distention. Negative for abdominal pain, constipation, diarrhea, nausea and vomiting.   Genitourinary:  Negative for flank pain.   Skin:  Positive for wound (Left heel pressure ulcer).   Neurological:  Positive for weakness.   Psychiatric/Behavioral:  Positive for confusion (Baseline). Negative for agitation and decreased concentration. The patient is not nervous/anxious.    All other systems reviewed and are negative.      Objective:   VS: See PCC. Reviewed.    Physical Exam  Vitals (See PCC) reviewed.   Constitutional:       Appearance: Normal appearance. He is not ill-appearing.      Comments: Seen in his room with family present.  Patient is eating dinner with gusto, distracted by food and disinterested in medical discussion, likely secondary to MCI/dementia   HENT:      Head: Normocephalic and atraumatic.      Jaw: No tenderness or pain on movement.      Right Ear: Tympanic membrane normal. Swelling and tenderness present.      Left Ear: Ear canal and external ear normal.      Mouth/Throat:      Mouth: Mucous membranes are moist.      Pharynx: Oropharynx is clear.   Eyes:      General: Lids are normal. Vision grossly intact.      Comments: Left eye ptosis   Cardiovascular:      Rate and Rhythm: Normal rate and regular rhythm.   Pulmonary:      Effort: No respiratory distress.   Abdominal:      General: There is distension.      Tenderness: There is no abdominal tenderness. There is no guarding.   Genitourinary:     Testes:         Right: Swelling present. Tenderness not present.   Musculoskeletal:         General: No tenderness or signs of

## 2024-08-27 LAB
BASOPHILS ABSOLUTE: ABNORMAL
BASOPHILS RELATIVE PERCENT: 0.8 %
BUN BLDV-MCNC: 13 MG/DL
C-REACTIVE PROTEIN: 65.1
CALCIUM SERPL-MCNC: 7.8 MG/DL
CHLORIDE BLD-SCNC: 102 MMOL/L
CO2: 27 MMOL/L
CREAT SERPL-MCNC: 0.6 MG/DL
EGFR: 136
EOSINOPHILS ABSOLUTE: 0.3 /ΜL
EOSINOPHILS RELATIVE PERCENT: 6.9 %
GLUCOSE BLD-MCNC: 105 MG/DL
HCT VFR BLD CALC: 27.7 % (ref 41–53)
HEMOGLOBIN: 8.8 G/DL (ref 13.5–17.5)
LYMPHOCYTES ABSOLUTE: 0.7 /ΜL
LYMPHOCYTES RELATIVE PERCENT: 13.8 %
MCH RBC QN AUTO: 25.9 PG
MCHC RBC AUTO-ENTMCNC: 32 G/DL
MCV RBC AUTO: 81 FL
MONOCYTES ABSOLUTE: 0.5 /ΜL
MONOCYTES RELATIVE PERCENT: 10 %
NEUTROPHILS ABSOLUTE: 3.4 /ΜL
NEUTROPHILS RELATIVE PERCENT: 68.5 %
PLATELET # BLD: 156 K/ΜL
PMV BLD AUTO: 6.8 FL
POTASSIUM SERPL-SCNC: 4 MMOL/L
RBC # BLD: 3.41 10^6/ΜL
SODIUM BLD-SCNC: 138 MMOL/L
VANCOMYCIN TROUGH: 23.7
WBC # BLD: 5 10^3/ML

## 2024-08-29 ASSESSMENT — ENCOUNTER SYMPTOMS
DIARRHEA: 0
WHEEZING: 0
ABDOMINAL DISTENTION: 1
VOMITING: 0
NAUSEA: 0
SHORTNESS OF BREATH: 0
ABDOMINAL PAIN: 0
CONSTIPATION: 0
COUGH: 0

## 2024-08-29 ASSESSMENT — VISUAL ACUITY: OU: 1

## 2024-08-29 NOTE — PROGRESS NOTES
Skin:  Positive for wound (Left heel ulcer; SEE WOUND CARE PROGRESS NOTES).   Neurological:  Positive for weakness.   Psychiatric/Behavioral:  Positive for confusion (Baseline). Negative for agitation and decreased concentration. The patient is not nervous/anxious.    All other systems reviewed and are negative.      Objective:   VS: See PCC. Reviewed.    Physical Exam  Vitals (See [facility]) reviewed.   Constitutional:       Appearance: Normal appearance. He is not ill-appearing.      Comments: Lethargic appearing, just woke from nap. Disinterested demeanor.    HENT:      Head: Normocephalic and atraumatic.      Jaw: No tenderness or pain on movement.      Right Ear: Swelling and tenderness present.      Mouth/Throat:      Mouth: Mucous membranes are moist.      Pharynx: Oropharynx is clear.   Eyes:      General: Lids are normal. Vision grossly intact.      Conjunctiva/sclera: Conjunctivae normal.      Comments: Left eye ptosis   Cardiovascular:      Rate and Rhythm: Normal rate and regular rhythm.   Pulmonary:      Effort: No respiratory distress.   Abdominal:      General: There is distension.      Tenderness: There is no abdominal tenderness. There is no guarding.   Genitourinary:     Testes:         Right: Swelling present. Tenderness not present.   Musculoskeletal:         General: No tenderness or signs of injury.      Right lower leg: Edema present.      Left lower leg: Edema present.      Comments: Mild 1+ lower leg edema BLE   Skin:     General: Skin is warm and dry.      Coloration: Skin is pale.      Findings: No erythema.      Comments: See [facility] for wound care progress notes. No signficant change in wound bed. Eschar noted, appears to be same size. No new erythema to surrounding tissue.   Neurological:      Mental Status: He is alert. He is disoriented.      Cranial Nerves: Dysarthria present.      Sensory: Sensory deficit (Bilateral toes and dorsal foot to light touch.) present.      Motor: Weakness  present.   Psychiatric:         Attention and Perception: He is inattentive.         Speech: He is noncommunicative.         Behavior: Behavior is uncooperative and withdrawn.         Cognition and Memory: Cognition is impaired. Memory is impaired.         Assessment:       Diagnosis Orders   1. Pressure injury of skin of left heel, unspecified injury stage        2. Weight gain        3. Scrotal swelling              Plan:      No orders of the defined types were placed in this encounter.    No orders of the defined types were placed in this encounter.      Continue having pharmacy to dose vancomycin.  No new scrotal swelling, will add Lasix 40 g x 1 tomorrow, then extend to 30 mg p.o. daily times extra 3 days.  DC and start prior to Lasix orders thereafter.  BMP on Friday and Monday.  Continue monitor wound and will await wound NP new orders next week.    No follow-ups on file.      NABILA Fontenot    Electronically signed by: NABILA Fontenot on 8/29/2024    Please note orders entered on site at facility after discussion with appropriate facility nursing/therapy/ / nutritional staff. Current longstanding medical problems and acute medical issues addressed with staff. Available data and data elements in on site paper chart reviewed and analyzed.  Current external consultant notes reviewed in on site chart. Ordered laboratory testing and imaging will be reviewed when available.     Side effects, adverse effects of the medication prescribed today, as well as treatment plan and result expectations have been discussed withthe patient who expresses understanding and desires to proceed.    I spent a total of 25 minutes on the date of service which included preparing to see the patient, face-to-face patient care, performing a medically appropriate examination, completing clinical documentation, and on counseling/ eductaing the patient and the family.      Please note Nuance Dragon PowerMic III software used for

## 2024-08-31 NOTE — PROGRESS NOTES
Sexual activity: Not Currently     Partners: Female   Other Topics Concern    Not on file   Social History Narrative    Lives with wife.  Per patient he has been  for 2 years.  He met his wife online and went to [facility] and brought her back to [facility].  They live in [facility] no steps into home. It is a 2 story home. Bedroom and bath upstairs. Bathroom down. Per patient once he is down the steps he doesn't go back up during the day.    2 daughters local.      he has very poor vision.  He does not like to drive at night or when raining.  Patient states he struggles looking at a computer or reading due to vision.         Type of Home: House in [address]    Home Layout: Two level    Home Access: Level entry    Bathroom Shower/Tub: Tub/Shower unit    Bathroom Equipment: Hand-held shower    Home Equipment: Cane, Rollator    ADL Assistance: Needs assistance (needs assist)    Homemaking Assistance:  (wife does cooking and cleaning)    Ambulation Assistance: Independent (uses rollator PRN)    Transfer Assistance: Independent    Active : Yes    Additional Comments: Pt mildly inconsistent historian             Social Determinants of Health     Financial Resource Strain: Low Risk  (8/27/2023)    [facility]    Overall Financial Resource Strain (CARDIA)     Difficulty of Paying Living Expenses: Not very hard   Food Insecurity: No Food Insecurity (7/19/2024)    Hunger Vital Sign     Worried About Running Out of Food in the Last Year: Never true     Ran Out of Food in the Last Year: Never true   Transportation Needs: No Transportation Needs (7/19/2024)    PRAPARE - Transportation     Lack of Transportation (Medical): No     Lack of Transportation (Non-Medical): No   Physical Activity: Inactive (8/2/2023)    Exercise Vital Sign     Days of Exercise per Week: 0 days     Minutes of Exercise per Session: 0 min   Stress: Not on file   Social Connections: Not on file    Intimate Partner Violence: Not on file   Housing Stability: Low Risk  (7/19/2024)    Housing Stability Vital Sign     Unable to Pay for Housing in the Last Year: No     Number of Places Lived in the Last Year: 2     Unstable Housing in the Last Year: No         Lab Results   Component Value Date    LABA1C 7.8 (H) 07/19/2024     Lab Results   Component Value Date     07/19/2024       Lab Results   Component Value Date/Time     08/27/2024 02:10 PM    K 4.0 08/27/2024 02:10 PM    K 3.4 07/23/2024 10:53 AM     08/27/2024 02:10 PM    CO2 27 08/27/2024 02:10 PM    BUN 13 08/27/2024 02:10 PM    CREATININE 0.6 08/27/2024 02:10 PM    GLUCOSE 105 08/27/2024 02:10 PM    GLUCOSE 174 08/22/2023 05:28 AM    CALCIUM 7.8 08/27/2024 02:10 PM        Lab Results   Component Value Date    CHOL 174 11/09/2023    CHOL 149 11/02/2023    CHOL 172 10/31/2023     Lab Results   Component Value Date    TRIG 164 (H) 11/09/2023    TRIG 99 11/02/2023    TRIG 125 10/31/2023     Lab Results   Component Value Date    HDL 41 11/09/2023    HDL 38 (L) 11/02/2023    HDL 42 10/31/2023     No components found for: \"LDLCHOLESTEROL\", \"LDLCALC\"  Lab Results   Component Value Date    VLDL 31 08/21/2023     Lab Results   Component Value Date    CHOLHDLRATIO 6.0 (A) 08/21/2023    CHOLHDLRATIO 5.5 07/19/2012       Lab Results   Component Value Date    TSH 0.976 07/07/2024       Lab Results   Component Value Date    WBC 5.0 08/27/2024    HGB 8.8 (A) 08/27/2024    HCT 27.7 (A) 08/27/2024    MCV 81.0 08/27/2024     08/27/2024       Please note orders entered on site at facility after discussion with appropriate facility nursing/therapy/ / nutritional staff. Current longstanding medical problems and acute medical issues addressed with staff. Available data and data elements in on site paper chart reviewed and analyzed.  Current external consultant notes reviewed in on site chart. Ordered laboratory testing and imaging will

## 2024-09-11 ENCOUNTER — OFFICE VISIT (OUTPATIENT)
Dept: INFECTIOUS DISEASES | Age: 64
End: 2024-09-11
Payer: COMMERCIAL

## 2024-09-11 VITALS
RESPIRATION RATE: 16 BRPM | TEMPERATURE: 97.3 F | SYSTOLIC BLOOD PRESSURE: 130 MMHG | HEART RATE: 82 BPM | DIASTOLIC BLOOD PRESSURE: 74 MMHG

## 2024-09-11 DIAGNOSIS — B95.0 GROUP A STREPTOCOCCAL INFECTION: Primary | ICD-10-CM

## 2024-09-11 DIAGNOSIS — M86.672 CHRONIC OSTEOMYELITIS OF LEFT FOOT (HCC): ICD-10-CM

## 2024-09-11 PROCEDURE — G8427 DOCREV CUR MEDS BY ELIG CLIN: HCPCS | Performed by: INTERNAL MEDICINE

## 2024-09-11 PROCEDURE — 3078F DIAST BP <80 MM HG: CPT | Performed by: INTERNAL MEDICINE

## 2024-09-11 PROCEDURE — G9710 PT PROV HOSP SRV MSMT PER: HCPCS | Performed by: INTERNAL MEDICINE

## 2024-09-11 PROCEDURE — G8417 CALC BMI ABV UP PARAM F/U: HCPCS | Performed by: INTERNAL MEDICINE

## 2024-09-11 PROCEDURE — 3075F SYST BP GE 130 - 139MM HG: CPT | Performed by: INTERNAL MEDICINE

## 2024-09-11 PROCEDURE — 99213 OFFICE O/P EST LOW 20 MIN: CPT | Performed by: INTERNAL MEDICINE

## 2024-09-11 PROCEDURE — 1036F TOBACCO NON-USER: CPT | Performed by: INTERNAL MEDICINE

## 2024-09-17 LAB
BASOPHILS ABSOLUTE: ABNORMAL
BASOPHILS RELATIVE PERCENT: 0.8 %
BUN BLDV-MCNC: 16 MG/DL
CALCIUM SERPL-MCNC: 7.8 MG/DL
CHLORIDE BLD-SCNC: 103 MMOL/L
CO2: 26 MMOL/L
CREAT SERPL-MCNC: 0.6 MG/DL
EGFR: 136
EOSINOPHILS ABSOLUTE: 0.6 /ΜL
EOSINOPHILS RELATIVE PERCENT: 11.9 %
GLUCOSE BLD-MCNC: 141 MG/DL
HCT VFR BLD CALC: 27.4 % (ref 41–53)
HEMOGLOBIN: 8.8 G/DL (ref 13.5–17.5)
LYMPHOCYTES ABSOLUTE: 0.7 /ΜL
LYMPHOCYTES RELATIVE PERCENT: 13.1 %
MCH RBC QN AUTO: 24.7 PG
MCHC RBC AUTO-ENTMCNC: 32.1 G/DL
MCV RBC AUTO: 77 FL
MONOCYTES ABSOLUTE: 0.5 /ΜL
MONOCYTES RELATIVE PERCENT: 9.9 %
NEUTROPHILS ABSOLUTE: 3.5 /ΜL
NEUTROPHILS RELATIVE PERCENT: 64.3 %
PLATELET # BLD: 136 K/ΜL
PMV BLD AUTO: 6.8 FL
POTASSIUM SERPL-SCNC: 4.1 MMOL/L
RBC # BLD: 3.55 10^6/ΜL
SODIUM BLD-SCNC: 139 MMOL/L
VANCOMYCIN TROUGH: 20.3
WBC # BLD: 5.4 10^3/ML

## 2024-09-23 ENCOUNTER — HOSPITAL ENCOUNTER (OUTPATIENT)
Dept: CARDIOLOGY | Age: 64
Discharge: HOME | End: 2024-09-23
Payer: COMMERCIAL

## 2024-09-23 PROCEDURE — 93298 REM INTERROG DEV EVAL SCRMS: CPT

## 2024-09-24 LAB
BASOPHILS ABSOLUTE: ABNORMAL
BASOPHILS RELATIVE PERCENT: 0.9 %
BUN BLDV-MCNC: 14 MG/DL
C-REACTIVE PROTEIN: 24.4
CALCIUM SERPL-MCNC: 8.1 MG/DL
CHLORIDE BLD-SCNC: 101 MMOL/L
CO2: 28 MMOL/L
CREAT SERPL-MCNC: 0.5 MG/DL
EGFR: 168
EOSINOPHILS ABSOLUTE: 0.5 /ΜL
EOSINOPHILS RELATIVE PERCENT: 9.1 %
GLUCOSE BLD-MCNC: 134 MG/DL
HCT VFR BLD CALC: 26.8 % (ref 41–53)
HEMOGLOBIN: 9.1 G/DL (ref 13.5–17.5)
LYMPHOCYTES ABSOLUTE: 0.7 /ΜL
LYMPHOCYTES RELATIVE PERCENT: 13.9 %
MCH RBC QN AUTO: 25 PG
MCHC RBC AUTO-ENTMCNC: 34.1 G/DL
MCV RBC AUTO: 73.3 FL
MONOCYTES ABSOLUTE: 0.5 /ΜL
MONOCYTES RELATIVE PERCENT: 9.7 %
NEUTROPHILS ABSOLUTE: 3.5 /ΜL
NEUTROPHILS RELATIVE PERCENT: 66.4 %
PLATELET # BLD: 184 K/ΜL
PMV BLD AUTO: 6.9 FL
POTASSIUM SERPL-SCNC: 4 MMOL/L
RBC # BLD: 3.65 10^6/ΜL
SODIUM BLD-SCNC: 136 MMOL/L
VANCOMYCIN TROUGH: 22.1
WBC # BLD: 5.3 10^3/ML

## 2024-10-01 ENCOUNTER — OFFICE VISIT (OUTPATIENT)
Dept: GERIATRIC MEDICINE | Age: 64
End: 2024-10-01

## 2024-10-01 DIAGNOSIS — Z79.4 DIABETES MELLITUS TYPE 2, INSULIN DEPENDENT (HCC): ICD-10-CM

## 2024-10-01 DIAGNOSIS — I10 ESSENTIAL HYPERTENSION, BENIGN: Primary | ICD-10-CM

## 2024-10-01 DIAGNOSIS — E11.9 DIABETES MELLITUS TYPE 2, INSULIN DEPENDENT (HCC): ICD-10-CM

## 2024-10-01 DIAGNOSIS — I50.22 CHRONIC SYSTOLIC (CONGESTIVE) HEART FAILURE (HCC): ICD-10-CM

## 2024-10-01 LAB
BASOPHILS ABSOLUTE: ABNORMAL
BASOPHILS RELATIVE PERCENT: 0.8 %
BUN BLDV-MCNC: 22 MG/DL
C-REACTIVE PROTEIN: 26.3
CALCIUM SERPL-MCNC: 8.1 MG/DL
CHLORIDE BLD-SCNC: 103 MMOL/L
CO2: 29 MMOL/L
CREAT SERPL-MCNC: 0.8 MG/DL
EGFR: 98
EOSINOPHILS ABSOLUTE: 0.4 /ΜL
EOSINOPHILS RELATIVE PERCENT: 7.3 %
GLUCOSE BLD-MCNC: 164 MG/DL
HCT VFR BLD CALC: 27.9 % (ref 41–53)
HEMOGLOBIN: 9.1 G/DL (ref 13.5–17.5)
LYMPHOCYTES ABSOLUTE: 0.6 /ΜL
LYMPHOCYTES RELATIVE PERCENT: 11.6 %
MCH RBC QN AUTO: 24.4 PG
MCHC RBC AUTO-ENTMCNC: 32.5 G/DL
MCV RBC AUTO: 75.2 FL
MONOCYTES ABSOLUTE: 0.4 /ΜL
MONOCYTES RELATIVE PERCENT: 7.9 %
NEUTROPHILS ABSOLUTE: 3.9 /ΜL
NEUTROPHILS RELATIVE PERCENT: 72.4 %
PLATELET # BLD: 157 K/ΜL
PMV BLD AUTO: 6.7 FL
POTASSIUM SERPL-SCNC: 4.1 MMOL/L
RBC # BLD: 3.72 10^6/ΜL
SODIUM BLD-SCNC: 139 MMOL/L
WBC # BLD: 5.3 10^3/ML

## 2024-10-08 ENCOUNTER — OFFICE VISIT (OUTPATIENT)
Dept: GERIATRIC MEDICINE | Age: 64
End: 2024-10-08

## 2024-10-08 DIAGNOSIS — M47.817 LUMBOSACRAL SPONDYLOSIS WITHOUT MYELOPATHY: ICD-10-CM

## 2024-10-08 DIAGNOSIS — I50.22 CHRONIC SYSTOLIC (CONGESTIVE) HEART FAILURE (HCC): Primary | ICD-10-CM

## 2024-10-09 ENCOUNTER — OFFICE VISIT (OUTPATIENT)
Dept: INFECTIOUS DISEASES | Age: 64
End: 2024-10-09
Payer: COMMERCIAL

## 2024-10-09 VITALS
DIASTOLIC BLOOD PRESSURE: 74 MMHG | RESPIRATION RATE: 16 BRPM | SYSTOLIC BLOOD PRESSURE: 118 MMHG | TEMPERATURE: 97.2 F | HEART RATE: 77 BPM

## 2024-10-09 DIAGNOSIS — M86.672 CHRONIC OSTEOMYELITIS OF LEFT FOOT: Primary | ICD-10-CM

## 2024-10-09 PROCEDURE — 3078F DIAST BP <80 MM HG: CPT | Performed by: INTERNAL MEDICINE

## 2024-10-09 PROCEDURE — G8417 CALC BMI ABV UP PARAM F/U: HCPCS | Performed by: INTERNAL MEDICINE

## 2024-10-09 PROCEDURE — G8427 DOCREV CUR MEDS BY ELIG CLIN: HCPCS | Performed by: INTERNAL MEDICINE

## 2024-10-09 PROCEDURE — G8484 FLU IMMUNIZE NO ADMIN: HCPCS | Performed by: INTERNAL MEDICINE

## 2024-10-09 PROCEDURE — 99213 OFFICE O/P EST LOW 20 MIN: CPT | Performed by: INTERNAL MEDICINE

## 2024-10-09 PROCEDURE — 3074F SYST BP LT 130 MM HG: CPT | Performed by: INTERNAL MEDICINE

## 2024-10-09 PROCEDURE — G9710 PT PROV HOSP SRV MSMT PER: HCPCS | Performed by: INTERNAL MEDICINE

## 2024-10-09 PROCEDURE — 1036F TOBACCO NON-USER: CPT | Performed by: INTERNAL MEDICINE

## 2024-10-09 RX ORDER — VANCOMYCIN HYDROCHLORIDE 250 MG/1
CAPSULE ORAL
COMMUNITY
Start: 2024-09-12

## 2024-10-09 RX ORDER — VANCOMYCIN 750 MG/150ML
INJECTION, SOLUTION INTRAVENOUS
COMMUNITY
Start: 2024-10-08

## 2024-10-09 NOTE — PROGRESS NOTES
Infectious Disease     Patient Name: Gary Turk  Date: 10/9/2024  YOB: 1960  Medical Record Number: [id number]      Chronic osteomyelitis left foot           from [facility] being treated for chronic infection  fleft  foot osteomyelitis history of growing group A streptococcus on Augmentin  Had completed long-term course of IV Zosyn from May through June 2024      Admitted to [facility] 7/7/2024 for loss of appetite  Fevers  diarrhea  C. difficile toxin 7/8/2024 positive      Cultures from left foot wound growing Proteus mirabilis Staph aureus      Discharged 7/12/2024  Patient discharged with a plan of 6 weeks of IV vancomycin for osteomyelitis of foot  and oral vancomycin for C. difficile        Patient had completed 6 weeks of IV antibiotics with vancomycin and was on treatment for C. difficile evaluated 4 weeks ago without any significant improvement in the heel ulceration    Antibiotic vancomycin were continued at that time ceftriaxone was added    Patient follows up now 4 weeks later     Nursing home orders did not show the ceftriaxone that was ordered being given      Review of Systems   Unable to perform ROS: Dementia     Physical Exam:      Physical Exam  Cardiovascular:      Heart sounds: Normal heart sounds. No murmur heard.  Pulmonary:      Effort: Pulmonary effort is normal. No respiratory distress.      Breath sounds: Normal breath sounds. No stridor. No wheezing, rhonchi or rales.   Chest:      Chest wall: No tenderness.   Abdominal:      General: Abdomen is flat. Bowel sounds are normal. There is no distension.      Palpations: Abdomen is soft. There is no mass.      Tenderness: There is no abdominal tenderness. There is no guarding or rebound.      Hernia: No hernia is present.   Skin:     Comments: Ulcer posterior aspect left heel granulating  No visible bone soft tissues intact overall improved no significant surrounding erythema         /74   Pulse 77   Temp 97.2 °F (36.2 °C)

## 2024-10-17 ENCOUNTER — OFFICE VISIT (OUTPATIENT)
Dept: GERIATRIC MEDICINE | Age: 64
End: 2024-10-17

## 2024-10-17 DIAGNOSIS — R21 RASH: Primary | ICD-10-CM

## 2024-10-31 NOTE — PROGRESS NOTES
SUBJECTIVE:  This 64-year-old woman scheduled follow-up visit for his heart failure hypertension bronchitis patient's prior skin breakdown intermittent episodes of agitation no recent nausea vomiting oral intake has been good no new acute pain crisis      ROS: Denies chest pain palpitation  The rest of the 14 point ROS negative    PHYSICAL EXAM: VSS per facility record  Pupils are reactive oral mucosas dry chest showed no crackles no wheezing cardiovascular showed a regular rate abdomen soft nontender extremity trace edema    ASSESSMENT & PLAN:   Diagnosis Orders   1. Essential hypertension, benign        2. Chronic systolic (congestive) heart failure        3. Diabetes mellitus type 2, insulin dependent (HCC)            Continue oxazole pressure monitor skin for breakdown local wound care as needed.  Monitor blood sugars as well as blood pressure follow-up A1c BMP pending in the next 3 months          Past Medical History:   Diagnosis Date    Acute blood loss anemia 10/09/2023    Acute GI bleeding 08/06/2024    Asthma     Back pain     compresion in [place]    CAD (coronary artery disease) 01/2020    3 vessel     chf 09/16/2022    CVA (cerebral vascular accident) ([facility])     Diabetes mellitus ([facility])     Essential hypertension, benign     Hyperlipemia     Hypertensive urgency 08/16/2022    Ischemic cardiomyopathy 09/16/2022    Neuropathic pain, leg, bilateral     NSTEMI (non-ST elevated myocardial infarction) ([facility]) 12/13/2019         Past Surgical History:   Procedure Laterality Date    CARPAL TUNNEL RELEASE Bilateral     COLONOSCOPY N/A 7/24/2024    COLONOSCOPY with polypectomies and biopsies performed by Kelton Lopez MD at [facility]    EP DEVICE PROCEDURE N/A 9/29/2023    Loop recorder insert  ROOM 293 [employer] NOTIFIED performed by Maxwell Tyler DO at [facility]    FOOT DEBRIDEMENT Right 5/5/2023    RIGHT FOOT DEBRIDEMENT INCISION AND DRAINAGE ROOM 174 performed by Jairo Garcia DPM at

## 2024-11-08 NOTE — PROGRESS NOTES
disability    Tobacco Use    Smoking status: Never     Passive exposure: Never    Smokeless tobacco: Never   Vaping Use    Vaping status: Never Used   Substance and Sexual Activity    Alcohol use: Not Currently     Alcohol/week: 2.0 standard drinks of alcohol     Types: 2 Cans of beer per week    Drug use: No    Sexual activity: Not Currently     Partners: Female   Other Topics Concern    Not on file   Social History Narrative    Lives with wife.  Per patient he has been  for 2 years.  He met his wife online and went to [facility] and brought her back to [facility].  They live in [facility] no steps into home. It is a 2 story home. Bedroom and bath upstairs. Bathroom down. Per patient once he is down the steps he doesn't go back up during the day.    2 daughters local.      he has very poor vision.  He does not like to drive at night or when raining.  Patient states he struggles looking at a computer or reading due to vision.         Type of Home: House in [address]    Home Layout: Two level    Home Access: Level entry    Bathroom Shower/Tub: Tub/Shower unit    Bathroom Equipment: Hand-held shower    Home Equipment: Cane, Rollator    ADL Assistance: Needs assistance (needs assist)    Homemaking Assistance:  (wife does cooking and cleaning)    Ambulation Assistance: Independent (uses rollator PRN)    Transfer Assistance: Independent    Active : Yes    Additional Comments: Pt mildly inconsistent historian             Social Determinants of Health     Financial Resource Strain: Low Risk  (8/27/2023)    [facility]    Overall Financial Resource Strain (CARDIA)     Difficulty of Paying Living Expenses: Not very hard   Food Insecurity: No Food Insecurity (7/19/2024)    Hunger Vital Sign     Worried About Running Out of Food in the Last Year: Never true     Ran Out of Food in the Last Year: Never true   Transportation Needs: No Transportation Needs (7/19/2024)

## 2024-11-12 ENCOUNTER — OFFICE VISIT (OUTPATIENT)
Dept: INFECTIOUS DISEASES | Age: 64
End: 2024-11-12
Payer: COMMERCIAL

## 2024-11-12 VITALS
TEMPERATURE: 97.1 F | BODY MASS INDEX: 28.25 KG/M2 | OXYGEN SATURATION: 98 % | HEIGHT: 67 IN | WEIGHT: 180 LBS | RESPIRATION RATE: 20 BRPM | HEART RATE: 73 BPM | DIASTOLIC BLOOD PRESSURE: 69 MMHG | SYSTOLIC BLOOD PRESSURE: 119 MMHG

## 2024-11-12 DIAGNOSIS — M86.672 CHRONIC OSTEOMYELITIS OF LEFT FOOT: Primary | ICD-10-CM

## 2024-11-12 PROCEDURE — 99213 OFFICE O/P EST LOW 20 MIN: CPT | Performed by: INTERNAL MEDICINE

## 2024-11-12 PROCEDURE — G8427 DOCREV CUR MEDS BY ELIG CLIN: HCPCS | Performed by: INTERNAL MEDICINE

## 2024-11-12 PROCEDURE — G9710 PT PROV HOSP SRV MSMT PER: HCPCS | Performed by: INTERNAL MEDICINE

## 2024-11-12 PROCEDURE — 3078F DIAST BP <80 MM HG: CPT | Performed by: INTERNAL MEDICINE

## 2024-11-12 PROCEDURE — G8417 CALC BMI ABV UP PARAM F/U: HCPCS | Performed by: INTERNAL MEDICINE

## 2024-11-12 PROCEDURE — G8484 FLU IMMUNIZE NO ADMIN: HCPCS | Performed by: INTERNAL MEDICINE

## 2024-11-12 PROCEDURE — 3074F SYST BP LT 130 MM HG: CPT | Performed by: INTERNAL MEDICINE

## 2024-11-12 PROCEDURE — 1036F TOBACCO NON-USER: CPT | Performed by: INTERNAL MEDICINE

## 2024-11-12 NOTE — PROGRESS NOTES
infection         /69   Pulse 73   Temp 97.1 °F (36.2 °C)   Resp 20   Ht 1.702 m (5' 7\")   Wt 81.6 kg (180 lb)   SpO2 98%   BMI 28.19 kg/m²               .   Lab Results   Component Value Date    WBC 27.3 10/15/2024    HGB 9.1 (A) 10/15/2024    HCT 30.2 (A) 10/08/2024    MCV 74.5 10/15/2024     10/15/2024     Lab Results   Component Value Date/Time     10/15/2024 05:56 PM    K 4.3 10/15/2024 05:56 PM    K 3.4 07/23/2024 10:53 AM     10/15/2024 05:56 PM    CO2 28 10/15/2024 05:56 PM    BUN 21 10/15/2024 05:56 PM    CREATININE 0.5 10/15/2024 05:56 PM    GLUCOSE 108 10/15/2024 05:56 PM    GLUCOSE 174 08/22/2023 05:28 AM    CALCIUM 7.8 10/15/2024 05:56 PM    LABGLOM 168 10/15/2024 05:56 PM                ASSESSMENT:  PLAN:          Chronic osteomyelitis left heel      Superficial ulceration remaining left heel region    No antibiotic therapy at this point continue wound care follow-up as needed

## 2024-11-14 ENCOUNTER — OFFICE VISIT (OUTPATIENT)
Dept: CARDIOLOGY CLINIC | Age: 64
End: 2024-11-14
Payer: COMMERCIAL

## 2024-11-14 VITALS
SYSTOLIC BLOOD PRESSURE: 118 MMHG | DIASTOLIC BLOOD PRESSURE: 64 MMHG | WEIGHT: 184.3 LBS | HEART RATE: 76 BPM | BODY MASS INDEX: 28.87 KG/M2 | OXYGEN SATURATION: 98 %

## 2024-11-14 DIAGNOSIS — I48.0 PAF (PAROXYSMAL ATRIAL FIBRILLATION) (HCC): ICD-10-CM

## 2024-11-14 DIAGNOSIS — I50.32 CHRONIC DIASTOLIC CONGESTIVE HEART FAILURE (HCC): ICD-10-CM

## 2024-11-14 DIAGNOSIS — I25.5 ISCHEMIC CARDIOMYOPATHY: Primary | ICD-10-CM

## 2024-11-14 DIAGNOSIS — I10 ESSENTIAL HYPERTENSION, BENIGN: ICD-10-CM

## 2024-11-14 DIAGNOSIS — I25.10 CORONARY ARTERY DISEASE INVOLVING NATIVE HEART WITHOUT ANGINA PECTORIS, UNSPECIFIED VESSEL OR LESION TYPE: ICD-10-CM

## 2024-11-14 PROCEDURE — G8427 DOCREV CUR MEDS BY ELIG CLIN: HCPCS | Performed by: INTERNAL MEDICINE

## 2024-11-14 PROCEDURE — G8484 FLU IMMUNIZE NO ADMIN: HCPCS | Performed by: INTERNAL MEDICINE

## 2024-11-14 PROCEDURE — 3078F DIAST BP <80 MM HG: CPT | Performed by: INTERNAL MEDICINE

## 2024-11-14 PROCEDURE — 93000 ELECTROCARDIOGRAM COMPLETE: CPT | Performed by: INTERNAL MEDICINE

## 2024-11-14 PROCEDURE — 99214 OFFICE O/P EST MOD 30 MIN: CPT | Performed by: INTERNAL MEDICINE

## 2024-11-14 PROCEDURE — G8417 CALC BMI ABV UP PARAM F/U: HCPCS | Performed by: INTERNAL MEDICINE

## 2024-11-14 PROCEDURE — 1036F TOBACCO NON-USER: CPT | Performed by: INTERNAL MEDICINE

## 2024-11-14 PROCEDURE — G9710 PT PROV HOSP SRV MSMT PER: HCPCS | Performed by: INTERNAL MEDICINE

## 2024-11-14 PROCEDURE — 3074F SYST BP LT 130 MM HG: CPT | Performed by: INTERNAL MEDICINE

## 2024-11-14 RX ORDER — ATORVASTATIN CALCIUM 40 MG/1
TABLET, FILM COATED ORAL
COMMUNITY
Start: 2024-11-11

## 2024-11-14 RX ORDER — TAMSULOSIN HYDROCHLORIDE 0.4 MG/1
CAPSULE ORAL
COMMUNITY
Start: 2024-10-25

## 2024-11-14 RX ORDER — LACTOBACILLUS ACIDOPHILUS 500MM CELL
1 CAPSULE ORAL DAILY
COMMUNITY

## 2024-11-14 RX ORDER — POTASSIUM CHLORIDE 1500 MG/1
TABLET, EXTENDED RELEASE ORAL
COMMUNITY
Start: 2024-10-27

## 2024-11-14 RX ORDER — ASPIRIN 81 MG/1
81 TABLET ORAL DAILY
Qty: 90 TABLET | Refills: 3 | Status: SHIPPED | OUTPATIENT
Start: 2024-11-14

## 2024-11-14 RX ORDER — FERROUS SULFATE 325(65) MG
325 TABLET ORAL
COMMUNITY

## 2024-11-14 RX ORDER — CLOPIDOGREL BISULFATE 75 MG/1
75 TABLET ORAL DAILY
Qty: 90 TABLET | Refills: 3 | Status: SHIPPED | OUTPATIENT
Start: 2024-11-14

## 2024-11-14 NOTE — PROGRESS NOTES
mLs by mouth See Admin Instructions      loperamide (IMODIUM) 2 MG capsule Take 1 capsule by mouth 4 times daily as needed for Diarrhea      escitalopram (LEXAPRO) 10 MG tablet 1.5 tablets      apixaban (ELIQUIS) 5 MG TABS tablet Take 1 tablet by mouth 2 times daily RESUME FULL DOSE ON 10/11. Continue low dose of eliquis 2.5mg twice daily until 10/11. (Patient taking differently: Take 1 tablet by mouth 2 times daily) 60 tablet 0    sacubitril-valsartan (ENTRESTO) 24-26 MG per tablet Take 1 tablet by mouth 2 times daily Indications: Congestive Heart Failure      Handicap Placard MISC by Does not apply route He needs a placard for 5 years  Expires 8/2/28  He is unable to walk 200 feet without stopping  Dx back pain 1 each 0    Accu-Chek FastClix Lancets MISC Test 4x daily Dx E11.65 150 each 3    blood glucose test strips (ACCU-CHEK GUIDE) strip Test 4x daily Dx E11.65 150 each 3    vancomycin (VANCOCIN) 750 MG/150ML SOLN       vancomycin (VANCOCIN) 250 MG capsule       guaiFENesin-codeine (GUAIFENESIN AC) 100-10 MG/5ML liquid Take 5 mLs by mouth 3 times daily as needed for Cough.      sennosides-docusate sodium (SENOKOT-S) 8.6-50 MG tablet Take 1 tablet by mouth 2 times daily Indications: Constipation      BREO ELLIPTA 100-25 MCG/ACT inhaler Inhale 1 puff into the lungs daily Indications: Asthma, Chronic Obstructive Lung Disease       No current facility-administered medications for this visit.       Allergies: Patient has no known allergies.      Review of Systems:  General: Fatigued.   Cardiovascular: See HPI. No orthopnea or PND.   Respiratory: Dyspnea is present with mild degrees of activity.  Gastrointestinal: No melena or hematochezia.   Genitourinary: No hematuria.   Hematological: + Easy bruising but no excessive bleeding  Vascular: No lower extremity edema or claudication.  Neurological: No recent TIA or CVA symptoms.  + Paresthesias.   Musculoskeletal: No chest wall pain.  Currently wheelchair

## 2024-11-16 NOTE — PROGRESS NOTES
05:56 PM    K 4.3 10/15/2024 05:56 PM    K 3.4 07/23/2024 10:53 AM     10/15/2024 05:56 PM    CO2 28 10/15/2024 05:56 PM    BUN 21 10/15/2024 05:56 PM    CREATININE 0.5 10/15/2024 05:56 PM    GLUCOSE 108 10/15/2024 05:56 PM    GLUCOSE 174 08/22/2023 05:28 AM    CALCIUM 7.8 10/15/2024 05:56 PM        Lab Results   Component Value Date    CHOL 174 11/09/2023    CHOL 149 11/02/2023    CHOL 172 10/31/2023     Lab Results   Component Value Date    TRIG 164 (H) 11/09/2023    TRIG 99 11/02/2023    TRIG 125 10/31/2023     Lab Results   Component Value Date    HDL 41 11/09/2023    HDL 38 (L) 11/02/2023    HDL 42 10/31/2023     No components found for: \"LDLCHOLESTEROL\", \"LDLCALC\"  Lab Results   Component Value Date    VLDL 31 08/21/2023     Lab Results   Component Value Date    CHOLHDLRATIO 6.0 (A) 08/21/2023    CHOLHDLRATIO 5.5 07/19/2012       Lab Results   Component Value Date    TSH 0.976 07/07/2024       Lab Results   Component Value Date    WBC 27.3 10/15/2024    HGB 9.1 (A) 10/15/2024    HCT 30.2 (A) 10/08/2024    MCV 74.5 10/15/2024     10/15/2024       Please note orders entered on site at facility after discussion with appropriate facility nursing/therapy/ / nutritional staff. Current longstanding medical problems and acute medical issues addressed with staff. Available data and data elements in on site paper chart reviewed and analyzed.  Current external consultant notes reviewed in on site chart. Ordered laboratory testing and imaging will be reviewed when available.

## 2024-11-19 ENCOUNTER — OFFICE VISIT (OUTPATIENT)
Dept: GERIATRIC MEDICINE | Age: 64
End: 2024-11-19
Payer: COMMERCIAL

## 2024-11-19 DIAGNOSIS — M47.817 LUMBOSACRAL SPONDYLOSIS WITHOUT MYELOPATHY: Primary | ICD-10-CM

## 2024-11-19 DIAGNOSIS — I48.0 PAF (PAROXYSMAL ATRIAL FIBRILLATION) (HCC): ICD-10-CM

## 2024-11-19 PROCEDURE — 99308 SBSQ NF CARE LOW MDM 20: CPT | Performed by: INTERNAL MEDICINE

## 2024-11-19 PROCEDURE — G8484 FLU IMMUNIZE NO ADMIN: HCPCS | Performed by: INTERNAL MEDICINE

## 2024-12-02 ENCOUNTER — HOSPITAL ENCOUNTER (OUTPATIENT)
Dept: CARDIOLOGY | Age: 64
Discharge: HOME | End: 2024-12-02
Payer: COMMERCIAL

## 2024-12-02 PROCEDURE — 93298 REM INTERROG DEV EVAL SCRMS: CPT

## 2024-12-19 NOTE — PROGRESS NOTES
Device by Does not apply route daily 1 each 0    acetaminophen (TYLENOL) 650 MG suppository Place 1 suppository rectally every 4 hours as needed for Fever      acetaminophen (TYLENOL) 325 MG tablet Take 2 tablets by mouth every 4 hours as needed for Pain      aluminum-magnesium hydroxide 200-200 MG/5ML suspension Take by mouth every 6 hours as needed for Indigestion      bisacodyl (DULCOLAX) 10 MG suppository Place 1 suppository rectally daily      sodium phosphate (FLEET) 7-19 GM/118ML Place 1 enema rectally once as needed      glucagon 1 MG injection Inject 1 mg into the muscle once      glucose (GLUTOSE) 40 % GEL Take 37.5 mLs by mouth See Admin Instructions      guaiFENesin-codeine (GUAIFENESIN AC) 100-10 MG/5ML liquid Take 5 mLs by mouth 3 times daily as needed for Cough.      loperamide (IMODIUM) 2 MG capsule Take 1 capsule by mouth 4 times daily as needed for Diarrhea      escitalopram (LEXAPRO) 10 MG tablet 1.5 tablets      sennosides-docusate sodium (SENOKOT-S) 8.6-50 MG tablet Take 1 tablet by mouth 2 times daily Indications: Constipation      sacubitril-valsartan (ENTRESTO) 24-26 MG per tablet Take 1 tablet by mouth 2 times daily Indications: Congestive Heart Failure      Handicap Placard MISC by Does not apply route He needs a placard for 5 years  Expires 8/2/28  He is unable to walk 200 feet without stopping  Dx back pain 1 each 0    BREO ELLIPTA 100-25 MCG/ACT inhaler Inhale 1 puff into the lungs daily Indications: Asthma, Chronic Obstructive Lung Disease      [DISCONTINUED] lisinopril (PRINIVIL;ZESTRIL) 10 MG tablet Take 1 tablet by mouth daily 30 tablet 5    Accu-Chek FastClix Lancets MISC Test 4x daily Dx E11.65 150 each 3    blood glucose test strips (ACCU-CHEK GUIDE) strip Test 4x daily Dx E11.65 150 each 3     No current facility-administered medications on file prior to visit.         Family History   Problem Relation Age of Onset    Other Father         accident    Heart Failure Mother

## 2025-01-01 ENCOUNTER — OFFICE VISIT (OUTPATIENT)
Dept: GERIATRIC MEDICINE | Age: 65
End: 2025-01-01

## 2025-01-01 DIAGNOSIS — L97.514 RIGHT FOOT ULCER, WITH NECROSIS OF BONE (HCC): ICD-10-CM

## 2025-01-01 DIAGNOSIS — I50.22 CHRONIC SYSTOLIC (CONGESTIVE) HEART FAILURE (HCC): ICD-10-CM

## 2025-01-01 DIAGNOSIS — I25.5 ISCHEMIC CARDIOMYOPATHY: ICD-10-CM

## 2025-01-01 DIAGNOSIS — I48.0 PAF (PAROXYSMAL ATRIAL FIBRILLATION) (HCC): ICD-10-CM

## 2025-01-01 DIAGNOSIS — R27.0 ATAXIA: Primary | ICD-10-CM

## 2025-01-06 ENCOUNTER — HOSPITAL ENCOUNTER (OUTPATIENT)
Dept: CARDIOLOGY | Age: 65
Discharge: HOME | End: 2025-01-06
Payer: COMMERCIAL

## 2025-01-06 PROCEDURE — 93298 REM INTERROG DEV EVAL SCRMS: CPT

## 2025-01-07 PROCEDURE — 93298 REM INTERROG DEV EVAL SCRMS: CPT | Performed by: INTERNAL MEDICINE

## 2025-01-26 NOTE — PROGRESS NOTES
Apply the Silvadene cream 4 times a day.  Watch for signs of infection as discussed.  Return to the ER for worsening concerns.  This should heal over the next week to 2 weeks.   twice daily, Lipitor 40 mg daily, metformin 1000 mg twice daily, oxycodone 5 mg q.4 hours as needed, Prinivil 10 mg daily, Protonix 40 mg daily, Singulair 10 mg daily, vancomycin 0.5 g IV twice daily. FAMILY HISTORY:  Included diabetes. SOCIAL HISTORY:  The patient has been community-dwelling individual.  Had been independent of ADL/IADLs. REVIEW OF SYSTEMS:  He is intermittently lightheaded. Oral intake is fluctuant. No recent chest pain, palpitation. No change in his bowel or bladder habits. Ongoing intermittent discomfort at his site of cellulitis. Otherwise, 14-point review of systems was unremarkable. PHYSICAL EXAMINATION:  The patient's vital signs were stable. He was afebrile 98.9, pulse was 86, blood pressure was 124/68. He is normocephalic, atraumatic. Pupils are equal and reactive. Oral mucosa is moist.  Chest showed no crackles, no wheezing. Cardiovascular exam showed a regular rate. Abdomen was soft, nontender. No involuntary guarding or rigidity. Extremities showed +1 dorsal pedal pulse, questionable likely fullness in the posterior aspect of the right lower extremity. Skin:  No evidence of new rash at this time. Lymph:  No axillary or inguinal lymphadenopathy. ASSESSMENT AND PLAN:  1. Cellulitis, ? abscess: The patient is completing course of IV antibiotic therapy. Follow up with infectious disease. May repeat imaging of his lower extremity. 2.   Diabetes: The patient's blood sugar is trending in the low. May be that his diet is improved here compared to the home setting. We will reduce his 70/30 insulin at this time. Monitor blood sugars. 3.   Hypertension:  Blood pressure is stable. Continue with home regimen. 4.   Weakness: The patient will undergo a course of physical therapy, occupational therapy with a goal of maximization of functional status.     Please note, available hospital records, imaging reports, consultant notes and laboratory results are reviewed.          Electronically Signed By: Anika Steve M.D. on 07/22/2018 10:49:42  ______________________________  ELVI Dickson.Dirk  D: 07/18/2018 20:40:16  T: 07/19/2018 06:51:56    cc: [facility]

## 2025-01-29 ENCOUNTER — OFFICE VISIT (OUTPATIENT)
Dept: GERIATRIC MEDICINE | Age: 65
End: 2025-01-29

## 2025-01-29 DIAGNOSIS — L03.012 INFECTION OF NAIL BED OF FINGER OF LEFT HAND: Primary | ICD-10-CM

## 2025-01-30 ENCOUNTER — OFFICE VISIT (OUTPATIENT)
Dept: GERIATRIC MEDICINE | Age: 65
End: 2025-01-30

## 2025-01-30 DIAGNOSIS — I50.22 CHRONIC SYSTOLIC (CONGESTIVE) HEART FAILURE (HCC): ICD-10-CM

## 2025-01-30 DIAGNOSIS — E11.9 DIABETES MELLITUS TYPE 2, INSULIN DEPENDENT (HCC): Primary | ICD-10-CM

## 2025-01-30 DIAGNOSIS — Z79.4 DIABETES MELLITUS TYPE 2, INSULIN DEPENDENT (HCC): Primary | ICD-10-CM

## 2025-02-04 ENCOUNTER — OFFICE VISIT (OUTPATIENT)
Dept: GERIATRIC MEDICINE | Age: 65
End: 2025-02-04

## 2025-02-04 DIAGNOSIS — I50.22 CHRONIC SYSTOLIC (CONGESTIVE) HEART FAILURE (HCC): ICD-10-CM

## 2025-02-04 DIAGNOSIS — Z79.4 DIABETES MELLITUS TYPE 2, INSULIN DEPENDENT (HCC): ICD-10-CM

## 2025-02-04 DIAGNOSIS — E11.9 DIABETES MELLITUS TYPE 2, INSULIN DEPENDENT (HCC): ICD-10-CM

## 2025-02-04 DIAGNOSIS — M43.17 SPONDYLOLISTHESIS, LUMBOSACRAL REGION: Primary | ICD-10-CM

## 2025-02-10 ENCOUNTER — HOSPITAL ENCOUNTER (OUTPATIENT)
Dept: CARDIOLOGY | Age: 65
Discharge: HOME | End: 2025-02-10
Payer: COMMERCIAL

## 2025-02-10 PROCEDURE — 93298 REM INTERROG DEV EVAL SCRMS: CPT

## 2025-02-28 ASSESSMENT — ENCOUNTER SYMPTOMS
SHORTNESS OF BREATH: 0
COLOR CHANGE: 1

## 2025-02-28 NOTE — PROGRESS NOTES
Subjective:      Patient ID: Gary Turk is a pleasant 64 y.o. male who presents today for:  Chief Complaint   Patient presents with    Other     Infection of nail bed of finger of left hand  (primary encounter diagnosis)         [facility]    Patient seen today for initial concern of possible finger.  Patient left hand does show a forwith some irritation/erythema surrounding nailbeds.  No evidence of acute infection or pus.  Plan: Index and third digit.  Biopsy affected.  Recheck CBC and BMP on Friday as patient has not had labs in some time.  1 presentation to affected areas until resolved.  Monitor for any further concerns in his infection.  No acute pain noted on palpation.      Patient Active Problem List   Diagnosis    Essential hypertension, benign    Irregular heart rhythm    Hyperlipidemia    Allergic rhinitis due to pollen    Staphylococcus aureus bacteremia    Hematoma of right thigh    Hematoma of left lower extremity    Hereditary peripheral neuropathy    Migraine without aura    Community acquired pneumonia of right lower lobe of lung    Mild intermittent asthma with exacerbation    CAD (coronary artery disease)    Body mass index (bmi) 29.0-29.9, adult    Cellulitis of right lower limb    Dyspnea, unspecified    Encounter for pre-employment examination    Family history of ischemic heart disease and other diseases of the circulatory system    Other specified hypothyroidism    Inflammatory disorders of scrotum    Long term (current) use of insulin (HCC)    Long term (current) use of oral hypoglycemic drugs    Other chronic sinusitis    Other idiopathic scoliosis, lumbosacral region    Other intervertebral disc disorders, lumbar region    Pain in left lower leg    General patient noncompliance    Sleep apnea    Spinal stenosis, lumbar region with neurogenic claudication    Spondylolisthesis, lumbosacral region    Unsp open wound of r little finger w/o damage to nail, init    Involuntary movements

## 2025-03-03 ASSESSMENT — ENCOUNTER SYMPTOMS
SHORTNESS OF BREATH: 0
COLOR CHANGE: 1

## 2025-03-17 ENCOUNTER — HOSPITAL ENCOUNTER (OUTPATIENT)
Dept: CARDIOLOGY | Age: 65
Discharge: HOME | End: 2025-03-17
Payer: COMMERCIAL

## 2025-03-17 PROCEDURE — 93298 REM INTERROG DEV EVAL SCRMS: CPT

## 2025-04-17 ENCOUNTER — OFFICE VISIT (OUTPATIENT)
Dept: GERIATRIC MEDICINE | Age: 65
End: 2025-04-17

## 2025-04-17 DIAGNOSIS — I50.22 CHRONIC SYSTOLIC (CONGESTIVE) HEART FAILURE (HCC): Primary | ICD-10-CM

## 2025-04-17 DIAGNOSIS — I48.0 PAF (PAROXYSMAL ATRIAL FIBRILLATION) (HCC): ICD-10-CM

## 2025-04-17 DIAGNOSIS — I10 ESSENTIAL HYPERTENSION, BENIGN: ICD-10-CM

## 2025-04-18 ENCOUNTER — OFFICE VISIT (OUTPATIENT)
Dept: GERIATRIC MEDICINE | Age: 65
End: 2025-04-18

## 2025-04-18 DIAGNOSIS — B35.1 ONYCHOMYCOSIS: Primary | ICD-10-CM

## 2025-04-18 DIAGNOSIS — S60.029A: ICD-10-CM

## 2025-04-21 PROCEDURE — 93298 REM INTERROG DEV EVAL SCRMS: CPT | Performed by: INTERNAL MEDICINE

## 2025-04-22 ENCOUNTER — HOSPITAL ENCOUNTER (OUTPATIENT)
Dept: CARDIOLOGY | Age: 65
Discharge: HOME | End: 2025-04-22
Payer: COMMERCIAL

## 2025-04-22 ENCOUNTER — OFFICE VISIT (OUTPATIENT)
Dept: GERIATRIC MEDICINE | Age: 65
End: 2025-04-22

## 2025-04-22 DIAGNOSIS — E11.9 DIABETES MELLITUS TYPE 2, INSULIN DEPENDENT (HCC): ICD-10-CM

## 2025-04-22 DIAGNOSIS — Z79.4 DIABETES MELLITUS TYPE 2, INSULIN DEPENDENT (HCC): ICD-10-CM

## 2025-04-22 DIAGNOSIS — I10 ESSENTIAL HYPERTENSION, BENIGN: ICD-10-CM

## 2025-04-22 DIAGNOSIS — G81.91 HEMIPLEGIA AFFECTING RIGHT DOMINANT SIDE, UNSPECIFIED ETIOLOGY, UNSPECIFIED HEMIPLEGIA TYPE (HCC): ICD-10-CM

## 2025-04-22 DIAGNOSIS — I50.22 CHRONIC SYSTOLIC (CONGESTIVE) HEART FAILURE (HCC): Primary | ICD-10-CM

## 2025-04-22 PROCEDURE — 93298 REM INTERROG DEV EVAL SCRMS: CPT

## 2025-05-01 NOTE — PROGRESS NOTES
vomiting    chf    Ischemic cardiomyopathy    Chronic systolic (congestive) heart failure    Crush fracture of vertebra due to osteoporosis (HCC)    Diabetes mellitus type 2, insulin dependent (HCC)    Altered mental status, unspecified    Contact with and (suspected) exposure to covid-19    Degeneration of lumbar intervertebral disc    Diplopia    Displacement of lumbar intervertebral disc without myelopathy    Headache    Hypokalemia    Illness, unspecified    Low back pain    Lumbosacral spondylosis without myelopathy    Moderate persistent asthma without complication    Nasal congestion    Nausea    Presence of aortocoronary bypass graft    Toothache    Right foot ulcer, with necrosis of bone ([facility])    Cellulitis of right foot    Right hallux osteomyelitis ([facility])    Hyperglycemia    Medically noncompliant    Acute hematogenous osteomyelitis of right foot ([facility])    Abscess    Acute osteomyelitis of toe of right foot ([facility])    Weakness    Coagulation defect    Dizziness    TIA (transient ischemic attack)    Hypertensive emergency    Unsteadiness on feet    Vertebral artery occlusion, left    Transient alteration of awareness    Presence of coronary angioplasty implant and graft    Hypertensive encephalopathy    Cryptogenic stroke ([facility])    Immobility    Aphasia    Dysphagia, oropharyngeal phase    Adjustment disorder with depressed mood    Closed nondisplaced intertrochanteric fracture of left femur ([facility])    Fall from standing    Acute pain due to trauma    Acute post-operative pain    MCI (mild cognitive impairment)    Thalamic hemorrhage ([facility])    Ataxia    Hemiplegia affecting right dominant side, unspecified etiology, unspecified hemiplegia type ([facility])    PAF (paroxysmal atrial fibrillation) ([facility])    History of stroke    Ptosis of left eyelid    Fever of unknown origin    Chronic ulcer of left heel with fat layer exposed ([facility])    Diabetic foot infection ([facility])    Urinary retention    Chronic osteomyelitis of left foot

## 2025-05-02 ENCOUNTER — OFFICE VISIT (OUTPATIENT)
Dept: GERIATRIC MEDICINE | Age: 65
End: 2025-05-02
Payer: COMMERCIAL

## 2025-05-02 DIAGNOSIS — B35.1 FUNGAL INFECTION OF NAIL: Primary | ICD-10-CM

## 2025-05-02 PROCEDURE — 99308 SBSQ NF CARE LOW MDM 20: CPT | Performed by: PHYSICIAN ASSISTANT

## 2025-05-12 NOTE — PROGRESS NOTES
Subjective:      Patient ID: Gary Turk is a pleasant 65 y.o. male who presents today for:  Chief Complaint   Patient presents with    Other     Fungal infection of nail  (primary encounter diagnosis)         [facility]    Seen today for follow-up on recent changes in nail condition.  Believed to be at first possible mild trauma to bilateral fingers, however but due to no etiology and ongoing consistent changes in nails, likely fungal infection.  Patient is alert and oriented to self only.  Otherwise confused and Diflucan 200 mg p.o. q. Monday for the next 4 weeks with BMP every 3 weeks monitor renal function.  Monitor progress in condition.  No further changes to care plan at this time.      Patient Active Problem List   Diagnosis    Essential hypertension, benign    Irregular heart rhythm    Hyperlipidemia    Allergic rhinitis due to pollen    Staphylococcus aureus bacteremia    Hematoma of right thigh    Hematoma of left lower extremity    Hereditary peripheral neuropathy    Migraine without aura    Community acquired pneumonia of right lower lobe of lung    Mild intermittent asthma with exacerbation    CAD (coronary artery disease)    Body mass index (bmi) 29.0-29.9, adult    Cellulitis of right lower limb    Dyspnea, unspecified    Encounter for pre-employment examination    Family history of ischemic heart disease and other diseases of the circulatory system    Other specified hypothyroidism    Inflammatory disorders of scrotum    Long term (current) use of insulin (HCC)    Long term (current) use of oral hypoglycemic drugs    Other chronic sinusitis    Other idiopathic scoliosis, lumbosacral region    Other intervertebral disc disorders, lumbar region    Pain in left lower leg    General patient noncompliance    Sleep apnea    Spinal stenosis, lumbar region with neurogenic claudication    Spondylolisthesis, lumbosacral region    Unsp open wound of r little finger w/o damage to nail, init     Infliximab Counseling:  I discussed with the patient the risks of infliximab including but not limited to myelosuppression, immunosuppression, autoimmune hepatitis, demyelinating diseases, lymphoma, and serious infections.  The patient understands that monitoring is required including a PPD at baseline and must alert us or the primary physician if symptoms of infection or other concerning signs are noted.

## 2025-05-17 NOTE — PROGRESS NOTES
Activity    Alcohol use: Not Currently     Alcohol/week: 2.0 standard drinks of alcohol     Types: 2 Cans of beer per week    Drug use: No    Sexual activity: Not Currently     Partners: Female   Other Topics Concern    Not on file   Social History Narrative    Lives with wife.  Per patient he has been  for 2 years.  He met his wife online and went to [facility] and brought her back to [facility].  They live in [facility] no steps into home. It is a 2 story home. Bedroom and bath upstairs. Bathroom down. Per patient once he is down the steps he doesn't go back up during the day.    2 daughters local.      he has very poor vision.  He does not like to drive at night or when raining.  Patient states he struggles looking at a computer or reading due to vision.         Type of Home: House in [address]    Home Layout: Two level    Home Access: Level entry    Bathroom Shower/Tub: Tub/Shower unit    Bathroom Equipment: Hand-held shower    Home Equipment: Cane, Rollator    ADL Assistance: Needs assistance (needs assist)    Homemaking Assistance:  (wife does cooking and cleaning)    Ambulation Assistance: Independent (uses rollator PRN)    Transfer Assistance: Independent    Active : Yes    Additional Comments: Pt mildly inconsistent historian             Social Drivers of Health     Financial Resource Strain: Low Risk  (8/27/2023)    Received from [facility], [facility]    Overall Financial Resource Strain (CARDIA)     Difficulty of Paying Living Expenses: Not very hard   Food Insecurity: No Food Insecurity (7/19/2024)    Hunger Vital Sign     Worried About Running Out of Food in the Last Year: Never true     Ran Out of Food in the Last Year: Never true   Transportation Needs: No Transportation Needs (7/19/2024)    PRAPARE - Transportation     Lack of Transportation (Medical): No     Lack of Transportation (Non-Medical): No   Physical Activity: Inactive (8/2/2023)    Exercise Vital Sign

## 2025-05-21 NOTE — PROGRESS NOTES
FOOT DEBRIDEMENT Right 5/5/2023    RIGHT FOOT DEBRIDEMENT INCISION AND DRAINAGE ROOM 174 [place] [facility] [place]    FOOT DEBRIDEMENT Right 5/8/2023    RIGHT FOOT  INCISION AND DRAINAGE WITH DEBRIDEMENT OF NON-VIABLE TISSUE DELAYED PRIMARY CLOSURE performed by Jairo Garcia DPM [place] [facility]    FOOT SURGERY Left     bone spur    HIP SURGERY Left 10/5/2023    TROCHANTERIC FIXATION NAILING LEFT FEMUR FRACTURE ROOM 265 performed by Mirza Elliott MD [place] [facility] [place]    LAMINOTOMY  2012    SPINE SURGERY N/A 11/11/2022    L1 VERETEBRAL AUGMENTATION performed by Concetta Junior MD [place] [facility] [place]         Current Outpatient Medications on File Prior to Visit   Medication Sig Dispense Refill    atorvastatin (LIPITOR) 40 MG tablet       potassium chloride (KLOR-CON M) 20 MEQ extended release tablet       tamsulosin (FLOMAX) 0.4 MG capsule       Acidophilus Lactobacillus CAPS Take 1 capsule by mouth daily      ferrous sulfate (IRON 325) 325 (65 Fe) MG tablet Take 1 tablet by mouth daily (with breakfast)      aspirin 81 MG EC tablet Take 1 tablet by mouth daily 90 tablet 3    clopidogrel (PLAVIX) 75 MG tablet Take 1 tablet by mouth daily 90 tablet 3    vancomycin (VANCOCIN) 750 MG/150ML SOLN       vancomycin (VANCOCIN) 250 MG capsule       Continuous Glucose Sensor (DEXCOM G7 SENSOR) MISC Appy one sensor every 10 days  -   DX E11.65, IDDM 3 each 2    fluticasone-salmeterol (ADVAIR) 250-50 MCG/ACT AEPB diskus inhaler Inhale 1 puff into the lungs in the morning and 1 puff in the evening.      nitroGLYCERIN (NITROSTAT) 0.4 MG SL tablet Place 1 tablet under the tongue every 5 minutes as needed for Chest pain up to max of 3 total doses. If no relief after 1 dose, call 911.      carvedilol (COREG) 6.25 MG tablet Take 1 tablet by mouth 2 times daily 60 tablet 3    divalproex (DEPAKOTE) 500 MG DR tablet Take 1 tablet by mouth in the morning and at bedtime      HUMALOG KWIKPEN 100 UNIT/ML SOPN Inject 5 Units into the skin 3 times

## 2025-05-27 ENCOUNTER — HOSPITAL ENCOUNTER (OUTPATIENT)
Dept: CARDIOLOGY | Age: 65
Discharge: HOME | End: 2025-05-27
Payer: COMMERCIAL

## 2025-05-27 PROCEDURE — 93298 REM INTERROG DEV EVAL SCRMS: CPT | Performed by: INTERNAL MEDICINE

## 2025-05-27 PROCEDURE — 93298 REM INTERROG DEV EVAL SCRMS: CPT

## 2025-05-29 ENCOUNTER — OFFICE VISIT (OUTPATIENT)
Dept: GERIATRIC MEDICINE | Age: 65
End: 2025-05-29

## 2025-05-29 DIAGNOSIS — B35.1 FUNGAL INFECTION OF NAIL: Primary | ICD-10-CM

## 2025-05-31 NOTE — PROGRESS NOTES
Intractable nausea and vomiting    chf    Ischemic cardiomyopathy    Chronic systolic (congestive) heart failure    Crush fracture of vertebra due to osteoporosis (HCC)    Diabetes mellitus type 2, insulin dependent (HCC)    Altered mental status, unspecified    Contact with and (suspected) exposure to covid-19    Degeneration of lumbar intervertebral disc    Diplopia    Displacement of lumbar intervertebral disc without myelopathy    Headache    Hypokalemia    Illness, unspecified    Low back pain    Lumbosacral spondylosis without myelopathy    Moderate persistent asthma without complication    Nasal congestion    Nausea    Presence of aortocoronary bypass graft    Toothache    Right foot ulcer, with necrosis of bone ([facility])    Cellulitis of right foot    Right hallux osteomyelitis ([facility])    Hyperglycemia    Medically noncompliant    Acute hematogenous osteomyelitis of right foot (HCC)    Abscess    Acute osteomyelitis of toe of right foot ([facility])    Weakness    Coagulation defect    Dizziness    TIA (transient ischemic attack)    Hypertensive emergency    Unsteadiness on feet    Vertebral artery occlusion, left    Transient alteration of awareness    Presence of coronary angioplasty implant and graft    Hypertensive encephalopathy    Cryptogenic stroke ([facility])    Immobility    Aphasia    Dysphagia, oropharyngeal phase    Adjustment disorder with depressed mood    Closed nondisplaced intertrochanteric fracture of left femur ([facility])    Fall from standing    Acute pain due to trauma    Acute post-operative pain    MCI (mild cognitive impairment)    Thalamic hemorrhage ([facility])    Ataxia    Hemiplegia affecting right dominant side, unspecified etiology, unspecified hemiplegia type ([facility])    PAF (paroxysmal atrial fibrillation) ([facility])    History of stroke    Ptosis of left eyelid    Fever of unknown origin    Chronic ulcer of left heel with fat layer exposed ([facility])    Diabetic foot infection ([facility])    Urinary retention    Chronic

## 2025-06-09 ENCOUNTER — OFFICE VISIT (OUTPATIENT)
Dept: GERIATRIC MEDICINE | Age: 65
End: 2025-06-09

## 2025-06-09 DIAGNOSIS — R05.9 COUGH IN ADULT: ICD-10-CM

## 2025-06-09 DIAGNOSIS — Z91.89 AT RISK FOR OSTEOMYELITIS: ICD-10-CM

## 2025-06-09 DIAGNOSIS — L97.429 HEEL ULCER, LEFT, WITH UNSPECIFIED SEVERITY (HCC): Primary | ICD-10-CM

## 2025-06-13 ENCOUNTER — TRANSCRIBE ORDERS (OUTPATIENT)
Dept: ADMINISTRATIVE | Age: 65
End: 2025-06-13

## 2025-06-13 DIAGNOSIS — L08.9 INFECTION OF SKIN AND SUBCUTANEOUS TISSUE: ICD-10-CM

## 2025-06-13 DIAGNOSIS — L97.429 HEEL ULCERATION, LEFT, WITH UNSPECIFIED SEVERITY (HCC): Primary | ICD-10-CM

## 2025-06-16 NOTE — PROGRESS NOTES
Resource Strain (CARDIA)     Difficulty of Paying Living Expenses: Not very hard   Food Insecurity: No Food Insecurity (7/19/2024)    Hunger Vital Sign     Worried About Running Out of Food in the Last Year: Never true     Ran Out of Food in the Last Year: Never true   Transportation Needs: No Transportation Needs (7/19/2024)    PRAPARE - Transportation     Lack of Transportation (Medical): No     Lack of Transportation (Non-Medical): No   Physical Activity: Inactive (8/2/2023)    Exercise Vital Sign     Days of Exercise per Week: 0 days     Minutes of Exercise per Session: 0 min   Stress: Not on file   Social Connections: Not on file   Intimate Partner Violence: Not on file   Housing Stability: Low Risk  (7/19/2024)    Housing Stability Vital Sign     Unable to Pay for Housing in the Last Year: No     Number of Places Lived in the Last Year: 2     Unstable Housing in the Last Year: No     Family History   Problem Relation Age of Onset    Other Father         accident    Heart Failure Mother     Heart Disease Brother     Cirrhosis Brother      No Known Allergies        Review of Systems   Unable to perform ROS: Other       Objective:   VS: See PCC. Reviewed.    Physical Exam  Vitals (See PCC) reviewed.   Constitutional:       General: He is not in acute distress.     Appearance: Normal appearance. He is normal weight. He is not ill-appearing.   HENT:      Head: Normocephalic and atraumatic.   Eyes:      Conjunctiva/sclera: Conjunctivae normal.   Cardiovascular:      Rate and Rhythm: Normal rate and regular rhythm.   Pulmonary:      Effort: Pulmonary effort is normal. No respiratory distress.      Breath sounds: Normal breath sounds. No wheezing.      Comments: Poor command following  Abdominal:      General: Bowel sounds are normal.      Palpations: Abdomen is soft.   Skin:     General: Skin is warm and dry.      Comments: Left calcaneous wound. Dressed and covered. SEE WOUND CARE NOTES VIA PCC FOR DETAILS.

## 2025-06-18 ENCOUNTER — HOSPITAL ENCOUNTER (INPATIENT)
Age: 65
LOS: 3 days | Discharge: NURSING, REHAB OR LONG-TERM CARE | DRG: 638 | End: 2025-06-21
Attending: STUDENT IN AN ORGANIZED HEALTH CARE EDUCATION/TRAINING PROGRAM | Admitting: INTERNAL MEDICINE
Payer: MEDICARE

## 2025-06-18 ENCOUNTER — APPOINTMENT (OUTPATIENT)
Dept: GENERAL RADIOLOGY | Age: 65
DRG: 638 | End: 2025-06-18
Payer: MEDICARE

## 2025-06-18 DIAGNOSIS — S91.309A OPEN WOUND OF HEEL, INITIAL ENCOUNTER: Primary | ICD-10-CM

## 2025-06-18 DIAGNOSIS — L97.422 CHRONIC ULCER OF LEFT HEEL WITH FAT LAYER EXPOSED (HCC): ICD-10-CM

## 2025-06-18 DIAGNOSIS — L89.620 PRESSURE INJURY OF LEFT HEEL, UNSTAGEABLE (HCC): ICD-10-CM

## 2025-06-18 DIAGNOSIS — L03.116 CELLULITIS OF LEFT LOWER EXTREMITY: ICD-10-CM

## 2025-06-18 LAB
ALBUMIN SERPL-MCNC: 3.2 G/DL (ref 3.5–4.6)
ALP SERPL-CCNC: 80 U/L (ref 35–104)
ALT SERPL-CCNC: 10 U/L (ref 0–41)
ANION GAP SERPL CALCULATED.3IONS-SCNC: 9 MEQ/L (ref 9–15)
AST SERPL-CCNC: 10 U/L (ref 0–40)
BACTERIA URNS QL MICRO: NEGATIVE /HPF
BASOPHILS # BLD: 0 K/UL (ref 0–0.2)
BASOPHILS NFR BLD: 0.3 %
BILIRUB SERPL-MCNC: <0.2 MG/DL (ref 0.2–0.7)
BILIRUB UR QL STRIP: NEGATIVE
BUN SERPL-MCNC: 35 MG/DL (ref 8–23)
CALCIUM SERPL-MCNC: 8.6 MG/DL (ref 8.5–9.9)
CHLORIDE SERPL-SCNC: 103 MEQ/L (ref 95–107)
CK SERPL-CCNC: 35 U/L (ref 0–190)
CLARITY UR: CLEAR
CO2 SERPL-SCNC: 27 MEQ/L (ref 20–31)
COLOR UR: YELLOW
CREAT SERPL-MCNC: 0.98 MG/DL (ref 0.7–1.2)
CRP SERPL HS-MCNC: 10.3 MG/L (ref 0–5)
EOSINOPHIL # BLD: 0.3 K/UL (ref 0–0.7)
EOSINOPHIL NFR BLD: 3.9 %
EPI CELLS #/AREA URNS AUTO: ABNORMAL /HPF (ref 0–5)
ERYTHROCYTE [DISTWIDTH] IN BLOOD BY AUTOMATED COUNT: 13.8 % (ref 11.5–14.5)
ERYTHROCYTE [SEDIMENTATION RATE] IN BLOOD BY WESTERGREN METHOD: 36 MM (ref 0–20)
GLOBULIN SER CALC-MCNC: 2.8 G/DL (ref 2.3–3.5)
GLUCOSE BLD-MCNC: 260 MG/DL (ref 70–99)
GLUCOSE SERPL-MCNC: 241 MG/DL (ref 70–99)
GLUCOSE UR STRIP-MCNC: NEGATIVE MG/DL
HCT VFR BLD AUTO: 32.4 % (ref 42–52)
HGB BLD-MCNC: 10.7 G/DL (ref 14–18)
HGB UR QL STRIP: ABNORMAL
HYALINE CASTS #/AREA URNS AUTO: ABNORMAL /HPF (ref 0–5)
KETONES UR STRIP-MCNC: NEGATIVE MG/DL
LACTIC ACID, SEPSIS: 1.2 MMOL/L (ref 0.5–1.9)
LACTIC ACID, SEPSIS: 1.2 MMOL/L (ref 0.5–1.9)
LEUKOCYTE ESTERASE UR QL STRIP: NEGATIVE
LYMPHOCYTES # BLD: 0.6 K/UL (ref 1–4.8)
LYMPHOCYTES NFR BLD: 8.8 %
MAGNESIUM SERPL-MCNC: 1.7 MG/DL (ref 1.7–2.4)
MCH RBC QN AUTO: 28.5 PG (ref 27–31.3)
MCHC RBC AUTO-ENTMCNC: 33 % (ref 33–37)
MCV RBC AUTO: 86.2 FL (ref 79–92.2)
MONOCYTES # BLD: 0.4 K/UL (ref 0.2–0.8)
MONOCYTES NFR BLD: 5.8 %
NEUTROPHILS # BLD: 5.6 K/UL (ref 1.4–6.5)
NEUTS SEG NFR BLD: 80.9 %
NITRITE UR QL STRIP: NEGATIVE
PERFORMED ON: ABNORMAL
PH UR STRIP: 5.5 [PH] (ref 5–9)
PLATELET # BLD AUTO: 141 K/UL (ref 130–400)
POTASSIUM SERPL-SCNC: 4.4 MEQ/L (ref 3.4–4.9)
PROCALCITONIN SERPL IA-MCNC: 0.06 NG/ML (ref 0–0.15)
PROT SERPL-MCNC: 6 G/DL (ref 6.3–8)
PROT UR STRIP-MCNC: 100 MG/DL
RBC # BLD AUTO: 3.76 M/UL (ref 4.7–6.1)
RBC #/AREA URNS AUTO: ABNORMAL /HPF (ref 0–5)
SODIUM SERPL-SCNC: 139 MEQ/L (ref 135–144)
SP GR UR STRIP: 1.01 (ref 1–1.03)
URINE REFLEX TO CULTURE: ABNORMAL
UROBILINOGEN UR STRIP-ACNC: 0.2 E.U./DL
WBC # BLD AUTO: 6.9 K/UL (ref 4.8–10.8)
WBC #/AREA URNS AUTO: ABNORMAL /HPF (ref 0–5)

## 2025-06-18 PROCEDURE — 6370000000 HC RX 637 (ALT 250 FOR IP)

## 2025-06-18 PROCEDURE — 86140 C-REACTIVE PROTEIN: CPT

## 2025-06-18 PROCEDURE — 82550 ASSAY OF CK (CPK): CPT

## 2025-06-18 PROCEDURE — 87075 CULTR BACTERIA EXCEPT BLOOD: CPT

## 2025-06-18 PROCEDURE — 85025 COMPLETE CBC W/AUTO DIFF WBC: CPT

## 2025-06-18 PROCEDURE — 2580000003 HC RX 258: Performed by: INTERNAL MEDICINE

## 2025-06-18 PROCEDURE — 87070 CULTURE OTHR SPECIMN AEROBIC: CPT

## 2025-06-18 PROCEDURE — 1210000000 HC MED SURG R&B

## 2025-06-18 PROCEDURE — 36415 COLL VENOUS BLD VENIPUNCTURE: CPT

## 2025-06-18 PROCEDURE — 6360000002 HC RX W HCPCS: Performed by: STUDENT IN AN ORGANIZED HEALTH CARE EDUCATION/TRAINING PROGRAM

## 2025-06-18 PROCEDURE — 96365 THER/PROPH/DIAG IV INF INIT: CPT

## 2025-06-18 PROCEDURE — 81001 URINALYSIS AUTO W/SCOPE: CPT

## 2025-06-18 PROCEDURE — 87077 CULTURE AEROBIC IDENTIFY: CPT

## 2025-06-18 PROCEDURE — 2580000003 HC RX 258: Performed by: STUDENT IN AN ORGANIZED HEALTH CARE EDUCATION/TRAINING PROGRAM

## 2025-06-18 PROCEDURE — 51701 INSERT BLADDER CATHETER: CPT

## 2025-06-18 PROCEDURE — 80053 COMPREHEN METABOLIC PANEL: CPT

## 2025-06-18 PROCEDURE — 99285 EMERGENCY DEPT VISIT HI MDM: CPT

## 2025-06-18 PROCEDURE — 84145 PROCALCITONIN (PCT): CPT

## 2025-06-18 PROCEDURE — 6360000002 HC RX W HCPCS: Performed by: INTERNAL MEDICINE

## 2025-06-18 PROCEDURE — 93005 ELECTROCARDIOGRAM TRACING: CPT | Performed by: STUDENT IN AN ORGANIZED HEALTH CARE EDUCATION/TRAINING PROGRAM

## 2025-06-18 PROCEDURE — 96375 TX/PRO/DX INJ NEW DRUG ADDON: CPT

## 2025-06-18 PROCEDURE — 85652 RBC SED RATE AUTOMATED: CPT

## 2025-06-18 PROCEDURE — 87040 BLOOD CULTURE FOR BACTERIA: CPT

## 2025-06-18 PROCEDURE — 2500000003 HC RX 250 WO HCPCS: Performed by: INTERNAL MEDICINE

## 2025-06-18 PROCEDURE — 73630 X-RAY EXAM OF FOOT: CPT

## 2025-06-18 PROCEDURE — 83735 ASSAY OF MAGNESIUM: CPT

## 2025-06-18 PROCEDURE — 83605 ASSAY OF LACTIC ACID: CPT

## 2025-06-18 RX ORDER — SODIUM CHLORIDE 0.9 % (FLUSH) 0.9 %
5-40 SYRINGE (ML) INJECTION EVERY 12 HOURS SCHEDULED
Status: DISCONTINUED | OUTPATIENT
Start: 2025-06-18 | End: 2025-06-21 | Stop reason: HOSPADM

## 2025-06-18 RX ORDER — ONDANSETRON 2 MG/ML
4 INJECTION INTRAMUSCULAR; INTRAVENOUS EVERY 6 HOURS PRN
Status: DISCONTINUED | OUTPATIENT
Start: 2025-06-18 | End: 2025-06-21 | Stop reason: HOSPADM

## 2025-06-18 RX ORDER — INSULIN LISPRO 100 [IU]/ML
0-4 INJECTION, SOLUTION INTRAVENOUS; SUBCUTANEOUS
Status: DISCONTINUED | OUTPATIENT
Start: 2025-06-18 | End: 2025-06-21 | Stop reason: HOSPADM

## 2025-06-18 RX ORDER — SODIUM CHLORIDE 9 MG/ML
INJECTION, SOLUTION INTRAVENOUS PRN
Status: DISCONTINUED | OUTPATIENT
Start: 2025-06-18 | End: 2025-06-21 | Stop reason: HOSPADM

## 2025-06-18 RX ORDER — DEXTROSE MONOHYDRATE 100 MG/ML
INJECTION, SOLUTION INTRAVENOUS CONTINUOUS PRN
Status: DISCONTINUED | OUTPATIENT
Start: 2025-06-18 | End: 2025-06-21 | Stop reason: HOSPADM

## 2025-06-18 RX ORDER — ACETAMINOPHEN 325 MG/1
650 TABLET ORAL EVERY 6 HOURS PRN
Status: DISCONTINUED | OUTPATIENT
Start: 2025-06-18 | End: 2025-06-21 | Stop reason: HOSPADM

## 2025-06-18 RX ORDER — ENOXAPARIN SODIUM 100 MG/ML
40 INJECTION SUBCUTANEOUS DAILY
Status: DISCONTINUED | OUTPATIENT
Start: 2025-06-18 | End: 2025-06-21 | Stop reason: HOSPADM

## 2025-06-18 RX ORDER — POLYETHYLENE GLYCOL 3350 17 G/17G
17 POWDER, FOR SOLUTION ORAL DAILY PRN
Status: DISCONTINUED | OUTPATIENT
Start: 2025-06-18 | End: 2025-06-21 | Stop reason: HOSPADM

## 2025-06-18 RX ORDER — FLUCONAZOLE 200 MG/1
200 TABLET ORAL DAILY
COMMUNITY
Start: 2025-05-05 | End: 2025-07-28

## 2025-06-18 RX ORDER — ACETAMINOPHEN 650 MG/1
650 SUPPOSITORY RECTAL EVERY 6 HOURS PRN
Status: DISCONTINUED | OUTPATIENT
Start: 2025-06-18 | End: 2025-06-21 | Stop reason: HOSPADM

## 2025-06-18 RX ORDER — POTASSIUM CHLORIDE 7.45 MG/ML
10 INJECTION INTRAVENOUS PRN
Status: DISCONTINUED | OUTPATIENT
Start: 2025-06-18 | End: 2025-06-21 | Stop reason: HOSPADM

## 2025-06-18 RX ORDER — SODIUM CHLORIDE 0.9 % (FLUSH) 0.9 %
5-40 SYRINGE (ML) INJECTION PRN
Status: DISCONTINUED | OUTPATIENT
Start: 2025-06-18 | End: 2025-06-21 | Stop reason: HOSPADM

## 2025-06-18 RX ORDER — VANCOMYCIN 1.25 G/250ML
25 INJECTION, SOLUTION INTRAVENOUS ONCE
Status: DISCONTINUED | OUTPATIENT
Start: 2025-06-18 | End: 2025-06-18 | Stop reason: RX

## 2025-06-18 RX ORDER — SODIUM CHLORIDE 9 MG/ML
INJECTION, SOLUTION INTRAVENOUS CONTINUOUS
Status: DISPENSED | OUTPATIENT
Start: 2025-06-18 | End: 2025-06-19

## 2025-06-18 RX ORDER — ONDANSETRON 4 MG/1
4 TABLET, ORALLY DISINTEGRATING ORAL EVERY 8 HOURS PRN
Status: DISCONTINUED | OUTPATIENT
Start: 2025-06-18 | End: 2025-06-21 | Stop reason: HOSPADM

## 2025-06-18 RX ORDER — INSULIN LISPRO 100 [IU]/ML
5 INJECTION, SOLUTION INTRAVENOUS; SUBCUTANEOUS
Status: DISCONTINUED | OUTPATIENT
Start: 2025-06-18 | End: 2025-06-21 | Stop reason: HOSPADM

## 2025-06-18 RX ORDER — POTASSIUM CHLORIDE 1500 MG/1
40 TABLET, EXTENDED RELEASE ORAL PRN
Status: DISCONTINUED | OUTPATIENT
Start: 2025-06-18 | End: 2025-06-21 | Stop reason: HOSPADM

## 2025-06-18 RX ORDER — GLUCAGON 1 MG/ML
1 KIT INJECTION PRN
Status: DISCONTINUED | OUTPATIENT
Start: 2025-06-18 | End: 2025-06-21 | Stop reason: HOSPADM

## 2025-06-18 RX ORDER — MAGNESIUM SULFATE IN WATER 40 MG/ML
2000 INJECTION, SOLUTION INTRAVENOUS PRN
Status: DISCONTINUED | OUTPATIENT
Start: 2025-06-18 | End: 2025-06-21 | Stop reason: HOSPADM

## 2025-06-18 RX ADMIN — VANCOMYCIN HYDROCHLORIDE 1750 MG: 1 INJECTION, POWDER, LYOPHILIZED, FOR SOLUTION INTRAVENOUS at 15:36

## 2025-06-18 RX ADMIN — PIPERACILLIN AND TAZOBACTAM 4500 MG: 4; .5 INJECTION, POWDER, FOR SOLUTION INTRAVENOUS at 17:24

## 2025-06-18 RX ADMIN — SODIUM CHLORIDE 3000 MG: 9 INJECTION, SOLUTION INTRAVENOUS at 13:18

## 2025-06-18 RX ADMIN — SODIUM CHLORIDE: 0.9 INJECTION, SOLUTION INTRAVENOUS at 15:35

## 2025-06-18 RX ADMIN — Medication 10 ML: at 21:25

## 2025-06-18 RX ADMIN — INSULIN LISPRO 2 UNITS: 100 INJECTION, SOLUTION INTRAVENOUS; SUBCUTANEOUS at 21:25

## 2025-06-18 RX ADMIN — DOXYCYCLINE 100 MG: 100 INJECTION, POWDER, LYOPHILIZED, FOR SOLUTION INTRAVENOUS at 14:15

## 2025-06-18 ASSESSMENT — PAIN SCALES - WONG BAKER: WONGBAKER_NUMERICALRESPONSE: NO HURT

## 2025-06-18 NOTE — PROGRESS NOTES
1515 Patient arrived to unit via cart from ER in stable condition. Patient alert to self only, very hard of hearing. On RA. IV infusing without difficulty. Patient noted with large NOEL wound to left heel and vascular discoloration to right lower extremity. Call placed to [facility] to receive copy of paperwork via fax. Home med list completed through paperwork as received, copy of DNR CCA noted to be included in paperwork as well. Dr. Oden notified by face to face    Electronically signed by Gail Chance RN on 6/18/2025 at 4:01 PM

## 2025-06-18 NOTE — ED NOTES
Pt daughter called at this time and updated on labs and testing  Pt daughter states that pt is NWB and uses a wheelchair for mobility  Pt daughter states that pt has dementia and that he is baseline alert to self  Pt daughter states that pt is [place] and that he can be combative at times   Pt is on a Mechanical soft diet and regular liquids per pt daughter   Pt daughter states that pt is a DNRCCA

## 2025-06-18 NOTE — CONSULTS
PODIATRIC MEDICINE AND SURGERY  CONSULT HISTORY AND PHYSICAL    Consulting Service:  ED  Opinion/advice regarding: Left heel wound  Staff Doctor:  Dr. Mejias DPM    ASSESSMENT:  65 y.o. male with PMH significant for Asthma, CAD, chf, CVA, Diabetes mellitus,  hypertension,Hyperlipemia, NSTEMI. For who podiatry was consulted for Left heel wound.  On admission labs significant for CRP of 10.3, and sedimentation rate of 36 and normal WBC of 6.9.  X-rays were negative for soft tissue emphysema or acute signs of osteomyelitis.  On exam ischemic pressure wound noted to the posterior left heel.  Based on exam and clinical findings, workup includes left foot cellulitis versus osteomyelitis.    PLAN AND RECOMMENDATIONS::  Patient's case to be discussed with staff, Dr. Mejias, who will provide final recommendations going forward.  Wound care: betadine wet to dry, 4x4's ABD, kerlix and ACE bandage.  WBAT to bilateral lower extremity.  Elevate left lower extremity at or above heart level while resting  -Order for left ankle MRI placed.  - Order for arterial duplex placed.  - Wound culture obtained and sent to [facility] for culture and sensitivity  Antibiotic coverage per primary team/ID.  Pain management per primary team   Podiatry to follow  Patient will need follow up with Dr. Mejias within 7-10 days of discharge      HPI: This 65 y.o. year old male was seen today for left foot infection.  Unable to obtain history from patient due to dementia.  Patient lives in facility.    Past Medical History:   Diagnosis Date    Acute blood loss anemia 10/09/2023    Acute GI bleeding 08/06/2024    Asthma     Back pain     compresion in [place]    CAD (coronary artery disease) 01/2020    3 vessel     chf 09/16/2022    CVA (cerebral vascular accident) (HCC)     Diabetes mellitus (HCC)     Essential hypertension, benign     Hyperlipemia     Hypertensive urgency 08/16/2022    Ischemic cardiomyopathy 09/16/2022    Neuropathic pain, leg,  daily Indications: Congestive Heart Failure      Handicap Placard MISC by Does not apply route He needs a placard for 5 years  Expires 8/2/28  He is unable to walk 200 feet without stopping  Dx back pain 1 each 0    BREO ELLIPTA 100-25 MCG/ACT inhaler Inhale 1 puff into the lungs daily Indications: Asthma, Chronic Obstructive Lung Disease      [DISCONTINUED] lisinopril (PRINIVIL;ZESTRIL) 10 MG tablet Take 1 tablet by mouth daily 30 tablet 5    Accu-Chek FastClix Lancets MISC Test 4x daily Dx E11.65 150 each 3    blood glucose test strips (ACCU-CHEK GUIDE) strip Test 4x daily Dx E11.65 150 each 3       No Known Allergies    Family History   Problem Relation Age of Onset    Other Father         accident    Heart Failure Mother     Heart Disease Brother     Cirrhosis Brother        Social History     Socioeconomic History    Marital status:      Spouse name: rodríguez    Number of children: 2    Years of education: 14    Highest education level: Some college, no degree   Occupational History    Occupation: disability    Tobacco Use    Smoking status: Never     Passive exposure: Never    Smokeless tobacco: Never   Vaping Use    Vaping status: Never Used   Substance and Sexual Activity    Alcohol use: Not Currently     Alcohol/week: 2.0 standard drinks of alcohol     Types: 2 Cans of beer per week    Drug use: No    Sexual activity: Not Currently     Partners: Female   Other Topics Concern    Not on file   Social History Narrative    Lives with wife.  Per patient he has been  for 2 years.  He met his wife online and went to [facility] and brought her back to [facility].  They live in [facility] into home. It is a 2 story home. Bedroom and bath upstairs. Bathroom down. Per patient once he is down the steps he doesn't go back up during the day.    2 daughters local.      he has very poor vision.  He does not like to drive at night or when raining.  Patient states he struggles looking at a computer or reading

## 2025-06-18 NOTE — ED NOTES
Pt incontinent of bowel at this time   Pt given flory care and external cath in place at this time

## 2025-06-18 NOTE — ED NOTES
Dressing placed on pt left heel at this time with xeroform, ABD, and kerlex at this time   Pt tolerated well

## 2025-06-18 NOTE — H&P
Hospital Medicine  History and Physical    Patient:  Gary Turk  MRN: [id number]    CHIEF COMPLAINT:    Chief Complaint   Patient presents with    Wound Check     Heel        History Obtained From:  Patient, EMR  Primary Care Physician: Lobo Monteiro MD    HISTORY OF PRESENT ILLNESS:   The patient is a 65 y.o. male who presents with left heel ulcer.  Patient unable to provide any history due to underlying dementia.  Based on outpatient chart this is a chronic lesion that has been present for many weeks.    Past Medical History:      Diagnosis Date    Acute blood loss anemia 10/09/2023    Acute GI bleeding 08/06/2024    Asthma     Back pain     compresion in [place]    CAD (coronary artery disease) 01/2020    3 vessel     chf 09/16/2022    CVA (cerebral vascular accident) ([facility])     Diabetes mellitus ([facility])     Essential hypertension, benign     Hyperlipemia     Hypertensive urgency 08/16/2022    Ischemic cardiomyopathy 09/16/2022    Neuropathic pain, leg, bilateral     NSTEMI (non-ST elevated myocardial infarction) ([facility]) 12/13/2019       Past Surgical History:      Procedure Laterality Date    CARPAL TUNNEL RELEASE Bilateral     COLONOSCOPY N/A 7/24/2024    COLONOSCOPY with polypectomies and biopsies performed by Kelton Lopez MD at [facility]    EP DEVICE PROCEDURE N/A 9/29/2023    Loop recorder insert  ROOM 293 [employer] NOTIFIED performed by Maxwell Tyler DO at [facility] CARDIAC CATH LAB    FOOT DEBRIDEMENT Right 5/5/2023    RIGHT FOOT DEBRIDEMENT INCISION AND DRAINAGE ROOM 174 performed by Jairo Garcia DPM at [facility] OR    FOOT DEBRIDEMENT Right 5/8/2023    RIGHT FOOT  INCISION AND DRAINAGE WITH DEBRIDEMENT OF NON-VIABLE TISSUE DELAYED PRIMARY CLOSURE performed by Jairo Garcia DPM at [facility] OR    FOOT SURGERY Left     bone spur    HIP SURGERY Left 10/5/2023    TROCHANTERIC FIXATION NAILING LEFT FEMUR FRACTURE ROOM 265 performed by Mirza Elliott MD at [facility] OR    LAMINOTOMY  2012    SPINE SURGERY N/A  Transient alteration of awareness R40.4    Presence of coronary angioplasty implant and graft Z95.5    Hypertensive encephalopathy I67.4    Cryptogenic stroke ([facility]) I63.9    Immobility Z74.09    Aphasia R47.01    Dysphagia, oropharyngeal phase R13.12    Adjustment disorder with depressed mood F43.21    Closed nondisplaced intertrochanteric fracture of left femur ([facility]) S72.145A    Fall from standing W19.XXXA    Acute pain due to trauma G89.11    Acute post-operative pain G89.18    MCI (mild cognitive impairment) G31.84    Thalamic hemorrhage ([facility]) I61.0    Ataxia R27.0    Hemiplegia affecting right dominant side, unspecified etiology, unspecified hemiplegia type ([facility]) G81.91    PAF (paroxysmal atrial fibrillation) ([facility]) I48.0    History of stroke Z86.73    Ptosis of left eyelid H02.402    Fever of unknown origin R50.9    Chronic ulcer of left heel with fat layer exposed ([facility]) L97.422    Diabetic foot infection ([facility]) E11.628, L08.9    Urinary retention R33.9    Chronic osteomyelitis of left foot ([facility]) M86.672    C. difficile diarrhea A04.72    GIB (gastrointestinal bleeding) K92.2    Pressure injury of left heel, unstageable ([facility]) L89.620    Pressure injury of sacral region, stage 2 ([facility]) L89.152    Rectal bleeding K62.5    GI bleeding K92.2    Encounter for hospice care discussion Z71.89    Advanced care planning/counseling discussion Z71.89    Goals of care, counseling/discussion Z71.89    Palliative care encounter Z51.5    Cellulitis of left foot L03.116       SRI WILSON MD, MD

## 2025-06-18 NOTE — ED PROVIDER NOTES
[facility]  4W MED SURG UNIT  eMERGENCY dEPARTMENT eNCOUnter      Pt Name: Gary Turk  MRN: [id number]  Birthdate 1960  Date of evaluation: 6/18/2025  Provider: Cl Torres MD  Note Started: 6/18/25 9:43 AM EDT    HISTORY OF PRESENT ILLNESS      Chief Complaint   Patient presents with    Wound Check     Heel        The history is provided by the Patient and EMS.  Gary Turk is a 65 y.o. male with a PMH clinically significant for C. Diff, Osteomyelitis, Thalamic Hemorrhage, HTN, HLD, DM II, Afib, CKD, CAD s/p CABG, HF, Asthma, BPH, Dementia, and CVA presenting to the ED via EMS c/o concerns by facility NP for left heel osteomyelitis.  Patient unable to contribute to the history in the ED.  Reported to be at baseline however.  Vital signs stable en route per EMS.  No other report given to EMS prior to arrival.    Per Chart Review: PMH as noted above obtained via outpatient chart review.   Most recent ECHO as noted below in 2023:    Left Ventricle: Normal left ventricular systolic function with a visually estimated EF of 50 - 55%. Left ventricle size is normal. Normal wall thickness. Normal wall motion. Diastolic dysfunction present with normal LV EF.    Aortic Valve: Mild sclerosis of the aortic valve cusp.    Interatrial Septum: No interatrial shunt visualized with color Doppler. Agitated saline study was negative with and without provocation.    REVIEW OF SYSTEMS       Review of Systems  Otherwise as noted in HPI    PAST MEDICAL HISTORY     Past Medical History:   Diagnosis Date    Acute blood loss anemia 10/09/2023    Acute GI bleeding 08/06/2024    Asthma     Back pain     compresion in [place]    CAD (coronary artery disease) 01/2020    3 vessel     chf 09/16/2022    CVA (cerebral vascular accident) (HCC)     Diabetes mellitus (HCC)     Essential hypertension, benign     Hyperlipemia     Hypertensive urgency 08/16/2022    Ischemic cardiomyopathy 09/16/2022    Neuropathic pain, leg, bilateral     NSTEMI  for 2 years.  He met his wife online and went to [employer] and brought her back to [facility].  They live in [facility] no steps into home. It is a 2 story home. Bedroom and bath upstairs. Bathroom down. Per patient once he is down the steps he doesn't go back up during the day.    2 daughters local.      he has very poor vision.  He does not like to drive at night or when raining.  Patient states he struggles looking at a computer or reading due to vision.         Type of Home: House in [address]    Home Layout: Two level    Home Access: Level entry    Bathroom Shower/Tub: Tub/Shower unit    Bathroom Equipment: Hand-held shower    Home Equipment: Cane, Rollator    ADL Assistance: Needs assistance (needs assist)    Homemaking Assistance:  (wife does cooking and cleaning)    Ambulation Assistance: Independent (uses rollator PRN)    Transfer Assistance: Independent    Active : Yes    Additional Comments: Pt mildly inconsistent historian             Social Drivers of Health     Financial Resource Strain: Low Risk  (8/27/2023)    [facility]    Overall Financial Resource Strain (CARDIA)     Difficulty of Paying Living Expenses: Not very hard   Food Insecurity: No Food Insecurity (6/18/2025)    Hunger Vital Sign     Worried About Running Out of Food in the Last Year: Never true     Ran Out of Food in the Last Year: Never true   Transportation Needs: No Transportation Needs (6/18/2025)    PRAPARE - Transportation     Lack of Transportation (Medical): No     Lack of Transportation (Non-Medical): No   Physical Activity: Inactive (8/2/2023)    Exercise Vital Sign     Days of Exercise per Week: 0 days     Minutes of Exercise per Session: 0 min   Housing Stability: Low Risk  (6/18/2025)    Housing Stability Vital Sign     Unable to Pay for Housing in the Last Year: No     Number of Times Moved in the Last Year: 1     Homeless in the Last Year: No       CURRENT MEDICATIONS    infection.  Did note mild drainage and possible necrotic tissues on initial exam.  No clear evidence of subcutaneous emphysema concerning for necrotizing fasciitis or clear abscesses requiring drainage. Consulted podiatry who evaluated the patient in the ED as noted above. Initiated on appropriate antibiotics in the ED and admitted under IM.  Pt was administered   Medications   sodium chloride flush 0.9 % injection 5-40 mL (has no administration in time range)   sodium chloride flush 0.9 % injection 5-40 mL (has no administration in time range)   0.9 % sodium chloride infusion (has no administration in time range)   potassium chloride (KLOR-CON M) extended release tablet 40 mEq (has no administration in time range)     Or   potassium bicarb-citric acid (EFFER-K) effervescent tablet 40 mEq (has no administration in time range)     Or   potassium chloride 10 mEq/100 mL IVPB (Peripheral Line) (has no administration in time range)   magnesium sulfate 2000 mg in 50 mL IVPB premix (has no administration in time range)   enoxaparin (LOVENOX) injection 40 mg (40 mg SubCUTAneous Not Given 6/18/25 1536)   ondansetron (ZOFRAN-ODT) disintegrating tablet 4 mg (has no administration in time range)     Or   ondansetron (ZOFRAN) injection 4 mg (has no administration in time range)   polyethylene glycol (GLYCOLAX) packet 17 g (has no administration in time range)   acetaminophen (TYLENOL) tablet 650 mg (has no administration in time range)     Or   acetaminophen (TYLENOL) suppository 650 mg (has no administration in time range)   0.9 % sodium chloride infusion ( IntraVENous New Bag 6/18/25 1535)   piperacillin-tazobactam (ZOSYN) 4,500 mg in sodium chloride 0.9 % 100 mL IVPB (addEASE) (0 mg IntraVENous Stopped 6/18/25 1819)     Followed by   piperacillin-tazobactam (ZOSYN) 3,375 mg in sodium chloride 0.9 % 50 mL IVPB (addEASE) (has no administration in time range)   ampicillin-sulbactam (UNASYN) 3,000 mg in sodium chloride 0.9 % 100

## 2025-06-18 NOTE — ED TRIAGE NOTES
Pt arrived from NH due to refusing medication and labs and Nurse Practioner has concern for osteomyelitis

## 2025-06-19 ENCOUNTER — APPOINTMENT (OUTPATIENT)
Dept: MRI IMAGING | Age: 65
DRG: 638 | End: 2025-06-19
Payer: MEDICARE

## 2025-06-19 ENCOUNTER — APPOINTMENT (OUTPATIENT)
Dept: ULTRASOUND IMAGING | Age: 65
DRG: 638 | End: 2025-06-19
Payer: MEDICARE

## 2025-06-19 LAB
ANION GAP SERPL CALCULATED.3IONS-SCNC: 13 MEQ/L (ref 9–15)
BACTERIA BLD CULT ORG #2: NORMAL
BACTERIA BLD CULT: NORMAL
BUN SERPL-MCNC: 29 MG/DL (ref 8–23)
CALCIUM SERPL-MCNC: 8.5 MG/DL (ref 8.5–9.9)
CHLORIDE SERPL-SCNC: 104 MEQ/L (ref 95–107)
CO2 SERPL-SCNC: 20 MEQ/L (ref 20–31)
CREAT SERPL-MCNC: 0.83 MG/DL (ref 0.7–1.2)
EKG ATRIAL RATE: 75 BPM
EKG DIAGNOSIS: NORMAL
EKG P AXIS: 68 DEGREES
EKG P-R INTERVAL: 144 MS
EKG Q-T INTERVAL: 412 MS
EKG QRS DURATION: 90 MS
EKG QTC CALCULATION (BAZETT): 460 MS
EKG R AXIS: -32 DEGREES
EKG T AXIS: 17 DEGREES
EKG VENTRICULAR RATE: 75 BPM
GLUCOSE BLD-MCNC: 197 MG/DL (ref 70–99)
GLUCOSE BLD-MCNC: 212 MG/DL (ref 70–99)
GLUCOSE BLD-MCNC: 215 MG/DL (ref 70–99)
GLUCOSE SERPL-MCNC: 179 MG/DL (ref 70–99)
PERFORMED ON: ABNORMAL
POTASSIUM SERPL-SCNC: 3.9 MEQ/L (ref 3.4–4.9)
SODIUM SERPL-SCNC: 137 MEQ/L (ref 135–144)

## 2025-06-19 PROCEDURE — 73721 MRI JNT OF LWR EXTRE W/O DYE: CPT

## 2025-06-19 PROCEDURE — 36415 COLL VENOUS BLD VENIPUNCTURE: CPT

## 2025-06-19 PROCEDURE — 6370000000 HC RX 637 (ALT 250 FOR IP)

## 2025-06-19 PROCEDURE — 93925 LOWER EXTREMITY STUDY: CPT

## 2025-06-19 PROCEDURE — 2580000003 HC RX 258: Performed by: INTERNAL MEDICINE

## 2025-06-19 PROCEDURE — 6360000002 HC RX W HCPCS: Performed by: INTERNAL MEDICINE

## 2025-06-19 PROCEDURE — 80048 BASIC METABOLIC PNL TOTAL CA: CPT

## 2025-06-19 PROCEDURE — 1210000000 HC MED SURG R&B

## 2025-06-19 PROCEDURE — 2500000003 HC RX 250 WO HCPCS: Performed by: INTERNAL MEDICINE

## 2025-06-19 RX ADMIN — INSULIN LISPRO 1 UNITS: 100 INJECTION, SOLUTION INTRAVENOUS; SUBCUTANEOUS at 21:29

## 2025-06-19 RX ADMIN — INSULIN LISPRO 5 UNITS: 100 INJECTION, SOLUTION INTRAVENOUS; SUBCUTANEOUS at 11:33

## 2025-06-19 RX ADMIN — SODIUM CHLORIDE: 0.9 INJECTION, SOLUTION INTRAVENOUS at 01:43

## 2025-06-19 RX ADMIN — INSULIN LISPRO 1 UNITS: 100 INJECTION, SOLUTION INTRAVENOUS; SUBCUTANEOUS at 11:32

## 2025-06-19 RX ADMIN — Medication 10 ML: at 21:29

## 2025-06-19 RX ADMIN — PIPERACILLIN AND TAZOBACTAM 3375 MG: 3; .375 INJECTION, POWDER, LYOPHILIZED, FOR SOLUTION INTRAVENOUS at 01:43

## 2025-06-19 RX ADMIN — PIPERACILLIN AND TAZOBACTAM 3375 MG: 3; .375 INJECTION, POWDER, LYOPHILIZED, FOR SOLUTION INTRAVENOUS at 16:56

## 2025-06-19 RX ADMIN — PIPERACILLIN AND TAZOBACTAM 3375 MG: 3; .375 INJECTION, POWDER, LYOPHILIZED, FOR SOLUTION INTRAVENOUS at 08:42

## 2025-06-19 ASSESSMENT — PAIN SCALES - WONG BAKER: WONGBAKER_NUMERICALRESPONSE: NO HURT

## 2025-06-19 NOTE — PROGRESS NOTES
0700 Assumed care of patient    0830 Shift assessment complete, see flowsheets. Patient withdrawn, shakes head yes and no but does not respond verbally. Not interested in breakfast at this time, attempted to give patient a drink, spit it right back out. Dressing to LLE C/D/I. Patient resting quietly with safety measures maintained    0930 Patient off unit to ultrasound    1015 Patient returned from ultrasound    1500 Patient off unit to MRI    Electronically signed by Gail Chance RN on 6/19/2025 at 8:49 AM

## 2025-06-19 NOTE — PROGRESS NOTES
PODIATRIC MEDICINE AND SURGERY  CONSULT HISTORY AND PHYSICAL    Consulting Service:  ED  Opinion/advice regarding: Left heel wound  Staff Doctor:  Dr. Mejias DPM    ASSESSMENT:  65 y.o. male with PMH significant for Asthma, CAD, chf, CVA, Diabetes mellitus,  hypertension,Hyperlipemia, NSTEMI. For who podiatry was consulted for Left heel wound.  On admission labs significant for CRP of 10.3, and sedimentation rate of 36 and normal WBC of 6.9.  X-rays were negative for soft tissue emphysema or acute signs of osteomyelitis.  On exam ischemic pressure wound noted to the posterior left heel.  PVRs reviewed. Patient is at high risk of proximal amputation due to vascular compromise. Consult to vascular surgery placed for recommendation on revascularization for limb salvage versus proximal amputation. Will continue wound care in the meantime.     PLAN AND RECOMMENDATIONS::  Patient's case to be discussed with staff, Dr. Mejias, who will provide final recommendations going forward.    Wound care left heel: Dakins wet dry, abd and kerlix. Nursing orders placed for daily.    Elevate left lower extremity at or above heart level while resting  -Left ankle MRI pending  -PVRs reviwed.  - Wound culture obtained and sent to [facility] for culture and sensitivity  Antibiotic coverage per primary team/ID.  Pain management per primary team   Podiatry to follow  Patient will need follow up with Dr. Mejias within 7-10 days of discharge      HPI: This 65 y.o. year old male was seen today for left foot infection.  Unable to obtain history from patient due to dementia.  Patient lives in facility.    Past Medical History:   Diagnosis Date    Acute blood loss anemia 10/09/2023    Acute GI bleeding 08/06/2024    Asthma     Back pain     compresion in [place]    CAD (coronary artery disease) 01/2020    3 vessel     chf 09/16/2022    CVA (cerebral vascular accident) (HCC)     Diabetes mellitus (HCC)     Essential hypertension, benign          Financial Resource Strain: Low Risk  (8/27/2023)    [facility]    Overall Financial Resource Strain (CARDIA)     Difficulty of Paying Living Expenses: Not very hard   Food Insecurity: No Food Insecurity (6/18/2025)    Hunger Vital Sign     Worried About Running Out of Food in the Last Year: Never true     Ran Out of Food in the Last Year: Never true   Transportation Needs: No Transportation Needs (6/18/2025)    PRAPARE - Transportation     Lack of Transportation (Medical): No     Lack of Transportation (Non-Medical): No   Physical Activity: Inactive (8/2/2023)    Exercise Vital Sign     Days of Exercise per Week: 0 days     Minutes of Exercise per Session: 0 min   Stress: Not on file   Social Connections: Not on file   Intimate Partner Violence: Not on file   Housing Stability: Low Risk  (6/18/2025)    Housing Stability Vital Sign     Unable to Pay for Housing in the Last Year: No     Number of Times Moved in the Last Year: 1     Homeless in the Last Year: No       Review of Systems  CONSTITUTIONAL: No fevers, chills, diaphoresis  HEENT: No epistaxis, tinnitus  EYES: No diplopia, blurry vision.  CARDIOVASCULAR: No chest pain, palpitations,  PULM: No dyspnea, tachypnea, wheezing  GI: No nausea, vomiting, constipation, diarrhea  : No dysuria, gross hematuria, or pyuria  INTEGUMENTARY:  open skin lesion to left posterior heel    OBJECTIVE:  /75   Pulse 78   Temp 97.9 °F (36.6 °C) (Oral)   Resp 18   Ht 1.702 m (5' 7\")   Wt 75.3 kg (166 lb)   SpO2 97%   BMI 26.00 kg/m²   Patient is AAO x 1.    Vascular:   Nonpalpable Dorsalis Pedis and Nonpalpable Posterior Tibial Pulses B/L   Capillary Fill time < 5 seconds to B/L digits  Skin temperature cool to cool from  tibial tuberosity to the digits B/L.  Except posterior left heel which was warm.  Hair growth absent to digits  no edema      Neurological:   -Pain on palpation of left heel  studies:  PVRS;  There is mild to moderateplaque visualized within the arteries of the  bilateral lower extremities.  Abnormal monophasic waveforms are visualized in  the right distal anterior tibial, left popliteal and posterior tibial  arteries.  There are elevated systolic velocities in the right posterior  tibial, anterior tibial, left posterior tibial and anterior tibial arteries.  There is diminished flow in the right mid/distal anterior tibial and mid  posterior tibial arteries.  A collateral was visualized in the distal right  posterior tibial artery.     IMPRESSION:  1. Mild to moderate atherosclerotic plaque in the bilateral lower extremities.  2. Abnormal monophasic waveforms in the right distal anterior tibial, left  popliteal and posterior tibial arteries.  3. Elevated systolic velocities in the right posterior tibial, anterior  tibial, left posterior tibial and anterior tibial arteries.  4. Diminished flow in the right mid/distal anterior tibial and mid posterior  tibial arteries.      Emiliano Segal DPM, TANNER PGY-1  Podiatric Surgery Resident  Podiatry On Call Pager: [phone]  June 19, 2025

## 2025-06-19 NOTE — CARE COORDINATION
Case Management Assessment  Initial Evaluation    Date/Time of Evaluation: 6/19/2025 9:31 AM  Assessment Completed by: HAYDEN Riley    If patient is discharged prior to next notation, then this note serves as note for discharge by case management.    Patient Name: Gary Turk                   YOB: 1960  Diagnosis: Cellulitis of left foot [L03.116]  Cellulitis of left lower extremity [L03.116]  Open wound of heel, initial encounter [S91.309A]                   Date / Time: 6/18/2025  9:42 AM    Patient Admission Status: Inpatient   Readmission Risk (Low < 19, Mod (19-27), High > 27): Readmission Risk Score: 19.1    Current PCP: Lobo Monteiro MD  PCP verified by CM? Yes    Chart Reviewed: Yes      History Provided by: Medical Record, Other (see comment) (STEPHANIE MORATAYA)  Patient Orientation: Self    Patient Cognition: Dementia / Early Alzheimer's    Hospitalization in the last 30 days (Readmission):  No    If yes, Readmission Assessment in  Navigator will be completed.    Advance Directives:      Code Status: DNR-CCA   Patient's Primary Decision Maker is: Named in Scanned ACP Document    Primary Decision Maker: Yeimy Turk - Spouse - [phone]    Secondary Decision Maker: Lela Moore - Child - [phone]    Discharge Planning:    Patient lives with: Other (Comment) (facility) Type of Home: Skilled Nursing Facility  Primary Care Giver: Other (Comment) (STEPHANIE MORATAYA [facility])  Patient Support Systems include: Family Members   Current Financial resources: Medicare  Current community resources: None  Current services prior to admission: Skilled Nursing Facility            Current DME:              Type of Home Care services:  OT, PT    ADLS  Prior functional level: Assistance with the following:, Mobility, Bathing, Dressing, Toileting, Cooking, Housework, Shopping, Feeding  Current functional level: Assistance with the following:, Bathing, Dressing, Toileting, Feeding, Cooking, Housework,  Shopping, Mobility    PT AM-PAC:   /24  OT AM-PAC:   /24    Family can provide assistance at MS: No  Would you like Case Management to discuss the discharge plan with any other family members/significant others, and if so, who? Yes (SPOUSE AND DTR)  Plans to Return to Present Housing: Yes  Other Identified Issues/Barriers to RETURNING to current housing: MEDICAL CLEARANCE   Potential Assistance needed at discharge: Skilled Nursing Facility            Potential DME:    Patient expects to discharge to: Skilled nursing facility  Plan for transportation at discharge:      Financial    Payor: MEDICARE / Plan: MEDICARE PART A AND B / Product Type: *No Product type* /     Does insurance require precert for SNF: No    Potential assistance Purchasing Medications:    Meds-to-Beds request: Yes      [facility] - [place], [place] - [address]  - P [phone] - F [phone]  [address]  [facility] [address]  Phone: [phone] Fax: [phone]    [facility] [address] [facility] - P [phone] - F [phone]  [address] [facility] [address]  Phone: [phone] Fax: [phone]      Notes:    Factors facilitating achievement of predicted outcomes: Family support    Barriers to discharge: Medical complications    Additional Case Management Notes: PT FROM [facility] LONG TERM BED HOLD. DEPENDENT AT BASELINE. W/C BOUND. NO HOME O2; NO HD; DEMENTIA.     PLAN RETURN TO [facility] AT MS. DENIES ANY NEED.     The Plan for Transition of Care is related to the following treatment goals of Cellulitis of left foot [L03.116]  Cellulitis of left lower extremity [L03.116]  Open wound of heel, initial encounter [S91.309A]    IF APPLICABLE: The Patient and/or patient representative Gary and his family were provided with a choice of provider and agrees with the discharge plan. Freedom of choice list with basic dialogue that supports the patient's individualized plan of care/goals and

## 2025-06-19 NOTE — ACP (ADVANCE CARE PLANNING)
Advance Care Planning   Healthcare Decision Maker:    Primary Decision Maker: TurkYeimy - Spouse - [phone]    Secondary Decision Maker: TeresaLela - Child - [phone]    Click here to complete Healthcare Decision Makers including selection of the Healthcare Decision Maker Relationship (ie \"Primary\").  Today we documented Decision Maker(s) consistent with Legal Next of Kin hierarchy.       Electronically signed by HAYDEN Riley on 6/19/2025 at 9:29 AM

## 2025-06-19 NOTE — PROGRESS NOTES
Hospitalist Progress Note      Date of Admission: 6/18/2025  Chief Complaint:    Chief Complaint   Patient presents with    Wound Check     Heel      Subjective:  No new complaints.  No nausea, vomiting, chest pain, or headache      Medications:    Infusion Medications    sodium chloride      dextrose       Scheduled Medications    Sodium Hypochlorite   Irrigation Daily    sodium chloride flush  5-40 mL IntraVENous 2 times per day    enoxaparin  40 mg SubCUTAneous Daily    piperacillin-tazobactam  3,375 mg IntraVENous Q8H    insulin lispro  5 Units SubCUTAneous TID AC    insulin lispro  0-4 Units SubCUTAneous 4x Daily AC & HS     PRN Meds: sodium chloride flush, sodium chloride, potassium chloride **OR** potassium alternative oral replacement **OR** potassium chloride, magnesium sulfate, ondansetron **OR** ondansetron, polyethylene glycol, acetaminophen **OR** acetaminophen, glucose, dextrose bolus **OR** dextrose bolus, glucagon (rDNA), dextrose    Intake/Output Summary (Last 24 hours) at 6/19/2025 1721  Last data filed at 6/19/2025 1242  Gross per 24 hour   Intake 2873.82 ml   Output 1100 ml   Net 1773.82 ml     Exam:  /75   Pulse 78   Temp 97.9 °F (36.6 °C) (Oral)   Resp 18   Ht 1.702 m (5' 7\")   Wt 75.3 kg (166 lb)   SpO2 97%   BMI 26.00 kg/m²   Head: Normocephalic, atraumatic  Sclera clear  Neck JVD flat  Lungs: normal effort of breathing  Stable non cellulitic foot wound    Labs:   Recent Labs     06/18/25  1038   WBC 6.9   HGB 10.7*   HCT 32.4*        Recent Labs     06/18/25  1038 06/19/25  0517    137   K 4.4 3.9    104   CO2 27 20   BUN 35* 29*   CREATININE 0.98 0.83   CALCIUM 8.6 8.5   AST 10  --    ALT 10  --    BILITOT <0.2  --    ALKPHOS 80  --      No results for input(s): \"INR\" in the last 72 hours.  Recent Labs     06/18/25  1038   CKTOTAL 35     Radiology:  Vascular duplex lower extremity arteries bilateral   Final Result   1. Mild to moderate atherosclerotic plaque  in the bilateral lower extremities.   2. Abnormal monophasic waveforms in the right distal anterior tibial, left   popliteal and posterior tibial arteries.   3. Elevated systolic velocities in the right posterior tibial, anterior   tibial, left posterior tibial and anterior tibial arteries.   4. Diminished flow in the right mid/distal anterior tibial and mid posterior   tibial arteries.         XR FOOT LEFT (MIN 3 VIEWS)   Final Result   1. Large dorsal calcaneal wound approximating the dorsal cortex of the calcaneus. Osteomyelitis cannot be excluded, though there is no significant cortical irregularity.   2. Advanced generalized osteopenia.            MRI ANKLE LEFT WO CONTRAST    (Results Pending)     Assessment/Plan:    Heel wound of foot.  Stable, non cellulitic, MRI pending to ro osteomyelitis.  Even if positive MRI, can likely be addressed outpatient for elective amputation versus palliative management; peripheral vascular disease present will require further vascular workup which can be managed outpt.     DM: monitor glucose accordion, titrate meds as needed  PVD: outpt vascular work up.     SRI WILSON MD

## 2025-06-19 NOTE — PLAN OF CARE
Problem: Chronic Conditions and Co-morbidities  Goal: Patient's chronic conditions and co-morbidity symptoms are monitored and maintained or improved  6/19/2025 0847 by Gail Chance RN  Outcome: Progressing  6/18/2025 2246 by Jaimee Bryant RN  Outcome: Progressing     Problem: Discharge Planning  Goal: Discharge to home or other facility with appropriate resources  6/19/2025 0847 by Gail Chance RN  Outcome: Progressing  6/18/2025 2246 by Jaimee Bryant RN  Outcome: Progressing     Problem: Skin/Tissue Integrity  Goal: Skin integrity remains intact  Description: 1.  Monitor for areas of redness and/or skin breakdown  2.  Assess vascular access sites hourly  3.  Every 4-6 hours minimum:  Change oxygen saturation probe site  4.  Every 4-6 hours:  If on nasal continuous positive airway pressure, respiratory therapy assess nares and determine need for appliance change or resting period  6/19/2025 0847 by Gail Chance RN  Outcome: Progressing  6/18/2025 2246 by Jaimee Bryant RN  Outcome: Progressing

## 2025-06-20 ENCOUNTER — APPOINTMENT (OUTPATIENT)
Dept: INTERVENTIONAL RADIOLOGY/VASCULAR | Age: 65
DRG: 638 | End: 2025-06-20
Payer: MEDICARE

## 2025-06-20 LAB
ANION GAP SERPL CALCULATED.3IONS-SCNC: 13 MEQ/L (ref 9–15)
BUN SERPL-MCNC: 25 MG/DL (ref 8–23)
CALCIUM SERPL-MCNC: 8.4 MG/DL (ref 8.5–9.9)
CHLORIDE SERPL-SCNC: 104 MEQ/L (ref 95–107)
CO2 SERPL-SCNC: 22 MEQ/L (ref 20–31)
CREAT SERPL-MCNC: 0.79 MG/DL (ref 0.7–1.2)
GLUCOSE BLD-MCNC: 124 MG/DL (ref 70–99)
GLUCOSE BLD-MCNC: 131 MG/DL (ref 70–99)
GLUCOSE BLD-MCNC: 157 MG/DL (ref 70–99)
GLUCOSE BLD-MCNC: 194 MG/DL (ref 70–99)
GLUCOSE SERPL-MCNC: 192 MG/DL (ref 70–99)
PERFORMED ON: ABNORMAL
POTASSIUM SERPL-SCNC: 4 MEQ/L (ref 3.4–4.9)
SODIUM SERPL-SCNC: 139 MEQ/L (ref 135–144)

## 2025-06-20 PROCEDURE — 36573 INSJ PICC RS&I 5 YR+: CPT

## 2025-06-20 PROCEDURE — 99222 1ST HOSP IP/OBS MODERATE 55: CPT | Performed by: INTERNAL MEDICINE

## 2025-06-20 PROCEDURE — C1769 GUIDE WIRE: HCPCS

## 2025-06-20 PROCEDURE — 1210000000 HC MED SURG R&B

## 2025-06-20 PROCEDURE — 36415 COLL VENOUS BLD VENIPUNCTURE: CPT

## 2025-06-20 PROCEDURE — 36573 INSJ PICC RS&I 5 YR+: CPT | Performed by: RADIOLOGY

## 2025-06-20 PROCEDURE — 80048 BASIC METABOLIC PNL TOTAL CA: CPT

## 2025-06-20 PROCEDURE — 2500000003 HC RX 250 WO HCPCS: Performed by: INTERNAL MEDICINE

## 2025-06-20 PROCEDURE — 6370000000 HC RX 637 (ALT 250 FOR IP)

## 2025-06-20 PROCEDURE — 6370000000 HC RX 637 (ALT 250 FOR IP): Performed by: INTERNAL MEDICINE

## 2025-06-20 PROCEDURE — 02HV33Z INSERTION OF INFUSION DEVICE INTO SUPERIOR VENA CAVA, PERCUTANEOUS APPROACH: ICD-10-PCS | Performed by: INTERNAL MEDICINE

## 2025-06-20 PROCEDURE — 6360000002 HC RX W HCPCS: Performed by: INTERNAL MEDICINE

## 2025-06-20 PROCEDURE — 2580000003 HC RX 258: Performed by: INTERNAL MEDICINE

## 2025-06-20 RX ORDER — LIDOCAINE HYDROCHLORIDE 20 MG/ML
5 INJECTION, SOLUTION INFILTRATION; PERINEURAL ONCE
Status: COMPLETED | OUTPATIENT
Start: 2025-06-20 | End: 2025-06-20

## 2025-06-20 RX ORDER — SODIUM CHLORIDE 0.9 % (FLUSH) 0.9 %
5-40 SYRINGE (ML) INJECTION PRN
Status: DISCONTINUED | OUTPATIENT
Start: 2025-06-20 | End: 2025-06-21 | Stop reason: HOSPADM

## 2025-06-20 RX ORDER — SODIUM CHLORIDE 0.9 % (FLUSH) 0.9 %
5-40 SYRINGE (ML) INJECTION EVERY 12 HOURS SCHEDULED
Status: DISCONTINUED | OUTPATIENT
Start: 2025-06-20 | End: 2025-06-21 | Stop reason: HOSPADM

## 2025-06-20 RX ORDER — LACTOBACILLUS RHAMNOSUS GG 10B CELL
1 CAPSULE ORAL 2 TIMES DAILY
Status: DISCONTINUED | OUTPATIENT
Start: 2025-06-20 | End: 2025-06-21 | Stop reason: HOSPADM

## 2025-06-20 RX ORDER — SODIUM CHLORIDE 9 MG/ML
INJECTION, SOLUTION INTRAVENOUS PRN
Status: DISCONTINUED | OUTPATIENT
Start: 2025-06-20 | End: 2025-06-21 | Stop reason: HOSPADM

## 2025-06-20 RX ORDER — SODIUM CHLORIDE 9 MG/ML
250 INJECTION, SOLUTION INTRAVENOUS ONCE
Status: DISCONTINUED | OUTPATIENT
Start: 2025-06-20 | End: 2025-06-21 | Stop reason: HOSPADM

## 2025-06-20 RX ADMIN — SODIUM CHLORIDE, PRESERVATIVE FREE 20 ML: 5 INJECTION INTRAVENOUS at 21:35

## 2025-06-20 RX ADMIN — ONDANSETRON 4 MG: 2 INJECTION, SOLUTION INTRAMUSCULAR; INTRAVENOUS at 08:35

## 2025-06-20 RX ADMIN — Medication 10 ML: at 21:36

## 2025-06-20 RX ADMIN — PIPERACILLIN AND TAZOBACTAM 3375 MG: 3; .375 INJECTION, POWDER, LYOPHILIZED, FOR SOLUTION INTRAVENOUS at 16:54

## 2025-06-20 RX ADMIN — PIPERACILLIN AND TAZOBACTAM 3375 MG: 3; .375 INJECTION, POWDER, LYOPHILIZED, FOR SOLUTION INTRAVENOUS at 01:58

## 2025-06-20 RX ADMIN — PIPERACILLIN AND TAZOBACTAM 3375 MG: 3; .375 INJECTION, POWDER, LYOPHILIZED, FOR SOLUTION INTRAVENOUS at 09:25

## 2025-06-20 RX ADMIN — Medication 1 CAPSULE: at 21:35

## 2025-06-20 RX ADMIN — LIDOCAINE HYDROCHLORIDE 5 ML: 20 INJECTION, SOLUTION INFILTRATION; PERINEURAL at 15:47

## 2025-06-20 RX ADMIN — INSULIN LISPRO 5 UNITS: 100 INJECTION, SOLUTION INTRAVENOUS; SUBCUTANEOUS at 12:23

## 2025-06-20 RX ADMIN — INSULIN LISPRO 1 UNITS: 100 INJECTION, SOLUTION INTRAVENOUS; SUBCUTANEOUS at 08:32

## 2025-06-20 RX ADMIN — ONDANSETRON 4 MG: 2 INJECTION, SOLUTION INTRAMUSCULAR; INTRAVENOUS at 14:42

## 2025-06-20 RX ADMIN — INSULIN LISPRO 5 UNITS: 100 INJECTION, SOLUTION INTRAVENOUS; SUBCUTANEOUS at 08:32

## 2025-06-20 ASSESSMENT — PAIN SCALES - WONG BAKER
WONGBAKER_NUMERICALRESPONSE: NO HURT

## 2025-06-20 NOTE — DISCHARGE INSTR - COC
Continuity of Care Form    Patient Name: Gary Turk   :  1960  MRN:  [id number]    Admit date:  2025  Discharge date:  ***    Code Status Order: DNR-CCA   Advance Directives:     Admitting Physician:  Curly Oden MD  PCP: Lobo Monteiro MD    Discharging Nurse: ***  Discharging Hospital Unit/Room#: W465/W465-01  Discharging Unit Phone Number: ***    Emergency Contact:   Extended Emergency Contact Information  Primary Emergency Contact: Lela Moore  Home Phone: [phone]  Mobile Phone: [phone]  Relation: Child  Preferred language: English   needed? No  Secondary Emergency Contact: Annemarie Mccormick   [facility]  Home Phone: [phone]  Mobile Phone: [phone]  Relation: Brother/Sister    Past Surgical History:  Past Surgical History:   Procedure Laterality Date    CARPAL TUNNEL RELEASE Bilateral     COLONOSCOPY N/A 2024    COLONOSCOPY with polypectomies and biopsies performed by Kelton Lopez MD at [facility]    EP DEVICE PROCEDURE N/A 2023    Loop recorder insert  ROOM 293 MEDTR[employer]IC NOTIFIED performed by Maxwell Tyler DO at [facility] CATH LAB    FOOT DEBRIDEMENT Right 2023    RIGHT FOOT DEBRIDEMENT INCISION AND DRAINAGE ROOM 174 performed by Jairo Garcia DPM at [facility] OR    FOOT DEBRIDEMENT Right 2023    RIGHT FOOT  INCISION AND DRAINAGE WITH DEBRIDEMENT OF NON-VIABLE TISSUE DELAYED PRIMARY CLOSURE performed by Jairo Garcia DPM at [facility] OR    FOOT SURGERY Left     bone spur    HIP SURGERY Left 10/5/2023    TROCHANTERIC FIXATION NAILING LEFT FEMUR FRACTURE ROOM 265 performed by Mirza Elliott MD at [facility] OR    LAMINOTOMY  2012    SPINE SURGERY N/A 2022    L1 VERETEBRAL AUGMENTATION performed by Concetta Junior MD at [facility]       Immunization History:   Immunization History   Administered Date(s) Administered    COVID-19, PFIZER GRAY top, DO NOT Dilute, (age 12 y+), IM, 30 mcg/0.3 mL 06/10/2022    COVID-19, PFIZER PURPLE  cardiomyopathy I25.5    Chronic systolic (congestive) heart failure I50.22    Crush fracture of vertebra due to osteoporosis ([facility]) M80.08XA    Diabetes mellitus type 2, insulin dependent ([facility]) E11.9, Z79.4    Altered mental status, unspecified R41.82    Contact with and (suspected) exposure to covid-19 Z20.822    Degeneration of lumbar intervertebral disc M51.369    Diplopia H53.2    Displacement of lumbar intervertebral disc without myelopathy M51.26    Headache R51.9    Hypokalemia E87.6    Illness, unspecified R69    Low back pain M54.50    Lumbosacral spondylosis without myelopathy M47.817    Moderate persistent asthma without complication J45.40    Nasal congestion R09.81    Nausea R11.0    Presence of aortocoronary bypass graft Z95.1    Toothache K08.89    Right foot ulcer, with necrosis of bone ([facility]) L97.514    Cellulitis of right foot L03.115    Right hallux osteomyelitis ([facility]) M86.9    Hyperglycemia R73.9    Medically noncompliant Z91.199    Acute hematogenous osteomyelitis of right foot ([facility]) M86.071    Abscess L02.91    Acute osteomyelitis of toe of right foot ([facility]) M86.171    Weakness R53.1    Coagulation defect D68.9    Dizziness R42    TIA (transient ischemic attack) G45.9    Hypertensive emergency I16.1    Unsteadiness on feet R26.81    Vertebral artery occlusion, left I65.02    Transient alteration of awareness R40.4    Presence of coronary angioplasty implant and graft Z95.5    Hypertensive encephalopathy I67.4    Cryptogenic stroke ([facility]) I63.9    Immobility Z74.09    Aphasia R47.01    Dysphagia, oropharyngeal phase R13.12    Adjustment disorder with depressed mood F43.21    Closed nondisplaced intertrochanteric fracture of left femur ([facility]) S72.145A    Fall from standing W19.XXXA    Acute pain due to trauma G89.11    Acute post-operative pain G89.18    MCI (mild cognitive impairment) G31.84    Thalamic hemorrhage ([facility]) I61.0    Ataxia R27.0    Hemiplegia affecting right dominant side, unspecified

## 2025-06-20 NOTE — PROGRESS NOTES
PODIATRIC MEDICINE AND SURGERY  CONSULT HISTORY AND PHYSICAL    Consulting Service:  ED  Opinion/advice regarding: Left heel wound  Staff Doctor:  Dr. Mejias DPM    ASSESSMENT:  65 y.o. male with PMH significant for Asthma, CAD, chf, CVA, Diabetes mellitus,  hypertension,Hyperlipemia, NSTEMI. For who podiatry was consulted for Left heel wound.  On admission labs significant for CRP of 10.3, and sedimentation rate of 36 and normal WBC of 6.9.  X-rays were negative for soft tissue emphysema or acute signs of osteomyelitis.  On exam ischemic pressure wound noted to the posterior left heel.  MRI and NIVS reviewed and consistent with diagnosis of PAD and early OM.  Patient is not a surgical candidate for a partial calcanectomy.  Podiatry team recommending to continue abx recommendations by ID and local wound care with close follow-up as an outpatient with Dr. Mejias at this time.  Patient at a high risk of BKA if conservative treatment with wound care fails, given his mentation and nonambulatory status.      PLAN AND RECOMMENDATIONS::  Patient's case to be discussed with staff, Dr. Mejias, who will provide final recommendations going forward.    Wound care left heel: Dakins wet dry, abd and kerlix. Nursing orders placed for daily dressing changes.    Elevate left lower extremity at or above heart level while resting  -Patient is not a candidate for podiatric surgical intervention at this time.  -Left ankle MRI reviewed  -PVRs reviwed.  Antibiotic coverage per primary team/ID.  Pain management per primary team   Podiatry to follow peripherally while in house.  Patient will need follow up with Dr. Mejias within 7-10 days of discharge for wound care.    HPI: This 65 y.o. year old male was seen today for left foot infection.  Unable to obtain history from patient due to dementia.  Patient lives in facility.    Past Medical History:   Diagnosis Date    Acute blood loss anemia 10/09/2023    Acute GI bleeding  stopping  Dx back pain 1 each 0    [DISCONTINUED] lisinopril (PRINIVIL;ZESTRIL) 10 MG tablet Take 1 tablet by mouth daily 30 tablet 5    Accu-Chek FastClix Lancets MISC Test 4x daily Dx E11.65 150 each 3    blood glucose test strips (ACCU-CHEK GUIDE) strip Test 4x daily Dx E11.65 150 each 3       No Known Allergies    Family History   Problem Relation Age of Onset    Other Father         accident    Heart Failure Mother     Heart Disease Brother     Cirrhosis Brother        Social History     Socioeconomic History    Marital status:      Spouse name: rodríguez    Number of children: 2    Years of education: 14    Highest education level: Some college, no degree   Occupational History    Occupation: disability    Tobacco Use    Smoking status: Never     Passive exposure: Never    Smokeless tobacco: Never   Vaping Use    Vaping status: Never Used   Substance and Sexual Activity    Alcohol use: Not Currently     Alcohol/week: 2.0 standard drinks of alcohol     Types: 2 Cans of beer per week    Drug use: No    Sexual activity: Not Currently     Partners: Female   Other Topics Concern    Not on file   Social History Narrative    Lives with wife.  Per patient he has been  for 2 years.  He met his wife online and went to [facility] and brought her back to [facility].  They live in [facility] into home. It is a 2 story home. Bedroom and bath upstairs. Bathroom down. Per patient once he is down the steps he doesn't go back up during the day.    2 daughters local.      he has very poor vision.  He does not like to drive at night or when raining.  Patient states he struggles looking at a computer or reading due to vision.         Type of Home: House in [address]    Home Layout: Two level    Home Access: Level entry    Bathroom Shower/Tub: Tub/Shower unit    Bathroom Equipment: Hand-held shower    Home Equipment: Cane, Rollator    ADL Assistance: Needs assistance (needs assist)    Homemaking

## 2025-06-20 NOTE — CONSULTS
Interventional Radiology Vascular Access Team   Consult Note    MRN: [id number]   Patient: Gary Turk   : 1960   Age: 65 y.o.  Patient has no known allergies.        Patient is a 65 y.o. male admitted on 2025.   Vascular access team consulted due to need for IV antibiotics and successfully placed Left Basilic PICC on  25.    Peripherally Inserted Central Catheter/ Midline Assessment:   PICC 25 Left Basilic (Active)   Central Line Being Utilized No 25 155   Criteria for Appropriate Use Long term IV/antibiotic administration 25 155   Site Assessment Clean, dry & intact 25 155   Phlebitis Assessment No symptoms 25 155   Infiltration Assessment 0 25 155   Extremity Circumference (cm) 27.5 cm 25 155   External Catheter Length (cm) 0 cm 25 155   Lumen #1 Color/Status Purple;Brisk blood return;Normal saline locked;Flushed;Alcohol cap applied 25 155   Lumen #2 Color/Status Red;Brisk blood return;Normal saline locked;Flushed;Alcohol cap applied 25 155   Alcohol Cap Used Yes 25 155   Dressing Type Transparent w/CHG gel;Securing device 25   Dressing Status New dressing applied;Clean, dry & intact 25 155   Dressing Intervention New 25       Electronically signed by Aurora Noguera RN on 25 at 4:24 PM EDT

## 2025-06-20 NOTE — PLAN OF CARE
Problem: Chronic Conditions and Co-morbidities  Goal: Patient's chronic conditions and co-morbidity symptoms are monitored and maintained or improved  6/20/2025 0857 by Holly Meyer RN  Outcome: Progressing  6/20/2025 0054 by Mariah Arndt RN  Outcome: Progressing     Problem: Discharge Planning  Goal: Discharge to home or other facility with appropriate resources  6/20/2025 0857 by Holly Meyer RN  Outcome: Progressing  6/20/2025 0054 by Mariah Arndt RN  Outcome: Progressing     Problem: Pain  Goal: Verbalizes/displays adequate comfort level or baseline comfort level  6/20/2025 0857 by Holly Meyer RN  Outcome: Progressing  6/20/2025 0054 by Mariah Arndt RN  Outcome: Progressing     Problem: Skin/Tissue Integrity  Goal: Skin integrity remains intact  Description: 1.  Monitor for areas of redness and/or skin breakdown  2.  Assess vascular access sites hourly  3.  Every 4-6 hours minimum:  Change oxygen saturation probe site  4.  Every 4-6 hours:  If on nasal continuous positive airway pressure, respiratory therapy assess nares and determine need for appliance change or resting period  6/20/2025 0857 by Holly Meyer RN  Outcome: Progressing  6/20/2025 0054 by Mariah Arndt RN  Outcome: Progressing

## 2025-06-20 NOTE — PROGRESS NOTES
Attempted to see patient today.  Patient in radiology getting pick line.  Needs CTA runoff to eval pvd.

## 2025-06-20 NOTE — PROGRESS NOTES
0900: Alert to person only. Repositioned. Dressing to LLE CDI. Heels elevated. Call light within reach. Safety maintained.    1400: Dressing to LLE changed. Pt tolerated well.     1500: Call to pts daughter for PICC consent. VM left. Call to specials to make aware of VM.     1620: D/c in per Akshat if clear by ID-- Dr. Livingston states to hold off as pt was vomiting (medicated per MAR) when rounding. Picc to LUE.     Electronically signed by Holly Meyer RN on 6/20/2025 at 8:58 AM

## 2025-06-20 NOTE — CONSULTS
Infectious Diseases Inpatient Consult Note      Reason for Consult:   Left calcaneus osteomyelitis  Requesting Physician:   Dr. Oden  Primary Care Physician:  Lobo Monteiro MD  History Obtained From:   Pt, EPIC    Admit Date: 6/18/2025  Hospital Day: 3      HISTORY OF PRESENT ILLNESS:  This is a 65 y.o. male with past medical history of diabetes mellitus 2, coronary artery disease, asthma, CVA, hypertension, hyperlipidemia, ischemic cardiomyopathy, chronic osteomyelitis of left calcaneus with chronic nonhealing wound that was treated with IV antibiotics in 2024, history of C. difficile colitis, hearing loss, was admitted to [facility]  from skilled nursing facility through ER for worsening left heel wound with strong malodor.  Upon additional evaluation, wound culture was positive for 4 different bacteria including Pseudomonas aeruginosa, slightly elevated CRP and sed rate.  MRI of left heel was positive for osteomyelitis of left calcaneus that was present last year on MRI.  Patient was started on IV Zosyn.  Patient started having nausea and vomiting of bile today.  He was reported to have decreased appetite.  Had 1 bowel movement yesterday.  Patient reports that he is hard of hearing.  I tried to communicate with him by writing and he could not read.  Is poor historian.     Past medical surgical and social history were reviewed    Past Medical History:   Diagnosis Date    Acute blood loss anemia 10/09/2023    Acute GI bleeding 08/06/2024    Asthma     Back pain     compresion in [place]    CAD (coronary artery disease) 01/2020    3 vessel     chf 09/16/2022    CVA (cerebral vascular accident) ([facility])     Diabetes mellitus ([facility])     Essential hypertension, benign     Hyperlipemia     Hypertensive urgency 08/16/2022    Ischemic cardiomyopathy 09/16/2022    Neuropathic pain, leg, bilateral     NSTEMI (non-ST elevated myocardial infarction) ([facility]) 12/13/2019       Past Surgical History:   Procedure Laterality Date    CARPAL  mid/distal anterior tibial and mid posterior  tibial arteries.    MRI of left foot done January 2024 was positive for osteomyelitis involving calcaneus    IMPRESSION:    Infected left heel stage III ulceration that is chronic with lack of healing  Chronic left calcaneus osteomyelitis  Polymicrobial bacterial infection  Diabetes mellitus 2 with peripheral arterial disease      PLAN:  Continue IV Zosyn.  I will give a short course of antibiotics especially in view of old finding of abnormal MRI and with nonsignificant elevation of sed rate and CRP as discussed with the daughter  Check culture and sensitivity and accordingly make decision about IV antibiotics on discharge  I will order Lactobacillus  PICC line as discussed with the daughter who is agreeable  Offloading  Local wound care per podiatry  Follow-up with vascular postdischarge  Agree with treatment of nausea and vomiting and follow-up with Dr. Oden      Discussed with patient, adult child, and RN    Yi Livingston MD

## 2025-06-20 NOTE — CARE COORDINATION
Plan remains [facility] when medically cleared. Patient is a long term resident and will not need a pre cert to return. ID consulted.

## 2025-06-20 NOTE — PROGRESS NOTES
went to draw patient's labs and he became aggressive.  She then came to me and I went in with her to the patient.  Explained what we needed and why we needed it but patient refused to straighten out his arm and began yelling profanities as us to leave him alone.  Notified Tanya AGUILAR

## 2025-06-21 VITALS
TEMPERATURE: 97.7 F | OXYGEN SATURATION: 100 % | SYSTOLIC BLOOD PRESSURE: 137 MMHG | RESPIRATION RATE: 18 BRPM | HEIGHT: 67 IN | HEART RATE: 86 BPM | BODY MASS INDEX: 26.06 KG/M2 | WEIGHT: 166 LBS | DIASTOLIC BLOOD PRESSURE: 68 MMHG

## 2025-06-21 LAB
ANION GAP SERPL CALCULATED.3IONS-SCNC: 14 MEQ/L (ref 9–15)
BUN SERPL-MCNC: 23 MG/DL (ref 8–23)
CALCIUM SERPL-MCNC: 8.5 MG/DL (ref 8.5–9.9)
CHLORIDE SERPL-SCNC: 105 MEQ/L (ref 95–107)
CO2 SERPL-SCNC: 23 MEQ/L (ref 20–31)
CREAT SERPL-MCNC: 0.82 MG/DL (ref 0.7–1.2)
GLUCOSE BLD-MCNC: 196 MG/DL (ref 70–99)
GLUCOSE SERPL-MCNC: 159 MG/DL (ref 70–99)
PERFORMED ON: ABNORMAL
POTASSIUM SERPL-SCNC: 4.2 MEQ/L (ref 3.4–4.9)
SODIUM SERPL-SCNC: 142 MEQ/L (ref 135–144)

## 2025-06-21 PROCEDURE — 36415 COLL VENOUS BLD VENIPUNCTURE: CPT

## 2025-06-21 PROCEDURE — 80048 BASIC METABOLIC PNL TOTAL CA: CPT

## 2025-06-21 PROCEDURE — 6370000000 HC RX 637 (ALT 250 FOR IP)

## 2025-06-21 PROCEDURE — 6370000000 HC RX 637 (ALT 250 FOR IP): Performed by: INTERNAL MEDICINE

## 2025-06-21 PROCEDURE — 99232 SBSQ HOSP IP/OBS MODERATE 35: CPT | Performed by: INTERNAL MEDICINE

## 2025-06-21 PROCEDURE — 2500000003 HC RX 250 WO HCPCS: Performed by: INTERNAL MEDICINE

## 2025-06-21 PROCEDURE — 2580000003 HC RX 258: Performed by: INTERNAL MEDICINE

## 2025-06-21 PROCEDURE — 6360000002 HC RX W HCPCS: Performed by: INTERNAL MEDICINE

## 2025-06-21 RX ADMIN — SODIUM CHLORIDE, PRESERVATIVE FREE 10 ML: 5 INJECTION INTRAVENOUS at 09:44

## 2025-06-21 RX ADMIN — ENOXAPARIN SODIUM 40 MG: 100 INJECTION SUBCUTANEOUS at 09:43

## 2025-06-21 RX ADMIN — PIPERACILLIN AND TAZOBACTAM 3375 MG: 3; .375 INJECTION, POWDER, LYOPHILIZED, FOR SOLUTION INTRAVENOUS at 01:49

## 2025-06-21 RX ADMIN — Medication 1 CAPSULE: at 09:43

## 2025-06-21 RX ADMIN — INSULIN LISPRO 5 UNITS: 100 INJECTION, SOLUTION INTRAVENOUS; SUBCUTANEOUS at 09:43

## 2025-06-21 RX ADMIN — INSULIN LISPRO 2 UNITS: 100 INJECTION, SOLUTION INTRAVENOUS; SUBCUTANEOUS at 12:53

## 2025-06-21 RX ADMIN — DAKIN'S SOLUTION 0.125% (QUARTER STRENGTH): 0.12 SOLUTION at 09:44

## 2025-06-21 RX ADMIN — Medication 10 ML: at 09:45

## 2025-06-21 RX ADMIN — INSULIN LISPRO 5 UNITS: 100 INJECTION, SOLUTION INTRAVENOUS; SUBCUTANEOUS at 12:52

## 2025-06-21 RX ADMIN — PIPERACILLIN AND TAZOBACTAM 3375 MG: 3; .375 INJECTION, POWDER, LYOPHILIZED, FOR SOLUTION INTRAVENOUS at 09:49

## 2025-06-21 NOTE — PROGRESS NOTES
Hospitalist Progress Note      Date of Admission: 6/18/2025  Chief Complaint:    Chief Complaint   Patient presents with    Wound Check     Heel      Subjective:  No new complaints.  No nausea, vomiting, chest pain, or headache      Medications:    Infusion Medications    sodium chloride      sodium chloride      sodium chloride      dextrose       Scheduled Medications    sodium chloride flush  5-40 mL IntraVENous 2 times per day    lactobacillus  1 capsule Oral BID    Sodium Hypochlorite   Irrigation Daily    sodium chloride flush  5-40 mL IntraVENous 2 times per day    enoxaparin  40 mg SubCUTAneous Daily    piperacillin-tazobactam  3,375 mg IntraVENous Q8H    insulin lispro  5 Units SubCUTAneous TID AC    insulin lispro  0-4 Units SubCUTAneous 4x Daily AC & HS     PRN Meds: sodium chloride flush, sodium chloride, sodium chloride flush, sodium chloride, potassium chloride **OR** potassium alternative oral replacement **OR** potassium chloride, magnesium sulfate, ondansetron **OR** ondansetron, polyethylene glycol, acetaminophen **OR** acetaminophen, glucose, dextrose bolus **OR** dextrose bolus, glucagon (rDNA), dextrose    Intake/Output Summary (Last 24 hours) at 6/21/2025 0040  Last data filed at 6/20/2025 1703  Gross per 24 hour   Intake 1359.71 ml   Output 1300 ml   Net 59.71 ml     Exam:  BP (!) 146/80   Pulse 94   Temp 98.1 °F (36.7 °C) (Axillary)   Resp 14   Ht 1.702 m (5' 7\")   Wt 75.3 kg (166 lb)   SpO2 100%   BMI 26.00 kg/m²   Head: Normocephalic, atraumatic  Sclera clear  Neck JVD flat  Lungs: normal effort of breathing  Stable non cellulitic foot wound    Labs:   Recent Labs     06/18/25  1038   WBC 6.9   HGB 10.7*   HCT 32.4*        Recent Labs     06/18/25  1038 06/19/25  0517 06/20/25  0737    137 139   K 4.4 3.9 4.0    104 104   CO2 27 20 22   BUN 35* 29* 25*   CREATININE 0.98 0.83 0.79   CALCIUM 8.6 8.5 8.4*   AST 10  --   --    ALT 10  --   --    BILITOT <0.2  --   --

## 2025-06-21 NOTE — CARE COORDINATION
Plan remains to [facility] when medically cleared. Patient is a long term resident at [facility]. No pre-cert is need it at OR.   Pending ID plan at this time.     Electronically signed by HAYDEN Riley on 6/21/2025 at 9:20 AM

## 2025-06-21 NOTE — PROGRESS NOTES
Infectious Diseases Inpatient Progress Note          HISTORY OF PRESENT ILLNESS:  Follow up polymicrobial bacterial infection of left heel, chronic nonhealing left heel wound with recent worsening condition with increased necrotic tissue and malodor, chronic osteomyelitis with abnormal MRI of left calcaneus last year and during this admission in a patient with diabetes mellitus 2 and peripheral arterial disease on IV Zosyn, well tolerated.  No fevers.     Current Medications:     sodium chloride flush  5-40 mL IntraVENous 2 times per day    lactobacillus  1 capsule Oral BID    Sodium Hypochlorite   Irrigation Daily    sodium chloride flush  5-40 mL IntraVENous 2 times per day    enoxaparin  40 mg SubCUTAneous Daily    piperacillin-tazobactam  3,375 mg IntraVENous Q8H    insulin lispro  5 Units SubCUTAneous TID AC    insulin lispro  0-4 Units SubCUTAneous 4x Daily AC & HS       Allergies:  Patient has no known allergies.      Review of Systems  Poor historian.  Hard of hearing.  Denies any pain  No vomiting today reported  No fevers  Chronic left heel wound with lack of healing  Physical Exam  Vitals:    06/20/25 1619 06/20/25 1620 06/20/25 1914 06/21/25 0739   BP: (!) 143/63 (!) 143/63 (!) 146/80 (!) 166/84   Pulse:   94 85   Resp:   14 18   Temp:   98.1 °F (36.7 °C) 97.7 °F (36.5 °C)   TempSrc:   Axillary Oral   SpO2:   100% 100%   Weight:       Height:         General Appearance: Hard of hearing, in no acute distress  Skin: warm and dry, no rash.   Head: normocephalic and atraumatic  Eyes: anicteric sclerae  Lungs: normal respiratory effort, clear lungs  Heart normal S1-S2  Abdomen: soft, no tenderness  No leg edema  No erythema, no tenderness  Left heel with intact dressing    DATA:    Lab Results   Component Value Date    WBC 6.9 06/18/2025    HGB 10.7 (L) 06/18/2025    HCT 32.4 (L) 06/18/2025    MCV 86.2 06/18/2025     06/18/2025     Lab Results   Component Value Date    CREATININE 0.82 06/21/2025    BUN

## 2025-06-22 LAB
CULTURE WOUND: ABNORMAL
ORGANISM: ABNORMAL

## 2025-06-23 LAB
BACTERIA BLD CULT ORG #2: NORMAL
BACTERIA BLD CULT: NORMAL
CULTURE WOUND: ABNORMAL
ORGANISM: ABNORMAL

## 2025-06-30 ENCOUNTER — OFFICE VISIT (OUTPATIENT)
Dept: GERIATRIC MEDICINE | Age: 65
End: 2025-06-30

## 2025-06-30 DIAGNOSIS — M86.172 ACUTE OSTEOMYELITIS OF LEFT CALCANEUS (HCC): Primary | ICD-10-CM

## 2025-06-30 DIAGNOSIS — T80.219A PICC LINE INFECTION, INITIAL ENCOUNTER: ICD-10-CM

## 2025-06-30 NOTE — PROGRESS NOTES
Physician Progress Note      PATIENT:               INDRA DORANTES  CSN #:                  [id number]  :                       1960  ADMIT DATE:       2025 9:42 AM  DISCH DATE:        2025 3:20 PM  RESPONDING  PROVIDER #:        Curly Oden MD          QUERY TEXT:    After review, can the etiology for the osteomyelitis be clarified as:    The clinical indicators include:  -male age 65  -25 Dr Segal: \"On exam ischemic pressure wound noted to the posterior   left heel.  MRI and NIVS reviewed and consistent with diagnosis of PAD and   early OM.  Patient is not a surgical candidate for a partial calcanectomy.\"  -25 Dr Livingston: \"Infected left heel stage III ulceration that is chronic   with lack of healing  Chronic left calcaneus osteomyelitis Polymicrobial   bacterial infection  Diabetes mellitus 2 with peripheral arterial disease  May   discharge to skilled nursing facility on IV Zosyn for 2 weeks.  I will not   treat patient for osteomyelitis with a prolonged course of IV antibiotics   since this is a chronic condition that might not be fixed unless if patient   undergoes calcanectomy\"  Options provided:  -- Osteomyelitis is related to diabetes.  -- Osteomyelitis is related to left heel pressure injury.  -- Other - I will add my own diagnosis  -- Disagree - Not applicable / Not valid  -- Disagree - Clinically unable to determine / Unknown  -- Refer to Clinical Documentation Reviewer    PROVIDER RESPONSE TEXT:    Osteomyelitis is related to diabetes.    Query created by: Adarsh Matthews on 2025 11:56 AM      QUERY TEXT:    \"left heel stage III ulceration\" is documented in the 25 ID consult note   and \"ischemic pressure wound noted to the posterior left heel.\" is documented   in the 26 Podiatry PN.  After further review, can this be further   clarified as:    The clinical indicators include:  -male age 65  -25 H&P Dr Oden: \"Left heel wound, no clear or obvious

## 2025-07-01 ENCOUNTER — HOSPITAL ENCOUNTER (OUTPATIENT)
Dept: CARDIOLOGY | Age: 65
Discharge: HOME | End: 2025-07-01
Payer: MEDICARE

## 2025-07-01 DIAGNOSIS — Z45.09 ENCOUNTER FOR LOOP RECORDER CHECK: Primary | ICD-10-CM

## 2025-07-01 PROCEDURE — 93298 REM INTERROG DEV EVAL SCRMS: CPT

## 2025-07-07 ENCOUNTER — TELEPHONE (OUTPATIENT)
Age: 65
End: 2025-07-07

## 2025-07-07 NOTE — TELEPHONE ENCOUNTER
Nurse Middleton reports IV Abx as prescribed are complete. Patient still has Picc Line in and the area around the canula is red and somewhat irritating for the patient. Request is to D/c the Picc line. Nurse confirms patient's F/u appt with ID for 7/10/25. Will update ID physician.     Per review with Dr Gil, Ok to pull Picc-Line. Returned call to Nurse Middleton and advised. She verbalized understanding, confirms appt for 7/10/25.

## 2025-07-10 ENCOUNTER — OFFICE VISIT (OUTPATIENT)
Age: 65
End: 2025-07-10
Payer: MEDICARE

## 2025-07-10 ENCOUNTER — TRANSCRIBE ORDERS (OUTPATIENT)
Dept: ADMINISTRATIVE | Age: 65
End: 2025-07-10

## 2025-07-10 VITALS
SYSTOLIC BLOOD PRESSURE: 120 MMHG | DIASTOLIC BLOOD PRESSURE: 72 MMHG | TEMPERATURE: 97.3 F | RESPIRATION RATE: 18 BRPM | OXYGEN SATURATION: 94 % | HEART RATE: 76 BPM

## 2025-07-10 DIAGNOSIS — M86.672 CHRONIC OSTEOMYELITIS OF LEFT FOOT (HCC): Primary | ICD-10-CM

## 2025-07-10 DIAGNOSIS — R94.01 NONSPECIFIC ABNORMAL ELECTROENCEPHALOGRAM (EEG): Primary | ICD-10-CM

## 2025-07-10 DIAGNOSIS — E08.621 DIABETIC ULCER OF LEFT HEEL ASSOCIATED WITH DIABETES MELLITUS DUE TO UNDERLYING CONDITION, UNSPECIFIED ULCER STAGE (HCC): ICD-10-CM

## 2025-07-10 DIAGNOSIS — L97.429 DIABETIC ULCER OF LEFT HEEL ASSOCIATED WITH DIABETES MELLITUS DUE TO UNDERLYING CONDITION, UNSPECIFIED ULCER STAGE (HCC): ICD-10-CM

## 2025-07-10 PROCEDURE — G9710 PT PROV HOSP SRV MSMT PER: HCPCS | Performed by: INTERNAL MEDICINE

## 2025-07-10 PROCEDURE — G8417 CALC BMI ABV UP PARAM F/U: HCPCS | Performed by: INTERNAL MEDICINE

## 2025-07-10 PROCEDURE — 3074F SYST BP LT 130 MM HG: CPT | Performed by: INTERNAL MEDICINE

## 2025-07-10 PROCEDURE — 99212 OFFICE O/P EST SF 10 MIN: CPT | Performed by: INTERNAL MEDICINE

## 2025-07-10 PROCEDURE — G9691 PT HOSP DUR MSMT PERIOD: HCPCS | Performed by: INTERNAL MEDICINE

## 2025-07-10 PROCEDURE — 3078F DIAST BP <80 MM HG: CPT | Performed by: INTERNAL MEDICINE

## 2025-07-10 PROCEDURE — 1036F TOBACCO NON-USER: CPT | Performed by: INTERNAL MEDICINE

## 2025-07-10 PROCEDURE — 99213 OFFICE O/P EST LOW 20 MIN: CPT | Performed by: INTERNAL MEDICINE

## 2025-07-10 PROCEDURE — G8427 DOCREV CUR MEDS BY ELIG CLIN: HCPCS | Performed by: INTERNAL MEDICINE

## 2025-07-10 NOTE — PROGRESS NOTES
Infectious Disease     Patient Name: Gary Turk  Date: 7/10/2025  YOB: 1960  Medical Record Number: [id number]      Chronic osteomyelitis left foot    Readmitted to the hospital late June 2025 chronic left heel ulcer nonhealing  Treated in the past 2024 with extended course of IV antibiotics for osteomyelitis    On June admission had increasing necrosis abnormal MRI      Cultures        Component  Ref Range & Units (hover)    CULTURE WOUND  Abnormal   Direct Exam:  FEW NEUTROPHILS  Direct Exam:  MODERATE GRAM NEGATIVE RODS  Direct Exam:  FEW GRAM POSITIVE COCCI IN PAIRS  Cult,Aerobe/Anaerobe:  No anaerobic organisms isolated at 5 days.  Cult,Aerobe/Anaerobe:  This is a mixed culture of organisms, which may represent  Cult,Aerobe/Anaerobe:  colonization or contamination.  Notify the  laboratory  Cult,Aerobe/Anaerobe:  within 7 days for further workup.  Susceptibilities have  Cult,Aerobe/Anaerobe:  not been performed.  Performed at [facility] [address] [place] [id number] ([phone]    Organism Pseudomonas aeruginosa Abnormal    CULTURE WOUND MODERATE GROWTH  Identification by MALDI-TOF   Organism Providencia stuartii Abnormal    CULTURE WOUND MODERATE GROWTH  Identification by MALDI-TOF   Organism Proteus mirabilis Abnormal    CULTURE WOUND LIGHT GROWTH  Identification by MALDI-TOF   Organism Morganella morganii Abnormal    CULTURE WOUND LIGHT GROWTH  Identification by MALDI-TOF   Resulting Agency [facility]              Narrative  Performed by: [facility]  ORDER#: [id number]                          ORDERED BY: DANIS BARKER  SOURCE: Wound                              COLLECTED:  06/18/25 14:21  ANTIBIOTICS AT [place].:                      RECEIVED :  06/18/25 14:21   Specimen Collected: 06/18/25 14:21 EDT Last Resulted: 06/23/25 08:46 EDT       Discharge back to nursing home 2 weeks IV Zosyn          Review of Systems   Unable to perform ROS: Dementia

## 2025-07-18 NOTE — PROGRESS NOTES
Subjective:      Patient ID: Gary Turk is a pleasant 65 y.o. male who presents today for:  Chief Complaint   Patient presents with    Other     Acute osteomyelitis of left calcaneus (hcc)  (primary encounter diagnosis)  Picc line infection, initial encounter         [facility]  Following up with patient for PICC line complication.  Patient on IV antibiotics until 7/6/2025 however currently nurse complaining of PICC site looking irritated.  On examination there is some mild redness to around the PICC site, no drainage or pus noted.  Plan to draw blood cultures, CBC and BMP tomorrow morning and will DC PICC line if evidence of leukocytosis.  Will change to peripheral line for the remaining days if necessary, or replace PICC.  No further issues or concerns noted otherwise.  Vital signs are stable at present.  Patient seems on perturbed, he is uncooperative however nurse did redirect and allow for visual inspection of his PICC line.      Patient Active Problem List   Diagnosis    Essential hypertension, benign    Irregular heart rhythm    Hyperlipidemia    Allergic rhinitis due to pollen    Staphylococcus aureus bacteremia    Hematoma of right thigh    Hematoma of left lower extremity    Hereditary peripheral neuropathy    Migraine without aura    Community acquired pneumonia of right lower lobe of lung    Mild intermittent asthma with exacerbation    CAD (coronary artery disease)    Body mass index (bmi) 29.0-29.9, adult    Cellulitis of right lower limb    Dyspnea, unspecified    Encounter for pre-employment examination    Family history of ischemic heart disease and other diseases of the circulatory system    Other specified hypothyroidism    Inflammatory disorders of scrotum    Long term (current) use of insulin (HCC)    Long term (current) use of oral hypoglycemic drugs    Other chronic sinusitis    Other idiopathic scoliosis, lumbosacral region    Other intervertebral disc disorders, lumbar region    Pain

## 2025-07-30 ENCOUNTER — OFFICE VISIT (OUTPATIENT)
Dept: GERIATRIC MEDICINE | Age: 65
End: 2025-07-30

## 2025-07-30 DIAGNOSIS — Z79.4 TYPE 2 DIABETES MELLITUS WITH HYPERGLYCEMIA, WITH LONG-TERM CURRENT USE OF INSULIN (HCC): ICD-10-CM

## 2025-07-30 DIAGNOSIS — E11.65 TYPE 2 DIABETES MELLITUS WITH HYPERGLYCEMIA, WITH LONG-TERM CURRENT USE OF INSULIN (HCC): ICD-10-CM

## 2025-07-30 DIAGNOSIS — S91.302D OPEN WOUND OF LEFT FOOT, SUBSEQUENT ENCOUNTER: Primary | ICD-10-CM

## 2025-07-30 DIAGNOSIS — I50.22 CHRONIC SYSTOLIC (CONGESTIVE) HEART FAILURE (HCC): ICD-10-CM

## 2025-08-05 PROCEDURE — 93298 REM INTERROG DEV EVAL SCRMS: CPT | Performed by: INTERNAL MEDICINE

## (undated) PROCEDURE — 0JH632Z INSERTION OF MONITORING DEVICE INTO CHEST SUBCUTANEOUS TISSUE AND FASCIA, PERCUTANEOUS APPROACH: ICD-10-PCS

## (undated) DEVICE — COUNTER NDL 40 COUNT HLD 70 FOAM BLK ADH W/ MAG

## (undated) DEVICE — 3M™ STERI-DRAPE™ U-DRAPE 1015: Brand: STERI-DRAPE™

## (undated) DEVICE — Device: Brand: BOOT LINER, DISPOSABLE

## (undated) DEVICE — PAD,ABDOMINAL,8"X10",ST,LF: Brand: MEDLINE

## (undated) DEVICE — MARKER SURG SKIN GENTIAN VLT REG TIP W/ 6IN RUL

## (undated) DEVICE — SPONGE,LAP,18"X18",DLX,XR,ST,5/PK,40/PK: Brand: MEDLINE

## (undated) DEVICE — TOWEL,OR,DSP,ST,BLUE,STD,4/PK,20PK/CS: Brand: MEDLINE

## (undated) DEVICE — 3M™ STERI-STRIP™ REINFORCED ADHESIVE SKIN CLOSURES, R1547, 1/2 IN X 4 IN (12 MM X 100 MM), 6 STRIPS/ENVELOPE: Brand: 3M™ STERI-STRIP™

## (undated) DEVICE — TUBING, SUCTION, 9/32" X 12', STRAIGHT: Brand: MEDLINE INDUSTRIES, INC.

## (undated) DEVICE — NEEDLE HYPO 25GA L1.5IN BLU POLYPR HUB S STL REG BVL STR

## (undated) DEVICE — ENDO CARRY-ON PROCEDURE KIT: Brand: ENDO CARRY-ON PROCEDURE KIT

## (undated) DEVICE — LABEL MED MINI W/ MARKER

## (undated) DEVICE — STAPLER SKIN H3.9MM WIRE DIA0.58MM CRWN 6.9MM 35 STPL ROT

## (undated) DEVICE — GOWN,AURORA,NONREINFORCED,LARGE: Brand: MEDLINE

## (undated) DEVICE — PACK,ARTHROSCOPY II,SIRUS: Brand: MEDLINE

## (undated) DEVICE — APPLICATOR MEDICATED 26 CC SOLUTION HI LT ORNG CHLORAPREP

## (undated) DEVICE — APPLICATOR MEDICATED 10.5 CC SOLUTION HI LT ORNG CHLORAPREP

## (undated) DEVICE — SYRINGE IRRIG 60ML SFT PLIABLE BLB EZ TO GRP 1 HND USE W/

## (undated) DEVICE — TAMP K09A XPAN INFLAT BONE  SIZE 15/3-RB: Brand: KYPHON XPANDER™ INFLATABLE BONE TAMP

## (undated) DEVICE — Device: Brand: PROTECTORS, LEG SPAR BALL JOINT, 12/PR

## (undated) DEVICE — DRESSING HYDROFIBER AQUACEL AG ADVANTAGE 3.5X6 IN

## (undated) DEVICE — SUTURE VCRL SZ 2-0 L36IN ABSRB UD L36MM CT-1 1/2 CIR J945H

## (undated) DEVICE — Device: Brand: COVER, PERINEAL POST, 12 PK

## (undated) DEVICE — BLADE,CARBON-STEEL,10,STRL,DISPOSABLE,TB: Brand: MEDLINE

## (undated) DEVICE — 1010 S-DRAPE TOWEL DRAPE 10/BX: Brand: STERI-DRAPE™

## (undated) DEVICE — PACK,BASIC: Brand: MEDLINE

## (undated) DEVICE — CATHETER SCLERO L240CM NDL 25GA L4MM SHTH DIA2.3MM CNTRST

## (undated) DEVICE — MARKER SURG SKIN GENTIAN VLT REG TIP W/ 6IN RUL DYNJSM01

## (undated) DEVICE — SINGLE PORT MANIFOLD: Brand: NEPTUNE 2

## (undated) DEVICE — PLATE ES AD W 9FT CRD 2

## (undated) DEVICE — GLOVE ORTHO 8   MSG9480

## (undated) DEVICE — GLOVE ORANGE PI 8 1/2   MSG9085

## (undated) DEVICE — INTENDED FOR TISSUE SEPARATION, AND OTHER PROCEDURES THAT REQUIRE A SHARP SURGICAL BLADE TO PUNCTURE OR CUT.: Brand: BARD-PARKER ® CARBON RIB-BACK BLADES

## (undated) DEVICE — DRAPE,ISOLATION,INCISE,INVISISHIELD: Brand: MEDLINE

## (undated) DEVICE — BANDAGE,GAUZE,BULKEE II,4.5"X4.1YD,STRL: Brand: MEDLINE

## (undated) DEVICE — TUBE SET 96 MM 64 MM H2O PERISTALTIC STD AUX CHANNEL

## (undated) DEVICE — BANDAGE ADH W2XL4IN NITRL FAB STRP CURAD

## (undated) DEVICE — GOWN,SIRUS,POLYRNF,BRTHSLV,XLN/XL,20/CS: Brand: MEDLINE

## (undated) DEVICE — 3M™ IOBAN™ 2 ANTIMICROBIAL INCISE DRAPE 6650EZ: Brand: IOBAN™ 2

## (undated) DEVICE — DRESSING PETRO W3XL8IN OIL EMUL N ADH GZ KNIT IMPREG CELOS

## (undated) DEVICE — BRUSH ENDO CLN L90.5IN SHTH DIA1.7MM BRIST DIA5-7MM 2-6MM

## (undated) DEVICE — FORCEPS BX L240CM JAW DIA2.8MM L CAP W/ NDL MIC MESH TOOTH

## (undated) DEVICE — TUBING, SUCTION, 1/4" X 10', STRAIGHT: Brand: MEDLINE

## (undated) DEVICE — BANDAGE COMPR W4INXL5YD WHT BGE POLY COT M E WRP WV HK AND

## (undated) DEVICE — SUTURE VCRL SZ 0 L36IN ABSRB UD L36MM CT-1 1/2 CIR J946H

## (undated) DEVICE — TUBING IRRIGATION 140/160/180/190 SER GI ENDOSCP SMARTCAP

## (undated) DEVICE — SYRINGE A08A KYPHX XPANDER INFLATION: Brand: KYPHON®  INFLATION SYRINGE

## (undated) DEVICE — GLOVE ORANGE PI 7 1/2   MSG9075

## (undated) DEVICE — STAPLER SKIN H3.9MM WIRE DIA0.58MM CRWN 6.9MM 35 STPL FIX

## (undated) DEVICE — SPONGE GZ W4XL4IN COT 12 PLY TYP VII WVN C FLD DSGN STERILE

## (undated) DEVICE — STRIP WND PK W0.25INXL5YD IODO GZ TIGHTLY WVN CURAD

## (undated) DEVICE — BONE FILLER DEVICE F04B SIZE 3

## (undated) DEVICE — NEEDLE BNE ACCS SZ 3 11GA DMND FOR VERTPLSTY EXPR FRAC

## (undated) DEVICE — ALCOHOL RUBBING ISO 16OZ 70%

## (undated) DEVICE — SYRINGE MED 10ML LUERLOCK TIP W/O SFTY DISP

## (undated) DEVICE — NEEDLE SPINAL 22GA L3.5IN SPINOCAN

## (undated) DEVICE — GAUZE,SPONGE,4"X4",16PLY,XRAY,STRL,LF: Brand: MEDLINE

## (undated) DEVICE — PACK,LAPAROTOMY,NO GOWNS: Brand: MEDLINE

## (undated) DEVICE — COVER LT HNDL BLU PLAS

## (undated) DEVICE — TRAP POLYP BALEEN

## (undated) DEVICE — GUIDEWIRE ORTH L400MM DIA3.2MM FOR TFN

## (undated) DEVICE — GLOVE ORANGE PI 8   MSG9080

## (undated) DEVICE — GLOVE ORANGE PI 7   MSG9070

## (undated) DEVICE — SNAP KOVER: Brand: UNBRANDED

## (undated) DEVICE — 1810 FOAM BLOCK NEEDLE COUNTER: Brand: DEVON

## (undated) DEVICE — ELECTRODE PT RET AD L9FT HI MOIST COND ADH HYDRGEL CORDED

## (undated) DEVICE — NEPTUNE E-SEP SMOKE EVACUATION PENCIL, COATED, 70MM BLADE, PUSH BUTTON SWITCH: Brand: NEPTUNE E-SEP

## (undated) DEVICE — SHEET,DRAPE,53X77,STERILE: Brand: MEDLINE

## (undated) DEVICE — SNARE ENDOSCP L240CM OD24MM LOOP W10MM RND INSUL STIFF BRAID

## (undated) DEVICE — MONITOR CRD 1.4 CC 3.4 GM INSERTABLE LINQ

## (undated) DEVICE — SUTURE STRATAFIX SPRL MCRYL + SZ 3 0 L8IN ABSRB UD L26MM SH SXMP1B427

## (undated) DEVICE — GAUZE,SPONGE,FLUFF,6"X6.75",STRL,10/TRAY: Brand: MEDLINE

## (undated) DEVICE — BIT DRL L330MM DIA4.2MM CALIB 100MM 3 FLUT QUIK CPL

## (undated) DEVICE — GOWN,AURORA,NONRNF,XL,30/CS: Brand: MEDLINE

## (undated) DEVICE — INTRODUCER T15D OIS BLUNT: Brand: KYPHON® OSTEO INTRODUCER™ SYSTEM

## (undated) DEVICE — GLOVE SURG SZ 8 L12IN FNGR THK79MIL GRN LTX FREE

## (undated) DEVICE — PADDING CAST W4INXL4YD HIGHLY ABSRB THAN COT EZ APPL

## (undated) DEVICE — BLADE,CARBON-STEEL,15,STRL,DISPOSABLE,TB: Brand: MEDLINE

## (undated) DEVICE — SHOE CAST HK  LOOP CLOSURE LG 11-13 IN M BLK PROCARE